# Patient Record
Sex: FEMALE | Race: WHITE | Employment: OTHER
[De-identification: names, ages, dates, MRNs, and addresses within clinical notes are randomized per-mention and may not be internally consistent; named-entity substitution may affect disease eponyms.]

---

## 2017-01-20 ENCOUNTER — OFFICE VISIT (OUTPATIENT)
Dept: FAMILY MEDICINE CLINIC | Facility: CLINIC | Age: 52
End: 2017-01-20

## 2017-01-20 VITALS
BODY MASS INDEX: 34.16 KG/M2 | HEART RATE: 64 BPM | WEIGHT: 199 LBS | DIASTOLIC BLOOD PRESSURE: 84 MMHG | SYSTOLIC BLOOD PRESSURE: 122 MMHG | TEMPERATURE: 97.6 F | RESPIRATION RATE: 18 BRPM | OXYGEN SATURATION: 93 %

## 2017-01-20 DIAGNOSIS — F33.2 MAJOR DEPRESSIVE DISORDER, RECURRENT, SEVERE WITHOUT PSYCHOTIC FEATURES (HCC): ICD-10-CM

## 2017-01-20 DIAGNOSIS — E78.2 MIXED HYPERLIPIDEMIA: ICD-10-CM

## 2017-01-20 DIAGNOSIS — I10 ESSENTIAL HYPERTENSION: Primary | ICD-10-CM

## 2017-01-20 PROCEDURE — 99214 OFFICE O/P EST MOD 30 MIN: CPT | Performed by: NURSE PRACTITIONER

## 2017-01-20 ASSESSMENT — ENCOUNTER SYMPTOMS
SINUS PRESSURE: 0
COLOR CHANGE: 0
BLOOD IN STOOL: 0
BLURRED VISION: 0
SHORTNESS OF BREATH: 0
WHEEZING: 0
CONSTIPATION: 0
SORE THROAT: 0
DIARRHEA: 0
ABDOMINAL PAIN: 0
CHEST TIGHTNESS: 0
EYE PAIN: 0
EYE ITCHING: 0
NAUSEA: 0
COUGH: 0
ABDOMINAL DISTENTION: 0

## 2017-02-01 DIAGNOSIS — I10 ESSENTIAL HYPERTENSION: ICD-10-CM

## 2017-02-01 RX ORDER — FUROSEMIDE 20 MG/1
TABLET ORAL
Qty: 30 TABLET | Refills: 5 | Status: SHIPPED | OUTPATIENT
Start: 2017-02-01 | End: 2017-12-29

## 2017-02-01 RX ORDER — METOCLOPRAMIDE 10 MG/1
TABLET ORAL
Qty: 30 TABLET | Refills: 5 | Status: SHIPPED | OUTPATIENT
Start: 2017-02-01 | End: 2017-12-29 | Stop reason: ALTCHOICE

## 2017-02-01 RX ORDER — LEVETIRACETAM 500 MG/1
TABLET ORAL
Qty: 60 TABLET | Refills: 5 | Status: SHIPPED | OUTPATIENT
Start: 2017-02-01 | End: 2017-12-08 | Stop reason: SDUPTHER

## 2017-02-02 RX ORDER — WARFARIN SODIUM 6 MG/1
6 TABLET ORAL DAILY
Qty: 30 TABLET | Refills: 3 | OUTPATIENT
Start: 2017-02-02

## 2017-02-02 RX ORDER — LEVETIRACETAM 250 MG/1
250 TABLET ORAL EVERY MORNING
Qty: 60 TABLET | Refills: 3 | OUTPATIENT
Start: 2017-02-02

## 2017-02-02 RX ORDER — LOSARTAN POTASSIUM 25 MG/1
25 TABLET ORAL DAILY
Qty: 30 TABLET | Refills: 3 | OUTPATIENT
Start: 2017-02-02

## 2017-02-02 RX ORDER — OMEPRAZOLE 20 MG/1
20 TABLET, DELAYED RELEASE ORAL DAILY
Qty: 30 TABLET | Refills: 3 | OUTPATIENT
Start: 2017-02-02

## 2017-02-07 ENCOUNTER — HOSPITAL ENCOUNTER (EMERGENCY)
Age: 52
Discharge: HOME | End: 2017-02-07
Attending: EMERGENCY MEDICINE
Payer: COMMERCIAL

## 2017-02-07 VITALS
TEMPERATURE: 98.6 F | OXYGEN SATURATION: 97 % | BODY MASS INDEX: 33.29 KG/M2 | DIASTOLIC BLOOD PRESSURE: 84 MMHG | RESPIRATION RATE: 16 BRPM | SYSTOLIC BLOOD PRESSURE: 121 MMHG | WEIGHT: 195 LBS | HEART RATE: 73 BPM | HEIGHT: 64 IN

## 2017-02-07 DIAGNOSIS — R79.1 ELEVATED INR: ICD-10-CM

## 2017-02-07 DIAGNOSIS — K57.32 DIVERTICULITIS OF COLON: Primary | ICD-10-CM

## 2017-02-07 LAB
ABSOLUTE EOS #: 0.1 K/UL (ref 0–0.4)
ABSOLUTE LYMPH #: 2.6 K/UL (ref 1–4.8)
ABSOLUTE MONO #: 0.5 K/UL (ref 0.1–1.3)
ALBUMIN SERPL-MCNC: 3.8 G/DL (ref 3.5–5.2)
ALBUMIN/GLOBULIN RATIO: ABNORMAL (ref 1–2.5)
ALP BLD-CCNC: 154 U/L (ref 35–104)
ALT SERPL-CCNC: 17 U/L (ref 5–33)
ANION GAP SERPL CALCULATED.3IONS-SCNC: 12 MMOL/L (ref 9–17)
AST SERPL-CCNC: 16 U/L
BASOPHILS # BLD: 1 % (ref 0–2)
BASOPHILS ABSOLUTE: 0.1 K/UL (ref 0–0.2)
BILIRUB SERPL-MCNC: 0.21 MG/DL (ref 0.3–1.2)
BILIRUBIN DIRECT: <0.08 MG/DL
BILIRUBIN, INDIRECT: ABNORMAL MG/DL (ref 0–1)
BUN BLDV-MCNC: 15 MG/DL (ref 6–20)
BUN/CREAT BLD: ABNORMAL (ref 9–20)
CALCIUM SERPL-MCNC: 8.8 MG/DL (ref 8.6–10.4)
CHLORIDE BLD-SCNC: 102 MMOL/L (ref 98–107)
CO2: 27 MMOL/L (ref 20–31)
CREAT SERPL-MCNC: 0.82 MG/DL (ref 0.5–0.9)
DIFFERENTIAL TYPE: ABNORMAL
EOSINOPHILS RELATIVE PERCENT: 2 % (ref 0–4)
GFR AFRICAN AMERICAN: >60 ML/MIN
GFR NON-AFRICAN AMERICAN: >60 ML/MIN
GFR SERPL CREATININE-BSD FRML MDRD: ABNORMAL ML/MIN/{1.73_M2}
GFR SERPL CREATININE-BSD FRML MDRD: ABNORMAL ML/MIN/{1.73_M2}
GLOBULIN: ABNORMAL G/DL (ref 1.5–3.8)
GLUCOSE BLD-MCNC: 123 MG/DL (ref 70–99)
HCT VFR BLD CALC: 34.6 % (ref 36–46)
HEMOGLOBIN: 11.6 G/DL (ref 12–16)
INR BLD: 3.7
LIPASE: 15 U/L (ref 13–60)
LYMPHOCYTES # BLD: 36 % (ref 24–44)
MAGNESIUM: 1.9 MG/DL (ref 1.6–2.6)
MCH RBC QN AUTO: 28 PG (ref 26–34)
MCHC RBC AUTO-ENTMCNC: 33.4 G/DL (ref 31–37)
MCV RBC AUTO: 83.6 FL (ref 80–100)
MONOCYTES # BLD: 7 % (ref 1–7)
PDW BLD-RTO: 14.8 % (ref 11.5–14.9)
PLATELET # BLD: 240 K/UL (ref 150–450)
PLATELET ESTIMATE: ABNORMAL
PMV BLD AUTO: 8.4 FL (ref 6–12)
POTASSIUM SERPL-SCNC: 3.7 MMOL/L (ref 3.7–5.3)
PROTHROMBIN TIME: 41.5 SEC (ref 9.7–12)
RBC # BLD: 4.14 M/UL (ref 4–5.2)
RBC # BLD: ABNORMAL 10*6/UL
SEG NEUTROPHILS: 54 % (ref 36–66)
SEGMENTED NEUTROPHILS ABSOLUTE COUNT: 4 K/UL (ref 1.3–9.1)
SODIUM BLD-SCNC: 141 MMOL/L (ref 135–144)
TOTAL PROTEIN: 6.9 G/DL (ref 6.4–8.3)
WBC # BLD: 7.3 K/UL (ref 3.5–11)
WBC # BLD: ABNORMAL 10*3/UL

## 2017-02-07 PROCEDURE — 36415 COLL VENOUS BLD VENIPUNCTURE: CPT

## 2017-02-07 PROCEDURE — 87505 NFCT AGENT DETECTION GI: CPT

## 2017-02-07 PROCEDURE — 99284 EMERGENCY DEPT VISIT MOD MDM: CPT

## 2017-02-07 PROCEDURE — 80076 HEPATIC FUNCTION PANEL: CPT

## 2017-02-07 PROCEDURE — 80048 BASIC METABOLIC PNL TOTAL CA: CPT

## 2017-02-07 PROCEDURE — 6360000002 HC RX W HCPCS: Performed by: EMERGENCY MEDICINE

## 2017-02-07 PROCEDURE — 83735 ASSAY OF MAGNESIUM: CPT

## 2017-02-07 PROCEDURE — 87177 OVA AND PARASITES SMEARS: CPT

## 2017-02-07 PROCEDURE — 6370000000 HC RX 637 (ALT 250 FOR IP): Performed by: EMERGENCY MEDICINE

## 2017-02-07 PROCEDURE — 96375 TX/PRO/DX INJ NEW DRUG ADDON: CPT

## 2017-02-07 PROCEDURE — 83690 ASSAY OF LIPASE: CPT

## 2017-02-07 PROCEDURE — 85025 COMPLETE CBC W/AUTO DIFF WBC: CPT

## 2017-02-07 PROCEDURE — 87209 SMEAR COMPLEX STAIN: CPT

## 2017-02-07 PROCEDURE — 85610 PROTHROMBIN TIME: CPT

## 2017-02-07 PROCEDURE — 87493 C DIFF AMPLIFIED PROBE: CPT

## 2017-02-07 PROCEDURE — 96374 THER/PROPH/DIAG INJ IV PUSH: CPT

## 2017-02-07 RX ORDER — AMOXICILLIN AND CLAVULANATE POTASSIUM 875; 125 MG/1; MG/1
1 TABLET, FILM COATED ORAL ONCE
Status: COMPLETED | OUTPATIENT
Start: 2017-02-07 | End: 2017-02-07

## 2017-02-07 RX ORDER — PANTOPRAZOLE SODIUM 20 MG/1
20 TABLET, DELAYED RELEASE ORAL EVERY MORNING
COMMUNITY
End: 2017-10-17 | Stop reason: ALTCHOICE

## 2017-02-07 RX ORDER — AMOXICILLIN AND CLAVULANATE POTASSIUM 875; 125 MG/1; MG/1
1 TABLET, FILM COATED ORAL 2 TIMES DAILY
Qty: 20 TABLET | Refills: 0 | Status: SHIPPED | OUTPATIENT
Start: 2017-02-07 | End: 2017-02-17

## 2017-02-07 RX ORDER — WARFARIN SODIUM 5 MG/1
15 TABLET ORAL
COMMUNITY
End: 2017-10-17 | Stop reason: DRUGHIGH

## 2017-02-07 RX ORDER — LOSARTAN POTASSIUM 25 MG/1
12.5 TABLET ORAL DAILY
COMMUNITY
End: 2018-01-09

## 2017-02-07 RX ORDER — ONDANSETRON 2 MG/ML
4 INJECTION INTRAMUSCULAR; INTRAVENOUS ONCE
Status: COMPLETED | OUTPATIENT
Start: 2017-02-07 | End: 2017-02-07

## 2017-02-07 RX ORDER — PROMETHAZINE HYDROCHLORIDE 12.5 MG/1
12.5 TABLET ORAL EVERY 6 HOURS PRN
COMMUNITY
End: 2017-12-29 | Stop reason: ALTCHOICE

## 2017-02-07 RX ORDER — DIPHENOXYLATE HYDROCHLORIDE AND ATROPINE SULFATE 2.5; .025 MG/1; MG/1
2 TABLET ORAL EVERY 8 HOURS PRN
COMMUNITY
End: 2017-11-06 | Stop reason: SDUPTHER

## 2017-02-07 RX ORDER — HYDROCODONE BITARTRATE AND ACETAMINOPHEN 5; 325 MG/1; MG/1
1 TABLET ORAL EVERY 6 HOURS PRN
Qty: 10 TABLET | Refills: 0 | Status: SHIPPED | OUTPATIENT
Start: 2017-02-07 | End: 2017-02-14

## 2017-02-07 RX ADMIN — AMOXICILLIN AND CLAVULANATE POTASSIUM 1 TABLET: 875; 125 TABLET, FILM COATED ORAL at 20:54

## 2017-02-07 RX ADMIN — HYDROMORPHONE HYDROCHLORIDE 1 MG: 1 INJECTION, SOLUTION INTRAMUSCULAR; INTRAVENOUS; SUBCUTANEOUS at 18:23

## 2017-02-07 RX ADMIN — ONDANSETRON 4 MG: 2 INJECTION INTRAMUSCULAR; INTRAVENOUS at 18:23

## 2017-02-07 ASSESSMENT — PAIN DESCRIPTION - LOCATION: LOCATION: ABDOMEN

## 2017-02-07 ASSESSMENT — PAIN SCALES - GENERAL
PAINLEVEL_OUTOF10: 10
PAINLEVEL_OUTOF10: 10

## 2017-02-07 ASSESSMENT — PAIN DESCRIPTION - ORIENTATION: ORIENTATION: RIGHT;LOWER

## 2017-02-07 ASSESSMENT — PAIN DESCRIPTION - PAIN TYPE: TYPE: ACUTE PAIN

## 2017-02-08 ENCOUNTER — CARE COORDINATION (OUTPATIENT)
Dept: CARE COORDINATION | Facility: CLINIC | Age: 52
End: 2017-02-08

## 2017-02-08 ASSESSMENT — ENCOUNTER SYMPTOMS
VOMITING: 1
NAUSEA: 1
COUGH: 0
BACK PAIN: 0
SHORTNESS OF BREATH: 0
ABDOMINAL PAIN: 1
DIARRHEA: 1

## 2017-02-20 ENCOUNTER — HOSPITAL ENCOUNTER (OUTPATIENT)
Age: 52
Setting detail: SPECIMEN
Discharge: HOME | End: 2017-02-20
Payer: COMMERCIAL

## 2017-02-20 LAB
INR BLD: 1.8
PROTHROMBIN TIME: 18.8 SEC (ref 9.4–12.6)

## 2017-02-23 ENCOUNTER — HOSPITAL ENCOUNTER (OUTPATIENT)
Age: 52
Setting detail: SPECIMEN
Discharge: HOME | End: 2017-02-23
Payer: COMMERCIAL

## 2017-02-23 LAB
INR BLD: 1.3
PROTHROMBIN TIME: 14.2 SEC (ref 9.4–12.6)

## 2017-03-12 ENCOUNTER — APPOINTMENT (OUTPATIENT)
Dept: GENERAL RADIOLOGY | Age: 52
End: 2017-03-12
Payer: COMMERCIAL

## 2017-03-12 ENCOUNTER — HOSPITAL ENCOUNTER (EMERGENCY)
Age: 52
Discharge: HOME | End: 2017-03-12
Attending: EMERGENCY MEDICINE
Payer: COMMERCIAL

## 2017-03-12 VITALS
SYSTOLIC BLOOD PRESSURE: 108 MMHG | OXYGEN SATURATION: 100 % | HEIGHT: 64 IN | HEART RATE: 63 BPM | BODY MASS INDEX: 34.15 KG/M2 | RESPIRATION RATE: 16 BRPM | WEIGHT: 200 LBS | DIASTOLIC BLOOD PRESSURE: 64 MMHG | TEMPERATURE: 98.2 F

## 2017-03-12 DIAGNOSIS — R10.9 CHRONIC ABDOMINAL PAIN: Primary | ICD-10-CM

## 2017-03-12 DIAGNOSIS — G89.29 CHRONIC ABDOMINAL PAIN: Primary | ICD-10-CM

## 2017-03-12 LAB
-: NORMAL
ABSOLUTE EOS #: 0.1 K/UL (ref 0–0.4)
ABSOLUTE LYMPH #: 2 K/UL (ref 1–4.8)
ABSOLUTE MONO #: 0.3 K/UL (ref 0.1–1.3)
ALBUMIN SERPL-MCNC: 4.2 G/DL (ref 3.5–5.2)
ALBUMIN/GLOBULIN RATIO: ABNORMAL (ref 1–2.5)
ALP BLD-CCNC: 163 U/L (ref 35–104)
ALT SERPL-CCNC: 23 U/L (ref 5–33)
AMORPHOUS: NORMAL
ANION GAP SERPL CALCULATED.3IONS-SCNC: 13 MMOL/L (ref 9–17)
AST SERPL-CCNC: 24 U/L
BACTERIA: NORMAL
BASOPHILS # BLD: 1 % (ref 0–2)
BASOPHILS ABSOLUTE: 0 K/UL (ref 0–0.2)
BILIRUB SERPL-MCNC: 0.35 MG/DL (ref 0.3–1.2)
BILIRUBIN URINE: NEGATIVE
BUN BLDV-MCNC: 13 MG/DL (ref 6–20)
BUN/CREAT BLD: ABNORMAL (ref 9–20)
CALCIUM SERPL-MCNC: 8.9 MG/DL (ref 8.6–10.4)
CASTS UA: NORMAL /LPF
CHLORIDE BLD-SCNC: 99 MMOL/L (ref 98–107)
CO2: 27 MMOL/L (ref 20–31)
COLOR: YELLOW
COMMENT UA: ABNORMAL
CREAT SERPL-MCNC: 0.74 MG/DL (ref 0.5–0.9)
CRYSTALS, UA: NORMAL /HPF
DIFFERENTIAL TYPE: ABNORMAL
EOSINOPHILS RELATIVE PERCENT: 2 % (ref 0–4)
EPITHELIAL CELLS UA: NORMAL /HPF
GFR AFRICAN AMERICAN: >60 ML/MIN
GFR NON-AFRICAN AMERICAN: >60 ML/MIN
GFR SERPL CREATININE-BSD FRML MDRD: ABNORMAL ML/MIN/{1.73_M2}
GFR SERPL CREATININE-BSD FRML MDRD: ABNORMAL ML/MIN/{1.73_M2}
GLUCOSE BLD-MCNC: 102 MG/DL (ref 70–99)
GLUCOSE URINE: NEGATIVE
HCT VFR BLD CALC: 38.6 % (ref 36–46)
HEMOGLOBIN: 12.5 G/DL (ref 12–16)
INR BLD: 2.6
KETONES, URINE: NEGATIVE
LEUKOCYTE ESTERASE, URINE: NEGATIVE
LIPASE: 15 U/L (ref 13–60)
LYMPHOCYTES # BLD: 35 % (ref 24–44)
MCH RBC QN AUTO: 26.9 PG (ref 26–34)
MCHC RBC AUTO-ENTMCNC: 32.4 G/DL (ref 31–37)
MCV RBC AUTO: 83.1 FL (ref 80–100)
MONOCYTES # BLD: 6 % (ref 1–7)
MUCUS: NORMAL
NITRITE, URINE: NEGATIVE
OTHER OBSERVATIONS UA: NORMAL
PARTIAL THROMBOPLASTIN TIME: 43.3 SEC (ref 23–31)
PDW BLD-RTO: 15.3 % (ref 11.5–14.9)
PH UA: 5.5 (ref 5–8)
PLATELET # BLD: 231 K/UL (ref 150–450)
PLATELET ESTIMATE: ABNORMAL
PMV BLD AUTO: 8.4 FL (ref 6–12)
POTASSIUM SERPL-SCNC: 3.7 MMOL/L (ref 3.7–5.3)
PROTEIN UA: NEGATIVE
PROTHROMBIN TIME: 28.8 SEC (ref 9.7–12)
RBC # BLD: 4.65 M/UL (ref 4–5.2)
RBC # BLD: ABNORMAL 10*6/UL
RBC UA: NORMAL /HPF
RENAL EPITHELIAL, UA: NORMAL /HPF
SEG NEUTROPHILS: 56 % (ref 36–66)
SEGMENTED NEUTROPHILS ABSOLUTE COUNT: 3.2 K/UL (ref 1.3–9.1)
SODIUM BLD-SCNC: 139 MMOL/L (ref 135–144)
SPECIFIC GRAVITY UA: 1.01 (ref 1–1.03)
TOTAL PROTEIN: 7.3 G/DL (ref 6.4–8.3)
TRICHOMONAS: NORMAL
TURBIDITY: ABNORMAL
URINE HGB: NEGATIVE
UROBILINOGEN, URINE: NORMAL
WBC # BLD: 5.8 K/UL (ref 3.5–11)
WBC # BLD: ABNORMAL 10*3/UL
WBC UA: NORMAL /HPF
YEAST: NORMAL

## 2017-03-12 PROCEDURE — 2580000003 HC RX 258: Performed by: EMERGENCY MEDICINE

## 2017-03-12 PROCEDURE — 80053 COMPREHEN METABOLIC PANEL: CPT

## 2017-03-12 PROCEDURE — 85025 COMPLETE CBC W/AUTO DIFF WBC: CPT

## 2017-03-12 PROCEDURE — 81001 URINALYSIS AUTO W/SCOPE: CPT

## 2017-03-12 PROCEDURE — 83690 ASSAY OF LIPASE: CPT

## 2017-03-12 PROCEDURE — 96372 THER/PROPH/DIAG INJ SC/IM: CPT

## 2017-03-12 PROCEDURE — 74022 RADEX COMPL AQT ABD SERIES: CPT

## 2017-03-12 PROCEDURE — 6360000002 HC RX W HCPCS: Performed by: EMERGENCY MEDICINE

## 2017-03-12 PROCEDURE — 96374 THER/PROPH/DIAG INJ IV PUSH: CPT

## 2017-03-12 PROCEDURE — 99284 EMERGENCY DEPT VISIT MOD MDM: CPT

## 2017-03-12 PROCEDURE — 85730 THROMBOPLASTIN TIME PARTIAL: CPT

## 2017-03-12 PROCEDURE — 36415 COLL VENOUS BLD VENIPUNCTURE: CPT

## 2017-03-12 PROCEDURE — 85610 PROTHROMBIN TIME: CPT

## 2017-03-12 RX ORDER — DICYCLOMINE HYDROCHLORIDE 10 MG/ML
20 INJECTION INTRAMUSCULAR ONCE
Status: COMPLETED | OUTPATIENT
Start: 2017-03-12 | End: 2017-03-12

## 2017-03-12 RX ORDER — ONDANSETRON 2 MG/ML
4 INJECTION INTRAMUSCULAR; INTRAVENOUS ONCE
Status: COMPLETED | OUTPATIENT
Start: 2017-03-12 | End: 2017-03-12

## 2017-03-12 RX ORDER — 0.9 % SODIUM CHLORIDE 0.9 %
1000 INTRAVENOUS SOLUTION INTRAVENOUS ONCE
Status: COMPLETED | OUTPATIENT
Start: 2017-03-12 | End: 2017-03-12

## 2017-03-12 RX ORDER — ONDANSETRON 4 MG/1
4 TABLET, ORALLY DISINTEGRATING ORAL EVERY 8 HOURS PRN
Qty: 10 TABLET | Refills: 0 | Status: SHIPPED | OUTPATIENT
Start: 2017-03-12 | End: 2017-10-17 | Stop reason: ALTCHOICE

## 2017-03-12 RX ORDER — DICYCLOMINE HYDROCHLORIDE 10 MG/1
10 CAPSULE ORAL EVERY 6 HOURS PRN
Qty: 20 CAPSULE | Refills: 0 | Status: SHIPPED | OUTPATIENT
Start: 2017-03-12 | End: 2017-12-29 | Stop reason: ALTCHOICE

## 2017-03-12 RX ADMIN — ONDANSETRON 4 MG: 2 INJECTION INTRAMUSCULAR; INTRAVENOUS at 12:41

## 2017-03-12 RX ADMIN — DICYCLOMINE HYDROCHLORIDE 20 MG: 20 INJECTION, SOLUTION INTRAMUSCULAR at 12:43

## 2017-03-12 RX ADMIN — SODIUM CHLORIDE 1000 ML: 9 INJECTION, SOLUTION INTRAVENOUS at 12:38

## 2017-03-12 ASSESSMENT — ENCOUNTER SYMPTOMS
RHINORRHEA: 0
FACIAL SWELLING: 0
DIARRHEA: 1
EYE REDNESS: 0
BLOOD IN STOOL: 0
NAUSEA: 1
WHEEZING: 0
VOMITING: 0
ABDOMINAL PAIN: 1
BACK PAIN: 0
EYE PAIN: 0
COLOR CHANGE: 0
SINUS PRESSURE: 0
TROUBLE SWALLOWING: 0
CONSTIPATION: 0
SORE THROAT: 0
SHORTNESS OF BREATH: 0
CHEST TIGHTNESS: 0
EYE DISCHARGE: 0
COUGH: 0

## 2017-03-12 ASSESSMENT — PAIN DESCRIPTION - PAIN TYPE: TYPE: ACUTE PAIN

## 2017-03-12 ASSESSMENT — PAIN DESCRIPTION - ORIENTATION: ORIENTATION: RIGHT;LOWER

## 2017-03-12 ASSESSMENT — PAIN SCALES - GENERAL: PAINLEVEL_OUTOF10: 10

## 2017-03-12 ASSESSMENT — PAIN DESCRIPTION - LOCATION: LOCATION: ABDOMEN

## 2017-03-14 ENCOUNTER — TELEPHONE (OUTPATIENT)
Dept: FAMILY MEDICINE CLINIC | Age: 52
End: 2017-03-14

## 2017-04-11 RX ORDER — GLUCOSAM/CHON-MSM1/C/MANG/BOSW 500-416.6
1 TABLET ORAL DAILY
Qty: 100 EACH | Refills: 3 | Status: SHIPPED | OUTPATIENT
Start: 2017-04-11

## 2017-04-21 DIAGNOSIS — Z12.39 ENCOUNTER FOR SCREENING FOR MALIGNANT NEOPLASM OF BREAST: Primary | ICD-10-CM

## 2017-07-17 ENCOUNTER — OFFICE VISIT (OUTPATIENT)
Dept: FAMILY MEDICINE CLINIC | Age: 52
End: 2017-07-17
Payer: MEDICARE

## 2017-07-17 VITALS
HEART RATE: 76 BPM | DIASTOLIC BLOOD PRESSURE: 78 MMHG | BODY MASS INDEX: 32.82 KG/M2 | SYSTOLIC BLOOD PRESSURE: 122 MMHG | OXYGEN SATURATION: 98 % | WEIGHT: 191.2 LBS | TEMPERATURE: 97.5 F | RESPIRATION RATE: 18 BRPM

## 2017-07-17 DIAGNOSIS — G89.29 CHRONIC RIGHT-SIDED LOW BACK PAIN WITH RIGHT-SIDED SCIATICA: Primary | ICD-10-CM

## 2017-07-17 DIAGNOSIS — M54.41 CHRONIC RIGHT-SIDED LOW BACK PAIN WITH RIGHT-SIDED SCIATICA: Primary | ICD-10-CM

## 2017-07-17 PROCEDURE — 99213 OFFICE O/P EST LOW 20 MIN: CPT | Performed by: NURSE PRACTITIONER

## 2017-07-17 RX ORDER — CYCLOBENZAPRINE HCL 10 MG
10 TABLET ORAL 2 TIMES DAILY PRN
Qty: 20 TABLET | Refills: 0 | Status: SHIPPED | OUTPATIENT
Start: 2017-07-17 | End: 2017-07-27

## 2017-07-20 ASSESSMENT — ENCOUNTER SYMPTOMS
SINUS PRESSURE: 0
COUGH: 0
EYE ITCHING: 0
WHEEZING: 0
SORE THROAT: 0
ABDOMINAL DISTENTION: 0
NAUSEA: 0
ABDOMINAL PAIN: 0
CONSTIPATION: 0
DIARRHEA: 0
BLOOD IN STOOL: 0
CHEST TIGHTNESS: 0
SHORTNESS OF BREATH: 0
EYE PAIN: 0
COLOR CHANGE: 0

## 2017-07-22 ASSESSMENT — ENCOUNTER SYMPTOMS: BACK PAIN: 1

## 2017-07-31 ENCOUNTER — HOSPITAL ENCOUNTER (OUTPATIENT)
Dept: PAIN MANAGEMENT | Age: 52
Discharge: HOME | End: 2017-07-31
Payer: MEDICARE

## 2017-07-31 VITALS
TEMPERATURE: 98.6 F | HEIGHT: 64 IN | RESPIRATION RATE: 18 BRPM | OXYGEN SATURATION: 98 % | DIASTOLIC BLOOD PRESSURE: 77 MMHG | HEART RATE: 76 BPM | SYSTOLIC BLOOD PRESSURE: 137 MMHG | WEIGHT: 196 LBS | BODY MASS INDEX: 33.46 KG/M2

## 2017-07-31 DIAGNOSIS — M47.817 LUMBOSACRAL SPONDYLOSIS WITHOUT MYELOPATHY: Primary | ICD-10-CM

## 2017-07-31 DIAGNOSIS — M51.36 DDD (DEGENERATIVE DISC DISEASE), LUMBAR: ICD-10-CM

## 2017-07-31 DIAGNOSIS — M46.1 SACROILIITIS (HCC): ICD-10-CM

## 2017-07-31 DIAGNOSIS — M47.816 LUMBAR FACET ARTHROPATHY: ICD-10-CM

## 2017-07-31 PROCEDURE — G0463 HOSPITAL OUTPT CLINIC VISIT: HCPCS

## 2017-07-31 PROCEDURE — 99204 OFFICE O/P NEW MOD 45 MIN: CPT | Performed by: PAIN MEDICINE

## 2017-07-31 PROCEDURE — 99204 OFFICE O/P NEW MOD 45 MIN: CPT

## 2017-07-31 PROCEDURE — 80307 DRUG TEST PRSMV CHEM ANLYZR: CPT

## 2017-07-31 ASSESSMENT — PAIN DESCRIPTION - DESCRIPTORS: DESCRIPTORS: ACHING;CONSTANT

## 2017-07-31 ASSESSMENT — PAIN DESCRIPTION - DIRECTION: RADIATING_TOWARDS: DOWN RIGHT LEG

## 2017-07-31 ASSESSMENT — ENCOUNTER SYMPTOMS
VOMITING: 0
CONSTIPATION: 1
COUGH: 0
PHOTOPHOBIA: 0
NAUSEA: 0
BACK PAIN: 1
SHORTNESS OF BREATH: 0
HEARTBURN: 0
BLURRED VISION: 0
DIARRHEA: 0

## 2017-07-31 ASSESSMENT — PAIN DESCRIPTION - PAIN TYPE: TYPE: CHRONIC PAIN

## 2017-07-31 ASSESSMENT — PAIN DESCRIPTION - LOCATION: LOCATION: BACK;LEG

## 2017-07-31 ASSESSMENT — PAIN DESCRIPTION - ONSET: ONSET: SUDDEN

## 2017-07-31 ASSESSMENT — PAIN DESCRIPTION - FREQUENCY: FREQUENCY: CONTINUOUS

## 2017-07-31 ASSESSMENT — PAIN DESCRIPTION - ORIENTATION: ORIENTATION: RIGHT

## 2017-07-31 ASSESSMENT — PAIN SCALES - GENERAL: PAINLEVEL_OUTOF10: 8

## 2017-08-04 LAB
6-ACETYLMORPHINE, UR: NOT DETECTED
7-AMINOCLONAZEPAM, URINE: NOT DETECTED
ALPHA-OH-ALPRAZ, URINE: NOT DETECTED
ALPRAZOLAM, URINE: NOT DETECTED
AMPHETAMINES, URINE: NOT DETECTED
BARBITURATES, URINE: NOT DETECTED
BENZOYLECGONINE, UR: NOT DETECTED
BUPRENORPHINE URINE: NOT DETECTED
CARISOPRODOL, UR: NOT DETECTED
CLONAZEPAM, URINE: NOT DETECTED
CODEINE, URINE: NOT DETECTED
CREATININE URINE: 59.3 MG/DL (ref 20–400)
DIAZEPAM, URINE: NOT DETECTED
DRUGS EXPECTED, UR: NORMAL
EER HI RES INTERP UR: NORMAL
ETHYL GLUCURONIDE UR: NOT DETECTED
FENTANYL URINE: NOT DETECTED
HYDROCODONE, URINE: NOT DETECTED
HYDROMORPHONE, URINE: NOT DETECTED
LORAZEPAM, URINE: NOT DETECTED
MARIJUANA METAB, UR: NOT DETECTED
MDA, UR: NOT DETECTED
MDEA, EVE, UR: NOT DETECTED
MDMA URINE: NOT DETECTED
MEPERIDINE METAB, UR: NOT DETECTED
METHADONE, URINE: NOT DETECTED
METHAMPHETAMINE, URINE: NOT DETECTED
METHYLPHENIDATE: NOT DETECTED
MIDAZOLAM, URINE: NOT DETECTED
MORPHINE URINE: NOT DETECTED
NORBUPRENORPHINE, URINE: NOT DETECTED
NORDIAZEPAM, URINE: NOT DETECTED
NORFENTANYL, URINE: NOT DETECTED
NORHYDROCODONE, URINE: NOT DETECTED
NOROXYCODONE, URINE: NOT DETECTED
NOROXYMORPHONE, URINE: NOT DETECTED
OXAZEPAM, URINE: NOT DETECTED
OXYCODONE URINE: NOT DETECTED
OXYMORPHONE, URINE: NOT DETECTED
PAIN MANAGEMENT DRUG PANEL INTERP, URINE: NORMAL
PAIN MGT DRUG PANEL, HI RES, UR: NORMAL
PCP,URINE: NOT DETECTED
PHENTERMINE, UR: NOT DETECTED
PROPOXYPHENE, URINE: NOT DETECTED
TAPENTADOL, URINE: NOT DETECTED
TAPENTADOL-O-SULFATE, URINE: NOT DETECTED
TEMAZEPAM, URINE: NOT DETECTED
TRAMADOL, URINE: NOT DETECTED
ZOLPIDEM, URINE: NOT DETECTED

## 2017-08-14 ENCOUNTER — OFFICE VISIT (OUTPATIENT)
Dept: FAMILY MEDICINE CLINIC | Age: 52
End: 2017-08-14
Payer: MEDICARE

## 2017-08-14 VITALS
SYSTOLIC BLOOD PRESSURE: 132 MMHG | HEIGHT: 64 IN | TEMPERATURE: 97.7 F | WEIGHT: 193 LBS | OXYGEN SATURATION: 98 % | RESPIRATION RATE: 16 BRPM | DIASTOLIC BLOOD PRESSURE: 82 MMHG | BODY MASS INDEX: 32.95 KG/M2 | HEART RATE: 72 BPM

## 2017-08-14 DIAGNOSIS — M47.817 LUMBOSACRAL SPONDYLOSIS WITHOUT MYELOPATHY: Primary | ICD-10-CM

## 2017-08-14 DIAGNOSIS — J01.10 ACUTE NON-RECURRENT FRONTAL SINUSITIS: ICD-10-CM

## 2017-08-14 PROCEDURE — 99213 OFFICE O/P EST LOW 20 MIN: CPT | Performed by: NURSE PRACTITIONER

## 2017-08-14 RX ORDER — BUSPIRONE HYDROCHLORIDE 10 MG/1
10 TABLET ORAL DAILY
COMMUNITY

## 2017-08-14 RX ORDER — ZOLPIDEM TARTRATE 5 MG/1
5 TABLET ORAL
COMMUNITY
Start: 2017-08-04 | End: 2017-10-17 | Stop reason: ALTCHOICE

## 2017-08-14 RX ORDER — AMOXICILLIN 500 MG/1
500 CAPSULE ORAL 2 TIMES DAILY
Qty: 20 CAPSULE | Refills: 0 | Status: SHIPPED | OUTPATIENT
Start: 2017-08-14 | End: 2017-08-24

## 2017-08-14 RX ORDER — OMEPRAZOLE 20 MG/1
20 TABLET, DELAYED RELEASE ORAL DAILY
Qty: 90 TABLET | Refills: 1 | Status: SHIPPED | OUTPATIENT
Start: 2017-08-14 | End: 2018-01-09

## 2017-08-15 ENCOUNTER — HOSPITAL ENCOUNTER (OUTPATIENT)
Dept: PAIN MANAGEMENT | Age: 52
Discharge: HOME | End: 2017-08-15
Payer: MEDICARE

## 2017-08-15 ENCOUNTER — HOSPITAL ENCOUNTER (OUTPATIENT)
Dept: GENERAL RADIOLOGY | Age: 52
Discharge: HOME | End: 2017-08-15
Payer: MEDICARE

## 2017-08-15 VITALS
OXYGEN SATURATION: 100 % | RESPIRATION RATE: 16 BRPM | SYSTOLIC BLOOD PRESSURE: 145 MMHG | TEMPERATURE: 98.8 F | HEIGHT: 64 IN | HEART RATE: 72 BPM | BODY MASS INDEX: 32.95 KG/M2 | WEIGHT: 193 LBS | DIASTOLIC BLOOD PRESSURE: 96 MMHG

## 2017-08-15 DIAGNOSIS — R52 PAIN: ICD-10-CM

## 2017-08-15 DIAGNOSIS — M51.36 DDD (DEGENERATIVE DISC DISEASE), LUMBAR: ICD-10-CM

## 2017-08-15 DIAGNOSIS — M47.816 LUMBAR FACET ARTHROPATHY: ICD-10-CM

## 2017-08-15 DIAGNOSIS — M47.817 LUMBOSACRAL SPONDYLOSIS WITHOUT MYELOPATHY: Primary | ICD-10-CM

## 2017-08-15 PROCEDURE — 64495 INJ PARAVERT F JNT L/S 3 LEV: CPT | Performed by: PAIN MEDICINE

## 2017-08-15 PROCEDURE — 6360000002 HC RX W HCPCS

## 2017-08-15 PROCEDURE — 64493 INJ PARAVERT F JNT L/S 1 LEV: CPT

## 2017-08-15 PROCEDURE — 64495 INJ PARAVERT F JNT L/S 3 LEV: CPT

## 2017-08-15 PROCEDURE — 6360000002 HC RX W HCPCS: Performed by: PAIN MEDICINE

## 2017-08-15 PROCEDURE — 3209999900 FLUORO FOR SURGICAL PROCEDURES

## 2017-08-15 PROCEDURE — 64493 INJ PARAVERT F JNT L/S 1 LEV: CPT | Performed by: PAIN MEDICINE

## 2017-08-15 PROCEDURE — 6360000004 HC RX CONTRAST MEDICATION

## 2017-08-15 PROCEDURE — 64494 INJ PARAVERT F JNT L/S 2 LEV: CPT | Performed by: PAIN MEDICINE

## 2017-08-15 PROCEDURE — 64494 INJ PARAVERT F JNT L/S 2 LEV: CPT

## 2017-08-15 PROCEDURE — 2500000003 HC RX 250 WO HCPCS

## 2017-08-15 RX ORDER — MIDAZOLAM HYDROCHLORIDE 1 MG/ML
INJECTION INTRAMUSCULAR; INTRAVENOUS
Status: COMPLETED | OUTPATIENT
Start: 2017-08-15 | End: 2017-08-15

## 2017-08-15 RX ORDER — FENTANYL CITRATE 50 UG/ML
INJECTION, SOLUTION INTRAMUSCULAR; INTRAVENOUS
Status: COMPLETED | OUTPATIENT
Start: 2017-08-15 | End: 2017-08-15

## 2017-08-15 RX ADMIN — MIDAZOLAM 2 MG: 1 INJECTION INTRAMUSCULAR; INTRAVENOUS at 09:22

## 2017-08-15 RX ADMIN — FENTANYL CITRATE 50 MCG: 50 INJECTION INTRAMUSCULAR; INTRAVENOUS at 09:31

## 2017-08-15 ASSESSMENT — PAIN DESCRIPTION - DESCRIPTORS: DESCRIPTORS: ACHING;CONSTANT

## 2017-08-15 ASSESSMENT — PAIN - FUNCTIONAL ASSESSMENT
PAIN_FUNCTIONAL_ASSESSMENT: 0-10

## 2017-08-16 ENCOUNTER — TELEPHONE (OUTPATIENT)
Dept: PAIN MANAGEMENT | Age: 52
End: 2017-08-16

## 2017-08-18 PROBLEM — E11.9 TYPE 2 DIABETES MELLITUS WITHOUT COMPLICATION, WITHOUT LONG-TERM CURRENT USE OF INSULIN (HCC): Chronic | Status: ACTIVE | Noted: 2017-08-18

## 2017-08-19 ASSESSMENT — ENCOUNTER SYMPTOMS
WHEEZING: 0
ABDOMINAL PAIN: 0
CONSTIPATION: 0
EYE ITCHING: 0
SINUS PRESSURE: 0
COLOR CHANGE: 0
EYE PAIN: 0
SORE THROAT: 0
DIARRHEA: 0
SHORTNESS OF BREATH: 0
BLOOD IN STOOL: 0
NAUSEA: 0
COUGH: 0
ABDOMINAL DISTENTION: 0
CHEST TIGHTNESS: 0
BACK PAIN: 1

## 2017-08-21 ENCOUNTER — HOSPITAL ENCOUNTER (EMERGENCY)
Age: 52
Discharge: HOME | End: 2017-08-21
Attending: EMERGENCY MEDICINE
Payer: MEDICARE

## 2017-08-21 ENCOUNTER — TELEPHONE (OUTPATIENT)
Dept: PAIN MANAGEMENT | Age: 52
End: 2017-08-21

## 2017-08-21 VITALS
DIASTOLIC BLOOD PRESSURE: 94 MMHG | SYSTOLIC BLOOD PRESSURE: 147 MMHG | RESPIRATION RATE: 16 BRPM | OXYGEN SATURATION: 100 % | TEMPERATURE: 98.3 F | WEIGHT: 196 LBS | HEART RATE: 87 BPM | BODY MASS INDEX: 33.46 KG/M2 | HEIGHT: 64 IN

## 2017-08-21 DIAGNOSIS — S39.012A LOW BACK STRAIN, INITIAL ENCOUNTER: Primary | ICD-10-CM

## 2017-08-21 PROCEDURE — 99283 EMERGENCY DEPT VISIT LOW MDM: CPT

## 2017-08-21 PROCEDURE — 6360000002 HC RX W HCPCS: Performed by: PHYSICIAN ASSISTANT

## 2017-08-21 PROCEDURE — 96372 THER/PROPH/DIAG INJ SC/IM: CPT

## 2017-08-21 RX ORDER — KETOROLAC TROMETHAMINE 30 MG/ML
60 INJECTION, SOLUTION INTRAMUSCULAR; INTRAVENOUS ONCE
Status: COMPLETED | OUTPATIENT
Start: 2017-08-21 | End: 2017-08-21

## 2017-08-21 RX ADMIN — KETOROLAC TROMETHAMINE 60 MG: 30 INJECTION, SOLUTION INTRAMUSCULAR at 11:59

## 2017-08-21 ASSESSMENT — PAIN SCALES - GENERAL
PAINLEVEL_OUTOF10: 10
PAINLEVEL_OUTOF10: 10

## 2017-08-21 ASSESSMENT — ENCOUNTER SYMPTOMS
ABDOMINAL PAIN: 0
WHEEZING: 0
COUGH: 0
SHORTNESS OF BREATH: 0
ABDOMINAL SWELLING: 0
BACK PAIN: 1

## 2017-08-21 ASSESSMENT — PAIN DESCRIPTION - ORIENTATION: ORIENTATION: RIGHT;LEFT

## 2017-08-21 ASSESSMENT — PAIN DESCRIPTION - LOCATION: LOCATION: LEG;ARM

## 2017-08-21 ASSESSMENT — PAIN DESCRIPTION - PAIN TYPE: TYPE: ACUTE PAIN

## 2017-08-23 ENCOUNTER — TELEPHONE (OUTPATIENT)
Dept: PAIN MANAGEMENT | Age: 52
End: 2017-08-23

## 2017-08-30 ENCOUNTER — HOSPITAL ENCOUNTER (OUTPATIENT)
Dept: PAIN MANAGEMENT | Age: 52
Discharge: HOME | End: 2017-08-30
Payer: MEDICARE

## 2017-08-30 VITALS
HEART RATE: 83 BPM | SYSTOLIC BLOOD PRESSURE: 130 MMHG | OXYGEN SATURATION: 99 % | RESPIRATION RATE: 16 BRPM | WEIGHT: 196 LBS | TEMPERATURE: 98.1 F | BODY MASS INDEX: 33.46 KG/M2 | HEIGHT: 64 IN | DIASTOLIC BLOOD PRESSURE: 86 MMHG

## 2017-08-30 DIAGNOSIS — M54.16 LUMBAR RADICULOPATHY: Primary | ICD-10-CM

## 2017-08-30 DIAGNOSIS — D68.51 FACTOR V LEIDEN (HCC): ICD-10-CM

## 2017-08-30 DIAGNOSIS — M47.817 LUMBOSACRAL SPONDYLOSIS WITHOUT MYELOPATHY: ICD-10-CM

## 2017-08-30 DIAGNOSIS — Z79.01 WARFARIN ANTICOAGULATION: ICD-10-CM

## 2017-08-30 DIAGNOSIS — M51.36 DDD (DEGENERATIVE DISC DISEASE), LUMBAR: ICD-10-CM

## 2017-08-30 DIAGNOSIS — M47.816 LUMBAR FACET ARTHROPATHY: ICD-10-CM

## 2017-08-30 DIAGNOSIS — M46.1 SACROILIITIS (HCC): ICD-10-CM

## 2017-08-30 PROCEDURE — 99213 OFFICE O/P EST LOW 20 MIN: CPT

## 2017-08-30 PROCEDURE — 99214 OFFICE O/P EST MOD 30 MIN: CPT | Performed by: PAIN MEDICINE

## 2017-08-30 RX ORDER — MELOXICAM 15 MG/1
15 TABLET ORAL DAILY
Qty: 30 TABLET | Refills: 3 | Status: SHIPPED | OUTPATIENT
Start: 2017-08-30 | End: 2017-09-01

## 2017-08-30 ASSESSMENT — ENCOUNTER SYMPTOMS
PHOTOPHOBIA: 0
HEARTBURN: 0
SHORTNESS OF BREATH: 0
BLURRED VISION: 0
BACK PAIN: 1
DIARRHEA: 0
VOMITING: 0
EYES NEGATIVE: 1
GASTROINTESTINAL NEGATIVE: 1
NAUSEA: 0
RESPIRATORY NEGATIVE: 1
COUGH: 0

## 2017-08-30 ASSESSMENT — PAIN DESCRIPTION - FREQUENCY: FREQUENCY: CONTINUOUS

## 2017-08-30 ASSESSMENT — PAIN DESCRIPTION - PROGRESSION: CLINICAL_PROGRESSION: NOT CHANGED

## 2017-08-30 ASSESSMENT — PAIN DESCRIPTION - ORIENTATION: ORIENTATION: RIGHT;LEFT;LOWER

## 2017-08-30 ASSESSMENT — PAIN DESCRIPTION - PAIN TYPE: TYPE: CHRONIC PAIN

## 2017-08-30 ASSESSMENT — PAIN DESCRIPTION - DESCRIPTORS: DESCRIPTORS: ACHING;CONSTANT

## 2017-08-30 ASSESSMENT — PAIN DESCRIPTION - ONSET: ONSET: ON-GOING

## 2017-08-30 ASSESSMENT — PAIN SCALES - GENERAL: PAINLEVEL_OUTOF10: 8

## 2017-08-30 ASSESSMENT — PAIN DESCRIPTION - DIRECTION: RADIATING_TOWARDS: LEFT LEG

## 2017-08-30 ASSESSMENT — PAIN DESCRIPTION - LOCATION: LOCATION: BACK

## 2017-08-31 ENCOUNTER — TELEPHONE (OUTPATIENT)
Dept: PAIN MANAGEMENT | Age: 52
End: 2017-08-31

## 2017-09-01 RX ORDER — TRAMADOL HYDROCHLORIDE 50 MG/1
50 TABLET ORAL EVERY 6 HOURS PRN
Qty: 40 TABLET | Refills: 0 | Status: SHIPPED | OUTPATIENT
Start: 2017-09-01 | End: 2017-09-13

## 2017-09-08 ENCOUNTER — NURSE ONLY (OUTPATIENT)
Dept: FAMILY MEDICINE CLINIC | Age: 52
End: 2017-09-08
Payer: MEDICARE

## 2017-09-08 ENCOUNTER — HOSPITAL ENCOUNTER (OUTPATIENT)
Age: 52
Setting detail: SPECIMEN
Discharge: HOME | End: 2017-09-08
Payer: MEDICARE

## 2017-09-08 DIAGNOSIS — Z23 FLU VACCINE NEED: ICD-10-CM

## 2017-09-08 DIAGNOSIS — E11.9 TYPE 2 DIABETES MELLITUS WITHOUT COMPLICATION, WITHOUT LONG-TERM CURRENT USE OF INSULIN (HCC): Primary | Chronic | ICD-10-CM

## 2017-09-08 DIAGNOSIS — Z23 NEED FOR PNEUMOCOCCAL VACCINATION: ICD-10-CM

## 2017-09-08 LAB
CREATININE URINE: 27.3 MG/DL (ref 28–217)
HBA1C MFR BLD: 6.2 %
MICROALBUMIN/CREAT 24H UR: <12 MG/L
MICROALBUMIN/CREAT UR-RTO: 44 MCG/MG CREAT

## 2017-09-08 PROCEDURE — G0009 ADMIN PNEUMOCOCCAL VACCINE: HCPCS | Performed by: NURSE PRACTITIONER

## 2017-09-08 PROCEDURE — 90732 PPSV23 VACC 2 YRS+ SUBQ/IM: CPT | Performed by: NURSE PRACTITIONER

## 2017-09-08 PROCEDURE — 83036 HEMOGLOBIN GLYCOSYLATED A1C: CPT | Performed by: NURSE PRACTITIONER

## 2017-09-08 PROCEDURE — G0008 ADMIN INFLUENZA VIRUS VAC: HCPCS | Performed by: NURSE PRACTITIONER

## 2017-09-08 PROCEDURE — 90686 IIV4 VACC NO PRSV 0.5 ML IM: CPT | Performed by: NURSE PRACTITIONER

## 2017-09-13 ENCOUNTER — APPOINTMENT (OUTPATIENT)
Dept: GENERAL RADIOLOGY | Age: 52
End: 2017-09-13
Payer: MEDICARE

## 2017-09-13 ENCOUNTER — HOSPITAL ENCOUNTER (EMERGENCY)
Age: 52
Discharge: HOME | End: 2017-09-13
Attending: EMERGENCY MEDICINE
Payer: MEDICARE

## 2017-09-13 VITALS
HEIGHT: 64 IN | HEART RATE: 88 BPM | OXYGEN SATURATION: 99 % | DIASTOLIC BLOOD PRESSURE: 79 MMHG | WEIGHT: 196 LBS | TEMPERATURE: 98 F | RESPIRATION RATE: 16 BRPM | BODY MASS INDEX: 33.46 KG/M2 | SYSTOLIC BLOOD PRESSURE: 116 MMHG

## 2017-09-13 DIAGNOSIS — M54.6 ACUTE LEFT-SIDED THORACIC BACK PAIN: Primary | ICD-10-CM

## 2017-09-13 PROCEDURE — 6360000002 HC RX W HCPCS: Performed by: PHYSICIAN ASSISTANT

## 2017-09-13 PROCEDURE — 96372 THER/PROPH/DIAG INJ SC/IM: CPT

## 2017-09-13 PROCEDURE — 99283 EMERGENCY DEPT VISIT LOW MDM: CPT

## 2017-09-13 PROCEDURE — 72072 X-RAY EXAM THORAC SPINE 3VWS: CPT

## 2017-09-13 PROCEDURE — 72040 X-RAY EXAM NECK SPINE 2-3 VW: CPT

## 2017-09-13 RX ORDER — KETOROLAC TROMETHAMINE 30 MG/ML
60 INJECTION, SOLUTION INTRAMUSCULAR; INTRAVENOUS ONCE
Status: COMPLETED | OUTPATIENT
Start: 2017-09-13 | End: 2017-09-13

## 2017-09-13 RX ORDER — TRAMADOL HYDROCHLORIDE 50 MG/1
50 TABLET ORAL EVERY 6 HOURS PRN
Qty: 10 TABLET | Refills: 0 | Status: SHIPPED | OUTPATIENT
Start: 2017-09-13 | End: 2017-10-17 | Stop reason: ALTCHOICE

## 2017-09-13 RX ADMIN — KETOROLAC TROMETHAMINE 60 MG: 30 INJECTION, SOLUTION INTRAMUSCULAR at 18:34

## 2017-09-13 ASSESSMENT — PAIN DESCRIPTION - DESCRIPTORS
DESCRIPTORS: ACHING
DESCRIPTORS: ACHING

## 2017-09-13 ASSESSMENT — PAIN DESCRIPTION - PAIN TYPE: TYPE: CHRONIC PAIN

## 2017-09-13 ASSESSMENT — PAIN DESCRIPTION - LOCATION
LOCATION: BACK
LOCATION: BACK

## 2017-09-13 ASSESSMENT — PAIN DESCRIPTION - ORIENTATION: ORIENTATION: LEFT;LOWER

## 2017-09-13 ASSESSMENT — PAIN SCALES - GENERAL
PAINLEVEL_OUTOF10: 10
PAINLEVEL_OUTOF10: 8
PAINLEVEL_OUTOF10: 10

## 2017-09-28 ENCOUNTER — TELEPHONE (OUTPATIENT)
Dept: FAMILY MEDICINE CLINIC | Age: 52
End: 2017-09-28

## 2017-09-28 NOTE — TELEPHONE ENCOUNTER
Pt is calling because she is having very low blood sugars and is wondering what she can do she just had a sugar of 52.  Made her an appointment

## 2017-09-29 ENCOUNTER — OFFICE VISIT (OUTPATIENT)
Dept: FAMILY MEDICINE CLINIC | Age: 52
End: 2017-09-29
Payer: MEDICARE

## 2017-09-29 VITALS
OXYGEN SATURATION: 95 % | DIASTOLIC BLOOD PRESSURE: 78 MMHG | WEIGHT: 193 LBS | BODY MASS INDEX: 33.13 KG/M2 | SYSTOLIC BLOOD PRESSURE: 124 MMHG | HEART RATE: 76 BPM

## 2017-09-29 DIAGNOSIS — E11.649 LOW BLOOD SUGAR IN DIABETES (HCC): Primary | ICD-10-CM

## 2017-09-29 DIAGNOSIS — Z12.31 ENCOUNTER FOR SCREENING MAMMOGRAM FOR MALIGNANT NEOPLASM OF BREAST: ICD-10-CM

## 2017-09-29 LAB — HBA1C MFR BLD: 5.7 %

## 2017-09-29 PROCEDURE — 83036 HEMOGLOBIN GLYCOSYLATED A1C: CPT | Performed by: NURSE PRACTITIONER

## 2017-09-29 PROCEDURE — 99213 OFFICE O/P EST LOW 20 MIN: CPT | Performed by: NURSE PRACTITIONER

## 2017-09-29 ASSESSMENT — ENCOUNTER SYMPTOMS
COUGH: 0
CONSTIPATION: 0
ABDOMINAL DISTENTION: 0
CHEST TIGHTNESS: 0
BLOOD IN STOOL: 0
SORE THROAT: 0
EYE ITCHING: 0
SINUS PRESSURE: 0
WHEEZING: 0
COLOR CHANGE: 0
DIARRHEA: 0
ABDOMINAL PAIN: 0
EYE PAIN: 0
NAUSEA: 0
SHORTNESS OF BREATH: 0

## 2017-10-03 ENCOUNTER — HOSPITAL ENCOUNTER (OUTPATIENT)
Dept: GENERAL RADIOLOGY | Age: 52
Discharge: HOME | End: 2017-10-03
Payer: MEDICARE

## 2017-10-03 ENCOUNTER — HOSPITAL ENCOUNTER (OUTPATIENT)
Dept: PAIN MANAGEMENT | Age: 52
Discharge: HOME | End: 2017-09-12
Payer: MEDICARE

## 2017-10-03 ENCOUNTER — HOSPITAL ENCOUNTER (OUTPATIENT)
Dept: PAIN MANAGEMENT | Age: 52
Discharge: HOME | End: 2017-10-03
Payer: MEDICARE

## 2017-10-03 ENCOUNTER — HOSPITAL ENCOUNTER (OUTPATIENT)
Age: 52
Discharge: HOME | End: 2017-10-03
Payer: MEDICARE

## 2017-10-03 VITALS
TEMPERATURE: 98.6 F | HEIGHT: 64 IN | SYSTOLIC BLOOD PRESSURE: 152 MMHG | BODY MASS INDEX: 32.95 KG/M2 | HEART RATE: 73 BPM | WEIGHT: 193 LBS | OXYGEN SATURATION: 98 % | RESPIRATION RATE: 16 BRPM | DIASTOLIC BLOOD PRESSURE: 104 MMHG

## 2017-10-03 DIAGNOSIS — M51.36 DDD (DEGENERATIVE DISC DISEASE), LUMBAR: ICD-10-CM

## 2017-10-03 DIAGNOSIS — M54.16 LUMBAR RADICULOPATHY: Primary | ICD-10-CM

## 2017-10-03 DIAGNOSIS — M47.817 LUMBOSACRAL SPONDYLOSIS WITHOUT MYELOPATHY: ICD-10-CM

## 2017-10-03 DIAGNOSIS — M47.816 LUMBAR FACET ARTHROPATHY: ICD-10-CM

## 2017-10-03 DIAGNOSIS — R52 PAIN: ICD-10-CM

## 2017-10-03 LAB
INR BLD: 0.9
PROTHROMBIN TIME: 9.9 SEC (ref 9.7–12)

## 2017-10-03 PROCEDURE — 85610 PROTHROMBIN TIME: CPT

## 2017-10-03 PROCEDURE — 62323 NJX INTERLAMINAR LMBR/SAC: CPT | Performed by: PAIN MEDICINE

## 2017-10-03 PROCEDURE — 36415 COLL VENOUS BLD VENIPUNCTURE: CPT

## 2017-10-03 PROCEDURE — 6360000002 HC RX W HCPCS

## 2017-10-03 PROCEDURE — 3209999900 FLUORO FOR SURGICAL PROCEDURES

## 2017-10-03 PROCEDURE — 62327 NJX INTERLAMINAR LMBR/SAC: CPT

## 2017-10-03 PROCEDURE — 99213 OFFICE O/P EST LOW 20 MIN: CPT

## 2017-10-03 PROCEDURE — 6360000004 HC RX CONTRAST MEDICATION

## 2017-10-03 ASSESSMENT — PAIN - FUNCTIONAL ASSESSMENT
PAIN_FUNCTIONAL_ASSESSMENT: 0-10
PAIN_FUNCTIONAL_ASSESSMENT: 0-10

## 2017-10-03 NOTE — IP AVS SNAPSHOT
Patient Information     Patient Name LUCAS Bertrand 1965         This is your updated medication list to keep with you all times      ASK your doctor about these medications     ARIPiprazole 10 MG tablet   Commonly known as:  ABILIFY   TAKE 1 TABLET BY MOUTH NIGHTLY       atorvastatin 40 MG tablet   Commonly known as:  LIPITOR   TAKE 1 TABLET BY MOUTH NIGHTLY       busPIRone 10 MG tablet   Commonly known as:  BUSPAR       dicyclomine 10 MG capsule   Commonly known as:  BENTYL   Take 1 capsule by mouth every 6 hours as needed (cramps)       diphenoxylate-atropine 2.5-0.025 MG per tablet   Commonly known as:  LOMOTIL       docusate sodium 100 MG capsule   Commonly known as:  DOCQLACE   TAKE ONE (1) CAPSULE BY MOUTH DAILY AS NEEDED FORCONSTIPATION       furosemide 20 MG tablet   Commonly known as:  LASIX   TAKE 1 TABLET BY MOUTH DAILY       JANUVIA 50 MG tablet   Generic drug:  SITagliptin       * levETIRAcetam 250 MG tablet   Commonly known as:  KEPPRA       * levETIRAcetam 500 MG tablet   Commonly known as:  KEPPRA   TAKE 1 TABLET BY MOUTH 2 TIMES DAILY       losartan 25 MG tablet   Commonly known as:  COZAAR       metoclopramide 10 MG tablet   Commonly known as:  REGLAN   TAKE 1 TABLET BY MOUTH DAILY       omeprazole 20 MG tablet   Commonly known as:  PRILOSEC OTC   Take 1 tablet by mouth daily       ondansetron 4 MG disintegrating tablet   Commonly known as:  ZOFRAN ODT   Take 1 tablet by mouth every 8 hours as needed for Nausea or Vomiting       pantoprazole 20 MG tablet   Commonly known as:  PROTONIX       promethazine 12.5 MG tablet   Commonly known as:  PHENERGAN       traMADol 50 MG tablet   Commonly known as:  ULTRAM   Take 1 tablet by mouth every 6 hours as needed for Pain       traZODone 100 MG tablet   Commonly known as:  DESYREL   Take 2 tablets by mouth nightly ---Has supply       TRUEPLUS LANCETS 30G Misc   1 each by Does not apply route daily       warfarin 5 MG tablet Commonly known as:  COUMADIN       zolpidem 5 MG tablet   Commonly known as:  AMBIEN       * Notice: This list has 2 medication(s) that are the same as other medications prescribed for you. Read the directions carefully, and ask your doctor or other care provider to review them with you.

## 2017-10-03 NOTE — PROCEDURES
Pre-Procedure Note    Patient Name: Hayder Perry   YOB: 1965  Room/Bed: Room/bed info not found  Medical Record Number: [id number]  Date: 10/3/2017       Indication:    1. Lumbar radiculopathy    2. Lumbosacral spondylosis without myelopathy    3. Lumbar facet arthropathy ([address]    4. DDD (degenerative disc disease), lumbar        Consent: On file. Vital Signs:   Vitals:    10/03/17 0832   BP: (!) 143/88   Pulse: 72   Resp: 12   Temp:    SpO2: 98%       Past Medical History:   has a past medical history of Acid reflux; Arthritis; Coughing; Depression; Diabetes mellitus ([address] Diverticulitis; Epilepsy ([address] Factor V Leiden ([facility] Hiatal hernia; History of stroke associated with blood clotting tendency; Hx of blood clots; Hyperlipidemia; Hypertension; Insomnia; Sleep apnea; Unspecified cerebral artery occlusion with cerebral infarction; and Wears glasses. Past Surgical History:   has a past surgical history that includes Neck surgery (July 2012); Endometrial ablation; laparoscopy; Hysterectomy; Tonsillectomy; Cholecystectomy; Appendectomy; Upper gastrointestinal endoscopy; Knee arthroscopy (Left, 4/7/15, 2015); hiatal hernia repair (2/4/2016); Colonoscopy; Colonoscopy (1/22/16); Colonoscopy (01/05/2017); and hernia repair. Pre-Sedation Documentation and Exam:   Vital signs have been reviewed (see flow sheet for vitals). Mallampati Airway Assessment:  normal    ASA Classification:  Class 3 - A patient with severe systemic disease that limits activity but is not incapacitating    Sedation/ Anesthesia Plan:   intravenous sedation   as needed. Medications Planned:   midazolam (Versed) / Fentanyl  Intravenously  as needed. Patient is an appropriate candidate for plan of sedation: yes  Patient's History and Physical examination was reviewed and there is no change. Electronically signed by Arnulfo Villalobos MD on 10/3/2017 at 8:35 AM        Preoperative Diagnosis:    1.  Lumbar radiculopathy    2. Lumbosacral spondylosis without myelopathy    3. Lumbar facet arthropathy ([address]    4. DDD (degenerative disc disease), lumbar        Postoperative Diagnosis:    1. Lumbar radiculopathy    2. Lumbosacral spondylosis without myelopathy    3. Lumbar facet arthropathy ([address]    4. DDD (degenerative disc disease), lumbar        Procedure Performed:  Lumbar epidural steroid injection under fluoroscopy guidance without IV sedation. Indication for the Procedure: The patient failed conservative management  for pain in the low back radiating to lower extremities. As the patient is not responding to conservative management and it is interfering with activities of daily living we decided to proceed with lumbar epidural steroid injection. The procedure and risks were discussed with the patient and an informed consent was obtained  Current Pain Assessment  Pain Assessment  Pain Assessment: 0-10  Pain Level: 10  Pain Type: Chronic pain  Pain Location: Back, Leg  Pain Orientation: Lower, Left  Pain Radiating Towards: left leg  Pain Descriptors: Constant, Aching, Radiating, Numbness, Tingling  Pain Frequency: Continuous  Pain Onset: On-going  Clinical Progression: Gradually worsening  Effect of Pain on Daily Activities: I can't walk more than even a block w/o pain or stopping  Patient's Stated Pain Goal:  (To be able to have decrease pain w/increase acrtiviies)  Pain Intervention(s): Medication (see eMar), Rest, Repositioned, Heat applied, Cold applied, Elevation  Response to Pain Intervention:  (Lumbar Facet Right helped 50% 1 week)     Procedure:     Patient's vital signs including BP, EKG and SaO2 were monitored by the RN and they remained stable during the procedure. A meaningful communication was kept up with the patient throughout  the procedure. The patient is placed in prone position. Skin over the back was prepped and draped in sterile manner.  Then using fluoroscopy the L4, L5 interspace was observed and the skin and deep tissues in the left paramedian area were infiltrated with 3 ml of 1% lidocaine. The #20-gauge, 3-1/2 inch Tuohy needle was inserted through the skin wheal and the epidural space was identified using loss of resistance technique with normal saline. This was confirmed with AP and lateral views using fluoroscopy after injecting about 3 ml of Omnipaque-180 and observing the spread of the contrast in the epidural space. Then after negative aspiration a total of 80 mg of triamcinolone with 8 ml of normal saline was injected into the epidural space. The needle is removed and a Band-Aid was placed over the needle insertion site. Patient's vital signs remained stable and the patient tolerated the procedure well. The patient was discharged home in stable condition and will be followed in the pain clinic in the next few weeks for further planning.     Electronically signed by Radha Veliz MD on 10/3/2017 at 8:35 AM

## 2017-10-03 NOTE — IP AVS SNAPSHOT
10/3/2017 [facility] [phone]       Follow-up Appointments    Below is a list of your follow-up and future appointments. This may not be a complete list as you may have made appointments directly with providers that we are not aware of or your providers may have made some for you. Please call your providers to confirm appointments. It is important to keep your appointments. Please bring your current insurance card, photo ID, co-pay, and all medication bottles to your appointment. If self-pay, payment is expected at the time of service. Future Appointments     10/18/2017 11:20 AM     Appointment with Mary Low MD at [facility] AND [facility] ([phone])   [phone]       10/27/2017 9:15 AM     Appointment with Kishor Mauricio CNP at [facility] ([phone])   [address] [place] [phone]       12/18/2017 1:00 PM     Appointment with Kishor Mauricio CNP at [facility] ([phone])   Please arrive 15 minutes prior to appointment, bring photo ID and insurance card. [address] [facility] [phone]         Preventive Care        Date Due    Diabetic Foot Exam 6/26/1976    Eye Exam By An Eye Doctor 6/26/1976    HIV screening is recommended for all people regardless of risk factors  aged 15-65 years at least once (lifetime) who have never been HIV tested. 6/26/1981    Tetanus Combination Vaccine (1 - Tdap) 6/26/1985    Pap Smear 6/26/1987    Cholesterol Screening 2/11/2016    Mammograms are recommended every 2 years for low/average risk patients aged 48 - 69, and every year for high risk patients per updated national guidelines. However these guidelines can be individualized by your provider.  6/26/2016    Urine Check For Kidney Problems 9/8/2018    Hemoglobin A1C (Test For Long-Term Glucose Control) 9/29/2018    Colonoscopy 1/24/2021                 Care Plan Once You Return Home This section includes instructions you will need to follow once you leave the hospital.  Your care team will discuss these with you, so you and those caring for you know how to best care for your health needs at home. This section may also include educational information about certain health topics that may be of help to you. Discharge Instructions         POST-PROCEDURE INSTRUCTIONS  Today's Procedure  Lumbar epidural steroid injection    If you are a diabetic, monitor your blood sugar closely for several days and contact your primary physician or the doctor that manages your diabetes if results are outside your normal range. Restart blood thinner at next scheduled dose. Call your ordering physician for when to have INR drawn. Remember to follow pre -procedure instructions previously given to you for any subsequent injections. After your procedure, you can expect some soreness or aching from the needle placement. You may also experience numbness at the injection site, or numbness radiating into your legs or arms (depending on procedure location). This may last from 4 to 8 hours, however, you should have full movement and strength. Until you have normal sensation, please use caution in climbing stairs, stepping up curbs, etc.    You initially may feel better since we injected a numbing medication along with the steroid. It may, however, take 3 to 7 days to notice the effects of the steroid medication. Medications Administered today :  Kenalog, Lidocaine, Omnipaque      The following information was given to and explained to the patient and/or family:   today's procedure, local anesthetic, IV sedation (patient instructed not to drive motor vehicles for 24 hours), and blood thinner risks/ please do not consume any alcoholic beverages today. Please do not make any important decisions today if you had sedation.     PAIN MEDICATION:  For minor discomfort, over the counter pain relievers like Advil (ibuprofen) or Tylenol (acetaminophen) may be used. Do not to exceed label instructions. If you are pregnant, lactating, have kidney or liver disorders, please check with your doctor before using over the counter medications. Medication prescribed by your physician may be taken  as directed for discomfort not relieved by non-prescription medication. ACTIVITY:  You may be up and around as desired and as tolerated by your level of comfort. Feel free to use an ice pack, or heating pad intermittently as necessary. DIET:  You may resume regular eating habits. OBSERVE FOR:  Although extremely unusual, you should be alert to and report any signs of infection. Symptoms to be aware of include: redness and/or warmth around the needle puncture site, increased pain other than expected from the procedure, swelling, drainage, chills, night sweats, and fever above 100 degrees F. (38 degrees C)    If you have any subsequent procedures scheduled, follow the pre procedure instructions given to you at your previous visit. If you have any questions or concerns, please call our office at [phone]. You may be asked to leave a message; this will allow time for the nurses to gather information from your chart and ask the doctor any questions regarding your care. Please contact our office with any questions or to report any problems you may be experiencing. If we are not available when you call, your call will be returned at our first opportunity. Chronic pain is not an emergency. If you feel you need to schedule an emergency evaluation regarding care you received at the pain clinic, please contact our clinic at [phone]. If you have an emergency, chest pain, shortness of breath or anything you feel is an emergent situation,  go the nearest Emergency Room. Please call [facility] at [phone]  for after hour assistance. Monday - Friday after 5 pm. Saturday and Sunday all day. SIDE EFFECTS FROM STEROID USE      After having an injection using steroids, you may experience some side effects. These side effects are temporary and are self-resolving after the body absorbs the steroid in approximately 3-7 days. Some side effects include:        - Insomnia    - Acid stomach    - Increased Blood Sugars (Monitor closely if you are Diabetic)    - Flushed face    - Fatigue, muscle/joint aches    - Headaches    - Water retention    - Increased appetite      It may take up to 1 week for the full benefit of the steroid to be realized      As established several months ago, it is now a necessary requirement to bring ALL MEDICATIONS prescribed by this office to each visit, in the original bottles ~ this includes Non-Narcotic medications. Failure to do so will prevent current prescriptions from being released. In case of emergency, you may call the refill line @ [phone] to request refill of NON-NARCOTIC mediations only as Medication changes and / or NARCOTIC MEDICATIONS WILL NOT BE DISPENSED WITHOUT AN APPOINTMENT. The refill line is a voicemail system, we ask that you speak slowly and clearly when leaving the following information: Your name, date of birth and the name / location of the pharmacy in your medication agreement. **There is a 48 hour / 2 business day notice required for refills, after which you may call your pharmacy to check the status. The nurse will contact you ONLY if there are questions or problems with the refill, our goal is for all patients to bring medication bottles to each visit and discuss ALL medication refill requirements at said visit. **     As stated in the medication agreement, a monthly appointment is required for any patient receiving medications, if you cancel an appointment your medications will not be dispensed until you are able to be seen. This may result in you being without medication for a period of time.

## 2017-10-09 ENCOUNTER — HOSPITAL ENCOUNTER (OUTPATIENT)
Age: 52
Discharge: HOME | End: 2017-10-09
Payer: MEDICARE

## 2017-10-09 ENCOUNTER — APPOINTMENT (OUTPATIENT)
Dept: CT IMAGING | Age: 52
End: 2017-10-09
Payer: MEDICARE

## 2017-10-09 ENCOUNTER — HOSPITAL ENCOUNTER (OUTPATIENT)
Age: 52
Setting detail: OBSERVATION
Discharge: HOME | End: 2017-10-11
Attending: EMERGENCY MEDICINE | Admitting: INTERNAL MEDICINE
Payer: MEDICARE

## 2017-10-09 DIAGNOSIS — R20.0 FACIAL NUMBNESS: Primary | ICD-10-CM

## 2017-10-09 LAB
ABSOLUTE EOS #: 0.1 K/UL (ref 0–0.4)
ABSOLUTE LYMPH #: 2.7 K/UL (ref 1–4.8)
ABSOLUTE MONO #: 0.7 K/UL (ref 0.1–1.3)
ALBUMIN SERPL-MCNC: 4.1 G/DL (ref 3.5–5.2)
ALBUMIN SERPL-MCNC: 4.2 G/DL (ref 3.5–5.2)
ALBUMIN/GLOBULIN RATIO: ABNORMAL (ref 1–2.5)
ALBUMIN/GLOBULIN RATIO: ABNORMAL (ref 1–2.5)
ALP BLD-CCNC: 116 U/L (ref 35–104)
ALP BLD-CCNC: 118 U/L (ref 35–104)
ALT SERPL-CCNC: 25 U/L (ref 5–33)
ALT SERPL-CCNC: 26 U/L (ref 5–33)
ANION GAP SERPL CALCULATED.3IONS-SCNC: 10 MMOL/L (ref 9–17)
ANION GAP SERPL CALCULATED.3IONS-SCNC: 14 MMOL/L (ref 9–17)
AST SERPL-CCNC: 16 U/L
AST SERPL-CCNC: 18 U/L
BASOPHILS # BLD: 1 %
BASOPHILS ABSOLUTE: 0.1 K/UL (ref 0–0.2)
BILIRUB SERPL-MCNC: 0.22 MG/DL (ref 0.3–1.2)
BILIRUB SERPL-MCNC: 0.36 MG/DL (ref 0.3–1.2)
BUN BLDV-MCNC: 20 MG/DL (ref 6–20)
BUN BLDV-MCNC: 25 MG/DL (ref 6–20)
BUN/CREAT BLD: ABNORMAL (ref 9–20)
BUN/CREAT BLD: ABNORMAL (ref 9–20)
CALCIUM IONIZED: 1.19 MMOL/L (ref 1.13–1.33)
CALCIUM SERPL-MCNC: 9.2 MG/DL (ref 8.6–10.4)
CALCIUM SERPL-MCNC: 9.4 MG/DL (ref 8.6–10.4)
CHLORIDE BLD-SCNC: 100 MMOL/L (ref 98–107)
CHLORIDE BLD-SCNC: 99 MMOL/L (ref 98–107)
CHOLESTEROL/HDL RATIO: 3.1
CHOLESTEROL: 215 MG/DL
CO2: 28 MMOL/L (ref 20–31)
CO2: 31 MMOL/L (ref 20–31)
CREAT SERPL-MCNC: 0.71 MG/DL (ref 0.5–0.9)
CREAT SERPL-MCNC: 0.78 MG/DL (ref 0.5–0.9)
CREATININE URINE: 49.9 MG/DL (ref 28–217)
DIFFERENTIAL TYPE: ABNORMAL
EOSINOPHILS RELATIVE PERCENT: 1 %
GFR AFRICAN AMERICAN: >60 ML/MIN
GFR AFRICAN AMERICAN: >60 ML/MIN
GFR NON-AFRICAN AMERICAN: >60 ML/MIN
GFR NON-AFRICAN AMERICAN: >60 ML/MIN
GFR SERPL CREATININE-BSD FRML MDRD: ABNORMAL ML/MIN/{1.73_M2}
GLUCOSE BLD-MCNC: 102 MG/DL (ref 70–99)
GLUCOSE BLD-MCNC: 124 MG/DL (ref 70–99)
HCT VFR BLD CALC: 37.8 % (ref 36–46)
HDLC SERPL-MCNC: 69 MG/DL
HEMOGLOBIN: 12 G/DL (ref 12–16)
INR BLD: 1.4
LDL CHOLESTEROL: 120 MG/DL (ref 0–130)
LYMPHOCYTES # BLD: 23 %
MCH RBC QN AUTO: 25.9 PG (ref 26–34)
MCHC RBC AUTO-ENTMCNC: 31.8 G/DL (ref 31–37)
MCV RBC AUTO: 81.4 FL (ref 80–100)
MICROALBUMIN/CREAT 24H UR: <12 MG/L
MICROALBUMIN/CREAT UR-RTO: 24 MCG/MG CREAT
MONOCYTES # BLD: 6 %
PDW BLD-RTO: 19.5 % (ref 11.5–14.9)
PLATELET # BLD: 309 K/UL (ref 150–450)
PLATELET ESTIMATE: ABNORMAL
PMV BLD AUTO: 8 FL (ref 6–12)
POTASSIUM SERPL-SCNC: 3.8 MMOL/L (ref 3.7–5.3)
POTASSIUM SERPL-SCNC: 4.2 MMOL/L (ref 3.7–5.3)
PROTHROMBIN TIME: 15.4 SEC (ref 9.7–12)
RBC # BLD: 4.64 M/UL (ref 4–5.2)
RBC # BLD: ABNORMAL 10*6/UL
SEG NEUTROPHILS: 69 %
SEGMENTED NEUTROPHILS ABSOLUTE COUNT: 7.9 K/UL (ref 1.3–9.1)
SODIUM BLD-SCNC: 140 MMOL/L (ref 135–144)
SODIUM BLD-SCNC: 142 MMOL/L (ref 135–144)
TOTAL PROTEIN: 7.3 G/DL (ref 6.4–8.3)
TOTAL PROTEIN: 7.4 G/DL (ref 6.4–8.3)
TRIGL SERPL-MCNC: 132 MG/DL
TROPONIN INTERP: NORMAL
TROPONIN T: <0.03 NG/ML
VLDLC SERPL CALC-MCNC: ABNORMAL MG/DL (ref 1–30)
WBC # BLD: 11.4 K/UL (ref 3.5–11)
WBC # BLD: ABNORMAL 10*3/UL

## 2017-10-09 PROCEDURE — 80053 COMPREHEN METABOLIC PANEL: CPT

## 2017-10-09 PROCEDURE — 82570 ASSAY OF URINE CREATININE: CPT

## 2017-10-09 PROCEDURE — 93005 ELECTROCARDIOGRAM TRACING: CPT

## 2017-10-09 PROCEDURE — 96372 THER/PROPH/DIAG INJ SC/IM: CPT

## 2017-10-09 PROCEDURE — 70450 CT HEAD/BRAIN W/O DYE: CPT

## 2017-10-09 PROCEDURE — 99285 EMERGENCY DEPT VISIT HI MDM: CPT

## 2017-10-09 PROCEDURE — 80061 LIPID PANEL: CPT

## 2017-10-09 PROCEDURE — 85610 PROTHROMBIN TIME: CPT

## 2017-10-09 PROCEDURE — 82043 UR ALBUMIN QUANTITATIVE: CPT

## 2017-10-09 PROCEDURE — G0378 HOSPITAL OBSERVATION PER HR: HCPCS

## 2017-10-09 PROCEDURE — 82330 ASSAY OF CALCIUM: CPT

## 2017-10-09 PROCEDURE — 83036 HEMOGLOBIN GLYCOSYLATED A1C: CPT

## 2017-10-09 PROCEDURE — 94762 N-INVAS EAR/PLS OXIMTRY CONT: CPT

## 2017-10-09 PROCEDURE — 6360000002 HC RX W HCPCS: Performed by: EMERGENCY MEDICINE

## 2017-10-09 PROCEDURE — 85025 COMPLETE CBC W/AUTO DIFF WBC: CPT

## 2017-10-09 PROCEDURE — 84484 ASSAY OF TROPONIN QUANT: CPT

## 2017-10-09 PROCEDURE — 36415 COLL VENOUS BLD VENIPUNCTURE: CPT

## 2017-10-09 RX ORDER — DICYCLOMINE HYDROCHLORIDE 10 MG/1
10 CAPSULE ORAL EVERY 6 HOURS PRN
Status: DISCONTINUED | OUTPATIENT
Start: 2017-10-09 | End: 2017-10-11 | Stop reason: HOSPADM

## 2017-10-09 RX ORDER — PANTOPRAZOLE SODIUM 40 MG/1
40 TABLET, DELAYED RELEASE ORAL
Status: DISCONTINUED | OUTPATIENT
Start: 2017-10-10 | End: 2017-10-11 | Stop reason: HOSPADM

## 2017-10-09 RX ORDER — LORAZEPAM 2 MG/ML
1 INJECTION INTRAMUSCULAR
Status: ACTIVE | OUTPATIENT
Start: 2017-10-09 | End: 2017-10-09

## 2017-10-09 RX ORDER — LEVETIRACETAM 500 MG/1
500 TABLET ORAL 2 TIMES DAILY
Status: DISCONTINUED | OUTPATIENT
Start: 2017-10-10 | End: 2017-10-11 | Stop reason: HOSPADM

## 2017-10-09 RX ORDER — TRAMADOL HYDROCHLORIDE 50 MG/1
50 TABLET ORAL EVERY 6 HOURS PRN
Status: DISCONTINUED | OUTPATIENT
Start: 2017-10-09 | End: 2017-10-11 | Stop reason: HOSPADM

## 2017-10-09 RX ORDER — DIPHENOXYLATE HYDROCHLORIDE AND ATROPINE SULFATE 2.5; .025 MG/1; MG/1
2 TABLET ORAL EVERY 8 HOURS PRN
Status: DISCONTINUED | OUTPATIENT
Start: 2017-10-09 | End: 2017-10-11 | Stop reason: HOSPADM

## 2017-10-09 RX ORDER — METOCLOPRAMIDE 10 MG/1
10 TABLET ORAL DAILY
Status: DISCONTINUED | OUTPATIENT
Start: 2017-10-10 | End: 2017-10-11 | Stop reason: HOSPADM

## 2017-10-09 RX ORDER — BUSPIRONE HYDROCHLORIDE 10 MG/1
10 TABLET ORAL 2 TIMES DAILY
Status: DISCONTINUED | OUTPATIENT
Start: 2017-10-10 | End: 2017-10-11 | Stop reason: HOSPADM

## 2017-10-09 RX ORDER — TRAZODONE HYDROCHLORIDE 50 MG/1
200 TABLET ORAL NIGHTLY
Status: DISCONTINUED | OUTPATIENT
Start: 2017-10-10 | End: 2017-10-11 | Stop reason: HOSPADM

## 2017-10-09 RX ORDER — LOSARTAN POTASSIUM 25 MG/1
12.5 TABLET ORAL DAILY
Status: DISCONTINUED | OUTPATIENT
Start: 2017-10-10 | End: 2017-10-11 | Stop reason: HOSPADM

## 2017-10-09 RX ORDER — DOCUSATE SODIUM 100 MG/1
100 CAPSULE, LIQUID FILLED ORAL 2 TIMES DAILY PRN
Status: DISCONTINUED | OUTPATIENT
Start: 2017-10-09 | End: 2017-10-11 | Stop reason: HOSPADM

## 2017-10-09 RX ORDER — ONDANSETRON 2 MG/ML
4 INJECTION INTRAMUSCULAR; INTRAVENOUS EVERY 6 HOURS PRN
Status: DISCONTINUED | OUTPATIENT
Start: 2017-10-09 | End: 2017-10-11 | Stop reason: HOSPADM

## 2017-10-09 RX ORDER — LEVETIRACETAM 250 MG/1
250 TABLET ORAL EVERY MORNING
Status: DISCONTINUED | OUTPATIENT
Start: 2017-10-10 | End: 2017-10-11 | Stop reason: HOSPADM

## 2017-10-09 RX ORDER — DEXTROSE MONOHYDRATE 25 G/50ML
12.5 INJECTION, SOLUTION INTRAVENOUS PRN
Status: DISCONTINUED | OUTPATIENT
Start: 2017-10-09 | End: 2017-10-11 | Stop reason: HOSPADM

## 2017-10-09 RX ORDER — SODIUM CHLORIDE 0.9 % (FLUSH) 0.9 %
10 SYRINGE (ML) INJECTION PRN
Status: DISCONTINUED | OUTPATIENT
Start: 2017-10-09 | End: 2017-10-11 | Stop reason: HOSPADM

## 2017-10-09 RX ORDER — BISACODYL 10 MG
10 SUPPOSITORY, RECTAL RECTAL DAILY PRN
Status: DISCONTINUED | OUTPATIENT
Start: 2017-10-09 | End: 2017-10-11 | Stop reason: HOSPADM

## 2017-10-09 RX ORDER — DEXTROSE MONOHYDRATE 50 MG/ML
100 INJECTION, SOLUTION INTRAVENOUS PRN
Status: DISCONTINUED | OUTPATIENT
Start: 2017-10-09 | End: 2017-10-11 | Stop reason: HOSPADM

## 2017-10-09 RX ORDER — PROMETHAZINE HYDROCHLORIDE 12.5 MG/1
12.5 TABLET ORAL EVERY 6 HOURS PRN
Status: DISCONTINUED | OUTPATIENT
Start: 2017-10-09 | End: 2017-10-11 | Stop reason: HOSPADM

## 2017-10-09 RX ORDER — ATORVASTATIN CALCIUM 40 MG/1
40 TABLET, FILM COATED ORAL NIGHTLY
Status: DISCONTINUED | OUTPATIENT
Start: 2017-10-10 | End: 2017-10-11 | Stop reason: HOSPADM

## 2017-10-09 RX ORDER — FUROSEMIDE 20 MG/1
20 TABLET ORAL DAILY
Status: DISCONTINUED | OUTPATIENT
Start: 2017-10-10 | End: 2017-10-11 | Stop reason: HOSPADM

## 2017-10-09 RX ORDER — NICOTINE POLACRILEX 4 MG
15 LOZENGE BUCCAL PRN
Status: DISCONTINUED | OUTPATIENT
Start: 2017-10-09 | End: 2017-10-11 | Stop reason: HOSPADM

## 2017-10-09 RX ORDER — ACETAMINOPHEN 325 MG/1
650 TABLET ORAL EVERY 4 HOURS PRN
Status: DISCONTINUED | OUTPATIENT
Start: 2017-10-09 | End: 2017-10-11 | Stop reason: HOSPADM

## 2017-10-09 RX ORDER — ARIPIPRAZOLE 10 MG/1
10 TABLET ORAL NIGHTLY
Status: DISCONTINUED | OUTPATIENT
Start: 2017-10-10 | End: 2017-10-11 | Stop reason: HOSPADM

## 2017-10-09 RX ORDER — SODIUM CHLORIDE 0.9 % (FLUSH) 0.9 %
10 SYRINGE (ML) INJECTION EVERY 12 HOURS SCHEDULED
Status: DISCONTINUED | OUTPATIENT
Start: 2017-10-10 | End: 2017-10-11 | Stop reason: HOSPADM

## 2017-10-09 RX ADMIN — ENOXAPARIN SODIUM 90 MG: 100 INJECTION SUBCUTANEOUS at 22:17

## 2017-10-09 ASSESSMENT — ENCOUNTER SYMPTOMS
NAUSEA: 0
DIARRHEA: 0
ORTHOPNEA: 0
BACK PAIN: 0
COUGH: 0
WHEEZING: 0
CONSTIPATION: 0
SORE THROAT: 0
SPUTUM PRODUCTION: 0
ABDOMINAL PAIN: 0
VOMITING: 0
BLURRED VISION: 0
SHORTNESS OF BREATH: 0
DOUBLE VISION: 0

## 2017-10-09 ASSESSMENT — PAIN SCALES - GENERAL
PAINLEVEL_OUTOF10: 0
PAINLEVEL_OUTOF10: 0

## 2017-10-09 NOTE — ED PROVIDER NOTES
[address] [facility]  eMERGENCY dEPARTMENT eNCOUnter   Attending Attestation     Pt Name: Jeffrey Ralph  MRN: [id number]  [place] 1965  Date of evaluation: 10/9/17       Jeffrey Ralph is a 47 y.o. female who presents with Numbness and Oral Swelling      MDM:   Patient with a history of CVA presented to the ER with facial numbness and tongue swelling. Patient without any new deficits at this time. We'll gather lab work obtained imaging and consider disposition afterwards. Patient with NIH stroke scale of 0. Patient is not TPA candidate because her last known well was 10 PM on October 8    Vitals:   Vitals:    10/09/17 2130 10/09/17 2306 10/09/17 2325 10/10/17 0653   BP: 126/75 124/77 (!) 151/90 135/82   Pulse: 72 76 72 68   Resp: 19 18 18 16   Temp:  98.9 °F (37.2 °C) 98.4 °F (36.9 °C) 98.8 °F (37.1 °C)   TempSrc:  Oral Oral Oral   SpO2: 97% 97% 100% 100%   Weight:       Height:             I personally evaluated and examined the patient in conjunction with the resident and agree with the assessment, treatment plan, and disposition of the patient as recorded by the resident. I performed a history and physical examination of the patient and discussed management with the resident. I reviewed the residents note and agree with the documented findings and plan of care. Any areas of disagreement are noted on the chart. I was personally present for the key portions of any procedures. I have documented in the chart those procedures where I was not present during the key portions. I have personally reviewed all images and agree with the resident's interpretation. I have reviewed the emergency nurses triage note. I agree with the chief complaint, past medical history, past surgical history, allergies, medications, social and family history as documented unless otherwise noted.     Princess Shreya MD  Attending Emergency Physician            Princess Shreya MD  10/10/17 8473

## 2017-10-09 NOTE — ED PROVIDER NOTES
[address] [facility]  Emergency Department Encounter  Emergency Medicine Resident     Pt Name: Irene Roberts  MRN: [id number]  [place] 1965  Date of evaluation: 10/9/17  PCP:  Annette Caal 9583       Chief Complaint   Patient presents with    Numbness    Oral Swelling       HISTORY OF PRESENT ILLNESS  (Location/Symptom, Timing/Onset, Context/Setting, Quality, Duration, Modifying Factors, Severity.)      Irene Roberts is a 47 y.o. female who presents with Bilateral facial numbness and tongue numbness which has been ongoing all day today. Patient says she woke up this morning with the symptoms and they have progressively been getting worse all day. She says that her last known well was last night before bed at around 10:00. She says she has a history of a previous CVA with residual left-sided deficits. He says that her previous CVA started out with unilateral facial numbness and then had progressed to unilateral weakness. She denies any difficulty breathing, difficulty swallowing, cough, shortness of breath, chest pain, abdominal pain, nausea, vomiting, or vision changes. Patient says that her previous stroke was due to having factor V Leiden. She says she's been on Coumadin for anticoagulation and says that her INR has been therapeutic. PAST MEDICAL / SURGICAL / SOCIAL / FAMILY HISTORY      has a past medical history of Acid reflux; Arthritis; Coughing; Depression; Diabetes mellitus ([address] Diverticulitis; Epilepsy ([address] Factor V Leiden ([address] Hiatal hernia; History of stroke associated with blood clotting tendency; Hx of blood clots; Hyperlipidemia; Hypertension; Insomnia; Sleep apnea; Unspecified cerebral artery occlusion with cerebral infarction; and Wears glasses. has a past surgical history that includes Neck surgery (July 2012); Endometrial ablation; laparoscopy; Hysterectomy; Tonsillectomy; Cholecystectomy; Appendectomy;  Upper gastrointestinal endoscopy; Knee arthroscopy (Left, 4/7/15, 2015); hiatal hernia repair (2/4/2016); Colonoscopy; Colonoscopy (1/22/16); Colonoscopy (01/05/2017); and hernia repair. Social History     Social History    Marital status:      Spouse name: N/A    Number of children: N/A    Years of education: N/A     Occupational History    disability      Social History Main Topics    Smoking status: Never Smoker    Smokeless tobacco: Never Used    Alcohol use 0.0 oz/week      Comment: 1-2 drinks per month    Drug use: No    Sexual activity: Yes     Partners: Male     Other Topics Concern    Not on file     Social History Narrative    No narrative on file       Family History   Problem Relation Age of Onset    Heart Disease Mother      dies age 32    Cancer Father      lymph nodes    Pacemaker Sister        Allergies:  Latex; Adhesive tape; Morphine and related; Other; and Aspirin    Home Medications:  Prior to Admission medications    Medication Sig Start Date End Date Taking?  Authorizing Provider   zolpidem (AMBIEN) 5 MG tablet Take 5 mg by mouth 8/4/17  Yes Historical Provider, MD   busPIRone (BUSPAR) 10 MG tablet Take 10 mg by mouth   Yes Historical Provider, MD   omeprazole (PRILOSEC OTC) 20 MG tablet Take 1 tablet by mouth daily 8/14/17  Yes Letitia Kang CNP   ARIPiprazole (ABILIFY) 10 MG tablet TAKE 1 TABLET BY MOUTH NIGHTLY 4/10/17  Yes Letitia Kang CNP   dicyclomine (BENTYL) 10 MG capsule Take 1 capsule by mouth every 6 hours as needed (cramps) 3/12/17  Yes Cirilo Hill MD   ondansetron (ZOFRAN ODT) 4 MG disintegrating tablet Take 1 tablet by mouth every 8 hours as needed for Nausea or Vomiting 3/12/17  Yes Cirilo Hill MD   diphenoxylate-atropine (LOMOTIL) 2.5-0.025 MG per tablet Take 2 tablets by mouth every 8 hours as needed for Diarrhea   Yes Historical Provider, MD   promethazine (PHENERGAN) 12.5 MG tablet Take 12.5 mg by mouth every 6 hours as needed for Nausea   Yes Historical Provider, MD   warfarin injection 10 mL    acetaminophen (TYLENOL) tablet 650 mg    magnesium hydroxide (MILK OF MAGNESIA) 400 MG/5ML suspension 30 mL    bisacodyl (DULCOLAX) suppository 10 mg    ondansetron (ZOFRAN) injection 4 mg    enoxaparin (LOVENOX) injection 90 mg    insulin lispro (HUMALOG) injection vial 0-12 Units    insulin lispro (HUMALOG) injection vial 0-6 Units    glucose (GLUTOSE) 40 % oral gel 15 g    dextrose 50 % solution 12.5 g    glucagon (rDNA) injection 1 mg    dextrose 5 % solution    LORazepam (ATIVAN) injection 1 mg       DDX: CVA, anxiety, allergic reaction, Bell's palsy    DIAGNOSTIC RESULTS / EMERGENCY DEPARTMENT COURSE / MDM     LABS:  Results for orders placed or performed during the hospital encounter of 10/09/17   CBC Auto Differential   Result Value Ref Range    WBC 11.4 (H) 3.5 - 11.0 k/uL    RBC 4.64 4.0 - 5.2 m/uL    Hemoglobin 12.0 12.0 - 16.0 g/dL    Hematocrit 37.8 36 - 46 %    MCV 81.4 80 - 100 fL    MCH 25.9 (L) 26 - 34 pg    MCHC 31.8 31 - 37 g/dL    RDW 19.5 (H) 11.5 - 14.9 %    Platelets 363 582 - 841 k/uL    MPV 8.0 6.0 - 12.0 fL    Differential Type NOT REPORTED     WBC Morphology NOT REPORTED     RBC Morphology NOT REPORTED     Platelet Estimate NOT REPORTED     Seg Neutrophils 69 %    Lymphocytes 23 %    Monocytes 6 %    Eosinophils % 1 %    Basophils 1 %    Segs Absolute 7.90 1.3 - 9.1 k/uL    Absolute Lymph # 2.70 1.0 - 4.8 k/uL    Absolute Mono # 0.70 0.1 - 1.3 k/uL    Absolute Eos # 0.10 0.0 - 0.4 k/uL    Basophils # 0.10 0.0 - 0.2 k/uL   Comprehensive Metabolic Panel   Result Value Ref Range    Glucose 124 (H) 70 - 99 mg/dL    BUN 25 (H) 6 - 20 mg/dL    CREATININE 0.78 0.50 - 0.90 mg/dL    Bun/Cre Ratio NOT REPORTED 9 - 20    Calcium 9.4 8.6 - 10.4 mg/dL    Sodium 142 135 - 144 mmol/L    Potassium 3.8 3.7 - 5.3 mmol/L    Chloride 100 98 - 107 mmol/L    CO2 28 20 - 31 mmol/L    Anion Gap 14 9 - 17 mmol/L    Alkaline Phosphatase 116 (H) 35 - 104 U/L    ALT 25 5 - 33 U/L    AST WORK    CRITICAL CARE:  None     FINAL IMPRESSION      1.  Facial numbness          DISPOSITION / PLAN     DISPOSITION Admitted    PATIENT REFERRED TO:  Brett Olivier, CNP  [address]  [phone]            DISCHARGE MEDICATIONS:  Current Discharge Medication List          Ryan Hirsch MD  Emergency Medicine Resident    (Please note that portions of this note were completed with a voice recognition program.  Efforts were made to edit the dictations but occasionally words are mis-transcribed.)       Ryan Hirsch MD  Resident  10/10/17 6522

## 2017-10-10 ENCOUNTER — APPOINTMENT (OUTPATIENT)
Dept: MRI IMAGING | Age: 52
End: 2017-10-10
Payer: MEDICARE

## 2017-10-10 PROBLEM — R20.0 FACIAL NUMBNESS: Status: ACTIVE | Noted: 2017-10-10

## 2017-10-10 PROBLEM — G45.9 TIA (TRANSIENT ISCHEMIC ATTACK): Status: ACTIVE | Noted: 2017-10-10

## 2017-10-10 LAB
CHOLESTEROL/HDL RATIO: 3.3
CHOLESTEROL: 194 MG/DL
ESTIMATED AVERAGE GLUCOSE: 137 MG/DL
ESTIMATED AVERAGE GLUCOSE: 137 MG/DL
GLUCOSE BLD-MCNC: 104 MG/DL (ref 65–105)
GLUCOSE BLD-MCNC: 114 MG/DL (ref 65–105)
GLUCOSE BLD-MCNC: 125 MG/DL (ref 65–105)
GLUCOSE BLD-MCNC: 97 MG/DL (ref 65–105)
HBA1C MFR BLD: 6.4 % (ref 4–6)
HBA1C MFR BLD: 6.4 % (ref 4–6)
HCT VFR BLD CALC: 35.7 % (ref 36–46)
HDLC SERPL-MCNC: 59 MG/DL
HEMOGLOBIN: 11.3 G/DL (ref 12–16)
INR BLD: 1.6
LDL CHOLESTEROL: 101 MG/DL (ref 0–130)
MCH RBC QN AUTO: 25.8 PG (ref 26–34)
MCHC RBC AUTO-ENTMCNC: 31.6 G/DL (ref 31–37)
MCV RBC AUTO: 81.6 FL (ref 80–100)
PDW BLD-RTO: 19.4 % (ref 11.5–14.9)
PLATELET # BLD: 297 K/UL (ref 150–450)
PMV BLD AUTO: 8.2 FL (ref 6–12)
PROTHROMBIN TIME: 18.1 SEC (ref 9.7–12)
RBC # BLD: 4.37 M/UL (ref 4–5.2)
TRIGL SERPL-MCNC: 171 MG/DL
VLDLC SERPL CALC-MCNC: ABNORMAL MG/DL (ref 1–30)
WBC # BLD: 11.6 K/UL (ref 3.5–11)

## 2017-10-10 PROCEDURE — G8979 MOBILITY GOAL STATUS: HCPCS

## 2017-10-10 PROCEDURE — G8978 MOBILITY CURRENT STATUS: HCPCS

## 2017-10-10 PROCEDURE — 6370000000 HC RX 637 (ALT 250 FOR IP): Performed by: INTERNAL MEDICINE

## 2017-10-10 PROCEDURE — G0378 HOSPITAL OBSERVATION PER HR: HCPCS

## 2017-10-10 PROCEDURE — G8980 MOBILITY D/C STATUS: HCPCS

## 2017-10-10 PROCEDURE — 85610 PROTHROMBIN TIME: CPT

## 2017-10-10 PROCEDURE — 70551 MRI BRAIN STEM W/O DYE: CPT

## 2017-10-10 PROCEDURE — 99223 1ST HOSP IP/OBS HIGH 75: CPT | Performed by: INTERNAL MEDICINE

## 2017-10-10 PROCEDURE — 85027 COMPLETE CBC AUTOMATED: CPT

## 2017-10-10 PROCEDURE — 6360000002 HC RX W HCPCS: Performed by: NURSE PRACTITIONER

## 2017-10-10 PROCEDURE — 93880 EXTRACRANIAL BILAT STUDY: CPT

## 2017-10-10 PROCEDURE — 80061 LIPID PANEL: CPT

## 2017-10-10 PROCEDURE — 97161 PT EVAL LOW COMPLEX 20 MIN: CPT

## 2017-10-10 PROCEDURE — 36415 COLL VENOUS BLD VENIPUNCTURE: CPT

## 2017-10-10 PROCEDURE — 6370000000 HC RX 637 (ALT 250 FOR IP): Performed by: NURSE PRACTITIONER

## 2017-10-10 PROCEDURE — 96374 THER/PROPH/DIAG INJ IV PUSH: CPT

## 2017-10-10 PROCEDURE — 96372 THER/PROPH/DIAG INJ SC/IM: CPT

## 2017-10-10 PROCEDURE — 82947 ASSAY GLUCOSE BLOOD QUANT: CPT

## 2017-10-10 PROCEDURE — 83036 HEMOGLOBIN GLYCOSYLATED A1C: CPT

## 2017-10-10 PROCEDURE — 2580000003 HC RX 258: Performed by: NURSE PRACTITIONER

## 2017-10-10 RX ORDER — LORAZEPAM 2 MG/ML
1 INJECTION INTRAMUSCULAR ONCE
Status: COMPLETED | OUTPATIENT
Start: 2017-10-10 | End: 2017-10-10

## 2017-10-10 RX ORDER — PANTOPRAZOLE SODIUM 20 MG/1
20 TABLET, DELAYED RELEASE ORAL EVERY MORNING
Status: DISCONTINUED | OUTPATIENT
Start: 2017-10-10 | End: 2017-10-10

## 2017-10-10 RX ORDER — WARFARIN SODIUM 7.5 MG/1
15 TABLET ORAL
Status: COMPLETED | OUTPATIENT
Start: 2017-10-10 | End: 2017-10-10

## 2017-10-10 RX ORDER — WARFARIN SODIUM 7.5 MG/1
15 TABLET ORAL DAILY
Status: DISCONTINUED | OUTPATIENT
Start: 2017-10-10 | End: 2017-10-10

## 2017-10-10 RX ADMIN — BUSPIRONE HYDROCHLORIDE 10 MG: 10 TABLET ORAL at 20:28

## 2017-10-10 RX ADMIN — ARIPIPRAZOLE 10 MG: 10 TABLET ORAL at 20:28

## 2017-10-10 RX ADMIN — ENOXAPARIN SODIUM 90 MG: 100 INJECTION SUBCUTANEOUS at 10:59

## 2017-10-10 RX ADMIN — Medication 10 ML: at 11:03

## 2017-10-10 RX ADMIN — LORAZEPAM 1 MG: 2 INJECTION INTRAMUSCULAR; INTRAVENOUS at 09:26

## 2017-10-10 RX ADMIN — LEVETIRACETAM 500 MG: 500 TABLET ORAL at 09:35

## 2017-10-10 RX ADMIN — LOSARTAN POTASSIUM 12.5 MG: 25 TABLET ORAL at 09:30

## 2017-10-10 RX ADMIN — Medication 10 ML: at 20:28

## 2017-10-10 RX ADMIN — LEVETIRACETAM 250 MG: 250 TABLET, FILM COATED ORAL at 09:38

## 2017-10-10 RX ADMIN — LINAGLIPTIN 5 MG: 5 TABLET, FILM COATED ORAL at 14:00

## 2017-10-10 RX ADMIN — FUROSEMIDE 20 MG: 20 TABLET ORAL at 09:30

## 2017-10-10 RX ADMIN — PANTOPRAZOLE SODIUM 40 MG: 40 TABLET, DELAYED RELEASE ORAL at 11:00

## 2017-10-10 RX ADMIN — ENOXAPARIN SODIUM 90 MG: 100 INJECTION SUBCUTANEOUS at 20:28

## 2017-10-10 RX ADMIN — WARFARIN SODIUM 15 MG: 7.5 TABLET ORAL at 17:17

## 2017-10-10 RX ADMIN — ATORVASTATIN CALCIUM 40 MG: 40 TABLET, FILM COATED ORAL at 20:27

## 2017-10-10 RX ADMIN — BUSPIRONE HYDROCHLORIDE 10 MG: 10 TABLET ORAL at 09:32

## 2017-10-10 RX ADMIN — DOCUSATE SODIUM 100 MG: 100 CAPSULE, LIQUID FILLED ORAL at 09:32

## 2017-10-10 RX ADMIN — LEVETIRACETAM 500 MG: 500 TABLET ORAL at 20:27

## 2017-10-10 RX ADMIN — METOCLOPRAMIDE 10 MG: 10 TABLET ORAL at 09:30

## 2017-10-10 ASSESSMENT — PAIN SCALES - GENERAL
PAINLEVEL_OUTOF10: 0

## 2017-10-10 NOTE — H&P
[address] [facility]     HISTORY AND PHYSICAL EXAMINATION            Date:   10/10/2017  Patient name:  Lili Arguello  Date of admission:  10/9/2017  5:53 PM  MRN:   [id number]  Account:  [de-identified]  YOB: 1965  PCP:    Janis Hay CNP  Room:   2103/2103-01  Code Status:    Full Code    CHIEF COMPLAINT     Chief Complaint   Patient presents with    Numbness    Oral Swelling       HISTORY OF PRESENT ILLNESS  (Character, Onset, Location, Duration,  Exacerbating/Relieving Factors, Radiation,   Associated Symptoms, Severity )      The patient is a 47 y.o.  female who presents with a one day history of facial numbness and feeling as if her tongue and throat were swelling. According to patient, she noted circumoral numbness upon waking this am, which has progressively worsened throughout the day. Currently she reports that her entire face and head feel numb, which is reminiscent of the symptoms she experienced with her CVA in 2003. There are no other associated symptoms. Denies fever, chills, chest pain, cough, abdominal pain, nausea, vomiting, diarrhea, and urinary symptoms; denies dysphagia, dysphasia, and visual distrbances. Denies worsening of left-sided weakness (residula from old CVA). There are no aggravating or alleviating factors. Symptoms are reported as constant and moderate. Patient has a history of factor V Leiden, for which she takes warfarin daily. Patient reports that she had recently been off of her warfarin prior to receiving a nerve block last week for hip pain. Warfarin was resumed on 10/3/17. PAST MEDICAL HISTORY   Patient  has a past medical history of Acid reflux; Arthritis; Coughing; Depression; Diabetes mellitus ([address] Diverticulitis; Epilepsy ([facility] [address] Factor V Leiden ([facility] [address] Hiatal hernia; History of stroke associated with blood clotting tendency; Hx of blood clots; Hyperlipidemia; Hypertension;  Insomnia; Sleep apnea; Unspecified cerebral artery occlusion with cerebral infarction; and Wears glasses. PAST SURGICAL HISTORY    Patient  has a past surgical history that includes Neck surgery (July 2012); Endometrial ablation; laparoscopy; Hysterectomy; Tonsillectomy; Cholecystectomy; Appendectomy; Upper gastrointestinal endoscopy; Knee arthroscopy (Left, 4/7/15, 2015); hiatal hernia repair (2/4/2016); Colonoscopy; Colonoscopy (1/22/16); Colonoscopy (01/05/2017); and hernia repair. FAMILY HISTORY    Patient family history includes Cancer in her father; Heart Disease in her mother; Pacemaker in her sister. SOCIAL HISTORY    Patient  reports that she has never smoked. She has never used smokeless tobacco. She reports that she drinks alcohol. She reports that she does not use drugs. HOME MEDICATIONS        Prior to Admission medications    Medication Sig Start Date End Date Taking?  Authorizing Provider   zolpidem (AMBIEN) 5 MG tablet Take 5 mg by mouth 8/4/17  Yes Historical Provider, MD   busPIRone (BUSPAR) 10 MG tablet Take 10 mg by mouth   Yes Historical Provider, MD   omeprazole (PRILOSEC OTC) 20 MG tablet Take 1 tablet by mouth daily 8/14/17  Yes Ara Yee CNP   ARIPiprazole (ABILIFY) 10 MG tablet TAKE 1 TABLET BY MOUTH NIGHTLY 4/10/17  Yes Ara Yee CNP   dicyclomine (BENTYL) 10 MG capsule Take 1 capsule by mouth every 6 hours as needed (cramps) 3/12/17  Yes Brennon Robison MD   ondansetron (ZOFRAN ODT) 4 MG disintegrating tablet Take 1 tablet by mouth every 8 hours as needed for Nausea or Vomiting 3/12/17  Yes Brennon Robison MD   diphenoxylate-atropine (LOMOTIL) 2.5-0.025 MG per tablet Take 2 tablets by mouth every 8 hours as needed for Diarrhea   Yes Historical Provider, MD   promethazine (PHENERGAN) 12.5 MG tablet Take 12.5 mg by mouth every 6 hours as needed for Nausea   Yes Historical Provider, MD   warfarin (COUMADIN) 5 MG tablet Take 15 mg by mouth    Yes Historical Provider, MD   losartan (COZAAR) 25 MG tablet Take 12.5 mg by mouth daily   Yes Historical Provider, MD   pantoprazole (PROTONIX) 20 MG tablet Take 20 mg by mouth every morning   Yes Historical Provider, MD   metoclopramide (REGLAN) 10 MG tablet TAKE 1 TABLET BY MOUTH DAILY 2/1/17  Yes Autumn Kohler CNP   levETIRAcetam (KEPPRA) 500 MG tablet TAKE 1 TABLET BY MOUTH 2 TIMES DAILY 2/1/17  Yes Autumn Kohler CNP   furosemide (LASIX) 20 MG tablet TAKE 1 TABLET BY MOUTH DAILY 2/1/17  Yes Autumn Kohler CNP   sitaGLIPtin (JANUVIA) 50 MG tablet Take 50 mg by mouth daily   Yes Historical Provider, MD   levETIRAcetam (KEPPRA) 250 MG tablet Take 250 mg by mouth every morning Takes with the 500mg to equal 750mg in am dose only   Yes Historical Provider, MD   traZODone (DESYREL) 100 MG tablet Take 2 tablets by mouth nightly ---Has supply 10/5/16  Yes Jarad Ramos MD   atorvastatin (LIPITOR) 40 MG tablet TAKE 1 TABLET BY MOUTH NIGHTLY 8/9/16  Yes Autumn Kohler CNP   docusate sodium (DOCQLACE) 100 MG capsule TAKE ONE (1) CAPSULE BY MOUTH DAILY AS NEEDED FORCONSTIPATION  Patient taking differently: TAKE ONE (1) CAPSULE BY MOUTH DAILY AS NEEDED FOR CONSTIPATION 7/15/16  Yes Autumn Kohler CNP   traMADol (ULTRAM) 50 MG tablet Take 1 tablet by mouth every 6 hours as needed for Pain 9/13/17   Jose Juan Hancock PA-C   TRUEPLUS LANCETS 30G MISC 1 each by Does not apply route daily 4/11/17   Autumn Kohler CNP       ALLERGIES      Latex; Adhesive tape; Morphine and related; Other; and Aspirin    REVIEW OF SYSTEMS     Review of Systems   Constitutional: Negative for chills, diaphoresis, fever and malaise/fatigue. HENT: Negative for congestion and sore throat. Eyes: Negative for blurred vision and double vision. Respiratory: Negative for cough, sputum production, shortness of breath and wheezing. Cardiovascular: Negative for chest pain, palpitations, orthopnea and leg swelling.    Gastrointestinal: Negative for abdominal pain, constipation, diarrhea, nausea and vomiting. Genitourinary: Negative for dysuria, frequency and urgency. Musculoskeletal: Negative for back pain, falls and myalgias. Skin: Negative for itching and rash. Neurological: Positive for sensory change (numbness and tingling of face and head). Negative for dizziness, focal weakness, weakness and headaches. Residual left sided weakness from old CVA unchanged from baseline   Psychiatric/Behavioral: Negative for depression and substance abuse. The patient is not nervous/anxious. PHYSICAL EXAM      BP (!) 151/90   Pulse 72   Temp 98.4 °F (36.9 °C) (Oral)   Resp 18   Ht 5' 4\" (1.626 m)   Wt 196 lb (88.9 kg)   SpO2 100%   BMI 33.64 kg/m²  Body mass index is 33.64 kg/m². Physical Exam   Constitutional: She is oriented to person, place, and time. She appears well-developed and well-nourished. No distress. HENT:   Head: Normocephalic and atraumatic. Mouth/Throat: Oropharynx is clear and moist.   No tongue swelling visible. Eyes: Conjunctivae and EOM are normal. Pupils are equal, round, and reactive to light. Neck: Normal range of motion. Neck supple. No tracheal deviation present. Cardiovascular: Normal rate, regular rhythm, normal heart sounds and intact distal pulses. Exam reveals no gallop and no friction rub. No murmur heard. Pulmonary/Chest: Effort normal and breath sounds normal. No respiratory distress. She has no wheezes. She has no rales. She exhibits no tenderness. Abdominal: Soft. Bowel sounds are normal. She exhibits no distension. There is no tenderness. There is no guarding. Musculoskeletal: Normal range of motion. She exhibits edema (trace bilateral lower extremity edema). She exhibits no tenderness. Lymphadenopathy:     She has no cervical adenopathy. Neurological: She is alert and oriented to person, place, and time. Residual weakness from old CVA in left upper and lower extremity.   Right upper and lower extremity strength 5/5 against Component Value Date    TSH 2.11 11/17/2015      U/A:No results for input(s): NITRITE, COLORU, WBCUA, RBCUA, MUCUS, BACTERIA, CLARITYU, SPECGRAV, LEUKOCYTESUR, BLOODU, GLUCOSEU, AMORPHOUS in the last 72 hours. Invalid input(s): Augusta Baldwin    Imaging/Diagonstics:     Xr Cervical Spine Limited    Result Date: 9/13/2017  EXAMINATION: 3 VIEWS OF THE THORACIC SPINE; 3 VIEWS OF THE CERVICAL SPINE 9/13/2017 6:20 pm COMPARISON: None. HISTORY: ORDERING SYSTEM PROVIDED HISTORY: left sided pain TECHNOLOGIST PROVIDED HISTORY: Reason for exam:->left sided pain Ordering Physician Provided Reason for Exam: pain Acuity: Acute Type of Exam: Initial FINDINGS: Cervical spine, three views: Cervical vertebral alignment is normal.  No fractures seen. Previous anterior cervical fusion at C5-6 and C6-7 with anterior surgical plate and screws. No hardware complication. The prevertebral soft tissues are unremarkable. The open-mouth odontoid view is. Thoracic spine, three views: Thoracic vertebral alignment is normal.  No fracture or significant arthritic changes. Paravertebral soft tissues are unremarkable. No acute osseous abnormality of the cervical spine. Anterior cervical fusion at C5-6 and C6-7 with no hardware complication. No acute osseous abnormality of the thoracic spine. Xr Thoracic Spine Standard    Result Date: 9/13/2017  EXAMINATION: 3 VIEWS OF THE THORACIC SPINE; 3 VIEWS OF THE CERVICAL SPINE 9/13/2017 6:20 pm COMPARISON: None. HISTORY: ORDERING SYSTEM PROVIDED HISTORY: left sided pain TECHNOLOGIST PROVIDED HISTORY: Reason for exam:->left sided pain Ordering Physician Provided Reason for Exam: pain Acuity: Acute Type of Exam: Initial FINDINGS: Cervical spine, three views: Cervical vertebral alignment is normal.  No fractures seen. Previous anterior cervical fusion at C5-6 and C6-7 with anterior surgical plate and screws. No hardware complication. The prevertebral soft tissues are unremarkable.   The open-mouth odontoid view is. Thoracic spine, three views: Thoracic vertebral alignment is normal.  No fracture or significant arthritic changes. Paravertebral soft tissues are unremarkable. No acute osseous abnormality of the cervical spine. Anterior cervical fusion at C5-6 and C6-7 with no hardware complication. No acute osseous abnormality of the thoracic spine. Ct Head Wo Contrast    Result Date: 10/9/2017  EXAMINATION: CT OF THE HEAD WITHOUT CONTRAST  10/9/2017 6:38 pm TECHNIQUE: CT of the head was performed without the administration of intravenous contrast. Dose modulation, iterative reconstruction, and/or weight based adjustment of the mA/kV was utilized to reduce the radiation dose to as low as reasonably achievable. COMPARISON: June 7, 2013 HISTORY: ORDERING SYSTEM PROVIDED HISTORY: bilateral facial numbness, previous CVA TECHNOLOGIST PROVIDED HISTORY: Ordering Physician Provided Reason for Exam: BILATERAL FACIAL NUMBNESS AND TINGLING X 1 DAY. PT ALSO COMPLAINS OF THROAT SWELLING Acuity: Unknown Type of Exam: Initial FINDINGS: BRAIN/VENTRICLES: There is no acute intracranial hemorrhage, mass effect or midline shift. No abnormal extra-axial fluid collection. The gray-white differentiation is maintained without evidence of an acute infarct. There is no evidence of hydrocephalus. There is focal linear encephalomalacia in the subinsular region on the right. This appears unchanged. ORBITS: The visualized portion of the orbits demonstrate no acute abnormality. SINUSES: The visualized paranasal sinuses and mastoid air cells demonstrate no acute abnormality. SOFT TISSUES/SKULL:  No acute abnormality of the visualized skull or soft tissues. No acute intracranial abnormality. Remote infarct right corona radiata. Fluoro For Surgical Procedures    Result Date: 10/3/2017  Radiology exam is complete. No Radiologist dictation. Please follow up with ordering provider.          ASSESSMENT  and  PLAN

## 2017-10-10 NOTE — CARE COORDINATION
CASE MANAGEMENT NOTE:    Admission Date:  10/9/2017 Jennifer Wilkes is a 47 y.o.  female    Admitted for : TIA (transient ischemic attack) [G45.9]    Met with:  Patient    PCP:  Kira Omalley CNP                                Insurance:  [facility] Medicare      Current Residence/ Living Arrangements:  independently at home- with daughter in 1 level home, no steps             Current Services PTA:  No    Is patient agreeable to VNS: No    Freedom of choice provided: Yes         VNS chosen:  No    DME:  Cpap    Home Oxygen: No    Nebulizer: No    Supplier: N/A    Potential Assistance Needed: may need to d/c home on Lovenox to bridge coumadin    SNF needed: No    [facility] [address] [facility]       Does Patient want to use MEDS to BEDS? No    Family Members/Caregivers that pt would like involved in their care:    No    If yes, list name here:      Transportation Provider:  Patient and Family                      Discharge Plan:  Return home, here with poss TIA, facial numbness and tongue swelling               Readmission Risk              Readmission Risk:        11.5       Age 72 or Greater:  0    Admitted from SNF or Requires Paid or Family Care:  0    Currently has CHF,COPD,ARF,CRI,or is on dialysis:  0    Takes more than 5 Prescription Medications:  4    Takes Digoxin,Insulin,Anticoagulants,Narcotics or ASA/Plavix:  [facility] in Past 12 Months:  0    On Disability:  3    Patient Considers own Health:  2.5          Electronically signed by:  Alexandra Garces RN on 10/10/2017 at 3:53 PM

## 2017-10-10 NOTE — PLAN OF CARE
Problem: Musculor/Skeletal Functional Status  Goal: Highest potential functional level  Outcome: Met This Shift  Up freely to BR;chose to stay in bed vs ambulating in hallway  Goal: Absence of falls  Outcome: Met This Shift

## 2017-10-10 NOTE — CONSULTS
Consult to Neurology  Consult performed by: Geraldo Ruvalcaba ordered by: Trixie Mello      Non-lateralized facial tingling. CT brain w/ old stroke. Nothing acute. She has residual mild left paresis from old stroke. Known coagulopathy. Needs restart of Coumadin. If nothing acute on MRI and if no significant stenosis per dopplers, then discharge at your discretion. All dictated.

## 2017-10-10 NOTE — PROGRESS NOTES
Pharmacy Note  Warfarin Consult follow-up      Recent Labs      10/09/17   1825  10/10/17   0412   INR  1.4  1.6     Recent Labs      10/09/17   1825  10/10/17   0412   HGB  12.0  11.3*   HCT  37.8  35.7*   PLT  309  297       Significant Drug-Drug Interactions:  New warfarin drug-drug interactions: None  Discontinued drug-drug interactions: None  Current warfarin drug-drug interactions: Lipitor/Lovenox      Date             INR        Dose given previous day  Dose scheduled for today  10/10/2017            1.6       None           15mg        Notes:                     Daily PT/INR while inpatient.    Lebron Torres MS   10/10/2017  10:46 AM

## 2017-10-10 NOTE — CONSULTS
[address] [facility]                [address] [facility] [address]                                 CONSULTATION    PATIENT NAME: Ry Duarte                  :             1965  MED REC NO:   [id number]                            ROOM:           2103  ACCOUNT NO:   [de-identified]                         ADMISSION DATE:  10/09/2017  PROVIDER:     Lani Avery    CONSULT DATE:  10/10/2017    Thank you for asking us to see this lady for neurologic evaluation. She is 47years old and we are asked to see her regarding possible  stroke. The patient came to the hospital because she had a sense of tingling  numbness in her lower face. This was not lateralized. She felt as if  her tongue was swollen and her throat was swollen, but upon  questioning she denies any trouble swallowing. There is been no  change in vision or speech. She denies any new lateralized numbness  or weakness of her face, arms, or legs. She has residual left-sided  paresis from a stroke in , but this has not changed recently. A  CT of the brain shows old small right cerebral infarction but nothing  acute. An MRI of the brain and carotid Dopplers have been ordered. The patient was off her Coumadin for about five days because of a  planned injection. PAST MEDICAL HISTORY:  From a neurologic standpoint, is as mentioned  above. She additionally has hypertension, elevated serum lipids,  sleep apnea, factor V Leiden deficiency, and history of blood clots. She has seizures and takes Keppra. She has not had a recent seizure. She denies having a neurologist.  She has diabetes and depression. PAST SURGICAL HISTORY:  She has had tonsillectomy, knee surgery,  hysterectomy, hernia repair, cholecystectomy, and appendectomy. FAMILY HISTORY:  There is no family history of stroke or epilepsy. Her mother  young from heart disease. SOCIAL HISTORY:  She drinks alcohol.   She does not smoke. CURRENT MEDICATIONS:  Noted in the computer record and anticoagulation  history as described above. REVIEW OF SYSTEMS:  The computer record of patient other than what is  mentioned above, a review of systems is negative for HEENT,  cardiovascular, pulmonary, gastrointestinal, urinary, musculoskeletal,  skin, endocrine, immunologic, and psychiatric. PHYSICAL EXAMINATION:  On examination, she has very mild left facial  weakness in an upper motor neuron distribution. Otherwise cranial  nerves II-XII are intact. Speech is intact for comprehension and  expression. There is no dysarthria or aphasia. She has a flat affect  and seems apathetic. She is oriented. 5/5 motor function on the  right and 4+/5 on the left (she states this is baseline for her since  her stroke). Trace deep tendon reflexes. Coordination is intact and  there is no tremor, ataxia, or nystagmus. IMPRESSION:  Her presenting symptoms as described above are not  particularly lateralized nor localizing. She does have some residual  mild left-sided paresis from her previous stroke (which is evident on  the CT of the brain but the CT shows nothing acute). She needs to get  back on her Coumadin as planned. An MRI of the brain and carotid  Dopplers are pending. If there is no acute finding nor significant  stenosis respectively then discharge at your discretion.         Homa Barragan    D: 10/10/2017 8:37:04       T: 10/10/2017 8:41:08     [email or web address]  Job#: [id number]     Doc#: [id number]

## 2017-10-10 NOTE — ED NOTES
Report called to Krystal Casillas on [facility], RN  10/09/17 4798

## 2017-10-10 NOTE — ED NOTES
Provided pt with nasir crackers and water per Dr. Marc Gutierrez A&Ox4, PMS intact, PWD.       [facility], [address] [place] [address]  10/09/17 4209

## 2017-10-10 NOTE — PROGRESS NOTES
Physical Therapy    Facility/Department: [address] [place] [address] CARE  Initial Assessment    NAME: Arvind Retana  : 1965  MRN: [id number]    Date of Service: 10/10/2017    Patient Diagnosis(es): The encounter diagnosis was Facial numbness. has a past medical history of Acid reflux; Arthritis; Coughing; Depression; Diabetes mellitus ([facility] [address] Diverticulitis; Epilepsy ([facility] [address] Factor V Leiden ([facility] [address] Hiatal hernia; History of stroke associated with blood clotting tendency; Hx of blood clots; Hyperlipidemia; Hypertension; Insomnia; Sleep apnea; Unspecified cerebral artery occlusion with cerebral infarction; and Wears glasses. has a past surgical history that includes Neck surgery (2012); Endometrial ablation; laparoscopy; Hysterectomy; Tonsillectomy; Cholecystectomy; Appendectomy; Upper gastrointestinal endoscopy; Knee arthroscopy (Left, 4/7/15, ); hiatal hernia repair (2016); Colonoscopy; Colonoscopy (16); Colonoscopy (2017); and hernia repair. Restrictions  Position Activity Restriction  Other position/activity restrictions: She has residual left-sided  Vision/Hearing  Vision: Within Functional Limits (Glasses)  Hearing: Within functional limits     Subjective  General  Patient assessed for rehabilitation services?: Yes  Additional Pertinent Hx: The patient is a 47 y.o.  female who presents with a one day history of facial numbness and feeling as if her tongue and throat were swelling. , Pt does have reisdual let sided weakness form previous CVA in . MRI negative for any acute process. Referral Date : 10/09/17  Diagnosis: TIA  Follows Commands: Within Functional Limits  Subjective  Subjective: Reports her symtoms of numbness are resolving , feels like her throat is swollen.   Pain Screening  Patient Currently in Pain: Denies  Vital Signs  Patient Currently in Pain: Denies       Orientation       Social/Functional History  Social/Functional History  Lives With:  (Daughter)  Type Status (): 0 percent impaired, limited or restricted  Mobility: Walking and Moving Around Discharge Status (): 0 percent impaired, limited or restricted  OutComes Score                                           Goals  Short term goals  Time Frame for Short term goals: NA  Patient Goals   Patient goals : Go home       Therapy Time   Individual Concurrent Group Co-treatment   Time In 1512         Time Out 1521         Minutes 9                 Melani Frye, PT

## 2017-10-11 VITALS
HEART RATE: 75 BPM | HEIGHT: 64 IN | WEIGHT: 196 LBS | BODY MASS INDEX: 33.46 KG/M2 | SYSTOLIC BLOOD PRESSURE: 120 MMHG | DIASTOLIC BLOOD PRESSURE: 67 MMHG | RESPIRATION RATE: 18 BRPM | TEMPERATURE: 98.2 F | OXYGEN SATURATION: 100 %

## 2017-10-11 LAB
ESTIMATED AVERAGE GLUCOSE: 137 MG/DL
GLUCOSE BLD-MCNC: 119 MG/DL (ref 65–105)
GLUCOSE BLD-MCNC: 158 MG/DL (ref 65–105)
HBA1C MFR BLD: 6.4 % (ref 4–6)
HCT VFR BLD CALC: 33.5 % (ref 36–46)
HEMOGLOBIN: 11 G/DL (ref 12–16)
INR BLD: 1.7
LV EF: 55 %
LVEF MODALITY: NORMAL
MCH RBC QN AUTO: 26.5 PG (ref 26–34)
MCHC RBC AUTO-ENTMCNC: 32.8 G/DL (ref 31–37)
MCV RBC AUTO: 80.6 FL (ref 80–100)
PDW BLD-RTO: 19.8 % (ref 11.5–14.9)
PLATELET # BLD: 289 K/UL (ref 150–450)
PMV BLD AUTO: 7.8 FL (ref 6–12)
PROTHROMBIN TIME: 19.1 SEC (ref 9.7–12)
RBC # BLD: 4.16 M/UL (ref 4–5.2)
WBC # BLD: 10.8 K/UL (ref 3.5–11)

## 2017-10-11 PROCEDURE — 97110 THERAPEUTIC EXERCISES: CPT

## 2017-10-11 PROCEDURE — 82947 ASSAY GLUCOSE BLOOD QUANT: CPT

## 2017-10-11 PROCEDURE — 93306 TTE W/DOPPLER COMPLETE: CPT

## 2017-10-11 PROCEDURE — G8989 SELF CARE D/C STATUS: HCPCS

## 2017-10-11 PROCEDURE — 97165 OT EVAL LOW COMPLEX 30 MIN: CPT

## 2017-10-11 PROCEDURE — 36415 COLL VENOUS BLD VENIPUNCTURE: CPT

## 2017-10-11 PROCEDURE — 85610 PROTHROMBIN TIME: CPT

## 2017-10-11 PROCEDURE — G0378 HOSPITAL OBSERVATION PER HR: HCPCS

## 2017-10-11 PROCEDURE — 6370000000 HC RX 637 (ALT 250 FOR IP): Performed by: NURSE PRACTITIONER

## 2017-10-11 PROCEDURE — 6370000000 HC RX 637 (ALT 250 FOR IP): Performed by: INTERNAL MEDICINE

## 2017-10-11 PROCEDURE — 2580000003 HC RX 258: Performed by: NURSE PRACTITIONER

## 2017-10-11 PROCEDURE — 85027 COMPLETE CBC AUTOMATED: CPT

## 2017-10-11 PROCEDURE — G8988 SELF CARE GOAL STATUS: HCPCS

## 2017-10-11 PROCEDURE — G8987 SELF CARE CURRENT STATUS: HCPCS

## 2017-10-11 PROCEDURE — 96372 THER/PROPH/DIAG INJ SC/IM: CPT

## 2017-10-11 PROCEDURE — 6360000002 HC RX W HCPCS: Performed by: NURSE PRACTITIONER

## 2017-10-11 PROCEDURE — 99239 HOSP IP/OBS DSCHRG MGMT >30: CPT | Performed by: INTERNAL MEDICINE

## 2017-10-11 PROCEDURE — 83036 HEMOGLOBIN GLYCOSYLATED A1C: CPT

## 2017-10-11 RX ORDER — WARFARIN SODIUM 7.5 MG/1
15 TABLET ORAL
Status: DISCONTINUED | OUTPATIENT
Start: 2017-10-11 | End: 2017-10-11 | Stop reason: HOSPADM

## 2017-10-11 RX ADMIN — BUSPIRONE HYDROCHLORIDE 10 MG: 10 TABLET ORAL at 07:59

## 2017-10-11 RX ADMIN — ACETAMINOPHEN 650 MG: 325 TABLET ORAL at 06:25

## 2017-10-11 RX ADMIN — LEVETIRACETAM 250 MG: 250 TABLET, FILM COATED ORAL at 07:57

## 2017-10-11 RX ADMIN — LEVETIRACETAM 500 MG: 500 TABLET ORAL at 08:00

## 2017-10-11 RX ADMIN — ENOXAPARIN SODIUM 90 MG: 100 INJECTION SUBCUTANEOUS at 07:59

## 2017-10-11 RX ADMIN — METOCLOPRAMIDE 10 MG: 10 TABLET ORAL at 07:57

## 2017-10-11 RX ADMIN — FUROSEMIDE 20 MG: 20 TABLET ORAL at 07:57

## 2017-10-11 RX ADMIN — TRAZODONE HYDROCHLORIDE 200 MG: 50 TABLET ORAL at 01:14

## 2017-10-11 RX ADMIN — Medication 10 ML: at 08:00

## 2017-10-11 RX ADMIN — PANTOPRAZOLE SODIUM 40 MG: 40 TABLET, DELAYED RELEASE ORAL at 06:25

## 2017-10-11 RX ADMIN — LOSARTAN POTASSIUM 12.5 MG: 25 TABLET ORAL at 07:58

## 2017-10-11 RX ADMIN — LINAGLIPTIN 5 MG: 5 TABLET, FILM COATED ORAL at 07:58

## 2017-10-11 RX ADMIN — TRAMADOL HYDROCHLORIDE 50 MG: 50 TABLET, FILM COATED ORAL at 08:08

## 2017-10-11 RX ADMIN — TRAMADOL HYDROCHLORIDE 50 MG: 50 TABLET, FILM COATED ORAL at 01:06

## 2017-10-11 RX ADMIN — INSULIN LISPRO 2 UNITS: 100 INJECTION, SOLUTION INTRAVENOUS; SUBCUTANEOUS at 12:03

## 2017-10-11 ASSESSMENT — PAIN DESCRIPTION - LOCATION
LOCATION: HEAD
LOCATION: HEAD

## 2017-10-11 ASSESSMENT — PAIN SCALES - GENERAL
PAINLEVEL_OUTOF10: 10
PAINLEVEL_OUTOF10: 4
PAINLEVEL_OUTOF10: 0
PAINLEVEL_OUTOF10: 8
PAINLEVEL_OUTOF10: 8
PAINLEVEL_OUTOF10: 10

## 2017-10-11 ASSESSMENT — PAIN DESCRIPTION - PAIN TYPE
TYPE: ACUTE PAIN
TYPE: ACUTE PAIN

## 2017-10-11 ASSESSMENT — PAIN DESCRIPTION - DESCRIPTORS
DESCRIPTORS: HEADACHE
DESCRIPTORS: HEADACHE

## 2017-10-11 NOTE — DISCHARGE INSTR - DIET

## 2017-10-11 NOTE — PROGRESS NOTES
Pharmacy Note  Warfarin Consult follow-up      Recent Labs      10/09/17   1825  10/10/17   0412  10/11/17   0644   INR  1.4  1.6  1.7     Recent Labs      10/09/17   1825  10/10/17   0412  10/11/17   0457   HGB  12.0  11.3*  11.0*   HCT  37.8  35.7*  33.5*   PLT  309  297  289       Significant Drug-Drug Interactions:  New warfarin drug-drug interactions: none  Discontinued drug-drug interactions: none  Current warfarin drug-drug interactions: Lipitor/Lovenox,tramadol, trazodone, acetaminophen      Date             INR        Dose given previous day  Dose scheduled for today  10/11/2017      1.7       15 mg           15 mg        Notes:     Continue home dose of 15 mg. Daily PT/INR while inpatient. Mindy Cheng. Ph.  10/11/2017  10:53 AM

## 2017-10-11 NOTE — PROGRESS NOTES
Discharge instructions given , pt verbalized understanding, home meds returned to pt, pt ambulaetd to care with belongings and family at side, pt stable at time of discharge

## 2017-10-11 NOTE — PROGRESS NOTES
[address]   Occupational Therapy Evaluation  Date: 10/11/17  Patient Name: Cheryl Enciso       Room: 9768/9974-16  MRN: [id number]  Account: [de-identified]   : 1965  (47 y.o.) Gender: female        Diagnosis: TIA          Past Medical History:  has a past medical history of Acid reflux; Arthritis; Coughing; Depression; Diabetes mellitus ([facility] Diverticulitis; Epilepsy ([facility] Factor V Leiden ([facility] Hiatal hernia; History of stroke associated with blood clotting tendency; Hx of blood clots; Hyperlipidemia; Hypertension; Insomnia; Sleep apnea; Unspecified cerebral artery occlusion with cerebral infarction; and Wears glasses. Past Surgical History:   has a past surgical history that includes Neck surgery (2012); Endometrial ablation; laparoscopy; Hysterectomy; Tonsillectomy; Cholecystectomy; Appendectomy; Upper gastrointestinal endoscopy; Knee arthroscopy (Left, 4/7/15, ); hiatal hernia repair (2016); Colonoscopy; Colonoscopy (16); Colonoscopy (2017); and hernia repair.     Restrictions  Other position/activity restrictions: She has residual left-sided  Position Activity Restriction  Other position/activity restrictions: She has residual left-sided  Required Braces or Orthoses?: No     Vitals  Temp: 98.2 °F (36.8 °C)  Pulse: 75  Resp: 18  BP: 120/67  Height: 5' 4\" (162.6 cm)  Weight: 196 lb (88.9 kg)  BMI (Calculated): 33.7  Oxygen Therapy  SpO2: 100 %  Pulse Oximeter Device Mode: Continuous  O2 Device: None (Room air)  Level of Consciousness: Alert    Subjective     Overall Orientation Status: Within Functional Limits  Vision  Vision: Impaired  Vision Exceptions: Wears glasses at all times  Hearing  Hearing: Within functional limits  Social/Functional History  Lives With: Daughter  Type of Home: Apartment  Home Layout: One level  Home Access: Level entry  Bathroom Shower/Tub: Tub/Shower unit  Bathroom Toilet: Standard  ADL Assistance: Independent  Homemaking Assistance: Independent  Homemaking Responsibilities: Yes (share homemgmt tasks)  Ambulation Assistance: Independent  Active : Yes  Additional Comments: Pt does all home making chores on her own. Objective      Cognition  Overall Cognitive Status: WFL   Sensation  Overall Sensation Status: WFL   ADL  Feeding: Independent  Grooming: Independent  UE Bathing: Independent  LE Bathing: Independent  UE Dressing: Independent  LE Dressing: Independent  Toileting: Independent    UE Function           LUE Strength  Gross LUE Strength: Exceptions to [facility]  L Shoulder Flex: 3/5  L Elbow Flex: 3+/5  L Elbow Ext: 3/5  L Wrist Flex: 3/5  L Wrist Ext: 3/5  L Hand Grasp: 3+/5     LUE Tone: Normotonic     LUE AROM (degrees)  LUE AROM : WFL     Left Hand AROM (degrees)  Left Hand AROM: WFL  RUE Strength  Gross RUE Strength: WFL  R Hand Grasp: 4+/5      RUE Tone: Normotonic     RUE AROM (degrees)  RUE AROM : WFL     Right Hand AROM (degrees)  Right Hand AROM: WFL     Exercises:  Pt. insturcted in left UE stgth ex. with yellow and red thera-band ex. in all planes. Pt. completed 10 reps. with red/yellow thera-band in all planes. Pt. also completed left  stgth ex. with moderate resistance foam block  ( gree ) 10 reps. Pt. issued ex. hand out for left UE and  stgth ex. And thera-bands  for HEP. Pt. demo. good understanding . Fine Motor Skills  Coordination  Movements Are Fluid And Coordinated: No  Coordination and Movement description: Left UE, Decreased speed       Mobility  Supine to Sit: Independent  Sit to Supine: Independent       Balance  Sitting Balance: Independent  Standing Balance: Independent  Standing Balance  Sit to stand: Independent  Stand to sit: Independent  Functional Mobility  Functional - Mobility Device: No device  Activity: To/from bathroom  Assist Level:  Independent  Bed mobility  Rolling to Left: Independent  Rolling to Right: Independent  Supine to Sit: Independent  Sit to Supine: Independent  Scooting: Independent     Transfers  Stand Step Transfers: Independent  Sit to stand: Independent  Stand to sit: Independent  Toilet Transfers  Toilet - Technique: Ambulating  Equipment Used: Grab bars      Assessment  Prognosis: Good  Decision Making: Low Complexity  Patient Education: OT POC, Left UE and  stgth ex. for HEP, issued yellow and red thera-band and green foam block and hand out  REQUIRES OT FOLLOW UP: No  No Skilled OT: At baseline function  Activity Tolerance: Patient Tolerated treatment well         G CODE  OT G-codes  Functional Assessment Tool Used: [employer] ( 6 clicks)  Functional Limitation: Self care  Self Care Current Status (): 0 percent impaired, limited or restricted  Self Care Goal Status (): 0 percent impaired, limited or restricted  Self Care Discharge Status (): 0 percent impaired, limited or restricted    Goals       Plan  Safety Devices  Safety Devices in place: Yes  Type of devices: Nurse notified, Left in bed, Call light within reach  Restraints  Initially in place: No     Plan  Plan Comment: No further OT needed at this time. Pt.is indep. with ADL's. Left UE weakness is at baseline.     OT Equipment Recommendations  Equipment Needed: No  OT Individual Minutes  Time In: 0930  Time Out: 5393  Minutes: 23    Electronically signed by TRUNG Segundo on 10/11/17 at 3:01 PM

## 2017-10-12 LAB
EKG ATRIAL RATE: 69 BPM
EKG P AXIS: 24 DEGREES
EKG P-R INTERVAL: 174 MS
EKG Q-T INTERVAL: 374 MS
EKG QRS DURATION: 86 MS
EKG QTC CALCULATION (BAZETT): 400 MS
EKG R AXIS: 15 DEGREES
EKG T AXIS: 22 DEGREES
EKG VENTRICULAR RATE: 69 BPM

## 2017-10-14 ENCOUNTER — HOSPITAL ENCOUNTER (OUTPATIENT)
Age: 52
Discharge: HOME | End: 2017-10-14
Payer: MEDICARE

## 2017-10-14 LAB
AMOUNT GLUCOSE GIVEN: 100 G
GLUCOSE FASTING: 117 MG/DL (ref 65–99)
GLUCOSE TOLERANCE TEST 1 HOUR: 229 MG/DL (ref 65–184)
GLUCOSE TOLERANCE TEST 2 HOUR: 104 MG/DL (ref 65–139)
GLUCOSE TOLERANCE TEST 3 HOUR: 67 MG/DL (ref 65–130)
GLUCOSE, 4 HOUR: 83 MG/DL (ref 65–125)
GLUCOSE, 5 HR: 100 MG/DL (ref 65–109)

## 2017-10-14 PROCEDURE — 82952 GTT-ADDED SAMPLES: CPT

## 2017-10-14 PROCEDURE — 82951 GLUCOSE TOLERANCE TEST (GTT): CPT

## 2017-10-14 PROCEDURE — 36415 COLL VENOUS BLD VENIPUNCTURE: CPT

## 2017-10-17 ENCOUNTER — OFFICE VISIT (OUTPATIENT)
Dept: FAMILY MEDICINE CLINIC | Age: 52
End: 2017-10-17
Payer: MEDICARE

## 2017-10-17 VITALS
RESPIRATION RATE: 18 BRPM | HEART RATE: 62 BPM | TEMPERATURE: 97.5 F | BODY MASS INDEX: 33.95 KG/M2 | OXYGEN SATURATION: 98 % | SYSTOLIC BLOOD PRESSURE: 120 MMHG | DIASTOLIC BLOOD PRESSURE: 74 MMHG | WEIGHT: 197.8 LBS

## 2017-10-17 DIAGNOSIS — E11.9 TYPE 2 DIABETES MELLITUS WITHOUT COMPLICATION, WITHOUT LONG-TERM CURRENT USE OF INSULIN (HCC): Chronic | ICD-10-CM

## 2017-10-17 DIAGNOSIS — G45.9 TRANSIENT CEREBRAL ISCHEMIA, UNSPECIFIED TYPE: Primary | ICD-10-CM

## 2017-10-17 DIAGNOSIS — R53.1 LEFT-SIDED WEAKNESS: ICD-10-CM

## 2017-10-17 PROCEDURE — 1036F TOBACCO NON-USER: CPT | Performed by: NURSE PRACTITIONER

## 2017-10-17 PROCEDURE — 3044F HG A1C LEVEL LT 7.0%: CPT | Performed by: NURSE PRACTITIONER

## 2017-10-17 PROCEDURE — 99214 OFFICE O/P EST MOD 30 MIN: CPT | Performed by: NURSE PRACTITIONER

## 2017-10-17 PROCEDURE — 3017F COLORECTAL CA SCREEN DOC REV: CPT | Performed by: NURSE PRACTITIONER

## 2017-10-17 PROCEDURE — G8427 DOCREV CUR MEDS BY ELIG CLIN: HCPCS | Performed by: NURSE PRACTITIONER

## 2017-10-17 PROCEDURE — G8484 FLU IMMUNIZE NO ADMIN: HCPCS | Performed by: NURSE PRACTITIONER

## 2017-10-17 PROCEDURE — G8417 CALC BMI ABV UP PARAM F/U: HCPCS | Performed by: NURSE PRACTITIONER

## 2017-10-17 PROCEDURE — 3014F SCREEN MAMMO DOC REV: CPT | Performed by: NURSE PRACTITIONER

## 2017-10-17 RX ORDER — MELOXICAM 15 MG/1
TABLET ORAL
Refills: 3 | COMMUNITY
Start: 2017-09-22 | End: 2017-10-18 | Stop reason: ALTCHOICE

## 2017-10-17 RX ORDER — ZOLPIDEM TARTRATE 10 MG/1
10 TABLET ORAL
COMMUNITY
Start: 2017-10-13 | End: 2018-01-09

## 2017-10-17 RX ORDER — WARFARIN SODIUM 5 MG/1
15 TABLET ORAL DAILY
Qty: 90 TABLET | Refills: 0 | Status: SHIPPED
Start: 2017-10-17 | End: 2018-01-09

## 2017-10-18 ENCOUNTER — TELEPHONE (OUTPATIENT)
Dept: FAMILY MEDICINE CLINIC | Age: 52
End: 2017-10-18

## 2017-10-18 ENCOUNTER — HOSPITAL ENCOUNTER (OUTPATIENT)
Dept: PAIN MANAGEMENT | Age: 52
Discharge: HOME | End: 2017-10-18
Payer: MEDICARE

## 2017-10-18 VITALS
WEIGHT: 196 LBS | DIASTOLIC BLOOD PRESSURE: 77 MMHG | HEIGHT: 64 IN | BODY MASS INDEX: 33.46 KG/M2 | HEART RATE: 73 BPM | OXYGEN SATURATION: 100 % | RESPIRATION RATE: 17 BRPM | TEMPERATURE: 98.6 F | SYSTOLIC BLOOD PRESSURE: 101 MMHG

## 2017-10-18 DIAGNOSIS — M46.1 SACROILIITIS (HCC): ICD-10-CM

## 2017-10-18 DIAGNOSIS — M54.16 LUMBAR RADICULOPATHY: Primary | ICD-10-CM

## 2017-10-18 DIAGNOSIS — D68.51 FACTOR V LEIDEN (HCC): ICD-10-CM

## 2017-10-18 DIAGNOSIS — M47.816 LUMBAR FACET ARTHROPATHY: ICD-10-CM

## 2017-10-18 DIAGNOSIS — M47.817 LUMBOSACRAL SPONDYLOSIS WITHOUT MYELOPATHY: ICD-10-CM

## 2017-10-18 DIAGNOSIS — Z79.01 WARFARIN ANTICOAGULATION: ICD-10-CM

## 2017-10-18 DIAGNOSIS — M51.36 DDD (DEGENERATIVE DISC DISEASE), LUMBAR: ICD-10-CM

## 2017-10-18 PROCEDURE — 99213 OFFICE O/P EST LOW 20 MIN: CPT

## 2017-10-18 PROCEDURE — 99214 OFFICE O/P EST MOD 30 MIN: CPT | Performed by: PAIN MEDICINE

## 2017-10-18 RX ORDER — TRAMADOL HYDROCHLORIDE 50 MG/1
50 TABLET ORAL 2 TIMES DAILY PRN
Qty: 60 TABLET | Refills: 0 | Status: SHIPPED | OUTPATIENT
Start: 2017-10-18 | End: 2017-12-08 | Stop reason: SDUPTHER

## 2017-10-18 RX ORDER — CYCLOBENZAPRINE HCL 10 MG
10 TABLET ORAL 2 TIMES DAILY PRN
Qty: 60 TABLET | Refills: 2 | Status: SHIPPED | OUTPATIENT
Start: 2017-10-18 | End: 2017-10-28

## 2017-10-18 ASSESSMENT — PAIN DESCRIPTION - PAIN TYPE: TYPE: CHRONIC PAIN

## 2017-10-18 ASSESSMENT — PAIN SCALES - GENERAL: PAINLEVEL_OUTOF10: 7

## 2017-10-18 ASSESSMENT — PAIN DESCRIPTION - ONSET: ONSET: ON-GOING

## 2017-10-18 ASSESSMENT — ENCOUNTER SYMPTOMS
BACK PAIN: 1
VOMITING: 0
EYES NEGATIVE: 1
SHORTNESS OF BREATH: 0
SINUS PAIN: 1
COUGH: 0
GASTROINTESTINAL NEGATIVE: 1
DOUBLE VISION: 0
BLURRED VISION: 0
NAUSEA: 0
RESPIRATORY NEGATIVE: 1
HEARTBURN: 0

## 2017-10-18 ASSESSMENT — PAIN DESCRIPTION - PROGRESSION: CLINICAL_PROGRESSION: GRADUALLY IMPROVING

## 2017-10-18 ASSESSMENT — PAIN DESCRIPTION - FREQUENCY: FREQUENCY: CONTINUOUS

## 2017-10-18 ASSESSMENT — PAIN DESCRIPTION - LOCATION: LOCATION: BACK;BUTTOCKS

## 2017-10-18 ASSESSMENT — PAIN DESCRIPTION - ORIENTATION: ORIENTATION: LEFT

## 2017-10-18 ASSESSMENT — PAIN DESCRIPTION - DESCRIPTORS: DESCRIPTORS: CONSTANT;SPASM

## 2017-10-18 NOTE — PROGRESS NOTES
[facility]  Patient Pain Assessment  Consultation - No att. providers found    Primary Care Physician: Pilar Washington CNP    Chief complaint:   Chief Complaint   Patient presents with    Back Pain     left buttock   . HISTORY OF PRESENT ILLNESS:  Hayder Perry is 47 y.o. female with    Patient return to the pain clinic with a chief complaint of pain involving the low back. She had undergone lumbar epidural steroid injection on 10/3/2017 with about 80% improvement in the pain especially in the radiating pain to the left lower extremity.  Reports that after few days after the injection she had a mini stroke and that is placed on Coumadin. She reports at that time she developed numbness of the face. She also reports she is going to start physical therapy in the near future. Back Pain   This is a chronic problem. The current episode started more than 1 month ago. The problem occurs constantly. The problem has been gradually improving since onset. The pain is present in the lumbar spine and gluteal. The quality of the pain is described as stabbing and shooting. The pain is at a severity of 7/10. The pain is severe. The pain is worse during the day. The symptoms are aggravated by bending, lying down, sitting, standing and coughing (walking, ADLs). Stiffness is present: sometimes. Associated symptoms include headaches. Pertinent negatives include no chest pain, dysuria, fever, numbness, tingling or weight loss. Risk factors include sedentary lifestyle, lack of exercise and obesity. OARRS compliant?  yes  Concern for prescription abuse?no    Current Pain Assessment  Pain Assessment  Pain Assessment: 0-10  Pain Level: 7  Pain Type: Chronic pain  Pain Location: Back, Buttocks  Pain Orientation: Left  Pain Radiating Towards: none  Pain Descriptors: Constant, Spasm  Pain Frequency: Continuous  Pain Onset: On-going  Clinical Progression: Gradually improving  Effect of Pain on Daily Activities: Can't walk more than even a block w/o pain or stopping  Patient's Stated Pain Goal: 2 (To decrease pain increase activiity)  Pain Intervention(s): Medication (see eMar), Rest, Repositioned, Heat applied, Cold applied                    ADVERSE MEDICATION EFFECTS:   Constipation: no  Bowel Regimen: Yes  Diet: no carbs  Appetite:  ok  Sedation:  not applicable  Urinary Retention: no    FOCUSED PAIN SCALE:  Highest : 8  Lowest :7  Average: Range-7  When and What  was your last procedure:    10/3/17 LESI L4-5  Was your procedure effective:  Yes, 80%    ACTIVITY/SOCIAL/EMOTIONAL:  Sleep Pattern: 13 hours, takes ambien hours per night. generally restful sleep  Energy Level:  Tired/Fatigued  Currently attending Physical Therapy:  Yes, will start Friday  Home Exercises: daily stretching, stroke extercises, arms  Mobility: walking increases the pain  Currently seeing a Psychiatrist or Psychologist:  Yes  Emotional Issues: anxiety/ nervousness   Mood: depressed     ABERRANT BEHAVIORS SINCE LAST VISIT:  Have you ever been treated in another Pain Clinic no  Refills for prescriptions appropriate: not done  Lost rx/pills: lost tramadol this summer  Taking more medication than prescribed:  no  Are you receiving PAIN medications from  other doctors: no  Last Urine/Serum Drug Screen :7/31/17  Was Serum/UDS as anticipated?   yes  Brought pill bottles in :not applicable   Was Pill count appropriate? :not applicable   Are currently pregnant? not applicable  Recent ER visits: Yes10/3/17 Mini stroke, admitted 3 days, [facility]             Past Medical History      Diagnosis Date    Acid reflux     Arthritis     Coughing     dry due to ace inhibitor    Depression     uses Abilify, Buspar, Trazodone for this    Diabetes mellitus ([facility]     Diverticulitis     Epilepsy ([facility]     last seizure 6 mos ago     Factor V Leiden ([facility]     Hiatal hernia     History of stroke associated with blood clotting tendency 2003 had clot in brain and left neck, left side weakness residual    Hx of blood clots     right breast, brain, neck    Hyperlipidemia     Hypertension     Insomnia     uses Trazodone for this    Sleep apnea     C PAP    Unspecified cerebral artery occlusion with cerebral infarction 6/2003    SEE BELOW, 20017 Mini stroke    Wears glasses        Surgical History  Past Surgical History:   Procedure Laterality Date    APPENDECTOMY      CHOLECYSTECTOMY      COLONOSCOPY      with polypectomy    COLONOSCOPY  1/22/16    AND EGD    COLONOSCOPY  01/05/2017    random biopsies.  ENDOMETRIAL ABLATION      2 times    HERNIA REPAIR      HIATAL HERNIA REPAIR  2/4/2016    laparoscopic robotic    HYSTERECTOMY      with BSO    KNEE ARTHROSCOPY Left 4/7/15, 2015    x 2    LAPAROSCOPY      \"springs inserted in to fallopian tubes\" to close tubes   [facility] NECK SURGERY  July 2012    Anterior, C5,6,7 bulging disk repair    TONSILLECTOMY      UPPER GASTROINTESTINAL ENDOSCOPY         Medications  Current Outpatient Prescriptions   Medication Sig Dispense Refill    cyclobenzaprine (FLEXERIL) 10 MG tablet Take 1 tablet by mouth 2 times daily as needed for Muscle spasms 60 tablet 2    traMADol (ULTRAM) 50 MG tablet Take 1 tablet by mouth 2 times daily as needed for Pain 60 tablet 0    zolpidem (AMBIEN) 10 MG tablet Take 10 mg by mouth      warfarin (COUMADIN) 5 MG tablet Take 3 tablets by mouth daily 90 tablet 0    Misc.  Devices MISC True Plus     Testing 3 times a day 100 each 5    busPIRone (BUSPAR) 10 MG tablet Take 10 mg by mouth      omeprazole (PRILOSEC OTC) 20 MG tablet Take 1 tablet by mouth daily 90 tablet 1    TRUEPLUS LANCETS 30G MISC 1 each by Does not apply route daily 100 each 3    ARIPiprazole (ABILIFY) 10 MG tablet TAKE 1 TABLET BY MOUTH NIGHTLY 30 tablet 2    dicyclomine (BENTYL) 10 MG capsule Take 1 capsule by mouth every 6 hours as needed (cramps) 20 capsule 0    diphenoxylate-atropine (LOMOTIL) 2.5-0.025 MG per tablet Take 2 tablets by mouth every 8 hours as needed for Diarrhea      promethazine (PHENERGAN) 12.5 MG tablet Take 12.5 mg by mouth every 6 hours as needed for Nausea      losartan (COZAAR) 25 MG tablet Take 12.5 mg by mouth daily      metoclopramide (REGLAN) 10 MG tablet TAKE 1 TABLET BY MOUTH DAILY 30 tablet 5    levETIRAcetam (KEPPRA) 500 MG tablet TAKE 1 TABLET BY MOUTH 2 TIMES DAILY 60 tablet 5    furosemide (LASIX) 20 MG tablet TAKE 1 TABLET BY MOUTH DAILY 30 tablet 5    levETIRAcetam (KEPPRA) 250 MG tablet Take 250 mg by mouth every morning Takes with the 500mg to equal 750mg in am dose only      atorvastatin (LIPITOR) 40 MG tablet TAKE 1 TABLET BY MOUTH NIGHTLY 30 tablet 5     No current facility-administered medications for this encounter. Allergies  Latex; Adhesive tape; Morphine and related; Other; and Aspirin    Family History  family history includes Cancer in her father; Heart Disease in her mother; Pacemaker in her sister. Social History  Social History     Social History    Marital status:      Spouse name: N/A    Number of children: N/A    Years of education: N/A     Occupational History    disability      Social History Main Topics    Smoking status: Never Smoker    Smokeless tobacco: Never Used    Alcohol use 0.0 oz/week      Comment: 1-2 drinks per month    Drug use: No    Sexual activity: Yes     Partners: Male     Other Topics Concern    None     Social History Narrative    None      reports that she does not use drugs. REVIEW OF SYSTEMS:  Review of Systems   Constitutional: Negative. Negative for fever and weight loss. Left side of body   HENT: Positive for sinus pain. Negative for hearing loss and tinnitus. Eyes: Negative. Negative for blurred vision and double vision. Respiratory: Negative. Negative for cough and shortness of breath. Obstructive sleep apnea   Cardiovascular: Positive for leg swelling. Negative for chest pain and palpitations. Gastrointestinal: Negative. Negative for heartburn, nausea and vomiting. Genitourinary: Negative. Negative for dysuria. Musculoskeletal: Positive for back pain, joint pain (left hip) and myalgias. Negative for falls and neck pain. Skin: Negative. Negative for rash. Neurological: Positive for tremors (right hand/leg), focal weakness, seizures (6 months ago last seizure) and headaches. Negative for dizziness, tingling and numbness. History of CVA-lt. Side weakness   Endo/Heme/Allergies: Bruises/bleeds easily (on coumadin). Coumadin therapy   Psychiatric/Behavioral: Positive for depression. Negative for substance abuse and suicidal ideas. The patient is nervous/anxious. The patient does not have insomnia (on [place]). GENERAL PHYSICAL EXAM:  Vitals: /77   Pulse 73   Temp 98.6 °F (37 °C) (Oral)   Resp 17   Ht 5' 4\" (1.626 m)   Wt 196 lb (88.9 kg)   SpO2 100%   BMI 33.64 kg/m² , Body mass index is 33.64 kg/m². Physical Exam   Constitutional: She is oriented to person, place, and time. She appears well-developed and well-nourished. Obese   HENT:   Head: Normocephalic and atraumatic. Eyes: Conjunctivae and EOM are normal. Pupils are equal, round, and reactive to light. Neck: Normal range of motion. Neck supple. No tracheal deviation present. No thyromegaly present. Cardiovascular: Normal rate and regular rhythm. Pulmonary/Chest: Effort normal. No respiratory distress. Abdominal: Soft. She exhibits no distension. Neurological: She is alert and oriented to person, place, and time. She displays tremor. She displays no atrophy and normal reflexes. No cranial nerve deficit or sensory deficit. She displays a negative Romberg sign. Gait abnormal. Coordination normal.   Reflex Scores:       Patellar reflexes are 2+ on the right side and 2+ on the left side.        Achilles reflexes are 2+ on the right side and 1+ on the left side.  Overall strength in the left side appears to be diminished both in upper and lower extremity-4 out of 5   Skin: Skin is warm and dry. Psychiatric: She has a normal mood and affect. Her speech is normal and behavior is normal. Judgment and thought content normal. Cognition and memory are normal.    Right Ankle Exam     Muscle Strength   The patient has normal right ankle strength. Left Ankle Exam     Muscle Strength   The patient has normal left ankle strength. Right Knee Exam     Muscle Strength     The patient has normal right knee strength. Right Hip Exam     Muscle Strength   The patient has normal right hip strength. Back Exam     Tenderness   The patient is experiencing tenderness in the lumbar and sacroiliac. Range of Motion   Back extension: Limited and painful. Back flexion: Limited and painful. Back lateral bend right: Limited and painful. Back lateral bend left: Limited and painful. Back rotation right: Limited and painful. Back rotation left: Limited and painful.      Muscle Strength   Right Quadriceps:  5/5   Left Quadriceps:  4/5   Right Hamstrings:  5/5   Left Hamstrings:  4/5     Tests   Straight leg raise right: positive  Straight leg raise left: positive    Other   Sensation: normal  Gait: antalgic   Erythema: no back redness  Scars: absent    Comments:  Skin --normal appearance  Surgical Scar --Absent   kyphosis present and scoliosis absent  Alignment of her shoulders, scapulae and iliac crests--symmetric  Paraspinal tenderness:Present  Lumbar lordosis-----------Normal  Movements of the lumbar spine indicated above are diminished range with pain  Facet loading is positive with pain especially on the right side   Facet loading---  : Right side--    Pain-Moderate                                   Left side----   Pain  Mild  Yariel's test -------------- Right side---positive                                           Left inaccuracies of transcription  that are inadvertently overlooked prior to the signature. There is any questions about the transcription please contact me. Return in  4 weeks  with  yUen Mathis CNP  for further plan of treatment.        Electronically signed by Ezekiel Covington MD on 10/18/2017 at 9:44 PM

## 2017-10-18 NOTE — TELEPHONE ENCOUNTER
[facility] called stating that Vesna's test strips are no longer covered by her insurance so the pharmacy can switch her test strips and lancets, but will need a new prescription for a new machine that will take the new test strips.

## 2017-10-19 ENCOUNTER — TELEPHONE (OUTPATIENT)
Dept: PAIN MANAGEMENT | Age: 52
End: 2017-10-19

## 2017-10-21 ASSESSMENT — ENCOUNTER SYMPTOMS
COLOR CHANGE: 0
WHEEZING: 0
EYE PAIN: 0
SORE THROAT: 0
NAUSEA: 0
EYE ITCHING: 0
BLURRED VISION: 0
ABDOMINAL DISTENTION: 0
ABDOMINAL PAIN: 0
SHORTNESS OF BREATH: 0
CHEST TIGHTNESS: 0
COUGH: 0
VISUAL CHANGE: 0
BLOOD IN STOOL: 0
SINUS PRESSURE: 0
DIARRHEA: 0
CONSTIPATION: 0

## 2017-10-22 NOTE — PROGRESS NOTES
Date    Acid reflux     Arthritis     Coughing     dry due to ace inhibitor    Depression     uses Abilify, Buspar, Trazodone for this    Diabetes mellitus ([facility]     Diverticulitis     Epilepsy ([facility]     last seizure 6 mos ago     Factor V Leiden ([facility]     Hiatal hernia     History of stroke associated with blood clotting tendency 2003    had clot in brain and left neck, left side weakness residual    Hx of blood clots     right breast, brain, neck    Hyperlipidemia     Hypertension     Insomnia     uses Trazodone for this    Sleep apnea     C PAP    Unspecified cerebral artery occlusion with cerebral infarction 6/2003    SEE BELOW, 20017 Mini stroke    Wears glasses       Past Surgical History:   Procedure Laterality Date    APPENDECTOMY      CHOLECYSTECTOMY      COLONOSCOPY      with polypectomy    COLONOSCOPY  1/22/16    AND EGD    COLONOSCOPY  01/05/2017    random biopsies.  ENDOMETRIAL ABLATION      2 times    HERNIA REPAIR      HIATAL HERNIA REPAIR  2/4/2016    laparoscopic robotic    HYSTERECTOMY      with BSO    KNEE ARTHROSCOPY Left 4/7/15, 2015    x 2    LAPAROSCOPY      \"springs inserted in to fallopian tubes\" to close tubes   [facility] NECK SURGERY  July 2012    Anterior, C5,6,7 bulging disk repair    TONSILLECTOMY      UPPER GASTROINTESTINAL ENDOSCOPY         Family History   Problem Relation Age of Onset    Heart Disease Mother      dies age 32   [facility] Cancer Father      lymph nodes    Pacemaker Sister        Social History   Substance Use Topics    Smoking status: Never Smoker    Smokeless tobacco: Never Used    Alcohol use 0.0 oz/week      Comment: 1-2 drinks per month      Current Outpatient Prescriptions   Medication Sig Dispense Refill    zolpidem (AMBIEN) 10 MG tablet Take 10 mg by mouth      warfarin (COUMADIN) 5 MG tablet Take 3 tablets by mouth daily 90 tablet 0    Misc.  Devices MISC True Plus     Testing 3 times a day 100 each 5    busPIRone (BUSPAR) 10 MG tablet 10/09/2018    Lipid screen  10/10/2018    Diabetic hemoglobin A1C test  10/11/2018    Colon cancer screen colonoscopy  01/24/2021    Flu vaccine  Completed    Pneumococcal med risk  Completed       Subjective:      Review of Systems   Constitutional: Positive for fatigue. Negative for activity change and appetite change. HENT: Negative for congestion, ear pain, hearing loss, sinus pressure, sore throat and tinnitus. Eyes: Negative for blurred vision, pain, itching and visual disturbance. Respiratory: Negative for cough, chest tightness, shortness of breath and wheezing. Cardiovascular: Negative for chest pain, palpitations and leg swelling. Gastrointestinal: Negative for abdominal distention, abdominal pain, blood in stool, constipation, diarrhea and nausea. Endocrine: Negative for cold intolerance, heat intolerance, polydipsia, polyphagia and polyuria. Genitourinary: Negative for dysuria, flank pain, frequency and urgency. Musculoskeletal: Negative for arthralgias, gait problem and myalgias. Skin: Negative for color change, rash and wound. Allergic/Immunologic: Negative for environmental allergies and food allergies. Neurological: Positive for weakness (left sided). Negative for speech difficulty, light-headedness, numbness and headaches. Hematological: Does not bruise/bleed easily. Psychiatric/Behavioral: Negative for decreased concentration and sleep disturbance. The patient is not nervous/anxious. Objective:     /74 (Site: Left Arm, Position: Sitting, Cuff Size: Medium Adult)   Pulse 62   Temp 97.5 °F (36.4 °C) (Temporal)   Resp 18   Wt 197 lb 12.8 oz (89.7 kg)   SpO2 98%   BMI 33.95 kg/m²   Physical Exam      Assessment:      1. Transient cerebral ischemia, unspecified type    2. Left-sided weakness    3.  Type 2 diabetes mellitus without complication, without long-term current use of insulin (HCC)                     Plan:   Transient cerebral ischemia:  Take warfarin as prescribed  Follow up with specialist as directed  Any episodes of increased weakness, slurred speech, confusion, difficulty walking, go to ED  Left sided weakness: Referred to PT for strengthening of left side. Diabetes:  Monitor BS daily or more often if BS's are out of range  Keep log of BS results and bring with you at next appointment  Discussed signs and symptoms of hypo/hyperglycemia  Take medications as directed  Discussed diet, eat balanced meals with a variety of food, limit intake of carbohydrates, low fat, high fiber  Read nutrition labels so appropriate carbohydrates can be counted  Lose weight slowly if needed  Monitor BP every couple of days  Exercise for at least 30 minute a day 3-5 times a week  Drink plenty of water (8-8oz glasses daily)  RTO as directed    Orders Placed This Encounter   Procedures    Amb External Referral To Physical Therapy     Referral Priority:   Routine     Referral Type:   Consult for Advice and Opinion     Referral Reason:   Specialty Services Required     Referred to Provider:   Eezkiel Poe     Requested Specialty:   Physical Therapy     Number of Visits Requested:   1    DME Order for Glucometer as OP     Orders Placed This Encounter   Medications    warfarin (COUMADIN) 5 MG tablet     Sig: Take 3 tablets by mouth daily     Dispense:  90 tablet     Refill:  0    Misc. Devices MISC     Sig: True Plus     Testing 3 times a day     Dispense:  100 each     Refill:  5        Return in about 2 months (around 12/17/2017) for left sided weakness. Patient given educational materials - see patient instructions. Discussed use, benefit, and side effects of prescribed medications. All patient questions answered. Pt voiced understanding. Reviewed health maintenance. Instructed to continue current medications, diet and exercise. Patient agreed with treatment plan. Follow up as directed.      Electronically signed by Vero Anne CNP on 10/21/2017

## 2017-10-23 ENCOUNTER — OFFICE VISIT (OUTPATIENT)
Dept: FAMILY MEDICINE CLINIC | Age: 52
End: 2017-10-23
Payer: MEDICARE

## 2017-10-23 ENCOUNTER — TELEPHONE (OUTPATIENT)
Dept: FAMILY MEDICINE CLINIC | Age: 52
End: 2017-10-23

## 2017-10-23 VITALS
RESPIRATION RATE: 18 BRPM | HEIGHT: 64 IN | SYSTOLIC BLOOD PRESSURE: 102 MMHG | BODY MASS INDEX: 33.84 KG/M2 | DIASTOLIC BLOOD PRESSURE: 78 MMHG | OXYGEN SATURATION: 98 % | HEART RATE: 73 BPM | WEIGHT: 198.2 LBS | TEMPERATURE: 97.7 F

## 2017-10-23 DIAGNOSIS — Z86.73 HISTORY OF RECURRENT TIAS: Primary | ICD-10-CM

## 2017-10-23 DIAGNOSIS — G25.2 COARSE TREMORS: ICD-10-CM

## 2017-10-23 DIAGNOSIS — G43.719 INTRACTABLE CHRONIC MIGRAINE WITHOUT AURA AND WITHOUT STATUS MIGRAINOSUS: ICD-10-CM

## 2017-10-23 DIAGNOSIS — R53.1 ACUTE LEFT-SIDED WEAKNESS: ICD-10-CM

## 2017-10-23 PROCEDURE — 3017F COLORECTAL CA SCREEN DOC REV: CPT | Performed by: NURSE PRACTITIONER

## 2017-10-23 PROCEDURE — 99214 OFFICE O/P EST MOD 30 MIN: CPT | Performed by: NURSE PRACTITIONER

## 2017-10-23 PROCEDURE — 1036F TOBACCO NON-USER: CPT | Performed by: NURSE PRACTITIONER

## 2017-10-23 PROCEDURE — G8417 CALC BMI ABV UP PARAM F/U: HCPCS | Performed by: NURSE PRACTITIONER

## 2017-10-23 PROCEDURE — 3014F SCREEN MAMMO DOC REV: CPT | Performed by: NURSE PRACTITIONER

## 2017-10-23 PROCEDURE — G8484 FLU IMMUNIZE NO ADMIN: HCPCS | Performed by: NURSE PRACTITIONER

## 2017-10-23 PROCEDURE — G8427 DOCREV CUR MEDS BY ELIG CLIN: HCPCS | Performed by: NURSE PRACTITIONER

## 2017-10-23 ASSESSMENT — ENCOUNTER SYMPTOMS
CONSTIPATION: 0
SHORTNESS OF BREATH: 0
EYE ITCHING: 0
ABDOMINAL PAIN: 0
DIARRHEA: 0
SINUS PRESSURE: 0
BLOOD IN STOOL: 0
VOMITING: 0
ABDOMINAL DISTENTION: 0
COUGH: 0
COLOR CHANGE: 0
BLURRED VISION: 0
WHEEZING: 0
NAUSEA: 0
CHEST TIGHTNESS: 0
SORE THROAT: 0
EYE PAIN: 0

## 2017-10-23 NOTE — PROGRESS NOTES
Visit Information    Have you changed or started any medications since your last visit including any over-the-counter medicines, vitamins, or herbal medicines? no   Are you having any side effects from any of your medications? -  no  Have you stopped taking any of your medications? Is so, why? -  no    Have you seen any other physician or provider since your last visit? No  Have you had any other diagnostic tests since your last visit? No  Have you been seen in the emergency room and/or had an admission to a hospital since we last saw you? Yes - Records Obtained  Have you had your routine dental cleaning in the past 6 months? no    Have you activated your [employer] account? If not, what are your barriers?  No: declined      Patient Care Team:  Christina Acuna CNP as PCP - General (Nurse Practitioner)    Medical History Review      Health Maintenance   Topic Date Due    Diabetic foot exam  06/26/1976    Diabetic retinal exam  06/26/1976    HIV screen  06/26/1981    DTaP/Tdap/Td vaccine (1 - Tdap) 06/26/1985    Cervical cancer screen  06/26/1987    Breast cancer screen  06/26/2016    Diabetic microalbuminuria test  10/09/2018    Lipid screen  10/10/2018    Diabetic hemoglobin A1C test  10/11/2018    Colon cancer screen colonoscopy  01/24/2021    Flu vaccine  Completed    Pneumococcal med risk  Completed

## 2017-10-23 NOTE — PROGRESS NOTES
and obesity. Tremors  This is a new problem that started with first \"mini stroke\" in which she was hospitalized. States had some trembling in right hand but it was not all the time until this last episode of TIA when she went to [facility] ED. States her right hand is trembling badly and it never stops now. Has to sit on her hand to get it to be still. Is having difficulty with eating. Hemoglobin A1C (%)   Date Value   10/11/2017 6.4 (H)   10/10/2017 6.4 (H)   10/09/2017 6.4 (H)             ( goal A1C is < 7)   Microalb/Crt. Ratio (mcg/mg creat)   Date Value   10/09/2017 24     LDL Cholesterol (mg/dL)   Date Value   10/10/2017 101   10/09/2017 120   02/11/2015 74       (goal LDL is <100)   AST (U/L)   Date Value   10/09/2017 18     ALT (U/L)   Date Value   10/09/2017 25     BUN (mg/dL)   Date Value   10/09/2017 25 (H)     BP Readings from Last 3 Encounters:   10/23/17 102/78   10/18/17 101/77   10/17/17 120/74          (goal 120/80)    Past Medical History:   Diagnosis Date    Acid reflux     Arthritis     Coughing     dry due to ace inhibitor    Depression     uses Abilify, Buspar, Trazodone for this    [facility]     Diverticulitis     Epilepsy ([facility] [facility]     last seizure 6 mos ago     Factor V Leiden ([facility]     Hiatal hernia     History of stroke associated with blood clotting tendency 2003    had clot in brain and left neck, left side weakness residual    Hx of blood clots     right breast, brain, neck    Hyperlipidemia     Hypertension     Insomnia     uses Trazodone for this    Sleep apnea     C PAP    Unspecified cerebral artery occlusion with cerebral infarction 6/2003    SEE BELOW, [address]17 Mini stroke    Wears glasses       Past Surgical History:   Procedure Laterality Date    APPENDECTOMY      CHOLECYSTECTOMY      COLONOSCOPY      with polypectomy    COLONOSCOPY  1/22/16    AND EGD    COLONOSCOPY  01/05/2017    random biopsies.     ENDOMETRIAL ABLATION      2 times  HERNIA REPAIR      HIATAL HERNIA REPAIR  2/4/2016    laparoscopic robotic    HYSTERECTOMY      with BSO    KNEE ARTHROSCOPY Left 4/7/15, 2015    x 2    LAPAROSCOPY      \"springs inserted in to fallopian tubes\" to close tubes   Namrata Duckworth NECK SURGERY  July 2012    Anterior, C5,6,7 bulging disk repair    TONSILLECTOMY      UPPER GASTROINTESTINAL ENDOSCOPY         Family History   Problem Relation Age of Onset    Heart Disease Mother      dies age 29    Cancer Father      lymph nodes    Pacemaker Sister        Social History   Substance Use Topics    Smoking status: Never Smoker    Smokeless tobacco: Never Used    Alcohol use 0.0 oz/week      Comment: 1-2 drinks per month      Current Outpatient Prescriptions   Medication Sig Dispense Refill    cyclobenzaprine (FLEXERIL) 10 MG tablet Take 1 tablet by mouth 2 times daily as needed for Muscle spasms 60 tablet 2    traMADol (ULTRAM) 50 MG tablet Take 1 tablet by mouth 2 times daily as needed for Pain 60 tablet 0    zolpidem (AMBIEN) 10 MG tablet Take 10 mg by mouth      warfarin (COUMADIN) 5 MG tablet Take 3 tablets by mouth daily 90 tablet 0    Misc.  Devices MISC True Plus     Testing 3 times a day 100 each 5    busPIRone (BUSPAR) 10 MG tablet Take 10 mg by mouth      omeprazole (PRILOSEC OTC) 20 MG tablet Take 1 tablet by mouth daily 90 tablet 1    TRUEPLUS LANCETS 30G MISC 1 each by Does not apply route daily 100 each 3    ARIPiprazole (ABILIFY) 10 MG tablet TAKE 1 TABLET BY MOUTH NIGHTLY 30 tablet 2    dicyclomine (BENTYL) 10 MG capsule Take 1 capsule by mouth every 6 hours as needed (cramps) 20 capsule 0    diphenoxylate-atropine (LOMOTIL) 2.5-0.025 MG per tablet Take 2 tablets by mouth every 8 hours as needed for Diarrhea      promethazine (PHENERGAN) 12.5 MG tablet Take 12.5 mg by mouth every 6 hours as needed for Nausea      losartan (COZAAR) 25 MG tablet Take 12.5 mg by mouth daily      metoclopramide (REGLAN) 10 MG tablet TAKE 1 TABLET BY MOUTH DAILY 30 tablet 5    levETIRAcetam (KEPPRA) 500 MG tablet TAKE 1 TABLET BY MOUTH 2 TIMES DAILY 60 tablet 5    furosemide (LASIX) 20 MG tablet TAKE 1 TABLET BY MOUTH DAILY 30 tablet 5    levETIRAcetam (KEPPRA) 250 MG tablet Take 250 mg by mouth every morning Takes with the 500mg to equal 750mg in am dose only      atorvastatin (LIPITOR) 40 MG tablet TAKE 1 TABLET BY MOUTH NIGHTLY 30 tablet 5     No current facility-administered medications for this visit. Allergies   Allergen Reactions    Latex     Adhesive Tape      Also plastic tape    Morphine And Related Nausea Only    Other Other (See Comments)     Surgical staples cause infection    Aspirin Nausea And Vomiting       Health Maintenance   Topic Date Due    Diabetic foot exam  06/26/1976    Diabetic retinal exam  06/26/1976    HIV screen  06/26/1981    DTaP/Tdap/Td vaccine (1 - Tdap) 06/26/1985    Cervical cancer screen  06/26/1987    Breast cancer screen  06/26/2016    Diabetic microalbuminuria test  10/09/2018    Lipid screen  10/10/2018    Diabetic hemoglobin A1C test  10/11/2018    Colon cancer screen colonoscopy  01/24/2021    Flu vaccine  Completed    Pneumococcal med risk  Completed       Subjective:      Review of Systems   Constitutional: Positive for fatigue. Negative for activity change and appetite change. HENT: Negative for congestion, ear pain, hearing loss, sinus pressure, sore throat and tinnitus. Eyes: Negative for blurred vision, pain, itching and visual disturbance. Respiratory: Negative for cough, chest tightness, shortness of breath and wheezing. Cardiovascular: Negative for chest pain, palpitations and leg swelling. Gastrointestinal: Negative for abdominal distention, abdominal pain, blood in stool, constipation, diarrhea, nausea and vomiting. Endocrine: Negative for cold intolerance, heat intolerance, polydipsia, polyphagia and polyuria.    Genitourinary: Negative for dysuria, flank pain, frequency and urgency. Musculoskeletal: Negative for arthralgias, gait problem and myalgias. Skin: Negative for color change, rash and wound. Allergic/Immunologic: Negative for environmental allergies and food allergies. Neurological: Positive for tremors, weakness (left side) and headaches (constant, never lets up). Negative for dizziness, speech difficulty and light-headedness. Hematological: Does not bruise/bleed easily. Psychiatric/Behavioral: Positive for memory loss. Negative for confusion, decreased concentration and sleep disturbance. The patient is not nervous/anxious and does not have insomnia. Objective:     /78 (Site: Left Arm, Position: Sitting, Cuff Size: Medium Adult)   Pulse 73   Temp 97.7 °F (36.5 °C) (Temporal)   Resp 18   Ht 5' 4\" (1.626 m)   Wt 198 lb 3.2 oz (89.9 kg)   SpO2 98%   BMI 34.02 kg/m²   Physical Exam   Constitutional: She is oriented to person, place, and time. She appears well-developed and well-nourished. HENT:   Head: Normocephalic. Right Ear: External ear normal.   Left Ear: External ear normal.   Nose: Nose normal.   Mouth/Throat: Oropharynx is clear and moist.   Eyes: Conjunctivae and EOM are normal.   Neck: Normal range of motion. Neck supple. No thyromegaly present. Cardiovascular: Normal rate, regular rhythm and normal heart sounds. Exam reveals no gallop and no friction rub. No murmur heard. Pulmonary/Chest: Effort normal and breath sounds normal. No respiratory distress. She has no wheezes. She has no rales. She exhibits no tenderness. Abdominal: Soft. Bowel sounds are normal. She exhibits no distension. There is no splenomegaly or hepatomegaly. There is no tenderness. No hernia. Musculoskeletal: Normal range of motion. She exhibits no edema or tenderness. Lymphadenopathy:     She has no cervical adenopathy. Neurological: She is alert and oriented to person, place, and time. She displays tremor (right hand).  She exhibits Visits Requested:   1     No orders of the defined types were placed in this encounter. Return in about 2 months (around 12/23/2017) for migraine headaches left sided weakness, TIA. Patient given educational materials - see patient instructions. Discussed use, benefit, and side effects of prescribed medications. All patient questions answered. Pt voiced understanding. Reviewed health maintenance. Instructed to continue current medications, diet and exercise. Patient agreed with treatment plan. Follow up as directed.      Electronically signed by Ca Sow CNP on 10/23/2017

## 2017-10-27 ENCOUNTER — OFFICE VISIT (OUTPATIENT)
Dept: FAMILY MEDICINE CLINIC | Age: 52
End: 2017-10-27
Payer: MEDICARE

## 2017-10-27 ENCOUNTER — HOSPITAL ENCOUNTER (OUTPATIENT)
Age: 52
Setting detail: SPECIMEN
Discharge: HOME | End: 2017-10-27
Payer: MEDICARE

## 2017-10-27 VITALS
DIASTOLIC BLOOD PRESSURE: 82 MMHG | SYSTOLIC BLOOD PRESSURE: 132 MMHG | HEART RATE: 89 BPM | BODY MASS INDEX: 33.84 KG/M2 | OXYGEN SATURATION: 97 % | HEIGHT: 64 IN | WEIGHT: 198.2 LBS | RESPIRATION RATE: 16 BRPM | TEMPERATURE: 98.5 F

## 2017-10-27 DIAGNOSIS — R51.9 WORSENING HEADACHES: ICD-10-CM

## 2017-10-27 DIAGNOSIS — Z12.4 ENCOUNTER FOR PAPANICOLAOU SMEAR OF CERVIX: Primary | ICD-10-CM

## 2017-10-27 DIAGNOSIS — G45.9 TRANSIENT CEREBRAL ISCHEMIA, UNSPECIFIED TYPE: ICD-10-CM

## 2017-10-27 DIAGNOSIS — R53.1 LEFT-SIDED WEAKNESS: ICD-10-CM

## 2017-10-27 PROCEDURE — G8484 FLU IMMUNIZE NO ADMIN: HCPCS | Performed by: NURSE PRACTITIONER

## 2017-10-27 PROCEDURE — 1036F TOBACCO NON-USER: CPT | Performed by: NURSE PRACTITIONER

## 2017-10-27 PROCEDURE — 3014F SCREEN MAMMO DOC REV: CPT | Performed by: NURSE PRACTITIONER

## 2017-10-27 PROCEDURE — G8417 CALC BMI ABV UP PARAM F/U: HCPCS | Performed by: NURSE PRACTITIONER

## 2017-10-27 PROCEDURE — 99214 OFFICE O/P EST MOD 30 MIN: CPT | Performed by: NURSE PRACTITIONER

## 2017-10-27 PROCEDURE — G8427 DOCREV CUR MEDS BY ELIG CLIN: HCPCS | Performed by: NURSE PRACTITIONER

## 2017-10-27 PROCEDURE — 3017F COLORECTAL CA SCREEN DOC REV: CPT | Performed by: NURSE PRACTITIONER

## 2017-10-27 RX ORDER — KETOROLAC TROMETHAMINE 30 MG/ML
30 INJECTION, SOLUTION INTRAMUSCULAR; INTRAVENOUS ONCE
Qty: 1 ML | Refills: 0
Start: 2017-10-27 | End: 2017-12-29 | Stop reason: ALTCHOICE

## 2017-10-27 NOTE — PROGRESS NOTES
Visit Information    Have you changed or started any medications since your last visit including any over-the-counter medicines, vitamins, or herbal medicines? no   Are you having any side effects from any of your medications? -  no  Have you stopped taking any of your medications? Is so, why? -  no    Have you seen any other physician or provider since your last visit? No  Have you had any other diagnostic tests since your last visit? No  Have you been seen in the emergency room and/or had an admission to a hospital since we last saw you? No  Have you had your routine dental cleaning in the past 6 months? no    Have you activated your [employer] account? If not, what are your barriers?  No: declined      Patient Care Team:  Lisbeth Hopkins CNP as PCP - General (Nurse Practitioner)    Medical History Review      Health Maintenance   Topic Date Due    Diabetic foot exam  06/26/1976    Diabetic retinal exam  06/26/1976    HIV screen  06/26/1981    DTaP/Tdap/Td vaccine (1 - Tdap) 06/26/1985    Cervical cancer screen  06/26/1987    Breast cancer screen  06/26/2016    Diabetic microalbuminuria test  10/09/2018    Lipid screen  10/10/2018    Diabetic hemoglobin A1C test  10/11/2018    Colon cancer screen colonoscopy  01/24/2021    Flu vaccine  Completed    Pneumococcal med risk  Completed

## 2017-10-29 ASSESSMENT — ENCOUNTER SYMPTOMS
SHORTNESS OF BREATH: 0
DIARRHEA: 0
ABDOMINAL DISTENTION: 0
COLOR CHANGE: 0
CHEST TIGHTNESS: 0
CONSTIPATION: 0
BLOOD IN STOOL: 0
EYE ITCHING: 0
WHEEZING: 0

## 2017-10-30 NOTE — PROGRESS NOTES
[facility], [address] [address]  Dept: [phone]  Dept Fax: [phone]    Mala Lanier is a 47 y.o. female who presents today for her medical conditions/complaints as noted below. Mala Lanier is c/o of Gynecologic Exam; Diabetes; and Headache            HPI:     Gynecologic Exam   The patient's pertinent negatives include no genital itching, genital odor, pelvic pain, vaginal bleeding or vaginal discharge. The patient is experiencing no pain. She is not pregnant. Associated symptoms include headaches. Pertinent negatives include no constipation, diarrhea, dysuria, flank pain, frequency, hematuria, painful intercourse, rash or urgency. She is sexually active. No, her partner does not have an STD. She uses hysterectomy for contraception. Her past medical history is significant for an abdominal surgery. Neurologic Problem   The patient's primary symptoms include memory loss, slurred speech and weakness (left side). The patient's pertinent negatives include no altered mental status or focal sensory loss. This is a recurrent problem. The current episode started 1 to 4 weeks ago. The neurological problem developed suddenly. The problem has been gradually worsening since onset. There was left-sided, lower extremity and upper extremity focality noted. Associated symptoms include fatigue and headaches (constant, never lets up). Pertinent negatives include no abdominal pain, chest pain, confusion, dizziness, light-headedness, nausea, palpitations, shortness of breath or vomiting. Past treatments include sleep (Went to ED. ). The treatment provided no relief. Her past medical history is significant for a clotting disorder and a CVA. Extremity Weakness   This is a new problem. The current episode started 1 to 4 weeks ago. The problem occurs constantly. The problem has been gradually worsening.  Associated symptoms include fatigue, headaches (constant, never lets Value   10/09/2017 25     BUN (mg/dL)   Date Value   10/09/2017 25 (H)     BP Readings from Last 3 Encounters:   10/27/17 132/82   10/23/17 102/78   10/18/17 101/77          (goal 120/80)    Past Medical History:   Diagnosis Date    Acid reflux     Arthritis     Coughing     dry due to ace inhibitor    Depression     uses Abilify, Buspar, Trazodone for this    Diabetes mellitus ([facility] [facility]     Diverticulitis     Epilepsy ([facility] [address] [facility]     last seizure 6 mos ago     Factor V Leiden ([facility] [facility]     Hiatal hernia     History of stroke associated with blood clotting tendency 2003    had clot in brain and left neck, left side weakness residual    Hx of blood clots     right breast, brain, neck    Hyperlipidemia     Hypertension     Insomnia     uses Trazodone for this    Sleep apnea     C PAP    Unspecified cerebral artery occlusion with cerebral infarction 6/2003    SEE BELOW, 20017 Mini stroke    Wears glasses       Past Surgical History:   Procedure Laterality Date    APPENDECTOMY      CHOLECYSTECTOMY      COLONOSCOPY      with polypectomy    COLONOSCOPY  1/22/16    AND EGD    COLONOSCOPY  01/05/2017    random biopsies.     ENDOMETRIAL ABLATION      2 times    HERNIA REPAIR      HIATAL HERNIA REPAIR  2/4/2016    laparoscopic robotic    HYSTERECTOMY      with BSO    KNEE ARTHROSCOPY Left 4/7/15, 2015    x 2    LAPAROSCOPY      \"springs inserted in to fallopian tubes\" to close tubes   Kirsty Sly NECK SURGERY  July 2012    Anterior, C5,6,7 bulging disk repair    TONSILLECTOMY      UPPER GASTROINTESTINAL ENDOSCOPY         Family History   Problem Relation Age of Onset    Heart Disease Mother      dies age 32   Kirsty Sly Cancer Father      lymph nodes    Pacemaker Sister        Social History   Substance Use Topics    Smoking status: Never Smoker    Smokeless tobacco: Never Used    Alcohol use 0.0 oz/week      Comment: 1-2 drinks per month      Current Outpatient Prescriptions   Medication Sig Dispense Refill    ketorolac (TORADOL) 30 MG/ML injection Inject 1 mL into the muscle once for 1 dose 1 mL 0    zolpidem (AMBIEN) 10 MG tablet Take 10 mg by mouth      warfarin (COUMADIN) 5 MG tablet Take 3 tablets by mouth daily 90 tablet 0    Misc. Devices MISC True Plus     Testing 3 times a day 100 each 5    busPIRone (BUSPAR) 10 MG tablet Take 10 mg by mouth      omeprazole (PRILOSEC OTC) 20 MG tablet Take 1 tablet by mouth daily 90 tablet 1    TRUEPLUS LANCETS 30G MISC 1 each by Does not apply route daily 100 each 3    ARIPiprazole (ABILIFY) 10 MG tablet TAKE 1 TABLET BY MOUTH NIGHTLY 30 tablet 2    dicyclomine (BENTYL) 10 MG capsule Take 1 capsule by mouth every 6 hours as needed (cramps) 20 capsule 0    diphenoxylate-atropine (LOMOTIL) 2.5-0.025 MG per tablet Take 2 tablets by mouth every 8 hours as needed for Diarrhea      promethazine (PHENERGAN) 12.5 MG tablet Take 12.5 mg by mouth every 6 hours as needed for Nausea      losartan (COZAAR) 25 MG tablet Take 12.5 mg by mouth daily      metoclopramide (REGLAN) 10 MG tablet TAKE 1 TABLET BY MOUTH DAILY 30 tablet 5    levETIRAcetam (KEPPRA) 500 MG tablet TAKE 1 TABLET BY MOUTH 2 TIMES DAILY 60 tablet 5    furosemide (LASIX) 20 MG tablet TAKE 1 TABLET BY MOUTH DAILY 30 tablet 5    levETIRAcetam (KEPPRA) 250 MG tablet Take 250 mg by mouth every morning Takes with the 500mg to equal 750mg in am dose only      atorvastatin (LIPITOR) 40 MG tablet TAKE 1 TABLET BY MOUTH NIGHTLY 30 tablet 5     No current facility-administered medications for this visit.       Allergies   Allergen Reactions    Latex     Adhesive Tape      Also plastic tape    Morphine And Related Nausea Only    Other Other (See Comments)     Surgical staples cause infection    Aspirin Nausea And Vomiting       Health Maintenance   Topic Date Due    Diabetic foot exam  06/26/1976    Diabetic retinal exam  06/26/1976    HIV screen  06/26/1981    DTaP/Tdap/Td vaccine (1 - Tdap) 06/26/1985    Cervical cancer screen  06/26/1987    Breast cancer screen  06/26/2016    Diabetic microalbuminuria test  10/09/2018    Lipid screen  10/10/2018    Diabetic hemoglobin A1C test  10/11/2018    Colon cancer screen colonoscopy  01/24/2021    Flu vaccine  Completed    Pneumococcal med risk  Completed       Subjective:      Review of Systems   Constitutional: Negative for activity change and appetite change. HENT: Negative for congestion. Eyes: Negative for itching and visual disturbance. Respiratory: Negative for chest tightness, shortness of breath and wheezing. Cardiovascular: Negative for palpitations and leg swelling. Gastrointestinal: Negative for abdominal distention, blood in stool, constipation and diarrhea. Endocrine: Negative for cold intolerance and heat intolerance. Genitourinary: Negative for dysuria, flank pain, frequency, hematuria, pelvic pain, urgency, vaginal bleeding, vaginal discharge and vaginal pain. Musculoskeletal: Negative for arthralgias, gait problem and myalgias. Skin: Negative for color change, rash and wound. Allergic/Immunologic: Negative for environmental allergies and food allergies. Neurological: Positive for headaches. Negative for light-headedness. Hematological: Does not bruise/bleed easily. Psychiatric/Behavioral: Negative for decreased concentration and sleep disturbance. Objective:     /82 (Site: Right Arm, Position: Sitting, Cuff Size: Medium Adult)   Pulse 89   Temp 98.5 °F (36.9 °C) (Temporal)   Resp 16   Ht 5' 4\" (1.626 m)   Wt 198 lb 3.2 oz (89.9 kg)   SpO2 97%   BMI 34.02 kg/m²   Physical Exam   Constitutional: She is oriented to person, place, and time. She appears well-developed and well-nourished. HENT:   Head: Normocephalic. Right Ear: External ear normal.   Left Ear: External ear normal.   Nose: Nose normal.   Mouth/Throat: Oropharynx is clear and moist.   Eyes: Conjunctivae and EOM are normal.   Neck: Normal range of motion. Neck supple. No thyromegaly present. Cardiovascular: Normal rate, regular rhythm and normal heart sounds. Exam reveals no gallop and no friction rub. No murmur heard. Pulmonary/Chest: Effort normal and breath sounds normal. No respiratory distress. She has no wheezes. She has no rales. She exhibits no tenderness. Abdominal: Soft. Bowel sounds are normal. She exhibits no distension. There is no splenomegaly or hepatomegaly. There is no tenderness. No hernia. Hernia confirmed negative in the right inguinal area and confirmed negative in the left inguinal area. Genitourinary: Rectum normal. No breast swelling, tenderness, discharge or bleeding. There is no rash, tenderness, lesion or injury on the right labia. There is no rash, tenderness, lesion or injury on the left labia. Cervix exhibits discharge. Cervix exhibits no motion tenderness and no friability. No erythema, tenderness or bleeding in the vagina. No foreign body in the vagina. No signs of injury around the vagina. No vaginal discharge found. Musculoskeletal: Normal range of motion. She exhibits no edema or tenderness. Lymphadenopathy:     She has no cervical adenopathy. Right: No inguinal adenopathy present. Left: No inguinal adenopathy present. Neurological: She is alert and oriented to person, place, and time. She displays tremor (right hand). Coordination and gait normal.   Skin: Skin is warm and dry. No rash noted. No erythema. Psychiatric: She has a normal mood and affect. Her speech is normal and behavior is normal. Judgment and thought content normal. Cognition and memory are normal.   Nursing note and vitals reviewed. Assessment:      1. Encounter for Papanicolaou smear of cervix    2. Transient cerebral ischemia, unspecified type    3. Facial numbness    4. Worsening headaches    5.  Left-sided weakness                     Plan:   Pap Smear:  Will call with results once received  May have a little spotting Smokeless tobacco: Never Used                           Order Specific Question:   Collection Type     Answer: Thin Prep     Order Specific Question:   Prior Abnormal Pap Test     Answer:   No     Order Specific Question:   Screening or Diagnostic     Answer:   Screening     Order Specific Question:   HPV Requested? Answer:   Yes -  If ASCUS Reflex HPV     Order Specific Question:   High Risk Patient     Answer:   Donald.- Mackenzie Padilla MD     Referral Priority:   Routine     Referral Type:   Consult for Advice and Opinion     Referral Reason:   Specialty Services Required     Referred to Provider:   Nata Leo     Requested Specialty:   Neurology     Number of Visits Requested:   1     Orders Placed This Encounter   Medications    ketorolac (TORADOL) 30 MG/ML injection     Sig: Inject 1 mL into the muscle once for 1 dose     Dispense:  1 mL     Refill:  0        Return if symptoms worsen or fail to improve. Patient given educational materials - see patient instructions. Discussed use, benefit, and side effects of prescribed medications. All patient questions answered. Pt voiced understanding. Reviewed health maintenance. Instructed to continue current medications, diet and exercise. Patient agreed with treatment plan. Follow up as directed.      Electronically signed by Sterling Oliveira CNP on 10/29/2017

## 2017-11-06 ENCOUNTER — OFFICE VISIT (OUTPATIENT)
Dept: FAMILY MEDICINE CLINIC | Age: 52
End: 2017-11-06
Payer: MEDICARE

## 2017-11-06 VITALS
OXYGEN SATURATION: 98 % | WEIGHT: 198 LBS | TEMPERATURE: 97.5 F | RESPIRATION RATE: 18 BRPM | SYSTOLIC BLOOD PRESSURE: 108 MMHG | HEART RATE: 87 BPM | HEIGHT: 64 IN | DIASTOLIC BLOOD PRESSURE: 72 MMHG | BODY MASS INDEX: 33.8 KG/M2

## 2017-11-06 DIAGNOSIS — R11.2 NON-INTRACTABLE VOMITING WITH NAUSEA, UNSPECIFIED VOMITING TYPE: Primary | ICD-10-CM

## 2017-11-06 DIAGNOSIS — R19.7 DIARRHEA, UNSPECIFIED TYPE: ICD-10-CM

## 2017-11-06 DIAGNOSIS — B96.89 BACTERIAL VAGINOSIS: ICD-10-CM

## 2017-11-06 DIAGNOSIS — R52 BODY ACHES: ICD-10-CM

## 2017-11-06 DIAGNOSIS — N76.0 BACTERIAL VAGINOSIS: ICD-10-CM

## 2017-11-06 LAB — CYTOLOGY REPORT: NORMAL

## 2017-11-06 PROCEDURE — G8427 DOCREV CUR MEDS BY ELIG CLIN: HCPCS | Performed by: NURSE PRACTITIONER

## 2017-11-06 PROCEDURE — G8484 FLU IMMUNIZE NO ADMIN: HCPCS | Performed by: NURSE PRACTITIONER

## 2017-11-06 PROCEDURE — 3014F SCREEN MAMMO DOC REV: CPT | Performed by: NURSE PRACTITIONER

## 2017-11-06 PROCEDURE — 99214 OFFICE O/P EST MOD 30 MIN: CPT | Performed by: NURSE PRACTITIONER

## 2017-11-06 PROCEDURE — 3017F COLORECTAL CA SCREEN DOC REV: CPT | Performed by: NURSE PRACTITIONER

## 2017-11-06 PROCEDURE — 1036F TOBACCO NON-USER: CPT | Performed by: NURSE PRACTITIONER

## 2017-11-06 PROCEDURE — G8417 CALC BMI ABV UP PARAM F/U: HCPCS | Performed by: NURSE PRACTITIONER

## 2017-11-06 RX ORDER — METRONIDAZOLE 500 MG/1
500 TABLET ORAL 2 TIMES DAILY
Qty: 14 TABLET | Refills: 0 | Status: SHIPPED | OUTPATIENT
Start: 2017-11-06 | End: 2017-11-13

## 2017-11-06 RX ORDER — DIPHENOXYLATE HYDROCHLORIDE AND ATROPINE SULFATE 2.5; .025 MG/1; MG/1
1 TABLET ORAL 4 TIMES DAILY PRN
Qty: 12 TABLET | Refills: 0 | Status: SHIPPED | OUTPATIENT
Start: 2017-11-06 | End: 2017-11-16

## 2017-11-06 RX ORDER — ONDANSETRON 2 MG/ML
4 INJECTION INTRAMUSCULAR; INTRAVENOUS EVERY 6 HOURS PRN
Status: DISCONTINUED | OUTPATIENT
Start: 2017-11-06 | End: 2018-01-09 | Stop reason: ALTCHOICE

## 2017-11-06 ASSESSMENT — PATIENT HEALTH QUESTIONNAIRE - PHQ9
SUM OF ALL RESPONSES TO PHQ9 QUESTIONS 1 & 2: 0
2. FEELING DOWN, DEPRESSED OR HOPELESS: 0
1. LITTLE INTEREST OR PLEASURE IN DOING THINGS: 0
SUM OF ALL RESPONSES TO PHQ QUESTIONS 1-9: 0

## 2017-11-08 ENCOUNTER — OFFICE VISIT (OUTPATIENT)
Dept: FAMILY MEDICINE CLINIC | Age: 52
End: 2017-11-08
Payer: MEDICARE

## 2017-11-08 VITALS
OXYGEN SATURATION: 97 % | WEIGHT: 201 LBS | SYSTOLIC BLOOD PRESSURE: 108 MMHG | BODY MASS INDEX: 34.31 KG/M2 | HEIGHT: 64 IN | DIASTOLIC BLOOD PRESSURE: 74 MMHG | RESPIRATION RATE: 20 BRPM | HEART RATE: 68 BPM | TEMPERATURE: 97.9 F

## 2017-11-08 DIAGNOSIS — G44.209 ACUTE NON INTRACTABLE TENSION-TYPE HEADACHE: ICD-10-CM

## 2017-11-08 DIAGNOSIS — H66.001 ACUTE SUPPURATIVE OTITIS MEDIA OF RIGHT EAR WITHOUT SPONTANEOUS RUPTURE OF TYMPANIC MEMBRANE, RECURRENCE NOT SPECIFIED: Primary | ICD-10-CM

## 2017-11-08 DIAGNOSIS — H61.22 IMPACTED CERUMEN, LEFT EAR: ICD-10-CM

## 2017-11-08 PROCEDURE — 69209 REMOVE IMPACTED EAR WAX UNI: CPT | Performed by: NURSE PRACTITIONER

## 2017-11-08 PROCEDURE — G8417 CALC BMI ABV UP PARAM F/U: HCPCS | Performed by: NURSE PRACTITIONER

## 2017-11-08 PROCEDURE — 96372 THER/PROPH/DIAG INJ SC/IM: CPT | Performed by: NURSE PRACTITIONER

## 2017-11-08 PROCEDURE — 3014F SCREEN MAMMO DOC REV: CPT | Performed by: NURSE PRACTITIONER

## 2017-11-08 PROCEDURE — 3017F COLORECTAL CA SCREEN DOC REV: CPT | Performed by: NURSE PRACTITIONER

## 2017-11-08 PROCEDURE — G8427 DOCREV CUR MEDS BY ELIG CLIN: HCPCS | Performed by: NURSE PRACTITIONER

## 2017-11-08 PROCEDURE — 1036F TOBACCO NON-USER: CPT | Performed by: NURSE PRACTITIONER

## 2017-11-08 PROCEDURE — 99214 OFFICE O/P EST MOD 30 MIN: CPT | Performed by: NURSE PRACTITIONER

## 2017-11-08 PROCEDURE — G8484 FLU IMMUNIZE NO ADMIN: HCPCS | Performed by: NURSE PRACTITIONER

## 2017-11-08 RX ORDER — LORATADINE AND PSEUDOEPHEDRINE 10; 240 MG/1; MG/1
1 TABLET, EXTENDED RELEASE ORAL DAILY
Qty: 30 TABLET | Refills: 2 | Status: SHIPPED | OUTPATIENT
Start: 2017-11-08 | End: 2018-07-06

## 2017-11-08 RX ORDER — OFLOXACIN 3 MG/ML
5 SOLUTION AURICULAR (OTIC) 2 TIMES DAILY
Qty: 10 ML | Refills: 0 | Status: SHIPPED | OUTPATIENT
Start: 2017-11-08 | End: 2017-11-18

## 2017-11-08 RX ORDER — KETOROLAC TROMETHAMINE 30 MG/ML
60 INJECTION, SOLUTION INTRAMUSCULAR; INTRAVENOUS ONCE
Status: COMPLETED | OUTPATIENT
Start: 2017-11-08 | End: 2017-11-08

## 2017-11-08 RX ADMIN — ONDANSETRON 4 MG: 2 INJECTION INTRAMUSCULAR; INTRAVENOUS at 12:21

## 2017-11-08 RX ADMIN — KETOROLAC TROMETHAMINE 60 MG: 30 INJECTION, SOLUTION INTRAMUSCULAR; INTRAVENOUS at 12:22

## 2017-11-08 ASSESSMENT — PATIENT HEALTH QUESTIONNAIRE - PHQ9
SUM OF ALL RESPONSES TO PHQ QUESTIONS 1-9: 0
SUM OF ALL RESPONSES TO PHQ9 QUESTIONS 1 & 2: 0
2. FEELING DOWN, DEPRESSED OR HOPELESS: 0
1. LITTLE INTEREST OR PLEASURE IN DOING THINGS: 0

## 2017-11-08 NOTE — PROGRESS NOTES
Visit Information    Have you changed or started any medications since your last visit including any over-the-counter medicines, vitamins, or herbal medicines? no   Are you having any side effects from any of your medications? -  no  Have you stopped taking any of your medications? Is so, why? -  no    Have you seen any other physician or provider since your last visit? No  Have you had any other diagnostic tests since your last visit? No  Have you been seen in the emergency room and/or had an admission to a hospital since we last saw you? No  Have you had your routine dental cleaning in the past 6 months? no    Have you activated your [employer] account? If not, what are your barriers?  No: declined      Patient Care Team:  Julio César Finch CNP as PCP - General (Nurse Practitioner)        Health Maintenance   Topic Date Due    Diabetic foot exam  06/26/1976    Diabetic retinal exam  06/26/1976    HIV screen  06/26/1981    DTaP/Tdap/Td vaccine (1 - Tdap) 06/26/1985    Breast cancer screen  06/26/2016    Diabetic microalbuminuria test  10/09/2018    Lipid screen  10/10/2018    Diabetic hemoglobin A1C test  10/11/2018    Cervical cancer screen  10/27/2020    Colon cancer screen colonoscopy  01/24/2021    Flu vaccine  Completed    Pneumococcal med risk  Completed

## 2017-11-12 ASSESSMENT — ENCOUNTER SYMPTOMS
COLOR CHANGE: 0
CHEST TIGHTNESS: 0
NAUSEA: 1
DIARRHEA: 1
VOMITING: 1
EYE PAIN: 0
SHORTNESS OF BREATH: 0
SINUS PRESSURE: 0
CONSTIPATION: 0
EYE ITCHING: 0
BLOOD IN STOOL: 0
ABDOMINAL DISTENTION: 0

## 2017-11-13 NOTE — PROGRESS NOTES
[facility], [address] [address]  Dept: [phone]  Dept Fax: [phone]    Alisha Rodriguez is a 47 y.o. female who presents today for her medical conditions/complaints as noted below. Alisha Rodriguez is c/o of URI (eye pain, body aches, chills, nausea, vommitting, head ache )      Susanne Padilla received counseling on the following healthy behaviors: nutrition and medication adherence  Reviewed prior labs and health maintenance. Continue current medications, diet and exercise. Discussed use, benefit, and side effects of prescribed medications. Barriers to medication compliance addressed. Patient given educational materials - see patient instructions. All patient questions answered. Patient voiced understanding. HPI:   Nausea & Vomiting   This is a new problem. Episode onset: states started 4 days ago. The problem occurs constantly. The problem has been unchanged. Associated symptoms include chills, fatigue, headaches, myalgias, nausea and vomiting. Pertinent negatives include no arthralgias or weakness. Change in bowel habit: has diarrhea. The symptoms are aggravated by eating, drinking and exertion. She has tried rest for the symptoms. The treatment provided no relief. Diarrhea    This is a new problem. Episode onset: started 4 days ago. The problem occurs 5 to 10 times per day. The problem has been unchanged. The stool consistency is described as watery. The patient states that diarrhea does not awaken her from sleep. Associated symptoms include chills, headaches, myalgias and vomiting. Pertinent negatives include no arthralgias or sweats. Nothing aggravates the symptoms. There are no known risk factors. She has tried nothing for the symptoms. The treatment provided no relief. Generalized Body Aches   This is a new problem. The current episode started in the past 7 days. The problem occurs constantly. The problem has been gradually worsening. 1 each by Does not apply route daily 100 each 3    ARIPiprazole (ABILIFY) 10 MG tablet TAKE 1 TABLET BY MOUTH NIGHTLY 30 tablet 2    dicyclomine (BENTYL) 10 MG capsule Take 1 capsule by mouth every 6 hours as needed (cramps) 20 capsule 0    promethazine (PHENERGAN) 12.5 MG tablet Take 12.5 mg by mouth every 6 hours as needed for Nausea      losartan (COZAAR) 25 MG tablet Take 12.5 mg by mouth daily      metoclopramide (REGLAN) 10 MG tablet TAKE 1 TABLET BY MOUTH DAILY 30 tablet 5    levETIRAcetam (KEPPRA) 500 MG tablet TAKE 1 TABLET BY MOUTH 2 TIMES DAILY 60 tablet 5    furosemide (LASIX) 20 MG tablet TAKE 1 TABLET BY MOUTH DAILY 30 tablet 5    levETIRAcetam (KEPPRA) 250 MG tablet Take 250 mg by mouth every morning Takes with the 500mg to equal 750mg in am dose only      atorvastatin (LIPITOR) 40 MG tablet TAKE 1 TABLET BY MOUTH NIGHTLY 30 tablet 5     Current Facility-Administered Medications   Medication Dose Route Frequency Provider Last Rate Last Dose    ondansetron (ZOFRAN) injection 4 mg  4 mg Intravenous Q6H PRN Crisn Sanchez, CNP   4 mg at 11/08/17 1221     Allergies   Allergen Reactions    Latex     Adhesive Tape      Also plastic tape    Morphine And Related Nausea Only    Other Other (See Comments)     Surgical staples cause infection    Aspirin Nausea And Vomiting       Health Maintenance   Topic Date Due    Diabetic foot exam  06/26/1976    Diabetic retinal exam  06/26/1976    HIV screen  06/26/1981    DTaP/Tdap/Td vaccine (1 - Tdap) 06/26/1985    Breast cancer screen  06/26/2016    Diabetic microalbuminuria test  10/09/2018    Lipid screen  10/10/2018    Diabetic hemoglobin A1C test  10/11/2018    Cervical cancer screen  10/27/2020    Colon cancer screen colonoscopy  01/24/2021    Flu vaccine  Completed    Pneumococcal med risk  Completed       Subjective:      Review of Systems   Constitutional: Positive for chills and fatigue.  Negative for activity change and appetite change. HENT: Negative for hearing loss, sinus pressure and tinnitus. Eyes: Negative for pain, itching and visual disturbance. Respiratory: Negative for chest tightness and shortness of breath. Cardiovascular: Negative for palpitations and leg swelling. Gastrointestinal: Positive for diarrhea, nausea and vomiting. Negative for abdominal distention, blood in stool and constipation. Change in bowel habit: has diarrhea. Endocrine: Negative for cold intolerance, heat intolerance, polydipsia, polyphagia and polyuria. Genitourinary: Negative for flank pain, frequency and urgency. Musculoskeletal: Positive for myalgias. Negative for arthralgias and gait problem. Skin: Negative for color change and wound. Allergic/Immunologic: Negative for environmental allergies and food allergies. Neurological: Positive for headaches. Negative for speech difficulty, weakness and light-headedness. Hematological: Does not bruise/bleed easily. Psychiatric/Behavioral: Negative for decreased concentration and sleep disturbance. The patient is not nervous/anxious. Objective:     /72 (Site: Left Arm, Position: Sitting, Cuff Size: Medium Adult)   Pulse 87   Temp 97.5 °F (36.4 °C) (Temporal)   Resp 18   Ht 5' 4\" (1.626 m)   Wt 198 lb (89.8 kg)   SpO2 98%   BMI 33.99 kg/m²   Physical Exam   Constitutional: She is oriented to person, place, and time. She appears well-developed and well-nourished. HENT:   Head: Normocephalic. Right Ear: External ear normal.   Left Ear: External ear normal.   Nose: Nose normal.   Mouth/Throat: Oropharynx is clear and moist.   Eyes: Conjunctivae and EOM are normal.   Neck: Normal range of motion. Neck supple. No thyromegaly present. Cardiovascular: Normal rate, regular rhythm and normal heart sounds. Exam reveals no gallop and no friction rub. No murmur heard. Pulmonary/Chest: Effort normal and breath sounds normal. No respiratory distress. She has no wheezes. She has no rales. She exhibits no tenderness. Abdominal: Soft. Bowel sounds are normal. She exhibits no distension. There is no splenomegaly or hepatomegaly. There is tenderness. No hernia. Musculoskeletal: Normal range of motion. She exhibits no edema or tenderness. Aching all over body   Lymphadenopathy:     She has no cervical adenopathy. Neurological: She is alert and oriented to person, place, and time. Coordination and gait normal.   Skin: Skin is warm and dry. No rash noted. No erythema. Psychiatric: She has a normal mood and affect. Her speech is normal and behavior is normal. Judgment and thought content normal. Cognition and memory are normal.   Tearful, stating she is so sick   Nursing note and vitals reviewed. Assessment:      1. Non-intractable vomiting with nausea, unspecified vomiting type    2. Diarrhea, unspecified type    3. Body aches    4. Bacterial vaginosis                     Plan:     Nausea/Vomiting:  Take medication as directed  Avoid an empty stomach. Avoid strong smells and unpleasant odors. Drink enough liquids so that your urine is light colored. Practice good mouth care. Wear loose-fitted clothing. Sit or recline with your head elevated for at least 30-60 minutes after eating  Assurance given that this would stop  Diarrhea:   For cramping and abdominal pain, use heating pad on belly  Eat soft starchy foods, bananas, cooked cereal, plain rice, plain noodles, gelatin, eggs, toast, applesauce  Avoid milk products  Avoid caffeine for a few days  Drink plenty of water, weakened tea, broth, apple or grape juice  Drink Gatorade to replace electrolytes  Return to normal diet after 2-3 days, but avoid alcohol, greasy, fatty foods, spicy or highly seasoned foods, and fresh vegetables  May use Imodium as directed on package but stop use if blood noticed in stool  Call office if diarrhea lasts longer than 5 days, have abdominal cramps or pain for greater than 24 hours or if

## 2017-11-19 ASSESSMENT — ENCOUNTER SYMPTOMS
WHEEZING: 0
SINUS PRESSURE: 0
COUGH: 0
EYE ITCHING: 0
ABDOMINAL PAIN: 0
CONSTIPATION: 0
NAUSEA: 0
COLOR CHANGE: 0
ABDOMINAL DISTENTION: 0
SORE THROAT: 0
CHEST TIGHTNESS: 0
BLOOD IN STOOL: 0
EYE PAIN: 0
SCALP TENDERNESS: 1
SHORTNESS OF BREATH: 0
DIARRHEA: 0

## 2017-11-19 NOTE — PROGRESS NOTES
negatives include no abdominal pain, coughing, eye pain, hearing loss, nausea, sinus pressure, sore throat, tinnitus or weakness. Nothing aggravates the symptoms. She has tried darkened room, cold packs and Excedrin for the symptoms. The treatment provided no relief. Her past medical history is significant for hypertension and obesity. Impacted cerumen in left ear  This is a new problem that was found today upon examination. Has had ear fullness but did not know that ear was full of wax. Has had decreased hearing but has had such a terrible headache for over a week that she thought it might be from that. Has had no drainage from the ear. Has not tried any treatment. Hemoglobin A1C (%)   Date Value   10/11/2017 6.4 (H)   10/10/2017 6.4 (H)   10/09/2017 6.4 (H)             ( goal A1C is < 7)   Microalb/Crt.  Ratio (mcg/mg creat)   Date Value   10/09/2017 24     LDL Cholesterol (mg/dL)   Date Value   10/10/2017 101   10/09/2017 120   02/11/2015 74       (goal LDL is <100)   AST (U/L)   Date Value   10/09/2017 18     ALT (U/L)   Date Value   10/09/2017 25     BUN (mg/dL)   Date Value   10/09/2017 25 (H)     BP Readings from Last 3 Encounters:   11/08/17 108/74   11/06/17 108/72   10/27/17 132/82          (goal 120/80)    Past Medical History:   Diagnosis Date    Acid reflux     Arthritis     Coughing     dry due to ace inhibitor    Depression     uses Abilify, Buspar, Trazodone for this    Diabetes mellitus ([facility] [address]     Diverticulitis     Epilepsy ([facility] [address]     last seizure 6 mos ago     Factor V Leiden ([facility] [address]     Hiatal hernia     History of stroke associated with blood clotting tendency 2003    had clot in brain and left neck, left side weakness residual    Hx of blood clots     right breast, brain, neck    Hyperlipidemia     Hypertension     Insomnia     uses Trazodone for this    Sleep apnea     C PAP    Unspecified cerebral artery occlusion with cerebral infarction 6/2003    SEE BELOW, [address] Mini stroke    Wears glasses       Past Surgical History:   Procedure Laterality Date    APPENDECTOMY      CHOLECYSTECTOMY      COLONOSCOPY      with polypectomy    COLONOSCOPY  1/22/16    AND EGD    COLONOSCOPY  01/05/2017    random biopsies.  ENDOMETRIAL ABLATION      2 times    HERNIA REPAIR      HIATAL HERNIA REPAIR  2/4/2016    laparoscopic robotic    HYSTERECTOMY      with BSO    KNEE ARTHROSCOPY Left 4/7/15, 2015    x 2    LAPAROSCOPY      \"springs inserted in to fallopian tubes\" to close tubes   [facility] NECK SURGERY  July 2012    Anterior, C5,6,7 bulging disk repair    TONSILLECTOMY      UPPER GASTROINTESTINAL ENDOSCOPY         Family History   Problem Relation Age of Onset    Heart Disease Mother      dies age 29    Cancer Father      lymph nodes    Pacemaker Sister        Social History   Substance Use Topics    Smoking status: Never Smoker    Smokeless tobacco: Never Used    Alcohol use 0.0 oz/week      Comment: 1-2 drinks per month      Current Outpatient Prescriptions   Medication Sig Dispense Refill    loratadine-pseudoephedrine (CLARITIN-D 24 HOUR)  MG per extended release tablet Take 1 tablet by mouth daily 30 tablet 2    zolpidem (AMBIEN) 10 MG tablet Take 10 mg by mouth      warfarin (COUMADIN) 5 MG tablet Take 3 tablets by mouth daily 90 tablet 0    Misc.  Devices MISC True Plus     Testing 3 times a day 100 each 5    busPIRone (BUSPAR) 10 MG tablet Take 10 mg by mouth      omeprazole (PRILOSEC OTC) 20 MG tablet Take 1 tablet by mouth daily 90 tablet 1    TRUEPLUS LANCETS 30G MISC 1 each by Does not apply route daily 100 each 3    ARIPiprazole (ABILIFY) 10 MG tablet TAKE 1 TABLET BY MOUTH NIGHTLY 30 tablet 2    dicyclomine (BENTYL) 10 MG capsule Take 1 capsule by mouth every 6 hours as needed (cramps) 20 capsule 0    promethazine (PHENERGAN) 12.5 MG tablet Take 12.5 mg by mouth every 6 hours as needed for Nausea      losartan (COZAAR) 25 MG tablet Take 12.5 mg by mouth daily      metoclopramide (REGLAN) 10 MG tablet TAKE 1 TABLET BY MOUTH DAILY 30 tablet 5    levETIRAcetam (KEPPRA) 500 MG tablet TAKE 1 TABLET BY MOUTH 2 TIMES DAILY 60 tablet 5    furosemide (LASIX) 20 MG tablet TAKE 1 TABLET BY MOUTH DAILY 30 tablet 5    levETIRAcetam (KEPPRA) 250 MG tablet Take 250 mg by mouth every morning Takes with the 500mg to equal 750mg in am dose only      atorvastatin (LIPITOR) 40 MG tablet TAKE 1 TABLET BY MOUTH NIGHTLY 30 tablet 5    ketorolac (TORADOL) 30 MG/ML injection Inject 1 mL into the muscle once for 1 dose 1 mL 0     Current Facility-Administered Medications   Medication Dose Route Frequency Provider Last Rate Last Dose    ondansetron (ZOFRAN) injection 4 mg  4 mg Intravenous Q6H PRN Valri Putt, CNP   4 mg at 11/08/17 1221     Allergies   Allergen Reactions    Latex     Adhesive Tape      Also plastic tape    Morphine And Related Nausea Only    Other Other (See Comments)     Surgical staples cause infection    Aspirin Nausea And Vomiting       Health Maintenance   Topic Date Due    Diabetic foot exam  06/26/1976    Diabetic retinal exam  06/26/1976    HIV screen  06/26/1981    DTaP/Tdap/Td vaccine (1 - Tdap) 06/26/1985    Breast cancer screen  06/26/2016    Diabetic microalbuminuria test  10/09/2018    Lipid screen  10/10/2018    Diabetic hemoglobin A1C test  10/11/2018    Cervical cancer screen  10/27/2020    Colon cancer screen colonoscopy  01/24/2021    Flu vaccine  Completed    Pneumococcal med risk  Completed       Subjective:      Review of Systems   Constitutional: Negative for activity change and appetite change. HENT: Positive for ear discharge and ear pain (bilateral). Negative for congestion, hearing loss, sinus pressure, sore throat and tinnitus. Eyes: Negative for pain, itching and visual disturbance. Respiratory: Negative for cough, chest tightness, shortness of breath and wheezing.     Cardiovascular: Negative for chest pain, use Q-tips, andrew pins, or other objects to clean ear  Do not use ear candles. Left ear was impacted with cerumen which was removed by lavage  Advised to put a few drops of peroxide in ears several times a month. If ears feel plugged, come to office to have wax removed  RTO as directed    Orders Placed This Encounter   Procedures    IL REMOVAL IMPACTED CERUMEN IRRIGATION/LVG UNILAT     Orders Placed This Encounter   Medications    ketorolac (TORADOL) injection 60 mg    ofloxacin (FLOXIN) 0.3 % otic solution     Sig: Place 5 drops into both ears 2 times daily for 10 days     Dispense:  10 mL     Refill:  0    loratadine-pseudoephedrine (CLARITIN-D 24 HOUR)  MG per extended release tablet     Sig: Take 1 tablet by mouth daily     Dispense:  30 tablet     Refill:  2        Return in about 4 weeks (around 12/6/2017) for headache. Patient given educational materials - see patient instructions. Discussed use, benefit, and side effects of prescribed medications. All patient questions answered. Pt voiced understanding. Reviewed health maintenance. Instructed to continue current medications, diet and exercise. Patient agreed with treatment plan. Follow up as directed.      Electronically signed by Janis Hay CNP on 11/19/2017

## 2017-11-22 ENCOUNTER — HOSPITAL ENCOUNTER (OUTPATIENT)
Dept: WOMENS IMAGING | Age: 52
Discharge: HOME | End: 2017-11-22
Payer: MEDICARE

## 2017-11-22 DIAGNOSIS — Z78.0 ASYMPTOMATIC MENOPAUSAL STATE: ICD-10-CM

## 2017-11-22 DIAGNOSIS — Z12.31 ENCOUNTER FOR SCREENING MAMMOGRAM FOR MALIGNANT NEOPLASM OF BREAST: ICD-10-CM

## 2017-11-22 PROCEDURE — 77080 DXA BONE DENSITY AXIAL: CPT

## 2017-11-22 PROCEDURE — 77063 BREAST TOMOSYNTHESIS BI: CPT

## 2017-12-08 ENCOUNTER — HOSPITAL ENCOUNTER (OUTPATIENT)
Dept: PAIN MANAGEMENT | Age: 52
Discharge: HOME | End: 2017-12-08
Payer: MEDICARE

## 2017-12-08 VITALS
SYSTOLIC BLOOD PRESSURE: 138 MMHG | TEMPERATURE: 98.6 F | RESPIRATION RATE: 16 BRPM | DIASTOLIC BLOOD PRESSURE: 80 MMHG | HEIGHT: 64 IN | WEIGHT: 200 LBS | BODY MASS INDEX: 34.15 KG/M2 | OXYGEN SATURATION: 98 % | HEART RATE: 88 BPM

## 2017-12-08 DIAGNOSIS — F43.12 CHRONIC POST-TRAUMATIC STRESS DISORDER (PTSD): ICD-10-CM

## 2017-12-08 DIAGNOSIS — M47.817 LUMBOSACRAL SPONDYLOSIS WITHOUT MYELOPATHY: Primary | ICD-10-CM

## 2017-12-08 DIAGNOSIS — D68.51 FACTOR V LEIDEN (HCC): ICD-10-CM

## 2017-12-08 DIAGNOSIS — M54.16 LUMBAR RADICULOPATHY: ICD-10-CM

## 2017-12-08 DIAGNOSIS — Z79.891 CHRONIC USE OF OPIATE FOR THERAPEUTIC PURPOSE: Chronic | ICD-10-CM

## 2017-12-08 PROCEDURE — 99214 OFFICE O/P EST MOD 30 MIN: CPT | Performed by: ANESTHESIOLOGY

## 2017-12-08 PROCEDURE — 99213 OFFICE O/P EST LOW 20 MIN: CPT

## 2017-12-08 RX ORDER — LEVETIRACETAM 750 MG/1
TABLET ORAL
Refills: 2 | COMMUNITY
Start: 2017-11-16 | End: 2018-01-09

## 2017-12-08 RX ORDER — TRAMADOL HYDROCHLORIDE 50 MG/1
50 TABLET ORAL 2 TIMES DAILY PRN
Qty: 60 TABLET | Refills: 0 | Status: SHIPPED | OUTPATIENT
Start: 2017-12-08 | End: 2018-01-09 | Stop reason: SDUPTHER

## 2017-12-08 ASSESSMENT — PAIN DESCRIPTION - LOCATION: LOCATION: BACK;BUTTOCKS

## 2017-12-08 ASSESSMENT — PAIN DESCRIPTION - ONSET: ONSET: ON-GOING

## 2017-12-08 ASSESSMENT — ENCOUNTER SYMPTOMS
RESPIRATORY NEGATIVE: 1
COUGH: 0
HEARTBURN: 0
BLURRED VISION: 0
SINUS PAIN: 0
SORE THROAT: 0
SHORTNESS OF BREATH: 0
GASTROINTESTINAL NEGATIVE: 1
EYES NEGATIVE: 1
BACK PAIN: 1
NAUSEA: 0
VOMITING: 0

## 2017-12-08 ASSESSMENT — PAIN DESCRIPTION - DESCRIPTORS: DESCRIPTORS: CONSTANT;DULL

## 2017-12-08 ASSESSMENT — PAIN DESCRIPTION - ORIENTATION: ORIENTATION: RIGHT

## 2017-12-08 ASSESSMENT — PAIN SCALES - GENERAL: PAINLEVEL_OUTOF10: 8

## 2017-12-08 ASSESSMENT — PAIN DESCRIPTION - PAIN TYPE: TYPE: CHRONIC PAIN

## 2017-12-08 ASSESSMENT — PAIN DESCRIPTION - PROGRESSION: CLINICAL_PROGRESSION: GRADUALLY IMPROVING

## 2017-12-08 ASSESSMENT — PAIN DESCRIPTION - FREQUENCY: FREQUENCY: CONTINUOUS

## 2017-12-08 NOTE — PROGRESS NOTES
10/3/17  Was your procedure effective:  Yes, 80%    ACTIVITY/SOCIAL/EMOTIONAL:  Sleep Pattern: 10 hours per night. nightime awakenings  Energy Level:  Tired/Fatigued  Currently attending Physical Therapy:  No  Home Exercises: never none  Mobility: no current mobility problem   Currently seeing a Psychiatrist or Psychologist:  Yes  Emotional Issues: anxiety/ nervousness   Mood: depressed     ABERRANT BEHAVIORS SINCE LAST VISIT:  Have you ever been treated in another Pain Clinic no  Refills for prescriptions appropriate: yes  Lost rx/pills: no  Taking more medication than prescribed:  no  Are you receiving PAIN medications from  other doctors: no  Last Urine/Serum Drug Screen :7/31/17  Was Serum/UDS as anticipated? yes  Brought pill bottles in :threw away bottle/emptied   Was Pill count appropriate? :did not bring in   Are currently pregnant? No, LMP 3 ablation/hysterectomy  Recent ER visits: No             Past Medical History      Diagnosis Date    Acid reflux     Arthritis     Coughing     dry due to ace inhibitor    Depression     uses Abilify, Buspar, Trazodone for this    Diabetes mellitus ([facility] [address]     Diverticulitis     Epilepsy ([facility] [address]     last seizure 6 mos ago     Factor V Leiden ([facility] [address]     Hiatal hernia     History of stroke associated with blood clotting tendency 2003    had clot in brain and left neck, left side weakness residual    Hx of blood clots     right breast, brain, neck    Hyperlipidemia     Hypertension     Insomnia     uses Trazodone for this    Sleep apnea     C PAP    Unspecified cerebral artery occlusion with cerebral infarction 6/2003    SEE BELOW, 20017 Mini stroke    Wears glasses        Surgical History  Past Surgical History:   Procedure Laterality Date    APPENDECTOMY      CHOLECYSTECTOMY      COLONOSCOPY      with polypectomy    COLONOSCOPY  1/22/16    AND EGD    COLONOSCOPY  01/05/2017    random biopsies.     ENDOMETRIAL ABLATION      2 times    HERNIA REPAIR  HIATAL HERNIA REPAIR  2/4/2016    laparoscopic robotic    HYSTERECTOMY      with BSO    KNEE ARTHROSCOPY Left 4/7/15, 2015    x 2    LAPAROSCOPY      \"springs inserted in to fallopian tubes\" to close tubes   [facility] NECK SURGERY  July 2012    Anterior, C5,6,7 bulging disk repair    TONSILLECTOMY      UPPER GASTROINTESTINAL ENDOSCOPY         Medications  Current Outpatient Prescriptions   Medication Sig Dispense Refill    levETIRAcetam (KEPPRA) 750 MG tablet TAKE 1 TABLET TWICE DAILY  2    loratadine-pseudoephedrine (CLARITIN-D 24 HOUR)  MG per extended release tablet Take 1 tablet by mouth daily 30 tablet 2    zolpidem (AMBIEN) 10 MG tablet Take 10 mg by mouth      warfarin (COUMADIN) 5 MG tablet Take 3 tablets by mouth daily 90 tablet 0    Misc.  Devices MISC True Plus     Testing 3 times a day 100 each 5    busPIRone (BUSPAR) 10 MG tablet Take 10 mg by mouth      omeprazole (PRILOSEC OTC) 20 MG tablet Take 1 tablet by mouth daily 90 tablet 1    TRUEPLUS LANCETS 30G MISC 1 each by Does not apply route daily 100 each 3    ARIPiprazole (ABILIFY) 10 MG tablet TAKE 1 TABLET BY MOUTH NIGHTLY 30 tablet 2    dicyclomine (BENTYL) 10 MG capsule Take 1 capsule by mouth every 6 hours as needed (cramps) 20 capsule 0    promethazine (PHENERGAN) 12.5 MG tablet Take 12.5 mg by mouth every 6 hours as needed for Nausea      losartan (COZAAR) 25 MG tablet Take 12.5 mg by mouth daily      metoclopramide (REGLAN) 10 MG tablet TAKE 1 TABLET BY MOUTH DAILY 30 tablet 5    furosemide (LASIX) 20 MG tablet TAKE 1 TABLET BY MOUTH DAILY 30 tablet 5    atorvastatin (LIPITOR) 40 MG tablet TAKE 1 TABLET BY MOUTH NIGHTLY 30 tablet 5    ketorolac (TORADOL) 30 MG/ML injection Inject 1 mL into the muscle once for 1 dose 1 mL 0     Current Facility-Administered Medications   Medication Dose Route Frequency Provider Last Rate Last Dose    ondansetron (ZOFRAN) injection 4 mg  4 mg Intravenous Q6H PRN Hilary Teran CNP   4 mg at 11/08/17 1221       Allergies  Latex; Adhesive tape; Morphine and related; Other; and Aspirin    Family History  family history includes Cancer in her father; Heart Disease in her mother; Pacemaker in her sister. Social History  Social History     Social History    Marital status:      Spouse name: N/A    Number of children: N/A    Years of education: N/A     Occupational History    disability      Social History Main Topics    Smoking status: Never Smoker    Smokeless tobacco: Never Used    Alcohol use 0.0 oz/week      Comment: occ    Drug use: No    Sexual activity: Yes     Partners: Male     Other Topics Concern    None     Social History Narrative    None      reports that she does not use drugs. REVIEW OF SYSTEMS:  Review of Systems   Constitutional: Negative. HENT: Negative. Negative for hearing loss, sinus pain and sore throat. Eyes: Negative. Negative for blurred vision. Respiratory: Negative. Negative for cough and shortness of breath. Cardiovascular: Positive for leg swelling. Negative for chest pain and palpitations. Gastrointestinal: Negative. Negative for heartburn, nausea and vomiting. Genitourinary: Negative. Musculoskeletal: Positive for back pain, joint pain and neck pain. Negative for falls. Skin: Negative. Neurological: Positive for tremors (right hand), seizures (7 months ago last seizure) and headaches. Negative for dizziness and tingling. Endo/Heme/Allergies: Bruises/bleeds easily (coumadin). Psychiatric/Behavioral: Positive for depression. Negative for suicidal ideas. The patient is nervous/anxious and has insomnia (on [place]). Objective:  Vital signs: (most recent): Blood pressure 138/80, pulse 88, temperature 98.6 °F (37 °C), temperature source Oral, resp. rate 16, height 5' 4\" (1.626 m), weight 200 lb (90.7 kg), SpO2 98 %, not currently breastfeeding.        Assessment & Plan   Past medical history of factor V Leiden on chronic and evaluation with Coumadin  Psychological history of depression and PTSD taking several antipsychotic medications  History of chronic lower back pain with radiation down the right leg associated with right leg numbness and weakness. In past had lumbar into laminar epidural steroid injection last injection was in October patient reports significant improvement in her pain with this injection. She is currently prescribed tramadol twice a day and is asking for increasing the dose. OA RRS report was reviewed consistent with prescription history. She missed her appointment last month and has been out of the medication for 1 month and report increased pain because of that  1. Lumbosacral spondylosis without myelopathy    2. Lumbar radiculopathy    3. Factor V Leiden ([facility] [address]    4. Chronic post-traumatic stress disorder (PTSD)    5. Chronic use of opiate for therapeutic purpose          The current medical regimen is effective;  continue present plan and medications. Controlled Substances Monitoring:     Attestation: The Prescription Monitoring Report for this patient was reviewed today. Marycarmen Roe MD)  Documentation: No signs of potential drug abuse or diversion identified., Existing medication contract. Marycarmen Roe MD)  Chronic Pain: Functional status reviewed - continues with improved or maintaining ADL's. Marycarmen Roe MD)    No orders of the defined types were placed in this encounter. Orders Placed This Encounter   Medications    traMADol (ULTRAM) 50 MG tablet     Sig: Take 1 tablet by mouth 2 times daily as needed for Pain .      Dispense:  60 tablet     Refill:  0       Electronically signed by Marycarmen Roe MD on 12/8/2017 at 4:57 PM

## 2017-12-18 ENCOUNTER — OFFICE VISIT (OUTPATIENT)
Dept: FAMILY MEDICINE CLINIC | Age: 52
End: 2017-12-18
Payer: MEDICARE

## 2017-12-18 VITALS
OXYGEN SATURATION: 98 % | HEIGHT: 64 IN | DIASTOLIC BLOOD PRESSURE: 78 MMHG | BODY MASS INDEX: 35.92 KG/M2 | SYSTOLIC BLOOD PRESSURE: 126 MMHG | RESPIRATION RATE: 16 BRPM | WEIGHT: 210.4 LBS | TEMPERATURE: 97.5 F | HEART RATE: 81 BPM

## 2017-12-18 DIAGNOSIS — R06.02 SHORTNESS OF BREATH ON EXERTION: Primary | ICD-10-CM

## 2017-12-18 DIAGNOSIS — R07.9 RIGHT-SIDED CHEST PAIN: ICD-10-CM

## 2017-12-18 PROCEDURE — G8484 FLU IMMUNIZE NO ADMIN: HCPCS | Performed by: NURSE PRACTITIONER

## 2017-12-18 PROCEDURE — 3014F SCREEN MAMMO DOC REV: CPT | Performed by: NURSE PRACTITIONER

## 2017-12-18 PROCEDURE — G8417 CALC BMI ABV UP PARAM F/U: HCPCS | Performed by: NURSE PRACTITIONER

## 2017-12-18 PROCEDURE — G8427 DOCREV CUR MEDS BY ELIG CLIN: HCPCS | Performed by: NURSE PRACTITIONER

## 2017-12-18 PROCEDURE — 99213 OFFICE O/P EST LOW 20 MIN: CPT | Performed by: NURSE PRACTITIONER

## 2017-12-18 PROCEDURE — 3017F COLORECTAL CA SCREEN DOC REV: CPT | Performed by: NURSE PRACTITIONER

## 2017-12-18 PROCEDURE — 1036F TOBACCO NON-USER: CPT | Performed by: NURSE PRACTITIONER

## 2017-12-19 ENCOUNTER — HOSPITAL ENCOUNTER (OUTPATIENT)
Age: 52
Discharge: HOME | End: 2017-12-19
Payer: MEDICARE

## 2017-12-19 ENCOUNTER — HOSPITAL ENCOUNTER (OUTPATIENT)
Dept: GENERAL RADIOLOGY | Age: 52
Discharge: HOME | End: 2017-12-19
Payer: MEDICARE

## 2017-12-19 DIAGNOSIS — R07.9 RIGHT-SIDED CHEST PAIN: ICD-10-CM

## 2017-12-19 DIAGNOSIS — R06.02 SHORTNESS OF BREATH ON EXERTION: ICD-10-CM

## 2017-12-19 PROCEDURE — 71020 XR CHEST STANDARD TWO VW: CPT

## 2018-01-01 ASSESSMENT — ENCOUNTER SYMPTOMS
ABDOMINAL DISTENTION: 0
NAUSEA: 0
SHORTNESS OF BREATH: 1
EYE ITCHING: 0
BLOOD IN STOOL: 0
COUGH: 1
SPUTUM PRODUCTION: 0
SORE THROAT: 0
CONSTIPATION: 0
COLOR CHANGE: 0
ORTHOPNEA: 0
SINUS PRESSURE: 0
ABDOMINAL PAIN: 0
DIARRHEA: 0
CHEST TIGHTNESS: 1
WHEEZING: 0
EYE PAIN: 0
BACK PAIN: 1

## 2018-01-02 NOTE — PROGRESS NOTES
 Hypertension     Insomnia     uses Trazodone for this    Sleep apnea     C PAP    Unspecified cerebral artery occlusion with cerebral infarction 6/2003    SEE BELOW, 20017 Mini stroke    Wears glasses       Past Surgical History:   Procedure Laterality Date    APPENDECTOMY      CHOLECYSTECTOMY      COLONOSCOPY      with polypectomy    COLONOSCOPY  1/22/16    AND EGD    COLONOSCOPY  01/05/2017    random biopsies.  ENDOMETRIAL ABLATION      2 times    HERNIA REPAIR      HIATAL HERNIA REPAIR  2/4/2016    laparoscopic robotic    HYSTERECTOMY      with BSO    KNEE ARTHROSCOPY Left 4/7/15, 2015    x 2    LAPAROSCOPY      \"springs inserted in to fallopian tubes\" to close tubes   [facility] NECK SURGERY  July 2012    Anterior, C5,6,7 bulging disk repair    TONSILLECTOMY      UPPER GASTROINTESTINAL ENDOSCOPY         Family History   Problem Relation Age of Onset    Heart Disease Mother      dies age 32   [facility] Cancer Father      lymph nodes    Pacemaker Sister        Social History   Substance Use Topics    Smoking status: Never Smoker    Smokeless tobacco: Never Used    Alcohol use 0.0 oz/week      Comment: occ      Current Outpatient Prescriptions   Medication Sig Dispense Refill    levETIRAcetam (KEPPRA) 750 MG tablet TAKE 1 TABLET TWICE DAILY  2    loratadine-pseudoephedrine (CLARITIN-D 24 HOUR)  MG per extended release tablet Take 1 tablet by mouth daily 30 tablet 2    zolpidem (AMBIEN) 10 MG tablet Take 10 mg by mouth      warfarin (COUMADIN) 5 MG tablet Take 3 tablets by mouth daily 90 tablet 0    Misc.  Devices MISC True Plus     Testing 3 times a day 100 each 5    busPIRone (BUSPAR) 10 MG tablet Take 10 mg by mouth      omeprazole (PRILOSEC OTC) 20 MG tablet Take 1 tablet by mouth daily 90 tablet 1    TRUEPLUS LANCETS 30G MISC 1 each by Does not apply route daily 100 each 3    ARIPiprazole (ABILIFY) 10 MG tablet TAKE 1 TABLET BY MOUTH NIGHTLY 30 tablet 2    losartan (COZAAR) 25 MG tablet Take 12.5 mg by mouth daily       Current Facility-Administered Medications   Medication Dose Route Frequency Provider Last Rate Last Dose    ondansetron (ZOFRAN) injection 4 mg  4 mg Intravenous Q6H PRN Erin ElizabethsEILEEN   4 mg at 11/08/17 1221     Allergies   Allergen Reactions    Latex     Adhesive Tape      Also plastic tape    Morphine And Related Nausea Only    Other Other (See Comments)     Surgical staples cause infection    Aspirin Nausea And Vomiting       Health Maintenance   Topic Date Due    Diabetic foot exam  06/26/1976    Diabetic retinal exam  06/26/1976    HIV screen  06/26/1981    DTaP/Tdap/Td vaccine (1 - Tdap) 06/26/1985    Diabetic microalbuminuria test  10/09/2018    Lipid screen  10/10/2018    Diabetic hemoglobin A1C test  10/11/2018    Breast cancer screen  11/22/2019    Cervical cancer screen  10/27/2020    Colon cancer screen colonoscopy  01/24/2021    Flu vaccine  Completed    Pneumococcal med risk  Completed       Subjective:      Review of Systems   Constitutional: Negative for activity change and appetite change. HENT: Negative for congestion, ear pain, hearing loss, sinus pressure, sore throat and tinnitus. Eyes: Negative for pain, itching and visual disturbance. Respiratory: Positive for cough, chest tightness (right sided) and shortness of breath. Negative for sputum production and wheezing. Cardiovascular: Positive for chest pain (right sided). Negative for palpitations, orthopnea and leg swelling. Gastrointestinal: Negative for abdominal distention, abdominal pain, blood in stool, constipation, diarrhea and nausea. Endocrine: Negative for cold intolerance, heat intolerance, polydipsia, polyphagia and polyuria. Genitourinary: Negative for dysuria, flank pain, frequency and urgency. Musculoskeletal: Positive for back pain. Negative for arthralgias, gait problem and myalgias. Skin: Negative for color change, rash and wound. vitals reviewed. Assessment:      1. Shortness of breath on exertion    2. Right-sided chest pain                     Plan:    . Shortness Of Breath:  Do not smoke or allow others to smoke around you  Get plenty of rest and sleep  Find healthy ways to deal with stress  Exercise daily  Eat regularly and healthy  Call 911 if: shortness of breath becomes severe, chest pain or pressure, sweating, nausea or vomiting, lightheadedness or sudden weakness, fast or irregular heartbeat  Call office if wheezing starts, you wake up at night out of breath, or have to prop head up on several pillows to breathe, become SOB with light activity or while at rest.  RTO as directed  Right sided chest pain: Get chest x-ray  Will call with results once received      Orders Placed This Encounter   Procedures    XR CHEST STANDARD (2 VW)     Standing Status:   Future     Number of Occurrences:   1     Standing Expiration Date:   12/18/2018     Order Specific Question:   Reason for exam:     Answer:   right upper chest pain and shortness of breath     No orders of the defined types were placed in this encounter. No Follow-up on file. Patient given educational materials - see patient instructions. Discussed use, benefit, and side effects of prescribed medications. All patient questions answered. Pt voiced understanding. Reviewed health maintenance. Instructed to continue current medications, diet and exercise. Patient agreed with treatment plan. Follow up as directed.      Electronically signed by Nguyen Reid CNP on 1/1/2018

## 2018-01-09 ENCOUNTER — HOSPITAL ENCOUNTER (OUTPATIENT)
Dept: PAIN MANAGEMENT | Age: 53
Discharge: HOME | End: 2018-01-09
Payer: MEDICARE

## 2018-01-09 VITALS
HEIGHT: 64 IN | RESPIRATION RATE: 16 BRPM | SYSTOLIC BLOOD PRESSURE: 132 MMHG | OXYGEN SATURATION: 96 % | HEART RATE: 83 BPM | WEIGHT: 200 LBS | BODY MASS INDEX: 34.15 KG/M2 | TEMPERATURE: 98 F | DIASTOLIC BLOOD PRESSURE: 84 MMHG

## 2018-01-09 DIAGNOSIS — M51.36 DDD (DEGENERATIVE DISC DISEASE), LUMBAR: ICD-10-CM

## 2018-01-09 DIAGNOSIS — M47.816 LUMBAR FACET ARTHROPATHY: ICD-10-CM

## 2018-01-09 DIAGNOSIS — M54.16 LUMBAR RADICULOPATHY: ICD-10-CM

## 2018-01-09 DIAGNOSIS — Z01.812 PRE-PROCEDURE LAB EXAM: ICD-10-CM

## 2018-01-09 DIAGNOSIS — Z79.891 CHRONIC USE OF OPIATE FOR THERAPEUTIC PURPOSE: ICD-10-CM

## 2018-01-09 DIAGNOSIS — M43.06 LUMBAR SPONDYLOLYSIS: Primary | ICD-10-CM

## 2018-01-09 DIAGNOSIS — M47.817 LUMBOSACRAL SPONDYLOSIS WITHOUT MYELOPATHY: ICD-10-CM

## 2018-01-09 PROCEDURE — 99213 OFFICE O/P EST LOW 20 MIN: CPT

## 2018-01-09 RX ORDER — FUROSEMIDE 20 MG/1
TABLET ORAL
Refills: 3 | COMMUNITY
Start: 2017-12-31 | End: 2018-01-09

## 2018-01-09 RX ORDER — ACARBOSE 25 MG/1
TABLET ORAL
COMMUNITY
Start: 2018-01-08 | End: 2018-01-09

## 2018-01-09 RX ORDER — POTASSIUM CHLORIDE 750 MG/1
CAPSULE, EXTENDED RELEASE ORAL
Refills: 11 | COMMUNITY
Start: 2017-12-31 | End: 2018-01-09

## 2018-01-09 RX ORDER — CYCLOBENZAPRINE HCL 10 MG
TABLET ORAL
Refills: 2 | COMMUNITY
Start: 2017-12-12 | End: 2018-01-09

## 2018-01-09 RX ORDER — TRAMADOL HYDROCHLORIDE 50 MG/1
TABLET ORAL
COMMUNITY
Start: 2017-12-08 | End: 2018-02-13 | Stop reason: CLARIF

## 2018-01-09 RX ORDER — LOSARTAN POTASSIUM 25 MG/1
TABLET ORAL
COMMUNITY
Start: 2017-12-31 | End: 2018-09-08 | Stop reason: SDUPTHER

## 2018-01-09 RX ORDER — LEVETIRACETAM 500 MG/1
TABLET ORAL
Refills: 4 | COMMUNITY
Start: 2017-12-03 | End: 2018-09-01 | Stop reason: DRUGHIGH

## 2018-01-09 RX ORDER — BLOOD SUGAR DIAGNOSTIC
STRIP MISCELLANEOUS
COMMUNITY
Start: 2017-12-13

## 2018-01-09 RX ORDER — ATORVASTATIN CALCIUM 80 MG/1
TABLET, FILM COATED ORAL
Refills: 5 | COMMUNITY
Start: 2017-12-07 | End: 2018-06-04 | Stop reason: SDUPTHER

## 2018-01-09 RX ORDER — FUROSEMIDE 20 MG/1
TABLET ORAL
COMMUNITY
Start: 2017-12-31 | End: 2018-01-29 | Stop reason: SDUPTHER

## 2018-01-09 RX ORDER — ATORVASTATIN CALCIUM 40 MG/1
TABLET, FILM COATED ORAL
Refills: 11 | COMMUNITY
Start: 2017-12-03 | End: 2018-02-13 | Stop reason: CLARIF

## 2018-01-09 RX ORDER — POTASSIUM CHLORIDE 750 MG/1
CAPSULE, EXTENDED RELEASE ORAL
COMMUNITY
Start: 2017-12-31

## 2018-01-09 RX ORDER — CYCLOBENZAPRINE HCL 10 MG
TABLET ORAL
COMMUNITY
Start: 2017-12-12 | End: 2018-07-06 | Stop reason: DRUGHIGH

## 2018-01-09 RX ORDER — PANTOPRAZOLE SODIUM 20 MG/1
TABLET, DELAYED RELEASE ORAL
COMMUNITY
Start: 2018-01-03 | End: 2018-01-09

## 2018-01-09 RX ORDER — TRAMADOL HYDROCHLORIDE 50 MG/1
50 TABLET ORAL 2 TIMES DAILY PRN
Qty: 60 TABLET | Refills: 0 | Status: SHIPPED | OUTPATIENT
Start: 2018-01-09 | End: 2018-01-09

## 2018-01-09 RX ORDER — PANTOPRAZOLE SODIUM 20 MG/1
TABLET, DELAYED RELEASE ORAL
Refills: 11 | COMMUNITY
Start: 2017-12-03 | End: 2018-01-09 | Stop reason: SDUPTHER

## 2018-01-09 RX ORDER — LEVETIRACETAM 250 MG/1
TABLET ORAL
COMMUNITY
Start: 2017-12-31 | End: 2018-08-31 | Stop reason: SDUPTHER

## 2018-01-09 RX ORDER — ACARBOSE 25 MG/1
TABLET ORAL
COMMUNITY
Start: 2017-12-11 | End: 2018-01-19 | Stop reason: ALTCHOICE

## 2018-01-09 RX ORDER — WARFARIN SODIUM 7.5 MG/1
TABLET ORAL
COMMUNITY
Start: 2018-01-05 | End: 2018-09-17 | Stop reason: SDUPTHER

## 2018-01-09 RX ORDER — LEVETIRACETAM 250 MG/1
TABLET ORAL
Refills: 11 | COMMUNITY
Start: 2017-12-03 | End: 2018-01-09

## 2018-01-09 RX ORDER — ZOLPIDEM TARTRATE 10 MG/1
TABLET ORAL
COMMUNITY
Start: 2017-12-15 | End: 2018-04-04 | Stop reason: ALTCHOICE

## 2018-01-09 RX ORDER — PANTOPRAZOLE SODIUM 20 MG/1
20 TABLET, DELAYED RELEASE ORAL DAILY
COMMUNITY
Start: 2017-12-31 | End: 2018-07-06 | Stop reason: ALTCHOICE

## 2018-01-09 ASSESSMENT — ENCOUNTER SYMPTOMS
RESPIRATORY NEGATIVE: 1
GASTROINTESTINAL NEGATIVE: 1
BACK PAIN: 1

## 2018-01-09 NOTE — PROGRESS NOTES
[facility]  Progress  Note    Patient is here today to review medication contract. Chief Complaint: low back        HPI: She c/o low back pain radiating down left leg. No history lumbar surgery. PT did not help, No Ed visits. She does not sleep well due to pain. She had lumbar epidural steroid injectionL4, L5 10-3-2017 with 80% relief. Pain   Returned. She would like to have repeat injection, it was noted she was seen in the Ed in October, 2017 with c/o numbness in her face and was seen by Neurology and had MRI brain  Mary Garland Oct 10, 2017 [phone]    Narrative   EXAMINATION:   MRI OF THE BRAIN WITHOUT CONTRAST  10/10/2017 10:20 am       TECHNIQUE:   Multiplanar multisequence MRI of the brain was performed without the   administration of intravenous contrast.       COMPARISON:   MRI on 01/31/2012, head CT on 06/07/2013       HISTORY:   ORDERING SYSTEM PROVIDED HISTORY: TIA   TECHNOLOGIST PROVIDED HISTORY:   Ordering Physician Provided Reason for Exam: PT STATES HER ENTIRE FACE,   THROAT AND TONGUE ARE NUMB X 1 DAY. PT HAS A HISTORY OF A STROKE   Acuity: Acute       Initial evaluation.       FINDINGS:   INTRACRANIAL STRUCTURES/VENTRICLES: There are no areas of restricted   diffusion to suggest an acute infarct.  The cerebral and cerebellar   parenchyma demonstrate volume loss, commensurate with the patient's age.    There is an area of slit like encephalomalacia noted in the right basal   ganglia, unchanged.  There are several scattered areas of increased T2 signal   noted supratentorially which are compatible with mild chronic microvascular   white matter ischemic disease, not appreciably changed.  There are no   abnormal extra-axial fluid collections.       There is ex vacuo dilatation of the right lateral ventricle.  The ventricles   are otherwise stable in size.       Normal major intracranial flow voids are noted.       There are no areas of blooming artifact noted on the reviewed today. KAMARI Tucker)  Documentation: No signs of potential drug abuse or diversion identified., Obtaining appropriate analgesic effect of treatment. (Oarrs done 1-8-18 reviewed 1-9-18 rajan diuscrepancy E AUDREY CNP) KAMARI Lewis)  Medication Contracts: Existing medication contract. KAMARI Tucker)  Attestation: The Prescription Monitoring Report for this patient was reviewed today. KAMARI Tucker)  Documentation: No signs of potential drug abuse or diversion identified., Obtaining appropriate analgesic effect of treatment. (Oarrs done 1-8-18 reviewed 1-9-18 rajan diuscrepancy E AUDREY CNP) KAMARI Lewis)  Medication Contracts: Existing medication contract. (KAMARI Tucker)  Morphine equivalent dose as reported on OARRS:  Review of OARRS does not show any aberrant prescription behavior. Medication is helping the patient stay active. Patient denies any side effects and reports adequate analgesia. No sign of misuse/abuse.     Past Medical History:   Diagnosis Date    Acid reflux     Arthritis     Coughing     dry due to ace inhibitor    Depression     uses Abilify, Buspar, Trazodone for this    Diabetes mellitus ([facility] [address]     Diverticulitis     Epilepsy ([facility] [address]     last seizure 6 mos ago     Factor V Leiden ([facility] [address]     Hiatal hernia     History of stroke associated with blood clotting tendency 2003    had clot in brain and left neck, left side weakness residual    Hx of blood clots     right breast, brain, neck    Hyperlipidemia     Hypertension     Insomnia     uses Trazodone for this    Pre-procedure lab exam 1/9/2018    Sleep apnea     C PAP    Unspecified cerebral artery occlusion with cerebral infarction 6/2003    SEE BELOW, 20017 Mini stroke    Wears glasses        Past Surgical History:   Procedure Laterality Date    APPENDECTOMY      CHOLECYSTECTOMY      COLONOSCOPY      with polypectomy    COLONOSCOPY  1/22/16    AND EGD    COLONOSCOPY  01/05/2017    random

## 2018-01-16 ENCOUNTER — OFFICE VISIT (OUTPATIENT)
Dept: FAMILY MEDICINE CLINIC | Age: 53
End: 2018-01-16
Payer: MEDICARE

## 2018-01-16 VITALS
SYSTOLIC BLOOD PRESSURE: 118 MMHG | HEIGHT: 64 IN | HEART RATE: 79 BPM | TEMPERATURE: 98.1 F | BODY MASS INDEX: 37.25 KG/M2 | DIASTOLIC BLOOD PRESSURE: 74 MMHG | WEIGHT: 218.2 LBS | OXYGEN SATURATION: 96 %

## 2018-01-16 DIAGNOSIS — E11.42 TYPE 2 DIABETES MELLITUS WITH DIABETIC POLYNEUROPATHY, WITHOUT LONG-TERM CURRENT USE OF INSULIN (HCC): ICD-10-CM

## 2018-01-16 DIAGNOSIS — M79.604 BILATERAL LEG PAIN: Primary | ICD-10-CM

## 2018-01-16 DIAGNOSIS — M79.605 BILATERAL LEG PAIN: Primary | ICD-10-CM

## 2018-01-16 LAB — HBA1C MFR BLD: 6.2 %

## 2018-01-16 PROCEDURE — 3044F HG A1C LEVEL LT 7.0%: CPT | Performed by: NURSE PRACTITIONER

## 2018-01-16 PROCEDURE — 3014F SCREEN MAMMO DOC REV: CPT | Performed by: NURSE PRACTITIONER

## 2018-01-16 PROCEDURE — 99213 OFFICE O/P EST LOW 20 MIN: CPT | Performed by: NURSE PRACTITIONER

## 2018-01-16 PROCEDURE — 1036F TOBACCO NON-USER: CPT | Performed by: NURSE PRACTITIONER

## 2018-01-16 PROCEDURE — G8427 DOCREV CUR MEDS BY ELIG CLIN: HCPCS | Performed by: NURSE PRACTITIONER

## 2018-01-16 PROCEDURE — G8484 FLU IMMUNIZE NO ADMIN: HCPCS | Performed by: NURSE PRACTITIONER

## 2018-01-16 PROCEDURE — 83036 HEMOGLOBIN GLYCOSYLATED A1C: CPT | Performed by: NURSE PRACTITIONER

## 2018-01-16 PROCEDURE — 3017F COLORECTAL CA SCREEN DOC REV: CPT | Performed by: NURSE PRACTITIONER

## 2018-01-16 PROCEDURE — G8417 CALC BMI ABV UP PARAM F/U: HCPCS | Performed by: NURSE PRACTITIONER

## 2018-01-16 RX ORDER — GABAPENTIN 100 MG/1
CAPSULE ORAL
Qty: 60 CAPSULE | Refills: 0 | Status: SHIPPED | OUTPATIENT
Start: 2018-01-16 | End: 2018-02-12 | Stop reason: SDUPTHER

## 2018-01-16 RX ORDER — ZOLPIDEM TARTRATE 10 MG/1
TABLET ORAL
COMMUNITY
Start: 2018-01-11 | End: 2018-01-19 | Stop reason: SDUPTHER

## 2018-01-19 ENCOUNTER — HOSPITAL ENCOUNTER (OUTPATIENT)
Dept: PAIN MANAGEMENT | Age: 53
Discharge: HOME | End: 2018-01-19
Payer: MEDICARE

## 2018-01-19 ENCOUNTER — HOSPITAL ENCOUNTER (OUTPATIENT)
Dept: GENERAL RADIOLOGY | Age: 53
Discharge: HOME | End: 2018-01-19
Payer: MEDICARE

## 2018-01-19 ENCOUNTER — HOSPITAL ENCOUNTER (OUTPATIENT)
Age: 53
Discharge: HOME | End: 2018-01-19
Payer: MEDICARE

## 2018-01-19 VITALS
SYSTOLIC BLOOD PRESSURE: 143 MMHG | BODY MASS INDEX: 37.56 KG/M2 | WEIGHT: 220 LBS | DIASTOLIC BLOOD PRESSURE: 92 MMHG | OXYGEN SATURATION: 100 % | HEART RATE: 79 BPM | TEMPERATURE: 98.3 F | RESPIRATION RATE: 16 BRPM | HEIGHT: 64 IN

## 2018-01-19 DIAGNOSIS — M54.16 LUMBAR RADICULOPATHY: Primary | ICD-10-CM

## 2018-01-19 DIAGNOSIS — M43.06 LUMBAR SPONDYLOLYSIS: ICD-10-CM

## 2018-01-19 DIAGNOSIS — M47.817 LUMBOSACRAL SPONDYLOSIS WITHOUT MYELOPATHY: ICD-10-CM

## 2018-01-19 DIAGNOSIS — Z79.891 CHRONIC USE OF OPIATE FOR THERAPEUTIC PURPOSE: ICD-10-CM

## 2018-01-19 DIAGNOSIS — R52 PAIN: ICD-10-CM

## 2018-01-19 DIAGNOSIS — M47.816 LUMBAR FACET ARTHROPATHY: ICD-10-CM

## 2018-01-19 LAB
INR BLD: 1
PROTHROMBIN TIME: 10.3 SEC (ref 9.7–12)

## 2018-01-19 PROCEDURE — 6360000004 HC RX CONTRAST MEDICATION

## 2018-01-19 PROCEDURE — 6360000002 HC RX W HCPCS

## 2018-01-19 PROCEDURE — 62327 NJX INTERLAMINAR LMBR/SAC: CPT

## 2018-01-19 PROCEDURE — 36415 COLL VENOUS BLD VENIPUNCTURE: CPT

## 2018-01-19 PROCEDURE — 6360000002 HC RX W HCPCS: Performed by: PAIN MEDICINE

## 2018-01-19 PROCEDURE — 85610 PROTHROMBIN TIME: CPT

## 2018-01-19 PROCEDURE — 62323 NJX INTERLAMINAR LMBR/SAC: CPT | Performed by: PAIN MEDICINE

## 2018-01-19 PROCEDURE — 3209999900 FLUORO FOR SURGICAL PROCEDURES

## 2018-01-19 RX ORDER — MIDAZOLAM HYDROCHLORIDE 1 MG/ML
INJECTION INTRAMUSCULAR; INTRAVENOUS
Status: COMPLETED | OUTPATIENT
Start: 2018-01-19 | End: 2018-01-19

## 2018-01-19 RX ADMIN — MIDAZOLAM 2 MG: 1 INJECTION INTRAMUSCULAR; INTRAVENOUS at 12:04

## 2018-01-19 ASSESSMENT — PAIN SCALES - GENERAL: PAINLEVEL_OUTOF10: 9

## 2018-01-19 ASSESSMENT — PAIN DESCRIPTION - ONSET: ONSET: ON-GOING

## 2018-01-19 ASSESSMENT — PAIN - FUNCTIONAL ASSESSMENT
PAIN_FUNCTIONAL_ASSESSMENT: 0-10
PAIN_FUNCTIONAL_ASSESSMENT: 0-10

## 2018-01-19 ASSESSMENT — PAIN DESCRIPTION - LOCATION: LOCATION: BACK

## 2018-01-19 ASSESSMENT — PAIN DESCRIPTION - DIRECTION: RADIATING_TOWARDS: LEFT LEG

## 2018-01-19 ASSESSMENT — PAIN DESCRIPTION - ORIENTATION: ORIENTATION: LOWER;LEFT

## 2018-01-19 ASSESSMENT — PAIN DESCRIPTION - PROGRESSION: CLINICAL_PROGRESSION: NOT CHANGED

## 2018-01-19 ASSESSMENT — PAIN DESCRIPTION - PAIN TYPE: TYPE: CHRONIC PAIN

## 2018-01-19 ASSESSMENT — PAIN DESCRIPTION - FREQUENCY: FREQUENCY: INTERMITTENT

## 2018-01-19 ASSESSMENT — PAIN DESCRIPTION - DESCRIPTORS: DESCRIPTORS: DULL;SHARP

## 2018-01-19 NOTE — PROCEDURES
Pre-Procedure Note    Patient Name: Shabbir Sebastian   YOB: 1965  Room/Bed: Room/bed info not found  Medical Record Number: [id number]  Date: 1/19/2018       Indication:    1. Lumbar radiculopathy    2. Lumbar spondylolysis    3. Lumbosacral spondylosis without myelopathy    4. Lumbar facet arthropathy    5. Chronic use of opiate for therapeutic purpose        Consent: On file. Vital Signs:   Vitals:    01/19/18 1205   BP: (!) 169/104   Pulse: 75   Resp: 18   Temp:    SpO2: 99%       Past Medical History:   has a past medical history of Acid reflux; Arthritis; Coughing; Depression; Diabetes mellitus ([facility] [address] Diverticulitis; Epilepsy ([facility] [address] Factor V Leiden ([facility] [address] Hiatal hernia; History of stroke associated with blood clotting tendency; Hx of blood clots; Hyperlipidemia; Hypertension; Insomnia; Pre-procedure lab exam; Sleep apnea; Unspecified cerebral artery occlusion with cerebral infarction; and Wears glasses. Past Surgical History:   has a past surgical history that includes Neck surgery (July 2012); Endometrial ablation; laparoscopy; Hysterectomy; Tonsillectomy; Cholecystectomy; Appendectomy; Upper gastrointestinal endoscopy; Knee arthroscopy (Left, 4/7/15, 2015); hiatal hernia repair (2/4/2016); Colonoscopy; Colonoscopy (1/22/16); Colonoscopy (01/05/2017); and hernia repair. Pre-Sedation Documentation and Exam:   Vital signs have been reviewed (see flow sheet for vitals). Mallampati Airway Assessment:  normal    ASA Classification:  Class 3 - A patient with severe systemic disease that limits activity but is not incapacitating    Sedation/ Anesthesia Plan:   intravenous sedation   as needed. Medications Planned:   midazolam (Versed) / Fentanyl  Intravenously  as needed. Patient is an appropriate candidate for plan of sedation: yes  Patient's History and Physical examination was reviewed and there is no change.   Electronically signed by Mima Kunz MD on 1/19/2018 at 12:09 procedure. The patient is placed in prone position. Skin over the back was prepped and draped in sterile manner. Then using fluoroscopy the L4, L5 interspace was observed and the skin and deep tissues in the left paramedian area were infiltrated with 4 ml of 1% lidocaine. The #20-gauge, 3-1/2 inch Tuohy needle was inserted through the skin wheal and the epidural space was identified using loss of resistance technique with normal saline. This was confirmed with AP and lateral views using fluoroscopy after injecting about 2 ml of Omnipaque-180 and observing the spread of the contrast in the epidural space. Then after negative aspiration a total of 80 mg of triamcinolone with 8 ml of normal saline was injected into the epidural space. The needle is removed and a Band-Aid was placed over the needle insertion site. Patient's vital signs remained stable and the patient tolerated the procedure well. The patient was discharged home in stable condition and will be followed in the pain clinic in the next few weeks for further planning.     Electronically signed by Mima Kunz MD on 1/19/2018 at 12:09 PM

## 2018-01-22 ENCOUNTER — TELEPHONE (OUTPATIENT)
Dept: PAIN MANAGEMENT | Age: 53
End: 2018-01-22

## 2018-01-22 ENCOUNTER — TELEPHONE (OUTPATIENT)
Dept: FAMILY MEDICINE CLINIC | Age: 53
End: 2018-01-22

## 2018-01-22 ENCOUNTER — OFFICE VISIT (OUTPATIENT)
Dept: FAMILY MEDICINE CLINIC | Age: 53
End: 2018-01-22
Payer: MEDICARE

## 2018-01-22 VITALS
OXYGEN SATURATION: 98 % | HEIGHT: 64 IN | WEIGHT: 217.4 LBS | HEART RATE: 85 BPM | SYSTOLIC BLOOD PRESSURE: 132 MMHG | TEMPERATURE: 98.8 F | BODY MASS INDEX: 37.11 KG/M2 | DIASTOLIC BLOOD PRESSURE: 82 MMHG

## 2018-01-22 DIAGNOSIS — Z79.891 CHRONIC USE OF OPIATE FOR THERAPEUTIC PURPOSE: ICD-10-CM

## 2018-01-22 DIAGNOSIS — R51.9 INTRACTABLE HEADACHE, UNSPECIFIED CHRONICITY PATTERN, UNSPECIFIED HEADACHE TYPE: Primary | ICD-10-CM

## 2018-01-22 PROCEDURE — 99213 OFFICE O/P EST LOW 20 MIN: CPT | Performed by: NURSE PRACTITIONER

## 2018-01-22 PROCEDURE — 3017F COLORECTAL CA SCREEN DOC REV: CPT | Performed by: NURSE PRACTITIONER

## 2018-01-22 PROCEDURE — 3014F SCREEN MAMMO DOC REV: CPT | Performed by: NURSE PRACTITIONER

## 2018-01-22 PROCEDURE — G8417 CALC BMI ABV UP PARAM F/U: HCPCS | Performed by: NURSE PRACTITIONER

## 2018-01-22 PROCEDURE — 96372 THER/PROPH/DIAG INJ SC/IM: CPT | Performed by: NURSE PRACTITIONER

## 2018-01-22 PROCEDURE — G8484 FLU IMMUNIZE NO ADMIN: HCPCS | Performed by: NURSE PRACTITIONER

## 2018-01-22 PROCEDURE — G8427 DOCREV CUR MEDS BY ELIG CLIN: HCPCS | Performed by: NURSE PRACTITIONER

## 2018-01-22 PROCEDURE — 1036F TOBACCO NON-USER: CPT | Performed by: NURSE PRACTITIONER

## 2018-01-22 RX ORDER — KETOROLAC TROMETHAMINE 30 MG/ML
60 INJECTION, SOLUTION INTRAMUSCULAR; INTRAVENOUS ONCE
Status: COMPLETED | OUTPATIENT
Start: 2018-01-22 | End: 2018-01-22

## 2018-01-22 RX ORDER — GABAPENTIN 100 MG/1
CAPSULE ORAL
COMMUNITY
Start: 2018-01-16 | End: 2018-02-12 | Stop reason: SDUPTHER

## 2018-01-22 RX ADMIN — KETOROLAC TROMETHAMINE 60 MG: 30 INJECTION, SOLUTION INTRAMUSCULAR; INTRAVENOUS at 14:39

## 2018-01-25 ENCOUNTER — TELEPHONE (OUTPATIENT)
Dept: FAMILY MEDICINE CLINIC | Age: 53
End: 2018-01-25

## 2018-01-26 PROBLEM — E11.42 TYPE 2 DIABETES MELLITUS WITH DIABETIC POLYNEUROPATHY, WITHOUT LONG-TERM CURRENT USE OF INSULIN (HCC): Chronic | Status: ACTIVE | Noted: 2017-08-18

## 2018-01-26 ASSESSMENT — ENCOUNTER SYMPTOMS
COUGH: 0
NAUSEA: 0
WHEEZING: 0
CHEST TIGHTNESS: 0
ABDOMINAL PAIN: 0
SORE THROAT: 0
SHORTNESS OF BREATH: 0
EYE ITCHING: 0
BLOOD IN STOOL: 0
EYE PAIN: 0
CONSTIPATION: 0
ABDOMINAL DISTENTION: 0
SINUS PRESSURE: 0
DIARRHEA: 0
COLOR CHANGE: 0

## 2018-01-26 NOTE — PROGRESS NOTES
Pt states she started expierence pain in bilateral legs x 4days ago. Painful to the touch front and back all the way down to her toes. Visit Information    Have you changed or started any medications since your last visit including any over-the-counter medicines, vitamins, or herbal medicines? no   Are you having any side effects from any of your medications? -  no  Have you stopped taking any of your medications? Is so, why? -  no    Have you seen any other physician or provider since your last visit? No  Have you had any other diagnostic tests since your last visit? No  Have you been seen in the emergency room and/or had an admission to a hospital since we last saw you? No  Have you had your routine dental cleaning in the past 6 months? no    Have you activated your invi account? If not, what are your barriers?  No: declined     Patient Care Team:  Edison Ga CNP as PCP - General (Nurse Practitioner)    Medical History Review  Past Medical, Family, and Social History reviewed and does not contribute to the patient presenting condition    Health Maintenance   Topic Date Due    Diabetic foot exam  06/26/1976    Diabetic retinal exam  06/26/1976    HIV screen  06/26/1981    DTaP/Tdap/Td vaccine (1 - Tdap) 06/26/1985    Diabetic microalbuminuria test  10/09/2018    Potassium monitoring  10/09/2018    Creatinine monitoring  10/09/2018    Lipid screen  10/10/2018    A1C test (Diabetic or Prediabetic)  10/11/2018    Breast cancer screen  11/22/2019    Cervical cancer screen  10/27/2020    Colon cancer screen colonoscopy  01/24/2021    Flu vaccine  Completed    Pneumococcal med risk  Completed
No thyromegaly present. Cardiovascular: Normal rate, regular rhythm and normal heart sounds. Exam reveals no gallop and no friction rub. No murmur heard. Pulmonary/Chest: Effort normal and breath sounds normal. No respiratory distress. She has no wheezes. She has no rales. She exhibits no tenderness. Abdominal: Soft. Bowel sounds are normal. She exhibits no distension. There is no splenomegaly or hepatomegaly. There is no tenderness. No hernia. Musculoskeletal: Normal range of motion. She exhibits no edema or tenderness. Legs:  Lymphadenopathy:     She has no cervical adenopathy. Neurological: She is alert and oriented to person, place, and time. Coordination and gait normal.   Skin: Skin is warm and dry. No rash noted. No erythema. Psychiatric: She has a normal mood and affect. Her speech is normal and behavior is normal. Judgment and thought content normal. Cognition and memory are normal.   Nursing note and vitals reviewed. Assessment:      1. Bilateral leg pain    2. Type 2 diabetes mellitus with diabetic polyneuropathy, without long-term current use of insulin ([facility])                     Plan:   Neuropathy/Leg pain:  Take medication if prescribed as directed  Take OTC pain medication as directed  May try biofeedback, relaxation methods.   Eat a healthy diet that includes fruits, vegetables, whole grains and lean meat  RTO as directed  Diabetes:  Monitor BS daily or more often if BS's are out of range  Keep log of BS results and bring with you at next appointment  Discussed signs and symptoms of hypo/hyperglycemia  Take medications as directed  Discussed diet, eat balanced meals with a variety of food, limit intake of carbohydrates, low fat, high fiber  Read nutrition labels so appropriate carbohydrates can be counted  Lose weight slowly if needed  Monitor BP every couple of days  Discussed A1c reading today was 6.2  Exercise for at least 30 minute a day 3-5 times a week  Drink plenty

## 2018-01-28 ASSESSMENT — ENCOUNTER SYMPTOMS
NAUSEA: 0
EYE PAIN: 0
SINUS PRESSURE: 0
DIARRHEA: 0
COLOR CHANGE: 0
BLOOD IN STOOL: 0
SORE THROAT: 0
ABDOMINAL DISTENTION: 0
EYE ITCHING: 0
WHEEZING: 0
CONSTIPATION: 0
ABDOMINAL PAIN: 0
SHORTNESS OF BREATH: 0
COUGH: 0
CHEST TIGHTNESS: 0

## 2018-01-29 NOTE — PROGRESS NOTES
LDL is <100)   AST (U/L)   Date Value   10/09/2017 18     ALT (U/L)   Date Value   10/09/2017 25     BUN (mg/dL)   Date Value   10/09/2017 25 (H)     BP Readings from Last 3 Encounters:   01/22/18 132/82   01/19/18 (!) 143/92   01/16/18 118/74          (goal 120/80)    Past Medical History:   Diagnosis Date    Acid reflux     Arthritis     Coughing     dry due to ace inhibitor    Depression     uses Abilify, Buspar, Trazodone for this    Diabetes mellitus ([facility] [address]     Diverticulitis     Epilepsy ([facility] [address]     last seizure 6 mos ago     Factor V Leiden ([facility] [address]     Hiatal hernia     History of stroke associated with blood clotting tendency 2003    had clot in brain and left neck, left side weakness residual    Hx of blood clots     right breast, brain, neck    Hyperlipidemia     Hypertension     Insomnia     uses Trazodone for this    Pre-procedure lab exam 1/9/2018    Sleep apnea     C PAP    Unspecified cerebral artery occlusion with cerebral infarction 6/2003    SEE BELOW, 20017 Mini stroke    Wears glasses       Past Surgical History:   Procedure Laterality Date    APPENDECTOMY      CHOLECYSTECTOMY      COLONOSCOPY      with polypectomy    COLONOSCOPY  1/22/16    AND EGD    COLONOSCOPY  01/05/2017    random biopsies.     ENDOMETRIAL ABLATION      2 times    HERNIA REPAIR      HIATAL HERNIA REPAIR  2/4/2016    laparoscopic robotic    HYSTERECTOMY      with BSO    KNEE ARTHROSCOPY Left 4/7/15, 2015    x 2    LAPAROSCOPY      \"springs inserted in to fallopian tubes\" to close tubes   [facility] NECK SURGERY  July 2012    Anterior, C5,6,7 bulging disk repair    TONSILLECTOMY      UPPER GASTROINTESTINAL ENDOSCOPY         Family History   Problem Relation Age of Onset    Heart Disease Mother      dies age 32    Cancer Father      lymph nodes    Pacemaker Sister        Social History   Substance Use Topics    Smoking status: Never Smoker    Smokeless tobacco: Never Used    Alcohol use 0.0 oz/week the defined types were placed in this encounter. Orders Placed This Encounter   Medications    ketorolac (TORADOL) injection 60 mg        No Follow-up on file. Patient given educational materials - see patient instructions. Discussed use, benefit, and side effects of prescribed medications. All patient questions answered. Pt voiced understanding. Reviewed health maintenance. Instructed to continue current medications, diet and exercise. Patient agreed with treatment plan. Follow up as directed.      Electronically signed by Vanesa Monteiro CNP on 1/28/2018

## 2018-01-31 NOTE — TELEPHONE ENCOUNTER
Patient states that yes endo knows she is on it and she is not sure how long she is going to be on the prednisone.

## 2018-02-07 ENCOUNTER — TELEPHONE (OUTPATIENT)
Dept: FAMILY MEDICINE CLINIC | Age: 53
End: 2018-02-07

## 2018-02-08 ENCOUNTER — HOSPITAL ENCOUNTER (OUTPATIENT)
Dept: PAIN MANAGEMENT | Age: 53
Discharge: HOME | End: 2018-02-08
Payer: MEDICARE

## 2018-02-08 VITALS
BODY MASS INDEX: 37.05 KG/M2 | TEMPERATURE: 98.4 F | DIASTOLIC BLOOD PRESSURE: 84 MMHG | WEIGHT: 217 LBS | RESPIRATION RATE: 17 BRPM | SYSTOLIC BLOOD PRESSURE: 112 MMHG | HEIGHT: 64 IN | HEART RATE: 82 BPM | OXYGEN SATURATION: 95 %

## 2018-02-08 DIAGNOSIS — M43.06 LUMBAR SPONDYLOLYSIS: ICD-10-CM

## 2018-02-08 DIAGNOSIS — Z51.81 ENCOUNTER FOR MEDICATION MONITORING: ICD-10-CM

## 2018-02-08 DIAGNOSIS — M47.817 LUMBOSACRAL SPONDYLOSIS WITHOUT MYELOPATHY: Primary | ICD-10-CM

## 2018-02-08 DIAGNOSIS — M51.36 DDD (DEGENERATIVE DISC DISEASE), LUMBAR: ICD-10-CM

## 2018-02-08 DIAGNOSIS — D68.51 FACTOR V LEIDEN (HCC): ICD-10-CM

## 2018-02-08 DIAGNOSIS — M54.16 LUMBAR RADICULOPATHY: ICD-10-CM

## 2018-02-08 DIAGNOSIS — Z79.891 CHRONIC USE OF OPIATE FOR THERAPEUTIC PURPOSE: ICD-10-CM

## 2018-02-08 DIAGNOSIS — M47.816 LUMBAR FACET ARTHROPATHY: ICD-10-CM

## 2018-02-08 PROCEDURE — 99214 OFFICE O/P EST MOD 30 MIN: CPT | Performed by: PAIN MEDICINE

## 2018-02-08 PROCEDURE — 99213 OFFICE O/P EST LOW 20 MIN: CPT

## 2018-02-08 PROCEDURE — 80307 DRUG TEST PRSMV CHEM ANLYZR: CPT

## 2018-02-08 RX ORDER — TRAMADOL HYDROCHLORIDE 50 MG/1
50 TABLET ORAL 2 TIMES DAILY PRN
Qty: 60 TABLET | Refills: 0 | Status: SHIPPED | OUTPATIENT
Start: 2018-02-08 | End: 2018-02-08 | Stop reason: SDUPTHER

## 2018-02-08 RX ORDER — LIRAGLUTIDE 6 MG/ML
INJECTION SUBCUTANEOUS
Refills: 0 | COMMUNITY
Start: 2018-01-25 | End: 2018-02-13 | Stop reason: CLARIF

## 2018-02-08 RX ORDER — LIRAGLUTIDE 6 MG/ML
INJECTION SUBCUTANEOUS
COMMUNITY
Start: 2018-01-25 | End: 2018-07-06 | Stop reason: SINTOL

## 2018-02-08 RX ORDER — TRAMADOL HYDROCHLORIDE 50 MG/1
50 TABLET ORAL EVERY 8 HOURS PRN
Qty: 90 TABLET | Refills: 0 | Status: SHIPPED | OUTPATIENT
Start: 2018-02-08 | End: 2018-02-13 | Stop reason: CLARIF

## 2018-02-08 RX ORDER — TEMAZEPAM 15 MG/1
15 CAPSULE ORAL
COMMUNITY
Start: 2018-01-26 | End: 2018-05-04

## 2018-02-08 ASSESSMENT — PAIN DESCRIPTION - DIRECTION: RADIATING_TOWARDS: LEFT HIP/LEG

## 2018-02-08 ASSESSMENT — ENCOUNTER SYMPTOMS
SHORTNESS OF BREATH: 0
NAUSEA: 0
ABDOMINAL PAIN: 0
BACK PAIN: 1
BLURRED VISION: 0
SORE THROAT: 0
BOWEL INCONTINENCE: 0
GASTROINTESTINAL NEGATIVE: 1
HEARTBURN: 0
VOMITING: 0
EYES NEGATIVE: 1
COUGH: 0
RESPIRATORY NEGATIVE: 1
SINUS PAIN: 0

## 2018-02-08 ASSESSMENT — PAIN SCALES - GENERAL: PAINLEVEL_OUTOF10: 10

## 2018-02-08 ASSESSMENT — PAIN DESCRIPTION - PROGRESSION: CLINICAL_PROGRESSION: GRADUALLY WORSENING

## 2018-02-08 ASSESSMENT — PAIN DESCRIPTION - LOCATION: LOCATION: BACK;LEG;HIP

## 2018-02-08 ASSESSMENT — PAIN DESCRIPTION - ORIENTATION: ORIENTATION: LEFT

## 2018-02-08 ASSESSMENT — PAIN DESCRIPTION - ONSET: ONSET: ON-GOING

## 2018-02-08 ASSESSMENT — PAIN DESCRIPTION - PAIN TYPE: TYPE: CHRONIC PAIN

## 2018-02-08 ASSESSMENT — PAIN DESCRIPTION - DESCRIPTORS: DESCRIPTORS: CONSTANT

## 2018-02-08 ASSESSMENT — PAIN DESCRIPTION - FREQUENCY: FREQUENCY: CONTINUOUS

## 2018-02-08 NOTE — PROGRESS NOTES
[facility]  Patient Pain Assessment  Follow up - No att. providers found    Primary Care Physician: Jie Ayala CNP    Chief complaint:   Chief Complaint   Patient presents with    Back Pain     left hip/leg   . HISTORY OF PRESENT ILLNESS:  Chery Schafer is 47 y.o. female with    -Had LESI on 1/19  -No improvement in the pain; went on 1/20 for increased BP (182/125); Blood Sugar was 307. Thinks that the visist to the ED was related to the LESI  -Denies SOB, skin rash (but had facial redness); got treated with steroids which improved her symptoms  -Typical day: Gets up at 10 am; eats breakfast, then go to boyfriends house to watch tv; do dishes  -Still able to bathe, dress, toilet and groom herself; still able to walk without a walker and cane  Attending note: patient return to the pain clinic with a chief complaint of pain involving the low back area with pain radiating to mostly to the left lower extremity. She had undergone lumbar epidural steroid injection on 1/19/2018 with no pain relief. At present she is taking tramadol twice a day for pain control. She rates her current pain at a 10 on a scale of 0-10. She is not exercising on a regular basis. Patient's pain is mostly start in nature. Patient also has history of seizures and is on Keppra. Her last seizure was about 8 months ago. Patient is also has history of diabetes mellitus and is on insulin. Back Pain   This is a chronic problem. The current episode started more than 1 year ago. The problem occurs constantly. The problem has been gradually worsening since onset. The pain is present in the lumbar spine. The quality of the pain is described as aching. The pain radiates to the left knee. The pain is at a severity of 10/10. The pain is worse during the day. The symptoms are aggravated by standing.  Pertinent negatives include no abdominal pain, bladder incontinence, bowel incontinence, chest pain, dysuria, fever, headaches, numbness, tingling or weight loss. She has tried heat, walking and analgesics for the symptoms. The treatment provided mild relief. OARRS compliant? yes  Concern for prescription abuse?no    Current Pain Assessment  Pain Assessment  Pain Assessment: 0-10  Pain Level: 10  Pain Type: Chronic pain  Pain Location: Back, Leg, Hip  Pain Orientation: Left  Pain Radiating Towards: left hip/leg  Pain Descriptors: Constant  Pain Frequency: Continuous  Pain Onset: On-going  Clinical Progression: Gradually worsening  Effect of Pain on Daily Activities: pain increases w/walking & when standing to do dishes  Patient's Stated Pain Goal: 2 (To have pain @ 2 w/physical activity)  Pain Intervention(s): Medication (see eMar), Rest, Repositioned, Heat applied, Elevation                    ADVERSE MEDICATION EFFECTS:   Constipation: no  Bowel Regimen: Yes  Diet: low carbs  Appetite:  ok  Sedation:  no  Urinary Retention: no    FOCUSED PAIN SCALE:  Highest : 10  Lowest :8  Average: Range-9  When and What  was your last procedure:    1/19/18 LESI L4-5  Was your procedure effective:  No, worse ended in ER x2 lots of side effects    ACTIVITY/SOCIAL/EMOTIONAL:  Sleep Pattern: 10 hours per night. difficulty falling asleep, nightime awakenings and difficulty falling back asleep if awakened  Energy Level:  Tired/Fatigued  Currently attending Physical Therapy:  No  Home Exercises: never none  Mobility: walking increases the pain  Currently seeing a Psychiatrist or Psychologist:  Yes  Emotional Issues: anxiety/ nervousness   Mood: depressed     ABERRANT BEHAVIORS SINCE LAST VISIT:  Have you ever been treated in another Pain Clinic no  Refills for prescriptions appropriate: yes  Lost rx/pills: no  Taking more medication than prescribed:  no  Are you receiving PAIN medications from  other doctors: no  Last Urine/Serum Drug Screen :3/12/17  Was Serum/UDS as anticipated? yes  Brought pill bottles in :no , forgot bottle, pt.  States, I have 2 left  Was Pill count appropriate? :did not bring bottle in, due today according to [facility]    Are currently pregnant? not applicable  Recent ER visits: Yes, x2, [facility];s for side effects of steroid shots of LESI              Past Medical History      Diagnosis Date    Acid reflux     Arthritis     Coughing     dry due to ace inhibitor    Depression     uses Abilify, Buspar, Trazodone for this    [facility] [address]     Diverticulitis     Epilepsy ([facility] [address]     last seizure 6 mos ago     Factor V Leiden ([facility] [address]     Hiatal hernia     History of stroke associated with blood clotting tendency 2003    had clot in brain and left neck, left side weakness residual    Hx of blood clots     right breast, brain, neck    Hyperlipidemia     Hypertension     Insomnia     uses Trazodone for this    Pre-procedure lab exam 1/9/2018    Sleep apnea     C PAP    Unspecified cerebral artery occlusion with cerebral infarction 6/2003    SEE BELOW, [address] Mini stroke    Wears glasses        Surgical History  Past Surgical History:   Procedure Laterality Date    APPENDECTOMY      CHOLECYSTECTOMY      COLONOSCOPY      with polypectomy    COLONOSCOPY  1/22/16    AND EGD    COLONOSCOPY  01/05/2017    random biopsies.  ENDOMETRIAL ABLATION      2 times    HERNIA REPAIR      HIATAL HERNIA REPAIR  2/4/2016    laparoscopic robotic    HYSTERECTOMY      with BSO    KNEE ARTHROSCOPY Left 4/7/15, 2015    x 2    LAPAROSCOPY      \"springs inserted in to fallopian tubes\" to close tubes   Carissa Soto NECK SURGERY  July 2012    Anterior, C5,6,7 bulging disk repair    TONSILLECTOMY      UPPER GASTROINTESTINAL ENDOSCOPY         Medications  Current Outpatient Prescriptions   Medication Sig Dispense Refill    VICTOZA 18 MG/3ML SOPN SC injection INJECT 0.6MG ONCE DAILY  0    VICTOZA 18 MG/3ML SOPN SC injection       temazepam (RESTORIL) 15 MG capsule Take 15 mg by mouth.       traMADol (ULTRAM) 50 MG tablet Take 1 tablet by mouth every 8 hours as needed for Pain for up to 30 days. 90 tablet 0    furosemide (LASIX) 20 MG tablet TAKE ONE TABLET BY MOUTH DAILY 30 tablet 5    gabapentin (NEURONTIN) 100 MG capsule       PROAIR  (90 Base) MCG/ACT inhaler       gabapentin (NEURONTIN) 100 MG capsule Take 1 tablet daily twice a day. 60 capsule 0    atorvastatin (LIPITOR) 80 MG tablet TAKE 1 TABLET BY MOUTH NIGHTLY AT BEDTIME  5    atorvastatin (LIPITOR) 40 MG tablet TAKE ONE TABLET BY MOUTH NIGHTLY AT BEDTIME  11    levETIRAcetam (KEPPRA) 500 MG tablet TAKE ONE TABLET BY MOUTH TWO TIMES DAILY  4    ONE TOUCH ULTRA TEST strip TEST BLOOD SUGAR 3 TIMES A DAY  5    warfarin (COUMADIN) 7.5 MG tablet       levETIRAcetam (KEPPRA) 250 MG tablet       pantoprazole (PROTONIX) 20 MG tablet       losartan (COZAAR) 25 MG tablet       potassium chloride (MICRO-K) 10 MEQ extended release capsule       zolpidem (AMBIEN) 10 MG tablet       EXACTECH TEST strip       cyclobenzaprine (FLEXERIL) 10 MG tablet       traMADol (ULTRAM) 50 MG tablet       loratadine-pseudoephedrine (CLARITIN-D 24 HOUR)  MG per extended release tablet Take 1 tablet by mouth daily 30 tablet 2    Misc. Devices MISC True Plus     Testing 3 times a day 100 each 5    busPIRone (BUSPAR) 10 MG tablet Take 10 mg by mouth      TRUEPLUS LANCETS 30G MISC 1 each by Does not apply route daily 100 each 3    ARIPiprazole (ABILIFY) 10 MG tablet TAKE 1 TABLET BY MOUTH NIGHTLY 30 tablet 2     No current facility-administered medications for this encounter. Allergies  Latex; Adhesive tape; Morphine and related; Other; and Aspirin    Family History  family history includes Cancer in her father; Heart Disease in her mother; Pacemaker in her sister.     Social History  Social History     Social History    Marital status:      Spouse name: N/A    Number of children: N/A    Years of education: N/A     Occupational History    disability      Social tracheal deviation present. No thyromegaly present. Cardiovascular: Normal rate and regular rhythm. Pulmonary/Chest: Effort normal. No respiratory distress. Abdominal: Soft. She exhibits no distension. Musculoskeletal: She exhibits no edema. Neurological: She is alert and oriented to person, place, and time. She displays tremor. She displays no atrophy. No cranial nerve deficit or sensory deficit. She exhibits normal muscle tone. She displays a negative Romberg sign. Coordination normal.   Reflex Scores:       Patellar reflexes are 2+ on the right side and 1+ on the left side. Achilles reflexes are 2+ on the right side and 1+ on the left side. Skin: Skin is warm and dry. Psychiatric: She has a normal mood and affect. Her speech is normal and behavior is normal. Judgment and thought content normal. Cognition and memory are normal.    Right Ankle Exam     Muscle Strength   The patient has normal right ankle strength. Left Ankle Exam     Muscle Strength   The patient has normal left ankle strength. Right Knee Exam     Muscle Strength     The patient has normal right knee strength. Left Knee Exam     Comments:  Slightly decreased strength      Right Hip Exam     Muscle Strength   The patient has normal right hip strength. Left Hip Exam     Muscle Strength   Left hip normal muscle strength: Slightly decreased strength. Back Exam     Tenderness   The patient is experiencing tenderness in the lumbar and sacroiliac. Range of Motion   Back extension: Limited and painful. Back flexion: Limited and painful. Back lateral bend right: Limited and painful. Back lateral bend left: Limited and painful. Back rotation right: Limited and painful. Back rotation left: Limited and painful.      Muscle Strength   Right Quadriceps:  5/5   Left Quadriceps:  4/5   Right Hamstrings:  5/5   Left Hamstrings:  4/5     Tests   Straight leg raise right: positive  Straight leg raise

## 2018-02-09 ASSESSMENT — ENCOUNTER SYMPTOMS: PHOTOPHOBIA: 0

## 2018-02-13 ENCOUNTER — OFFICE VISIT (OUTPATIENT)
Dept: FAMILY MEDICINE CLINIC | Age: 53
End: 2018-02-13
Payer: MEDICARE

## 2018-02-13 VITALS
SYSTOLIC BLOOD PRESSURE: 124 MMHG | WEIGHT: 214.8 LBS | RESPIRATION RATE: 16 BRPM | TEMPERATURE: 97.8 F | HEIGHT: 64 IN | DIASTOLIC BLOOD PRESSURE: 86 MMHG | HEART RATE: 111 BPM | BODY MASS INDEX: 36.67 KG/M2 | OXYGEN SATURATION: 98 %

## 2018-02-13 DIAGNOSIS — G89.29 CHRONIC MIDLINE LOW BACK PAIN WITH BILATERAL SCIATICA: Primary | ICD-10-CM

## 2018-02-13 DIAGNOSIS — M54.41 CHRONIC MIDLINE LOW BACK PAIN WITH BILATERAL SCIATICA: Primary | ICD-10-CM

## 2018-02-13 DIAGNOSIS — M54.42 CHRONIC MIDLINE LOW BACK PAIN WITH BILATERAL SCIATICA: Primary | ICD-10-CM

## 2018-02-13 LAB
6-ACETYLMORPHINE, UR: NOT DETECTED
7-AMINOCLONAZEPAM, URINE: NOT DETECTED
ALPHA-OH-ALPRAZ, URINE: NOT DETECTED
ALPRAZOLAM, URINE: NOT DETECTED
AMPHETAMINES, URINE: NOT DETECTED
BARBITURATES, URINE: NOT DETECTED
BENZOYLECGONINE, UR: NOT DETECTED
BUPRENORPHINE URINE: NOT DETECTED
CARISOPRODOL, UR: NOT DETECTED
CLONAZEPAM, URINE: NOT DETECTED
CODEINE, URINE: NOT DETECTED
CREATININE URINE: 44.1 MG/DL (ref 20–400)
DIAZEPAM, URINE: NOT DETECTED
DRUGS EXPECTED, UR: NORMAL
EER HI RES INTERP UR: NORMAL
ETHYL GLUCURONIDE UR: NOT DETECTED
FENTANYL URINE: NOT DETECTED
HYDROCODONE, URINE: NOT DETECTED
HYDROMORPHONE, URINE: NOT DETECTED
LORAZEPAM, URINE: NOT DETECTED
MARIJUANA METAB, UR: NOT DETECTED
MDA, UR: NOT DETECTED
MDEA, EVE, UR: NOT DETECTED
MDMA URINE: NOT DETECTED
MEPERIDINE METAB, UR: NOT DETECTED
METHADONE, URINE: NOT DETECTED
METHAMPHETAMINE, URINE: NOT DETECTED
METHYLPHENIDATE: NOT DETECTED
MIDAZOLAM, URINE: NOT DETECTED
MORPHINE URINE: NOT DETECTED
NORBUPRENORPHINE, URINE: NOT DETECTED
NORDIAZEPAM, URINE: NOT DETECTED
NORFENTANYL, URINE: NOT DETECTED
NORHYDROCODONE, URINE: NOT DETECTED
NOROXYCODONE, URINE: NOT DETECTED
NOROXYMORPHONE, URINE: NOT DETECTED
OXAZEPAM, URINE: PRESENT
OXYCODONE URINE: NOT DETECTED
OXYMORPHONE, URINE: NOT DETECTED
PAIN MANAGEMENT DRUG PANEL INTERP, URINE: NORMAL
PAIN MGT DRUG PANEL, HI RES, UR: NORMAL
PCP,URINE: NOT DETECTED
PHENTERMINE, UR: NOT DETECTED
PROPOXYPHENE, URINE: NOT DETECTED
TAPENTADOL, URINE: NOT DETECTED
TAPENTADOL-O-SULFATE, URINE: NOT DETECTED
TEMAZEPAM, URINE: PRESENT
TRAMADOL, URINE: NOT DETECTED
ZOLPIDEM, URINE: NOT DETECTED

## 2018-02-13 PROCEDURE — 3014F SCREEN MAMMO DOC REV: CPT | Performed by: NURSE PRACTITIONER

## 2018-02-13 PROCEDURE — G8417 CALC BMI ABV UP PARAM F/U: HCPCS | Performed by: NURSE PRACTITIONER

## 2018-02-13 PROCEDURE — 3017F COLORECTAL CA SCREEN DOC REV: CPT | Performed by: NURSE PRACTITIONER

## 2018-02-13 PROCEDURE — G8484 FLU IMMUNIZE NO ADMIN: HCPCS | Performed by: NURSE PRACTITIONER

## 2018-02-13 PROCEDURE — G8427 DOCREV CUR MEDS BY ELIG CLIN: HCPCS | Performed by: NURSE PRACTITIONER

## 2018-02-13 PROCEDURE — 99213 OFFICE O/P EST LOW 20 MIN: CPT | Performed by: NURSE PRACTITIONER

## 2018-02-13 PROCEDURE — 1036F TOBACCO NON-USER: CPT | Performed by: NURSE PRACTITIONER

## 2018-02-13 RX ORDER — GABAPENTIN 100 MG/1
CAPSULE ORAL
Qty: 60 CAPSULE | Refills: 2 | Status: SHIPPED | OUTPATIENT
Start: 2018-02-13 | End: 2018-03-14 | Stop reason: DRUGHIGH

## 2018-02-13 RX ORDER — WARFARIN SODIUM 5 MG/1
5 TABLET ORAL DAILY
Qty: 30 TABLET | Refills: 3 | Status: SHIPPED | OUTPATIENT
Start: 2018-02-13 | End: 2018-03-14 | Stop reason: DRUGHIGH

## 2018-02-13 RX ORDER — ALPRAZOLAM 0.5 MG/1
TABLET ORAL
Qty: 2 TABLET | Refills: 0 | Status: SHIPPED | OUTPATIENT
Start: 2018-02-13 | End: 2018-02-13

## 2018-02-13 RX ORDER — CHOLECALCIFEROL (VITAMIN D3) 50 MCG
2000 TABLET ORAL DAILY
Qty: 30 TABLET | Refills: 5 | Status: SHIPPED | OUTPATIENT
Start: 2018-02-13 | End: 2018-11-29 | Stop reason: ALTCHOICE

## 2018-02-13 RX ORDER — TRAMADOL HYDROCHLORIDE 50 MG/1
TABLET ORAL
COMMUNITY
Start: 2018-02-08 | End: 2018-03-07 | Stop reason: SDUPTHER

## 2018-02-13 RX ORDER — TOPIRAMATE 25 MG/1
25 TABLET ORAL
COMMUNITY
Start: 2018-02-10

## 2018-02-13 ASSESSMENT — ENCOUNTER SYMPTOMS
ABDOMINAL PAIN: 0
SHORTNESS OF BREATH: 0
EYE ITCHING: 0
CHEST TIGHTNESS: 0
COUGH: 0
NAUSEA: 0
BLOOD IN STOOL: 0
CONSTIPATION: 0
EYE PAIN: 0
WHEEZING: 0
ABDOMINAL DISTENTION: 0
COLOR CHANGE: 0
BACK PAIN: 1
SORE THROAT: 0
DIARRHEA: 0
SINUS PRESSURE: 0

## 2018-02-13 NOTE — PROGRESS NOTES
oz/week      Comment: occ      Current Outpatient Prescriptions   Medication Sig Dispense Refill    Liraglutide (VICTOZA) 18 MG/3ML SOPN SC injection Inject 1.2 mg into the skin      topiramate (TOPAMAX) 25 MG tablet       traMADol (ULTRAM) 50 MG tablet       gabapentin (NEURONTIN) 100 MG capsule TAKE 1 TABLET DAILY TWICE A DAY. . 60 capsule 2    ALPRAZolam (XANAX) 0.5 MG tablet Take by mouth before testing. . 2 tablet 0    Cholecalciferol (VITAMIN D) 2000 units TABS tablet Take 1 tablet by mouth daily 30 tablet 5    warfarin (COUMADIN) 5 MG tablet Take 1 tablet by mouth daily 30 tablet 3    VICTOZA 18 MG/3ML SOPN SC injection       temazepam (RESTORIL) 15 MG capsule Take 15 mg by mouth.  furosemide (LASIX) 20 MG tablet TAKE ONE TABLET BY MOUTH DAILY 30 tablet 5    PROAIR  (90 Base) MCG/ACT inhaler       atorvastatin (LIPITOR) 80 MG tablet TAKE 1 TABLET BY MOUTH NIGHTLY AT BEDTIME  5    levETIRAcetam (KEPPRA) 500 MG tablet TAKE ONE TABLET BY MOUTH TWO TIMES DAILY  4    ONE TOUCH ULTRA TEST strip TEST BLOOD SUGAR 3 TIMES A DAY  5    warfarin (COUMADIN) 7.5 MG tablet       levETIRAcetam (KEPPRA) 250 MG tablet       pantoprazole (PROTONIX) 20 MG tablet       losartan (COZAAR) 25 MG tablet       potassium chloride (MICRO-K) 10 MEQ extended release capsule       zolpidem (AMBIEN) 10 MG tablet       EXACTECH TEST strip       cyclobenzaprine (FLEXERIL) 10 MG tablet       loratadine-pseudoephedrine (CLARITIN-D 24 HOUR)  MG per extended release tablet Take 1 tablet by mouth daily 30 tablet 2    Misc. Devices MISC True Plus     Testing 3 times a day 100 each 5    busPIRone (BUSPAR) 10 MG tablet Take 10 mg by mouth      TRUEPLUS LANCETS 30G MISC 1 each by Does not apply route daily 100 each 3    ARIPiprazole (ABILIFY) 10 MG tablet TAKE 1 TABLET BY MOUTH NIGHTLY 30 tablet 2     No current facility-administered medications for this visit.       Allergies   Allergen Reactions    Latex not nervous/anxious. Objective:     /86 (Site: Right Arm, Position: Sitting, Cuff Size: Medium Adult)   Pulse 111   Temp 97.8 °F (36.6 °C) (Temporal)   Resp 16   Ht 5' 4\" (1.626 m)   Wt 214 lb 12.8 oz (97.4 kg)   SpO2 98%   BMI 36.87 kg/m²   Physical Exam   Constitutional: She is oriented to person, place, and time. She appears well-developed and well-nourished. HENT:   Head: Normocephalic. Right Ear: External ear normal.   Left Ear: External ear normal.   Nose: Nose normal.   Mouth/Throat: Oropharynx is clear and moist.   Eyes: Conjunctivae and EOM are normal.   Neck: Normal range of motion. Neck supple. No thyromegaly present. Cardiovascular: Normal rate, regular rhythm and normal heart sounds. Exam reveals no gallop and no friction rub. No murmur heard. Pulmonary/Chest: Effort normal and breath sounds normal. No respiratory distress. She has no wheezes. She has no rales. She exhibits no tenderness. Abdominal: Soft. Bowel sounds are normal. She exhibits no distension. There is no splenomegaly or hepatomegaly. There is no tenderness. No hernia. Musculoskeletal: Normal range of motion. She exhibits no edema or tenderness. Lymphadenopathy:     She has no cervical adenopathy. Neurological: She is alert and oriented to person, place, and time. Coordination and gait normal.   Skin: Skin is warm and dry. No rash noted. No erythema. Psychiatric: She has a normal mood and affect. Her speech is normal and behavior is normal. Judgment and thought content normal. Cognition and memory are normal.   Nursing note and vitals reviewed. Assessment:      1. Chronic midline low back pain with bilateral sciatica                     Plan:   Back Pain: Do exercises daily to strengthen core muscles  May apply heat or ice, whichever feels best, to back 3-4 times a day. Elevate legs when sitting.   Use proper lifting mechanics  Good posture at all times  Do not sit for long periods without getting up and walking. May use Icy Hot to back . Get MRI as ordered. No orders of the defined types were placed in this encounter. Orders Placed This Encounter   Medications    gabapentin (NEURONTIN) 100 MG capsule     Sig: TAKE 1 TABLET DAILY TWICE A DAY. Martine Nick Dispense:  60 capsule     Refill:  2    ALPRAZolam (XANAX) 0.5 MG tablet     Sig: Take by mouth before testing. Martine Nick Dispense:  2 tablet     Refill:  0    Cholecalciferol (VITAMIN D) 2000 units TABS tablet     Sig: Take 1 tablet by mouth daily     Dispense:  30 tablet     Refill:  5    warfarin (COUMADIN) 5 MG tablet     Sig: Take 1 tablet by mouth daily     Dispense:  30 tablet     Refill:  3        Return in about 2 weeks (around 2/27/2018) for PT/INR. Patient given educational materials - see patient instructions. Discussed use, benefit, and side effects of prescribed medications. All patient questions answered. Pt voiced understanding. Reviewed health maintenance. Instructed to continue current medications, diet and exercise. Patient agreed with treatment plan. Follow up as directed.      Electronically signed by Fariba Strickland CNP on 2/13/2018

## 2018-02-22 ENCOUNTER — HOSPITAL ENCOUNTER (OUTPATIENT)
Dept: MRI IMAGING | Age: 53
Discharge: HOME | End: 2018-02-24
Payer: MEDICARE

## 2018-02-22 DIAGNOSIS — M47.816 LUMBAR FACET ARTHROPATHY: ICD-10-CM

## 2018-02-22 DIAGNOSIS — M43.06 LUMBAR SPONDYLOLYSIS: ICD-10-CM

## 2018-02-22 DIAGNOSIS — M51.36 DDD (DEGENERATIVE DISC DISEASE), LUMBAR: ICD-10-CM

## 2018-02-22 DIAGNOSIS — M47.817 LUMBOSACRAL SPONDYLOSIS WITHOUT MYELOPATHY: ICD-10-CM

## 2018-02-22 PROCEDURE — 72148 MRI LUMBAR SPINE W/O DYE: CPT

## 2018-02-27 ENCOUNTER — NURSE ONLY (OUTPATIENT)
Dept: FAMILY MEDICINE CLINIC | Age: 53
End: 2018-02-27
Payer: MEDICARE

## 2018-02-27 DIAGNOSIS — Z86.73 HISTORY OF STROKE ASSOCIATED WITH BLOOD CLOTTING TENDENCY: Primary | ICD-10-CM

## 2018-02-27 LAB
INTERNATIONAL NORMALIZATION RATIO, POC: 1.1
PROTHROMBIN TIME, POC: 13.7

## 2018-02-27 PROCEDURE — 85610 PROTHROMBIN TIME: CPT | Performed by: NURSE PRACTITIONER

## 2018-03-07 ENCOUNTER — HOSPITAL ENCOUNTER (OUTPATIENT)
Dept: PAIN MANAGEMENT | Age: 53
Discharge: HOME | End: 2018-03-07
Payer: MEDICARE

## 2018-03-07 VITALS
BODY MASS INDEX: 36.54 KG/M2 | HEIGHT: 64 IN | TEMPERATURE: 98.2 F | OXYGEN SATURATION: 96 % | WEIGHT: 214 LBS | DIASTOLIC BLOOD PRESSURE: 64 MMHG | HEART RATE: 92 BPM | RESPIRATION RATE: 16 BRPM | SYSTOLIC BLOOD PRESSURE: 117 MMHG

## 2018-03-07 DIAGNOSIS — M54.16 LUMBAR RADICULOPATHY: ICD-10-CM

## 2018-03-07 DIAGNOSIS — M51.36 DDD (DEGENERATIVE DISC DISEASE), LUMBAR: ICD-10-CM

## 2018-03-07 DIAGNOSIS — M47.817 LUMBOSACRAL SPONDYLOSIS WITHOUT MYELOPATHY: Primary | ICD-10-CM

## 2018-03-07 DIAGNOSIS — M43.06 LUMBAR SPONDYLOLYSIS: ICD-10-CM

## 2018-03-07 DIAGNOSIS — Z51.81 ENCOUNTER FOR MEDICATION MONITORING: ICD-10-CM

## 2018-03-07 DIAGNOSIS — M47.816 LUMBAR FACET ARTHROPATHY: ICD-10-CM

## 2018-03-07 PROCEDURE — 99213 OFFICE O/P EST LOW 20 MIN: CPT

## 2018-03-07 PROCEDURE — 99214 OFFICE O/P EST MOD 30 MIN: CPT | Performed by: PAIN MEDICINE

## 2018-03-07 RX ORDER — TRAMADOL HYDROCHLORIDE 50 MG/1
50 TABLET ORAL EVERY 12 HOURS PRN
Qty: 60 TABLET | Refills: 0 | Status: SHIPPED | OUTPATIENT
Start: 2018-03-07 | End: 2018-04-04 | Stop reason: SDUPTHER

## 2018-03-07 ASSESSMENT — ENCOUNTER SYMPTOMS
RESPIRATORY NEGATIVE: 1
BACK PAIN: 1
EYES NEGATIVE: 1
HEARTBURN: 1

## 2018-03-07 ASSESSMENT — PAIN DESCRIPTION - PAIN TYPE: TYPE: CHRONIC PAIN

## 2018-03-07 ASSESSMENT — PAIN SCALES - GENERAL: PAINLEVEL_OUTOF10: 10

## 2018-03-07 ASSESSMENT — PAIN DESCRIPTION - LOCATION: LOCATION: BACK;HIP;LEG

## 2018-03-07 ASSESSMENT — PAIN DESCRIPTION - ONSET: ONSET: ON-GOING

## 2018-03-07 ASSESSMENT — PAIN DESCRIPTION - ORIENTATION: ORIENTATION: LEFT;LOWER

## 2018-03-07 ASSESSMENT — PAIN DESCRIPTION - FREQUENCY: FREQUENCY: CONTINUOUS

## 2018-03-07 ASSESSMENT — PAIN DESCRIPTION - PROGRESSION: CLINICAL_PROGRESSION: GRADUALLY WORSENING

## 2018-03-07 NOTE — PROGRESS NOTES
[facility]  Patient Pain Assessment  RECHECK - No att. providers found    Primary Care Physician: Temo Thomason CNP    Chief complaint:   Chief Complaint   Patient presents with    Lower Back Pain   . HISTORY OF PRESENT ILLNESS:  Chelsea Taylor is 47 y.o. female with    Patient return to the pain clinic with a chief complaint of pain involving the low back area with pain radiating left hip to the left leg. She denies any tingling numbness or weakness in her lower extremity. Patient reports her sleep patterns are poor because of the pain. She rates her pain at about 10 on a scale of 0-10. Patient typically also gives history of anxiety. Patient had an MRI done and would like to review the MRI. Back Pain   This is a chronic problem. The problem occurs constantly. The problem has been gradually worsening since onset. The pain is present in the lumbar spine and gluteal. The quality of the pain is described as stabbing, shooting and aching Christrobel Handy). The pain is at a severity of 10/10. The pain is severe. The pain is worse during the day. The symptoms are aggravated by bending, lying down, sitting, standing and coughing (walking, ADLs). Stiffness is present: sometimes. Associated symptoms include headaches. Pertinent negatives include no chest pain, dysuria, fever, numbness, tingling, weakness or weight loss. Risk factors include sedentary lifestyle, lack of exercise and obesity. Patient relates current medications are helping the pain. Patient reports taking pain medications as prescribed, denies obtaining medications from different sources and denies use of illegal drugs. Patient denies side effects from medications like nausea, vomiting, constipation or drowsiness. Patient reports current activities of daily living ar possible due to medications and would like to continue them. OARRS compliant?  yes  Concern for prescription abuse?no    Current Pain Assessment  Pain PROAIR  (90 Base) MCG/ACT inhaler       atorvastatin (LIPITOR) 80 MG tablet TAKE 1 TABLET BY MOUTH NIGHTLY AT BEDTIME  5    levETIRAcetam (KEPPRA) 500 MG tablet TAKE ONE TABLET BY MOUTH TWO TIMES DAILY  4    ONE TOUCH ULTRA TEST strip TEST BLOOD SUGAR 3 TIMES A DAY  5    warfarin (COUMADIN) 7.5 MG tablet       levETIRAcetam (KEPPRA) 250 MG tablet       pantoprazole (PROTONIX) 20 MG tablet       losartan (COZAAR) 25 MG tablet       potassium chloride (MICRO-K) 10 MEQ extended release capsule       zolpidem (AMBIEN) 10 MG tablet       EXACTECH TEST strip       cyclobenzaprine (FLEXERIL) 10 MG tablet       loratadine-pseudoephedrine (CLARITIN-D 24 HOUR)  MG per extended release tablet Take 1 tablet by mouth daily 30 tablet 2    Misc. Devices MISC True Plus     Testing 3 times a day 100 each 5    busPIRone (BUSPAR) 10 MG tablet Take 10 mg by mouth      TRUEPLUS LANCETS 30G MISC 1 each by Does not apply route daily 100 each 3    ARIPiprazole (ABILIFY) 10 MG tablet TAKE 1 TABLET BY MOUTH NIGHTLY 30 tablet 2     No current facility-administered medications for this encounter. Allergies  Latex; Adhesive tape; Morphine and related; Other; and Aspirin    Family History  family history includes Cancer in her father; Heart Disease in her mother; Pacemaker in her sister. Social History  Social History     Social History    Marital status:      Spouse name: N/A    Number of children: N/A    Years of education: N/A     Occupational History    disability      Social History Main Topics    Smoking status: Never Smoker    Smokeless tobacco: Never Used    Alcohol use 0.0 oz/week      Comment: occ    Drug use: No    Sexual activity: Yes     Partners: Male     Other Topics Concern    None     Social History Narrative    None      reports that she does not use drugs. REVIEW OF SYSTEMS:  Review of Systems   Constitutional: Negative for fever and weight loss.         Left leg HENT: Negative. Negative for congestion and hearing loss. Eyes: Negative. Negative for blurred vision and photophobia. Respiratory: Negative. Negative for cough and hemoptysis. Cardiovascular: Negative for chest pain, palpitations and leg swelling. Gastrointestinal: Positive for heartburn. Negative for constipation, nausea and vomiting. Genitourinary: Negative. Negative for dysuria and frequency. Musculoskeletal: Positive for back pain. Skin: Negative. Negative for rash. Neurological: Positive for headaches. Negative for tingling, sensory change, focal weakness, weakness and numbness. Endo/Heme/Allergies: Bruises/bleeds easily. Coumadin therapy   Psychiatric/Behavioral: Positive for depression. Negative for substance abuse and suicidal ideas. The patient is nervous/anxious and has insomnia. GENERAL PHYSICAL EXAM:  Vitals: /64   Pulse 92   Temp 98.2 °F (36.8 °C) (Oral)   Resp 16   Ht 5' 4\" (1.626 m)   Wt 214 lb (97.1 kg)   SpO2 96%   BMI 36.73 kg/m² , Body mass index is 36.73 kg/m². Physical Exam   Constitutional: She is oriented to person, place, and time. She appears well-developed and well-nourished. HENT:   Head: Normocephalic and atraumatic. Eyes: Conjunctivae are normal. Pupils are equal, round, and reactive to light. Neck: Neck supple. No tracheal deviation present. No thyromegaly present. Cardiovascular: Normal rate and regular rhythm. Pulmonary/Chest: Effort normal.   Abdominal: She exhibits no distension. Musculoskeletal: Normal range of motion. Neurological: She is alert and oriented to person, place, and time. She has normal strength. She displays no atrophy, no tremor and normal reflexes. No cranial nerve deficit or sensory deficit. She exhibits normal muscle tone. She displays a negative Romberg sign. Coordination normal.   Reflex Scores:       Patellar reflexes are 2+ on the right side and 2+ on the left side.        [place]

## 2018-03-08 ASSESSMENT — ENCOUNTER SYMPTOMS
PHOTOPHOBIA: 0
HEMOPTYSIS: 0
COUGH: 0
CONSTIPATION: 0
VOMITING: 0
BLURRED VISION: 0
NAUSEA: 0

## 2018-03-12 ENCOUNTER — TELEPHONE (OUTPATIENT)
Dept: FAMILY MEDICINE CLINIC | Age: 53
End: 2018-03-12

## 2018-03-13 ENCOUNTER — HOSPITAL ENCOUNTER (OUTPATIENT)
Dept: GENERAL RADIOLOGY | Age: 53
Discharge: HOME | End: 2018-03-15
Payer: MEDICARE

## 2018-03-13 ENCOUNTER — HOSPITAL ENCOUNTER (OUTPATIENT)
Dept: PAIN MANAGEMENT | Age: 53
Discharge: HOME | End: 2018-03-13
Payer: MEDICARE

## 2018-03-13 VITALS
RESPIRATION RATE: 18 BRPM | BODY MASS INDEX: 36.54 KG/M2 | OXYGEN SATURATION: 98 % | WEIGHT: 214 LBS | SYSTOLIC BLOOD PRESSURE: 136 MMHG | HEIGHT: 64 IN | DIASTOLIC BLOOD PRESSURE: 77 MMHG | TEMPERATURE: 98.3 F | HEART RATE: 86 BPM

## 2018-03-13 DIAGNOSIS — M51.36 DDD (DEGENERATIVE DISC DISEASE), LUMBAR: ICD-10-CM

## 2018-03-13 DIAGNOSIS — M47.816 LUMBAR FACET ARTHROPATHY: ICD-10-CM

## 2018-03-13 DIAGNOSIS — M47.817 LUMBOSACRAL SPONDYLOSIS WITHOUT MYELOPATHY: ICD-10-CM

## 2018-03-13 DIAGNOSIS — M54.16 LUMBAR RADICULOPATHY: ICD-10-CM

## 2018-03-13 DIAGNOSIS — M43.06 LUMBAR SPONDYLOLYSIS: Primary | ICD-10-CM

## 2018-03-13 PROCEDURE — 64495 INJ PARAVERT F JNT L/S 3 LEV: CPT

## 2018-03-13 PROCEDURE — 64493 INJ PARAVERT F JNT L/S 1 LEV: CPT

## 2018-03-13 PROCEDURE — 64493 INJ PARAVERT F JNT L/S 1 LEV: CPT | Performed by: PAIN MEDICINE

## 2018-03-13 PROCEDURE — 2500000003 HC RX 250 WO HCPCS

## 2018-03-13 PROCEDURE — 3209999900 FLUORO FOR SURGICAL PROCEDURES

## 2018-03-13 PROCEDURE — 6360000004 HC RX CONTRAST MEDICATION

## 2018-03-13 PROCEDURE — 64494 INJ PARAVERT F JNT L/S 2 LEV: CPT

## 2018-03-13 PROCEDURE — 6360000002 HC RX W HCPCS: Performed by: PAIN MEDICINE

## 2018-03-13 PROCEDURE — 64495 INJ PARAVERT F JNT L/S 3 LEV: CPT | Performed by: PAIN MEDICINE

## 2018-03-13 PROCEDURE — 64494 INJ PARAVERT F JNT L/S 2 LEV: CPT | Performed by: PAIN MEDICINE

## 2018-03-13 PROCEDURE — 6360000002 HC RX W HCPCS

## 2018-03-13 RX ORDER — MIDAZOLAM HYDROCHLORIDE 1 MG/ML
INJECTION INTRAMUSCULAR; INTRAVENOUS
Status: COMPLETED | OUTPATIENT
Start: 2018-03-13 | End: 2018-03-13

## 2018-03-13 RX ADMIN — MIDAZOLAM 2 MG: 1 INJECTION INTRAMUSCULAR; INTRAVENOUS at 09:20

## 2018-03-13 ASSESSMENT — PAIN - FUNCTIONAL ASSESSMENT
PAIN_FUNCTIONAL_ASSESSMENT: 0-10

## 2018-03-13 NOTE — PROGRESS NOTES
Discharge criteria met. Post procedure dressing dry and intact. Sensory and motor function intact as per pre-procedure. Patient alert and oriented x3  Instructions and follow up reviewed with pt at patient at discharge. Discharged home transported by wheelchair, accompanied by family .   Discharged @1315

## 2018-03-14 ENCOUNTER — OFFICE VISIT (OUTPATIENT)
Dept: FAMILY MEDICINE CLINIC | Age: 53
End: 2018-03-14
Payer: MEDICARE

## 2018-03-14 ENCOUNTER — TELEPHONE (OUTPATIENT)
Dept: PAIN MANAGEMENT | Age: 53
End: 2018-03-14

## 2018-03-14 VITALS
OXYGEN SATURATION: 98 % | SYSTOLIC BLOOD PRESSURE: 136 MMHG | BODY MASS INDEX: 34.97 KG/M2 | HEIGHT: 66 IN | RESPIRATION RATE: 22 BRPM | TEMPERATURE: 97.6 F | HEART RATE: 84 BPM | DIASTOLIC BLOOD PRESSURE: 82 MMHG | WEIGHT: 217.6 LBS

## 2018-03-14 DIAGNOSIS — B37.9 YEAST INFECTION: ICD-10-CM

## 2018-03-14 DIAGNOSIS — R10.84 GENERALIZED ABDOMINAL PAIN: Primary | ICD-10-CM

## 2018-03-14 DIAGNOSIS — D68.51 FACTOR V LEIDEN (HCC): ICD-10-CM

## 2018-03-14 DIAGNOSIS — Z79.891 CHRONIC USE OF OPIATE FOR THERAPEUTIC PURPOSE: ICD-10-CM

## 2018-03-14 LAB
INTERNATIONAL NORMALIZATION RATIO, POC: 1.3
PROTHROMBIN TIME, POC: 15.1

## 2018-03-14 PROCEDURE — 1036F TOBACCO NON-USER: CPT | Performed by: NURSE PRACTITIONER

## 2018-03-14 PROCEDURE — G8482 FLU IMMUNIZE ORDER/ADMIN: HCPCS | Performed by: NURSE PRACTITIONER

## 2018-03-14 PROCEDURE — G8417 CALC BMI ABV UP PARAM F/U: HCPCS | Performed by: NURSE PRACTITIONER

## 2018-03-14 PROCEDURE — 3017F COLORECTAL CA SCREEN DOC REV: CPT | Performed by: NURSE PRACTITIONER

## 2018-03-14 PROCEDURE — 85610 PROTHROMBIN TIME: CPT | Performed by: NURSE PRACTITIONER

## 2018-03-14 PROCEDURE — 99214 OFFICE O/P EST MOD 30 MIN: CPT | Performed by: NURSE PRACTITIONER

## 2018-03-14 PROCEDURE — G8427 DOCREV CUR MEDS BY ELIG CLIN: HCPCS | Performed by: NURSE PRACTITIONER

## 2018-03-14 PROCEDURE — 3014F SCREEN MAMMO DOC REV: CPT | Performed by: NURSE PRACTITIONER

## 2018-03-14 RX ORDER — CLOTRIMAZOLE 1 %
CREAM (GRAM) TOPICAL
Qty: 15 G | Refills: 0 | Status: SHIPPED | OUTPATIENT
Start: 2018-03-14

## 2018-03-14 RX ORDER — WARFARIN SODIUM 5 MG/1
10 TABLET ORAL DAILY
Qty: 30 TABLET | Refills: 3 | Status: SHIPPED | OUTPATIENT
Start: 2018-03-14 | End: 2018-05-04 | Stop reason: ALTCHOICE

## 2018-03-14 RX ORDER — GABAPENTIN 300 MG/1
300 CAPSULE ORAL 3 TIMES DAILY
Qty: 90 CAPSULE | Refills: 3 | Status: SHIPPED | OUTPATIENT
Start: 2018-03-14 | End: 2018-06-04

## 2018-03-14 ASSESSMENT — PATIENT HEALTH QUESTIONNAIRE - PHQ9
SUM OF ALL RESPONSES TO PHQ9 QUESTIONS 1 & 2: 0
1. LITTLE INTEREST OR PLEASURE IN DOING THINGS: 0
2. FEELING DOWN, DEPRESSED OR HOPELESS: 0
SUM OF ALL RESPONSES TO PHQ QUESTIONS 1-9: 0

## 2018-03-25 ASSESSMENT — ENCOUNTER SYMPTOMS
VOMITING: 0
DIARRHEA: 0
NAUSEA: 0
COUGH: 0
SORE THROAT: 0
SINUS PRESSURE: 0
EYE PAIN: 0
EYE ITCHING: 0
BELCHING: 0
CHEST TIGHTNESS: 0
SHORTNESS OF BREATH: 0
CONSTIPATION: 0
COLOR CHANGE: 0
ABDOMINAL PAIN: 1
ABDOMINAL DISTENTION: 0
WHEEZING: 0
BLOOD IN STOOL: 0
RHINORRHEA: 0

## 2018-03-25 ASSESSMENT — CROHNS DISEASE ACTIVITY INDEX (CDAI): CDAI SCORE: 0

## 2018-03-25 NOTE — PROGRESS NOTES
[facility], [address] [address]  Dept: [phone]  Dept Fax: [phone]    Blaze Alejandre is a 47 y.o. female who presents today for her medical conditions/complaints as noted below. Blaze Alejandre is c/o of Follow-Up from [facility] 03/09/2018 for abdominal pain )    Jesus Alberto Lynn received counseling on the following healthy behaviors: nutrition, exercise and medication adherence  Reviewed prior labs and health maintenance. Continue current medications, diet and exercise. Discussed use, benefit, and side effects of prescribed medications. Barriers to medication compliance addressed. Patient given educational materials - see patient instructions. All patient questions answered. Patient voiced understanding. HPI:     Abdominal Pain   This is a recurrent problem. The current episode started in the past 7 days. The onset quality is sudden. The problem occurs intermittently. The problem has been waxing and waning. The pain is located in the generalized abdominal region. The pain is at a severity of 6/10. The pain is severe. The quality of the pain is aching and sharp. The abdominal pain does not radiate. Pertinent negatives include no arthralgias, belching, constipation, diarrhea, dysuria, fever, frequency, headaches, myalgias, nausea or vomiting. Nothing aggravates the pain. The pain is relieved by nothing. She has tried oral narcotic analgesics and proton pump inhibitors for the symptoms. The treatment provided no relief. Prior diagnostic workup includes GI consult. Her past medical history is significant for GERD. There is no history of colon cancer, Crohn's disease or pancreatitis. Rash   This is a new problem. The current episode started 1 to 4 weeks ago. The problem has been gradually worsening since onset. The affected locations include the abdomen. The rash is characterized by dryness, itchiness, burning and redness. with polypectomy    COLONOSCOPY  1/22/16    AND EGD    COLONOSCOPY  01/05/2017    random biopsies.  ENDOMETRIAL ABLATION      2 times    HERNIA REPAIR      HIATAL HERNIA REPAIR  2/4/2016    laparoscopic robotic    HYSTERECTOMY      with BSO    KNEE ARTHROSCOPY Left 4/7/15, 2015    x 2    LAPAROSCOPY      \"springs inserted in to fallopian tubes\" to close tubes   [employer] NECK SURGERY  July 2012    Anterior, C5,6,7 bulging disk repair    TONSILLECTOMY      UPPER GASTROINTESTINAL ENDOSCOPY         Family History   Problem Relation Age of Onset    Heart Disease Mother      dies age 32   [employer] Cancer Father      lymph nodes    Pacemaker Sister        Social History   Substance Use Topics    Smoking status: Never Smoker    Smokeless tobacco: Never Used    Alcohol use 0.0 oz/week      Comment: occ      Current Outpatient Prescriptions   Medication Sig Dispense Refill    warfarin (COUMADIN) 5 MG tablet Take 2 tablets by mouth daily Take 2 tablets by mouth daily x 4 days 30 tablet 3    gabapentin (NEURONTIN) 300 MG capsule Take 1 capsule by mouth 3 times daily for 30 days. 90 capsule 3    clotrimazole (LOTRIMIN) 1 % cream Apply topically 2 times daily. 15 g 0    traMADol (ULTRAM) 50 MG tablet Take 1 tablet by mouth every 12 hours as needed for Pain for up to 30 days. 60 tablet 0    Liraglutide (VICTOZA) 18 MG/3ML SOPN SC injection Inject 1.2 mg into the skin      topiramate (TOPAMAX) 25 MG tablet       Cholecalciferol (VITAMIN D) 2000 units TABS tablet Take 1 tablet by mouth daily 30 tablet 5    VICTOZA 18 MG/3ML SOPN SC injection       temazepam (RESTORIL) 15 MG capsule Take 15 mg by mouth.       furosemide (LASIX) 20 MG tablet TAKE ONE TABLET BY MOUTH DAILY 30 tablet 5    PROAIR  (90 Base) MCG/ACT inhaler       atorvastatin (LIPITOR) 80 MG tablet TAKE 1 TABLET BY MOUTH NIGHTLY AT BEDTIME  5    levETIRAcetam (KEPPRA) 500 MG tablet TAKE ONE TABLET BY MOUTH TWO TIMES DAILY  4    ONE TOUCH ULTRA

## 2018-03-29 RX ORDER — OMEPRAZOLE AND SODIUM BICARBONATE 40; 1100 MG/1; MG/1
1 CAPSULE ORAL
Qty: 30 CAPSULE | Refills: 5 | Status: SHIPPED | OUTPATIENT
Start: 2018-03-29 | End: 2018-11-29 | Stop reason: ALTCHOICE

## 2018-04-04 ENCOUNTER — HOSPITAL ENCOUNTER (OUTPATIENT)
Dept: PAIN MANAGEMENT | Age: 53
Discharge: HOME | End: 2018-04-04
Payer: MEDICARE

## 2018-04-04 VITALS
BODY MASS INDEX: 37.05 KG/M2 | HEIGHT: 64 IN | HEART RATE: 97 BPM | TEMPERATURE: 98.4 F | SYSTOLIC BLOOD PRESSURE: 114 MMHG | DIASTOLIC BLOOD PRESSURE: 83 MMHG | RESPIRATION RATE: 20 BRPM | WEIGHT: 217 LBS | OXYGEN SATURATION: 99 %

## 2018-04-04 DIAGNOSIS — M51.36 DDD (DEGENERATIVE DISC DISEASE), LUMBAR: ICD-10-CM

## 2018-04-04 DIAGNOSIS — M47.816 LUMBAR FACET ARTHROPATHY: ICD-10-CM

## 2018-04-04 DIAGNOSIS — Z79.891 CHRONIC USE OF OPIATE FOR THERAPEUTIC PURPOSE: ICD-10-CM

## 2018-04-04 DIAGNOSIS — M47.817 LUMBOSACRAL SPONDYLOSIS WITHOUT MYELOPATHY: Primary | ICD-10-CM

## 2018-04-04 DIAGNOSIS — M54.16 LUMBAR RADICULOPATHY: ICD-10-CM

## 2018-04-04 PROCEDURE — 99214 OFFICE O/P EST MOD 30 MIN: CPT | Performed by: PAIN MEDICINE

## 2018-04-04 PROCEDURE — 99213 OFFICE O/P EST LOW 20 MIN: CPT

## 2018-04-04 RX ORDER — TEMAZEPAM 15 MG/1
15 CAPSULE ORAL
COMMUNITY
Start: 2018-04-02 | End: 2018-04-04 | Stop reason: SDUPTHER

## 2018-04-04 RX ORDER — TRAMADOL HYDROCHLORIDE 50 MG/1
50 TABLET ORAL EVERY 12 HOURS PRN
Qty: 60 TABLET | Refills: 0 | Status: SHIPPED | OUTPATIENT
Start: 2018-04-06 | End: 2018-05-04 | Stop reason: SDUPTHER

## 2018-04-04 RX ORDER — ONDANSETRON 4 MG/1
4 TABLET, ORALLY DISINTEGRATING ORAL
COMMUNITY
Start: 2018-04-01 | End: 2018-07-06 | Stop reason: ALTCHOICE

## 2018-04-04 RX ORDER — MONTELUKAST SODIUM 4 MG/1
1 TABLET, CHEWABLE ORAL DAILY
COMMUNITY
End: 2022-08-10

## 2018-04-04 ASSESSMENT — PAIN SCALES - GENERAL: PAINLEVEL_OUTOF10: 0

## 2018-04-04 ASSESSMENT — ENCOUNTER SYMPTOMS
SORE THROAT: 1
NAUSEA: 0
HEARTBURN: 0
VOMITING: 0
PHOTOPHOBIA: 0
COUGH: 1
EYES NEGATIVE: 1
SINUS PAIN: 0
BLURRED VISION: 0
SHORTNESS OF BREATH: 0
GASTROINTESTINAL NEGATIVE: 1
BACK PAIN: 1

## 2018-04-04 ASSESSMENT — PAIN DESCRIPTION - ORIENTATION: ORIENTATION: LEFT;LOWER

## 2018-04-04 ASSESSMENT — PAIN DESCRIPTION - PAIN TYPE: TYPE: CHRONIC PAIN

## 2018-04-04 ASSESSMENT — PAIN DESCRIPTION - PROGRESSION: CLINICAL_PROGRESSION: GRADUALLY IMPROVING

## 2018-04-04 ASSESSMENT — PAIN DESCRIPTION - FREQUENCY: FREQUENCY: INTERMITTENT

## 2018-04-04 ASSESSMENT — PAIN DESCRIPTION - LOCATION: LOCATION: BACK;HIP;LEG

## 2018-04-09 ENCOUNTER — OFFICE VISIT (OUTPATIENT)
Dept: GASTROENTEROLOGY | Age: 53
End: 2018-04-09
Payer: MEDICARE

## 2018-04-09 VITALS
WEIGHT: 223.3 LBS | BODY MASS INDEX: 38.33 KG/M2 | HEART RATE: 108 BPM | DIASTOLIC BLOOD PRESSURE: 86 MMHG | SYSTOLIC BLOOD PRESSURE: 131 MMHG

## 2018-04-09 DIAGNOSIS — K21.9 GASTROESOPHAGEAL REFLUX DISEASE, ESOPHAGITIS PRESENCE NOT SPECIFIED: ICD-10-CM

## 2018-04-09 DIAGNOSIS — R10.84 GENERALIZED ABDOMINAL PAIN: Primary | ICD-10-CM

## 2018-04-09 DIAGNOSIS — R14.0 BLOATING: ICD-10-CM

## 2018-04-09 DIAGNOSIS — R19.7 DIARRHEA, UNSPECIFIED TYPE: ICD-10-CM

## 2018-04-09 PROCEDURE — G8427 DOCREV CUR MEDS BY ELIG CLIN: HCPCS | Performed by: INTERNAL MEDICINE

## 2018-04-09 PROCEDURE — 99215 OFFICE O/P EST HI 40 MIN: CPT | Performed by: INTERNAL MEDICINE

## 2018-04-09 PROCEDURE — 3017F COLORECTAL CA SCREEN DOC REV: CPT | Performed by: INTERNAL MEDICINE

## 2018-04-09 PROCEDURE — 3014F SCREEN MAMMO DOC REV: CPT | Performed by: INTERNAL MEDICINE

## 2018-04-09 PROCEDURE — G8417 CALC BMI ABV UP PARAM F/U: HCPCS | Performed by: INTERNAL MEDICINE

## 2018-04-09 ASSESSMENT — ENCOUNTER SYMPTOMS
RECTAL PAIN: 0
BLOOD IN STOOL: 0
ALLERGIC/IMMUNOLOGIC NEGATIVE: 1
ANAL BLEEDING: 0
CONSTIPATION: 0
TROUBLE SWALLOWING: 0
RESPIRATORY NEGATIVE: 1
ABDOMINAL PAIN: 1
NAUSEA: 1
DIARRHEA: 1
ABDOMINAL DISTENTION: 0
VOMITING: 0

## 2018-04-11 ENCOUNTER — TELEPHONE (OUTPATIENT)
Dept: GASTROENTEROLOGY | Age: 53
End: 2018-04-11

## 2018-04-11 DIAGNOSIS — R19.7 DIARRHEA, UNSPECIFIED TYPE: ICD-10-CM

## 2018-04-11 DIAGNOSIS — K63.5 POLYP OF COLON, UNSPECIFIED PART OF COLON, UNSPECIFIED TYPE: Primary | ICD-10-CM

## 2018-04-11 DIAGNOSIS — K62.5 RECTAL BLEEDING: ICD-10-CM

## 2018-04-11 PROBLEM — Z01.812 PRE-PROCEDURE LAB EXAM: Status: RESOLVED | Noted: 2018-01-09 | Resolved: 2018-04-11

## 2018-04-25 ENCOUNTER — TELEPHONE (OUTPATIENT)
Dept: FAMILY MEDICINE CLINIC | Age: 53
End: 2018-04-25

## 2018-05-04 ENCOUNTER — HOSPITAL ENCOUNTER (OUTPATIENT)
Dept: PAIN MANAGEMENT | Age: 53
Discharge: HOME | End: 2018-05-04
Payer: MEDICARE

## 2018-05-04 VITALS
OXYGEN SATURATION: 97 % | BODY MASS INDEX: 38.07 KG/M2 | RESPIRATION RATE: 16 BRPM | HEIGHT: 64 IN | WEIGHT: 223 LBS | TEMPERATURE: 98.2 F | DIASTOLIC BLOOD PRESSURE: 91 MMHG | HEART RATE: 78 BPM | SYSTOLIC BLOOD PRESSURE: 126 MMHG

## 2018-05-04 DIAGNOSIS — Z79.891 CHRONIC USE OF OPIATE FOR THERAPEUTIC PURPOSE: ICD-10-CM

## 2018-05-04 DIAGNOSIS — M51.36 DDD (DEGENERATIVE DISC DISEASE), LUMBAR: ICD-10-CM

## 2018-05-04 DIAGNOSIS — M43.06 LUMBAR SPONDYLOLYSIS: ICD-10-CM

## 2018-05-04 DIAGNOSIS — M54.16 LUMBAR RADICULOPATHY: Primary | ICD-10-CM

## 2018-05-04 DIAGNOSIS — M47.817 LUMBOSACRAL SPONDYLOSIS WITHOUT MYELOPATHY: ICD-10-CM

## 2018-05-04 DIAGNOSIS — Z51.81 ENCOUNTER FOR MEDICATION MONITORING: ICD-10-CM

## 2018-05-04 DIAGNOSIS — M47.816 LUMBAR FACET ARTHROPATHY: ICD-10-CM

## 2018-05-04 PROCEDURE — 99213 OFFICE O/P EST LOW 20 MIN: CPT | Performed by: NURSE PRACTITIONER

## 2018-05-04 PROCEDURE — 99213 OFFICE O/P EST LOW 20 MIN: CPT

## 2018-05-04 RX ORDER — TRAMADOL HYDROCHLORIDE 50 MG/1
50 TABLET ORAL EVERY 12 HOURS PRN
Qty: 60 TABLET | Refills: 0 | Status: SHIPPED | OUTPATIENT
Start: 2018-05-06 | End: 2018-06-04 | Stop reason: SDUPTHER

## 2018-05-04 RX ORDER — VENLAFAXINE HYDROCHLORIDE 75 MG/1
75 CAPSULE, EXTENDED RELEASE ORAL
COMMUNITY
Start: 2018-04-30 | End: 2018-06-04

## 2018-05-04 RX ORDER — TEMAZEPAM 15 MG/1
15 CAPSULE ORAL NIGHTLY
COMMUNITY
Start: 2018-04-30

## 2018-05-04 ASSESSMENT — ENCOUNTER SYMPTOMS
RESPIRATORY NEGATIVE: 1
NAUSEA: 1
BACK PAIN: 1

## 2018-05-08 RX ORDER — WARFARIN SODIUM 5 MG/1
5 TABLET ORAL DAILY
Qty: 90 TABLET | Refills: 1 | Status: SHIPPED | OUTPATIENT
Start: 2018-05-08 | End: 2018-06-04 | Stop reason: ALTCHOICE

## 2018-05-08 RX ORDER — FUROSEMIDE 20 MG/1
TABLET ORAL
Qty: 90 TABLET | Refills: 1 | Status: SHIPPED | OUTPATIENT
Start: 2018-05-08 | End: 2018-11-14 | Stop reason: SDUPTHER

## 2018-05-08 RX ORDER — WARFARIN SODIUM 5 MG/1
5 TABLET ORAL DAILY
Qty: 30 TABLET | Refills: 3 | Status: SHIPPED | OUTPATIENT
Start: 2018-05-08 | End: 2018-06-04 | Stop reason: ALTCHOICE

## 2018-05-18 ENCOUNTER — CARE COORDINATION (OUTPATIENT)
Dept: CARE COORDINATION | Age: 53
End: 2018-05-18

## 2018-06-04 ENCOUNTER — HOSPITAL ENCOUNTER (OUTPATIENT)
Dept: PAIN MANAGEMENT | Age: 53
Discharge: HOME | End: 2018-06-04
Payer: MEDICARE

## 2018-06-04 VITALS
HEART RATE: 68 BPM | SYSTOLIC BLOOD PRESSURE: 133 MMHG | BODY MASS INDEX: 38.07 KG/M2 | OXYGEN SATURATION: 98 % | TEMPERATURE: 98.4 F | HEIGHT: 64 IN | WEIGHT: 223 LBS | RESPIRATION RATE: 16 BRPM | DIASTOLIC BLOOD PRESSURE: 84 MMHG

## 2018-06-04 DIAGNOSIS — M51.36 DDD (DEGENERATIVE DISC DISEASE), LUMBAR: ICD-10-CM

## 2018-06-04 DIAGNOSIS — Z01.812 PRE-PROCEDURE LAB EXAM: ICD-10-CM

## 2018-06-04 DIAGNOSIS — M47.816 LUMBAR FACET ARTHROPATHY: ICD-10-CM

## 2018-06-04 DIAGNOSIS — Z79.891 CHRONIC USE OF OPIATE FOR THERAPEUTIC PURPOSE: ICD-10-CM

## 2018-06-04 DIAGNOSIS — M46.1 SACROILIITIS (HCC): ICD-10-CM

## 2018-06-04 DIAGNOSIS — Z51.81 ENCOUNTER FOR MEDICATION MONITORING: Primary | ICD-10-CM

## 2018-06-04 DIAGNOSIS — M54.16 LUMBAR RADICULOPATHY: ICD-10-CM

## 2018-06-04 DIAGNOSIS — M47.817 LUMBOSACRAL SPONDYLOSIS WITHOUT MYELOPATHY: ICD-10-CM

## 2018-06-04 PROCEDURE — 99213 OFFICE O/P EST LOW 20 MIN: CPT

## 2018-06-04 PROCEDURE — 99213 OFFICE O/P EST LOW 20 MIN: CPT | Performed by: NURSE PRACTITIONER

## 2018-06-04 RX ORDER — GABAPENTIN 100 MG/1
CAPSULE ORAL
Refills: 2 | COMMUNITY
Start: 2018-05-16 | End: 2018-07-30 | Stop reason: SDUPTHER

## 2018-06-04 RX ORDER — ATORVASTATIN CALCIUM 80 MG/1
TABLET, FILM COATED ORAL
Qty: 30 TABLET | Refills: 5 | Status: SHIPPED | OUTPATIENT
Start: 2018-06-04

## 2018-06-04 RX ORDER — VENLAFAXINE HYDROCHLORIDE 150 MG/1
CAPSULE, EXTENDED RELEASE ORAL
Refills: 1 | COMMUNITY
Start: 2018-04-28

## 2018-06-04 RX ORDER — DICYCLOMINE HCL 20 MG
TABLET ORAL
Refills: 0 | COMMUNITY
Start: 2018-05-28 | End: 2020-10-23

## 2018-06-04 RX ORDER — TRAMADOL HYDROCHLORIDE 50 MG/1
50 TABLET ORAL EVERY 12 HOURS PRN
Qty: 60 TABLET | Refills: 0 | Status: SHIPPED | OUTPATIENT
Start: 2018-06-06 | End: 2018-07-06 | Stop reason: ALTCHOICE

## 2018-06-04 ASSESSMENT — ENCOUNTER SYMPTOMS
BACK PAIN: 1
DIARRHEA: 1

## 2018-06-06 ENCOUNTER — TELEPHONE (OUTPATIENT)
Dept: FAMILY MEDICINE CLINIC | Age: 53
End: 2018-06-06

## 2018-06-06 RX ORDER — DICYCLOMINE HCL 20 MG
TABLET ORAL
Qty: 120 TABLET | Refills: 0 | Status: CANCELLED | OUTPATIENT
Start: 2018-06-06

## 2018-06-18 ENCOUNTER — HOSPITAL ENCOUNTER (OUTPATIENT)
Age: 53
Discharge: HOME | End: 2018-06-18
Payer: MEDICARE

## 2018-06-18 ENCOUNTER — HOSPITAL ENCOUNTER (OUTPATIENT)
Dept: PAIN MANAGEMENT | Age: 53
Discharge: HOME | End: 2018-06-18
Payer: MEDICARE

## 2018-06-18 ENCOUNTER — HOSPITAL ENCOUNTER (OUTPATIENT)
Dept: GENERAL RADIOLOGY | Age: 53
Discharge: HOME | End: 2018-06-20
Payer: MEDICARE

## 2018-06-18 VITALS
SYSTOLIC BLOOD PRESSURE: 128 MMHG | WEIGHT: 223 LBS | OXYGEN SATURATION: 100 % | RESPIRATION RATE: 16 BRPM | DIASTOLIC BLOOD PRESSURE: 78 MMHG | BODY MASS INDEX: 38.07 KG/M2 | HEART RATE: 70 BPM | HEIGHT: 64 IN | TEMPERATURE: 98.5 F

## 2018-06-18 DIAGNOSIS — M43.06 LUMBAR SPONDYLOLYSIS: ICD-10-CM

## 2018-06-18 DIAGNOSIS — M47.816 LUMBAR FACET ARTHROPATHY: ICD-10-CM

## 2018-06-18 DIAGNOSIS — M54.16 LUMBAR RADICULOPATHY: ICD-10-CM

## 2018-06-18 DIAGNOSIS — Z01.812 PRE-PROCEDURE LAB EXAM: ICD-10-CM

## 2018-06-18 DIAGNOSIS — M51.36 DDD (DEGENERATIVE DISC DISEASE), LUMBAR: ICD-10-CM

## 2018-06-18 DIAGNOSIS — M47.817 LUMBOSACRAL SPONDYLOSIS WITHOUT MYELOPATHY: Primary | ICD-10-CM

## 2018-06-18 LAB
INR BLD: 1
PROTHROMBIN TIME: 10.6 SEC (ref 9.7–12)

## 2018-06-18 PROCEDURE — 64493 INJ PARAVERT F JNT L/S 1 LEV: CPT

## 2018-06-18 PROCEDURE — 6360000004 HC RX CONTRAST MEDICATION

## 2018-06-18 PROCEDURE — 64493 INJ PARAVERT F JNT L/S 1 LEV: CPT | Performed by: PAIN MEDICINE

## 2018-06-18 PROCEDURE — 64494 INJ PARAVERT F JNT L/S 2 LEV: CPT | Performed by: PAIN MEDICINE

## 2018-06-18 PROCEDURE — 64494 INJ PARAVERT F JNT L/S 2 LEV: CPT

## 2018-06-18 PROCEDURE — 2500000003 HC RX 250 WO HCPCS

## 2018-06-18 PROCEDURE — 6360000002 HC RX W HCPCS

## 2018-06-18 PROCEDURE — 36415 COLL VENOUS BLD VENIPUNCTURE: CPT

## 2018-06-18 PROCEDURE — 64495 INJ PARAVERT F JNT L/S 3 LEV: CPT

## 2018-06-18 PROCEDURE — 64495 INJ PARAVERT F JNT L/S 3 LEV: CPT | Performed by: PAIN MEDICINE

## 2018-06-18 PROCEDURE — 85610 PROTHROMBIN TIME: CPT

## 2018-06-18 PROCEDURE — 6360000002 HC RX W HCPCS: Performed by: PAIN MEDICINE

## 2018-06-18 PROCEDURE — 3209999900 FLUORO FOR SURGICAL PROCEDURES

## 2018-06-18 RX ORDER — AMOXICILLIN 500 MG/1
CAPSULE ORAL
Refills: 0 | Status: ON HOLD | COMMUNITY
Start: 2018-06-04 | End: 2018-07-17 | Stop reason: ALTCHOICE

## 2018-06-18 RX ORDER — MIDAZOLAM HYDROCHLORIDE 1 MG/ML
INJECTION INTRAMUSCULAR; INTRAVENOUS
Status: COMPLETED | OUTPATIENT
Start: 2018-06-18 | End: 2018-06-18

## 2018-06-18 RX ADMIN — MIDAZOLAM 2 MG: 1 INJECTION INTRAMUSCULAR; INTRAVENOUS at 08:41

## 2018-06-18 ASSESSMENT — PAIN - FUNCTIONAL ASSESSMENT
PAIN_FUNCTIONAL_ASSESSMENT: 0-10
PAIN_FUNCTIONAL_ASSESSMENT: 0-10

## 2018-06-18 ASSESSMENT — PAIN DESCRIPTION - LOCATION: LOCATION: BACK

## 2018-06-18 ASSESSMENT — PAIN DESCRIPTION - PAIN TYPE: TYPE: CHRONIC PAIN

## 2018-06-18 ASSESSMENT — PAIN DESCRIPTION - FREQUENCY: FREQUENCY: INTERMITTENT

## 2018-06-18 ASSESSMENT — PAIN DESCRIPTION - ORIENTATION: ORIENTATION: LEFT;LOWER

## 2018-06-18 ASSESSMENT — PAIN DESCRIPTION - ONSET: ONSET: ON-GOING

## 2018-06-18 ASSESSMENT — PAIN DESCRIPTION - DIRECTION: RADIATING_TOWARDS: LEFT LEG TO KNEE

## 2018-06-18 ASSESSMENT — PAIN SCALES - GENERAL: PAINLEVEL_OUTOF10: 9

## 2018-06-18 ASSESSMENT — PAIN DESCRIPTION - PROGRESSION: CLINICAL_PROGRESSION: NOT CHANGED

## 2018-06-18 ASSESSMENT — PAIN DESCRIPTION - DESCRIPTORS: DESCRIPTORS: SHARP;STABBING

## 2018-06-19 ENCOUNTER — TELEPHONE (OUTPATIENT)
Dept: PAIN MANAGEMENT | Age: 53
End: 2018-06-19

## 2018-06-29 ENCOUNTER — HOSPITAL ENCOUNTER (OUTPATIENT)
Age: 53
Discharge: HOME | End: 2018-06-29
Payer: MEDICARE

## 2018-06-29 LAB
ALBUMIN SERPL-MCNC: 3.8 G/DL (ref 3.5–5.2)
ALBUMIN/GLOBULIN RATIO: ABNORMAL (ref 1–2.5)
ALP BLD-CCNC: 155 U/L (ref 35–104)
ALT SERPL-CCNC: 20 U/L (ref 5–33)
ANION GAP SERPL CALCULATED.3IONS-SCNC: 12 MMOL/L (ref 9–17)
AST SERPL-CCNC: 17 U/L
BILIRUB SERPL-MCNC: 0.22 MG/DL (ref 0.3–1.2)
BUN BLDV-MCNC: 14 MG/DL (ref 6–20)
BUN/CREAT BLD: ABNORMAL (ref 9–20)
CALCIUM SERPL-MCNC: 8.8 MG/DL (ref 8.6–10.4)
CHLORIDE BLD-SCNC: 105 MMOL/L (ref 98–107)
CO2: 25 MMOL/L (ref 20–31)
CREAT SERPL-MCNC: 0.72 MG/DL (ref 0.5–0.9)
GFR AFRICAN AMERICAN: >60 ML/MIN
GFR NON-AFRICAN AMERICAN: >60 ML/MIN
GFR SERPL CREATININE-BSD FRML MDRD: ABNORMAL ML/MIN/{1.73_M2}
GFR SERPL CREATININE-BSD FRML MDRD: ABNORMAL ML/MIN/{1.73_M2}
GLUCOSE BLD-MCNC: 110 MG/DL (ref 70–99)
IRON SATURATION: 12 % (ref 20–55)
IRON: 41 UG/DL (ref 37–145)
POTASSIUM SERPL-SCNC: 3.6 MMOL/L (ref 3.7–5.3)
SODIUM BLD-SCNC: 142 MMOL/L (ref 135–144)
T3 FREE: 2.67 PG/ML (ref 2.02–4.43)
THYROXINE, FREE: 1.18 NG/DL (ref 0.93–1.7)
TOTAL IRON BINDING CAPACITY: 345 UG/DL (ref 250–450)
TOTAL PROTEIN: 7.6 G/DL (ref 6.4–8.3)
TSH SERPL DL<=0.05 MIU/L-ACNC: 3.66 MIU/L (ref 0.3–5)
UNSATURATED IRON BINDING CAPACITY: 304 UG/DL (ref 112–347)
VITAMIN D 25-HYDROXY: 21.1 NG/ML (ref 30–100)

## 2018-06-29 PROCEDURE — 84443 ASSAY THYROID STIM HORMONE: CPT

## 2018-06-29 PROCEDURE — 84439 ASSAY OF FREE THYROXINE: CPT

## 2018-06-29 PROCEDURE — 82306 VITAMIN D 25 HYDROXY: CPT

## 2018-06-29 PROCEDURE — 83550 IRON BINDING TEST: CPT

## 2018-06-29 PROCEDURE — 83540 ASSAY OF IRON: CPT

## 2018-06-29 PROCEDURE — 82627 DEHYDROEPIANDROSTERONE: CPT

## 2018-06-29 PROCEDURE — 80053 COMPREHEN METABOLIC PANEL: CPT

## 2018-06-29 PROCEDURE — 86376 MICROSOMAL ANTIBODY EACH: CPT

## 2018-06-29 PROCEDURE — 84481 FREE ASSAY (FT-3): CPT

## 2018-07-02 ENCOUNTER — HOSPITAL ENCOUNTER (OUTPATIENT)
Dept: PAIN MANAGEMENT | Age: 53
Discharge: HOME | End: 2018-07-02
Payer: MEDICARE

## 2018-07-02 VITALS
DIASTOLIC BLOOD PRESSURE: 79 MMHG | WEIGHT: 223 LBS | BODY MASS INDEX: 38.07 KG/M2 | TEMPERATURE: 98 F | RESPIRATION RATE: 16 BRPM | HEIGHT: 64 IN | SYSTOLIC BLOOD PRESSURE: 131 MMHG | HEART RATE: 70 BPM | OXYGEN SATURATION: 99 %

## 2018-07-02 DIAGNOSIS — Z51.81 ENCOUNTER FOR MEDICATION MONITORING: ICD-10-CM

## 2018-07-02 DIAGNOSIS — M54.16 LUMBAR RADICULOPATHY: ICD-10-CM

## 2018-07-02 DIAGNOSIS — M43.06 LUMBAR SPONDYLOLYSIS: Primary | ICD-10-CM

## 2018-07-02 DIAGNOSIS — M47.816 LUMBAR FACET ARTHROPATHY: ICD-10-CM

## 2018-07-02 DIAGNOSIS — M47.817 LUMBOSACRAL SPONDYLOSIS WITHOUT MYELOPATHY: ICD-10-CM

## 2018-07-02 DIAGNOSIS — M51.36 DDD (DEGENERATIVE DISC DISEASE), LUMBAR: ICD-10-CM

## 2018-07-02 LAB
DHEAS (DHEA SULFATE): <15 UG/DL (ref 26–200)
THYROID PEROXIDASE (TPO) AB: 15.1 IU/ML (ref 0–35)

## 2018-07-02 PROCEDURE — 99213 OFFICE O/P EST LOW 20 MIN: CPT

## 2018-07-02 PROCEDURE — 99214 OFFICE O/P EST MOD 30 MIN: CPT | Performed by: PAIN MEDICINE

## 2018-07-02 RX ORDER — HYDROCODONE BITARTRATE AND ACETAMINOPHEN 5; 325 MG/1; MG/1
1 TABLET ORAL EVERY 24 HOURS
Qty: 30 TABLET | Refills: 0 | Status: SHIPPED | OUTPATIENT
Start: 2018-07-02 | End: 2018-07-30 | Stop reason: SDUPTHER

## 2018-07-02 RX ORDER — HYDROCODONE BITARTRATE AND ACETAMINOPHEN 5; 325 MG/1; MG/1
1 TABLET ORAL EVERY 6 HOURS PRN
Qty: 30 TABLET | Refills: 0 | Status: SHIPPED | OUTPATIENT
Start: 2018-07-02 | End: 2018-07-02 | Stop reason: SDUPTHER

## 2018-07-02 RX ORDER — TRAMADOL HYDROCHLORIDE 50 MG/1
50 TABLET ORAL EVERY 12 HOURS PRN
Qty: 60 TABLET | Refills: 0 | Status: CANCELLED | OUTPATIENT
Start: 2018-07-06 | End: 2018-08-05

## 2018-07-02 ASSESSMENT — PAIN DESCRIPTION - PAIN TYPE: TYPE: CHRONIC PAIN

## 2018-07-02 ASSESSMENT — ENCOUNTER SYMPTOMS
BLURRED VISION: 0
RESPIRATORY NEGATIVE: 1
SHORTNESS OF BREATH: 0
VOMITING: 0
BACK PAIN: 1
COUGH: 0
CONSTIPATION: 0
HEARTBURN: 0
GASTROINTESTINAL NEGATIVE: 1
EYES NEGATIVE: 1
NAUSEA: 0
PHOTOPHOBIA: 0

## 2018-07-02 ASSESSMENT — PAIN DESCRIPTION - ONSET: ONSET: ON-GOING

## 2018-07-02 ASSESSMENT — PAIN DESCRIPTION - DESCRIPTORS: DESCRIPTORS: SHARP;STABBING;CONSTANT

## 2018-07-02 ASSESSMENT — PAIN DESCRIPTION - FREQUENCY: FREQUENCY: CONTINUOUS

## 2018-07-02 ASSESSMENT — PAIN SCALES - GENERAL: PAINLEVEL_OUTOF10: 9

## 2018-07-02 ASSESSMENT — PAIN DESCRIPTION - LOCATION: LOCATION: BACK;LEG

## 2018-07-02 ASSESSMENT — PAIN DESCRIPTION - DIRECTION: RADIATING_TOWARDS: LEFT LEG TO KNEE

## 2018-07-02 ASSESSMENT — PAIN DESCRIPTION - PROGRESSION: CLINICAL_PROGRESSION: GRADUALLY WORSENING

## 2018-07-02 ASSESSMENT — PAIN DESCRIPTION - ORIENTATION: ORIENTATION: LEFT;LOWER

## 2018-07-02 NOTE — PROGRESS NOTES
[address] Pain Management  Patient Pain Assessment  RECHECK - Dr. Kalyan Wilson    Primary Care Physician: KAMARI Augustine - CNP    Chief complaint:   Chief Complaint   Patient presents with    Lower Back Pain   . HISTORY OF PRESENT ILLNESS:  Jaron Glover is 47 y.o. female with    Patient return to [facility] with a chief complaint of pain involving the low back area. This patient had undergone lumbar facet joint injections on the left side at the levels of L2 3 through L5-S1 on 6/18/2018 without any relief of pain. She is complaining of pain in the low back area with pain radiating to the left thigh to the knee along with this weakness in her left lower extremity. She relates that her pain is getting worse. Her sleep patterns are poor sleeping about 6-7 hours a day. She reports her Effexor dose was increased by 75 mg. She reports she is trying to exercise she did physical therapy in the past.    Back Pain   This is a chronic problem. The current episode started more than 1 year ago. The problem occurs constantly. The problem has been gradually worsening since onset. The pain is present in the sacro-iliac, lumbar spine and gluteal. Quality: Sharp stabbing in nature. The pain radiates to the left foot. The pain is at a severity of 9/10 (0 to 3). The pain is severe. The pain is the same all the time. The symptoms are aggravated by bending, standing, position and twisting (ADLs, lifting and walking). Stiffness is present in the morning. Associated symptoms include headaches, tingling and weakness. Pertinent negatives include no chest pain, dysuria, fever, numbness or weight loss. Risk factors include lack of exercise, sedentary lifestyle and obesity. Treatments tried: Lumbar facet joint injections. The treatment provided mild relief. Patient reports tramadol is not helping her pain she is unable to sleep because of the pain. She is requesting a change in her medications. OARRS compliant? yes  Concern for prescription abuse?no    Current Pain Assessment  Pain Assessment  Pain Assessment: 0-10  Pain Level: 9  Pain Type: Chronic pain  Pain Location: Back, Leg  Pain Orientation: Left, Lower  Pain Radiating Towards: Left leg to knee  Pain Descriptors: Sharp, Stabbing, Constant  Pain Frequency: Continuous  Pain Onset: On-going  Clinical Progression: Gradually worsening  Effect of Pain on Daily Activities: Pain increases with standing, walking, and doing dishesw  Patient's Stated Pain Goal: 2 (Pain at 2 with physical activity)  Pain Intervention(s): Medication (see eMar), Rest                    ADVERSE MEDICATION EFFECTS:   Constipation: no  Bowel Regimen: No:   Diet: common adult  Appetite:  ok  Sedation:  no  Urinary Retention: no    FOCUSED PAIN SCALE:  Highest : 10  Lowest :6  Average: LVGLN-4-66  When and What  was your last procedure:  6/18/18 Left Lumbar facet    Was your procedure effective:  no    ACTIVITY/SOCIAL/EMOTIONAL:  Sleep Pattern: 6 hours per night. nightime awakenings  Energy Level:  Tired/Fatigued  Currently attending Physical Therapy:  No  Home Exercises: daily stretches  Mobility: Can walk 500 feet before having increased pain  Currently seeing a Psychiatrist or Psychologist:  Yes for depression  Emotional Issues: normal   Mood: appropriate     ABERRANT BEHAVIORS SINCE LAST VISIT:  Have you ever been treated in another Pain Clinic not applicable  Refills for prescriptions appropriate: yes  Lost rx/pills: no  Taking more medication than prescribed:  no  Are you receiving PAIN medications from  other doctors: no  Last Urine/Serum Drug Screen :2/8/18  Was Serum/UDS as anticipated?   Negative for Tramadol  Brought pill bottles in :yes   Was Pill count appropriate? :yes   Are currently pregnant? not applicable  Recent ER visits: No             Past Medical History      Diagnosis Date    Acid reflux     Arthritis     Coughing     dry due to ace inhibitor    Depression     uses Abilify, her mother; Pacemaker in her sister. Social History  Social History     Social History    Marital status:      Spouse name: N/A    Number of children: N/A    Years of education: N/A     Occupational History    disability      Social History Main Topics    Smoking status: Never Smoker    Smokeless tobacco: Never Used    Alcohol use 0.0 oz/week      Comment: occ    Drug use: No    Sexual activity: Yes     Partners: Male     Other Topics Concern    None     Social History Narrative    None      reports that she does not use drugs. REVIEW OF SYSTEMS:  Review of Systems   Constitutional: Negative for fever, malaise/fatigue and weight loss. Left side of body   HENT: Negative. Negative for congestion and tinnitus. Eyes: Negative. Negative for blurred vision and photophobia. Respiratory: Negative. Negative for cough and shortness of breath. Cardiovascular: Negative for chest pain, palpitations and leg swelling. Gastrointestinal: Negative. Negative for constipation, heartburn, nausea and vomiting. Genitourinary: Negative. Negative for dysuria and hematuria. Musculoskeletal: Positive for back pain. Skin: Negative. Neurological: Positive for tingling, weakness and headaches. Negative for focal weakness and numbness. Endo/Heme/Allergies: Bruises/bleeds easily. Coumadin therapy for factor 5   Psychiatric/Behavioral: Positive for depression. Negative for substance abuse and suicidal ideas. The patient has insomnia. GENERAL PHYSICAL EXAM:  Vitals: /79   Pulse 70   Temp 98 °F (36.7 °C) (Oral)   Resp 16   Ht 5' 4\" (1.626 m)   Wt 223 lb (101.2 kg)   SpO2 99%   BMI 38.28 kg/m² , Body mass index is 38.28 kg/m². Physical Exam   Constitutional: She is oriented to person, place, and time. She appears well-developed and well-nourished. Morbidly obese   HENT:   Head: Normocephalic and atraumatic.    Eyes: Conjunctivae and EOM are normal. Pupils are equal, round, and reactive to light. Neck: Neck supple. No tracheal deviation present. No thyromegaly present. Cardiovascular: Normal rate and regular rhythm. Pulmonary/Chest: Effort normal.   Abdominal: She exhibits no distension. Musculoskeletal: Normal range of motion. She exhibits no edema. Neurological: She is alert and oriented to person, place, and time. She has normal strength. She displays no atrophy, no tremor and normal reflexes. No cranial nerve deficit or sensory deficit. She exhibits normal muscle tone. She displays a negative Romberg sign. Gait abnormal. Coordination normal.   Reflex Scores:       Patellar reflexes are 2+ on the right side and 2+ on the left side. Achilles reflexes are 1+ on the right side and 1+ on the left side. Skin: Skin is warm and dry. Psychiatric: She has a normal mood and affect. Her speech is normal and behavior is normal. Judgment and thought content normal. Cognition and memory are normal.    Right Ankle Exam     Muscle Strength   The patient has normal right ankle strength. Left Ankle Exam     Muscle Strength   The patient has normal left ankle strength. Right Knee Exam     Muscle Strength     The patient has normal right knee strength. Left Knee Exam     Muscle Strength     The patient has normal left knee strength. Right Hip Exam     Muscle Strength   The patient has normal right hip strength. Left Hip Exam     Muscle Strength   The patient has normal left hip strength. Back Exam     Tenderness   The patient is experiencing tenderness in the lumbar and sacroiliac. Range of Motion   Back extension: Limited and painful. Back flexion: Limited and painful. Back lateral bend right: Limited and painful. Back lateral bend left: Limited and painful. Back rotation right: Limited and painful. Back rotation left: Limited and painful.      Tests   Straight leg raise right: negative  Straight leg raise left: negative    Other   Sensation: normal  Gait: antalgic   Erythema: no back redness  Scars: absent    Comments:     Facet loading---  : Right side--    Pain-Mild                                   Left side----   Pain  Moderate  Yariel's test -------------- Right side---negative                                           Left side-----positive            Nurses Notes and Vital Signs reviewed. DATA  Labs:  Benzodiazepine Screen, Urine   Date Value Ref Range Status   03/20/2014 NEGATIVE NEG Final     Comment:           (Positive cutoff 200 ng/mL)                6/29/2018  8:42 PM - Shemar, Kathypn Incoming Lab Results From [facility]      Component Results      Component Value Ref Range & Units Status Collected Lab   Vit D, 25-Hydroxy 21.1   30.0 - 100.0 ng/mL Final 06/29/2018  3:14  [address]              Imaging:  Radiology Images and Reports reviewed where indicated and necessary      SPINAL CORD: The conus terminates normally.       SOFT TISSUES: No paraspinal mass identified.       L1-L2: There is no significant disc herniation, spinal canal stenosis or   neural foraminal narrowing.       L2-L3: There is no significant disc herniation, spinal canal stenosis or   neural foraminal narrowing.       L3-L4: There is no significant disc herniation, spinal canal stenosis or   neural foraminal narrowing.       L4-L5: Disc bulge and facet hypertrophy resulting in mild narrowing of the   thecal sac and both neural foramen.       L5-S1: Disc bulge and facet hypertrophy resulting in mild-to-moderate left   and right-sided neural foraminal stenosis.         Impression   Degenerative canal and neural foraminal stenosis of the lower lumbar spine.        Patient Active Problem List   Diagnosis    Complex tear of medial meniscus of left knee as current injury    Depression    Acid reflux    Hyperlipidemia    Epilepsy ([facility] [address]    Pelvic pain in female    Colon polyp    History of stroke associated with blood clotting tendency    Factor V Leiden ([facility] [address]    Cholelithiasis    Acute medial meniscus tear of left knee    Primary insomnia    Hiatal hernia    Major depressive disorder, recurrent, severe without psychotic features ([facility] [address]    Chronic post-traumatic stress disorder (PTSD)    ERICK (generalized anxiety disorder)    Abdominal pain    Diarrhea    Essential hypertension    Lumbosacral spondylosis without myelopathy    Type 2 diabetes mellitus with diabetic polyneuropathy, without long-term current use of insulin (HCC)    Lumbar radiculopathy    TIA (transient ischemic attack)    Facial numbness    Left-sided weakness    Worsening headaches    Chronic use of opiate for therapeutic purpose    Pre-procedure lab exam    Lumbar spondylolysis    Generalized abdominal pain    Bloating    DDD (degenerative disc disease), lumbar    Encounter for medication monitoring    Lumbar facet arthropathy    Sacroiliitis ([facility] [address]        ASSESSMENT    Staci Bryan is a 47 y.o. female with     1. Lumbar radiculopathy    2. Lumbosacral spondylosis without myelopathy    3. DDD (degenerative disc disease), lumbar    4. Lumbar facet arthropathy           PLAN  Patient is requesting that she is unable to sleep because of severe pain at night and she is requesting a change in the medication and would like to take something \"stronger\" at bedtime to help her pain. Hence we will start her on Norco at bedtime once a day. She is encouraged to exercise and work on diet and weight loss. We'll also refer her to nutritional counseling to help with her diet planning. We will continue current pain medications. Patient was told that we will continue her on Norco for next 2-3 months to help with her diet exercise and weight loss and then we will try to hanna the narcotic medication. She agrees with the treatment plan  Current medications are being tolerated without any Adverse side effects.   Orders Placed This Encounter   Medications  DISCONTD: HYDROcodone-acetaminophen (NORCO) 5-325 MG per tablet     Sig: Take 1 tablet by mouth every 6 hours as needed for Pain for up to 30 days. .     Dispense:  30 tablet     Refill:  0    HYDROcodone-acetaminophen (NORCO) 5-325 MG per tablet     Sig: Take 1 tablet by mouth every 24 hours for 30 days. .     Dispense:  30 tablet     Refill:  0     Urine drug screens have been appropriate. No aberrant activity noted. Analgesia is achieved. Activities of daily living are possible because of medications. Safe use of medications explained to patient. Counselling/Preventive measures for pain  Control:    [x]  Spine strengthening exercises are discussed with patient in detail. [x] Ill effects of being on chronic pain medications such as sleep disturbances, hormonal changes, withdrawal symptoms,  chronic opioid dependence and tolerance were discussed with patient. I had asked the patient to minimize medication use and utilize pain medications only for uncontrolled rest pain or pain with exertional activities. I advised patient not to self escalate pain medications without consulting with us. At each of patient's future visits we will try to taper pain medications, while adjusting the adjunct medications, and re-evaluating for Physical Therapy to improve spinal and joint strength. We will continue to have discussions to decrease pain medications as tolerated. We discussed the same at today's visit and have not been to implement it, as the patient's pain is not under control with current medications. Orders Placed This Encounter   Procedures    Amb Referral to Nutrition Services     Referral Priority:   Routine     Referral Type:   Consult for Advice and Opinion     Requested Specialty:   Nutrition     Number of Visits Requested:   1       Decision Making Process : Patient's health history and referral records thoroughly reviewed before focused physical examination and discussion with patient. Over 50% of today's visit is spent on examining the patient and counseling. Level of complexity of date to be reviewed is Moderate. The chart date reviewed include the following: Imaging Reports. Summary of Care. Time spent reviewing with patient the below reports:   Medication safety, Treatment options. Level of diagnosis and management options of this case is multiple: involving the following management options: Interventions as needed, medication management as appropriate, future visits, activity modification, heat/ice as needed, Urine drug screen as required. [x]The patient's questions were answered to the best of my abilities. This note was created using voice recognition software. There may be inaccuracies of transcription  that are inadvertently overlooked prior to the signature. There is any questions about the transcription please contact me. Return in  4 weeks  with  Melania Chua CNP  for further plan of treatment.          Electronically signed by Madeleine Villegas MD on 7/2/2018 at 4:01 PM

## 2018-07-04 PROBLEM — Z01.812 PRE-PROCEDURE LAB EXAM: Status: RESOLVED | Noted: 2018-01-09 | Resolved: 2018-07-04

## 2018-07-06 ENCOUNTER — OFFICE VISIT (OUTPATIENT)
Dept: FAMILY MEDICINE CLINIC | Age: 53
End: 2018-07-06
Payer: MEDICARE

## 2018-07-06 VITALS
SYSTOLIC BLOOD PRESSURE: 128 MMHG | BODY MASS INDEX: 37.25 KG/M2 | RESPIRATION RATE: 18 BRPM | WEIGHT: 218.2 LBS | TEMPERATURE: 97.8 F | DIASTOLIC BLOOD PRESSURE: 84 MMHG | OXYGEN SATURATION: 98 % | HEIGHT: 64 IN | HEART RATE: 102 BPM

## 2018-07-06 DIAGNOSIS — F33.2 MAJOR DEPRESSIVE DISORDER, RECURRENT, SEVERE WITHOUT PSYCHOTIC FEATURES (HCC): ICD-10-CM

## 2018-07-06 DIAGNOSIS — D68.51 FACTOR V LEIDEN (HCC): Primary | ICD-10-CM

## 2018-07-06 DIAGNOSIS — E11.42 TYPE 2 DIABETES MELLITUS WITH DIABETIC POLYNEUROPATHY, WITHOUT LONG-TERM CURRENT USE OF INSULIN (HCC): Chronic | ICD-10-CM

## 2018-07-06 DIAGNOSIS — G40.919 INTRACTABLE EPILEPSY WITHOUT STATUS EPILEPTICUS, UNSPECIFIED EPILEPSY TYPE (HCC): ICD-10-CM

## 2018-07-06 DIAGNOSIS — M46.1 SACROILIITIS (HCC): ICD-10-CM

## 2018-07-06 LAB
INTERNATIONAL NORMALIZATION RATIO, POC: 1.4
PROTHROMBIN TIME, POC: 16.5

## 2018-07-06 PROCEDURE — 2022F DILAT RTA XM EVC RTNOPTHY: CPT | Performed by: NURSE PRACTITIONER

## 2018-07-06 PROCEDURE — G8427 DOCREV CUR MEDS BY ELIG CLIN: HCPCS | Performed by: NURSE PRACTITIONER

## 2018-07-06 PROCEDURE — 3044F HG A1C LEVEL LT 7.0%: CPT | Performed by: NURSE PRACTITIONER

## 2018-07-06 PROCEDURE — G8417 CALC BMI ABV UP PARAM F/U: HCPCS | Performed by: NURSE PRACTITIONER

## 2018-07-06 PROCEDURE — 3017F COLORECTAL CA SCREEN DOC REV: CPT | Performed by: NURSE PRACTITIONER

## 2018-07-06 PROCEDURE — 99214 OFFICE O/P EST MOD 30 MIN: CPT | Performed by: NURSE PRACTITIONER

## 2018-07-06 PROCEDURE — 85610 PROTHROMBIN TIME: CPT | Performed by: NURSE PRACTITIONER

## 2018-07-06 PROCEDURE — 1036F TOBACCO NON-USER: CPT | Performed by: NURSE PRACTITIONER

## 2018-07-06 RX ORDER — VENLAFAXINE 75 MG/1
75 TABLET ORAL DAILY
Qty: 90 TABLET | Status: SHIPPED | COMMUNITY
Start: 2018-07-06 | End: 2018-09-27

## 2018-07-06 RX ORDER — CYCLOBENZAPRINE HCL 10 MG
10 TABLET ORAL 3 TIMES DAILY PRN
Qty: 60 TABLET | Refills: 0 | Status: SHIPPED
Start: 2018-07-06 | End: 2018-11-01 | Stop reason: SDUPTHER

## 2018-07-06 RX ORDER — VENLAFAXINE 75 MG/1
75 TABLET ORAL 3 TIMES DAILY
COMMUNITY
End: 2018-07-06 | Stop reason: DRUGHIGH

## 2018-07-06 ASSESSMENT — PATIENT HEALTH QUESTIONNAIRE - PHQ9
SUM OF ALL RESPONSES TO PHQ QUESTIONS 1-9: 0
1. LITTLE INTEREST OR PLEASURE IN DOING THINGS: 0
2. FEELING DOWN, DEPRESSED OR HOPELESS: 0
SUM OF ALL RESPONSES TO PHQ9 QUESTIONS 1 & 2: 0

## 2018-07-06 NOTE — LETTER
July 6, 2018        To Whom it May Concern:      Kimberli Garrison is my patient, and has been under my care since August 2015. I am familiar with her history and with the functional limitations imposed by her conditions. She meets the definition of disability under the Americans with Disabilities Act, the [employer], and the Rehabilitation Act of 2017. Due to her seizures and hypoglycemia as well as her depression and anxiety, Viola Vázquez has certain limitations related to coping and stress. In order to help alleviate these difficulties and to enhance her ability to live independently and to fully use and enjoy the dwelling unit you own or administer, I have prescribed Viola Vázquez to obtain a service dog to help determine when her blood sugar is low and impending seizures as well as comfort and emotional support. The presence of this animal is necessary for the health of Viola Vázquez, because its presence will mitigate the symptoms she is currently experiencing. Sincerely,        Krupa Choudhary CNP

## 2018-07-15 ASSESSMENT — ENCOUNTER SYMPTOMS
CHEST TIGHTNESS: 0
BACK PAIN: 1
COUGH: 0
BLOOD IN STOOL: 0
ABDOMINAL PAIN: 0
EYE ITCHING: 0
WHEEZING: 0
SINUS PRESSURE: 0
ABDOMINAL DISTENTION: 0
CONSTIPATION: 0
SHORTNESS OF BREATH: 0
NAUSEA: 0
COLOR CHANGE: 0
SORE THROAT: 0
EYE PAIN: 0
DIARRHEA: 0
SWOLLEN GLANDS: 0

## 2018-07-15 NOTE — PROGRESS NOTES
blocker is being taken. She does not see a podiatrist.Eye exam is not current. Back Pain   This is a chronic problem. The current episode started more than 1 year ago. The problem occurs constantly. The problem has been waxing and waning since onset. The pain is present in the lumbar spine and sacro-iliac. The quality of the pain is described as aching and shooting. The pain radiates to the left thigh. The pain is at a severity of 5/10. The pain is moderate. The pain is the same all the time. The symptoms are aggravated by standing. Stiffness is present all day. Associated symptoms include headaches and leg pain. Pertinent negatives include no abdominal pain, chest pain, dysuria or weakness. Risk factors include obesity. She has tried analgesics, muscle relaxant, ice and heat for the symptoms. The treatment provided no relief. Mental Health Problem   Primary symptoms comment: depression. The current episode started more than 1 month ago. This is a chronic problem. The onset of the illness is precipitated by emotional stress. Additional symptoms of the illness include insomnia, fatigue, feelings of worthlessness, distractible, poor judgment, headaches and seizures. Additional symptoms of the illness do not include appetite change or abdominal pain. She does not admit to suicidal ideas. She does not have a plan to commit suicide. She does not contemplate harming herself. She has not already injured self. She does not contemplate injuring another person. She has not already  injured another person. Risk factors that are present for mental illness include a history of mental illness and epilepsy. Seizures   This is a chronic (Epilepsy) problem. The current episode started more than 1 year ago. The problem occurs every several days. The problem has been gradually worsening. Associated symptoms include fatigue and headaches.  Pertinent negatives include no abdominal pain, arthralgias, chest pain, congestion, coughing, myalgias, nausea, rash, sore throat, swollen glands or weakness. Nothing aggravates the symptoms. She has tried rest for the symptoms. The treatment provided no relief. States cannot afford her medication that controls her seizures and that is why they are worsening. Says insurance will not pay for it. Hemoglobin A1C (%)   Date Value   01/16/2018 6.2   10/11/2017 6.4 (H)   10/10/2017 6.4 (H)             ( goal A1C is < 7)   Microalb/Crt. Ratio (mcg/mg creat)   Date Value   10/09/2017 24     LDL Cholesterol (mg/dL)   Date Value   10/10/2017 101   10/09/2017 120   02/11/2015 74       (goal LDL is <100)   AST (U/L)   Date Value   06/29/2018 17     ALT (U/L)   Date Value   06/29/2018 20     BUN (mg/dL)   Date Value   06/29/2018 14     BP Readings from Last 3 Encounters:   07/06/18 128/84   07/02/18 131/79   06/18/18 128/78          (goal 120/80)    Past Medical History:   Diagnosis Date    Acid reflux     Arthritis     Coughing     dry due to ace inhibitor    Depression     uses Abilify, Buspar, Trazodone for this    Diabetes mellitus ([facility] [address]     Diverticulitis     Epilepsy ([facility] [address]     last seizure 6 mos ago     Factor V Leiden ([facility] [address]     Generalized abdominal pain     Hiatal hernia     History of stroke associated with blood clotting tendency 2003    had clot in brain and left neck, left side weakness residual    Hx of blood clots     right breast, brain, neck    Hyperlipidemia     Hypertension     Insomnia     uses Trazodone for this    Pre-procedure lab exam 1/9/2018    Sleep apnea     C PAP    Unspecified cerebral artery occlusion with cerebral infarction 6/2003    SEE BELOW, 20017 Mini stroke    Wears glasses       Past Surgical History:   Procedure Laterality Date    APPENDECTOMY      CHOLECYSTECTOMY      COLONOSCOPY      with polypectomy    COLONOSCOPY  1/22/16    AND EGD    COLONOSCOPY  01/05/2017    DR MACKAY-random biopsies.     ENDOMETRIAL ABLATION      2 times    HERNIA mouth every morning (before breakfast) 30 capsule 5    clotrimazole (LOTRIMIN) 1 % cream Apply topically 2 times daily. 15 g 0    topiramate (TOPAMAX) 25 MG tablet       Cholecalciferol (VITAMIN D) 2000 units TABS tablet Take 1 tablet by mouth daily 30 tablet 5    PROAIR  (90 Base) MCG/ACT inhaler       levETIRAcetam (KEPPRA) 500 MG tablet TAKE ONE TABLET BY MOUTH TWO TIMES DAILY  4    warfarin (COUMADIN) 7.5 MG tablet       levETIRAcetam (KEPPRA) 250 MG tablet       losartan (COZAAR) 25 MG tablet       potassium chloride (MICRO-K) 10 MEQ extended release capsule       EXACTECH TEST strip       Misc. Devices MISC True Plus     Testing 3 times a day 100 each 5    busPIRone (BUSPAR) 10 MG tablet Take 10 mg by mouth      TRUEPLUS LANCETS 30G MISC 1 each by Does not apply route daily 100 each 3    ARIPiprazole (ABILIFY) 10 MG tablet TAKE 1 TABLET BY MOUTH NIGHTLY 30 tablet 2     No current facility-administered medications for this visit.       Allergies   Allergen Reactions    Latex Other (See Comments)     Red marks on body    Adhesive Tape      Also plastic tape    Morphine And Related Nausea Only    Other Other (See Comments)     Surgical staples cause infection    Aspirin Nausea And Vomiting       Health Maintenance   Topic Date Due    Diabetic foot exam  06/26/1976    Diabetic retinal exam  06/26/1976    HIV screen  06/26/1981    DTaP/Tdap/Td vaccine (1 - Tdap) 06/26/1985    Shingles Vaccine (1 of 2 - 2 Dose Series) 06/26/2016    Flu vaccine (1) 09/01/2018    Diabetic microalbuminuria test  10/09/2018    Lipid screen  10/10/2018    A1C test (Diabetic or Prediabetic)  01/16/2019    Potassium monitoring  06/29/2019    Creatinine monitoring  06/29/2019    Breast cancer screen  11/22/2019    Cervical cancer screen  10/27/2020    Colon cancer screen colonoscopy  01/24/2021    Pneumococcal med risk  Completed       Subjective:      Review of Systems   Constitutional: Positive for Exam reveals no gallop and no friction rub. No murmur heard. Pulmonary/Chest: Effort normal and breath sounds normal. No respiratory distress. She has no wheezes. She has no rales. She exhibits no tenderness. Abdominal: Soft. Bowel sounds are normal. She exhibits no distension. There is no splenomegaly or hepatomegaly. There is no tenderness. No hernia. Musculoskeletal: Normal range of motion. She exhibits no edema or tenderness. Lumbar back: She exhibits pain and spasm. Lymphadenopathy:     She has no cervical adenopathy. Neurological: She is alert and oriented to person, place, and time. She has normal strength. No sensory deficit. Coordination and gait normal.   States is having more seizures because she cannot afford the medication, Keppra, that she takes to control them. Skin: Skin is warm and dry. No rash noted. No erythema. Psychiatric: Her speech is normal and behavior is normal. Judgment and thought content normal. Cognition and memory are normal. She exhibits a depressed mood. Nursing note and vitals reviewed. Assessment:      1. Factor V Leiden ([facility] [address]    2. Type 2 diabetes mellitus with diabetic polyneuropathy, without long-term current use of insulin ([facility] [address]    3. Sacroiliitis ([facility] [address]    4. Major depressive disorder, recurrent, severe without psychotic features ([address]    5. Intractable epilepsy without status epilepticus, unspecified epilepsy type ([facility] [address]               Quality & Risk Score Accuracy    Visit Dx:  M46.1 - Sacroiliitis ([facility] [address]  Assessment and plan:  Stable based upon symptoms and exam. Continue current treatment plan and follow up at least yearly. Visit Dx:  F33.2 - Major depressive disorder, recurrent, severe without psychotic features ([address]  Assessment and plan:  Stable based upon symptoms and exam. Continue current treatment plan and follow up at least yearly.   Visit Dx:  Q60.978 - Intractable epilepsy without status epilepticus, unspecified epilepsy type

## 2018-07-17 ENCOUNTER — ANESTHESIA (OUTPATIENT)
Dept: OPERATING ROOM | Age: 53
End: 2018-07-17
Payer: MEDICARE

## 2018-07-17 ENCOUNTER — HOSPITAL ENCOUNTER (OUTPATIENT)
Age: 53
Setting detail: OUTPATIENT SURGERY
Discharge: HOME | End: 2018-07-17
Attending: INTERNAL MEDICINE | Admitting: INTERNAL MEDICINE
Payer: MEDICARE

## 2018-07-17 ENCOUNTER — ANESTHESIA EVENT (OUTPATIENT)
Dept: OPERATING ROOM | Age: 53
End: 2018-07-17
Payer: MEDICARE

## 2018-07-17 VITALS
HEART RATE: 68 BPM | DIASTOLIC BLOOD PRESSURE: 88 MMHG | RESPIRATION RATE: 16 BRPM | WEIGHT: 216 LBS | HEIGHT: 64 IN | SYSTOLIC BLOOD PRESSURE: 131 MMHG | TEMPERATURE: 97.5 F | OXYGEN SATURATION: 94 % | BODY MASS INDEX: 36.88 KG/M2

## 2018-07-17 VITALS — OXYGEN SATURATION: 100 % | TEMPERATURE: 98 F | SYSTOLIC BLOOD PRESSURE: 128 MMHG | DIASTOLIC BLOOD PRESSURE: 87 MMHG

## 2018-07-17 DIAGNOSIS — D68.51 FACTOR V LEIDEN (HCC): ICD-10-CM

## 2018-07-17 DIAGNOSIS — R10.2 PELVIC PAIN IN FEMALE: Primary | ICD-10-CM

## 2018-07-17 DIAGNOSIS — M47.817 LUMBOSACRAL SPONDYLOSIS WITHOUT MYELOPATHY: ICD-10-CM

## 2018-07-17 DIAGNOSIS — R14.0 BLOATING: ICD-10-CM

## 2018-07-17 DIAGNOSIS — E11.42 TYPE 2 DIABETES MELLITUS WITH DIABETIC POLYNEUROPATHY, WITHOUT LONG-TERM CURRENT USE OF INSULIN (HCC): Chronic | ICD-10-CM

## 2018-07-17 DIAGNOSIS — F51.01 PRIMARY INSOMNIA: ICD-10-CM

## 2018-07-17 DIAGNOSIS — F41.1 GAD (GENERALIZED ANXIETY DISORDER): ICD-10-CM

## 2018-07-17 DIAGNOSIS — F43.12 CHRONIC POST-TRAUMATIC STRESS DISORDER (PTSD): ICD-10-CM

## 2018-07-17 DIAGNOSIS — K44.9 HIATAL HERNIA: ICD-10-CM

## 2018-07-17 DIAGNOSIS — R51.9 WORSENING HEADACHES: ICD-10-CM

## 2018-07-17 DIAGNOSIS — M54.16 LUMBAR RADICULOPATHY: ICD-10-CM

## 2018-07-17 DIAGNOSIS — R10.84 GENERALIZED ABDOMINAL PAIN: ICD-10-CM

## 2018-07-17 DIAGNOSIS — R20.0 FACIAL NUMBNESS: ICD-10-CM

## 2018-07-17 DIAGNOSIS — R53.1 LEFT-SIDED WEAKNESS: ICD-10-CM

## 2018-07-17 DIAGNOSIS — Z51.81 ENCOUNTER FOR MEDICATION MONITORING: ICD-10-CM

## 2018-07-17 DIAGNOSIS — F33.2 MAJOR DEPRESSIVE DISORDER, RECURRENT, SEVERE WITHOUT PSYCHOTIC FEATURES (HCC): ICD-10-CM

## 2018-07-17 DIAGNOSIS — I10 ESSENTIAL HYPERTENSION: ICD-10-CM

## 2018-07-17 DIAGNOSIS — Z79.891 CHRONIC USE OF OPIATE FOR THERAPEUTIC PURPOSE: Chronic | ICD-10-CM

## 2018-07-17 DIAGNOSIS — M51.36 DDD (DEGENERATIVE DISC DISEASE), LUMBAR: ICD-10-CM

## 2018-07-17 DIAGNOSIS — Z86.73 HISTORY OF STROKE ASSOCIATED WITH BLOOD CLOTTING TENDENCY: ICD-10-CM

## 2018-07-17 DIAGNOSIS — M43.06 LUMBAR SPONDYLOLYSIS: ICD-10-CM

## 2018-07-17 DIAGNOSIS — K59.1 FUNCTIONAL DIARRHEA: ICD-10-CM

## 2018-07-17 DIAGNOSIS — M46.1 SACROILIITIS (HCC): ICD-10-CM

## 2018-07-17 DIAGNOSIS — M47.816 LUMBAR FACET ARTHROPATHY: ICD-10-CM

## 2018-07-17 LAB
GLUCOSE BLD-MCNC: 73 MG/DL (ref 65–105)
GLUCOSE BLD-MCNC: 80 MG/DL (ref 65–105)
INR BLD: 1.9
PROTHROMBIN TIME: 19.3 SEC (ref 9.7–12)

## 2018-07-17 PROCEDURE — 7100000030 HC ASPR PHASE II RECOVERY - FIRST 15 MIN: Performed by: INTERNAL MEDICINE

## 2018-07-17 PROCEDURE — 85610 PROTHROMBIN TIME: CPT

## 2018-07-17 PROCEDURE — 3609012400 HC EGD TRANSORAL BIOPSY SINGLE/MULTIPLE: Performed by: INTERNAL MEDICINE

## 2018-07-17 PROCEDURE — 7100000000 HC PACU RECOVERY - FIRST 15 MIN: Performed by: INTERNAL MEDICINE

## 2018-07-17 PROCEDURE — 3700000000 HC ANESTHESIA ATTENDED CARE: Performed by: INTERNAL MEDICINE

## 2018-07-17 PROCEDURE — 2580000003 HC RX 258: Performed by: INTERNAL MEDICINE

## 2018-07-17 PROCEDURE — 7100000001 HC PACU RECOVERY - ADDTL 15 MIN: Performed by: INTERNAL MEDICINE

## 2018-07-17 PROCEDURE — 88305 TISSUE EXAM BY PATHOLOGIST: CPT

## 2018-07-17 PROCEDURE — 2709999900 HC NON-CHARGEABLE SUPPLY: Performed by: INTERNAL MEDICINE

## 2018-07-17 PROCEDURE — 36415 COLL VENOUS BLD VENIPUNCTURE: CPT

## 2018-07-17 PROCEDURE — 7100000031 HC ASPR PHASE II RECOVERY - ADDTL 15 MIN: Performed by: INTERNAL MEDICINE

## 2018-07-17 PROCEDURE — 82947 ASSAY GLUCOSE BLOOD QUANT: CPT

## 2018-07-17 PROCEDURE — 6360000002 HC RX W HCPCS: Performed by: ANESTHESIOLOGY

## 2018-07-17 PROCEDURE — 3700000001 HC ADD 15 MINUTES (ANESTHESIA): Performed by: INTERNAL MEDICINE

## 2018-07-17 PROCEDURE — 3609027000 HC COLONOSCOPY: Performed by: INTERNAL MEDICINE

## 2018-07-17 RX ORDER — MEPERIDINE HYDROCHLORIDE 50 MG/ML
12.5 INJECTION INTRAMUSCULAR; INTRAVENOUS; SUBCUTANEOUS EVERY 5 MIN PRN
Status: DISCONTINUED | OUTPATIENT
Start: 2018-07-17 | End: 2018-07-17

## 2018-07-17 RX ORDER — FENTANYL CITRATE 50 UG/ML
25 INJECTION, SOLUTION INTRAMUSCULAR; INTRAVENOUS EVERY 5 MIN PRN
Status: DISCONTINUED | OUTPATIENT
Start: 2018-07-17 | End: 2018-07-17

## 2018-07-17 RX ORDER — SODIUM CHLORIDE, SODIUM LACTATE, POTASSIUM CHLORIDE, CALCIUM CHLORIDE 600; 310; 30; 20 MG/100ML; MG/100ML; MG/100ML; MG/100ML
INJECTION, SOLUTION INTRAVENOUS CONTINUOUS
Status: DISCONTINUED | OUTPATIENT
Start: 2018-07-17 | End: 2018-07-17 | Stop reason: HOSPADM

## 2018-07-17 RX ORDER — PROMETHAZINE HYDROCHLORIDE 25 MG/ML
6.25 INJECTION, SOLUTION INTRAMUSCULAR; INTRAVENOUS
Status: DISCONTINUED | OUTPATIENT
Start: 2018-07-17 | End: 2018-07-17 | Stop reason: HOSPADM

## 2018-07-17 RX ORDER — OXYCODONE HYDROCHLORIDE AND ACETAMINOPHEN 5; 325 MG/1; MG/1
2 TABLET ORAL PRN
Status: DISCONTINUED | OUTPATIENT
Start: 2018-07-17 | End: 2018-07-17

## 2018-07-17 RX ORDER — DIPHENHYDRAMINE HYDROCHLORIDE 50 MG/ML
12.5 INJECTION INTRAMUSCULAR; INTRAVENOUS
Status: DISCONTINUED | OUTPATIENT
Start: 2018-07-17 | End: 2018-07-17 | Stop reason: HOSPADM

## 2018-07-17 RX ORDER — OXYCODONE HYDROCHLORIDE AND ACETAMINOPHEN 5; 325 MG/1; MG/1
1 TABLET ORAL PRN
Status: DISCONTINUED | OUTPATIENT
Start: 2018-07-17 | End: 2018-07-17

## 2018-07-17 RX ORDER — MIDAZOLAM HYDROCHLORIDE 1 MG/ML
INJECTION INTRAMUSCULAR; INTRAVENOUS PRN
Status: DISCONTINUED | OUTPATIENT
Start: 2018-07-17 | End: 2018-07-17 | Stop reason: SDUPTHER

## 2018-07-17 RX ORDER — PROPOFOL 10 MG/ML
INJECTION, EMULSION INTRAVENOUS PRN
Status: DISCONTINUED | OUTPATIENT
Start: 2018-07-17 | End: 2018-07-17 | Stop reason: SDUPTHER

## 2018-07-17 RX ORDER — PROPOFOL 10 MG/ML
INJECTION, EMULSION INTRAVENOUS CONTINUOUS PRN
Status: DISCONTINUED | OUTPATIENT
Start: 2018-07-17 | End: 2018-07-17 | Stop reason: SDUPTHER

## 2018-07-17 RX ORDER — MORPHINE SULFATE 2 MG/ML
1 INJECTION, SOLUTION INTRAMUSCULAR; INTRAVENOUS EVERY 5 MIN PRN
Status: DISCONTINUED | OUTPATIENT
Start: 2018-07-17 | End: 2018-07-17

## 2018-07-17 RX ADMIN — SODIUM CHLORIDE, POTASSIUM CHLORIDE, SODIUM LACTATE AND CALCIUM CHLORIDE: 600; 310; 30; 20 INJECTION, SOLUTION INTRAVENOUS at 09:33

## 2018-07-17 RX ADMIN — PROPOFOL 50 MG: 10 INJECTION, EMULSION INTRAVENOUS at 09:44

## 2018-07-17 RX ADMIN — PROPOFOL 100 MCG/KG/MIN: 10 INJECTION, EMULSION INTRAVENOUS at 09:44

## 2018-07-17 RX ADMIN — PROPOFOL 20 MG: 10 INJECTION, EMULSION INTRAVENOUS at 09:45

## 2018-07-17 RX ADMIN — MIDAZOLAM 2 MG: 1 INJECTION INTRAMUSCULAR; INTRAVENOUS at 09:44

## 2018-07-17 ASSESSMENT — PULMONARY FUNCTION TESTS
PIF_VALUE: 0
PIF_VALUE: 1
PIF_VALUE: 2
PIF_VALUE: 0
PIF_VALUE: 2
PIF_VALUE: 0

## 2018-07-17 ASSESSMENT — PAIN SCALES - GENERAL
PAINLEVEL_OUTOF10: 0

## 2018-07-17 ASSESSMENT — PAIN - FUNCTIONAL ASSESSMENT: PAIN_FUNCTIONAL_ASSESSMENT: 0-10

## 2018-07-17 NOTE — ANESTHESIA PRE PROCEDURE
Department of Anesthesiology  Preprocedure Note       Name:  Jaron Glover   Age:  47 y.o.  :  1965                                          MRN:  [id number]         Date:  2018      Surgeon: Oumou Agarwal):  Juno Walter MD    Procedure: Procedure(s):  EGD ESOPHAGOGASTRODUODENOSCOPY  COLONOSCOPY    Medications prior to admission:   Prior to Admission medications    Medication Sig Start Date End Date Taking? Authorizing Provider   venlafaxine (EFFEXOR) 75 MG tablet Take 1 tablet by mouth daily With 150 mg tablet daily 18  Yes Brady Martin, APRN - CNP   cyclobenzaprine (FLEXERIL) 10 MG tablet 1 tablet 3 times daily as needed 18  Yes Brady Lex, APRN - CNP   HYDROcodone-acetaminophen (NORCO) 5-325 MG per tablet Take 1 tablet by mouth every 24 hours for 30 days. . 18 Yes Marissa Baxter MD   gabapentin (NEURONTIN) 100 MG capsule TAKE 1 TABLET DAILY TWICE A DAY. . 18  Yes Historical Provider, MD   venlafaxine (EFFEXOR XR) 150 MG extended release capsule TAKE 1 CAPSULE (150 MG TOTAL) BY MOUTH DAILY. 18  Yes Historical Provider, MD   atorvastatin (LIPITOR) 80 MG tablet TAKE 1 TABLET BY MOUTH NIGHTLY AT BEDTIME 18  Yes Brady Martin APRN - CNP   furosemide (LASIX) 20 MG tablet TAKE ONE TABLET BY MOUTH DAILY 18  Yes Brady Martin APRN - CNP   temazepam (RESTORIL) 15 MG capsule Take 15 mg by mouth. . 18  Yes Historical Provider, MD   colestipol (COLESTID) 1 g tablet Take 1 g by mouth   Yes Historical Provider, MD   clotrimazole (LOTRIMIN) 1 % cream Apply topically 2 times daily.  3/14/18  Yes Brady Martin APRN - CNP   topiramate (TOPAMAX) 25 MG tablet  2/10/18  Yes Historical Provider, MD   Cholecalciferol (VITAMIN D) 2000 units TABS tablet Take 1 tablet by mouth daily 18  Yes Brady Martin APRN - CNP   PROAIR  (68 Base) MCG/ACT inhaler  1/10/18  Yes Historical Provider, MD   levETIRAcetam (KEPPRA) 500 MG tablet TAKE ONE TABLET BY MOUTH TWO TIMES DAILY  Wears glasses        Past Surgical History:        Procedure Laterality Date    APPENDECTOMY      CHOLECYSTECTOMY      COLONOSCOPY      with polypectomy    COLONOSCOPY  1/22/16    AND EGD    COLONOSCOPY  01/05/2017    DR MACKAY-random biopsies.  ENDOMETRIAL ABLATION      2 times    HERNIA REPAIR      HIATAL HERNIA REPAIR  2/4/2016    laparoscopic robotic    HYSTERECTOMY      with BSO    KNEE ARTHROSCOPY Left 4/7/15, 2015    x 2    LAPAROSCOPY      \"springs inserted in to fallopian tubes\" to close tubes   [facility] NECK SURGERY  July 2012    Anterior, C5,6,7 bulging disk repair    TONSILLECTOMY      UPPER GASTROINTESTINAL ENDOSCOPY      Jan 2016 DR MACKAY       Social History:    Social History   Substance Use Topics    Smoking status: Never Smoker    Smokeless tobacco: Never Used    Alcohol use 0.0 oz/week      Comment: occ                                Counseling given: Not Answered      Vital Signs (Current):   Vitals:    07/17/18 0906   BP: (!) 145/93   Pulse: 71   Resp: 18   Temp: 98.1 °F (36.7 °C)   TempSrc: Oral   SpO2: 99%   Weight: 216 lb (98 kg)   Height: 5' 4\" (1.626 m)                                              BP Readings from Last 3 Encounters:   07/17/18 (!) 145/93   07/06/18 128/84   07/02/18 131/79       NPO Status:                                                                                 BMI:   Wt Readings from Last 3 Encounters:   07/17/18 216 lb (98 kg)   07/06/18 218 lb 3.2 oz (99 kg)   07/02/18 223 lb (101.2 kg)     Body mass index is 37.08 kg/m².     CBC:   Lab Results   Component Value Date    WBC 10.8 10/11/2017    RBC 4.16 10/11/2017    RBC 4.55 02/01/2012    HGB 11.0 10/11/2017    HCT 33.5 10/11/2017    MCV 80.6 10/11/2017    RDW 19.8 10/11/2017     10/11/2017     02/01/2012       CMP:   Lab Results   Component Value Date     06/29/2018    K 3.6 06/29/2018     06/29/2018    CO2 25 06/29/2018    BUN 14 06/29/2018    CREATININE 0.72 06/29/2018 GFRAA >60 06/29/2018    LABGLOM >60 06/29/2018    GLUCOSE 110 06/29/2018    GLUCOSE 126 01/30/2012    PROT 7.6 06/29/2018    CALCIUM 8.8 06/29/2018    BILITOT 0.22 06/29/2018    ALKPHOS 155 06/29/2018    AST 17 06/29/2018    ALT 20 06/29/2018       POC Tests: No results for input(s): POCGLU, POCNA, POCK, POCCL, POCBUN, POCHEMO, POCHCT in the last 72 hours. Coags:   Lab Results   Component Value Date    PROTIME 16.5 07/06/2018    INR 1.4 07/06/2018    INR 1.0 06/18/2018    APTT 43.3 03/12/2017       HCG (If Applicable): No results found for: PREGTESTUR, PREGSERUM, HCG, HCGQUANT     ABGs: No results found for: PHART, PO2ART, ZNV1RAU, RWN0IZF, BEART, U9IJRECU     Type & Screen (If Applicable):  No results found for: LABABO, [address]    Anesthesia Evaluation  Patient summary reviewed and Nursing notes reviewed no history of anesthetic complications:   Airway: Mallampati: III  TM distance: >3 FB   Neck ROM: full  Mouth opening: > = 3 FB Dental:          Pulmonary:normal exam  breath sounds clear to auscultation  (+) sleep apnea: on CPAP,                             Cardiovascular:    (+) hypertension:, hyperlipidemia      ECG reviewed  Rhythm: regular  Rate: normal  Echocardiogram reviewed                  Neuro/Psych:   (+) seizures:, CVA: residual symptoms, neuromuscular disease:, TIA, headaches: migraine headaches, psychiatric history:depression/anxiety              ROS comment: last seizure 6 mos ago  GI/Hepatic/Renal:   (+) hiatal hernia, GERD:, bowel prep,           Endo/Other:    (+) DiabetesType II DM, no interval change, , : arthritis: OA and no interval change. , . Abdominal:   (+) obese,         Vascular:                                        Anesthesia Plan      MAC and general     ASA 3             Anesthetic plan and risks discussed with patient.                       Priscilla Alexis MD   7/17/2018

## 2018-07-17 NOTE — H&P
HISTORY and [address] [id number]       NAME:  Micah Alicia  MRN: [id number]   YOB: 1965   Date: 7/17/2018   Age: 47 y.o. Gender: female     COMPLAINT AND PRESENT HISTORY:     Micah Alicia is a 47 y.o.  female, here today for EGD and Colonoscopy. Patient reports recent abdominal pain, cramping, bloating for the past few months. Pain located in right lower quadrant, described as an ache (8/10), no radiation. Patient admits to associated diarrhea, having approximately 1-2 loose bowel movements daily. Patient has past medical history of GERD, symptoms fairly well controlled with omeprazole and antiacids. She denies any other GI symptoms. No nausea, vomiting or constipation. Patient reports small amount of blood in stool recently, no known hemorrhoids, no rectal pain. Patient feeling well otherwise, no recent fever/chills, chest pain, cough or shortness of breath.     PAST MEDICAL HISTORY     Past Medical History:   Diagnosis Date    Acid reflux     Arthritis     Coughing     dry due to ace inhibitor    Depression     uses Abilify, Buspar, Trazodone for this    Diabetes mellitus ([facility] [address]     Diverticulitis     Epilepsy ([facility] [address]     last seizure 6 mos ago     Factor V Leiden ([facility] [address]     Generalized abdominal pain     Hiatal hernia     History of stroke associated with blood clotting tendency 2003    had clot in brain and left neck, left side weakness residual    Hx of blood clots     right breast, brain, neck    Hyperlipidemia     Hypertension     Insomnia     uses Trazodone for this    Pre-procedure lab exam 1/9/2018    Sleep apnea     C PAP    Unspecified cerebral artery occlusion with cerebral infarction 6/2003    SEE BELOW, 20017 Mini stroke    Wears glasses      SURGICAL HISTORY       Past Surgical History:   Procedure Laterality Date    APPENDECTOMY      CHOLECYSTECTOMY      COLONOSCOPY      with polypectomy    COLONOSCOPY  1/22/16    AND EGD    COLONOSCOPY  01/05/2017    DR MACKAY-random biopsies.  ENDOMETRIAL ABLATION      2 times    HERNIA REPAIR      HIATAL HERNIA REPAIR  2/4/2016    laparoscopic robotic    HYSTERECTOMY      with BSO    KNEE ARTHROSCOPY Left 4/7/15, 2015    x 2    LAPAROSCOPY      \"springs inserted in to fallopian tubes\" to close tubes   [facility] NECK SURGERY  July 2012    Anterior, C5,6,7 bulging disk repair    TONSILLECTOMY      UPPER GASTROINTESTINAL ENDOSCOPY      Jan 2016 DR MACKAY     FAMILY HISTORY       Family History   Problem Relation Age of Onset    Heart Disease Mother         dies age 32   [facility] Cancer Father         lymph nodes    Pacemaker Sister      SOCIAL HISTORY       Social History     Social History    Marital status:      Spouse name: N/A    Number of children: N/A    Years of education: N/A     Occupational History    disability      Social History Main Topics    Smoking status: Never Smoker    Smokeless tobacco: Never Used    Alcohol use 0.0 oz/week      Comment: occ    Drug use: No    Sexual activity: Yes     Partners: Male     Other Topics Concern    None     Social History Narrative    None     REVIEW OF SYSTEMS      Allergies   Allergen Reactions    Latex Other (See Comments)     Red marks on body    Adhesive Tape      Also plastic tape    Morphine And Related Nausea Only    Other Other (See Comments)     Surgical staples cause infection    Aspirin Nausea And Vomiting     No current facility-administered medications on file prior to encounter. Current Outpatient Prescriptions on File Prior to Encounter   Medication Sig Dispense Refill    colestipol (COLESTID) 1 g tablet Take 1 g by mouth      clotrimazole (LOTRIMIN) 1 % cream Apply topically 2 times daily.  15 g 0    topiramate (TOPAMAX) 25 MG tablet       Cholecalciferol (VITAMIN D) 2000 units TABS tablet Take 1 tablet by mouth daily 30 tablet 5    PROAIR  (90 Base) MCG/ACT inhaler       levETIRAcetam (KEPPRA) 500 MG tablet TAKE ONE TABLET BY MOUTH TWO TIMES DAILY  4    levETIRAcetam (KEPPRA) 250 MG tablet       losartan (COZAAR) 25 MG tablet       potassium chloride (MICRO-K) 10 MEQ extended release capsule       busPIRone (BUSPAR) 10 MG tablet Take 10 mg by mouth      ARIPiprazole (ABILIFY) 10 MG tablet TAKE 1 TABLET BY MOUTH NIGHTLY 30 tablet 2    omeprazole-sodium bicarbonate (ZEGERID)  MG per capsule Take 1 capsule by mouth every morning (before breakfast) 30 capsule 5    warfarin (COUMADIN) 7.5 MG tablet       EXACTECH TEST strip       Misc. Devices MISC True Plus     Testing 3 times a day 100 each 5    TRUEPLUS LANCETS 30G MISC 1 each by Does not apply route daily 100 each 3     Negative except for what is mentioned in the HPI. GENERAL PHYSICAL EXAM     Vitals: BP (!) 145/93   Pulse 71   Temp 98.1 °F (36.7 °C) (Oral)   Resp 18   Ht 5' 4\" (1.626 m)   Wt 216 lb (98 kg)   SpO2 99%   BMI 37.08 kg/m²  Body mass index is 37.08 kg/m². GENERAL APPEARANCE:   Izabel Aguila is a 47 y.o.,  female, moderately obese, nourished, conscious, alert. Does not appear to be distress or pain at this time. SKIN:  Normal temperature, turgor and texture. No cyanosis or jaundice. HEAD:  Normocephalic, atraumatic. EYES:  Pupils equal, reactive to light and accomodation. Conjunctiva clear, no pallor. Glasses. THROAT:  Not congested. No tonsillar erythema or exudates. NECK:  No stiffness, trachea central.  No palpable masses. CHEST:  Symmetrical and equal on expansion. HEART:  Slightly hypertensive. Regular rate, rhythm. No murmur. LUNGS:  Equal on expansion. Clear to auscultation with no adventitious sounds. ABDOMEN:  Obese. Soft on palpation. No localized tenderness, guarding or rigidity. No palpable organomegaly.               LYMPHATICS:  No palpable

## 2018-07-18 LAB — SURGICAL PATHOLOGY REPORT: NORMAL

## 2018-07-20 ENCOUNTER — HOSPITAL ENCOUNTER (OUTPATIENT)
Age: 53
Discharge: HOME | End: 2018-07-20
Payer: MEDICARE

## 2018-07-20 ENCOUNTER — NURSE ONLY (OUTPATIENT)
Dept: FAMILY MEDICINE CLINIC | Age: 53
End: 2018-07-20
Payer: MEDICARE

## 2018-07-20 VITALS — HEIGHT: 64 IN | BODY MASS INDEX: 37.25 KG/M2 | OXYGEN SATURATION: 96 % | WEIGHT: 218.2 LBS | RESPIRATION RATE: 16 BRPM

## 2018-07-20 DIAGNOSIS — D68.51 FACTOR V LEIDEN (HCC): ICD-10-CM

## 2018-07-20 DIAGNOSIS — G45.9 TRANSIENT CEREBRAL ISCHEMIA, UNSPECIFIED TYPE: Primary | ICD-10-CM

## 2018-07-20 LAB
INTERNATIONAL NORMALIZATION RATIO, POC: 1.3
IRON SATURATION: 23 % (ref 20–55)
IRON: 78 UG/DL (ref 37–145)
POTASSIUM SERPL-SCNC: 3.8 MMOL/L (ref 3.7–5.3)
PROTHROMBIN TIME, POC: 16.2
TOTAL IRON BINDING CAPACITY: 333 UG/DL (ref 250–450)
UNSATURATED IRON BINDING CAPACITY: 255 UG/DL (ref 112–347)
VITAMIN D 25-HYDROXY: 24 NG/ML (ref 30–100)

## 2018-07-20 PROCEDURE — 84132 ASSAY OF SERUM POTASSIUM: CPT

## 2018-07-20 PROCEDURE — 82306 VITAMIN D 25 HYDROXY: CPT

## 2018-07-20 PROCEDURE — 85610 PROTHROMBIN TIME: CPT | Performed by: NURSE PRACTITIONER

## 2018-07-20 PROCEDURE — 83540 ASSAY OF IRON: CPT

## 2018-07-20 PROCEDURE — 36415 COLL VENOUS BLD VENIPUNCTURE: CPT

## 2018-07-20 PROCEDURE — 83550 IRON BINDING TEST: CPT

## 2018-07-20 RX ORDER — AZITHROMYCIN 250 MG/1
TABLET, FILM COATED ORAL
Qty: 1 PACKET | Refills: 0 | Status: SHIPPED | OUTPATIENT
Start: 2018-07-20 | End: 2018-07-30 | Stop reason: ALTCHOICE

## 2018-07-20 ASSESSMENT — PATIENT HEALTH QUESTIONNAIRE - PHQ9
SUM OF ALL RESPONSES TO PHQ9 QUESTIONS 1 & 2: 0
SUM OF ALL RESPONSES TO PHQ QUESTIONS 1-9: 0
2. FEELING DOWN, DEPRESSED OR HOPELESS: 0
1. LITTLE INTEREST OR PLEASURE IN DOING THINGS: 0

## 2018-07-23 ENCOUNTER — HOSPITAL ENCOUNTER (OUTPATIENT)
Dept: NUCLEAR MEDICINE | Age: 53
Discharge: HOME | End: 2018-07-25
Payer: MEDICARE

## 2018-07-23 DIAGNOSIS — Z51.81 ENCOUNTER FOR MEDICATION MONITORING: ICD-10-CM

## 2018-07-23 DIAGNOSIS — Z79.891 CHRONIC USE OF OPIATE FOR THERAPEUTIC PURPOSE: Chronic | ICD-10-CM

## 2018-07-23 DIAGNOSIS — R10.84 GENERALIZED ABDOMINAL PAIN: ICD-10-CM

## 2018-07-23 DIAGNOSIS — M47.816 LUMBAR FACET ARTHROPATHY: ICD-10-CM

## 2018-07-23 DIAGNOSIS — M47.817 LUMBOSACRAL SPONDYLOSIS WITHOUT MYELOPATHY: ICD-10-CM

## 2018-07-23 DIAGNOSIS — M43.06 LUMBAR SPONDYLOLYSIS: ICD-10-CM

## 2018-07-23 DIAGNOSIS — M51.36 DDD (DEGENERATIVE DISC DISEASE), LUMBAR: ICD-10-CM

## 2018-07-23 DIAGNOSIS — I10 ESSENTIAL HYPERTENSION: ICD-10-CM

## 2018-07-23 DIAGNOSIS — R10.2 PELVIC PAIN IN FEMALE: ICD-10-CM

## 2018-07-23 DIAGNOSIS — F51.01 PRIMARY INSOMNIA: ICD-10-CM

## 2018-07-23 DIAGNOSIS — F33.2 MAJOR DEPRESSIVE DISORDER, RECURRENT, SEVERE WITHOUT PSYCHOTIC FEATURES (HCC): ICD-10-CM

## 2018-07-23 DIAGNOSIS — F43.12 CHRONIC POST-TRAUMATIC STRESS DISORDER (PTSD): ICD-10-CM

## 2018-07-23 DIAGNOSIS — R14.0 BLOATING: ICD-10-CM

## 2018-07-23 DIAGNOSIS — F41.1 GAD (GENERALIZED ANXIETY DISORDER): ICD-10-CM

## 2018-07-23 DIAGNOSIS — Z86.73 HISTORY OF STROKE ASSOCIATED WITH BLOOD CLOTTING TENDENCY: ICD-10-CM

## 2018-07-23 DIAGNOSIS — D68.51 FACTOR V LEIDEN (HCC): ICD-10-CM

## 2018-07-23 DIAGNOSIS — R53.1 LEFT-SIDED WEAKNESS: ICD-10-CM

## 2018-07-23 DIAGNOSIS — R20.0 FACIAL NUMBNESS: ICD-10-CM

## 2018-07-23 DIAGNOSIS — M54.16 LUMBAR RADICULOPATHY: ICD-10-CM

## 2018-07-23 DIAGNOSIS — R51.9 WORSENING HEADACHES: ICD-10-CM

## 2018-07-23 DIAGNOSIS — E11.42 TYPE 2 DIABETES MELLITUS WITH DIABETIC POLYNEUROPATHY, WITHOUT LONG-TERM CURRENT USE OF INSULIN (HCC): Chronic | ICD-10-CM

## 2018-07-23 DIAGNOSIS — K59.1 FUNCTIONAL DIARRHEA: ICD-10-CM

## 2018-07-23 DIAGNOSIS — M46.1 SACROILIITIS (HCC): ICD-10-CM

## 2018-07-23 DIAGNOSIS — K44.9 HIATAL HERNIA: ICD-10-CM

## 2018-07-23 PROCEDURE — 3430000000 HC RX DIAGNOSTIC RADIOPHARMACEUTICAL: Performed by: INTERNAL MEDICINE

## 2018-07-23 PROCEDURE — 78264 GASTRIC EMPTYING IMG STUDY: CPT

## 2018-07-23 PROCEDURE — A9541 TC99M SULFUR COLLOID: HCPCS | Performed by: INTERNAL MEDICINE

## 2018-07-23 RX ADMIN — Medication 1.1 MILLICURIE: at 08:07

## 2018-07-26 ENCOUNTER — HOSPITAL ENCOUNTER (OUTPATIENT)
Dept: NUCLEAR MEDICINE | Age: 53
Discharge: HOME | End: 2018-07-28
Payer: MEDICARE

## 2018-07-26 DIAGNOSIS — Z86.73 HISTORY OF STROKE ASSOCIATED WITH BLOOD CLOTTING TENDENCY: ICD-10-CM

## 2018-07-26 DIAGNOSIS — Z51.81 ENCOUNTER FOR MEDICATION MONITORING: ICD-10-CM

## 2018-07-26 DIAGNOSIS — Z79.891 CHRONIC USE OF OPIATE FOR THERAPEUTIC PURPOSE: ICD-10-CM

## 2018-07-26 DIAGNOSIS — R14.0 BLOATING: ICD-10-CM

## 2018-07-26 DIAGNOSIS — F51.01 PRIMARY INSOMNIA: ICD-10-CM

## 2018-07-26 DIAGNOSIS — M47.816 LUMBAR FACET ARTHROPATHY: ICD-10-CM

## 2018-07-26 DIAGNOSIS — F43.12 CHRONIC POST-TRAUMATIC STRESS DISORDER: ICD-10-CM

## 2018-07-26 DIAGNOSIS — R53.1 LEFT-SIDED WEAKNESS: ICD-10-CM

## 2018-07-26 DIAGNOSIS — E11.42 TYPE 2 DIABETES MELLITUS WITH DIABETIC POLYNEUROPATHY, WITHOUT LONG-TERM CURRENT USE OF INSULIN (HCC): ICD-10-CM

## 2018-07-26 DIAGNOSIS — K44.9 HIATAL HERNIA: ICD-10-CM

## 2018-07-26 DIAGNOSIS — M54.16 LUMBAR RADICULOPATHY: ICD-10-CM

## 2018-07-26 DIAGNOSIS — M43.06 LUMBAR SPONDYLOLYSIS: ICD-10-CM

## 2018-07-26 DIAGNOSIS — R20.0 FACIAL NUMBNESS: ICD-10-CM

## 2018-07-26 DIAGNOSIS — I10 ESSENTIAL HYPERTENSION: ICD-10-CM

## 2018-07-26 DIAGNOSIS — M46.1 SACROILIITIS (HCC): ICD-10-CM

## 2018-07-26 DIAGNOSIS — R10.2 PELVIC PAIN IN FEMALE: ICD-10-CM

## 2018-07-26 DIAGNOSIS — D68.51 FACTOR V LEIDEN (HCC): ICD-10-CM

## 2018-07-26 DIAGNOSIS — R10.84 GENERALIZED ABDOMINAL PAIN: ICD-10-CM

## 2018-07-26 DIAGNOSIS — R51.9 WORSENING HEADACHES: ICD-10-CM

## 2018-07-26 DIAGNOSIS — M47.817 LUMBOSACRAL SPONDYLOSIS WITHOUT MYELOPATHY: ICD-10-CM

## 2018-07-26 DIAGNOSIS — M51.36 DDD (DEGENERATIVE DISC DISEASE), LUMBAR: ICD-10-CM

## 2018-07-26 DIAGNOSIS — F33.2 MAJOR DEPRESSIVE DISORDER, RECURRENT SEVERE WITHOUT PSYCHOTIC FEATURES (HCC): ICD-10-CM

## 2018-07-26 DIAGNOSIS — F41.1 GAD (GENERALIZED ANXIETY DISORDER): ICD-10-CM

## 2018-07-26 DIAGNOSIS — K59.1 FUNCTIONAL DIARRHEA: ICD-10-CM

## 2018-07-26 PROCEDURE — 78264 GASTRIC EMPTYING IMG STUDY: CPT

## 2018-07-26 PROCEDURE — 3430000000 HC RX DIAGNOSTIC RADIOPHARMACEUTICAL: Performed by: INTERNAL MEDICINE

## 2018-07-26 PROCEDURE — A9541 TC99M SULFUR COLLOID: HCPCS | Performed by: INTERNAL MEDICINE

## 2018-07-26 RX ADMIN — Medication 1.1 MILLICURIE: at 08:37

## 2018-07-30 ENCOUNTER — HOSPITAL ENCOUNTER (OUTPATIENT)
Dept: PAIN MANAGEMENT | Age: 53
Discharge: HOME | End: 2018-07-30
Payer: MEDICARE

## 2018-07-30 ENCOUNTER — OFFICE VISIT (OUTPATIENT)
Dept: FAMILY MEDICINE CLINIC | Age: 53
End: 2018-07-30
Payer: MEDICARE

## 2018-07-30 VITALS
BODY MASS INDEX: 36.7 KG/M2 | SYSTOLIC BLOOD PRESSURE: 124 MMHG | HEIGHT: 64 IN | HEART RATE: 93 BPM | WEIGHT: 215 LBS | RESPIRATION RATE: 18 BRPM | DIASTOLIC BLOOD PRESSURE: 74 MMHG | OXYGEN SATURATION: 97 % | TEMPERATURE: 97.8 F

## 2018-07-30 DIAGNOSIS — M54.16 LUMBAR RADICULOPATHY: ICD-10-CM

## 2018-07-30 DIAGNOSIS — Z51.81 ENCOUNTER FOR MEDICATION MONITORING: ICD-10-CM

## 2018-07-30 DIAGNOSIS — J30.2 CHRONIC SEASONAL ALLERGIC RHINITIS, UNSPECIFIED TRIGGER: Primary | ICD-10-CM

## 2018-07-30 DIAGNOSIS — M47.816 LUMBAR FACET ARTHROPATHY: ICD-10-CM

## 2018-07-30 DIAGNOSIS — M43.06 LUMBAR SPONDYLOLYSIS: ICD-10-CM

## 2018-07-30 DIAGNOSIS — Z79.891 CHRONIC USE OF OPIATE FOR THERAPEUTIC PURPOSE: ICD-10-CM

## 2018-07-30 DIAGNOSIS — M46.1 SACROILIITIS (HCC): ICD-10-CM

## 2018-07-30 DIAGNOSIS — M51.36 DDD (DEGENERATIVE DISC DISEASE), LUMBAR: ICD-10-CM

## 2018-07-30 DIAGNOSIS — M47.817 LUMBOSACRAL SPONDYLOSIS WITHOUT MYELOPATHY: Primary | ICD-10-CM

## 2018-07-30 PROCEDURE — G8417 CALC BMI ABV UP PARAM F/U: HCPCS | Performed by: NURSE PRACTITIONER

## 2018-07-30 PROCEDURE — 1036F TOBACCO NON-USER: CPT | Performed by: NURSE PRACTITIONER

## 2018-07-30 PROCEDURE — 99213 OFFICE O/P EST LOW 20 MIN: CPT

## 2018-07-30 PROCEDURE — G8427 DOCREV CUR MEDS BY ELIG CLIN: HCPCS | Performed by: NURSE PRACTITIONER

## 2018-07-30 PROCEDURE — 3017F COLORECTAL CA SCREEN DOC REV: CPT | Performed by: NURSE PRACTITIONER

## 2018-07-30 PROCEDURE — 99213 OFFICE O/P EST LOW 20 MIN: CPT | Performed by: NURSE PRACTITIONER

## 2018-07-30 RX ORDER — HYDROCODONE BITARTRATE AND ACETAMINOPHEN 5; 325 MG/1; MG/1
1 TABLET ORAL EVERY 24 HOURS
Qty: 30 TABLET | Refills: 0 | Status: SHIPPED | OUTPATIENT
Start: 2018-08-01 | End: 2018-08-30 | Stop reason: SDUPTHER

## 2018-07-30 RX ORDER — VENLAFAXINE HYDROCHLORIDE 75 MG/1
CAPSULE, EXTENDED RELEASE ORAL
COMMUNITY
Start: 2018-07-20 | End: 2018-11-01 | Stop reason: SDUPTHER

## 2018-07-30 RX ORDER — MONTELUKAST SODIUM 10 MG/1
10 TABLET ORAL NIGHTLY
Qty: 30 TABLET | Refills: 3 | Status: SHIPPED | OUTPATIENT
Start: 2018-07-30 | End: 2018-11-14 | Stop reason: SDUPTHER

## 2018-07-30 RX ORDER — FLUTICASONE PROPIONATE 50 MCG
1 SPRAY, SUSPENSION (ML) NASAL DAILY
Qty: 1 BOTTLE | Refills: 3 | Status: SHIPPED | OUTPATIENT
Start: 2018-07-30 | End: 2018-09-27 | Stop reason: SDUPTHER

## 2018-07-30 ASSESSMENT — ENCOUNTER SYMPTOMS
BACK PAIN: 1
WHEEZING: 0
DIARRHEA: 0
BACK PAIN: 1
COUGH: 0
ABDOMINAL DISTENTION: 0
SORE THROAT: 0
CHEST TIGHTNESS: 1
SINUS PRESSURE: 0
NAUSEA: 0
EYE PAIN: 0
ABDOMINAL PAIN: 0
EYE DISCHARGE: 1
COUGH: 1
CONSTIPATION: 0
COLOR CHANGE: 0
SHORTNESS OF BREATH: 0
EYE ITCHING: 0
SHORTNESS OF BREATH: 0
BLOOD IN STOOL: 0
RHINORRHEA: 1
CONSTIPATION: 0

## 2018-07-30 NOTE — PROGRESS NOTES
Patient is here today to review medication contract. Chief Complaint: back pain    PMH: Patient complains of back pain. She has undergone facet joint injections on the left side at the levels of L2 3 through L5-S1 on 6/18/2018 without any relief of pain. She reports she is trying to exercise she did physical therapy in the past. She was taking tramadol 50 mg BID with mild relief but was switched to Norco 5 mg HS at last visit and states she is doing well with this medication - sleeping better and pain is well managed. Per Dr. Gemini Monreal POC: \"Patient was told that we will continue her on Norco for next 2-3 months to help with her diet exercise and weight loss and then we will try to taper the narcotic medication. She agrees with the treatment plan\". Back Pain   This is a chronic problem. The current episode started more than 1 year ago. The problem occurs constantly. The problem is unchanged. The pain is present in the lumbar spine. The quality of the pain is described as stabbing. The pain does not radiate. The pain is at a severity of 8/10. The pain is moderate. Exacerbated by: walking. Pertinent negatives include no chest pain or fever. She has tried analgesics, bed rest, heat and ice for the symptoms. The treatment provided mild relief. Patient denies any new neurological symptoms. No bowel or bladder incontinence, no weakness, and no falling. Pill count: appropriate    Morphine equivalent: 5    Attestation: The Prescription Monitoring Report for this patient was reviewed today. (Paola Cohen, APRN - CNP)  Documentation: Possible medication side effects, risk of tolerance/dependence & alternative treatments discussed., No signs of potential drug abuse or diversion identified.  Paola Cohen, APRN - CNP)  Medication Contracts: Existing medication contract. Paola Cohen, APRN - CNP)      Past Medical History:   Diagnosis Date    Acid reflux     Arthritis     Coughing

## 2018-07-30 NOTE — PROGRESS NOTES
z-pack she was on did not help. Still feeling a little tight in chest and back. States she thinks she is having seasonal allergies. Hemoglobin A1C (%)   Date Value   01/16/2018 6.2   10/11/2017 6.4 (H)   10/10/2017 6.4 (H)             ( goal A1C is < 7)   Microalb/Crt. Ratio (mcg/mg creat)   Date Value   10/09/2017 24     LDL Cholesterol (mg/dL)   Date Value   10/10/2017 101   10/09/2017 120   02/11/2015 74       (goal LDL is <100)   AST (U/L)   Date Value   06/29/2018 17     ALT (U/L)   Date Value   06/29/2018 20     BUN (mg/dL)   Date Value   06/29/2018 14     BP Readings from Last 3 Encounters:   07/30/18 124/74   07/17/18 131/88   07/17/18 128/87          (goal 120/80)    Past Medical History:   Diagnosis Date    Acid reflux     Arthritis     Coughing     dry due to ace inhibitor    Depression     uses Abilify, Buspar, Trazodone for this    Diabetes mellitus ([facility] [address]     Diverticulitis     Epilepsy ([facility] [address]     last seizure 6 mos ago     Factor V Leiden ([facility] [address]     Generalized abdominal pain     Hiatal hernia     History of stroke associated with blood clotting tendency 2003    had clot in brain and left neck, left side weakness residual    Hx of blood clots     right breast, brain, neck    Hyperlipidemia     Hypertension     Insomnia     uses Trazodone for this    Pre-procedure lab exam 1/9/2018    Sleep apnea     C PAP    Unspecified cerebral artery occlusion with cerebral infarction 6/2003    SEE BELOW, 20017 Mini stroke    Wears glasses       Past Surgical History:   Procedure Laterality Date    APPENDECTOMY      CHOLECYSTECTOMY      COLONOSCOPY      with polypectomy    COLONOSCOPY  1/22/16    AND EGD    COLONOSCOPY  01/05/2017    DR MACKAY-random biopsies.     ENDOMETRIAL ABLATION      2 times    HERNIA REPAIR      HIATAL HERNIA REPAIR  2/4/2016    laparoscopic robotic    HYSTERECTOMY      with BSO    KNEE ARTHROSCOPY Left 4/7/15, 2015    x 2    LAPAROSCOPY      \"[place] inserted in to fallopian tubes\" to close tubes   [facility] NECK SURGERY  July 2012    Anterior, C5,6,7 bulging disk repair    CA COLONOSCOPY FLX DX W/COLLJ SPEC WHEN PFRMD N/A 7/17/2018    COLONOSCOPY performed by Etelvina Thomas MD at [facility] ENDOSCOPY      Jan 2016 DR Gracia Done    UPPER GASTROINTESTINAL ENDOSCOPY N/A 7/17/2018    EGD BIOPSY performed by Etelvina Thomas MD at [address] History   Problem Relation Age of Onset    Heart Disease Mother         dies age 32   [facility] Cancer Father         lymph nodes    Pacemaker Sister        Social History   Substance Use Topics    Smoking status: Never Smoker    Smokeless tobacco: Never Used    Alcohol use 0.0 oz/week      Comment: occ      Current Outpatient Prescriptions   Medication Sig Dispense Refill    venlafaxine (EFFEXOR XR) 75 MG extended release capsule TAKE 1 CAPSULE BY MOUTH DAILY ALONG WITH 150 MG CAPSULE. TOTAL = 225 MG      montelukast (SINGULAIR) 10 MG tablet Take 1 tablet by mouth nightly 30 tablet 3    fluticasone (FLONASE) 50 MCG/ACT nasal spray 1 spray by Nasal route daily One spray each nostril at bed-time 1 Bottle 3    venlafaxine (EFFEXOR) 75 MG tablet Take 1 tablet by mouth daily With 150 mg tablet daily 90 tablet     cyclobenzaprine (FLEXERIL) 10 MG tablet 1 tablet 3 times daily as needed 60 tablet 0    HYDROcodone-acetaminophen (NORCO) 5-325 MG per tablet Take 1 tablet by mouth every 24 hours for 30 days. . 30 tablet 0    dicyclomine (BENTYL) 20 MG tablet TAKE 1 TABLET (20 MG TOTAL) BY MOUTH 2 (TWO) TIMES A DAY FOR 30 DOSES.  0    gabapentin (NEURONTIN) 100 MG capsule TAKE 1 TABLET DAILY TWICE A DAY. .  2    venlafaxine (EFFEXOR XR) 150 MG extended release capsule TAKE 1 CAPSULE (150 MG TOTAL) BY MOUTH DAILY.   1    atorvastatin (LIPITOR) 80 MG tablet TAKE 1 TABLET BY MOUTH NIGHTLY AT BEDTIME 30 tablet 5    furosemide (LASIX) 20 MG tablet TAKE ONE TABLET BY MOUTH DAILY 90 tablet 1    temazepam (RESTORIL) 15 MG capsule Take 15 mg by mouth. [place] ONE TOUCH ULTRA TEST strip TEST BLOOD SUGAR 3 TIMES A  strip 5    colestipol (COLESTID) 1 g tablet Take 1 g by mouth      omeprazole-sodium bicarbonate (ZEGERID)  MG per capsule Take 1 capsule by mouth every morning (before breakfast) 30 capsule 5    clotrimazole (LOTRIMIN) 1 % cream Apply topically 2 times daily. 15 g 0    topiramate (TOPAMAX) 25 MG tablet       Cholecalciferol (VITAMIN D) 2000 units TABS tablet Take 1 tablet by mouth daily 30 tablet 5    PROAIR  (90 Base) MCG/ACT inhaler       levETIRAcetam (KEPPRA) 500 MG tablet TAKE ONE TABLET BY MOUTH TWO TIMES DAILY  4    warfarin (COUMADIN) 7.5 MG tablet       levETIRAcetam (KEPPRA) 250 MG tablet       losartan (COZAAR) 25 MG tablet       potassium chloride (MICRO-K) 10 MEQ extended release capsule       EXACTECH TEST strip       Misc. Devices MISC True Plus     Testing 3 times a day 100 each 5    busPIRone (BUSPAR) 10 MG tablet Take 10 mg by mouth      TRUEPLUS LANCETS 30G MISC 1 each by Does not apply route daily 100 each 3    ARIPiprazole (ABILIFY) 10 MG tablet TAKE 1 TABLET BY MOUTH NIGHTLY 30 tablet 2     No current facility-administered medications for this visit.       Allergies   Allergen Reactions    Latex Other (See Comments)     Red marks on body    Adhesive Tape      Also plastic tape    Morphine And Related Nausea Only    Other Other (See Comments)     Surgical staples cause infection    Aspirin Nausea And Vomiting       Health Maintenance   Topic Date Due    Diabetic foot exam  06/26/1976    Diabetic retinal exam  06/26/1976    HIV screen  06/26/1981    DTaP/Tdap/Td vaccine (1 - Tdap) 06/26/1985    Shingles Vaccine (1 of 2 - 2 Dose Series) 06/26/2016    Flu vaccine (1) 09/01/2018    Diabetic microalbuminuria test  10/09/2018    Lipid screen  10/10/2018    A1C test (Diabetic or Prediabetic)  01/16/2019    Creatinine monitoring normal.   Left Ear: External ear normal.   Nose: Nose normal.   Mouth/Throat: Oropharynx is clear and moist.   Mild rhinorrhea    Eyes: Conjunctivae and EOM are normal. Right eye exhibits discharge. Left eye exhibits discharge. Eyes sl watery, slight periorbital edema with right side more than left   Neck: Normal range of motion. Neck supple. No thyromegaly present. Cardiovascular: Normal rate, regular rhythm and normal heart sounds. Exam reveals no gallop and no friction rub. No murmur heard. Pulmonary/Chest: Effort normal. No respiratory distress. She has wheezes. She has no rales. She exhibits no tenderness. Abdominal: Soft. Bowel sounds are normal. She exhibits no distension. There is no splenomegaly or hepatomegaly. There is no tenderness. No hernia. Musculoskeletal: Normal range of motion. She exhibits no edema or tenderness. Lymphadenopathy:     She has no cervical adenopathy. Neurological: She is alert and oriented to person, place, and time. Coordination and gait normal.   Skin: Skin is warm and dry. No rash noted. No erythema. Psychiatric: She has a normal mood and affect. Her speech is normal and behavior is normal. Judgment and thought content normal. Cognition and memory are normal.   Nursing note and vitals reviewed. Assessment:      1. Chronic seasonal allergic rhinitis, unspecified trigger                     Plan:      No orders of the defined types were placed in this encounter.     Orders Placed This Encounter   Medications    montelukast (SINGULAIR) 10 MG tablet     Sig: Take 1 tablet by mouth nightly     Dispense:  30 tablet     Refill:  3    fluticasone (FLONASE) 50 MCG/ACT nasal spray     Si spray by Nasal route daily One spray each nostril at bed-time     Dispense:  1 Bottle     Refill:  3   Rhinitis:  Reduce exposure to triggers  May irrigate nose with Neti-pot   Use fluticasone as directed  Take an antihistamine to relieve sneezing, and runny nose  Do not smoke  Do not expose self to second hand smoke. Seasonal allergies: Take singulair as prescribed  Keep windows closed during peak pollen times. RTO if breathing worsens or other symptoms appear. Return in about 6 months (around 1/30/2019), or if symptoms worsen or fail to improve. Patient given educational materials - see patient instructions. Discussed use, benefit, and side effects of prescribed medications. All patient questions answered. Pt voiced understanding. Reviewed health maintenance. Instructed to continue current medications, diet and exercise. Patient agreed with treatment plan. Follow up as directed.      Electronically signed by KAMARI Montano CNP on 7/30/2018

## 2018-08-06 ENCOUNTER — TELEPHONE (OUTPATIENT)
Dept: FAMILY MEDICINE CLINIC | Age: 53
End: 2018-08-06

## 2018-08-13 ENCOUNTER — TELEPHONE (OUTPATIENT)
Dept: FAMILY MEDICINE CLINIC | Age: 53
End: 2018-08-13

## 2018-08-13 DIAGNOSIS — S83.92XD SPRAIN OF LEFT KNEE, UNSPECIFIED LIGAMENT, SUBSEQUENT ENCOUNTER: Primary | ICD-10-CM

## 2018-08-14 ENCOUNTER — TELEPHONE (OUTPATIENT)
Dept: ORTHOPEDIC SURGERY | Age: 53
End: 2018-08-14

## 2018-08-14 ENCOUNTER — HOSPITAL ENCOUNTER (EMERGENCY)
Age: 53
Discharge: HOME | End: 2018-08-14
Attending: EMERGENCY MEDICINE
Payer: MEDICARE

## 2018-08-14 VITALS
HEIGHT: 64 IN | RESPIRATION RATE: 16 BRPM | OXYGEN SATURATION: 96 % | BODY MASS INDEX: 36.88 KG/M2 | WEIGHT: 216 LBS | HEART RATE: 84 BPM | SYSTOLIC BLOOD PRESSURE: 117 MMHG | TEMPERATURE: 97.7 F | DIASTOLIC BLOOD PRESSURE: 92 MMHG

## 2018-08-14 DIAGNOSIS — M25.562 ACUTE PAIN OF LEFT KNEE: Primary | ICD-10-CM

## 2018-08-14 LAB
INR BLD: 1.8
PROTHROMBIN TIME: 18.7 SEC (ref 9.7–12)

## 2018-08-14 PROCEDURE — 6370000000 HC RX 637 (ALT 250 FOR IP): Performed by: PHYSICIAN ASSISTANT

## 2018-08-14 PROCEDURE — 85610 PROTHROMBIN TIME: CPT

## 2018-08-14 PROCEDURE — 99284 EMERGENCY DEPT VISIT MOD MDM: CPT

## 2018-08-14 PROCEDURE — 36415 COLL VENOUS BLD VENIPUNCTURE: CPT

## 2018-08-14 RX ORDER — TRAMADOL HYDROCHLORIDE 50 MG/1
50 TABLET ORAL ONCE
Status: COMPLETED | OUTPATIENT
Start: 2018-08-14 | End: 2018-08-14

## 2018-08-14 RX ORDER — NAPROXEN 500 MG/1
500 TABLET ORAL 2 TIMES DAILY
Qty: 20 TABLET | Refills: 0 | Status: SHIPPED | OUTPATIENT
Start: 2018-08-14 | End: 2018-12-04 | Stop reason: ALTCHOICE

## 2018-08-14 RX ADMIN — TRAMADOL HYDROCHLORIDE 50 MG: 50 TABLET, FILM COATED ORAL at 13:22

## 2018-08-14 ASSESSMENT — PAIN DESCRIPTION - PAIN TYPE
TYPE: ACUTE PAIN
TYPE: ACUTE PAIN

## 2018-08-14 ASSESSMENT — PAIN DESCRIPTION - LOCATION
LOCATION: LEG;KNEE;FOOT
LOCATION: KNEE

## 2018-08-14 ASSESSMENT — PAIN SCALES - GENERAL
PAINLEVEL_OUTOF10: 10
PAINLEVEL_OUTOF10: 6
PAINLEVEL_OUTOF10: 10

## 2018-08-14 ASSESSMENT — PAIN DESCRIPTION - ORIENTATION
ORIENTATION: LEFT
ORIENTATION: LEFT

## 2018-08-14 NOTE — ED PROVIDER NOTES
daily, Disp-90 tabletHistorical Med      cyclobenzaprine (FLEXERIL) 10 MG tablet 1 tablet 3 times daily as needed, Disp-60 tablet, R-0NO PRINT      dicyclomine (BENTYL) 20 MG tablet TAKE 1 TABLET (20 MG TOTAL) BY MOUTH 2 (TWO) TIMES A DAY FOR 30 DOSES., R-0Historical Med      !! venlafaxine (EFFEXOR XR) 150 MG extended release capsule TAKE 1 CAPSULE (150 MG TOTAL) BY MOUTH DAILY., R-1Historical Med      atorvastatin (LIPITOR) 80 MG tablet TAKE 1 TABLET BY MOUTH NIGHTLY AT BEDTIME, Disp-30 tablet, R-5Normal      furosemide (LASIX) 20 MG tablet TAKE ONE TABLET BY MOUTH DAILY, Disp-90 tablet, R-1Normal      temazepam (RESTORIL) 15 MG capsule Take 15 mg by mouth. .Historical Med      !! ONE TOUCH ULTRA TEST strip TEST BLOOD SUGAR 3 TIMES A DAY, Disp-100 strip, R-5Normal      colestipol (COLESTID) 1 g tablet Take 1 g by mouthHistorical Med      omeprazole-sodium bicarbonate (ZEGERID)  MG per capsule Take 1 capsule by mouth every morning (before breakfast), Disp-30 capsule, R-5Normal      clotrimazole (LOTRIMIN) 1 % cream Apply topically 2 times daily. , Disp-15 g, R-0, Normal      topiramate (TOPAMAX) 25 MG tablet Historical Med      Cholecalciferol (VITAMIN D) 2000 units TABS tablet Take 1 tablet by mouth daily, Disp-30 tablet, R-5Normal      PROAIR  (90 Base) MCG/ACT inhaler DAWHistorical Med      !! levETIRAcetam (KEPPRA) 500 MG tablet TAKE ONE TABLET BY MOUTH TWO TIMES DAILY, R-4Historical Med      warfarin (COUMADIN) 7.5 MG tablet Historical Med      !! levETIRAcetam (KEPPRA) 250 MG tablet Historical Med      losartan (COZAAR) 25 MG tablet Historical Med      potassium chloride (MICRO-K) 10 MEQ extended release capsule Historical Med      !! EXACTECH TEST strip DAWHistorical Med      Misc.  Devices MISC Disp-100 each, R-5, NormalTrue Plus     Testing 3 times a day      busPIRone (BUSPAR) 10 MG tablet Take 10 mg by mouthHistorical Med      TRUEPLUS LANCETS 30G MISC DAILY Starting 4/11/2017, Until Discontinued, Disp-100 each, R-3, Normal      ARIPiprazole (ABILIFY) 10 MG tablet TAKE 1 TABLET BY MOUTH NIGHTLY, Disp-30 tablet, R-2Normal       !! - Potential duplicate medications found. Please discuss with provider. ALLERGIES     Latex; Adhesive tape; Morphine and related; Other; and Aspirin    FAMILY HISTORY       Family History   Problem Relation Age of Onset    Heart Disease Mother         dies age 32    Cancer Father         lymph nodes    Pacemaker Sister      Family Status   Relation Status    Mother     Father Alive    Sister Alive      None otherwise stated in nurses note    SOCIAL HISTORY      reports that she has never smoked. She has never used smokeless tobacco. She reports that she drinks alcohol. She reports that she does not use drugs. Lives at home with others    PHYSICAL EXAM    (up to 7 for level 4, 8 or more for level 5)     ED Triage Vitals [18 1251]   BP Temp Temp src Pulse Resp SpO2 Height Weight   (!) 172/89 97.7 °F (36.5 °C) -- 105 16 98 % 5' 4\" (1.626 m) 216 lb (98 kg)       Physical Exam   Nursing note and vitals reviewed. Constitutional: Oriented to person, place, and time and well-developed, well-nourished, and in no distress. Head: Normocephalic and atraumatic. Ear: External ears normal.   Nose: Nose normal and midline. Eyes: Conjunctivae and EOM are normal. Pupils are equal, round, and reactive to light. Cardiovascular: Normal rate, regular rhythm, normal heart sounds and intact distal pulses. Pulmonary/Chest: Effort normal and breath sounds normal. No respiratory distress. No wheezes. No rales. No chest tenderness. Musculoskeletal: There is some tenderness palpation to the 1-2+ pitting edema pretibial dorsal left foot. Pulses intact, range of motion is normal.  No swelling, negative Homans sign, no bands or cords palpated calf. Neurological: Alert and oriented to person, place, and time. GCS score is 15. Skin: Skin is warm and dry. No rash noted. No erythema. No pallor. DIAGNOSTIC RESULTS     RADIOLOGY:   All plain film, CT, MRI, and formal ultrasound images (except ED bedside ultrasound) are read by the radiologist   VL DUP LOWER EXTREMITY VENOUS LEFT    (Results Pending)       Negative for DVT    Nm Gastric Emptying    Result Date: 7/26/2018  EXAMINATION: NUCLEAR MEDICINE GASTRIC EMPTYING STUDY 7/26/2018 1:15 pm TECHNIQUE: Following ingestion of a standard solid meal labeled with 1.1 mCi Tc 99m sulfur colloid, planar images of the chest and abdomen were obtained at 0, 1, 2 and 4 hours in both anterior and posterior projections. Regions of interest were drawn around the stomach and geometric means were calculated. All medications capable of altering motility were held. COMPARISON: None. HISTORY: ORDERING SYSTEM PROVIDED HISTORY: Pelvic pain in female TECHNOLOGIST PROVIDED HISTORY: Ordering Physician Provided Reason for Exam: Pelvic pain in female, history of stroke associated with blood clotting tendency, factor V leiden, Primary insomnia, hiatal hernia, depressice disorder, chronic post-traumatic stress disorder, generalized anxiety, functional diarrhea, hypertension, lumbosacral spondylosis, type 2 diabetes, lumbar radiculopathy, facial numbness, leftsided weakness, worsening headaches, chronic use of opiate for therapeutic purpose, lumbar sponylolysis, abd pain, DDD, Encounter for mediciation monitorning, lumbar facet arthropathy, sacroiliitis Acuity: Unknown Type of Exam: Unknown Additional signs and symptoms: Pt states no heartburn, no n/v, no abd pain, diabetic FINDINGS: The gastric emptying were calculated as follows: At 60 min, there is 5.6% emptying of the stomach. At 120 min, there is 14.9% emptying of the stomach. (Normal is >40%) At 240 min, there is 36.2% emptying of the stomach. (Normal is >90%)     Significantly delayed gastric emptying as measured above.      Nm Gastric Emptying    Result Date: (36.5 °C)    SpO2: 98% 96%   Weight: 216 lb (98 kg)    Height: 5' 4\" (1.626 m)        Most likely due to the increased wraps were on the left knee 4 days. Instructed patient to leave to elevate the follow-up with ortho provided. Patient instructed to return to the emergency room if symptoms worsen, return, or any other concern right away which is agreed. Instructed to follow up with pcp/ortho provided as soon as possible    MEDS  Orders Placed This Encounter   Medications    traMADol (ULTRAM) tablet 50 mg    naproxen (NAPROSYN) 500 MG tablet     Sig: Take 1 tablet by mouth 2 times daily     Dispense:  20 tablet     Refill:  0         CONSULTS:  None    PROCEDURES:  None        FINAL IMPRESSION      1.  Acute pain of left knee          DISPOSITION/PLAN   DISPOSITION Decision To Discharge    PATIENT REFERRED TO:  Sonido Mast MD  [address] [place] [address]  [address] [facility] [address]  [phone]    Schedule an appointment as soon as possible for a visit       [address] [facility] [address]  [phone]    For worsening symptoms, or any other concern      DISCHARGE MEDICATIONS:  Discharge Medication List as of 8/14/2018  2:31 PM      START taking these medications    Details   naproxen (NAPROSYN) 500 MG tablet Take 1 tablet by mouth 2 times daily, Disp-20 tablet, R-0Print             (Please note that portions of this note were completed with a voice recognition program.  Efforts were made to edit the dictations but occasionally words are mis-transcribed.)    SANTA Jorge PA  08/14/18 [id number]

## 2018-08-22 ENCOUNTER — TELEPHONE (OUTPATIENT)
Dept: GASTROENTEROLOGY | Age: 53
End: 2018-08-22

## 2018-08-23 ENCOUNTER — TELEPHONE (OUTPATIENT)
Dept: GASTROENTEROLOGY | Age: 53
End: 2018-08-23

## 2018-08-23 NOTE — TELEPHONE ENCOUNTER
Patient returning call to reschedule colonoscopy with 2 day prep, she would just like to discuss this with Dr Eryn Pulido on her appointment scheduled on 8-31-18.

## 2018-08-23 NOTE — TELEPHONE ENCOUNTER
Writer tried calling pt's phone and pt VM box is full so was unable to LM to R/S colonoscopy with 2 day prep. Writer call E.C to have pt call DR Jeronimo's office to get R/S for her colonoscopy.

## 2018-08-30 ENCOUNTER — HOSPITAL ENCOUNTER (OUTPATIENT)
Dept: PAIN MANAGEMENT | Age: 53
Discharge: HOME | End: 2018-08-30
Payer: MEDICARE

## 2018-08-30 VITALS
DIASTOLIC BLOOD PRESSURE: 81 MMHG | HEIGHT: 64 IN | TEMPERATURE: 98 F | RESPIRATION RATE: 16 BRPM | OXYGEN SATURATION: 95 % | SYSTOLIC BLOOD PRESSURE: 128 MMHG | WEIGHT: 216 LBS | HEART RATE: 76 BPM | BODY MASS INDEX: 36.88 KG/M2

## 2018-08-30 DIAGNOSIS — Z51.81 ENCOUNTER FOR MEDICATION MONITORING: ICD-10-CM

## 2018-08-30 DIAGNOSIS — M46.1 SACROILIITIS (HCC): ICD-10-CM

## 2018-08-30 DIAGNOSIS — M54.16 LUMBAR RADICULOPATHY: ICD-10-CM

## 2018-08-30 DIAGNOSIS — Z79.891 CHRONIC USE OF OPIATE FOR THERAPEUTIC PURPOSE: ICD-10-CM

## 2018-08-30 DIAGNOSIS — M47.816 LUMBAR FACET ARTHROPATHY: ICD-10-CM

## 2018-08-30 DIAGNOSIS — M43.06 LUMBAR SPONDYLOLYSIS: ICD-10-CM

## 2018-08-30 DIAGNOSIS — M47.817 LUMBOSACRAL SPONDYLOSIS WITHOUT MYELOPATHY: Primary | ICD-10-CM

## 2018-08-30 DIAGNOSIS — M51.36 DDD (DEGENERATIVE DISC DISEASE), LUMBAR: ICD-10-CM

## 2018-08-30 PROCEDURE — 99213 OFFICE O/P EST LOW 20 MIN: CPT

## 2018-08-30 PROCEDURE — 99213 OFFICE O/P EST LOW 20 MIN: CPT | Performed by: NURSE PRACTITIONER

## 2018-08-30 RX ORDER — PANTOPRAZOLE SODIUM 20 MG/1
TABLET, DELAYED RELEASE ORAL
Refills: 11 | COMMUNITY
Start: 2018-06-01 | End: 2018-08-30

## 2018-08-30 RX ORDER — ONDANSETRON 4 MG/1
4 TABLET, FILM COATED ORAL EVERY 12 HOURS PRN
Qty: 10 TABLET | Refills: 0 | Status: SHIPPED | OUTPATIENT
Start: 2018-08-30 | End: 2018-09-04

## 2018-08-30 RX ORDER — HYDROCODONE BITARTRATE AND ACETAMINOPHEN 5; 325 MG/1; MG/1
1 TABLET ORAL EVERY 24 HOURS
Qty: 30 TABLET | Refills: 0 | Status: SHIPPED | OUTPATIENT
Start: 2018-09-02 | End: 2018-09-28 | Stop reason: SDUPTHER

## 2018-08-30 ASSESSMENT — ENCOUNTER SYMPTOMS
BACK PAIN: 1
RESPIRATORY NEGATIVE: 1

## 2018-08-30 NOTE — PROGRESS NOTES
Sudhir 89 PROGRESS NOTE      Patient here today to review MEDICATION CONTRACT    Chief Complaint:low back pain    HPI: She c/o low back pain is unchanged. PT did not help, Lumbar injection did not help. . She has pain left knee,  She saw an Orthopaedic Dr had an injection which did not help, She has an appt to see another orthopaedic surgeon  In October. She states was referred to pain clinic in [place] for her knee pain by the orthopaedic surgeon. She has had ED visits for knee pain. Her sleep is poor. She c/o nausea due to pain. Back Pain   This is a chronic problem. The current episode started more than 1 year ago. The problem occurs constantly. The problem is unchanged. The pain is present in the lumbar spine. Quality: dull. The pain does not radiate. The pain is at a severity of 8/10. The pain is severe. Exacerbated by: walking. Stiffness is present all day. Associated symptoms include headaches. Risk factors include obesity, sedentary lifestyle and menopause. She has tried analgesics and bed rest for the symptoms. The treatment provided mild relief. Patient denies any new neurological symptoms. No bowel or bladder incontinence, no weakness, and no falling. Treatment goals:  Functional status: reduce pain      Aberrancy  None   Analgesia  Pain 8  Adverse  Effects :none bM daily  ADL;s : activity limited        Pill count: appropriate    Morphine equivalent dose as reported on OARRS:5     Review of OARRS does not show any aberrant prescription behavior. Medication is helping the patient stay active. Patient denies any side effects and reports adequate analgesia. No sign of misuse/abuse.         When was the last UDS:    2-8-18         Was the UDS appropriate:    INCONSISTENT with medications provided:   TRAMADOL : based on negative immunoassay   Oxazepam   Temazepam   Record/Diagnostics Review:      As above, I did review the imaging    2/13/2018  8:03 AM - Shemar, Edelmira Incoming 20.0 - 400.0 mg/dL Final 02/08/2018  3:34 PM [facility]   Pain Mgt Drug Panel, Hi Res, Ur See Below   Final 02/08/2018  3:34 PM [facility]   (NOTE)   Methodology: Qualitative Enzyme Immunoassay and Qualitative Liquid   Chromatography-Time of Flight-Mass Spectrometry or Tandem Mass   Spectrometry, Quantitative Spectrophotometry   The absence of expected drug(s) and/or drug metabolite(s) may   indicate non-compliance, inappropriate timing of specimen   collection relative to drug administration, poor drug absorption,   diluted/adulterated urine, or limitations of testing. The   concentration must be greater than or equal to the cutoff to be   reported as present.  If specific drug concentrations are   required, contact the laboratory within two weeks of specimen   collection to request quantification by a second analytical   technique. Interpretive questions should be directed to the   laboratory. Results based on immunoassay detection that do not match clinical   expectations should be   interpreted with caution. Confirmatory testing by mass   spectrometry for immunoassay-based results is available, if   ordered within two weeks of specimen collection. Additional   charges apply. For medical purposes only; not valid for forensic use. This test was developed and its performance characteristics   determined by [facility]. The U.S. Food and Drug   Administration has not approved or cleared this test; however, FDA   clearance or approval is not currently required for clinical use. The results are not intended to be used as the sole means for   clinical diagnosis or patient management decisions. EER Hi Res Interp Ur See Note   Final 02/08/2018  3:34 PM [facility]   (NOTE)   Access [facility] Enhanced Report using either link below:   -Direct access: [email or web address]   -Enter Username, Password: [email or web address]   Username: [email or web address]   Password: [email or web address]   Performed by [facility],   [facility] every morning (before breakfast), Disp: 30 capsule, Rfl: 5    clotrimazole (LOTRIMIN) 1 % cream, Apply topically 2 times daily. , Disp: 15 g, Rfl: 0    topiramate (TOPAMAX) 25 MG tablet, , Disp: , Rfl:     PROAIR  (90 Base) MCG/ACT inhaler, , Disp: , Rfl:     levETIRAcetam (KEPPRA) 500 MG tablet, TAKE ONE TABLET BY MOUTH TWO TIMES DAILY, Disp: , Rfl: 4    warfarin (COUMADIN) 7.5 MG tablet, , Disp: , Rfl:     levETIRAcetam (KEPPRA) 250 MG tablet, , Disp: , Rfl:     losartan (COZAAR) 25 MG tablet, , Disp: , Rfl:     potassium chloride (MICRO-K) 10 MEQ extended release capsule, , Disp: , Rfl:     busPIRone (BUSPAR) 10 MG tablet, Take 10 mg by mouth, Disp: , Rfl:     ARIPiprazole (ABILIFY) 10 MG tablet, TAKE 1 TABLET BY MOUTH NIGHTLY, Disp: 30 tablet, Rfl: 2    cyclobenzaprine (FLEXERIL) 10 MG tablet, 1 tablet 3 times daily as needed, Disp: 60 tablet, Rfl: 0    venlafaxine (EFFEXOR XR) 150 MG extended release capsule, TAKE 1 CAPSULE (150 MG TOTAL) BY MOUTH DAILY. , Disp: , Rfl: 1    ONE TOUCH ULTRA TEST strip, TEST BLOOD SUGAR 3 TIMES A DAY, Disp: 100 strip, Rfl: 5    Cholecalciferol (VITAMIN D) 2000 units TABS tablet, Take 1 tablet by mouth daily, Disp: 30 tablet, Rfl: 5    EXACTECH TEST strip, , Disp: , Rfl:     Misc.  Devices MISC, True Plus     Testing 3 times a day, Disp: 100 each, Rfl: 5    TRUEPLUS LANCETS 30G MISC, 1 each by Does not apply route daily, Disp: 100 each, Rfl: 3    Family History   Problem Relation Age of Onset    Heart Disease Mother         dies age 32    Cancer Father         lymph nodes    Pacemaker Sister        Social History     Social History    Marital status:      Spouse name: N/A    Number of children: N/A    Years of education: N/A     Occupational History    disability      Social History Main Topics    Smoking status: Never Smoker    Smokeless tobacco: Never Used    Alcohol use 0.0 oz/week      Comment: occ    Drug use: No    Sexual activity: Yes     Partners: Male     Other Topics Concern    Not on file     Social History Narrative    No narrative on file       Review of Systems:  Review of Systems   Constitution: Negative. HENT: Negative. Eyes:        Glasses   Cardiovascular: Negative. Respiratory: Negative. Endocrine:        Blood sugars 150    Hematologic/Lymphatic: Bruises/bleeds easily. Skin: Negative. Musculoskeletal: Positive for back pain and joint pain. Genitourinary: Negative. Neurological: Positive for headaches and light-headedness. Uses walker   Psychiatric/Behavioral: Positive for depression. In therapy, managing         Physical Exam:  /81   Pulse 76   Temp 98 °F (36.7 °C) (Oral)   Resp 16   Ht 5' 4\" (1.626 m)   Wt 216 lb (98 kg)   SpO2 95%   BMI 37.08 kg/m²     Physical Exam   Constitutional: She is well-developed, well-nourished, and in no distress. overweight   HENT:   Head: Normocephalic. Neck: Normal range of motion. Neck supple. Pulmonary/Chest: Effort normal.   Musculoskeletal:        Left knee: She exhibits decreased range of motion and deformity. Tenderness found. Medial joint line and lateral joint line tenderness noted. Lumbar back: She exhibits tenderness.         Back:         Legs:        Assessment:    Problem List Items Addressed This Visit     Chronic use of opiate for therapeutic purpose (Chronic)    Lumbosacral spondylosis without myelopathy - Primary    Relevant Medications    HYDROcodone-acetaminophen (NORCO) 5-325 MG per tablet (Start on 9/2/2018)    Lumbar radiculopathy    Relevant Medications    HYDROcodone-acetaminophen (NORCO) 5-325 MG per tablet (Start on 9/2/2018)    Lumbar spondylolysis    DDD (degenerative disc disease), lumbar    Relevant Medications    HYDROcodone-acetaminophen (NORCO) 5-325 MG per tablet (Start on 9/2/2018)    Encounter for medication monitoring    Lumbar facet arthropathy ([facility] [address]    Relevant Medications

## 2018-09-01 RX ORDER — LEVETIRACETAM 250 MG/1
250 TABLET ORAL 2 TIMES DAILY
Qty: 180 TABLET | Refills: 1 | Status: SHIPPED | OUTPATIENT
Start: 2018-09-01

## 2018-09-10 ENCOUNTER — NURSE ONLY (OUTPATIENT)
Dept: FAMILY MEDICINE CLINIC | Age: 53
End: 2018-09-10
Payer: MEDICARE

## 2018-09-10 VITALS — RESPIRATION RATE: 16 BRPM | BODY MASS INDEX: 36.88 KG/M2 | WEIGHT: 216 LBS | HEIGHT: 64 IN

## 2018-09-10 DIAGNOSIS — D68.51 FACTOR V LEIDEN (HCC): ICD-10-CM

## 2018-09-10 DIAGNOSIS — G45.9 TRANSIENT CEREBRAL ISCHEMIA, UNSPECIFIED TYPE: Primary | ICD-10-CM

## 2018-09-10 LAB
INTERNATIONAL NORMALIZATION RATIO, POC: 1.5
PROTHROMBIN TIME, POC: 12.6

## 2018-09-10 PROCEDURE — 85610 PROTHROMBIN TIME: CPT | Performed by: NURSE PRACTITIONER

## 2018-09-11 RX ORDER — LOSARTAN POTASSIUM 25 MG/1
TABLET ORAL
Qty: 15 TABLET | Refills: 5 | Status: SHIPPED | OUTPATIENT
Start: 2018-09-11

## 2018-09-14 ENCOUNTER — TELEPHONE (OUTPATIENT)
Dept: FAMILY MEDICINE CLINIC | Age: 53
End: 2018-09-14

## 2018-09-17 RX ORDER — WARFARIN SODIUM 7.5 MG/1
TABLET ORAL
Qty: 60 TABLET | Refills: 1 | Status: SHIPPED | OUTPATIENT
Start: 2018-09-17 | End: 2018-10-16 | Stop reason: SDUPTHER

## 2018-09-17 RX ORDER — PROMETHAZINE HYDROCHLORIDE 25 MG/1
25 TABLET ORAL EVERY 6 HOURS PRN
Qty: 45 TABLET | Refills: 0 | Status: SHIPPED | OUTPATIENT
Start: 2018-09-17 | End: 2018-09-24

## 2018-09-18 ENCOUNTER — TELEPHONE (OUTPATIENT)
Dept: ONCOLOGY | Age: 53
End: 2018-09-18

## 2018-09-18 NOTE — TELEPHONE ENCOUNTER
HAO 7/9/2018 @ 9:30a    No call back    Called Dr. Alok Grant office on 9/18/208 and spoke with Radhames Booth and she said to disregard the order, because the patient is going to a different

## 2018-09-20 ENCOUNTER — HOSPITAL ENCOUNTER (OUTPATIENT)
Age: 53
Discharge: HOME | End: 2018-09-20
Payer: MEDICARE

## 2018-09-20 LAB
IRON SATURATION: 33 % (ref 20–55)
IRON: 97 UG/DL (ref 37–145)
POTASSIUM SERPL-SCNC: 3.5 MMOL/L (ref 3.7–5.3)
TOTAL IRON BINDING CAPACITY: 292 UG/DL (ref 250–450)
UNSATURATED IRON BINDING CAPACITY: 195 UG/DL (ref 112–347)
VITAMIN D 25-HYDROXY: 41 NG/ML (ref 30–100)

## 2018-09-20 PROCEDURE — 83540 ASSAY OF IRON: CPT

## 2018-09-20 PROCEDURE — 83550 IRON BINDING TEST: CPT

## 2018-09-20 PROCEDURE — 84132 ASSAY OF SERUM POTASSIUM: CPT

## 2018-09-20 PROCEDURE — 82306 VITAMIN D 25 HYDROXY: CPT

## 2018-09-20 PROCEDURE — 36415 COLL VENOUS BLD VENIPUNCTURE: CPT

## 2018-09-27 ENCOUNTER — OFFICE VISIT (OUTPATIENT)
Dept: GASTROENTEROLOGY | Age: 53
End: 2018-09-27
Payer: MEDICARE

## 2018-09-27 VITALS
WEIGHT: 206.4 LBS | BODY MASS INDEX: 35.43 KG/M2 | DIASTOLIC BLOOD PRESSURE: 89 MMHG | HEART RATE: 89 BPM | SYSTOLIC BLOOD PRESSURE: 133 MMHG

## 2018-09-27 DIAGNOSIS — K31.84 GASTROPARESIS: ICD-10-CM

## 2018-09-27 DIAGNOSIS — J30.2 CHRONIC SEASONAL ALLERGIC RHINITIS: ICD-10-CM

## 2018-09-27 DIAGNOSIS — R11.0 NAUSEA: ICD-10-CM

## 2018-09-27 DIAGNOSIS — K30 DELAYED GASTRIC EMPTYING: Primary | ICD-10-CM

## 2018-09-27 PROCEDURE — 99214 OFFICE O/P EST MOD 30 MIN: CPT | Performed by: INTERNAL MEDICINE

## 2018-09-27 PROCEDURE — G8417 CALC BMI ABV UP PARAM F/U: HCPCS | Performed by: INTERNAL MEDICINE

## 2018-09-27 PROCEDURE — 3017F COLORECTAL CA SCREEN DOC REV: CPT | Performed by: INTERNAL MEDICINE

## 2018-09-27 PROCEDURE — G8427 DOCREV CUR MEDS BY ELIG CLIN: HCPCS | Performed by: INTERNAL MEDICINE

## 2018-09-27 PROCEDURE — 1036F TOBACCO NON-USER: CPT | Performed by: INTERNAL MEDICINE

## 2018-09-27 RX ORDER — METOCLOPRAMIDE 10 MG/1
10 TABLET ORAL
Qty: 120 TABLET | Refills: 3 | Status: SHIPPED | OUTPATIENT
Start: 2018-09-27 | End: 2018-11-02 | Stop reason: SDUPTHER

## 2018-09-27 RX ORDER — FLUTICASONE PROPIONATE 50 MCG
SPRAY, SUSPENSION (ML) NASAL
Qty: 1 BOTTLE | Refills: 3 | Status: SHIPPED | OUTPATIENT
Start: 2018-09-27

## 2018-09-27 ASSESSMENT — ENCOUNTER SYMPTOMS
ABDOMINAL DISTENTION: 1
ANAL BLEEDING: 1
DIARRHEA: 1
RECTAL PAIN: 1
ABDOMINAL PAIN: 1
VOMITING: 0
BLOOD IN STOOL: 0
CONSTIPATION: 0
TROUBLE SWALLOWING: 0
ALLERGIC/IMMUNOLOGIC NEGATIVE: 1
RESPIRATORY NEGATIVE: 1
NAUSEA: 1

## 2018-09-27 NOTE — PROGRESS NOTES
Subjective:      Patient ID: Live Malin is a 47 y.o. female. Dr. Tyrone Kussmaul, APRN - CNP our mutual patient Live Malin was seen      Dr. Tyrone Kussmaul, APRN - CNP requested that you be seen for the evaluation of   1. Delayed gastric emptying    2. Gastroparesis    3. Nausea    . The patient is here as a follow up of her recent GI procedure. The results have been sent to you separately   The findings were explained to the patient in detail and biopsies were also discussed   with her  Retained food  Gastric emptying scan shows delayed emptying  Mild nausea  No bleeding  No melena    Past Medical, Family, and Social History reviewed and does contribute to the patient presenting condition. patient\"s PMH/PSH,SH,PSYCH hx, MEDs, ALLERGIES, and ROS was all reviewed and updated ion the appropriate sections    Abdominal Pain   Associated symptoms include arthralgias, diarrhea and nausea. Pertinent negatives include no constipation or vomiting. Diarrhea    Associated symptoms include abdominal pain and arthralgias. Pertinent negatives include no vomiting. Review of Systems   Constitutional: Positive for fatigue. HENT: Negative. Negative for trouble swallowing. Eyes: Positive for visual disturbance. Respiratory: Negative. Cardiovascular: Negative. Gastrointestinal: Positive for abdominal distention, abdominal pain, anal bleeding, diarrhea, nausea and rectal pain. Negative for blood in stool, constipation and vomiting. Endocrine: Negative. Genitourinary: Negative. Musculoskeletal: Positive for arthralgias. Skin: Negative. Allergic/Immunologic: Negative. Neurological: Positive for dizziness, weakness and light-headedness. Hematological: Bruises/bleeds easily. Psychiatric/Behavioral: Positive for sleep disturbance. The patient is nervous/anxious. Reviewed and agree  Objective:   Physical Exam   Constitutional: She is oriented to person, place, and time.  She

## 2018-09-28 ENCOUNTER — HOSPITAL ENCOUNTER (OUTPATIENT)
Dept: PAIN MANAGEMENT | Age: 53
Discharge: HOME | End: 2018-09-28
Payer: MEDICARE

## 2018-09-28 VITALS
SYSTOLIC BLOOD PRESSURE: 126 MMHG | WEIGHT: 206 LBS | HEIGHT: 64 IN | HEART RATE: 85 BPM | RESPIRATION RATE: 16 BRPM | DIASTOLIC BLOOD PRESSURE: 77 MMHG | TEMPERATURE: 98.2 F | BODY MASS INDEX: 35.17 KG/M2 | OXYGEN SATURATION: 97 %

## 2018-09-28 DIAGNOSIS — Z79.891 CHRONIC USE OF OPIATE FOR THERAPEUTIC PURPOSE: ICD-10-CM

## 2018-09-28 DIAGNOSIS — M47.817 LUMBOSACRAL SPONDYLOSIS WITHOUT MYELOPATHY: ICD-10-CM

## 2018-09-28 DIAGNOSIS — M51.36 DDD (DEGENERATIVE DISC DISEASE), LUMBAR: ICD-10-CM

## 2018-09-28 DIAGNOSIS — M47.816 LUMBAR FACET ARTHROPATHY: ICD-10-CM

## 2018-09-28 DIAGNOSIS — Z51.81 ENCOUNTER FOR MEDICATION MONITORING: Primary | ICD-10-CM

## 2018-09-28 DIAGNOSIS — M46.1 SACROILIITIS (HCC): ICD-10-CM

## 2018-09-28 DIAGNOSIS — M54.16 LUMBAR RADICULOPATHY: ICD-10-CM

## 2018-09-28 PROCEDURE — 99213 OFFICE O/P EST LOW 20 MIN: CPT

## 2018-09-28 PROCEDURE — 80307 DRUG TEST PRSMV CHEM ANLYZR: CPT

## 2018-09-28 PROCEDURE — 99214 OFFICE O/P EST MOD 30 MIN: CPT | Performed by: PAIN MEDICINE

## 2018-09-28 RX ORDER — HYDROCODONE BITARTRATE AND ACETAMINOPHEN 5; 325 MG/1; MG/1
1 TABLET ORAL EVERY 24 HOURS
Qty: 30 TABLET | Refills: 0 | Status: SHIPPED | OUTPATIENT
Start: 2018-10-04 | End: 2018-11-01 | Stop reason: SDUPTHER

## 2018-09-28 ASSESSMENT — PAIN DESCRIPTION - LOCATION: LOCATION: BACK

## 2018-09-28 ASSESSMENT — PAIN SCALES - GENERAL: PAINLEVEL_OUTOF10: 9

## 2018-09-28 ASSESSMENT — PAIN DESCRIPTION - ONSET: ONSET: ON-GOING

## 2018-09-28 ASSESSMENT — ENCOUNTER SYMPTOMS
BACK PAIN: 1
DIARRHEA: 1
EYES NEGATIVE: 1
SINUS PAIN: 1
RESPIRATORY NEGATIVE: 1

## 2018-09-28 ASSESSMENT — PAIN DESCRIPTION - ORIENTATION: ORIENTATION: LEFT;LOWER

## 2018-09-28 ASSESSMENT — PAIN DESCRIPTION - PAIN TYPE: TYPE: CHRONIC PAIN

## 2018-09-28 ASSESSMENT — PAIN DESCRIPTION - FREQUENCY: FREQUENCY: CONTINUOUS

## 2018-09-28 ASSESSMENT — PAIN DESCRIPTION - PROGRESSION: CLINICAL_PROGRESSION: GRADUALLY WORSENING

## 2018-09-28 ASSESSMENT — PAIN DESCRIPTION - DESCRIPTORS: DESCRIPTORS: CONSTANT;SHARP;STABBING

## 2018-09-28 ASSESSMENT — PAIN DESCRIPTION - DIRECTION: RADIATING_TOWARDS: LEFT LEG AND KNEE

## 2018-09-28 NOTE — PROGRESS NOTES
[facility] Pain Management  Patient Pain Assessment  RECHECK - Dr. Karri Miller    Primary Care Physician: Milton Boggs, KAMARI - CNP    Chief complaint:   Chief Complaint   Patient presents with    Lower Back Pain   . HISTORY OF PRESENT ILLNESS:  Carlos Manuel Macias is 47 y.o. female with    Patient returned to the pain clinic with a chief complaint of pain involving the low back. Pain is worse on the left side with pain radiating to the right thigh to the knee mostly. Patient had undergone lumbar facet joint injections on the left side with minimal improvement in the pain. She reports not much change in her back pain since her last visit. Her sleep patterns are somewhat poor. Back Pain   This is a chronic problem. The current episode started more than 1 year ago. The problem occurs constantly. The problem has been gradually worsening since onset. The pain is present in the sacro-iliac, lumbar spine and gluteal. Quality: Sharp stabbing in nature. The pain radiates to the left foot. The pain is at a severity of 9/10 (8-10). The pain is severe. The pain is the same all the time. The symptoms are aggravated by bending, standing, position and twisting (ADLs, lifting and walking). Stiffness is present in the morning. Associated symptoms include headaches and tingling. Pertinent negatives include no chest pain, dysuria, fever, numbness or weight loss. Risk factors include lack of exercise, sedentary lifestyle and obesity. Treatments tried: Lumbar facet joint injections. The treatment provided mild relief. Patient relates current medications are helping the pain. Patient reports taking pain medications as prescribed, denies obtaining medications from different sources and denies use of illegal drugs. Patient denies side effects from medications like nausea, vomiting, constipation or drowsiness.  Patient reports current activities of daily living ar possible due to medications and would like to (NORCO) 5-325 MG per tablet Take 1 tablet by mouth every 24 hours for 30 days. Akua Kay Date: 10/4/18 30 tablet 0    fluticasone (FLONASE) 50 MCG/ACT nasal spray INSTILL 1 SPRAY INTO EACH NOSTRIL DAILY AT BED TIME 1 Bottle 3    metoclopramide (REGLAN) 10 MG tablet Take 1 tablet by mouth 3 times daily (with meals) 120 tablet 3    warfarin (COUMADIN) 7.5 MG tablet Take as directed 60 tablet 1    losartan (COZAAR) 25 MG tablet TAKE ONE-HALF TABLET BY MOUTH DAILY 15 tablet 5    levETIRAcetam (KEPPRA) 250 MG tablet Take 1 tablet by mouth 2 times daily 180 tablet 1    Dulaglutide (TRULICITY) 5.69 GJ/8.7EF SOPN Inject 0.75 mg into the skin once a week      naproxen (NAPROSYN) 500 MG tablet Take 1 tablet by mouth 2 times daily 20 tablet 0    venlafaxine (EFFEXOR XR) 75 MG extended release capsule TAKE 1 CAPSULE BY MOUTH DAILY ALONG WITH 150 MG CAPSULE. TOTAL = 225 MG      montelukast (SINGULAIR) 10 MG tablet Take 1 tablet by mouth nightly 30 tablet 3    cyclobenzaprine (FLEXERIL) 10 MG tablet 1 tablet 3 times daily as needed 60 tablet 0    dicyclomine (BENTYL) 20 MG tablet TAKE 1 TABLET (20 MG TOTAL) BY MOUTH 2 (TWO) TIMES A DAY FOR 30 DOSES.  0    venlafaxine (EFFEXOR XR) 150 MG extended release capsule TAKE 1 CAPSULE (150 MG TOTAL) BY MOUTH DAILY. 1    atorvastatin (LIPITOR) 80 MG tablet TAKE 1 TABLET BY MOUTH NIGHTLY AT BEDTIME 30 tablet 5    furosemide (LASIX) 20 MG tablet TAKE ONE TABLET BY MOUTH DAILY 90 tablet 1    temazepam (RESTORIL) 15 MG capsule Take 15 mg by mouth. [employer] [place] ONE TOUCH ULTRA TEST strip TEST BLOOD SUGAR 3 TIMES A  strip 5    colestipol (COLESTID) 1 g tablet Take 1 g by mouth      omeprazole-sodium bicarbonate (ZEGERID)  MG per capsule Take 1 capsule by mouth every morning (before breakfast) 30 capsule 5    clotrimazole (LOTRIMIN) 1 % cream Apply topically 2 times daily.  15 g 0    topiramate (TOPAMAX) 25 MG tablet       Cholecalciferol (VITAMIN D) 2000 units TABS tablet Take 1 tablet by mouth daily 30 tablet 5    PROAIR  (90 Base) MCG/ACT inhaler       potassium chloride (MICRO-K) 10 MEQ extended release capsule       EXACTECH TEST strip       Misc. Devices MISC True Plus     Testing 3 times a day 100 each 5    busPIRone (BUSPAR) 10 MG tablet Take 10 mg by mouth      TRUEPLUS LANCETS 30G MISC 1 each by Does not apply route daily 100 each 3    ARIPiprazole (ABILIFY) 10 MG tablet TAKE 1 TABLET BY MOUTH NIGHTLY 30 tablet 2     No current facility-administered medications for this encounter. Allergies  Latex; Adhesive tape; Morphine and related; Other; and Aspirin    Family History  family history includes Cancer in her father; Heart Disease in her mother; Pacemaker in her sister. Social History  Social History     Social History    Marital status:      Spouse name: N/A    Number of children: N/A    Years of education: N/A     Occupational History    disability      Social History Main Topics    Smoking status: Never Smoker    Smokeless tobacco: Never Used    Alcohol use 0.0 oz/week      Comment: occ    Drug use: No    Sexual activity: Yes     Partners: Male     Other Topics Concern    None     Social History Narrative    None      reports that she does not use drugs. REVIEW OF SYSTEMS:  Review of Systems   Constitutional: Negative. Negative for fever and weight loss. HENT: Positive for sinus pain. Negative for tinnitus. Eyes: Negative. Negative for blurred vision and photophobia. Respiratory: Negative. Negative for cough and shortness of breath. Cardiovascular: Negative for chest pain, palpitations and leg swelling. Gastrointestinal: Positive for diarrhea. Negative for constipation, heartburn, nausea and vomiting. Genitourinary: Negative. Negative for dysuria and hematuria. Musculoskeletal: Positive for back pain. Skin: Positive for rash.         bilat feet, redness    Neurological: Positive for lumbar spine with mild to moderate   loss of disc height at L5-S1.       SPINAL CORD: The conus terminates normally.       SOFT TISSUES: No paraspinal mass identified.       L1-L2: There is no significant disc herniation, spinal canal stenosis or   neural foraminal narrowing.       L2-L3: There is no significant disc herniation, spinal canal stenosis or   neural foraminal narrowing.       L3-L4: There is no significant disc herniation, spinal canal stenosis or   neural foraminal narrowing.       L4-L5: Disc bulge and facet hypertrophy resulting in mild narrowing of the   thecal sac and both neural foramen.       L5-S1: Disc bulge and facet hypertrophy resulting in mild-to-moderate left   and right-sided neural foraminal stenosis.         Impression   Degenerative canal and neural foraminal stenosis of the lower lumbar spine. FINDINGS:   LUMBAR SPINE:       The bone mineral density in the lumbar spine including the L1 through L4   levels is measured at 1.25 to g/cm2, which corresponds to a T-score of 0.5   and a Z-score of 0 point.  This is within the normal range by WHO criteria.       LEFT HIP:       The bone mineral density in the total hip is measured at 0.920 g/cm2   corresponding to a T-score of -0.7 and a Z-score of -0.8.  This is within the   normal range by [facility] criteria.       The bone mineral density of the femoral neck is measured at 0.845 g/cm2   corresponding to a T-score of -1.4 and a Z-score of -1.1.  This is within the   osteopenic range by [facility] criteria.       .           Impression   Bone mineral density is in the osteopenic range.        Patient Active Problem List   Diagnosis    Complex tear of medial meniscus of left knee as current injury    Depression    Acid reflux    Hyperlipidemia    Epilepsy ([facility] [address]    Pelvic pain in female    Colon polyp    History of stroke associated with blood clotting tendency    Factor V Leiden ([facility] [address]    Cholelithiasis    Acute medial meniscus tear of left

## 2018-09-29 ASSESSMENT — ENCOUNTER SYMPTOMS
COUGH: 0
VOMITING: 0
HEARTBURN: 0
PHOTOPHOBIA: 0
CONSTIPATION: 0
BLURRED VISION: 0
SHORTNESS OF BREATH: 0
NAUSEA: 0

## 2018-10-02 LAB
6-ACETYLMORPHINE, UR: NOT DETECTED
7-AMINOCLONAZEPAM, URINE: NOT DETECTED
ALPHA-OH-ALPRAZ, URINE: NOT DETECTED
ALPRAZOLAM, URINE: NOT DETECTED
AMPHETAMINES, URINE: NOT DETECTED
BARBITURATES, URINE: NOT DETECTED
BENZOYLECGONINE, UR: NOT DETECTED
BUPRENORPHINE URINE: NOT DETECTED
CARISOPRODOL, UR: NOT DETECTED
CLONAZEPAM, URINE: NOT DETECTED
CODEINE, URINE: NOT DETECTED
CREATININE URINE: 89.5 MG/DL (ref 20–400)
DIAZEPAM, URINE: NOT DETECTED
DRUGS EXPECTED, UR: NORMAL
EER HI RES INTERP UR: NORMAL
ETHYL GLUCURONIDE UR: NOT DETECTED
FENTANYL URINE: NOT DETECTED
HYDROCODONE, URINE: NOT DETECTED
HYDROMORPHONE, URINE: NOT DETECTED
LORAZEPAM, URINE: NOT DETECTED
MARIJUANA METAB, UR: NOT DETECTED
MDA, UR: NOT DETECTED
MDEA, EVE, UR: NOT DETECTED
MDMA URINE: NOT DETECTED
MEPERIDINE METAB, UR: NOT DETECTED
METHADONE, URINE: NOT DETECTED
METHAMPHETAMINE, URINE: NOT DETECTED
METHYLPHENIDATE: NOT DETECTED
MIDAZOLAM, URINE: NOT DETECTED
MORPHINE URINE: NOT DETECTED
NORBUPRENORPHINE, URINE: NOT DETECTED
NORDIAZEPAM, URINE: NOT DETECTED
NORFENTANYL, URINE: NOT DETECTED
NORHYDROCODONE, URINE: NOT DETECTED
NOROXYCODONE, URINE: NOT DETECTED
NOROXYMORPHONE, URINE: NOT DETECTED
OXAZEPAM, URINE: PRESENT
OXYCODONE URINE: NOT DETECTED
OXYMORPHONE, URINE: NOT DETECTED
PAIN MANAGEMENT DRUG PANEL INTERP, URINE: NORMAL
PAIN MGT DRUG PANEL, HI RES, UR: NORMAL
PCP,URINE: NOT DETECTED
PHENTERMINE, UR: NOT DETECTED
PROPOXYPHENE, URINE: NOT DETECTED
TAPENTADOL, URINE: NOT DETECTED
TAPENTADOL-O-SULFATE, URINE: NOT DETECTED
TEMAZEPAM, URINE: PRESENT
TRAMADOL, URINE: NOT DETECTED
ZOLPIDEM, URINE: NOT DETECTED

## 2018-10-05 ENCOUNTER — OFFICE VISIT (OUTPATIENT)
Dept: FAMILY MEDICINE CLINIC | Age: 53
End: 2018-10-05
Payer: MEDICARE

## 2018-10-05 VITALS
HEART RATE: 84 BPM | HEIGHT: 64 IN | OXYGEN SATURATION: 98 % | RESPIRATION RATE: 18 BRPM | SYSTOLIC BLOOD PRESSURE: 128 MMHG | BODY MASS INDEX: 36.77 KG/M2 | DIASTOLIC BLOOD PRESSURE: 80 MMHG | TEMPERATURE: 97.8 F | WEIGHT: 215.4 LBS

## 2018-10-05 DIAGNOSIS — E11.42 TYPE 2 DIABETES MELLITUS WITH DIABETIC POLYNEUROPATHY, WITHOUT LONG-TERM CURRENT USE OF INSULIN (HCC): Primary | Chronic | ICD-10-CM

## 2018-10-05 DIAGNOSIS — R21 RASH, SKIN: ICD-10-CM

## 2018-10-05 DIAGNOSIS — D68.51 FACTOR V LEIDEN (HCC): ICD-10-CM

## 2018-10-05 DIAGNOSIS — Z23 FLU VACCINE NEED: ICD-10-CM

## 2018-10-05 LAB
HBA1C MFR BLD: 6.3 %
INTERNATIONAL NORMALIZATION RATIO, POC: 2.2
PROTHROMBIN TIME, POC: 25.9

## 2018-10-05 PROCEDURE — 3044F HG A1C LEVEL LT 7.0%: CPT | Performed by: NURSE PRACTITIONER

## 2018-10-05 PROCEDURE — G8427 DOCREV CUR MEDS BY ELIG CLIN: HCPCS | Performed by: NURSE PRACTITIONER

## 2018-10-05 PROCEDURE — 90686 IIV4 VACC NO PRSV 0.5 ML IM: CPT | Performed by: NURSE PRACTITIONER

## 2018-10-05 PROCEDURE — 96372 THER/PROPH/DIAG INJ SC/IM: CPT | Performed by: NURSE PRACTITIONER

## 2018-10-05 PROCEDURE — 1036F TOBACCO NON-USER: CPT | Performed by: NURSE PRACTITIONER

## 2018-10-05 PROCEDURE — G8417 CALC BMI ABV UP PARAM F/U: HCPCS | Performed by: NURSE PRACTITIONER

## 2018-10-05 PROCEDURE — 2022F DILAT RTA XM EVC RTNOPTHY: CPT | Performed by: NURSE PRACTITIONER

## 2018-10-05 PROCEDURE — 99214 OFFICE O/P EST MOD 30 MIN: CPT | Performed by: NURSE PRACTITIONER

## 2018-10-05 PROCEDURE — 83036 HEMOGLOBIN GLYCOSYLATED A1C: CPT | Performed by: NURSE PRACTITIONER

## 2018-10-05 PROCEDURE — G0008 ADMIN INFLUENZA VIRUS VAC: HCPCS | Performed by: NURSE PRACTITIONER

## 2018-10-05 PROCEDURE — 3017F COLORECTAL CA SCREEN DOC REV: CPT | Performed by: NURSE PRACTITIONER

## 2018-10-05 PROCEDURE — 85610 PROTHROMBIN TIME: CPT | Performed by: NURSE PRACTITIONER

## 2018-10-05 PROCEDURE — G8482 FLU IMMUNIZE ORDER/ADMIN: HCPCS | Performed by: NURSE PRACTITIONER

## 2018-10-05 RX ORDER — METHYLPREDNISOLONE 4 MG/1
TABLET ORAL
Qty: 1 KIT | Refills: 0 | Status: SHIPPED | OUTPATIENT
Start: 2018-10-05 | End: 2018-10-11

## 2018-10-05 RX ORDER — TRIAMCINOLONE ACETONIDE 0.25 MG/G
CREAM TOPICAL
Qty: 80 G | Refills: 1 | Status: SHIPPED | OUTPATIENT
Start: 2018-10-05

## 2018-10-05 RX ORDER — METHYLPREDNISOLONE ACETATE 80 MG/ML
80 INJECTION, SUSPENSION INTRA-ARTICULAR; INTRALESIONAL; INTRAMUSCULAR; SOFT TISSUE ONCE
Status: COMPLETED | OUTPATIENT
Start: 2018-10-05 | End: 2018-10-05

## 2018-10-05 RX ADMIN — METHYLPREDNISOLONE ACETATE 80 MG: 80 INJECTION, SUSPENSION INTRA-ARTICULAR; INTRALESIONAL; INTRAMUSCULAR; SOFT TISSUE at 11:10

## 2018-10-05 ASSESSMENT — PATIENT HEALTH QUESTIONNAIRE - PHQ9
2. FEELING DOWN, DEPRESSED OR HOPELESS: 0
SUM OF ALL RESPONSES TO PHQ QUESTIONS 1-9: 0
SUM OF ALL RESPONSES TO PHQ QUESTIONS 1-9: 0
SUM OF ALL RESPONSES TO PHQ9 QUESTIONS 1 & 2: 0
1. LITTLE INTEREST OR PLEASURE IN DOING THINGS: 0

## 2018-10-05 NOTE — PROGRESS NOTES
identification noted. Her disease course has been stable. There are no hypoglycemic associated symptoms. Pertinent negatives for hypoglycemia include no dizziness, headaches, nervousness/anxiousness or speech difficulty. Associated symptoms include fatigue and foot paresthesias. Pertinent negatives for diabetes include no chest pain, no polydipsia, no polyphagia, no polyuria and no weakness. There are no hypoglycemic complications. Symptoms are stable. Diabetic complications include peripheral neuropathy. Risk factors for coronary artery disease include diabetes mellitus, hypertension, obesity and dyslipidemia. Current diabetic treatment includes diet (Trulicity). She is compliant with treatment most of the time. Her weight is stable. She is following a generally healthy diet. Meal planning includes avoidance of concentrated sweets. She has not had a previous visit with a dietitian. She never participates in exercise. There is no change in her home blood glucose trend. An ACE inhibitor/angiotensin II receptor blocker is being taken. She does not see a podiatrist.Eye exam is not current. Rash   This is a new problem. The current episode started 1 to 4 weeks ago. The problem has been gradually worsening since onset. The affected locations include the left foot, left toes, right foot and right toes. The rash is characterized by dryness, redness, itchiness and peeling. Associated symptoms include fatigue. Pertinent negatives include no congestion, cough, diarrhea, eye pain, shortness of breath or sore throat. Past treatments include anti-itch cream. The treatment provided no relief. There is no history of allergies, asthma or eczema. Factor V Leiden  This is a chronic problem that has been stable. Currently taking warfarin for this. PT/INR done in this office. This is stable and she is having no problems.     Hemoglobin A1C (%)   Date Value   10/05/2018 6.3   01/16/2018 6.2   10/11/2017 6.4 (H)             ( goal A1C 09/20/2019    A1C test (Diabetic or Prediabetic)  10/05/2019    Breast cancer screen  11/22/2019    Cervical cancer screen  10/27/2020    Colon cancer screen colonoscopy  07/17/2023    Flu vaccine  Completed    Pneumococcal med risk  Completed       Subjective:      Review of Systems   Constitutional: Positive for fatigue. Negative for activity change and appetite change. HENT: Negative for congestion, ear pain, hearing loss, nosebleeds, sinus pressure, sore throat and tinnitus. Eyes: Negative for pain, itching and visual disturbance. Respiratory: Negative for cough, chest tightness, shortness of breath and wheezing. Cardiovascular: Negative for chest pain, palpitations and leg swelling. Gastrointestinal: Negative for abdominal distention, abdominal pain, blood in stool, constipation, diarrhea and nausea. Endocrine: Negative for cold intolerance, heat intolerance, polydipsia, polyphagia and polyuria. Genitourinary: Negative for dysuria, flank pain, frequency and urgency. Musculoskeletal: Positive for back pain and gait problem. Negative for arthralgias and myalgias. Skin: Positive for rash (bilateral feet). Negative for color change and wound. Allergic/Immunologic: Negative for environmental allergies and food allergies. Neurological: Positive for numbness (burning in feet). Negative for dizziness, speech difficulty, weakness, light-headedness and headaches. Hematological: Does not bruise/bleed easily. Psychiatric/Behavioral: Negative for decreased concentration and sleep disturbance. The patient is not nervous/anxious. Objective:     /80 (Site: Left Upper Arm, Position: Sitting, Cuff Size: Large Adult)   Pulse 84   Temp 97.8 °F (36.6 °C) (Temporal)   Resp 18   Ht 5' 4\" (1.626 m)   Wt 215 lb 6.4 oz (97.7 kg)   SpO2 98%   BMI 36.97 kg/m²   Physical Exam   Constitutional: She is oriented to person, place, and time. She appears well-developed and well-nourished.

## 2018-10-10 ASSESSMENT — ENCOUNTER SYMPTOMS
EYE PAIN: 0
ABDOMINAL DISTENTION: 0
SINUS PRESSURE: 0
WHEEZING: 0
ABDOMINAL PAIN: 0
SORE THROAT: 0
CONSTIPATION: 0
EYE ITCHING: 0
CHEST TIGHTNESS: 0
COUGH: 0
DIARRHEA: 0
COLOR CHANGE: 0
BLOOD IN STOOL: 0
SHORTNESS OF BREATH: 0
BACK PAIN: 1
NAUSEA: 0

## 2018-10-16 RX ORDER — WARFARIN SODIUM 7.5 MG/1
TABLET ORAL
Qty: 90 TABLET | Refills: 1 | Status: SHIPPED | OUTPATIENT
Start: 2018-10-16 | End: 2018-11-01 | Stop reason: ALTCHOICE

## 2018-10-26 ENCOUNTER — TELEPHONE (OUTPATIENT)
Dept: FAMILY MEDICINE CLINIC | Age: 53
End: 2018-10-26

## 2018-10-29 RX ORDER — WARFARIN SODIUM 5 MG/1
TABLET ORAL
Qty: 90 TABLET | Refills: 1 | Status: SHIPPED | OUTPATIENT
Start: 2018-10-29 | End: 2018-12-04 | Stop reason: DRUGHIGH

## 2018-10-30 ENCOUNTER — HOSPITAL ENCOUNTER (OUTPATIENT)
Age: 53
Discharge: HOME | End: 2018-10-30
Payer: MEDICARE

## 2018-10-30 LAB
POTASSIUM SERPL-SCNC: 4.1 MMOL/L (ref 3.7–5.3)
VITAMIN D 25-HYDROXY: 52.8 NG/ML (ref 30–100)

## 2018-10-30 PROCEDURE — 82306 VITAMIN D 25 HYDROXY: CPT

## 2018-10-30 PROCEDURE — 36415 COLL VENOUS BLD VENIPUNCTURE: CPT

## 2018-10-30 PROCEDURE — 84132 ASSAY OF SERUM POTASSIUM: CPT

## 2018-11-01 ENCOUNTER — HOSPITAL ENCOUNTER (OUTPATIENT)
Dept: PAIN MANAGEMENT | Age: 53
Discharge: HOME | End: 2018-11-01
Payer: MEDICARE

## 2018-11-01 VITALS
WEIGHT: 212 LBS | HEIGHT: 64 IN | BODY MASS INDEX: 36.19 KG/M2 | DIASTOLIC BLOOD PRESSURE: 76 MMHG | OXYGEN SATURATION: 99 % | SYSTOLIC BLOOD PRESSURE: 122 MMHG | RESPIRATION RATE: 16 BRPM | TEMPERATURE: 98 F | HEART RATE: 85 BPM

## 2018-11-01 DIAGNOSIS — Z01.812 PRE-PROCEDURE LAB EXAM: ICD-10-CM

## 2018-11-01 DIAGNOSIS — M54.16 LUMBAR RADICULOPATHY: ICD-10-CM

## 2018-11-01 DIAGNOSIS — M47.816 LUMBAR FACET ARTHROPATHY: Primary | ICD-10-CM

## 2018-11-01 DIAGNOSIS — Z79.891 CHRONIC USE OF OPIATE FOR THERAPEUTIC PURPOSE: ICD-10-CM

## 2018-11-01 DIAGNOSIS — M46.1 SACROILIITIS (HCC): ICD-10-CM

## 2018-11-01 DIAGNOSIS — M47.817 LUMBOSACRAL SPONDYLOSIS WITHOUT MYELOPATHY: ICD-10-CM

## 2018-11-01 DIAGNOSIS — F43.12 CHRONIC POST-TRAUMATIC STRESS DISORDER (PTSD): ICD-10-CM

## 2018-11-01 DIAGNOSIS — M51.36 DDD (DEGENERATIVE DISC DISEASE), LUMBAR: ICD-10-CM

## 2018-11-01 DIAGNOSIS — M43.06 LUMBAR SPONDYLOLYSIS: ICD-10-CM

## 2018-11-01 PROCEDURE — 99213 OFFICE O/P EST LOW 20 MIN: CPT

## 2018-11-01 RX ORDER — HYDROCODONE BITARTRATE AND ACETAMINOPHEN 5; 325 MG/1; MG/1
1 TABLET ORAL EVERY 24 HOURS
Qty: 30 TABLET | Refills: 0 | Status: SHIPPED | OUTPATIENT
Start: 2018-11-03 | End: 2018-11-29 | Stop reason: SDUPTHER

## 2018-11-01 RX ORDER — CYCLOBENZAPRINE HCL 10 MG
10 TABLET ORAL 2 TIMES DAILY PRN
Qty: 60 TABLET | Refills: 1 | Status: SHIPPED | OUTPATIENT
Start: 2018-11-01 | End: 2022-08-10

## 2018-11-01 ASSESSMENT — ENCOUNTER SYMPTOMS
NAUSEA: 1
BACK PAIN: 1

## 2018-11-01 NOTE — PROGRESS NOTES
Sudhir Lilly PROGRESS NOTE      Patient here today to review MEDICATIONCONTRACT    Chief Complaint:  Low back pain    HPI: She c/o low back pain. She states has muscle spasms and ran out of flexeril. Patient had undergone lumbar facet joint injections on the left side with minimal improvement in the pain. She is seeing an Orthopaedic Dr for her left knee, she had MRI done left knee. She has follow up appt Nov, 19. She has not started aquatics yet, she is waiting until she sees her Orthopaedic Dr. She is not sleeping well. She was seen last week in the ed for her knee pain/    Back Pain   This is a chronic problem. The current episode started more than 1 year ago. The problem occurs constantly. The problem has been gradually worsening since onset. The pain is present in the lumbar spine. The quality of the pain is described as aching. Radiates to: posterior left leg to knee. The pain is at a severity of 9/10. The pain is severe. The symptoms are aggravated by standing and twisting (walking). Associated symptoms include numbness and weakness. Risk factors include sedentary lifestyle and obesity. She has tried analgesics and ice for the symptoms. The treatment provided mild relief. Patient denies any new neurological symptoms. No bowel or bladder incontinence, no weakness, and no falling. Treatment goals:  Functional status: get rid of pain      Aberrancy: none     Analgesia:pain 9    Adverse  Effects :BM daily    ADL;s :activity limited due to left knee        Pill count: appropriate    Morphine equivalent dose as reported on OARRS:5  Attestation: The Prescription Monitoring Report for this patient was reviewed today. KAMARI Brink CNP)  Documentation: Obtaining appropriate analgesic effect of treatment., No signs of potential drug abuse or diversion identified. KAMARI Brink - CNP)  Medication Contracts: Existing medication contract.  KAMARI Brink - EILEEN)  Review this test; however, FDA   clearance or approval is not currently required for clinical use. The results are not intended to be used as the sole means for   clinical diagnosis or patient management decisions. EER Hi Res Interp Ur See Note   Final 09/28/2018  5:03 PM [facility]   (NOTE)   Access [facility] Enhanced Report using either link below:   -Direct access: [email or web address]   -Enter Username, Password: [email or web address]   Username: [email or web address]   Password: [email or web address]   Performed by [place] [id number], [id number] [facility] [phone]   www. Guzman Ding MD, Lab. Director              Past Medical History:   Diagnosis Date    Acid reflux     Arthritis     Coughing     dry due to ace inhibitor    Depression     uses Abilify, Buspar, Trazodone for this    Diabetes mellitus [address]     Diverticulitis     Epilepsy ([facility] [facility]     last seizure 6 mos ago     Factor V Leiden ([facility] [facility]     Generalized abdominal pain     Hiatal hernia     History of stroke associated with blood clotting tendency 2003    had clot in brain and left neck, left side weakness residual    Hx of blood clots     right breast, brain, neck    Hyperlipidemia     Hypertension     Insomnia     uses Trazodone for this    Pre-procedure lab exam 1/9/2018    Sleep apnea     C PAP    Unspecified cerebral artery occlusion with cerebral infarction 6/2003    SEE BELOW, 20017 Mini stroke    Wears glasses        Past Surgical History:   Procedure Laterality Date    APPENDECTOMY      CHOLECYSTECTOMY      COLONOSCOPY      with polypectomy    COLONOSCOPY  1/22/16    AND EGD    COLONOSCOPY  01/05/2017    DR MACKAY-random biopsies.     ENDOMETRIAL ABLATION      2 times    HERNIA REPAIR      HIATAL HERNIA REPAIR  2/4/2016    laparoscopic robotic    HYSTERECTOMY      with BSO    KNEE ARTHROSCOPY Left 4/7/15, 2015    x 2    LAPAROSCOPY      \"springs inserted in to fallopian tubes\" to close tubes    NECK SURGERY  July 2012    Anterior, C5,6,7 bulging disk repair    [place] COLONOSCOPY FLX DX W/COLLJ SPEC WHEN PFRMD N/A 7/17/2018    COLONOSCOPY performed by Ale Membreno MD at [address] ENDOSCOPY      Jan 2016 DR Rachael Caballero    UPPER GASTROINTESTINAL ENDOSCOPY N/A 7/17/2018    EGD BIOPSY performed by Ale Membreno MD at [address] [facility]   Allergen Reactions    Latex Other (See Comments)     Red marks on body    Adhesive Tape      Also plastic tape    Morphine And Related Nausea Only    Other Other (See Comments)     Surgical staples cause infection    Aspirin Nausea And Vomiting         Current Outpatient Prescriptions:     ferrous sulfate 27 MG TABS, Take by mouth, Disp: , Rfl:     [START ON 11/3/2018] HYDROcodone-acetaminophen (NORCO) 5-325 MG per tablet, Take 1 tablet by mouth every 24 hours for 30 days. ., Disp: 30 tablet, Rfl: 0    cyclobenzaprine (FLEXERIL) 10 MG tablet, Take 1 tablet by mouth 2 times daily as needed for Muscle spasms, Disp: 60 tablet, Rfl: 1    warfarin (COUMADIN) 5 MG tablet, TAKE 1 TABLET EVERY DAY, Disp: 90 tablet, Rfl: 1    pantoprazole (PROTONIX) 20 MG tablet, 1 TABLET(S) BY MOUTH AT LEAST 1/2 HR BEFORE BREAKFAST, CAFFINATED OR ACIDIC BEVERAGES IN THE MORNING, Disp: 30 tablet, Rfl: 5    triamcinolone (KENALOG) 0.025 % cream, Apply topically 2 times daily. , Disp: 80 g, Rfl: 1    fluticasone (FLONASE) 50 MCG/ACT nasal spray, INSTILL 1 SPRAY INTO EACH NOSTRIL DAILY AT BED TIME, Disp: 1 Bottle, Rfl: 3    metoclopramide (REGLAN) 10 MG tablet, Take 1 tablet by mouth 3 times daily (with meals), Disp: 120 tablet, Rfl: 3    losartan (COZAAR) 25 MG tablet, TAKE ONE-HALF TABLET BY MOUTH DAILY, Disp: 15 tablet, Rfl: 5    levETIRAcetam (KEPPRA) 250 MG tablet, Take 1 tablet by mouth 2 times daily, Disp: 180 tablet, Rfl: 1    naproxen (NAPROSYN) 500 MG tablet, Take 1 tablet by mouth 2 times daily, Disp: 20 tablet, Rfl: 0    montelukast Pacemaker Sister        Social History     Social History    Marital status:      Spouse name: N/A    Number of children: N/A    Years of education: N/A     Occupational History    disability      Social History Main Topics    Smoking status: Never Smoker    Smokeless tobacco: Never Used    Alcohol use 0.0 oz/week      Comment: occ    Drug use: No    Sexual activity: Yes     Partners: Male     Other Topics Concern    Not on file     Social History Narrative    No narrative on file       Review of Systems:  Review of Systems   Constitution: Positive for weakness. HENT: Negative. Eyes:        Glasses   Cardiovascular: Negative. Skin: Positive for rash. Musculoskeletal: Positive for back pain and joint pain. Gastrointestinal: Positive for nausea. Genitourinary: Negative. Neurological: Positive for light-headedness and numbness. Psychiatric/Behavioral: Positive for depression. Managing         Physical Exam:  /76   Pulse 85   Temp 98 °F (36.7 °C)   Resp 16   Ht 5' 4\" (1.626 m)   Wt 212 lb (96.2 kg)   SpO2 99%   BMI 36.39 kg/m²     Physical Exam   Constitutional: She appears well-developed. overweight   HENT:   Head: Normocephalic. Neck: Normal range of motion. Pulmonary/Chest: Effort normal.   Musculoskeletal:        Left knee: She exhibits decreased range of motion. Tenderness found. Medial joint line and lateral joint line tenderness noted. Lumbar back: She exhibits tenderness. Neurological: She is alert. Reflex Scores:       Patellar reflexes are 2+ on the right side and 2+ on the left side. Achilles reflexes are 2+ on the right side and 2+ on the left side. Skin: Skin is warm, dry and intact. Psychiatric: Her speech is normal and behavior is normal. Judgment and thought content normal. Cognition and memory are normal. She exhibits a depressed mood.          Assessment:    Problem List Items Addressed This Visit     Chronic use of consulting with us. At each of patient's future visits we will try to taper pain medications, while adjusting the adjunct medications, and re-evaluating for Physical Therapy to improve spinal andjoint strength. We will continue to have discussions to decrease pain medications as tolerated. Counseled patient on effects their pain medication and /or their medical condition mayhave on their  ability to drive or operate machinery. Instructed not to drive or operate machinery if drowsy     I also discussed with the patient regarding the dangers of combining narcotic pain medication with tranquilizers, alcohol or illegal drugs or taking the medication any way other thanprescribed. The dangers were discussed  including respiratory depression and death. Patient was told to tell  all  physicians regarding the medications he is getting from pain clinic. Patient is warned not to take any unprescribed medications over-the-countermedications that can depress breathing . Patient is advised to talk to the pharmacist or physicians if planning to take any over-the-counter medications before  takeing them. Patient is strongly advised to avoidtranquilizers or  relaxants, illegal drugs  or any medications that can depress breathing  Patient is also advised to tell us if there is any changes in their medications from other physicians.         TREATMENT OPTIONS:     Return in 4 weeks  Medication Agreement Requirements Met  Continue Opioid therapy  Script written for hydrocodone, flexeril  Follow up appointment made

## 2018-11-02 RX ORDER — METOCLOPRAMIDE 10 MG/1
10 TABLET ORAL
Qty: 270 TABLET | Refills: 0 | Status: SHIPPED | OUTPATIENT
Start: 2018-11-02 | End: 2020-10-27

## 2018-11-14 ENCOUNTER — TELEPHONE (OUTPATIENT)
Dept: FAMILY MEDICINE CLINIC | Age: 53
End: 2018-11-14

## 2018-11-15 ENCOUNTER — HOSPITAL ENCOUNTER (EMERGENCY)
Age: 53
Discharge: HOME | End: 2018-11-16
Attending: EMERGENCY MEDICINE
Payer: MEDICARE

## 2018-11-15 ENCOUNTER — APPOINTMENT (OUTPATIENT)
Dept: GENERAL RADIOLOGY | Age: 53
End: 2018-11-15
Payer: MEDICARE

## 2018-11-15 ENCOUNTER — APPOINTMENT (OUTPATIENT)
Dept: CT IMAGING | Age: 53
End: 2018-11-15
Payer: MEDICARE

## 2018-11-15 DIAGNOSIS — R10.9 ABDOMINAL PAIN, UNSPECIFIED ABDOMINAL LOCATION: Primary | ICD-10-CM

## 2018-11-15 LAB
ABSOLUTE EOS #: 0.1 K/UL (ref 0–0.4)
ABSOLUTE IMMATURE GRANULOCYTE: ABNORMAL K/UL (ref 0–0.3)
ABSOLUTE LYMPH #: 3 K/UL (ref 1–4.8)
ABSOLUTE MONO #: 0.5 K/UL (ref 0.1–1.3)
ALBUMIN SERPL-MCNC: 3.7 G/DL (ref 3.5–5.2)
ALBUMIN/GLOBULIN RATIO: ABNORMAL (ref 1–2.5)
ALP BLD-CCNC: 184 U/L (ref 35–104)
ALT SERPL-CCNC: 13 U/L (ref 5–33)
ANION GAP SERPL CALCULATED.3IONS-SCNC: 11 MMOL/L (ref 9–17)
AST SERPL-CCNC: 16 U/L
BASOPHILS # BLD: 1 % (ref 0–2)
BASOPHILS ABSOLUTE: 0.1 K/UL (ref 0–0.2)
BILIRUB SERPL-MCNC: 0.18 MG/DL (ref 0.3–1.2)
BUN BLDV-MCNC: 15 MG/DL (ref 6–20)
BUN/CREAT BLD: ABNORMAL (ref 9–20)
CALCIUM SERPL-MCNC: 9.3 MG/DL (ref 8.6–10.4)
CHLORIDE BLD-SCNC: 104 MMOL/L (ref 98–107)
CO2: 27 MMOL/L (ref 20–31)
CREAT SERPL-MCNC: 0.68 MG/DL (ref 0.5–0.9)
DIFFERENTIAL TYPE: ABNORMAL
EOSINOPHILS RELATIVE PERCENT: 1 % (ref 0–4)
GFR AFRICAN AMERICAN: >60 ML/MIN
GFR NON-AFRICAN AMERICAN: >60 ML/MIN
GFR SERPL CREATININE-BSD FRML MDRD: ABNORMAL ML/MIN/{1.73_M2}
GFR SERPL CREATININE-BSD FRML MDRD: ABNORMAL ML/MIN/{1.73_M2}
GLUCOSE BLD-MCNC: 104 MG/DL (ref 70–99)
HCT VFR BLD CALC: 36.1 % (ref 36–46)
HEMOGLOBIN: 12.1 G/DL (ref 12–16)
IMMATURE GRANULOCYTES: ABNORMAL %
INR BLD: 5.6
LYMPHOCYTES # BLD: 37 % (ref 24–44)
MCH RBC QN AUTO: 28.4 PG (ref 26–34)
MCHC RBC AUTO-ENTMCNC: 33.4 G/DL (ref 31–37)
MCV RBC AUTO: 85.1 FL (ref 80–100)
MONOCYTES # BLD: 7 % (ref 1–7)
NRBC AUTOMATED: ABNORMAL PER 100 WBC
PARTIAL THROMBOPLASTIN TIME: 59.9 SEC (ref 23–31)
PDW BLD-RTO: 15.8 % (ref 11.5–14.9)
PLATELET # BLD: 312 K/UL (ref 150–450)
PLATELET ESTIMATE: ABNORMAL
PMV BLD AUTO: 8 FL (ref 6–12)
POTASSIUM SERPL-SCNC: 3.4 MMOL/L (ref 3.7–5.3)
PROTHROMBIN TIME: 53.2 SEC (ref 9.7–12)
RBC # BLD: 4.24 M/UL (ref 4–5.2)
RBC # BLD: ABNORMAL 10*6/UL
SEG NEUTROPHILS: 54 % (ref 36–66)
SEGMENTED NEUTROPHILS ABSOLUTE COUNT: 4.5 K/UL (ref 1.3–9.1)
SODIUM BLD-SCNC: 142 MMOL/L (ref 135–144)
TOTAL PROTEIN: 7 G/DL (ref 6.4–8.3)
WBC # BLD: 8.1 K/UL (ref 3.5–11)
WBC # BLD: ABNORMAL 10*3/UL

## 2018-11-15 PROCEDURE — 99284 EMERGENCY DEPT VISIT MOD MDM: CPT

## 2018-11-15 PROCEDURE — 74177 CT ABD & PELVIS W/CONTRAST: CPT

## 2018-11-15 PROCEDURE — 85730 THROMBOPLASTIN TIME PARTIAL: CPT

## 2018-11-15 PROCEDURE — 71046 X-RAY EXAM CHEST 2 VIEWS: CPT

## 2018-11-15 PROCEDURE — 6360000002 HC RX W HCPCS: Performed by: STUDENT IN AN ORGANIZED HEALTH CARE EDUCATION/TRAINING PROGRAM

## 2018-11-15 PROCEDURE — 2580000003 HC RX 258: Performed by: STUDENT IN AN ORGANIZED HEALTH CARE EDUCATION/TRAINING PROGRAM

## 2018-11-15 PROCEDURE — 96374 THER/PROPH/DIAG INJ IV PUSH: CPT

## 2018-11-15 PROCEDURE — 87086 URINE CULTURE/COLONY COUNT: CPT

## 2018-11-15 PROCEDURE — 85025 COMPLETE CBC W/AUTO DIFF WBC: CPT

## 2018-11-15 PROCEDURE — 80053 COMPREHEN METABOLIC PANEL: CPT

## 2018-11-15 PROCEDURE — 85610 PROTHROMBIN TIME: CPT

## 2018-11-15 PROCEDURE — 36415 COLL VENOUS BLD VENIPUNCTURE: CPT

## 2018-11-15 PROCEDURE — 6360000004 HC RX CONTRAST MEDICATION: Performed by: STUDENT IN AN ORGANIZED HEALTH CARE EDUCATION/TRAINING PROGRAM

## 2018-11-15 PROCEDURE — 81001 URINALYSIS AUTO W/SCOPE: CPT

## 2018-11-15 RX ORDER — 0.9 % SODIUM CHLORIDE 0.9 %
1000 INTRAVENOUS SOLUTION INTRAVENOUS ONCE
Status: COMPLETED | OUTPATIENT
Start: 2018-11-15 | End: 2018-11-15

## 2018-11-15 RX ORDER — FENTANYL CITRATE 50 UG/ML
50 INJECTION, SOLUTION INTRAMUSCULAR; INTRAVENOUS ONCE
Status: COMPLETED | OUTPATIENT
Start: 2018-11-15 | End: 2018-11-15

## 2018-11-15 RX ORDER — SODIUM CHLORIDE 0.9 % (FLUSH) 0.9 %
10 SYRINGE (ML) INJECTION PRN
Status: DISCONTINUED | OUTPATIENT
Start: 2018-11-15 | End: 2018-11-16 | Stop reason: HOSPADM

## 2018-11-15 RX ORDER — 0.9 % SODIUM CHLORIDE 0.9 %
80 INTRAVENOUS SOLUTION INTRAVENOUS ONCE
Status: COMPLETED | OUTPATIENT
Start: 2018-11-15 | End: 2018-11-15

## 2018-11-15 RX ADMIN — FENTANYL CITRATE 50 MCG: 50 INJECTION, SOLUTION INTRAMUSCULAR; INTRAVENOUS at 22:32

## 2018-11-15 RX ADMIN — IOPAMIDOL 75 ML: 755 INJECTION, SOLUTION INTRAVENOUS at 23:26

## 2018-11-15 RX ADMIN — SODIUM CHLORIDE 80 ML: 9 INJECTION, SOLUTION INTRAVENOUS at 23:26

## 2018-11-15 RX ADMIN — Medication 10 ML: at 23:26

## 2018-11-15 RX ADMIN — SODIUM CHLORIDE 1000 ML: 9 INJECTION, SOLUTION INTRAVENOUS at 22:28

## 2018-11-15 ASSESSMENT — PAIN SCALES - GENERAL
PAINLEVEL_OUTOF10: 9
PAINLEVEL_OUTOF10: 9

## 2018-11-16 ENCOUNTER — APPOINTMENT (OUTPATIENT)
Dept: CT IMAGING | Age: 53
End: 2018-11-16
Payer: MEDICARE

## 2018-11-16 VITALS
TEMPERATURE: 98.2 F | BODY MASS INDEX: 36.7 KG/M2 | OXYGEN SATURATION: 99 % | HEIGHT: 64 IN | DIASTOLIC BLOOD PRESSURE: 79 MMHG | HEART RATE: 80 BPM | SYSTOLIC BLOOD PRESSURE: 130 MMHG | WEIGHT: 215 LBS | RESPIRATION RATE: 16 BRPM

## 2018-11-16 LAB
-: ABNORMAL
AMORPHOUS: ABNORMAL
BACTERIA: ABNORMAL
BILIRUBIN URINE: NEGATIVE
CASTS UA: ABNORMAL /LPF
COLOR: YELLOW
COMMENT UA: ABNORMAL
CRYSTALS, UA: ABNORMAL /HPF
EPITHELIAL CELLS UA: ABNORMAL /HPF
GLUCOSE URINE: NEGATIVE
KETONES, URINE: NEGATIVE
LEUKOCYTE ESTERASE, URINE: NEGATIVE
MUCUS: ABNORMAL
NITRITE, URINE: NEGATIVE
OTHER OBSERVATIONS UA: ABNORMAL
PH UA: 6 (ref 5–8)
PROTEIN UA: NEGATIVE
RBC UA: ABNORMAL /HPF
RENAL EPITHELIAL, UA: ABNORMAL /HPF
SPECIFIC GRAVITY UA: 1.07 (ref 1–1.03)
TRICHOMONAS: ABNORMAL
TURBIDITY: CLEAR
URINE HGB: NEGATIVE
UROBILINOGEN, URINE: NORMAL
WBC UA: ABNORMAL /HPF
YEAST: ABNORMAL

## 2018-11-16 PROCEDURE — 70450 CT HEAD/BRAIN W/O DYE: CPT

## 2018-11-16 RX ORDER — FAMOTIDINE 20 MG/1
20 TABLET, FILM COATED ORAL 2 TIMES DAILY
Qty: 60 TABLET | Refills: 0 | Status: SHIPPED | OUTPATIENT
Start: 2018-11-16 | End: 2020-10-23

## 2018-11-16 ASSESSMENT — ENCOUNTER SYMPTOMS
CHEST TIGHTNESS: 0
SORE THROAT: 0
TROUBLE SWALLOWING: 0
VOMITING: 0
NAUSEA: 0
PHOTOPHOBIA: 0
BACK PAIN: 0
COUGH: 0
DIARRHEA: 1
WHEEZING: 0
ABDOMINAL PAIN: 1
SHORTNESS OF BREATH: 0

## 2018-11-16 NOTE — ED PROVIDER NOTES
of education: N/A     Occupational History    disability      Social History Main Topics    Smoking status: Never Smoker    Smokeless tobacco: Never Used    Alcohol use 0.0 oz/week      Comment: occ    Drug use: No    Sexual activity: Yes     Partners: Male     Other Topics Concern    Not on file     Social History Narrative    No narrative on file       Family History   Problem Relation Age of Onset    Heart Disease Mother         dies age 32    Cancer Father         lymph nodes    Pacemaker Sister        Allergies:  Latex; Adhesive tape; Morphine and related; Other; and Aspirin    Home Medications:  Prior to Admission medications    Medication Sig Start Date End Date Taking? Authorizing Provider   famotidine (PEPCID) 20 MG tablet Take 1 tablet by mouth 2 times daily 11/16/18  Yes Richard Padilla MD   montelukast (SINGULAIR) 10 MG tablet TAKE 1 TABLET BY MOUTH EVERY DAY AT NIGHT 11/15/18   KAMARI Cristobal CNP   furosemide (LASIX) 20 MG tablet TAKE 1 TABLET EVERY DAY 11/15/18   KAMARI Cristobal CNP   metoclopramide (REGLAN) 10 MG tablet Take 1 tablet by mouth 3 times daily (with meals) 11/2/18   Milo Gonzalez MD   ferrous sulfate 27 MG TABS Take by mouth    Historical Provider, MD   HYDROcodone-acetaminophen (NORCO) 5-325 MG per tablet Take 1 tablet by mouth every 24 hours for 30 days. . 11/3/18 12/3/18  KAMARI Coleman CNP   cyclobenzaprine (FLEXERIL) 10 MG tablet Take 1 tablet by mouth 2 times daily as needed for Muscle spasms 11/1/18   KAMARI Coleman CNP   warfarin (COUMADIN) 5 MG tablet TAKE 1 TABLET EVERY DAY 10/29/18   KAMARI Cristobal CNP   pantoprazole (PROTONIX) 20 MG tablet 1 TABLET(S) BY MOUTH AT LEAST 1/2 HR BEFORE BREAKFAST, CAFFINATED OR ACIDIC BEVERAGES IN THE MORNING 10/29/18   KAMARI Cristobal CNP   triamcinolone (KENALOG) 0.025 % cream Apply topically 2 times daily.  10/5/18   KAMARI Cristobal CNP   fluticasone (FLONASE) 50 MCG/ACT nasal spray

## 2018-11-17 LAB
CULTURE: NO GROWTH
Lab: NORMAL
SPECIMEN DESCRIPTION: NORMAL
STATUS: NORMAL

## 2018-11-19 ENCOUNTER — ANTI-COAG VISIT (OUTPATIENT)
Dept: FAMILY MEDICINE CLINIC | Age: 53
End: 2018-11-19

## 2018-11-29 ENCOUNTER — HOSPITAL ENCOUNTER (OUTPATIENT)
Dept: PAIN MANAGEMENT | Age: 53
Discharge: HOME | End: 2018-11-29
Payer: MEDICARE

## 2018-11-29 VITALS
SYSTOLIC BLOOD PRESSURE: 133 MMHG | RESPIRATION RATE: 16 BRPM | TEMPERATURE: 97.9 F | HEART RATE: 82 BPM | BODY MASS INDEX: 35.51 KG/M2 | WEIGHT: 208 LBS | HEIGHT: 64 IN | DIASTOLIC BLOOD PRESSURE: 77 MMHG | OXYGEN SATURATION: 97 %

## 2018-11-29 DIAGNOSIS — M47.816 LUMBAR FACET ARTHROPATHY: ICD-10-CM

## 2018-11-29 DIAGNOSIS — M46.1 SACROILIITIS (HCC): ICD-10-CM

## 2018-11-29 DIAGNOSIS — M54.16 LUMBAR RADICULOPATHY: ICD-10-CM

## 2018-11-29 DIAGNOSIS — M47.817 LUMBOSACRAL SPONDYLOSIS WITHOUT MYELOPATHY: Primary | ICD-10-CM

## 2018-11-29 DIAGNOSIS — M51.36 DDD (DEGENERATIVE DISC DISEASE), LUMBAR: ICD-10-CM

## 2018-11-29 DIAGNOSIS — M43.06 LUMBAR SPONDYLOLYSIS: ICD-10-CM

## 2018-11-29 DIAGNOSIS — Z79.891 CHRONIC USE OF OPIATE FOR THERAPEUTIC PURPOSE: ICD-10-CM

## 2018-11-29 PROCEDURE — 99213 OFFICE O/P EST LOW 20 MIN: CPT

## 2018-11-29 PROCEDURE — 99213 OFFICE O/P EST LOW 20 MIN: CPT | Performed by: NURSE PRACTITIONER

## 2018-11-29 RX ORDER — CHOLECALCIFEROL (VITAMIN D3) 1250 MCG
CAPSULE ORAL WEEKLY
COMMUNITY

## 2018-11-29 RX ORDER — HYDROCODONE BITARTRATE AND ACETAMINOPHEN 5; 325 MG/1; MG/1
1 TABLET ORAL EVERY 24 HOURS
Qty: 30 TABLET | Refills: 0 | Status: SHIPPED | OUTPATIENT
Start: 2018-12-05 | End: 2019-01-04 | Stop reason: SDUPTHER

## 2018-11-29 ASSESSMENT — ENCOUNTER SYMPTOMS
BACK PAIN: 1
DIARRHEA: 1

## 2018-11-29 NOTE — PROGRESS NOTES
immunoassay has cross-reactivity to carisoprodol   and meprobamate.     Clonazepam, Urine Not Detected    Final 09/28/2018  5:03 PM [facility]   Codeine, Urine Not Detected    Final 09/28/2018  5:03 PM [facility]   MDA, Ur Not Detected    Final 09/28/2018  5:03 PM [facility]   Diazepam, Urine Not Detected    Final 09/28/2018  5:03 PM [facility]   Ethyl Glucuronide Ur Not Detected    Final 09/28/2018  5:03 PM [facility]   Fentanyl, Ur Not Detected    Final 09/28/2018  5:03 PM [facility]   Hydrocodone, Urine Not Detected    Final 09/28/2018  5:03 PM [facility]   Hydromorphone, Urine Not Detected    Final 09/28/2018  5:03 PM [facility]   Lorazepam, Urine Not Detected    Final 09/28/2018  5:03 PM [facility]   Marijuana Metab, Ur Not Detected    Final 09/28/2018  5:03 PM [facility]   MDEA, KAIA, Ur Not Detected    Final 09/28/2018  5:03 PM [facility]   MDMA, Urine Not Detected    Final 09/28/2018  5:03 PM [facility]   Meperidine Metab, Ur Not Detected    Final 09/28/2018  5:03 PM [facility]   Methadone, Urine Not Detected    Final 09/28/2018  5:03 PM [facility]   Methamphetamine, Urine Not Detected    Final 09/28/2018  5:03 PM [facility]   Methylphenidate Not Detected    Final 09/28/2018  5:03 PM [facility]   Midazolam, Urine Not Detected    Final 09/28/2018  5:03 PM [facility]   Morphine Urine Not Detected    Final 09/28/2018  5:03 PM [facility]   Norbuprenorphine, Urine Not Detected    Final 09/28/2018  5:03 PM [facility]   Nordiazepam, Urine Not Detected    Final 09/28/2018  5:03 PM [facility]   Norfentanyl, Urine Not Detected    Final 09/28/2018  5:03 PM [facility]   NORHYDROCODONE, URINE Not Detected    Final 09/28/2018  5:03 PM [facility]   Noroxycodone, Urine Not Detected    Final 09/28/2018  5:03 PM [facility]   NOROXYMORPHONE, URINE Not Detected    Final 09/28/2018  5:03 PM [facility]   Oxazepam, Urine Present    Final 09/28/2018  5:03 PM [facility]   Oxycodone Urine Not Detected    Final 09/28/2018  5:03 PM [facility]   Oxymorphone, Urine Not Detected    Final 09/28/2018  5:03 PM [facility]   PCP, Urine Not Detected    Final 09/28/2018  5:03 PM [facility]   Phentermine, Ur Not

## 2018-12-04 ENCOUNTER — HOSPITAL ENCOUNTER (OUTPATIENT)
Dept: PREADMISSION TESTING | Age: 53
Discharge: HOME | End: 2018-12-08
Payer: MEDICARE

## 2018-12-04 VITALS
HEIGHT: 64 IN | DIASTOLIC BLOOD PRESSURE: 88 MMHG | WEIGHT: 205 LBS | BODY MASS INDEX: 35 KG/M2 | TEMPERATURE: 98.6 F | RESPIRATION RATE: 16 BRPM | OXYGEN SATURATION: 97 % | HEART RATE: 88 BPM | SYSTOLIC BLOOD PRESSURE: 142 MMHG

## 2018-12-04 LAB
ABSOLUTE EOS #: 0 K/UL (ref 0–0.4)
ABSOLUTE IMMATURE GRANULOCYTE: ABNORMAL K/UL (ref 0–0.3)
ABSOLUTE LYMPH #: 2.2 K/UL (ref 1–4.8)
ABSOLUTE MONO #: 0.4 K/UL (ref 0.1–1.3)
ANION GAP SERPL CALCULATED.3IONS-SCNC: 11 MMOL/L (ref 9–17)
BASOPHILS # BLD: 1 % (ref 0–2)
BASOPHILS ABSOLUTE: 0.1 K/UL (ref 0–0.2)
BUN BLDV-MCNC: 12 MG/DL (ref 6–20)
BUN/CREAT BLD: NORMAL (ref 9–20)
CALCIUM SERPL-MCNC: 9 MG/DL (ref 8.6–10.4)
CHLORIDE BLD-SCNC: 104 MMOL/L (ref 98–107)
CO2: 26 MMOL/L (ref 20–31)
CREAT SERPL-MCNC: 0.72 MG/DL (ref 0.5–0.9)
DIFFERENTIAL TYPE: ABNORMAL
EKG ATRIAL RATE: 65 BPM
EKG P AXIS: 3 DEGREES
EKG P-R INTERVAL: 170 MS
EKG Q-T INTERVAL: 420 MS
EKG QRS DURATION: 98 MS
EKG QTC CALCULATION (BAZETT): 436 MS
EKG R AXIS: 22 DEGREES
EKG T AXIS: 32 DEGREES
EKG VENTRICULAR RATE: 65 BPM
EOSINOPHILS RELATIVE PERCENT: 1 % (ref 0–4)
GFR AFRICAN AMERICAN: >60 ML/MIN
GFR NON-AFRICAN AMERICAN: >60 ML/MIN
GFR SERPL CREATININE-BSD FRML MDRD: NORMAL ML/MIN/{1.73_M2}
GFR SERPL CREATININE-BSD FRML MDRD: NORMAL ML/MIN/{1.73_M2}
GLUCOSE BLD-MCNC: 99 MG/DL (ref 70–99)
HCT VFR BLD CALC: 36.3 % (ref 36–46)
HEMOGLOBIN: 11.7 G/DL (ref 12–16)
IMMATURE GRANULOCYTES: ABNORMAL %
LYMPHOCYTES # BLD: 37 % (ref 24–44)
MCH RBC QN AUTO: 27.7 PG (ref 26–34)
MCHC RBC AUTO-ENTMCNC: 32.2 G/DL (ref 31–37)
MCV RBC AUTO: 86 FL (ref 80–100)
MONOCYTES # BLD: 7 % (ref 1–7)
NRBC AUTOMATED: ABNORMAL PER 100 WBC
PDW BLD-RTO: 15.3 % (ref 11.5–14.9)
PLATELET # BLD: 315 K/UL (ref 150–450)
PLATELET ESTIMATE: ABNORMAL
PMV BLD AUTO: 8.5 FL (ref 6–12)
POTASSIUM SERPL-SCNC: 4.3 MMOL/L (ref 3.7–5.3)
RBC # BLD: 4.22 M/UL (ref 4–5.2)
RBC # BLD: ABNORMAL 10*6/UL
SEG NEUTROPHILS: 54 % (ref 36–66)
SEGMENTED NEUTROPHILS ABSOLUTE COUNT: 3.2 K/UL (ref 1.3–9.1)
SODIUM BLD-SCNC: 141 MMOL/L (ref 135–144)
WBC # BLD: 6 K/UL (ref 3.5–11)
WBC # BLD: ABNORMAL 10*3/UL

## 2018-12-04 PROCEDURE — 93005 ELECTROCARDIOGRAM TRACING: CPT

## 2018-12-04 PROCEDURE — 36415 COLL VENOUS BLD VENIPUNCTURE: CPT

## 2018-12-04 PROCEDURE — 80048 BASIC METABOLIC PNL TOTAL CA: CPT

## 2018-12-04 PROCEDURE — 85025 COMPLETE CBC W/AUTO DIFF WBC: CPT

## 2018-12-04 NOTE — H&P
HISTORY and [id number]       NAME:  Meek Rivas  MRN: [id number]   YOB: 1965   Date: 12/4/2018   Age: 47 y.o. Gender: female       COMPLAINT AND PRESENT HISTORY:   Meek Rivas is 47 y.o.,   female, having a Colonoscopy. Prior Colonoscopy was done 2 yrs ago. Patient has a history of colon polyps removed   Patient has a history of GI bleeding, experiencing BRBPR. for the last  started 2 months ago. Patient reports that her bleeding is similar to menstrual bleeding. Patient states that she notes bleeding with her stools every other day. Patient admits to weakness and fatigue lately. Patient has a hx of Delayed gastric emptying and Gastroparesis . Patient does complain of heartburn and uses an antacid. Patient denies any other GI symptoms. No nausea / vomiting, No diarrhea or constipation. No abdominal pains or cramping. No heartburn, no changes in the caliber or consistency of the stools. No fever or chills.       PAST MEDICAL HISTORY     Past Medical History:   Diagnosis Date    Acid reflux     Arthritis     Coughing     dry due to ace inhibitor    CPAP (continuous positive airway pressure) dependence     Depression     uses Abilify, Buspar, Trazodone for this    Diabetes mellitus ([facility] [address]     Diverticulitis     Environmental allergies     Epilepsy ([facility] [address]     MAY 2018    Factor V Leiden ([facility] [address]     Generalized abdominal pain     Hiatal hernia     History of stroke associated with blood clotting tendency 2003    had clot in brain and left neck, left side weakness residual    Hx of blood clots     right breast, brain, neck    Hyperlipidemia     Hypertension     Insomnia     uses Trazodone for this    Pre-procedure lab exam 1/9/2018    Sleep apnea     C PAP    Unspecified cerebral artery occlusion with cerebral infarction 6/2003    SEE BELOW, 200[address] Mini stroke    Wears glasses        Pt denies any history of  heart disease, COPD, Asthma,

## 2018-12-05 ENCOUNTER — ANESTHESIA EVENT (OUTPATIENT)
Dept: OPERATING ROOM | Age: 53
End: 2018-12-05
Payer: MEDICARE

## 2018-12-05 RX ORDER — SODIUM CHLORIDE 9 MG/ML
INJECTION, SOLUTION INTRAVENOUS CONTINUOUS
Status: CANCELLED | OUTPATIENT
Start: 2018-12-05

## 2018-12-05 RX ORDER — LIDOCAINE HYDROCHLORIDE 10 MG/ML
1 INJECTION, SOLUTION EPIDURAL; INFILTRATION; INTRACAUDAL; PERINEURAL
Status: CANCELLED | OUTPATIENT
Start: 2018-12-05 | End: 2018-12-05

## 2018-12-11 ENCOUNTER — TELEPHONE (OUTPATIENT)
Dept: GASTROENTEROLOGY | Age: 53
End: 2018-12-11

## 2018-12-12 ENCOUNTER — TELEPHONE (OUTPATIENT)
Dept: GASTROENTEROLOGY | Age: 53
End: 2018-12-12

## 2018-12-17 ENCOUNTER — TELEPHONE (OUTPATIENT)
Dept: GASTROENTEROLOGY | Age: 53
End: 2018-12-17

## 2018-12-17 NOTE — TELEPHONE ENCOUNTER
Writer called pt again and she answered stating yes she has stopped coumadin on her own simce 12/11/18. Writer stated th jenn has not received clearance form Count includes the [facility] [employer] but will update that you had stopped coumadin yourself on 12/11/18. Writer also infirmed pt of new arrival time of 745am and she agreed.

## 2018-12-17 NOTE — TELEPHONE ENCOUNTER
Per patient calls, patient has stopped coumadin and confirmed new arrival time of 745am for procedure tomorrow.

## 2018-12-18 ENCOUNTER — HOSPITAL ENCOUNTER (OUTPATIENT)
Age: 53
Setting detail: OUTPATIENT SURGERY
Discharge: HOME | End: 2018-12-18
Attending: INTERNAL MEDICINE | Admitting: INTERNAL MEDICINE
Payer: MEDICARE

## 2018-12-18 ENCOUNTER — ANESTHESIA (OUTPATIENT)
Dept: OPERATING ROOM | Age: 53
End: 2018-12-18
Payer: MEDICARE

## 2018-12-18 VITALS
SYSTOLIC BLOOD PRESSURE: 135 MMHG | BODY MASS INDEX: 35 KG/M2 | RESPIRATION RATE: 16 BRPM | DIASTOLIC BLOOD PRESSURE: 87 MMHG | OXYGEN SATURATION: 98 % | WEIGHT: 205 LBS | HEART RATE: 78 BPM | HEIGHT: 64 IN | TEMPERATURE: 97 F

## 2018-12-18 VITALS — TEMPERATURE: 96.8 F | OXYGEN SATURATION: 100 % | DIASTOLIC BLOOD PRESSURE: 70 MMHG | SYSTOLIC BLOOD PRESSURE: 118 MMHG

## 2018-12-18 LAB
GLUCOSE BLD-MCNC: 88 MG/DL (ref 65–105)
INR BLD: 1
PROTHROMBIN TIME: 10.6 SEC (ref 9.7–12)

## 2018-12-18 PROCEDURE — 7100000031 HC ASPR PHASE II RECOVERY - ADDTL 15 MIN: Performed by: INTERNAL MEDICINE

## 2018-12-18 PROCEDURE — 82947 ASSAY GLUCOSE BLOOD QUANT: CPT

## 2018-12-18 PROCEDURE — 2500000003 HC RX 250 WO HCPCS: Performed by: NURSE ANESTHETIST, CERTIFIED REGISTERED

## 2018-12-18 PROCEDURE — 7100000030 HC ASPR PHASE II RECOVERY - FIRST 15 MIN: Performed by: INTERNAL MEDICINE

## 2018-12-18 PROCEDURE — 2709999900 HC NON-CHARGEABLE SUPPLY: Performed by: INTERNAL MEDICINE

## 2018-12-18 PROCEDURE — 2580000003 HC RX 258: Performed by: ANESTHESIOLOGY

## 2018-12-18 PROCEDURE — 3609008400 HC SIGMOIDOSCOPY DIAGNOSTIC: Performed by: INTERNAL MEDICINE

## 2018-12-18 PROCEDURE — 3700000000 HC ANESTHESIA ATTENDED CARE: Performed by: INTERNAL MEDICINE

## 2018-12-18 PROCEDURE — 6360000002 HC RX W HCPCS: Performed by: NURSE ANESTHETIST, CERTIFIED REGISTERED

## 2018-12-18 PROCEDURE — 7100000000 HC PACU RECOVERY - FIRST 15 MIN: Performed by: INTERNAL MEDICINE

## 2018-12-18 PROCEDURE — 7100000001 HC PACU RECOVERY - ADDTL 15 MIN: Performed by: INTERNAL MEDICINE

## 2018-12-18 PROCEDURE — 36415 COLL VENOUS BLD VENIPUNCTURE: CPT

## 2018-12-18 PROCEDURE — 85610 PROTHROMBIN TIME: CPT

## 2018-12-18 PROCEDURE — 45330 DIAGNOSTIC SIGMOIDOSCOPY: CPT | Performed by: INTERNAL MEDICINE

## 2018-12-18 RX ORDER — LIDOCAINE HYDROCHLORIDE 10 MG/ML
1 INJECTION, SOLUTION EPIDURAL; INFILTRATION; INTRACAUDAL; PERINEURAL
Status: DISCONTINUED | OUTPATIENT
Start: 2018-12-18 | End: 2018-12-18 | Stop reason: HOSPADM

## 2018-12-18 RX ORDER — PROPOFOL 10 MG/ML
INJECTION, EMULSION INTRAVENOUS PRN
Status: DISCONTINUED | OUTPATIENT
Start: 2018-12-18 | End: 2018-12-18 | Stop reason: SDUPTHER

## 2018-12-18 RX ORDER — LIDOCAINE HYDROCHLORIDE 20 MG/ML
INJECTION, SOLUTION INFILTRATION; PERINEURAL PRN
Status: DISCONTINUED | OUTPATIENT
Start: 2018-12-18 | End: 2018-12-18 | Stop reason: SDUPTHER

## 2018-12-18 RX ORDER — MIDAZOLAM HYDROCHLORIDE 1 MG/ML
INJECTION INTRAMUSCULAR; INTRAVENOUS PRN
Status: DISCONTINUED | OUTPATIENT
Start: 2018-12-18 | End: 2018-12-18 | Stop reason: SDUPTHER

## 2018-12-18 RX ORDER — SODIUM CHLORIDE 9 MG/ML
INJECTION, SOLUTION INTRAVENOUS CONTINUOUS
Status: DISCONTINUED | OUTPATIENT
Start: 2018-12-18 | End: 2018-12-18 | Stop reason: HOSPADM

## 2018-12-18 RX ADMIN — SODIUM CHLORIDE: 9 INJECTION, SOLUTION INTRAVENOUS at 08:12

## 2018-12-18 RX ADMIN — MIDAZOLAM 2 MG: 1 INJECTION INTRAMUSCULAR; INTRAVENOUS at 09:20

## 2018-12-18 RX ADMIN — LIDOCAINE HYDROCHLORIDE 60 MG: 20 INJECTION, SOLUTION INFILTRATION; PERINEURAL at 09:21

## 2018-12-18 RX ADMIN — PROPOFOL 20 MG: 10 INJECTION, EMULSION INTRAVENOUS at 09:22

## 2018-12-18 RX ADMIN — PROPOFOL 20 MG: 10 INJECTION, EMULSION INTRAVENOUS at 09:24

## 2018-12-18 ASSESSMENT — PULMONARY FUNCTION TESTS
PIF_VALUE: 0

## 2018-12-18 ASSESSMENT — PAIN SCALES - GENERAL
PAINLEVEL_OUTOF10: 0
PAINLEVEL_OUTOF10: 0

## 2018-12-18 ASSESSMENT — PAIN - FUNCTIONAL ASSESSMENT: PAIN_FUNCTIONAL_ASSESSMENT: 0-10

## 2018-12-18 NOTE — ANESTHESIA PRE PROCEDURE
(TRULICITY) 1.45 DQ/7.1VN SOPN Inject 0.75 mg into the skin once a week Patient takes on Fridays   Yes Historical Provider, MD   dicyclomine (BENTYL) 20 MG tablet TAKE 1 TABLET (20 MG TOTAL) BY MOUTH 2 (TWO) TIMES A DAY FOR 30 DOSES. 5/28/18  Yes Historical Provider, MD   venlafaxine (EFFEXOR XR) 150 MG extended release capsule TAKE 1 CAPSULE (150 MG TOTAL) BY MOUTH DAILY. 4/28/18  Yes Historical Provider, MD   atorvastatin (LIPITOR) 80 MG tablet TAKE 1 TABLET BY MOUTH NIGHTLY AT BEDTIME  Patient taking differently: Take 80 mg by mouth daily  6/4/18  Yes [facility] [place] [place]   temazepam (RESTORIL) 15 MG capsule Take 15 mg by mouth nightly. . 4/30/18  Yes Historical Provider, MD   colestipol (COLESTID) 1 g tablet Take 1 g by mouth daily    Yes Historical Provider, MD   clotrimazole (LOTRIMIN) 1 % cream Apply topically 2 times daily. 3/14/18  Yes Zoila Canales   topiramate (TOPAMAX) 25 MG tablet Take 25 mg by mouth  2/10/18  Yes Historical Provider, MD   PROAIR  (90 Base) MCG/ACT inhaler Inhale 2 puffs into the lungs every 6 hours as needed  1/10/18  Yes Historical Provider, MD   potassium chloride (MICRO-K) 10 MEQ extended release capsule  12/31/17  Yes Historical Provider, MD   busPIRone (BUSPAR) 10 MG tablet Take 10 mg by mouth daily    Yes Historical Provider, MD   ARIPiprazole (ABILIFY) 10 MG tablet TAKE 1 TABLET BY MOUTH NIGHTLY 4/10/17  Yes [facility]   ONE TOUCH ULTRA TEST strip TEST BLOOD SUGAR 3 TIMES A DAY 4/23/18   Skpi Mendoza MD   EXACTECH TEST strip  12/13/17   Historical Provider, MD   Misc.  Devices MISC True Plus     Testing 3 times a day 10/17/17   [facility] [place]   TRUEPLUS LANCETS 30G MISC 1 each by Does not apply route daily 4/11/17   Jaretster Az       Current medications:    Current Facility-Administered Medications   Medication Dose Route Frequency Provider Last Rate Last Dose    0.9 % sodium chloride infusion   Intravenous Continuous Audrey Grady MD        lidocaine PF 1 % injection 1 mL 1 mL Intradermal Once PRN Rowena Henley MD           Allergies:     Allergies   Allergen Reactions    Latex Other (See Comments)     Red marks on body    Adhesive Tape      Also plastic tape    Morphine And Related Nausea Only    Other Other (See Comments)     Surgical staples cause infection    Aspirin Nausea And Vomiting       Problem List:    Patient Active Problem List   Diagnosis Code    Complex tear of medial meniscus of left knee as current injury S80.12A    Depression F32.9    Acid reflux K21.9    Hyperlipidemia E78.5    Epilepsy ([address] G40.909    Pelvic pain in female R10.2    Colon polyp K63.5    History of stroke associated with blood clotting tendency Z86.73    Cholelithiasis K80.20    Acute medial meniscus tear of left knee S83.242A    Primary insomnia F51.01    Hiatal hernia K44.9    Major depressive disorder, recurrent, severe without psychotic features (HCC) F33.2    Chronic post-traumatic stress disorder (PTSD) F43.12    ERICK (generalized anxiety disorder) F41.1    Abdominal pain R10.9    Diarrhea R19.7    Essential hypertension I10    Lumbosacral spondylosis without myelopathy M47.817    Type 2 diabetes mellitus with diabetic polyneuropathy, without long-term current use of insulin ([facility]) E11.42    Lumbar radiculopathy M54.16    TIA (transient ischemic attack) G45.9    Facial numbness R20.0    Left-sided weakness R53.1    Worsening headaches R51    Chronic use of opiate for therapeutic purpose Z79.891    Lumbar spondylolysis M43.06    Generalized abdominal pain R10.84    Bloating R14.0    DDD (degenerative disc disease), lumbar M51.36    Encounter for medication monitoring Z51.81    Lumbar facet arthropathy M47.816    Sacroiliitis (HCC) M46.1    Delayed gastric emptying K30       Past Medical History:        Diagnosis Date    Acid reflux     Arthritis     Coughing     dry due to ace inhibitor    CPAP (continuous positive airway pressure) dependence    

## 2018-12-18 NOTE — H&P
sounds. ABDOMEN:  Obese. Soft on palpation. No localized tenderness. No guarding or rigidity. No palpable hepatosplenomegaly. LYMPHATICS:  No palpable cervical lymphadenopathy.      LOCOMOTOR, BACK AND SPINE:  No tenderness or deformities. EXTREMITIES:  Symmetrical, no pretibial edema. Rosalias sign negative. No discoloration or ulcerations.     NEUROLOGIC:  The patient is conscious, alert, oriented, No apparent focal sensory or motor deficits.                                                                     PROVISIONAL DIAGNOSES / SURGERY:           Delayed gastric emptying    Gastroparesis          COLONOSCOPY                              Patient Active Problem List     Diagnosis Date Noted    Delayed gastric emptying 09/27/2018    Sacroiliitis ([address]      DDD (degenerative disc disease), lumbar      Encounter for medication monitoring      Lumbar facet arthropathy      Bloating 04/09/2018    Generalized abdominal pain      Lumbar spondylolysis      Chronic use of opiate for therapeutic purpose 12/08/2017    Left-sided weakness 10/27/2017    Worsening headaches 10/27/2017    TIA (transient ischemic attack) 10/10/2017    Facial numbness 10/10/2017    Lumbar radiculopathy      Type 2 diabetes mellitus with diabetic polyneuropathy, without long-term current use of insulin ([address] 08/18/2017    Lumbosacral spondylosis without myelopathy      Essential hypertension 01/20/2017    Abdominal pain 11/17/2016    Diarrhea 11/17/2016    Major depressive disorder, recurrent, severe without psychotic features ([address]      Chronic post-traumatic stress disorder (PTSD)      ERICK (generalized anxiety disorder)      Hiatal hernia 02/06/2016    Primary insomnia 02/03/2016    Acute medial meniscus tear of left knee 04/06/2015    Depression      Acid reflux      Hyperlipidemia      Epilepsy (HCC)      Pelvic pain in female      Colon polyp      History of stroke

## 2019-01-02 ENCOUNTER — TELEPHONE (OUTPATIENT)
Dept: FAMILY MEDICINE CLINIC | Age: 54
End: 2019-01-02

## 2019-01-04 ENCOUNTER — HOSPITAL ENCOUNTER (OUTPATIENT)
Dept: PAIN MANAGEMENT | Age: 54
Discharge: HOME | End: 2019-01-04
Payer: COMMERCIAL

## 2019-01-04 VITALS
DIASTOLIC BLOOD PRESSURE: 83 MMHG | SYSTOLIC BLOOD PRESSURE: 127 MMHG | BODY MASS INDEX: 36.5 KG/M2 | HEART RATE: 76 BPM | TEMPERATURE: 98 F | RESPIRATION RATE: 16 BRPM | HEIGHT: 63 IN | WEIGHT: 206 LBS | OXYGEN SATURATION: 97 %

## 2019-01-04 DIAGNOSIS — Z79.891 CHRONIC USE OF OPIATE FOR THERAPEUTIC PURPOSE: ICD-10-CM

## 2019-01-04 DIAGNOSIS — M47.816 LUMBAR FACET ARTHROPATHY: Primary | ICD-10-CM

## 2019-01-04 DIAGNOSIS — M47.817 LUMBOSACRAL SPONDYLOSIS WITHOUT MYELOPATHY: ICD-10-CM

## 2019-01-04 DIAGNOSIS — M46.1 SACROILIITIS (HCC): ICD-10-CM

## 2019-01-04 DIAGNOSIS — Z51.81 ENCOUNTER FOR MEDICATION MONITORING: ICD-10-CM

## 2019-01-04 DIAGNOSIS — F43.12 CHRONIC POST-TRAUMATIC STRESS DISORDER (PTSD): ICD-10-CM

## 2019-01-04 DIAGNOSIS — M51.36 DDD (DEGENERATIVE DISC DISEASE), LUMBAR: ICD-10-CM

## 2019-01-04 DIAGNOSIS — M43.06 LUMBAR SPONDYLOLYSIS: ICD-10-CM

## 2019-01-04 DIAGNOSIS — M54.16 LUMBAR RADICULOPATHY: ICD-10-CM

## 2019-01-04 PROCEDURE — 99213 OFFICE O/P EST LOW 20 MIN: CPT | Performed by: NURSE PRACTITIONER

## 2019-01-04 PROCEDURE — 99213 OFFICE O/P EST LOW 20 MIN: CPT

## 2019-01-04 RX ORDER — HYDROCODONE BITARTRATE AND ACETAMINOPHEN 5; 325 MG/1; MG/1
1 TABLET ORAL 2 TIMES DAILY PRN
Qty: 14 TABLET | Refills: 0 | Status: SHIPPED | OUTPATIENT
Start: 2019-01-04 | End: 2019-01-11

## 2019-01-04 ASSESSMENT — ENCOUNTER SYMPTOMS
HEARTBURN: 1
BACK PAIN: 1
RESPIRATORY NEGATIVE: 1

## 2019-01-14 ENCOUNTER — HOSPITAL ENCOUNTER (OUTPATIENT)
Age: 54
Setting detail: SPECIMEN
Discharge: HOME | End: 2019-01-14
Payer: COMMERCIAL

## 2019-01-14 LAB
INR BLD: 1.1
PROTHROMBIN TIME: 10.8 SEC (ref 9.4–12.6)

## 2019-01-14 PROCEDURE — 85610 PROTHROMBIN TIME: CPT

## 2019-01-16 ENCOUNTER — HOSPITAL ENCOUNTER (OUTPATIENT)
Age: 54
Setting detail: SPECIMEN
Discharge: HOME | End: 2019-01-16
Payer: COMMERCIAL

## 2019-01-16 LAB
INR BLD: 1.8
PROTHROMBIN TIME: 18.1 SEC (ref 9.4–12.6)

## 2019-01-16 PROCEDURE — 85610 PROTHROMBIN TIME: CPT

## 2019-01-16 PROCEDURE — 36415 COLL VENOUS BLD VENIPUNCTURE: CPT

## 2019-02-26 RX ORDER — FUROSEMIDE 20 MG/1
TABLET ORAL
Qty: 90 TABLET | Refills: 1 | Status: SHIPPED | OUTPATIENT
Start: 2019-02-26 | End: 2022-08-26

## 2019-06-05 ENCOUNTER — TELEPHONE (OUTPATIENT)
Dept: GASTROENTEROLOGY | Age: 54
End: 2019-06-05

## 2020-09-04 ENCOUNTER — TELEPHONE (OUTPATIENT)
Dept: GASTROENTEROLOGY | Age: 55
End: 2020-09-04

## 2020-09-04 NOTE — TELEPHONE ENCOUNTER
Ravindra Reno called, LVM asking to schedule an appt. Call was returned but we cannot leave a returned massage due to the mailbox being full.

## 2020-09-08 NOTE — TELEPHONE ENCOUNTER
Stefani from [facility] left message to verify we received referral for patient. Tried calling her back but there was no answer; unable to leave message because she does not have a voicemail set up.

## 2020-09-09 NOTE — TELEPHONE ENCOUNTER
If you can get him in sooner that will not overbook Dr Man Chahal than move him otherwise that date is OK. Preferably sooner.

## 2020-09-09 NOTE — TELEPHONE ENCOUNTER
Received a call from Awilda at [facility] to see if we had the patient's referral.  Advised yes. We scheduled for 9/28/20 at [facility]. She will call the patient and inform and if this does not work will have the patient call the office. Please advise due to the diagnoses if we can move up sooner.           Dx:  Hemorrhage of anus and rectum

## 2020-09-10 NOTE — TELEPHONE ENCOUNTER
Called patient and cancelled her [place] appointment and offered 9/11/20 11 am at the [place] office. Patient agreeable. Writer thanked and call ended.

## 2020-09-11 ENCOUNTER — OFFICE VISIT (OUTPATIENT)
Dept: GASTROENTEROLOGY | Age: 55
End: 2020-09-11
Payer: COMMERCIAL

## 2020-09-11 VITALS — BODY MASS INDEX: 39.04 KG/M2 | WEIGHT: 220.4 LBS

## 2020-09-11 PROBLEM — K62.5 RECTAL BLEEDING: Status: ACTIVE | Noted: 2020-09-11

## 2020-09-11 PROBLEM — R19.5 OCCULT BLOOD IN STOOLS: Status: ACTIVE | Noted: 2020-09-11

## 2020-09-11 PROBLEM — K31.84 GASTROPARESIS: Status: ACTIVE | Noted: 2020-09-11

## 2020-09-11 PROBLEM — K58.8 OTHER IRRITABLE BOWEL SYNDROME: Status: ACTIVE | Noted: 2020-09-11

## 2020-09-11 PROBLEM — E66.01 MORBID (SEVERE) OBESITY DUE TO EXCESS CALORIES (HCC): Status: ACTIVE | Noted: 2020-09-11

## 2020-09-11 PROCEDURE — 99214 OFFICE O/P EST MOD 30 MIN: CPT | Performed by: INTERNAL MEDICINE

## 2020-09-11 PROCEDURE — G8417 CALC BMI ABV UP PARAM F/U: HCPCS | Performed by: INTERNAL MEDICINE

## 2020-09-11 PROCEDURE — 3017F COLORECTAL CA SCREEN DOC REV: CPT | Performed by: INTERNAL MEDICINE

## 2020-09-11 PROCEDURE — G8427 DOCREV CUR MEDS BY ELIG CLIN: HCPCS | Performed by: INTERNAL MEDICINE

## 2020-09-11 PROCEDURE — 1036F TOBACCO NON-USER: CPT | Performed by: INTERNAL MEDICINE

## 2020-09-11 RX ORDER — POLYETHYLENE GLYCOL 3350 17 G/17G
POWDER, FOR SOLUTION ORAL
Qty: 238 G | Refills: 0 | Status: SHIPPED | OUTPATIENT
Start: 2020-09-11 | End: 2020-10-27

## 2020-09-11 ASSESSMENT — ENCOUNTER SYMPTOMS
BLOOD IN STOOL: 0
ABDOMINAL PAIN: 1
CONSTIPATION: 0
ANAL BLEEDING: 1
RECTAL PAIN: 1
ABDOMINAL DISTENTION: 1
RESPIRATORY NEGATIVE: 1
TROUBLE SWALLOWING: 0
VOMITING: 0
ALLERGIC/IMMUNOLOGIC NEGATIVE: 1
DIARRHEA: 0
NAUSEA: 1

## 2020-09-11 NOTE — PROGRESS NOTES
GI OFFICE FOLLOW UP    Lavell Brown is a 48 y.o. female evaluated via on 9/11/2020. Consent:  She and/or health care decision maker is aware that that she may receive a bill for this telephone service, depending on her insurance coverage, and has provided verbal consent to proceed: YES      INTERVAL HISTORY:   Atif Cuevas 1949  [place], [address]    Chief Complaint   Patient presents with    Rectal Bleeding     2018 pt with also rectal pain, last 2 colonoscopies unsuccessful    Gastroesophageal Reflux     is complaining of symptoms with this       1. Rectal bleeding    2. Gastroparesis    3. Occult blood in stools    4.  Morbid (severe) obesity due to excess calories (HCC)    5. Other irritable bowel syndrome        This patient evaluated in my office after 2018    She had 2 attempted colonoscopy done at that time was not performed secondary to poor prep both times    She has history significant for significant gastroparesis was found to have large amount of food in the stomach    She was given Reglan therapy unfortunately she is taking only 1 pill a day although he was prescribed 3 times a day    She has not been seen in the past 2 years has not followed up  Patient has some rectal bleeding she says every time she goes to the bathroom she is seeing fresh blood and she is scared about it  Patient has been complaining of some abdominal pains, off and on cramping  Also complains of abdominal bloating and gas  Has off and on nausea without any sig vomiting  Has some alternating constipation and diarrhea  Has no weight loss  Has some anxiety issues    Denies any trouble swallowing food has mild nausea but no vomiting    Has morbid obesity    She was also found to be positive for occult blood in the stools she is taken blood thinners warfarin sodium      HISTORY OF PRESENT ILLNESS: Hiral Olivier is a 48 y.o. female with a past history remarkable for , referred for evaluation of   Chief Complaint   Patient presents with    Rectal Bleeding     2018 pt with also rectal pain, last 2 colonoscopies unsuccessful    Gastroesophageal Reflux     is complaining of symptoms with this   . Past Medical,Family, and Social History reviewed and does contribute to the patient presenting condition. Patient's PMH/PSH,SH,PSYCH Hx, MEDs, ALLERGIES, and ROS were all reviewed and updated in the appropriate sections. PAST MEDICAL HISTORY:  Past Medical History:   Diagnosis Date    Acid reflux     Arthritis     Coughing     dry due to ace inhibitor    CPAP (continuous positive airway pressure) dependence     Depression     uses Abilify, Buspar, Trazodone for this    Diabetes mellitus [facility]     Diverticulitis     Environmental allergies     Epilepsy ([facility]     MAY 2018    Factor V Leiden ([facility]     Gastroparesis     Generalized abdominal pain     Hemorrhage of anus and rectum     Hiatal hernia     History of stroke associated with blood clotting tendency 2003    had clot in brain and left neck, left side weakness residual    Hx of blood clots     right breast, brain, neck    Hyperlipidemia     Hypertension     Insomnia     uses Trazodone for this    Pre-procedure lab exam 1/9/2018    Sleep apnea     C PAP    Unspecified cerebral artery occlusion with cerebral infarction 6/2003    SEE BELOW, [address] Mini stroke    Wears glasses        Past Surgical History:   Procedure Laterality Date    APPENDECTOMY      CHOLECYSTECTOMY      COLONOSCOPY      with polypectomy    COLONOSCOPY  1/22/16    AND EGD    COLONOSCOPY  01/05/2017    DR MACKAY-random biopsies.     ENDOMETRIAL ABLATION      2 times    HERNIA REPAIR      HIATAL HERNIA REPAIR  2/4/2016    laparoscopic robotic    HYSTERECTOMY      with BSO    KNEE ARTHROSCOPY Left 4/7/15, 2015    x 2    LAPAROSCOPY      \"springs inserted in to fallopian tubes\" to close tubes   [facility]  July 2012    Anterior, C5,6,7 bulging disk repair    SC COLONOSCOPY FLX DX W/COLLJ SPEC WHEN PFRMD N/A 7/17/2018    COLONOSCOPY performed by Cesia Saldivar MD at [address] [place] [address] N/A 12/18/2018    [address] performed by Cesia Saldivar MD at [address] ENDOSCOPY      Jan 2016 DR Mikey Valentin    UPPER GASTROINTESTINAL ENDOSCOPY N/A 7/17/2018    EGD BIOPSY performed by [facility]ar [facility] at [facility]:    Current Outpatient Medications:     furosemide (LASIX) 20 MG tablet, TAKE 1 TABLET EVERY DAY, Disp: 90 tablet, Rfl: 1    Warfarin Sodium (COUMADIN PO), Take 6 mg by mouth daily, Disp: , Rfl:     Cholecalciferol (VITAMIN D PO), Take by mouth once a week Pt. Takes on Sundays, Disp: , Rfl:     Cholecalciferol (VITAMIN D3) 65158 units CAPS, Take by mouth once a week, Disp: , Rfl:     famotidine (PEPCID) 20 MG tablet, Take 1 tablet by mouth 2 times daily, Disp: 60 tablet, Rfl: 0    montelukast (SINGULAIR) 10 MG tablet, TAKE 1 TABLET BY MOUTH EVERY DAY AT NIGHT, Disp: 30 tablet, Rfl: 5    metoclopramide (REGLAN) 10 MG tablet, Take 1 tablet by mouth 3 times daily (with meals), Disp: 270 tablet, Rfl: 0    ferrous sulfate 27 MG TABS, Take by mouth daily , Disp: , Rfl:     cyclobenzaprine (FLEXERIL) 10 MG tablet, Take 1 tablet by mouth 2 times daily as needed for Muscle spasms, Disp: 60 tablet, Rfl: 1    triamcinolone (KENALOG) 0.025 % cream, Apply topically 2 times daily. , Disp: 80 g, Rfl: 1    fluticasone (FLONASE) 50 MCG/ACT nasal spray, INSTILL 1 SPRAY INTO EACH NOSTRIL DAILY AT BED TIME, Disp: 1 Bottle, Rfl: 3    losartan (COZAAR) 25 MG tablet, TAKE ONE-HALF TABLET BY MOUTH DAILY, Disp: 15 tablet, Rfl: 5    levETIRAcetam (KEPPRA) 250 MG tablet, Take 1 tablet by mouth 2 times daily, Disp: 180 tablet, Rfl: 1    Dulaglutide (TRULICITY) 1.34 PL/2.3PS SOPN, Inject 0.75 mg into the skin once a week Patient takes on Fridays, Disp: , Rfl:     dicyclomine (BENTYL) 20 MG tablet, TAKE 1 TABLET (20 MG TOTAL) BY MOUTH 2 (TWO) TIMES A DAY FOR 30 DOSES., Disp: , Rfl: 0    venlafaxine (EFFEXOR XR) 150 MG extended release capsule, TAKE 1 CAPSULE (150 MG TOTAL) BY MOUTH DAILY. , Disp: , Rfl: 1    atorvastatin (LIPITOR) 80 MG tablet, TAKE 1 TABLET BY MOUTH NIGHTLY AT BEDTIME (Patient taking differently: Take 80 mg by mouth daily ), Disp: 30 tablet, Rfl: 5    temazepam (RESTORIL) 15 MG capsule, Take 15 mg by mouth nightly. ., Disp: , Rfl:     ONE TOUCH ULTRA TEST strip, TEST BLOOD SUGAR 3 TIMES A DAY, Disp: 100 strip, Rfl: 5    colestipol (COLESTID) 1 g tablet, Take 1 g by mouth daily , Disp: , Rfl:     clotrimazole (LOTRIMIN) 1 % cream, Apply topically 2 times daily. , Disp: 15 g, Rfl: 0    topiramate (TOPAMAX) 25 MG tablet, Take 25 mg by mouth , Disp: , Rfl:     PROAIR  (90 Base) MCG/ACT inhaler, Inhale 2 puffs into the lungs every 6 hours as needed , Disp: , Rfl:     potassium chloride (MICRO-K) 10 MEQ extended release capsule, , Disp: , Rfl:     EXACTECH TEST strip, , Disp: , Rfl:     Misc.  Devices MISC, True Plus     Testing 3 times a day, Disp: 100 each, Rfl: 5    busPIRone (BUSPAR) 10 MG tablet, Take 10 mg by mouth daily , Disp: , Rfl:     TRUEPLUS LANCETS 30G MISC, 1 each by Does not apply route daily, Disp: 100 each, Rfl: 3    ARIPiprazole (ABILIFY) 10 MG tablet, TAKE 1 TABLET BY MOUTH NIGHTLY, Disp: 30 tablet, Rfl: 2    ALLERGIES:   Allergies   Allergen Reactions    Latex Other (See Comments)     Red marks on body    Adhesive Tape      Also plastic tape    Morphine And Related Nausea Only    Other Other (See Comments)     Surgical staples cause infection    Aspirin Nausea And Vomiting       FAMILY HISTORY:       Problem Relation Age of Onset    Heart Disease Mother         dies age 32    Cancer Father         lymph nodes    Pacemaker Sister          SOCIAL HISTORY:   Social History     Socioeconomic History    Marital status:      Spouse name: Not on file    Number of children: Not on file    Years of education: Not on file    Highest education level: Not on file   Occupational History    Occupation: disability   Social Needs    Financial resource strain: Not on file    Food insecurity     Worry: Not on file     Inability: Not on file   [employer] needs     Medical: Not on file     Non-medical: Not on file   Tobacco Use    Smoking status: Never Smoker    Smokeless tobacco: Never Used   Substance and Sexual Activity    Alcohol use: Yes     Alcohol/week: 0.0 standard drinks     Comment: OCCASSIONAL    Drug use: No    Sexual activity: Yes     Partners: Male   Lifestyle    Physical activity     Days per week: Not on file     Minutes per session: Not on file    Stress: Not on file   Relationships    Social connections     Talks on phone: Not on file     Gets together: Not on file     Attends Anabaptist service: Not on file     Active member of club or organization: Not on file     Attends meetings of clubs or organizations: Not on file     Relationship status: Not on file    Intimate partner violence     Fear of current or ex partner: Not on file     Emotionally abused: Not on file     Physically abused: Not on file     Forced sexual activity: Not on file   Other Topics Concern    Not on file   Social History Narrative    Not on file         REVIEW OF SYSTEMS:         Review of Systems   Constitutional: Positive for fatigue. HENT: Negative. Negative for trouble swallowing. Eyes: Positive for visual disturbance. Respiratory: Negative. Cardiovascular: Negative. Gastrointestinal: Positive for abdominal distention, abdominal pain (lower abd), anal bleeding, nausea and rectal pain. Negative for blood in stool, constipation, diarrhea and vomiting. GERD   Endocrine: Negative. Genitourinary: Negative.     Musculoskeletal: Positive for arthralgias. Skin: Negative. Allergic/Immunologic: Negative. Neurological: Positive for dizziness, weakness and light-headedness. Hematological: Bruises/bleeds easily. Psychiatric/Behavioral: Positive for sleep disturbance. The patient is nervous/anxious. Reviewed and agree    PHYSICAL EXAMINATION: Vital signs reviewed per the nursing documentation. Wt 220 lb 6.4 oz (100 kg)   BMI 39.04 kg/m²   Body mass index is 39.04 kg/m². Physical Exam  Nursing note reviewed. Constitutional:       Appearance: She is well-developed. Comments: Anxious    HENT:      Head: Normocephalic and atraumatic. Eyes:      Conjunctiva/sclera: Conjunctivae normal.      Pupils: Pupils are equal, round, and reactive to light. Neck:      Musculoskeletal: Normal range of motion and neck supple. Cardiovascular:      Heart sounds: Normal heart sounds. Pulmonary:      Effort: Pulmonary effort is normal.      Breath sounds: Normal breath sounds. Abdominal:      General: Bowel sounds are normal.      Palpations: Abdomen is soft. Comments: NON TENDER, NON DISTENTED  LIVER SPLEEN AND HERNIAS ARE NOT  PALPABLE  BOWEL SOUNDS ARE POSITIVE      Musculoskeletal: Normal range of motion. Skin:     General: Skin is warm. Neurological:      Mental Status: She is alert and oriented to person, place, and time.    Psychiatric:         Behavior: Behavior normal.           LABORATORY DATA: Reviewed  Lab Results   Component Value Date    WBC 6.0 12/04/2018    HGB 11.7 (L) 12/04/2018    HCT 36.3 12/04/2018    MCV 86.0 12/04/2018     12/04/2018     12/04/2018    K 4.3 12/04/2018     12/04/2018    CO2 26 12/04/2018    BUN 12 12/04/2018    CREATININE 0.72 12/04/2018    LABALBU 3.7 11/15/2018    BILITOT 0.18 (L) 11/15/2018    ALKPHOS 184 (H) 11/15/2018    AST 16 11/15/2018    ALT 13 11/15/2018    INR 1.8 01/16/2019         Lab Results   Component Value Date    RBC 4.22 12/04/2018    HGB 11.7 (L) 12/04/2018 MCV 86.0 12/04/2018    MCH 27.7 12/04/2018    MCHC 32.2 12/04/2018    RDW 15.3 (H) 12/04/2018    MPV 8.5 12/04/2018    BASOPCT 1 12/04/2018    LYMPHSABS 2.20 12/04/2018    MONOSABS 0.40 12/04/2018    NEUTROABS 3.20 12/04/2018    EOSABS 0.00 12/04/2018    BASOSABS 0.10 12/04/2018         DIAGNOSTIC TESTING:     No results found. Assessment  1. Rectal bleeding    2. Gastroparesis    3. Occult blood in stools    4. Morbid (severe) obesity due to excess calories (HCC)    5. Other irritable bowel syndrome        Plan    Plan EGD and Colonoscopy     The Endoscopic procedure was explained to the patient in detail  The prep and NPO were explained  All the Risks, Benefits, and Alternatives were explained  Risk of Bleeding, Perforation and Cardio Respiratory risks were explained  her questions were answered  The procedure has been scheduled with the  in the office  Patient was asked to give us a call for any questions  The patient has verbalized understanding and agreement to this plan. Pt was advised in detail about some life style and dietary modifications. She was advised about avoidance of caffeine, nicotine and chocolate. Pt was also told to stay away from any kind of fast foods, soda pops. She was also advised to avoid lots of spices, grease and fried food etc.     Instructions were also given about trying to arrange the timing, quality and quantity of food. Instructions were given about using ample amount of fiber including dietary and supplemental fiber either metamucil, bennafiber or citrucell etc.  Pt was advised about drinking ample amount of water without any colors or chemicals. Stress was given about regular exercise. Pt has verbalized understanding and agreement to these modifications.       The patient was instructed to start taking some OTC Probiotics products   These are available over the counter at the Pharmacy stores and Grocery stores  He was explained about the beneficial effects they have in the GI track  They will help to establish the good bacterial azar and will help with the digestion and bowel movements  The patient has verbalized understanding and agreement to this plan      Pt seems to have signs and symptoms consistent with GERD, acid indigestion and heartburns. She was discussed  in detail about some possible life style and dietary modifications. She was stressed about the maintenance  of appropriate weight and effect of obesity contributing to reflux symptoms. Routine exercise was streesed. Avoidance of Caffeine, nicotine and chocolate were explained. Pt was asked to avoid spices grease and fried food. Advices were also given about avoidance of any kind of fast foods, soda pops and high energy drinks. Pt was advised to place two small block under the head end of the bed which may help with night time reflux. Was advised not to eat any thin at least 2-3 hrs before going to bed and walk especially after dinner    Pt has verbalized understanding and agreement to this plan. Pt was given advices and instructions about weight loss. She was advised about the significance of exercise at least three times a week . Dietary advices were also given about the avoidance of fast food, fried food and lots of spices and grease. nstructions were given about using ample amount of fiber including dietary and supplemental fiber either metamucil, bennafiber or citrucell etc.  Pt was advised about drinking ample amount of water without any colors or chemicals. Stress was given about regular exercise. Pt was told to stay way from soda pops.     Pt has verbalized understanding and agrrement    More than half of patient's clinic visit time was spent in counseling about lifestyle and dietary modifications  Patient's  questions were answered in this regard as well  The patient has verbalized understanding and agreement       I communicated with the patient and/or health care

## 2020-09-14 NOTE — TELEPHONE ENCOUNTER
Writer attempted to contact pt to inform pt of covid testing date sched for her, but there was no answer & we are unable to leave a msg d/t vm is full. We will send letter informing of covid date and time to pt's home address.   Pt is sched at [address] 10/16/20 @ 10:50am.

## 2020-09-25 ENCOUNTER — TELEPHONE (OUTPATIENT)
Dept: GASTROENTEROLOGY | Age: 55
End: 2020-09-25

## 2020-09-25 NOTE — TELEPHONE ENCOUNTER
Kyra Fajardo at [employer] left vm asking how long to hold coumadin for pt's procedure. Did return call to [phone] and spoke to answering service who took message for him. Did explain that request was sent to [facility] on 9/11/20 and there was not response scanned into the patients chart yet.

## 2020-10-13 NOTE — TELEPHONE ENCOUNTER
Notifed [facility], Kai Joe, of need for Belia Ma to hold Coumadin 5 days piror to procedure on 10/20/20. Notified Belia Ma of Coumadin hold beginning on 10/15/20. She voiced understanding.

## 2020-10-16 ENCOUNTER — HOSPITAL ENCOUNTER (OUTPATIENT)
Dept: PREADMISSION TESTING | Age: 55
Setting detail: SPECIMEN
Discharge: HOME | End: 2020-10-20
Payer: COMMERCIAL

## 2020-10-16 PROCEDURE — U0003 INFECTIOUS AGENT DETECTION BY NUCLEIC ACID (DNA OR RNA); SEVERE ACUTE RESPIRATORY SYNDROME CORONAVIRUS 2 (SARS-COV-2) (CORONAVIRUS DISEASE [COVID-19]), AMPLIFIED PROBE TECHNIQUE, MAKING USE OF HIGH THROUGHPUT TECHNOLOGIES AS DESCRIBED BY CMS-2020-01-R: HCPCS

## 2020-10-18 LAB — SARS-COV-2, NAA: NOT DETECTED

## 2020-10-19 ENCOUNTER — ANESTHESIA EVENT (OUTPATIENT)
Dept: ENDOSCOPY | Age: 55
End: 2020-10-19
Payer: COMMERCIAL

## 2020-10-19 ENCOUNTER — TELEPHONE (OUTPATIENT)
Dept: PRIMARY CARE CLINIC | Age: 55
End: 2020-10-19

## 2020-10-20 ENCOUNTER — HOSPITAL ENCOUNTER (OUTPATIENT)
Age: 55
Setting detail: OUTPATIENT SURGERY
Discharge: HOME | End: 2020-10-20
Attending: INTERNAL MEDICINE | Admitting: INTERNAL MEDICINE
Payer: COMMERCIAL

## 2020-10-20 ENCOUNTER — ANESTHESIA (OUTPATIENT)
Dept: ENDOSCOPY | Age: 55
End: 2020-10-20
Payer: COMMERCIAL

## 2020-10-20 VITALS
DIASTOLIC BLOOD PRESSURE: 75 MMHG | TEMPERATURE: 97.7 F | SYSTOLIC BLOOD PRESSURE: 135 MMHG | OXYGEN SATURATION: 98 % | BODY MASS INDEX: 39.05 KG/M2 | HEIGHT: 63 IN | RESPIRATION RATE: 14 BRPM | HEART RATE: 56 BPM | WEIGHT: 220.38 LBS

## 2020-10-20 VITALS — SYSTOLIC BLOOD PRESSURE: 132 MMHG | DIASTOLIC BLOOD PRESSURE: 62 MMHG | OXYGEN SATURATION: 100 %

## 2020-10-20 LAB — GLUCOSE BLD-MCNC: 75 MG/DL (ref 65–105)

## 2020-10-20 PROCEDURE — 7100000031 HC ASPR PHASE II RECOVERY - ADDTL 15 MIN: Performed by: INTERNAL MEDICINE

## 2020-10-20 PROCEDURE — 45385 COLONOSCOPY W/LESION REMOVAL: CPT | Performed by: INTERNAL MEDICINE

## 2020-10-20 PROCEDURE — 45380 COLONOSCOPY AND BIOPSY: CPT | Performed by: INTERNAL MEDICINE

## 2020-10-20 PROCEDURE — 6360000002 HC RX W HCPCS

## 2020-10-20 PROCEDURE — 3700000000 HC ANESTHESIA ATTENDED CARE: Performed by: INTERNAL MEDICINE

## 2020-10-20 PROCEDURE — 7100000030 HC ASPR PHASE II RECOVERY - FIRST 15 MIN: Performed by: INTERNAL MEDICINE

## 2020-10-20 PROCEDURE — 7100000000 HC PACU RECOVERY - FIRST 15 MIN: Performed by: INTERNAL MEDICINE

## 2020-10-20 PROCEDURE — 2580000003 HC RX 258: Performed by: ANESTHESIOLOGY

## 2020-10-20 PROCEDURE — 43239 EGD BIOPSY SINGLE/MULTIPLE: CPT | Performed by: INTERNAL MEDICINE

## 2020-10-20 PROCEDURE — 6360000002 HC RX W HCPCS: Performed by: ANESTHESIOLOGY

## 2020-10-20 PROCEDURE — 3609012400 HC EGD TRANSORAL BIOPSY SINGLE/MULTIPLE: Performed by: INTERNAL MEDICINE

## 2020-10-20 PROCEDURE — 2709999900 HC NON-CHARGEABLE SUPPLY: Performed by: INTERNAL MEDICINE

## 2020-10-20 PROCEDURE — 3700000001 HC ADD 15 MINUTES (ANESTHESIA): Performed by: INTERNAL MEDICINE

## 2020-10-20 PROCEDURE — 2500000003 HC RX 250 WO HCPCS

## 2020-10-20 PROCEDURE — 88305 TISSUE EXAM BY PATHOLOGIST: CPT

## 2020-10-20 PROCEDURE — 7100000001 HC PACU RECOVERY - ADDTL 15 MIN: Performed by: INTERNAL MEDICINE

## 2020-10-20 PROCEDURE — 3609010400 HC COLONOSCOPY POLYPECTOMY HOT BIOPSY: Performed by: INTERNAL MEDICINE

## 2020-10-20 PROCEDURE — 82947 ASSAY GLUCOSE BLOOD QUANT: CPT

## 2020-10-20 RX ORDER — DIPHENHYDRAMINE HYDROCHLORIDE 50 MG/ML
12.5 INJECTION INTRAMUSCULAR; INTRAVENOUS
Status: DISCONTINUED | OUTPATIENT
Start: 2020-10-20 | End: 2020-10-20 | Stop reason: HOSPADM

## 2020-10-20 RX ORDER — SODIUM CHLORIDE 0.9 % (FLUSH) 0.9 %
10 SYRINGE (ML) INJECTION EVERY 12 HOURS SCHEDULED
Status: DISCONTINUED | OUTPATIENT
Start: 2020-10-20 | End: 2020-10-20 | Stop reason: HOSPADM

## 2020-10-20 RX ORDER — GLIPIZIDE 5 MG/1
5 TABLET ORAL
COMMUNITY

## 2020-10-20 RX ORDER — FLUTICASONE PROPIONATE 50 MCG
BLISTER, WITH INHALATION DEVICE INHALATION
COMMUNITY
Start: 2020-09-04

## 2020-10-20 RX ORDER — PROPOFOL 10 MG/ML
INJECTION, EMULSION INTRAVENOUS PRN
Status: DISCONTINUED | OUTPATIENT
Start: 2020-10-20 | End: 2020-10-20 | Stop reason: SDUPTHER

## 2020-10-20 RX ORDER — LIDOCAINE HYDROCHLORIDE 10 MG/ML
INJECTION, SOLUTION EPIDURAL; INFILTRATION; INTRACAUDAL; PERINEURAL PRN
Status: DISCONTINUED | OUTPATIENT
Start: 2020-10-20 | End: 2020-10-20 | Stop reason: SDUPTHER

## 2020-10-20 RX ORDER — SODIUM CHLORIDE 9 MG/ML
INJECTION, SOLUTION INTRAVENOUS CONTINUOUS
Status: DISCONTINUED | OUTPATIENT
Start: 2020-10-20 | End: 2020-10-20 | Stop reason: HOSPADM

## 2020-10-20 RX ORDER — MEPERIDINE HYDROCHLORIDE 25 MG/ML
12.5 INJECTION INTRAMUSCULAR; INTRAVENOUS; SUBCUTANEOUS EVERY 5 MIN PRN
Status: DISCONTINUED | OUTPATIENT
Start: 2020-10-20 | End: 2020-10-20 | Stop reason: HOSPADM

## 2020-10-20 RX ORDER — ONDANSETRON 2 MG/ML
4 INJECTION INTRAMUSCULAR; INTRAVENOUS
Status: DISCONTINUED | OUTPATIENT
Start: 2020-10-20 | End: 2020-10-20 | Stop reason: HOSPADM

## 2020-10-20 RX ORDER — MORPHINE SULFATE 2 MG/ML
2 INJECTION, SOLUTION INTRAMUSCULAR; INTRAVENOUS EVERY 5 MIN PRN
Status: DISCONTINUED | OUTPATIENT
Start: 2020-10-20 | End: 2020-10-20 | Stop reason: HOSPADM

## 2020-10-20 RX ORDER — LABETALOL HYDROCHLORIDE 5 MG/ML
5 INJECTION, SOLUTION INTRAVENOUS EVERY 10 MIN PRN
Status: DISCONTINUED | OUTPATIENT
Start: 2020-10-20 | End: 2020-10-20 | Stop reason: HOSPADM

## 2020-10-20 RX ORDER — MIDAZOLAM HYDROCHLORIDE 1 MG/ML
2 INJECTION INTRAMUSCULAR; INTRAVENOUS ONCE
Status: COMPLETED | OUTPATIENT
Start: 2020-10-20 | End: 2020-10-20

## 2020-10-20 RX ORDER — LIDOCAINE HYDROCHLORIDE 10 MG/ML
1 INJECTION, SOLUTION EPIDURAL; INFILTRATION; INTRACAUDAL; PERINEURAL
Status: DISCONTINUED | OUTPATIENT
Start: 2020-10-20 | End: 2020-10-20 | Stop reason: HOSPADM

## 2020-10-20 RX ORDER — SODIUM CHLORIDE 0.9 % (FLUSH) 0.9 %
10 SYRINGE (ML) INJECTION PRN
Status: DISCONTINUED | OUTPATIENT
Start: 2020-10-20 | End: 2020-10-20 | Stop reason: HOSPADM

## 2020-10-20 RX ADMIN — PROPOFOL 330 MG: 10 INJECTION, EMULSION INTRAVENOUS at 10:29

## 2020-10-20 RX ADMIN — SODIUM CHLORIDE: 9 INJECTION, SOLUTION INTRAVENOUS at 09:39

## 2020-10-20 RX ADMIN — LIDOCAINE HYDROCHLORIDE 50 MG: 10 INJECTION, SOLUTION EPIDURAL; INFILTRATION; INTRACAUDAL; PERINEURAL at 10:29

## 2020-10-20 RX ADMIN — MIDAZOLAM 2 MG: 1 INJECTION INTRAMUSCULAR; INTRAVENOUS at 09:40

## 2020-10-20 ASSESSMENT — ENCOUNTER SYMPTOMS
SHORTNESS OF BREATH: 0
STRIDOR: 0

## 2020-10-20 ASSESSMENT — PULMONARY FUNCTION TESTS
PIF_VALUE: 1
PIF_VALUE: 2
PIF_VALUE: 1

## 2020-10-20 ASSESSMENT — PAIN SCALES - GENERAL: PAINLEVEL_OUTOF10: 0

## 2020-10-20 ASSESSMENT — PAIN - FUNCTIONAL ASSESSMENT: PAIN_FUNCTIONAL_ASSESSMENT: 0-10

## 2020-10-20 ASSESSMENT — LIFESTYLE VARIABLES: SMOKING_STATUS: 0

## 2020-10-20 NOTE — OP NOTE
PROCEDURE NOTE    DATE OF PROCEDURE: 10/20/2020     SURGEON: Clotilde Tijerina MD    ASSISTANT: None    PREOPERATIVE DIAGNOSIS: GERD  ABDOMINAL PAINS  GASTROPARESIS  IBS    POSTOPERATIVE DIAGNOSIS: As described below    OPERATION: Upper GI endoscopy with Biopsy    ANESTHESIA: MAC PER ANESTHESIA     ESTIMATED BLOOD LOSS: Less than 50 ml    COMPLICATIONS: None. SPECIMENS:  Was Obtained:     HISTORY: The patient is a 48y.o. year old female with history of above preop diagnosis. I recommended esophagogastroduodenoscopy with possible biopsy and I explained the risk, benefits, expected outcome, and alternatives to the procedure. Risks included but are not limited to bleeding, infection, respiratory distress, hypotension, and perforation of the esophagus, stomach, or duodenum. Patient understands and is in agreement. PROCEDURE: The patient was given IV conscious sedation. The patient's SPO2 remained above 90% throughout the procedure. The gastroscope was inserted orally and advanced under direct vision through the esophagus, through the stomach, through the pylorus, and into the descending duodenum. Findings:    Retropharyngeal area was grossly normal appearing    Esophagus: normal    Stomach:    Fundus: normal    Body: abnormal: SMALL TO MOD AMOUNT OF RETAINED FOOD    Antrum: abnormal: MILD EROSIVE GASTRITIS BIOPSIES WERE TAKEN    Duodenum:     Descending: normal. RANDOM BIOPSIES WERE TAKEN    Bulb: normal    The scope was removed and the patient tolerated the procedure well. Recommendations/Plan:   1. F/U Biopsies  2. F/U In Office in 3-4 weeks  3. Discussed with the family  4.  Post sedation patient was stable with stable vital signs and stable O2 saturations    Electronically signed by Clotilde Tijerina MD  on 10/20/2020 at 10:33 AM

## 2020-10-20 NOTE — ANESTHESIA PRE PROCEDURE
Department of Anesthesiology  Preprocedure Note       Name:  Parth Turcios   Age:  48 y.o.  :  1965                                          MRN:  [id number]         Date:  10/20/2020      Surgeon: Lopez Henley):  Kalpana Hernandez MD    Procedure: Procedure(s):  EGD ESOPHAGOGASTRODUODENOSCOPY  COLONOSCOPY DIAGNOSTIC    Medications prior to admission:   Prior to Admission medications    Medication Sig Start Date End Date Taking? Authorizing Provider   magnesium citrate solution Take 296 mLs by mouth once for 1 dose 20  Kalpana Hernandez MD   bisacodyl (DULCOLAX) 5 MG EC tablet TAKE 4 TABS AT 10 AM THE DAY PRIOR TO COLONOSCOPY 20   Kalpana Hernandez MD   polyethylene glycol [facility]) 17 GM/SCOOP powder Use as directed by following your patient instructions given by office. 20   Kalpana Hernandez MD   furosemide (LASIX) 20 MG tablet TAKE 1 TABLET EVERY DAY 19   KAMARI Murry CNP   Warfarin Sodium (COUMADIN PO) Take 6 mg by mouth daily    Historical Provider, MD   Cholecalciferol (VITAMIN D PO) Take by mouth once a week Pt. Takes on Sundays    Historical Provider, MD   Cholecalciferol (VITAMIN D3) 26487 units CAPS Take by mouth once a week    Historical Provider, MD   famotidine (PEPCID) 20 MG tablet Take 1 tablet by mouth 2 times daily 18   Nikolai Torres MD   montelukast (SINGULAIR) 10 MG tablet TAKE 1 TABLET BY MOUTH EVERY DAY AT NIGHT 11/15/18   [facility]   metoclopramide (REGLAN) 10 MG tablet Take 1 tablet by mouth 3 times daily (with meals) 18   Kalpana Hernandez MD   ferrous sulfate 27 MG TABS Take by mouth daily     Historical Provider, MD   cyclobenzaprine (FLEXERIL) 10 MG tablet Take 1 tablet by mouth 2 times daily as needed for Muscle spasms 18   KAMARI Walls CNP   triamcinolone (KENALOG) 0.025 % cream Apply topically 2 times daily.  10/5/18   Siena Canales   fluticasone (FLONASE) 50 MCG/ACT nasal spray INSTILL 1 SPRAY INTO EACH NOSTRIL DAILY AT BED TIME 9/27/18   Maral Canales   losartan (COZAAR) 25 MG tablet TAKE ONE-HALF TABLET BY MOUTH DAILY 9/11/18   Maral Bañuelos Ally   levETIRAcetam (KEPPRA) 250 MG tablet Take 1 tablet by mouth 2 times daily 9/1/18   Stacie Hatch   Dulaglutide (TRULICITY) 2.47 DQ/1.8WK SOPN Inject 0.75 mg into the skin once a week Patient takes on Fridays    Historical Provider, MD   dicyclomine (BENTYL) 20 MG tablet TAKE 1 TABLET (20 MG TOTAL) BY MOUTH 2 (TWO) TIMES A DAY FOR 30 DOSES. 5/28/18   Historical Provider, MD   venlafaxine (EFFEXOR XR) 150 MG extended release capsule TAKE 1 CAPSULE (150 MG TOTAL) BY MOUTH DAILY. 4/28/18   Historical Provider, MD   atorvastatin (LIPITOR) 80 MG tablet TAKE 1 TABLET BY MOUTH NIGHTLY AT BEDTIME  Patient taking differently: Take 80 mg by mouth daily  6/4/18   Maral Canales   temazepam (RESTORIL) 15 MG capsule Take 15 mg by mouth nightly. . 4/30/18   Historical Provider, MD   ONE TOUCH ULTRA TEST strip TEST BLOOD SUGAR 3 TIMES A DAY 4/23/18   Krysta Frias MD   colestipol (COLESTID) 1 g tablet Take 1 g by mouth daily     Historical Provider, MD   clotrimazole (LOTRIMIN) 1 % cream Apply topically 2 times daily. 3/14/18   Stacie Hatch   topiramate (TOPAMAX) 25 MG tablet Take 25 mg by mouth  2/10/18   Historical Provider, MD   PROAIR  (36 Base) MCG/ACT inhaler Inhale 2 puffs into the lungs every 6 hours as needed  1/10/18   Historical Provider, MD   potassium chloride (MICRO-K) 10 MEQ extended release capsule  12/31/17   Historical Provider, MD   EXACTECH TEST strip  12/13/17   Historical Provider, MD   Misc.  Devices MISC True Plus     Testing 3 times a day 10/17/17   Maral Bañuelos Ally   busPIRone (BUSPAR) 10 MG tablet Take 10 mg by mouth daily     Historical Provider, MD   TRUEPLUS LANCETS 30G MISC 1 each by Does not apply route daily 4/11/17   Maral Bañuelos [place]   ARIPiprazole (ABILIFY) 10 MG tablet TAKE 1 TABLET BY MOUTH NIGHTLY 4/10/17   Stacie Hatch       Current medications:    Current Facility-Administered Medications   Medication Dose Route Frequency Provider Last Rate Last Dose    sodium chloride flush 0.9 % injection 10 mL  10 mL Intravenous 2 times per day Jules Beckford MD        sodium chloride flush 0.9 % injection 10 mL  10 mL Intravenous PRN Jules Beckford MD        lidocaine PF 1 % injection 1 mL  1 mL Intradermal Once PRN Jules Beckford MD        0.9 % sodium chloride infusion   Intravenous Continuous Jules Beckford MD           Allergies:     Allergies   Allergen Reactions    Latex Other (See Comments)     Red marks on body    Adhesive Tape      Also plastic tape    Morphine And Related Nausea Only    Other Other (See Comments)     Surgical staples cause infection    Aspirin Nausea And Vomiting       Problem List:    Patient Active Problem List   Diagnosis Code    Complex tear of medial meniscus of left knee as current injury S80.12A    Depression F32.9    Acid reflux K21.9    Hyperlipidemia E78.5    Epilepsy ([facility] [address] G40.909    Pelvic pain in female R10.2    Colon polyp K63.5    History of stroke associated with blood clotting tendency Z86.73    Cholelithiasis K80.20    Acute medial meniscus tear of left knee S83.242A    Primary insomnia F51.01    Hiatal hernia K44.9    Major depressive disorder, recurrent, severe without psychotic features ([facility] [address] F33.2    Chronic post-traumatic stress disorder (PTSD) F43.12    ERICK (generalized anxiety disorder) F41.1    Abdominal pain R10.9    Diarrhea R19.7    Essential hypertension I10    Lumbosacral spondylosis without myelopathy M47.817    Type 2 diabetes mellitus with diabetic polyneuropathy, without long-term current use of insulin (HCC) E11.42    Lumbar radiculopathy M54.16    TIA (transient ischemic attack) G45.9    Facial numbness R20.0    Left-sided weakness R53.1    Worsening headaches R51.9    Chronic use of opiate for therapeutic purpose Z79.891    Lumbar spondylolysis M43.06    Generalized abdominal pain R10.84  Bloating R14.0    DDD (degenerative disc disease), lumbar M51.36    Encounter for medication monitoring Z51.81    Lumbar facet arthropathy M47.816    Sacroiliitis (HCC) M46.1    Delayed gastric emptying K30    Hemorrhage of anus and rectum K62.5    Other irritable bowel syndrome K58.8    Morbid (severe) obesity due to excess calories (HCC) E66.01    Occult blood in stools R19.5    Gastroparesis K31.84    Rectal bleeding K62.5       Past Medical History:        Diagnosis Date    Acid reflux     Arthritis     Coughing     dry due to ace inhibitor    CPAP (continuous positive airway pressure) dependence     Depression     uses Abilify, Buspar, Trazodone for this    Diabetes mellitus ([facility] [address]     Diverticulitis     Environmental allergies     Epilepsy ([facility] [address]     MAY 2018    Factor V Leiden ([facility] [address]     Gastroparesis     Generalized abdominal pain     Hemorrhage of anus and rectum     Hiatal hernia     History of stroke associated with blood clotting tendency 2003    had clot in brain and left neck, left side weakness residual    Hx of blood clots     right breast, brain, neck    Hyperlipidemia     Hypertension     Insomnia     uses Trazodone for this    Pre-procedure lab exam 1/9/2018    Sleep apnea     C PAP    Unspecified cerebral artery occlusion with cerebral infarction 6/2003    SEE BELOW, 20017 Mini stroke    Wears glasses        Past Surgical History:        Procedure Laterality Date    APPENDECTOMY      CHOLECYSTECTOMY      COLONOSCOPY      with polypectomy    COLONOSCOPY  1/22/16    AND EGD    COLONOSCOPY  01/05/2017    DR MACKAY-random biopsies.     ENDOMETRIAL ABLATION      2 times    HERNIA REPAIR      HIATAL HERNIA REPAIR  2/4/2016    laparoscopic robotic    HYSTERECTOMY      with BSO    KNEE ARTHROSCOPY Left 4/7/15, 2015    x 2    LAPAROSCOPY      \"springs inserted in to fallopian tubes\" to close tubes   [facility] NECK SURGERY  July 2012    Anterior, C5,6,7 bulging disk repair    [place] COLONOSCOPY FLX DX W/COLLJ SPEC WHEN PFRMD N/A 7/17/2018    COLONOSCOPY performed by Shann Spurling, MD at [address] N/A 12/18/2018    SIGMOIDOSCOPY DIAGNOSTIC FLEXIBLE performed by Shann Spurling, MD at [address] ENDOSCOPY      Jan 2016  [facility] GASTROINTESTINAL ENDOSCOPY N/A 7/17/2018    EGD BIOPSY performed by Shann Spurling, MD at [facility] History:    Social History     Tobacco Use    Smoking status: Never Smoker    Smokeless tobacco: Never Used   Substance Use Topics    Alcohol use: Yes     Alcohol/week: 0.0 standard drinks     Comment: OCCASSIONAL                                Counseling given: Not Answered      Vital Signs (Current): There were no vitals filed for this visit.                                            BP Readings from Last 3 Encounters:   01/04/19 127/83   12/18/18 135/87   12/18/18 118/70       NPO Status:                                                                                 BMI:   Wt Readings from Last 3 Encounters:   09/11/20 220 lb 6.4 oz (100 kg)   01/04/19 206 lb (93.4 kg)   12/18/18 205 lb (93 kg)     There is no height or weight on file to calculate BMI.    CBC:   Lab Results   Component Value Date    WBC 6.0 12/04/2018    RBC 4.22 12/04/2018    RBC 4.55 02/01/2012    HGB 11.7 12/04/2018    HCT 36.3 12/04/2018    MCV 86.0 12/04/2018    RDW 15.3 12/04/2018     12/04/2018     02/01/2012     HB 11.7    CMP:   Lab Results   Component Value Date     12/04/2018    K 4.3 12/04/2018     12/04/2018    CO2 26 12/04/2018    BUN 12 12/04/2018    CREATININE 0.72 12/04/2018    GFRAA >60 12/04/2018    LABGLOM >60 12/04/2018    GLUCOSE 99 12/04/2018    GLUCOSE 126 01/30/2012    PROT 7.0 11/15/2018    CALCIUM 9.0 12/04/2018    BILITOT 0.18 11/15/2018    ALKPHOS 184 11/15/2018    AST 16 11/15/2018    ALT 13 11/15/2018       POC Tests: No results for input(s): POCGLU, Amy Fix, POCCL, POCBUN, POCHEMO, POCHCT in the last 72 hours. Coags:   Lab Results   Component Value Date    PROTIME 18.1 01/16/2019    PROTIME 25.9 10/05/2018    INR 1.8 01/16/2019    APTT 59.9 11/15/2018       HCG (If Applicable): No results found for: PREGTESTUR, PREGSERUM, HCG, HCGQUANT     ABGs: No results found for: PHART, PO2ART, TOW7MYX, HMN2ZGB, BEART, N6XKACWJ     Type & Screen (If Applicable):  No results found for: LABABO, LABRH    Drug/Infectious Status (If Applicable):  No results found for: HIV, HEPCAB    COVID-19 Screening (If Applicable):   Lab Results   Component Value Date    COVID19 Not Detected 10/16/2020         Anesthesia Evaluation  Patient summary reviewed no history of anesthetic complications:   Airway: Mallampati: III  TM distance: >3 FB   Neck ROM: full  Mouth opening: > = 3 FB Dental: normal exam         Pulmonary:normal exam  breath sounds clear to auscultation  (+) sleep apnea: on CPAP,      (-) pneumonia, COPD, asthma, shortness of breath, recent URI, rhonchi, wheezes, rales, stridor and not a current smoker          Patient did not smoke on day of surgery.                  Cardiovascular:  Exercise tolerance: good (>4 METS),   (+) hypertension:,     (-) pacemaker, valvular problems/murmurs, past MI, CAD, CABG/stent, dysrhythmias,  angina,  CHF, orthopnea, PND,  MONTIEL, murmur, weak pulses,  friction rub, systolic click, carotid bruit,  JVD and peripheral edema    ECG reviewed  Rhythm: regular  Rate: normal  Echocardiogram reviewed         Beta Blocker:  Not on Beta Blocker         Neuro/Psych:   (+) CVA: no interval change, neuromuscular disease:, TIA, headaches:, psychiatric history: stable with treatmentdepression/anxiety    (-) seizures           GI/Hepatic/Renal:   (+) hiatal hernia, GERD: no interval change,      (-) PUD, hepatitis, liver disease, no renal disease, bowel prep and no morbid obesity       Endo/Other:    (+) DiabetesType II DM, , : arthritis: OA., no malignancy/cancer. (-) hypothyroidism, hyperthyroidism, blood dyscrasia, no electrolyte abnormalities, no malignancy/cancer               Abdominal:           Vascular: negative vascular ROS. Anesthesia Plan      MAC and general     ASA 3       Induction: intravenous. MIPS: Postoperative opioids intended and Prophylactic antiemetics administered. Anesthetic plan and risks discussed with patient. Plan discussed with CRNA. The patient was counseled at length about the risks of munira Covid-19 during their perioperative period and any recovery window from their procedure. The patient was made aware that munira Covid-19  may worsen their prognosis for recovering from their procedure  and lend to a higher morbidity and/or mortality risk. All material risks, benefits, and reasonable alternatives including postponing the procedure were discussed. The patient DOES wish to proceed with the procedure at this time.           Shu Machuca MD   10/20/2020

## 2020-10-20 NOTE — PROGRESS NOTES
Lab unable to draw blood, unable to draw blood from IV site, Dr. Noelle Rose ok not to get PT/INR since results from 10- was 1.8 and has been off coumadin since 10-

## 2020-10-20 NOTE — H&P
HISTORY and [id number]       NAME:  Amie Mehta  MRN: [id number]   YOB: 1965   Date: 10/20/2020   Age: 48 y.o. Gender: female       COMPLAINT AND PRESENT HISTORY:   Amie Mehta is 48 y.o.,  female, will be having a Colonoscopy and EGD. Prior Colonoscopy  and EGD was done Two years ago. With polyectomy  Pt has Hx of RECTAL BLEEDING, IBS ,OCCULT BLOOD IN STOOLS, and  GASTROPARESIS    Patient denies any  FH of Colon or esophogeal  Cancer. Patient reports no changes in bowel habits as constipation or diarrhea. she has  GI /Rectal bleeding very bright red blood,  it has been for two weeks, with no experiencing red/ black/ BRBPR stools. Patient has a history of abd. pain in the  RUQ area, as a sharp pain , rated  7/10. With no nausea or vomiting. Pt denies weight loss . Pt reports dysphagia and difficulty with food getting stuck. Pt has to resolve to drinking more fluids to assist with swallowing. Pt has hx of heartburn , she did not take any medication for it. Pt denies fever/chills, chest pain or SOB, no problem with anesthesia, No hx of infection MRSA  Pt reports her BM today is clear liquid today after she drink the colon prep yesterday  Pt NPO since the past midnight, no am medication . Pt report the last dose of Coumadin,was  Five days ago.     PAST MEDICAL HISTORY     Past Medical History:   Diagnosis Date    Acid reflux     Arthritis     Coughing     dry due to ace inhibitor    CPAP (continuous positive airway pressure) dependence     Depression     uses Abilify, Buspar, Trazodone for this    Diabetes mellitus ([facility] [address]     Diverticulitis     Environmental allergies     Epilepsy ([facility] [address]     MAY 2018    Factor V Leiden ([facility] [address]     Gastroparesis     Generalized abdominal pain     Hemorrhage of anus and rectum     Hiatal hernia     History of stroke associated with blood clotting tendency 2003    had clot in brain and left neck, left side weakness residual    Hx of blood clots     right breast, brain, neck    Hyperlipidemia     Hypertension     Insomnia     uses Trazodone for this    Pre-procedure lab exam 1/9/2018    Sleep apnea     C PAP    Unspecified cerebral artery occlusion with cerebral infarction 6/2003    SEE BELOW, 20017 Mini stroke    Wears glasses        SURGICAL HISTORY       Past Surgical History:   Procedure Laterality Date    APPENDECTOMY      CHOLECYSTECTOMY      COLONOSCOPY      with polypectomy    COLONOSCOPY  1/22/16    AND EGD    COLONOSCOPY  01/05/2017    DR MACKAY-random biopsies.     ENDOMETRIAL ABLATION      2 times    HERNIA REPAIR      HIATAL HERNIA REPAIR  2/4/2016    laparoscopic robotic    HYSTERECTOMY      with BSO    KNEE ARTHROSCOPY Left 4/7/15, 2015    x 2    LAPAROSCOPY      \"springs inserted in to fallopian tubes\" to close tubes   [facility] NECK SURGERY  July 2012    Anterior, C5,6,7 bulging disk repair    [place] COLONOSCOPY FLX DX W/COLLJ SPEC WHEN PFRMD N/A 7/17/2018    COLONOSCOPY performed by Mara Angeles MD at [address] N/A 12/18/2018    [address] [facility] performed by Mara Angeles MD at [address] ENDOSCOPY      Jan 2016 DR Sharda Posey    UPPER GASTROINTESTINAL ENDOSCOPY N/A 7/17/2018    EGD BIOPSY performed by Mara Angeles MD at [address] [facility]       Family History   Problem Relation Age of Onset    Heart Disease Mother         dies age 32   [facility] Cancer Father         lymph nodes    Pacemaker Sister        SOCIAL HISTORY       Social History     Socioeconomic History    Marital status:      Spouse name: Not on file    Number of children: Not on file    Years of education: Not on file    Highest education level: Not on file   Occupational History    Occupation: disability   Social Needs    Financial resource strain: Not on file    Food insecurity     Worry: Not on file     Inability: Not on file   [facility] Cholecalciferol (VITAMIN D PO) Take by mouth once a week Pt. Takes on Sundays      Cholecalciferol (VITAMIN D3) 28433 units CAPS Take by mouth once a week      famotidine (PEPCID) 20 MG tablet Take 1 tablet by mouth 2 times daily 60 tablet 0    montelukast (SINGULAIR) 10 MG tablet TAKE 1 TABLET BY MOUTH EVERY DAY AT NIGHT 30 tablet 5    metoclopramide (REGLAN) 10 MG tablet Take 1 tablet by mouth 3 times daily (with meals) 270 tablet 0    ferrous sulfate 27 MG TABS Take by mouth daily       cyclobenzaprine (FLEXERIL) 10 MG tablet Take 1 tablet by mouth 2 times daily as needed for Muscle spasms 60 tablet 1    triamcinolone (KENALOG) 0.025 % cream Apply topically 2 times daily. 80 g 1    fluticasone (FLONASE) 50 MCG/ACT nasal spray INSTILL 1 SPRAY INTO EACH NOSTRIL DAILY AT BED TIME 1 Bottle 3    losartan (COZAAR) 25 MG tablet TAKE ONE-HALF TABLET BY MOUTH DAILY 15 tablet 5    levETIRAcetam (KEPPRA) 250 MG tablet Take 1 tablet by mouth 2 times daily 180 tablet 1    Dulaglutide (TRULICITY) 5.72 WZ/3.9IB SOPN Inject 0.75 mg into the skin once a week Patient takes on Fridays      dicyclomine (BENTYL) 20 MG tablet TAKE 1 TABLET (20 MG TOTAL) BY MOUTH 2 (TWO) TIMES A DAY FOR 30 DOSES.  0    venlafaxine (EFFEXOR XR) 150 MG extended release capsule TAKE 1 CAPSULE (150 MG TOTAL) BY MOUTH DAILY. 1    atorvastatin (LIPITOR) 80 MG tablet TAKE 1 TABLET BY MOUTH NIGHTLY AT BEDTIME (Patient taking differently: Take 80 mg by mouth daily ) 30 tablet 5    temazepam (RESTORIL) 15 MG capsule Take 15 mg by mouth nightly. Conner Childs ONE TOUCH ULTRA TEST strip TEST BLOOD SUGAR 3 TIMES A  strip 5    colestipol (COLESTID) 1 g tablet Take 1 g by mouth daily       clotrimazole (LOTRIMIN) 1 % cream Apply topically 2 times daily.  15 g 0    topiramate (TOPAMAX) 25 MG tablet Take 25 mg by mouth       PROAIR  (90 Base) MCG/ACT inhaler Inhale 2 puffs into the lungs every 6 hours as needed       potassium chloride (MICRO-K) 10 MEQ extended release capsule       EXACTECH TEST strip       Misc. Devices MISC True Plus     Testing 3 times a day 100 each 5    busPIRone (BUSPAR) 10 MG tablet Take 10 mg by mouth daily       TRUEPLUS LANCETS 30G MISC 1 each by Does not apply route daily 100 each 3    ARIPiprazole (ABILIFY) 10 MG tablet TAKE 1 TABLET BY MOUTH NIGHTLY 30 tablet 2       Negative except for what is mentioned in the HPI. GENERAL PHYSICAL EXAM     Vitals: see nursing flow sheet for vital signs. GENERAL APPEARANCE:   Marizol Chaidez is 48 y.o.,  female,  nourished, conscious, alert. Does not appear to be distress or pain at this time. SKIN:  Warm, dry, no cyanosis or jaundice. HEAD:  Normocephalic, atraumatic, no swelling or tenderness. EYES:  Pupils equal, reactive to light. EARS:  No discharge, no marked hearing loss. NOSE:  No rhinorrhea, epistaxis or septal deformity. THROAT:  Not congested. No ulceration bleeding or discharge. NECK:  No stiffness, trachea central.  No palpable masses or L.N.                 CHEST:  Symmetrical and equal on expansion. HEART:  RRR S1 > S2. No audible murmurs or gallops. LUNGS:  Equal on expansion, normal breath sounds. No adventitious sounds. ABDOMEN: Soft on palpation. No dysphagia, No localized tenderness. No guarding or rigidity. No palpable hepatosplenomegaly. LYMPHATICS:  No palpable cervical lymphadenopathy. LOCOMOTOR, BACK AND SPINE:  No tenderness or deformities. EXTREMITIES:  Symmetrical, no pretibial edema. No discoloration or ulcerations. NEUROLOGIC:  The patient is conscious, alert, oriented,Cranial nerve II-XII intact, taste and smell were not examined. No apparent focal sensory or motor deficits.              PROVISIONAL DIAGNOSES / SURGERY:      RECTAL BLEEDING IBS   OCCULT BLOOD IN STOOLS   GASTROPARESIS    EGD ESOPHAGOGASTRODUODENOSCOPY  COLONOSCOPY DIAGNOSTIC  Patient Active Problem List    Diagnosis Date Noted    Other irritable bowel syndrome 09/11/2020    Morbid (severe) obesity due to excess calories ([address] 09/11/2020    Occult blood in stools 09/11/2020    Gastroparesis 09/11/2020    Rectal bleeding 09/11/2020    Hemorrhage of anus and rectum     Delayed gastric emptying 09/27/2018    Sacroiliitis ([address]     DDD (degenerative disc disease), lumbar     Encounter for medication monitoring     Lumbar facet arthropathy     Bloating 04/09/2018    Generalized abdominal pain     Lumbar spondylolysis     Chronic use of opiate for therapeutic purpose 12/08/2017    Left-sided weakness 10/27/2017    Worsening headaches 10/27/2017    TIA (transient ischemic attack) 10/10/2017    Facial numbness 10/10/2017    Lumbar radiculopathy     Type 2 diabetes mellitus with diabetic polyneuropathy, without long-term current use of insulin ([address] 08/18/2017    Lumbosacral spondylosis without myelopathy     Essential hypertension 01/20/2017    Abdominal pain 11/17/2016    Diarrhea 11/17/2016    Major depressive disorder, recurrent, severe without psychotic features ([address]     Chronic post-traumatic stress disorder (PTSD)     ERICK (generalized anxiety disorder)     Hiatal hernia 02/06/2016    Primary insomnia 02/03/2016    Acute medial meniscus tear of left knee 04/06/2015    Depression     Acid reflux     Hyperlipidemia     Epilepsy (HCC)     Pelvic pain in female     Colon polyp     History of stroke associated with blood clotting tendency     Cholelithiasis     Complex tear of medial meniscus of left knee as current injury 11/07/2014           KAMARI Lewis CNP on 10/20/2020 at 8:18 AM

## 2020-10-20 NOTE — OP NOTE
PROCEDURE NOTE    DATE OF PROCEDURE: 10/20/2020    SURGEON: Jody Nascimento MD    ASSISTANT: None    PREOPERATIVE DIAGNOSIS: SCREENING  IBS    POSTOPERATIVE DIAGNOSIS: as described below    OPERATION: Total colonoscopy     ANESTHESIA: MAC PER ANESTHESIA     ESTIMATED BLOOD LOSS: less than 50     COMPLICATIONS: None. SPECIMENS:  Was Obtained:     HISTORY: The patient is a 48y.o. year old female with history of above preop diagnosis. I recommended colonoscopy with possible biopsy or polypectomy and I explained the risk, benefits, expected outcome, and alternatives to the procedure. Risks included but are not limited to bleeding, infection, respiratory distress, hypotension, and perforation of the colon and possibility of missing a lesion. The patient understands and is in agreement. PROCEDURE: The patient was given IV conscious sedation. The patient's SPO2 remained above 90% throughout the procedure. The colonoscope was inserted per rectum and advanced under direct vision to the cecum without difficulty. The prep was fair. LARGE AMOUNT OF RETAINED STOOLS DENISE LEFT COLON   NO GROSS PATHOLOGY    Findings:  Terminal ileum: normal    Cecum/Ascending colon: normal. RANDOM BIOPSIES    Transverse colon: normal    Descending/Sigmoid colon: abnormal: RETAINED FORMED STOOLS  MILD DIVERTICULOSIS    Rectum/Anus: examined in normal and retroflexed positions and was abnormal: A ONE CM POLYP WAS REMOVED WITH SNARE CAUTERY FROM THE RECTO SIGMOID JUNCTION  VERY LOOSE TONE  INT HEMORRHOIDS    Withdrawal Time was (minutes): 16    The colon was decompressed and the scope was removed. The patient tolerated the procedure well. Recommendations/Plan:   1. Lifestyle and dietary modifications as discussed  2. F/U Biopsies  3. F/U In Office in 3-4 weeks  4. Discussed with the family  5. Colonoscopy Recall :1.5-2 year  6.  POST SEDATION PATIENT WAS STABLE WITH STABLE VITAL SIGNS AND OXYGEN SATURATIONS AND WAS DISCHARGED HOME WITH RIDE IN A STABLE CONDITION.     Electronically signed by Gray Christian MD  on 10/20/2020 at 10:44 AM

## 2020-10-21 LAB — SURGICAL PATHOLOGY REPORT: NORMAL

## 2020-10-22 ENCOUNTER — TELEPHONE (OUTPATIENT)
Dept: GASTROENTEROLOGY | Age: 55
End: 2020-10-22

## 2020-10-23 NOTE — TELEPHONE ENCOUNTER
Spoke to pt. Attempted to schedule her for office apt on Monday but she had another appointment she said. Pt is not using any PPI for acid reflux. Pt would like to be on cancellation list to see if she can get an open appointment with Dr Aleta Santoyo next week. Thanks. DISCHARGE

## 2020-10-26 ENCOUNTER — TELEPHONE (OUTPATIENT)
Dept: GASTROENTEROLOGY | Age: 55
End: 2020-10-26

## 2020-10-27 ENCOUNTER — OFFICE VISIT (OUTPATIENT)
Dept: GASTROENTEROLOGY | Age: 55
End: 2020-10-27
Payer: COMMERCIAL

## 2020-10-27 VITALS — TEMPERATURE: 96.6 F | BODY MASS INDEX: 39.27 KG/M2 | WEIGHT: 221.7 LBS

## 2020-10-27 PROCEDURE — 1036F TOBACCO NON-USER: CPT | Performed by: INTERNAL MEDICINE

## 2020-10-27 PROCEDURE — G8428 CUR MEDS NOT DOCUMENT: HCPCS | Performed by: INTERNAL MEDICINE

## 2020-10-27 PROCEDURE — G8484 FLU IMMUNIZE NO ADMIN: HCPCS | Performed by: INTERNAL MEDICINE

## 2020-10-27 PROCEDURE — 3017F COLORECTAL CA SCREEN DOC REV: CPT | Performed by: INTERNAL MEDICINE

## 2020-10-27 PROCEDURE — G8417 CALC BMI ABV UP PARAM F/U: HCPCS | Performed by: INTERNAL MEDICINE

## 2020-10-27 PROCEDURE — 99214 OFFICE O/P EST MOD 30 MIN: CPT | Performed by: INTERNAL MEDICINE

## 2020-10-27 RX ORDER — OMEPRAZOLE 20 MG/1
20 CAPSULE, DELAYED RELEASE ORAL
Qty: 180 CAPSULE | Refills: 1 | Status: SHIPPED | OUTPATIENT
Start: 2020-10-27 | End: 2021-04-19

## 2020-10-27 RX ORDER — METOCLOPRAMIDE 10 MG/1
10 TABLET ORAL
Qty: 120 TABLET | Refills: 3 | Status: SHIPPED | OUTPATIENT
Start: 2020-10-27 | End: 2021-04-01

## 2020-10-27 ASSESSMENT — ENCOUNTER SYMPTOMS
ALLERGIC/IMMUNOLOGIC NEGATIVE: 1
NAUSEA: 0
ANAL BLEEDING: 0
BLOOD IN STOOL: 0
SORE THROAT: 1
RESPIRATORY NEGATIVE: 1
TROUBLE SWALLOWING: 1
ABDOMINAL PAIN: 1
VOMITING: 0
ABDOMINAL DISTENTION: 1
RECTAL PAIN: 0
CONSTIPATION: 0
DIARRHEA: 0

## 2020-10-27 NOTE — PROGRESS NOTES
GI NEW PATIENT OFFICE VISIT    INTERVAL HISTORY:   No referring provider defined for this encounter. Chief Complaint   Patient presents with    Abdominal Pain     had colon/egd, c/o burning in stomach       1. Gastroparesis    2. Screening for colorectal cancer    3. Adenomatous polyp of colon, unspecified part of colon    4. Gastroesophageal reflux disease without esophagitis    5. Nausea    6. Morbid (severe) obesity due to excess calories [facility])       The patient is here as a follow up of her recent GI procedure. The results have been sent to you separately   The findings were explained to the patient in detail and biopsies were also discussed   with her    She had a recent upper endoscopy and colonoscopy by myself  Colonoscopy was not 10% cleaned up she had large amount of retained stools especially in the left side of the colon    She had a 1 cm polyp removed with snare cautery from the rectum biopsy of revealed it to be inflammatory polyp? She had internal hemorrhoids very loose anal sphincter tone    She has issues with gastroparesis diabetes mellitus had some retained food during the upper endoscopy with some gastritis    Patient has been complaining of some abdominal pains, off and on cramping  Also complains of abdominal bloating and gas  Has off and on nausea without any sig vomiting  Has some alternating constipation and diarrhea  Has no weight loss  Has some anxiety issues    No current smoking alcohol abuse illicit drug usage  Has morbid obesity  No known family history for colon cancer  Repeat colonoscopy suggested in 1.5 to 2 years    HISTORY OF PRESENT ILLNESS: Rica Mancini is a 48 y.o. female with a past history remarkable for , referred for evaluation of   Chief Complaint   Patient presents with    Abdominal Pain     had colon/egd, c/o burning in stomach   .     Past Medical,Family, and Social History reviewed and does contribute to the patient presenting condition. Patient's PMH/PSH,SH,PSYCH Hx, MEDs, ALLERGIES, and ROS were all reviewed and updated in the appropriate sections. PAST MEDICAL HISTORY:  Past Medical History:   Diagnosis Date    Acid reflux     Arthritis     Coughing     dry due to ace inhibitor    CPAP (continuous positive airway pressure) dependence     Depression     uses Abilify, Buspar, Trazodone for this    Diabetes mellitus [facility]     Diverticulitis     Environmental allergies     Epilepsy ([facility]     MAY 2018    Factor V Leiden ([facility]     Gastroparesis     Generalized abdominal pain     Hemorrhage of anus and rectum     Hiatal hernia     History of stroke associated with blood clotting tendency 2003    had clot in brain and left neck, left side weakness residual    Hx of blood clots     right breast, brain, neck    Hyperlipidemia     Hypertension     Insomnia     uses Trazodone for this    Pre-procedure lab exam 1/9/2018    Sleep apnea     C PAP    Unspecified cerebral artery occlusion with cerebral infarction 6/2003    SEE BELOW, [address] Mini stroke    Wears glasses        Past Surgical History:   Procedure Laterality Date    APPENDECTOMY      CHOLECYSTECTOMY      COLONOSCOPY      with polypectomy    COLONOSCOPY  1/22/16    AND EGD    COLONOSCOPY  01/05/2017    DR MACKAY-random biopsies.     COLONOSCOPY N/A 10/20/2020    COLONOSCOPY POLYPECTOMY HOT BIOPSY performed by Kimberly May MD at [place] [address]      2 times   [facility]  2/4/2016    laparoscopic robotic    HYSTERECTOMY      with BSO    KNEE ARTHROSCOPY Left 4/7/15, 2015    x 2    LAPAROSCOPY      \"springs inserted in to fallopian tubes\" to close tubes   [facility] NECK SURGERY  July 2012    Anterior, C5,6,7 bulging disk repair    MO COLONOSCOPY FLX DX W/COLLJ SPEC WHEN PFRMD N/A 7/17/2018    COLONOSCOPY performed by Kimberly [facility] MD at [facility] OR    SIGMOIDOSCOPY N/A 12/18/2018    SIGMOIDOSCOPY DIAGNOSTIC FLEXIBLE performed by Beka Manuel MD at [address] ENDOSCOPY      Jan 2016 DR Alexandra Alvarenga    UPPER GASTROINTESTINAL ENDOSCOPY N/A 7/17/2018    EGD BIOPSY performed by Beka Manuel MD at [facility] N/A 10/20/2020    EGD BIOPSY performed by Beka Manuel MD at [address]:    Current Outpatient Medications:     omeprazole (PRILOSEC) 20 MG delayed release capsule, Take 1 capsule by mouth 2 times daily (before meals), Disp: 180 capsule, Rfl: 1    metoclopramide (REGLAN) 10 MG tablet, Take 1 tablet by mouth 3 times daily (with meals), Disp: 120 tablet, Rfl: 3    enoxaparin (LOVENOX) 100 MG/ML injection, Inject 100 mg into the skin every 12 hours, Disp: , Rfl:     FLOVENT DISKUS 50 MCG/BLIST AEPB inhaler, TAKE 1 PUFF BY MOUTH TWICE A DAY, Disp: , Rfl:     glipiZIDE (GLUCOTROL) 5 MG tablet, Take 5 mg by mouth 2 times daily (before meals), Disp: , Rfl:     furosemide (LASIX) 20 MG tablet, TAKE 1 TABLET EVERY DAY, Disp: 90 tablet, Rfl: 1    Warfarin Sodium (COUMADIN PO), Take 6 mg by mouth daily, Disp: , Rfl:     Cholecalciferol (VITAMIN D PO), Take by mouth once a week Pt. Takes on Sundays, Disp: , Rfl:     Cholecalciferol (VITAMIN D3) 12816 units CAPS, Take by mouth once a week, Disp: , Rfl:     montelukast (SINGULAIR) 10 MG tablet, TAKE 1 TABLET BY MOUTH EVERY DAY AT NIGHT, Disp: 30 tablet, Rfl: 5    ferrous sulfate 27 MG TABS, Take by mouth daily , Disp: , Rfl:     cyclobenzaprine (FLEXERIL) 10 MG tablet, Take 1 tablet by mouth 2 times daily as needed for Muscle spasms, Disp: 60 tablet, Rfl: 1    triamcinolone (KENALOG) 0.025 % cream, Apply topically 2 times daily. , Disp: 80 g, Rfl: 1    fluticasone (FLONASE) 50 MCG/ACT nasal spray, INSTILL 1 SPRAY INTO EACH NOSTRIL DAILY AT BED TIME, Disp: 1 Bottle, Rfl: 3    losartan (COZAAR) 25 MG tablet, TAKE ONE-HALF TABLET BY MOUTH DAILY, Disp: 15 tablet, Rfl: 5    levETIRAcetam (KEPPRA) 250 MG tablet, Take 1 tablet by mouth 2 times daily, Disp: 180 tablet, Rfl: 1    venlafaxine (EFFEXOR XR) 150 MG extended release capsule, TAKE 1 CAPSULE (150 MG TOTAL) BY MOUTH DAILY. , Disp: , Rfl: 1    atorvastatin (LIPITOR) 80 MG tablet, TAKE 1 TABLET BY MOUTH NIGHTLY AT BEDTIME (Patient taking differently: Take 80 mg by mouth daily ), Disp: 30 tablet, Rfl: 5    temazepam (RESTORIL) 15 MG capsule, Take 15 mg by mouth nightly. ., Disp: , Rfl:     ONE TOUCH ULTRA TEST strip, TEST BLOOD SUGAR 3 TIMES A DAY, Disp: 100 strip, Rfl: 5    colestipol (COLESTID) 1 g tablet, Take 1 g by mouth daily , Disp: , Rfl:     clotrimazole (LOTRIMIN) 1 % cream, Apply topically 2 times daily. , Disp: 15 g, Rfl: 0    topiramate (TOPAMAX) 25 MG tablet, Take 25 mg by mouth , Disp: , Rfl:     PROAIR  (90 Base) MCG/ACT inhaler, Inhale 2 puffs into the lungs every 6 hours as needed , Disp: , Rfl:     potassium chloride (MICRO-K) 10 MEQ extended release capsule, , Disp: , Rfl:     EXACTECH TEST strip, , Disp: , Rfl:     Misc.  Devices MISC, True Plus     Testing 3 times a day, Disp: 100 each, Rfl: 5    busPIRone (BUSPAR) 10 MG tablet, Take 10 mg by mouth daily , Disp: , Rfl:     TRUEPLUS LANCETS 30G MISC, 1 each by Does not apply route daily, Disp: 100 each, Rfl: 3    ARIPiprazole (ABILIFY) 10 MG tablet, TAKE 1 TABLET BY MOUTH NIGHTLY, Disp: 30 tablet, Rfl: 2    magnesium citrate solution, Take 296 mLs by mouth once for 1 dose, Disp: 296 mL, Rfl: 0    ALLERGIES:   Allergies   Allergen Reactions    Latex Other (See Comments)     Red marks on body    Adhesive Tape      Also plastic tape    Morphine And Related Nausea Only    Other Other (See Comments)     Surgical staples cause infection    Aspirin Nausea And Vomiting       FAMILY HISTORY:       Problem Relation Age of Onset    Heart Disease Mother         dies age 32    Cancer Father         lymph nodes    Pacemaker Sister          SOCIAL HISTORY:   Social History     Socioeconomic History    Marital status:      Spouse name: Not on file    Number of children: Not on file    Years of education: Not on file    Highest education level: Not on file   Occupational History    Occupation: disability   Social Needs    Financial resource strain: Not on file    Food insecurity     Worry: Not on file     Inability: Not on file   [employer] needs     Medical: Not on file     Non-medical: Not on file   Tobacco Use    Smoking status: Never Smoker    Smokeless tobacco: Never Used   Substance and Sexual Activity    Alcohol use: Yes     Alcohol/week: 0.0 standard drinks     Comment: OCCASSIONAL    Drug use: No    Sexual activity: Yes     Partners: Male   Lifestyle    Physical activity     Days per week: Not on file     Minutes per session: Not on file    Stress: Not on file   Relationships    Social connections     Talks on phone: Not on file     Gets together: Not on file     Attends Rest[employer] service: Not on file     Active member of club or organization: Not on file     Attends meetings of clubs or organizations: Not on file     Relationship status: Not on file    Intimate partner violence     Fear of current or ex partner: Not on file     Emotionally abused: Not on file     Physically abused: Not on file     Forced sexual activity: Not on file   Other Topics Concern    Not on file   Social History Narrative    Not on file         REVIEW OF SYSTEMS:         Review of Systems   Constitutional: Positive for fatigue. HENT: Positive for sore throat (burning) and trouble swallowing (trouble getting food down). Eyes: Positive for visual disturbance. Respiratory: Negative. Cardiovascular: Negative. Gastrointestinal: Positive for abdominal distention and abdominal pain (burning).  Negative for anal bleeding, blood in stool, constipation, diarrhea, nausea, rectal pain and vomiting. GERD burning   Endocrine: Negative. Genitourinary: Negative. Musculoskeletal: Positive for arthralgias. Skin: Negative. Allergic/Immunologic: Negative. Neurological: Positive for dizziness, weakness and light-headedness. Hematological: Bruises/bleeds easily. Psychiatric/Behavioral: Positive for sleep disturbance. The patient is nervous/anxious. PHYSICAL EXAMINATION: Vital signs reviewed per the nursing documentation. Temp 96.6 °F (35.9 °C)   Wt 221 lb 11.2 oz (100.6 kg)   BMI 39.27 kg/m²   Body mass index is 39.27 kg/m². Physical Exam  Nursing note reviewed. Constitutional:       Appearance: She is well-developed. Comments: Anxious   Obesity    HENT:      Head: Normocephalic and atraumatic. Eyes:      Conjunctiva/sclera: Conjunctivae normal.      Pupils: Pupils are equal, round, and reactive to light. Neck:      Musculoskeletal: Normal range of motion and neck supple. Cardiovascular:      Heart sounds: Normal heart sounds. Pulmonary:      Effort: Pulmonary effort is normal.      Breath sounds: Normal breath sounds. Abdominal:      General: Bowel sounds are normal.      Palpations: Abdomen is soft. Comments: Obese non tender belly    Musculoskeletal: Normal range of motion. Skin:     General: Skin is warm. Neurological:      Mental Status: She is alert and oriented to person, place, and time.    Psychiatric:         Behavior: Behavior normal.           LABORATORY DATA: Reviewed  Lab Results   Component Value Date    WBC 6.0 12/04/2018    HGB 11.7 (L) 12/04/2018    HCT 36.3 12/04/2018    MCV 86.0 12/04/2018     12/04/2018     12/04/2018    K 4.3 12/04/2018     12/04/2018    CO2 26 12/04/2018    BUN 12 12/04/2018    CREATININE 0.72 12/04/2018    LABALBU 3.7 11/15/2018    BILITOT 0.18 (L) 11/15/2018    ALKPHOS 184 (H) 11/15/2018    AST 16 11/15/2018    ALT 13 11/15/2018    INR 1.8 01/16/2019         Lab Results Component Value Date    RBC 4.22 12/04/2018    HGB 11.7 (L) 12/04/2018    MCV 86.0 12/04/2018    MCH 27.7 12/04/2018    MCHC 32.2 12/04/2018    RDW 15.3 (H) 12/04/2018    MPV 8.5 12/04/2018    BASOPCT 1 12/04/2018    LYMPHSABS 2.20 12/04/2018    MONOSABS 0.40 12/04/2018    NEUTROABS 3.20 12/04/2018    EOSABS 0.00 12/04/2018    BASOSABS 0.10 12/04/2018         DIAGNOSTIC TESTING:     No results found. Assessment  1. Gastroparesis    2. Screening for colorectal cancer    3. Adenomatous polyp of colon, unspecified part of colon    4. Gastroesophageal reflux disease without esophagitis    5. Nausea    6. Morbid (severe) obesity due to excess calories (HCC)        Plan    PPI    Reglan    Significant side effects of both of these therapies will explain including tardive dyskinesia from Reglan and she was asked to stop taking medication and call me for any issues or problems      Pt seems to have signs and symptoms consistent with GERD, acid indigestion and heartburns. She was discussed  in detail about some possible life style and dietary modifications. She was stressed about the maintenance  of appropriate weight and effect of obesity contributing to reflux symptoms. Routine exercise was streesed. Avoidance of Caffeine, nicotine and chocolate were explained. Pt was asked to avoid spices grease and fried food. Advices were also given about avoidance of any kind of fast foods, soda pops and high energy drinks. Pt was advised to place two small block under the head end of the bed which may help with night time reflux. Was advised not to eat any thin at least 2-3 hrs before going to bed and walk especially after dinner    Pt has verbalized understanding and agreement to this plan. Pt was given advices and instructions about weight loss. She was advised about the significance of exercise at least three times a week .    Dietary advices were also given about the avoidance of fast food, fried food and lots of

## 2020-12-08 ENCOUNTER — TELEPHONE (OUTPATIENT)
Dept: GASTROENTEROLOGY | Age: 55
End: 2020-12-08

## 2020-12-08 NOTE — TELEPHONE ENCOUNTER
SCOTTM/Alber message that appt on 12/17/20 has been changed to a Virtual Appt on mobil# and please callback to confirm appt/mobil# or to reschedule

## 2021-04-19 RX ORDER — OMEPRAZOLE 20 MG/1
20 CAPSULE, DELAYED RELEASE ORAL
Qty: 180 CAPSULE | Refills: 1 | Status: SHIPPED | OUTPATIENT
Start: 2021-04-19 | End: 2022-02-28

## 2021-06-22 ENCOUNTER — TELEPHONE (OUTPATIENT)
Dept: GASTROENTEROLOGY | Age: 56
End: 2021-06-22

## 2021-06-22 NOTE — TELEPHONE ENCOUNTER
Received new referral from [facility], [facility] . Returned referral to [facility].

## 2022-02-28 RX ORDER — OMEPRAZOLE 20 MG/1
20 CAPSULE, DELAYED RELEASE ORAL
Qty: 180 CAPSULE | Refills: 1 | Status: SHIPPED | OUTPATIENT
Start: 2022-02-28

## 2022-03-28 ENCOUNTER — TELEPHONE (OUTPATIENT)
Dept: GASTROENTEROLOGY | Age: 57
End: 2022-03-28

## 2022-04-06 ENCOUNTER — OFFICE VISIT (OUTPATIENT)
Dept: ORTHOPEDIC SURGERY | Age: 57
End: 2022-04-06
Payer: MEDICARE

## 2022-04-06 DIAGNOSIS — Z41.9 SURGERY, ELECTIVE: Primary | ICD-10-CM

## 2022-04-06 DIAGNOSIS — M25.561 RIGHT KNEE PAIN, UNSPECIFIED CHRONICITY: ICD-10-CM

## 2022-04-06 PROCEDURE — 99203 OFFICE O/P NEW LOW 30 MIN: CPT | Performed by: ORTHOPAEDIC SURGERY

## 2022-04-06 NOTE — PROGRESS NOTES
[facility]            [address] [place] [address], [address] [facility],[address], [address] [facility]           Dept Phone: [phone]           Dept Fax:  [phone]           [place]          Dept Phone: [phone]           Dept Fax:  [phone]      Chief Compliant:  Chief Complaint   Patient presents with    Pain     Rt knee        History of Present Illness: This is a 55 y.o. female who presents to the clinic today for evaluation / follow up of severe right knee pain. Patient is a 78-year-old female who is here for evaluation for her right knee. Patient notes a history of having a previous left total knee arthroplasty performed per Dr. Margret Goldsmith in [place]. She has no problems with this. Patient was apparently was going to have her right knee replaced after failure of conservative treatment but this occurred at the time of the pandemic. Patient states that she is had a arthroscopy of his right knee she has had multiple steroid as well as viscosupplementation to this right knee. She states her activities become severely restricted to the point where she has difficulty ambulating. She would like to pursue total knee arthroplasty having failed conservative treatment on the right side. Review of Systems   Constitutional: Negative for fever, chills, sweats. Eyes: Negative for changes in vision, or pain. HENT: Negative for ear ache, epistaxis, or sore throat. Respiratory/Cardio: Negative for Chest pain, palpitations, SOB, or cough. Gastrointestinal: Negative for abdominal pain, N/V/D. Genitourinary: Negative for dysuria, frequency, urgency, or hematuria. Neurological: Negative for headache, numbness, or weakness. Integumentary: Negative for rash, itching, laceration, or abrasion.    Musculoskeletal: Positive for Pain (Rt knee) Physical Exam:  Constitutional: Patient is oriented to person, place, and time. Patient appears well-developed and well nourished. HENT: Negative otherwise noted  Head: Normocephalic and Atraumatic  Nose: Normal  Eyes: Conjunctivae and EOM are normal  Neck: Normal range of motion Neck supple. Respiratory/Cardio: Effort normal. No respiratory distress. Musculoskeletal: Examination of the patient's right knee notes slight effusion but nothing too severe. She has motion of 5 to about 125 degrees. She has some mild medial joint line tenderness but not severe she has ligamentously is grossly intact regards to cruciates and collaterals she has severe patellofemoral pain with any kind of compression and quad activation. She has no hip rotational pain and no trochanteric findings. She has no calf tenderness negative Homans    Neurological: Patient is alert and oriented to person, place, and time. Normal strenght. No sensory deficit. Skin: Skin is warm and dry  Psychiatric: Behavior is normal. Thought content normal.  Nursing note and vitals reviewed. Labs and Imaging:     XR taken today:  XR KNEE RIGHT (1-2 VIEWS)    Result Date: 4/6/2022  Rays taken today reviewed by me show standing AP both knees and a lateral of the right knee. Patient had a previous left total knee arthroplasty performed and appears to be in adequate position patient has fairly significant medial joint space narrowing approaching bone-on-bone. Patient is noted on the lateral view to have severe patellofemoral arthrosis with total joint space loss and significant osteophyte formation.           Orders Placed This Encounter   Procedures    XR KNEE RIGHT (1-2 VIEWS)     Standing Status:   Future     Number of Occurrences:   1     Standing Expiration Date:   4/5/2023       Assessment and Plan:  Significant degenerative joint disease right knee primarily patellofemoral nature but also some moderate medial joint space narrowing  History of left total knee arthroplasty performed elsewhere        This is a 55 y.o. female who presents to the clinic today for evaluation / follow up of significant osteoarthritis right knee. Past History:    Current Outpatient Medications:     omeprazole (PRILOSEC) 20 MG delayed release capsule, TAKE 1 CAPSULE BY MOUTH 2 TIMES DAILY (BEFORE MEALS), Disp: 180 capsule, Rfl: 1    metoclopramide (REGLAN) 10 MG tablet, TAKE 1 TABLET BY MOUTH 3 TIMES DAILY WITH MEALS, Disp: 120 tablet, Rfl: 2    enoxaparin (LOVENOX) 100 MG/ML injection, Inject 100 mg into the skin every 12 hours, Disp: , Rfl:     FLOVENT DISKUS 50 MCG/BLIST AEPB inhaler, TAKE 1 PUFF BY MOUTH TWICE A DAY, Disp: , Rfl:     glipiZIDE (GLUCOTROL) 5 MG tablet, Take 5 mg by mouth 2 times daily (before meals), Disp: , Rfl:     magnesium citrate solution, Take 296 mLs by mouth once for 1 dose, Disp: 296 mL, Rfl: 0    furosemide (LASIX) 20 MG tablet, TAKE 1 TABLET EVERY DAY, Disp: 90 tablet, Rfl: 1    Warfarin Sodium (COUMADIN PO), Take 6 mg by mouth daily, Disp: , Rfl:     Cholecalciferol (VITAMIN D PO), Take by mouth once a week Pt. Takes on Sundays, Disp: , Rfl:     Cholecalciferol (VITAMIN D3) 62486 units CAPS, Take by mouth once a week, Disp: , Rfl:     montelukast (SINGULAIR) 10 MG tablet, TAKE 1 TABLET BY MOUTH EVERY DAY AT NIGHT, Disp: 30 tablet, Rfl: 5    ferrous sulfate 27 MG TABS, Take by mouth daily , Disp: , Rfl:     cyclobenzaprine (FLEXERIL) 10 MG tablet, Take 1 tablet by mouth 2 times daily as needed for Muscle spasms, Disp: 60 tablet, Rfl: 1    triamcinolone (KENALOG) 0.025 % cream, Apply topically 2 times daily. , Disp: 80 g, Rfl: 1    fluticasone (FLONASE) 50 MCG/ACT nasal spray, INSTILL 1 SPRAY INTO EACH NOSTRIL DAILY AT BED TIME, Disp: 1 Bottle, Rfl: 3    losartan (COZAAR) 25 MG tablet, TAKE ONE-HALF TABLET BY MOUTH DAILY, Disp: 15 tablet, Rfl: 5    levETIRAcetam (KEPPRA) 250 MG tablet, Take 1 tablet by mouth 2 times daily, Disp: 180 tablet, Rfl: 1    venlafaxine (EFFEXOR XR) 150 MG extended release capsule, TAKE 1 CAPSULE (150 MG TOTAL) BY MOUTH DAILY. , Disp: , Rfl: 1    atorvastatin (LIPITOR) 80 MG tablet, TAKE 1 TABLET BY MOUTH NIGHTLY AT BEDTIME (Patient taking differently: Take 80 mg by mouth daily ), Disp: 30 tablet, Rfl: 5    temazepam (RESTORIL) 15 MG capsule, Take 15 mg by mouth nightly. ., Disp: , Rfl:     ONE TOUCH ULTRA TEST strip, TEST BLOOD SUGAR 3 TIMES A DAY, Disp: 100 strip, Rfl: 5    colestipol (COLESTID) 1 g tablet, Take 1 g by mouth daily , Disp: , Rfl:     clotrimazole (LOTRIMIN) 1 % cream, Apply topically 2 times daily. , Disp: 15 g, Rfl: 0    topiramate (TOPAMAX) 25 MG tablet, Take 25 mg by mouth , Disp: , Rfl:     PROAIR  (90 Base) MCG/ACT inhaler, Inhale 2 puffs into the lungs every 6 hours as needed , Disp: , Rfl:     potassium chloride (MICRO-K) 10 MEQ extended release capsule, , Disp: , Rfl:     EXACTECH TEST strip, , Disp: , Rfl:     Misc.  Devices MISC, True Plus     Testing 3 times a day, Disp: 100 each, Rfl: 5    busPIRone (BUSPAR) 10 MG tablet, Take 10 mg by mouth daily , Disp: , Rfl:     TRUEPLUS LANCETS 30G MISC, 1 each by Does not apply route daily, Disp: 100 each, Rfl: 3    ARIPiprazole (ABILIFY) 10 MG tablet, TAKE 1 TABLET BY MOUTH NIGHTLY, Disp: 30 tablet, Rfl: 2  Allergies   Allergen Reactions    Latex Other (See Comments)     Red marks on body    Adhesive Tape      Also plastic tape    Morphine And Related Nausea Only    Other Other (See Comments)     Surgical staples cause infection    Aspirin Nausea And Vomiting     Social History     Socioeconomic History    Marital status:      Spouse name: Not on file    Number of children: Not on file    Years of education: Not on file    Highest education level: Not on file   Occupational History    Occupation: disability   Tobacco Use    Smoking status: Never Smoker    Smokeless tobacco: Never Used   Vaping Use    Vaping Use: Never used   Substance and Sexual Activity    Alcohol use: Yes     Alcohol/week: 0.0 standard drinks     Comment: OCCASSIONAL    Drug use: No    Sexual activity: Yes     Partners: Male   Other Topics Concern    Not on file   Social History Narrative    Not on file     Social Determinants of Health     Financial Resource Strain:     Difficulty of Paying Living Expenses: Not on file   Food Insecurity:     Worried About Running Out of Food in the Last Year: Not on file    Zaki of Food in the Last Year: Not on file   Transportation Needs:     Lack of Transportation (Medical): Not on file    Lack of Transportation (Non-Medical):  Not on file   Physical Activity:     Days of Exercise per Week: Not on file    Minutes of Exercise per Session: Not on file   Stress:     Feeling of Stress : Not on file   Social Connections:     Frequency of Communication with Friends and Family: Not on file    Frequency of Social Gatherings with Friends and Family: Not on file    Attends [employer] Services: Not on file    Active Member of [address] [place] [address] or Organizations: Not on file    Attends Club or Organization Meetings: Not on file    Marital Status: Not on file   Intimate Partner Violence:     Fear of Current or Ex-Partner: Not on file    Emotionally Abused: Not on file    Physically Abused: Not on file    Sexually Abused: Not on file   Housing Stability:     Unable to Pay for Housing in the Last Year: Not on file    Number of [place] in the Last Year: Not on file    Unstable Housing in the Last Year: Not on file     Past Medical History:   Diagnosis Date    Acid reflux     Arthritis     Coughing     dry due to ace inhibitor    CPAP (continuous positive airway pressure) dependence     Depression     uses Abilify, Buspar, Trazodone for this    Diabetes mellitus ([facility]     Diverticulitis     Environmental allergies     [facility]ilepsy [facility]     MAY 2018    Factor V Leiden ([facility]     Gastroparesis     Generalized abdominal pain     Hemorrhage of anus and rectum     Hiatal hernia     History of stroke associated with blood clotting tendency 2003    had clot in brain and left neck, left side weakness residual    Hx of blood clots     right breast, brain, neck    Hyperlipidemia     Hypertension     Insomnia     uses Trazodone for this    Pre-procedure lab exam 1/9/2018    Sleep apnea     C PAP    Unspecified cerebral artery occlusion with cerebral infarction 6/2003    SEE BELOW, [address] Mini stroke    Wears glasses      Past Surgical History:   Procedure Laterality Date    APPENDECTOMY      CHOLECYSTECTOMY      COLONOSCOPY      with polypectomy    COLONOSCOPY  1/22/16    AND EGD    COLONOSCOPY  01/05/2017    DR MACKAY-random biopsies.     COLONOSCOPY N/A 10/20/2020    COLONOSCOPY POLYPECTOMY HOT BIOPSY performed by Kalpana Hernandez MD at [place] [address]      2 times   [facility]  2/4/2016    laparoscopic robotic    HYSTERECTOMY      with BSO    KNEE ARTHROSCOPY Left 4/7/15, 2015    x 2    LAPAROSCOPY      \"springs inserted in to fallopian tubes\" to close tubes   [facility] NECK SURGERY  July 2012    Anterior, C5,6,7 bulging disk repair    OR COLONOSCOPY FLX DX W/COLLJ SPEC WHEN PFRMD N/A 7/17/2018    COLONOSCOPY performed by Kalpana Hernandez MD at [address] [place] [address] N/A 12/18/2018    SIGMOIDOSCOPY DIAGNOSTIC FLEXIBLE performed by Kalpana Hernandez MD at [address] [facility],[address] ENDOSCOPY      Jan 2016 DR Henna Benitez    UPPER GASTROINTESTINAL ENDOSCOPY N/A 7/17/2018    EGD BIOPSY performed by Kalpana Hernandez MD at [facility] [address] N/A 10/20/2020    EGD BIOPSY performed by Kalpana Hernandez MD at [facility] ENDO     Family History   Problem Relation Age of Onset    Heart Disease Mother         dies age 32    Cancer Father         lymph nodes    Pacemaker Sister    Plan  Patient wished to pursue right total knee arthroplasty. She is aware of the risk and benefits as well as the typical details procedure having been through this before on her left knee. Patient reports a history of having a what sounds like a lung perforation in the past.  She does not give a history of COPD however but she does have a pulmonologist.  She does state that she is a type II diabetic but her last hemoglobin A1c was 6.4. Patient sees Esther Barrera for medical clearance      Provider Attestation:  Pierce Novak, personally performed the services described in this documentation. All medical record entries made by the scribe were at my direction and in my presence. I have reviewed the chart and discharge instructions and agree that the records reflect my personal performance and is accurate and complete. Mehnaz Osman MD. 04/06/22      Please note that this chart was generated using voice recognition Dragon dictation software. Although every effort was made to ensure the accuracy of this automated transcription, some errors in transcription may have occurred.

## 2022-04-07 ENCOUNTER — TELEPHONE (OUTPATIENT)
Dept: ORTHOPEDIC SURGERY | Age: 57
End: 2022-04-07

## 2022-04-07 NOTE — TELEPHONE ENCOUNTER
Mark from [address] called in stating they have received order for pt and needs offices notes faxed to [phone].  For any further questions Mark can be reached at [phone]

## 2022-04-11 PROBLEM — Z86.718 HX OF BLOOD CLOTS: Status: ACTIVE | Noted: 2022-04-11

## 2022-04-12 ENCOUNTER — HOSPITAL ENCOUNTER (OUTPATIENT)
Dept: PREADMISSION TESTING | Age: 57
Discharge: HOME | End: 2022-04-16
Payer: MEDICARE

## 2022-04-12 VITALS
WEIGHT: 217 LBS | BODY MASS INDEX: 38.45 KG/M2 | HEART RATE: 101 BPM | HEIGHT: 63 IN | OXYGEN SATURATION: 100 % | TEMPERATURE: 98 F | SYSTOLIC BLOOD PRESSURE: 115 MMHG | DIASTOLIC BLOOD PRESSURE: 81 MMHG | RESPIRATION RATE: 20 BRPM

## 2022-04-12 DIAGNOSIS — Z01.818 PREOP EXAMINATION: ICD-10-CM

## 2022-04-12 LAB
ANION GAP SERPL CALCULATED.3IONS-SCNC: 12 MMOL/L (ref 9–17)
BILIRUBIN URINE: NEGATIVE
BUN BLDV-MCNC: 15 MG/DL (ref 6–20)
CALCIUM SERPL-MCNC: 9.1 MG/DL (ref 8.6–10.4)
CHLORIDE BLD-SCNC: 108 MMOL/L (ref 98–107)
CO2: 22 MMOL/L (ref 20–31)
COLOR: YELLOW
COMMENT UA: NORMAL
CREAT SERPL-MCNC: 0.8 MG/DL (ref 0.5–0.9)
GFR AFRICAN AMERICAN: >60 ML/MIN
GFR NON-AFRICAN AMERICAN: >60 ML/MIN
GFR SERPL CREATININE-BSD FRML MDRD: ABNORMAL ML/MIN/{1.73_M2}
GLUCOSE BLD-MCNC: 87 MG/DL (ref 70–99)
GLUCOSE URINE: NEGATIVE
HCT VFR BLD CALC: 38 % (ref 36–46)
HEMOGLOBIN: 12.1 G/DL (ref 12–16)
KETONES, URINE: NEGATIVE
LEUKOCYTE ESTERASE, URINE: NEGATIVE
MCH RBC QN AUTO: 26.2 PG (ref 26–34)
MCHC RBC AUTO-ENTMCNC: 31.8 G/DL (ref 31–37)
MCV RBC AUTO: 82.2 FL (ref 80–100)
NITRITE, URINE: NEGATIVE
PDW BLD-RTO: 17.2 % (ref 11.5–14.9)
PH UA: 5.5 (ref 5–8)
PLATELET # BLD: 336 K/UL (ref 150–450)
PMV BLD AUTO: 7.8 FL (ref 6–12)
POTASSIUM SERPL-SCNC: 3.8 MMOL/L (ref 3.7–5.3)
PROTEIN UA: NEGATIVE
RBC # BLD: 4.62 M/UL (ref 4–5.2)
SODIUM BLD-SCNC: 142 MMOL/L (ref 135–144)
SPECIFIC GRAVITY UA: 1.01 (ref 1–1.03)
TURBIDITY: CLEAR
URINE HGB: NEGATIVE
UROBILINOGEN, URINE: NORMAL
WBC # BLD: 7.7 K/UL (ref 3.5–11)

## 2022-04-12 PROCEDURE — 81003 URINALYSIS AUTO W/O SCOPE: CPT

## 2022-04-12 PROCEDURE — 85027 COMPLETE CBC AUTOMATED: CPT

## 2022-04-12 PROCEDURE — 87641 MR-STAPH DNA AMP PROBE: CPT

## 2022-04-12 PROCEDURE — 80048 BASIC METABOLIC PNL TOTAL CA: CPT

## 2022-04-12 PROCEDURE — 36415 COLL VENOUS BLD VENIPUNCTURE: CPT

## 2022-04-12 PROCEDURE — 93005 ELECTROCARDIOGRAM TRACING: CPT | Performed by: NURSE PRACTITIONER

## 2022-04-12 ASSESSMENT — ENCOUNTER SYMPTOMS
SHORTNESS OF BREATH: 1
GASTROINTESTINAL NEGATIVE: 1
APNEA: 1
EYES NEGATIVE: 1

## 2022-04-12 NOTE — H&P
HISTORY and [id number]       NAME:  Carlos Morton  MRN: [id number]   YOB: 1965   Date: 4/12/2022   Age: 55 y.o. Gender: female     COMPLAINT AND PRESENT HISTORY:   Carlos Morton is 55 y.o.,  female, presents for KNEE TOTAL ARTHROPLASTY RIGHT Right per Dr Yang Torres. Primary dx:   HPI:  Pt has hx of arthritis and she had lift knee replacement 2-3 years ago. curretnly she  C/O of Constant aching/sharp pain swelling, stiffness, popping and cracking in the right Knee. Pain started more than 4 months and getting worse. Pt describes the pain as 10/10 in intensity at times. The knee does buckle, give away under her. Also she states her right knee locked up and she cannot move it and she use walker for ambulation all the time . No recent falls or trauma. No redness,or rashes. Pt states she has lymphedema  bilateral legs and she is using compression pump for one hours twice per day. Pain aggrivated by walking or standing jersey long time, pain decreased . Only by sitting, Pt denies taking any oral medication for pain. Pt denies physical therapy or steroid injection done before.  Pt denies fever/chills, chest pain or SOB.      Testing completed r/t condition:    XR KNEE RIGHT (1-2 VIEWS)  Narrative   Rays taken today reviewed by me show standing AP both knees and a lateral    of the right knee.  Patient had a previous left total knee arthroplasty    performed and appears to be in adequate position patient has fairly    significant medial joint space narrowing approaching bone-on-bone.     Patient is noted on the lateral view to have severe patellofemoral    arthrosis with total joint space loss and significant osteophyte    formation.      Review of additional significant medical hx:  Pt was not clear with reviewing her past medication history and the medication list , she kept change her medication list and she was unsure with her currently medication list. JAVAN  CURRENTLY MEDICATION R/T CONDITION : glipizide            Lab Results   Component Value Date     LABA1C 6.3 10/05/2018            Lab Results   Component Value Date      10/11/2017      DEPRESSION :   CURRENTLY MEDICATION R/T CONDITION: Abilify, Buspar, Trazodone     EPILEPSY MAY 2018, SEIZURES pt states the last seizure was 6 months ago. CURRENTLY MEDICATION R/T CONDITION : Keppra       HISTORY OF STROKE ASSOCIATED WITH BLOOD 2003 ,TIA 10/8/2017,Factor 5 Leiden mutation, heterozygous   Pt has mild weakness in the left Side and difficulty speech   CURRENTLY MEDICATION R/T CONDITION : WARFARIN  and she has the bridge with Lovenox. POCT Protime / INR     Ref Range & Units 4 wk ago   INR 0.8 - 1.2 2.4 Abnormal     Resulting Agency  MANUALLY TRANSCRIBED RESULTS   Specimen Collected: 03/14/22  Last Resulted: 03/14/22    Received From: [facility]  Result Received: 03/24/22  4:29 PM    View Encounter      Recent Data from [facility]  Related to POCT Protime / INR  Component 04/06/22 03/14/22 02/28/22 02/14/22 02/02/22 11/18/21    INR 2.1 Abnormal  2.4 3.8 Abnormal  2.2 Abnormal  3.5 Abnormal  2.8 Abnormal         HTN, HLD  PT DENEIS PALPITATION , CHESTPAIN , DIZZINESS, SYNCOPE  CURRENTLY MEDICATION R/T CONDITION : LIPITOR, LASIX ,COZAAR, COLESTIPOL      BP Readings from Last 3 Encounters:   04/12/22 115/81   10/20/20 132/62   10/20/20 135/75       EKG 12 Lead 2018  Narrative & Impression  Normal sinus rhythm  Incomplete right bundle branch block  Borderline ECG  When compared with ECG of 09-OCT-2017 18:18,  Nonspecific T wave abnormality, worse in Inferior leads       Echo complete W/ contrast 11/29/21  Narrative    Left Ventricle: Systolic function is normal with an ejection fraction   of 60-65%. No obvious regional wall motion abnormalities. Left Ventricle   Left ventricle appears normal in size. There is mild concentric increased wall thickness/hypertrophy. Systolic function is normal with an ejection fraction of 60-65%. No obvious regional wall motion abnormalities. Grade I diastolic dysfunction (impaired relaxation) is present. Right Ventricle   Right ventricular size appears normal. Systolic function is normal.     Left Atrium   Left atrium is normal in size. The left atrial volume index is 26.3 mL/m2. Right Atrium   Right atrium is normal in size. The right atrial area is 12.0 cm2. IVC/SVC   IVC is not well visualized. Mitral Valve   Mitral valve structure is normal. There is trace regurgitation. There is no evidence of mitral valve stenosis. Tricuspid Valve   The tricuspid valve was not well visualized. There is trace regurgitation. There is no evidence of tricuspid valve stenosis. The right ventricular systolic pressure could not be estimated. Aortic Valve   The aortic valve was not well visualized. Probable trileaflet aortic valve. There is no regurgitation or stenosis. Pulmonic Valve   The pulmonic valve was not well visualized. There is no evidence of pulmonic valve stenosis. Ascending Aorta   The aortic root is normal in size. Pericardium   Pericardium was not well visualized. Study Details   A complete echo was performed using complete 2D, color flow Doppler and spectral Doppler. Definity study was performed. Overall the study quality was adequate. The study had technical difficulties. The study was difficult due to patient's body habitus. Wall Scoring Baseline   Score Index: 1.00   The following segments are hyperkinetic: apical anterior, apical septal, apical inferior, apical lateral and apex. All other segments are normal.    History of pneumonia three years ago   Pt states she still on this medication   currently medication r/t condition : [facility] . X-ray chest 2 views 3/30/2022  Narrative  Indication: Persistent cough.    TECHNIQUE: Frontal and lateral views of the chest are obtained compared to prior exam dated on 12/16/2021. FINDINGS: Heart and mediastinum are within normal limits.  Focal consolidation, pleural effusion, or pneumothorax is not seen. IMPRESSION:   1.  No acute pulmonary process seen.  Unchanged appearance of the chest.   Workstation:[id number]   Finalized by Ethel Bose MD on 3/30/2022 4:23 PM    Pulmonary function test Complete PFT w/ BD (Spirometry (Flow Volume Loop) pre/post short acting bronchodilator w/ DLCO (diffusion study) and Lung Volume): 1/13/22     Narrative  Performed by [facility] RESULTS  Normal pre bronchodilator FVC, 105% predicted.  Normal pre bronchodilator   FEV1, 84% predicted.  Decreased pre bronchodilator ratio. Decreased total lung capacity. Decreased DLCO.    Impression:   Possible mixed obstructive restrictive lung disease. Decreased DLCO   pattern can be seen in emphysema, pulmonary vascular disease, interstitial   lung disease, and bronchiectasis. Clinical correlation is recommended. .      She does not give a history of COPD.       INSOMNIA   CURRENTLY MEDICATION R/T CONDITION : TRAZODONE    SLEEP APNEA  CURRENTLY MEDICATION R/T CONDITION : pt used CPAP at night     Denies hx of MRSA infection. Denies hx of blood clots. Denies hx of any personal or family hx of complications w/anesthesia. PAST MEDICAL HISTORY     Past Medical History:   Diagnosis Date    Acid reflux     Arthritis     Coughing     dry due to ace inhibitor    CPAP (continuous positive airway pressure) dependence     Depression     uses Abilify, Buspar, Trazodone for this    Diabetes mellitus ([facility]     Difficult intubation     Diverticulitis     Environmental allergies     Epilepsy ([facility]     MAY 2018,. ... 4/12/22: States last seizure was 6 months ago.      Factor V Leiden ([facility]     Gastroparesis     Generalized abdominal pain     Hemorrhage of anus and rectum     Hiatal hernia     History of stroke associated with blood clotting tendency 2003    had clot in brain and left neck, left side weakness residual    Hx of blood clots     right breast, brain, neck    Hyperlipidemia     Hypertension     Insomnia     uses Trazodone for this    Pre-procedure lab exam 1/9/2018    Sleep apnea     C PAP    Unspecified cerebral artery occlusion with cerebral infarction 6/2003    SEE BELOW, [address] Mini stroke    Wears glasses        SURGICAL HISTORY       Past Surgical History:   Procedure Laterality Date    APPENDECTOMY      CHOLECYSTECTOMY      COLONOSCOPY      with polypectomy    COLONOSCOPY  1/22/16    AND EGD    COLONOSCOPY  01/05/2017    DR MACKAY-random biopsies.     COLONOSCOPY N/A 10/20/2020    COLONOSCOPY POLYPECTOMY HOT BIOPSY performed by Hal Romberg, MD at [facility] 22      2 times   [facility]  2/4/2016    laparoscopic robotic    HYSTERECTOMY      with BSO    KNEE ARTHROSCOPY Left 4/7/15, 2015    x 2    LAPAROSCOPY      \"springs inserted in to fallopian tubes\" to close tubes   [place] NECK SURGERY  July 2012    Anterior, C5,6,7 bulging disk repair    HI COLONOSCOPY FLX DX W/COLLJ SPEC WHEN PFRMD N/A 7/17/2018    COLONOSCOPY performed by Hal Romberg, MD at [address] [place] [address] N/A 12/18/2018    SIGMOIDOSCOPY DIAGNOSTIC FLEXIBLE performed by Hal Romberg, MD at [address] [facility]  ENDOSCOPY      Jan 2016 DR Asha Zheng    UPPER GASTROINTESTINAL ENDOSCOPY N/A 7/17/2018    EGD BIOPSY performed by Hal [facility]berg, MD at [facility] N/A 10/20/2020    EGD BIOPSY performed by Hal [facility]berg, MD at [facility] History     Socioeconomic History    Marital status:      Spouse name: None    Number of children: None    Years of education: None    Highest education level: None   Occupational History    Occupation: disability   Tobacco Use    Smoking status: Never Smoker    Smokeless tobacco: Never Used   Vaping Use    Vaping Use: Never used   Substance and Sexual Activity    Alcohol use: Yes     Alcohol/week: 0.0 standard drinks     Comment: OCCASSIONAL    Drug use: No    Sexual activity: Yes     Partners: Male   Other Topics Concern    None   Social History Narrative    None     Social Determinants of Health     Financial Resource Strain:     Difficulty of Paying Living Expenses: Not on file   Food Insecurity:     Worried About Running Out of Food in the Last Year: Not on file    Zaki of Food in the Last Year: Not on file   Transportation Needs:     Lack of Transportation (Medical): Not on file    Lack of Transportation (Non-Medical):  Not on file   Physical Activity:     Days of Exercise per Week: Not on file    Minutes of Exercise per Session: Not on file   Stress:     Feeling of Stress : Not on file   Social Connections:     Frequency of Communication with Friends and Family: Not on file    Frequency of Social Gatherings with Friends and Family: Not on file    Attends Congregation Services: Not on file    Active Member of [address] [place] [address] or Organizations: Not on file    Attends Club or Organization Meetings: Not on file    Marital Status: Not on file   Intimate Partner Violence:     Fear of Current or Ex-Partner: Not on file    Emotionally Abused: Not on file    Physically Abused: Not on file    Sexually Abused: Not on file   Housing Stability:     Unable to Pay for Housing in the Last Year: Not on file    Number of [place] in the Last Year: Not on file    Unstable Housing in the Last Year: Not on file       REVIEW OF SYSTEMS      Allergies   Allergen Reactions    Latex Other (See Comments)     Red marks on body    Adhesive Tape      Also plastic tape    Morphine And Related Nausea Only    Other Other (See Comments)     Surgical staples cause infection    Aspirin Nausea And Vomiting       Current Outpatient Medications on File Prior to Encounter   Medication Sig Dispense Refill    omeprazole (PRILOSEC) 20 MG delayed release capsule TAKE 1 CAPSULE BY MOUTH 2 TIMES DAILY (BEFORE MEALS) 180 capsule 1    metoclopramide (REGLAN) 10 MG tablet TAKE 1 TABLET BY MOUTH 3 TIMES DAILY WITH MEALS 120 tablet 2    enoxaparin (LOVENOX) 100 MG/ML injection Inject 100 mg into the skin every 12 hours (Patient not taking: Reported on 4/12/2022)      FLOVENT DISKUS 50 MCG/BLIST AEPB inhaler TAKE 1 PUFF BY MOUTH TWICE A DAY      glipiZIDE (GLUCOTROL) 5 MG tablet Take 5 mg by mouth 2 times daily (before meals)      magnesium citrate solution Take 296 mLs by mouth once for 1 dose 296 mL 0    furosemide (LASIX) 20 MG tablet TAKE 1 TABLET EVERY DAY 90 tablet 1    Warfarin Sodium (COUMADIN PO) Take 6 mg by mouth daily      Cholecalciferol (VITAMIN D PO) Take by mouth once a week Pt. Takes on Sundays      Cholecalciferol (VITAMIN D3) 08333 units CAPS Take by mouth once a week      montelukast (SINGULAIR) 10 MG tablet TAKE 1 TABLET BY MOUTH EVERY DAY AT NIGHT 30 tablet 5    ferrous sulfate 27 MG TABS Take by mouth daily       cyclobenzaprine (FLEXERIL) 10 MG tablet Take 1 tablet by mouth 2 times daily as needed for Muscle spasms 60 tablet 1    triamcinolone (KENALOG) 0.025 % cream Apply topically 2 times daily. 80 g 1    fluticasone (FLONASE) 50 MCG/ACT nasal spray INSTILL 1 SPRAY INTO EACH NOSTRIL DAILY AT BED TIME 1 Bottle 3    losartan (COZAAR) 25 MG tablet TAKE ONE-HALF TABLET BY MOUTH DAILY 15 tablet 5    levETIRAcetam (KEPPRA) 250 MG tablet Take 1 tablet by mouth 2 times daily 180 tablet 1    venlafaxine (EFFEXOR XR) 150 MG extended release capsule TAKE 1 CAPSULE (150 MG TOTAL) BY MOUTH DAILY. 1    atorvastatin (LIPITOR) 80 MG tablet TAKE 1 TABLET BY MOUTH NIGHTLY AT BEDTIME (Patient taking differently: Take 80 mg by mouth daily ) 30 tablet 5    temazepam (RESTORIL) 15 MG capsule Take 15 mg by mouth nightly.  Kris Saldaña ONE TOUCH ULTRA TEST strip TEST BLOOD SUGAR 3 TIMES A DAY 100 strip 5    colestipol (COLESTID) 1 g tablet Take 1 g by mouth daily       clotrimazole (LOTRIMIN) 1 % cream Apply topically 2 times daily. 15 g 0    topiramate (TOPAMAX) 25 MG tablet Take 25 mg by mouth       PROAIR  (90 Base) MCG/ACT inhaler Inhale 2 puffs into the lungs every 6 hours as needed       potassium chloride (MICRO-K) 10 MEQ extended release capsule       EXACTECH TEST strip       Misc. Devices MISC True Plus     Testing 3 times a day 100 each 5    busPIRone (BUSPAR) 10 MG tablet Take 10 mg by mouth daily       TRUEPLUS LANCETS 30G MISC 1 each by Does not apply route daily 100 each 3    ARIPiprazole (ABILIFY) 10 MG tablet TAKE 1 TABLET BY MOUTH NIGHTLY 30 tablet 2     No current facility-administered medications on file prior to encounter. Review of Systems   Constitutional: Positive for activity change. Due to the right knee pain    HENT: Negative. Eyes: Negative. Respiratory: Positive for apnea and shortness of breath. With activity    Cardiovascular: Negative. Negative for chest pain, palpitations and leg swelling. Gastrointestinal: Negative. Genitourinary: Negative. Musculoskeletal:        Right knee pain, swelling bilateral legs due to lymphedema she is using compression pump for one hours twice per day. Skin: Negative. Neurological: Negative. Hematological: Bruises/bleeds easily. Psychiatric/Behavioral: Negative. GENERAL PHYSICAL EXAM     Vitals: /81   Pulse 101   Temp 98 °F (36.7 °C) (Temporal)   Resp 20   Ht 5' 3\" (1.6 m)   Wt 217 lb (98.4 kg)   SpO2 100%   BMI 38.44 kg/m²               Physical Exam  Constitutional:       Appearance: Normal appearance. HENT:      Head: Normocephalic. Right Ear: External ear normal.      Left Ear: External ear normal.      Nose: Nose normal.      Mouth/Throat:      Mouth: Mucous membranes are dry. Eyes:      General:         Right eye: No discharge.          Left eye: No discharge. Cardiovascular:      Rate and Rhythm: Regular rhythm. Tachycardia present. Pulses: Normal pulses. Heart sounds: Normal heart sounds. No murmur heard. No friction rub. No gallop. Pulmonary:      Effort: Pulmonary effort is normal.      Breath sounds: Normal breath sounds. No wheezing, rhonchi or rales. Abdominal:      General: There is no distension. Palpations: Abdomen is soft. Tenderness: There is no abdominal tenderness. Musculoskeletal:         General: Swelling and tenderness present. Cervical back: Normal range of motion and neck supple. Right lower le+ Edema present. Left lower le+ Edema present. Comments: -Pt has lymphedema ( swelling leg) bilateral.  -There is tenderness with palpation over the mild medial joint with slightly effusion of the right knee, with limited ROM,  no erythema, skin intact, no calf tenderness. Skin:     General: Skin is warm and dry. Coloration: Skin is not jaundiced. Findings: No bruising or erythema. Neurological:      General: No focal deficit present. Mental Status: She is alert and oriented to person, place, and time. Motor: No weakness.       Gait: Gait abnormal.      Comments: antalgic gait   Psychiatric:         Mood and Affect: Mood normal.         Behavior: Behavior normal.         LAB REVIEW     Lab Results   Component Value Date    WBC 7.7 2022    HGB 12.1 2022    HCT 38.0 2022    MCV 82.2 2022     2022     Lab Results   Component Value Date     2022    K 3.8 2022     2022    CO2 22 2022    BUN 15 2022    CREATININE 0.80 2022    GLUCOSE 87 2022    GLUCOSE 126 2012    CALCIUM 9.1 2022      Lab Results   Component Value Date    COLORU Yellow 2022    NITRU NEGATIVE 2022    GLUCOSEU NEGATIVE 2022    KETUA NEGATIVE 2022    UROBILINOGEN Normal 2022 [address] NEGATIVE 04/12/2022       PRELIMINARY EKG REVIEW, DATE:    EKG 4/12/2022  Normal sinus rhythm  Normal ECG    SURGERY / PROVISIONAL DIAGNOSES:      KNEE TOTAL ARTHROPLASTY RIGHT  DJD RIGHT KNEE (PT HAS HAD COVID VACCINE)    Patient Active Problem List    Diagnosis Date Noted    Hx of blood clots 04/11/2022    Other irritable bowel syndrome 09/11/2020    Morbid (severe) obesity due to excess calories ([address] 09/11/2020    Occult blood in stools 09/11/2020    Gastroparesis 09/11/2020    Rectal bleeding 09/11/2020    Hemorrhage of anus and rectum     Delayed gastric emptying 09/27/2018    Sacroiliitis ([address]     DDD (degenerative disc disease), lumbar     Encounter for medication monitoring     Lumbar facet arthropathy     Bloating 04/09/2018    Generalized abdominal pain     Lumbar spondylolysis     Chronic use of opiate for therapeutic purpose 12/08/2017    Left-sided weakness 10/27/2017    Worsening headaches 10/27/2017    TIA (transient ischemic attack) 10/10/2017    Facial numbness 10/10/2017    Lumbar radiculopathy     Type 2 diabetes mellitus with diabetic polyneuropathy, without long-term current use of insulin ([address] 08/18/2017    Lumbosacral spondylosis without myelopathy     Essential hypertension 01/20/2017    Abdominal pain 11/17/2016    Diarrhea 11/17/2016    Major depressive disorder, recurrent, severe without psychotic features ([address]     Chronic post-traumatic stress disorder (PTSD)     ERICK (generalized anxiety disorder)     Hiatal hernia 02/06/2016    Primary insomnia 02/03/2016    Acute medial meniscus tear of left knee 04/06/2015    Depression     Acid reflux     Hyperlipidemia     Epilepsy (HCC)     Pelvic pain in female     Colon polyp     History of stroke associated with blood clotting tendency     Cholelithiasis     Complex tear of medial meniscus of left knee as current injury 11/07/2014           Medical clearance order by Dr Zaynab Elise .     Dr. Gamboa Sis , anesthesia, was contacted and informed of patient's history and planned surgery. He review the pt's last EKG, ECHO, chest X-ray and the Pulmonary function test. Orders received and No Other clearance required beside medical clearance.      Total time spent on encounter- PAT provider minutes: 31-40 minutes      KAMARI Lewis CNP on 4/12/2022 at 10:35 AM

## 2022-04-13 LAB
EKG ATRIAL RATE: 91 BPM
EKG P AXIS: 57 DEGREES
EKG P-R INTERVAL: 204 MS
EKG Q-T INTERVAL: 374 MS
EKG QRS DURATION: 108 MS
EKG QTC CALCULATION (BAZETT): 460 MS
EKG R AXIS: 35 DEGREES
EKG T AXIS: 24 DEGREES
EKG VENTRICULAR RATE: 91 BPM
MRSA, DNA, NASAL: NEGATIVE
SPECIMEN DESCRIPTION: NORMAL

## 2022-04-13 PROCEDURE — 93010 ELECTROCARDIOGRAM REPORT: CPT | Performed by: INTERNAL MEDICINE

## 2022-04-15 ENCOUNTER — TELEPHONE (OUTPATIENT)
Dept: ORTHOPEDIC SURGERY | Age: 57
End: 2022-04-15

## 2022-04-15 DIAGNOSIS — M25.561 RIGHT KNEE PAIN, UNSPECIFIED CHRONICITY: Primary | ICD-10-CM

## 2022-04-15 RX ORDER — TRAMADOL HYDROCHLORIDE 50 MG/1
50 TABLET ORAL EVERY 4 HOURS PRN
Qty: 42 TABLET | Refills: 0 | Status: CANCELLED | OUTPATIENT
Start: 2022-04-15 | End: 2022-04-22

## 2022-04-15 NOTE — TELEPHONE ENCOUNTER
Patient called in requesting some advisement from the clinical as she is having a lot of pain in her knee. Patient stated it feels like rocks grinding on her knee and would like a call back from the clinical to advise her and what she can take. Please advise thank you!

## 2022-04-15 NOTE — TELEPHONE ENCOUNTER
Patient advised that this is a normal feeling being that she has some close to bone on bone narrowing. She would like something for pain due to not being able to walk. Willing to prescribe patient something for pain?   DOS 4/26/22 Rt TKA

## 2022-04-18 RX ORDER — TRAMADOL HYDROCHLORIDE 50 MG/1
50 TABLET ORAL EVERY 4 HOURS PRN
Qty: 42 TABLET | Refills: 0 | Status: SHIPPED | OUTPATIENT
Start: 2022-04-18 | End: 2022-04-26 | Stop reason: SDUPTHER

## 2022-04-18 NOTE — TELEPHONE ENCOUNTER
Patient called to follow up on previous pain med request from 4/15 and is asking for a return call to advise if something could be called into [facility] for her. Thank you.

## 2022-04-21 ENCOUNTER — TELEPHONE (OUTPATIENT)
Dept: ORTHOPEDIC SURGERY | Age: 57
End: 2022-04-21

## 2022-04-21 NOTE — TELEPHONE ENCOUNTER
Attempted to call patient, no answer and VM is full. She is needing medical clearance for her surgery next week. According to [facility] [place] office the patient was told she needed to see both pulmonology and cardiology as well. I do not see that either of those appointments have happened. I will try to call her again later or else her surgery will most likely get cancelled.

## 2022-04-22 DIAGNOSIS — M25.561 RIGHT KNEE PAIN, UNSPECIFIED CHRONICITY: ICD-10-CM

## 2022-04-22 NOTE — TELEPHONE ENCOUNTER
She was scheduled for surgery on 4/26 but she has to get pulmonary clearance and cardiac before her PCP will clear. Due to this the surgery has been cancelled. She says that her right knee is locking and she is having trouble getting out of bed. Do you have anything to offer at this time since we can't proceed with the total knee?

## 2022-04-26 RX ORDER — TRAMADOL HYDROCHLORIDE 50 MG/1
50 TABLET ORAL EVERY 4 HOURS PRN
Qty: 42 TABLET | Refills: 0 | Status: SHIPPED | OUTPATIENT
Start: 2022-04-26 | End: 2022-05-03

## 2022-05-06 DIAGNOSIS — M25.561 RIGHT KNEE PAIN, UNSPECIFIED CHRONICITY: Primary | ICD-10-CM

## 2022-05-06 NOTE — TELEPHONE ENCOUNTER
Patient called stating she is in a lot of pain in her right knee, it has given out on her 2 times and has had to have someone help her up. She took tylenol and tylenol pm and nothing is helping. She has used  iced/heat elevation. Patient has had 2 surgeries cancelled do to medical clearance issues. Please advise on what patient can do or if you can call her in medication. [facility].

## 2022-05-09 ENCOUNTER — TELEPHONE (OUTPATIENT)
Dept: ORTHOPEDIC SURGERY | Age: 57
End: 2022-05-09

## 2022-05-09 RX ORDER — TRAMADOL HYDROCHLORIDE 50 MG/1
50 TABLET ORAL EVERY 4 HOURS PRN
Qty: 42 TABLET | Refills: 0 | Status: SHIPPED | OUTPATIENT
Start: 2022-05-09 | End: 2022-05-16

## 2022-05-09 NOTE — TELEPHONE ENCOUNTER
Patient stated she had tramadol from a previous prescription and this is not helping. Patient has not had her surgery yet and has now been cleared by pulmonary and has a cardiology appointment on Thursday with hopes to be cleared. Is there anything you want to prescribe for her or just wait until her appointment on Wednesday 5/11/22.

## 2022-05-09 NOTE — TELEPHONE ENCOUNTER
Patient is scheduled for a PO on 5-11-22. Patient states her knee keeps giving out on her, and she is having quite a bit of pain. Ice/heat, Tramadol, nothing working. Ciro Calderón is not helping her either. Patient would like if she can have an RX prior to Wednesday's appt.     Pharmacy is [facility] in [facility]    Patient can be reached at [phone]

## 2022-05-11 ENCOUNTER — OFFICE VISIT (OUTPATIENT)
Dept: ORTHOPEDIC SURGERY | Age: 57
End: 2022-05-11
Payer: MEDICARE

## 2022-05-11 DIAGNOSIS — M25.561 RIGHT KNEE PAIN, UNSPECIFIED CHRONICITY: Primary | ICD-10-CM

## 2022-05-11 PROCEDURE — 99212 OFFICE O/P EST SF 10 MIN: CPT | Performed by: ORTHOPAEDIC SURGERY

## 2022-05-11 RX ORDER — HYDROCODONE BITARTRATE AND ACETAMINOPHEN 5; 325 MG/1; MG/1
1 TABLET ORAL EVERY 4 HOURS PRN
Qty: 42 TABLET | Refills: 0 | Status: SHIPPED | OUTPATIENT
Start: 2022-05-11 | End: 2022-05-18

## 2022-05-12 PROBLEM — Z01.818 PREOP EXAMINATION: Status: RESOLVED | Noted: 2022-04-12 | Resolved: 2022-05-12

## 2022-05-13 ENCOUNTER — TELEPHONE (OUTPATIENT)
Dept: ORTHOPEDIC SURGERY | Age: 57
End: 2022-05-13

## 2022-05-13 NOTE — TELEPHONE ENCOUNTER
Patient says her cardiologist cleared her for surgery and would like a return call to schedule surgery

## 2022-05-18 ENCOUNTER — TELEPHONE (OUTPATIENT)
Dept: ORTHOPEDIC SURGERY | Age: 57
End: 2022-05-18

## 2022-05-18 NOTE — TELEPHONE ENCOUNTER
AKIRA from [facility].  Called in stating insurance is requesting ov notes for pts knee brace please fax ov notes to [phone] can be reached at [phone]

## 2022-05-19 ENCOUNTER — TELEPHONE (OUTPATIENT)
Dept: ORTHOPEDIC SURGERY | Age: 57
End: 2022-05-19

## 2022-06-02 ENCOUNTER — HOSPITAL ENCOUNTER (OUTPATIENT)
Dept: PREADMISSION TESTING | Age: 57
Discharge: HOME | End: 2022-06-02

## 2022-06-03 PROBLEM — Z01.818 PREOP EXAMINATION: Status: ACTIVE | Noted: 2022-06-03

## 2022-06-06 ENCOUNTER — HOSPITAL ENCOUNTER (OUTPATIENT)
Dept: PREADMISSION TESTING | Age: 57
Discharge: HOME | End: 2022-06-10
Payer: MEDICARE

## 2022-06-06 VITALS
HEART RATE: 89 BPM | TEMPERATURE: 97.8 F | RESPIRATION RATE: 20 BRPM | SYSTOLIC BLOOD PRESSURE: 114 MMHG | DIASTOLIC BLOOD PRESSURE: 72 MMHG | WEIGHT: 211 LBS | HEIGHT: 63 IN | BODY MASS INDEX: 37.39 KG/M2 | OXYGEN SATURATION: 98 %

## 2022-06-06 DIAGNOSIS — Z01.818 PREOP EXAMINATION: ICD-10-CM

## 2022-06-06 LAB
ABSOLUTE EOS #: 0 K/UL (ref 0–0.4)
ABSOLUTE LYMPH #: 2.3 K/UL (ref 1–4.8)
ABSOLUTE MONO #: 0.7 K/UL (ref 0.1–1.3)
ANION GAP SERPL CALCULATED.3IONS-SCNC: 13 MMOL/L (ref 9–17)
BASOPHILS # BLD: 1 % (ref 0–2)
BASOPHILS ABSOLUTE: 0.1 K/UL (ref 0–0.2)
BILIRUBIN URINE: NEGATIVE
BUN BLDV-MCNC: 8 MG/DL (ref 6–20)
CALCIUM SERPL-MCNC: 9 MG/DL (ref 8.6–10.4)
CHLORIDE BLD-SCNC: 103 MMOL/L (ref 98–107)
CO2: 24 MMOL/L (ref 20–31)
COLOR: YELLOW
COMMENT UA: NORMAL
CREAT SERPL-MCNC: 0.94 MG/DL (ref 0.5–0.9)
EOSINOPHILS RELATIVE PERCENT: 1 % (ref 0–4)
GFR AFRICAN AMERICAN: >60 ML/MIN
GFR NON-AFRICAN AMERICAN: >60 ML/MIN
GFR SERPL CREATININE-BSD FRML MDRD: ABNORMAL ML/MIN/{1.73_M2}
GLUCOSE BLD-MCNC: 83 MG/DL (ref 70–99)
GLUCOSE URINE: NEGATIVE
HCT VFR BLD CALC: 37.5 % (ref 36–46)
HEMOGLOBIN: 12.2 G/DL (ref 12–16)
KETONES, URINE: NEGATIVE
LEUKOCYTE ESTERASE, URINE: NEGATIVE
LYMPHOCYTES # BLD: 36 % (ref 24–44)
MCH RBC QN AUTO: 27.1 PG (ref 26–34)
MCHC RBC AUTO-ENTMCNC: 32.4 G/DL (ref 31–37)
MCV RBC AUTO: 83.7 FL (ref 80–100)
MONOCYTES # BLD: 11 % (ref 1–7)
NITRITE, URINE: NEGATIVE
PDW BLD-RTO: 16.4 % (ref 11.5–14.9)
PH UA: 6 (ref 5–8)
PLATELET # BLD: 281 K/UL (ref 150–450)
PMV BLD AUTO: 8.4 FL (ref 6–12)
POTASSIUM SERPL-SCNC: 3.3 MMOL/L (ref 3.7–5.3)
PROTEIN UA: NEGATIVE
RBC # BLD: 4.49 M/UL (ref 4–5.2)
SEG NEUTROPHILS: 51 % (ref 36–66)
SEGMENTED NEUTROPHILS ABSOLUTE COUNT: 3.2 K/UL (ref 1.3–9.1)
SODIUM BLD-SCNC: 140 MMOL/L (ref 135–144)
SPECIFIC GRAVITY UA: 1.01 (ref 1–1.03)
TURBIDITY: CLEAR
URINE HGB: NEGATIVE
UROBILINOGEN, URINE: NORMAL
WBC # BLD: 6.3 K/UL (ref 3.5–11)

## 2022-06-06 PROCEDURE — 36415 COLL VENOUS BLD VENIPUNCTURE: CPT

## 2022-06-06 PROCEDURE — APPSS45 APP SPLIT SHARED TIME 31-45 MINUTES: Performed by: NURSE PRACTITIONER

## 2022-06-06 PROCEDURE — 81003 URINALYSIS AUTO W/O SCOPE: CPT

## 2022-06-06 PROCEDURE — 85025 COMPLETE CBC W/AUTO DIFF WBC: CPT

## 2022-06-06 PROCEDURE — 87641 MR-STAPH DNA AMP PROBE: CPT

## 2022-06-06 PROCEDURE — 80048 BASIC METABOLIC PNL TOTAL CA: CPT

## 2022-06-06 ASSESSMENT — ENCOUNTER SYMPTOMS
VOMITING: 0
WHEEZING: 0
SHORTNESS OF BREATH: 0
ABDOMINAL PAIN: 0
COUGH: 0
SORE THROAT: 0
CONSTIPATION: 0
NAUSEA: 0
BACK PAIN: 0
DIARRHEA: 0

## 2022-06-06 NOTE — H&P
HISTORY and [id number]       NAME:  Viraj Mclean  MRN: [id number]   YOB: 1965   Date: 6/6/2022   Age: 55 y.o. Gender: female       COMPLAINT AND PRESENT HISTORY:     Viraj Mclean is 55 y.o. female, undergoing preadmission testing for right knee DJD with scheduled right total knee arthroplasty. Symptoms started approximately two years ago and has been progressively worsening. Pt c/o pain, swelling, stiffness, popping and cracking in the right knee. Describes pain as constant aching. Rating pain 10/10. Takes hydrocodone, flexeril with moderate relief. Walking aggravates pain. Nothing in particular helps alleviate symptoms. Pain does radiate up to right anterior hip and down to right ankle. She does have tingling to anterior right lower leg into her right foot. She does have numbness to right knee at times. The right knee does buckle, give way under the patient. Pt reports frequent falls related to her right knee locking up. Last fall was two months ago. Denies injury, hitting her head or LOC. She does use a walker for ambulation assistance but is not using here today. Denies redness or rashes. Denies Hx of MRSA infections in the past.    Testing completed r/t condition:  X-RAY RIGHT KNEE, 4-6-22:  Narrative   Rays taken today reviewed by me show standing AP both knees and a lateral    of the right knee.  Patient had a previous left total knee arthroplasty    performed and appears to be in adequate position patient has fairly    significant medial joint space narrowing approaching bone-on-bone.     Patient is noted on the lateral view to have severe patellofemoral    arthrosis with total joint space loss and significant osteophyte    formation.      MRI RIGHT KNEE, 2-11-22: IMPRESSION:     1. Persistent median meniscal tear, no parameniscal cyst.     2. Arthritis and chondral wear versus involving the patellofemoral and medial compartments as described. Joint effusion. Workstation:[id number]     Finalized by Blaise Wall MD on 2/11/2022 10:32 AM        PMHx includes:    DM: Sees Dr. Abrahan Fried for endocrinology. Takes trulicity. She reports most recent A1c was 6.7 in May, 2022. Epilepsy: Takes keppra and topamax. Last seizure was in spring 2022. Sees Dr. Codey Siu for neurology. Factor V Leiden / multiple blood clots: Takes warfarin for anticoagulation. Stroke: 2003. Takes warfarin. She reports left sided residual weakness. HTN / HLD: Takes losartan, lipitor. Sees cardiologist at [facility]. Denies recent or current chest pain/pressure, palpitations, SOB, headaches, dizziness, lightheadedness, lower extremity swelling, and/or blurred vision. ECHO, 11-29-21:  Narrative  Performed by Christos Gonzalez  Left Ventricle: Systolic function is normal with an ejection fraction   of 60-65%. No obvious regional wall motion abnormalities.      STRESS TEST, 8-10-20:  Narrative  Performed by Rajendra Velasco: A pharmacological stress test was performed using regadenoson.   Negative Lexiscan stress EKG     Good quality myocardial perfusion images     No evidence of ischemia or infarction on perfusion images     Normal left ventricular ejection fraction EF 65% on gated images     Low risk study for cardiovascular events     Sleep apnea: Wears a CPAP at night. INHALER USE, HX OF PNA: Sees Dr. Reginaldo Germain for pulmonology. Current medications r/t condition: Yadira Goodwin     PFT, 1-13-22:  Impression:     Possible mixed obstructive restrictive lung disease. Decreased DLCO   pattern can be seen in emphysema, pulmonary vascular disease, interstitial   lung disease, and bronchiectasis. Clinical correlation is recommended. Difficult intubation. Otherwise, denies personal and family history of complications with anesthesia. Pt has obtained medical, cardiac and pulmonary clearance.        PAST MEDICAL HISTORY     Past Medical History:   Diagnosis Date    Acid reflux  Arthritis     Coughing     dry due to ace inhibitor    CPAP (continuous positive airway pressure) dependence     Depression     uses Abilify, Buspar, Trazodone for this    Diabetes mellitus ([facility]     Difficult intubation     Diverticulitis     Environmental allergies     Epilepsy ([facility]     MAY 2018,. ... 4/12/22: States last seizure was 6 months ago.  Factor V Leiden ([facility]     Gastroparesis     Generalized abdominal pain     Hemorrhage of anus and rectum     Hiatal hernia     History of stroke associated with blood clotting tendency 2003    had clot in brain and left neck, left side weakness residual    Hx of blood clots     right breast, brain, neck    Hyperlipidemia     Hypertension     Insomnia     uses Trazodone for this    Pre-procedure lab exam 1/9/2018    Sleep apnea     C PAP    Unspecified cerebral artery occlusion with cerebral infarction 6/2003    SEE BELOW, [address] Mini stroke    Wears glasses        SURGICAL HISTORY       Past Surgical History:   Procedure Laterality Date    APPENDECTOMY      CHOLECYSTECTOMY      COLONOSCOPY      with polypectomy    COLONOSCOPY  1/22/16    AND EGD    COLONOSCOPY  01/05/2017    DR MACKAY-random biopsies.     COLONOSCOPY N/A 10/20/2020    COLONOSCOPY POLYPECTOMY HOT BIOPSY performed by Smith Thomason MD at [place] [address]      2 times   [address] [place] [address]  2/4/2016    laparoscopic robotic    HYSTERECTOMY      with BSO    KNEE ARTHROSCOPY Left 4/7/15, 2015    x 2    LAPAROSCOPY      \"springs inserted in to fallopian tubes\" to close tubes   Manuel Spina NECK SURGERY  July 2012    Anterior, C5,6,7 bulging disk repair    ID COLONOSCOPY FLX DX W/COLLJ SPEC WHEN PFRMD N/A 7/17/2018    COLONOSCOPY performed by Smith Thomason MD at [address] [place] [address] N/A 12/18/2018    SIGMOIDOSCOPY DIAGNOSTIC FLEXIBLE performed by Smith Thomason MD at [address] [place] [address] 2016 DR Reji Diallo UPPER GASTROINTESTINAL ENDOSCOPY N/A 7/17/2018    EGD BIOPSY performed by Manohar Berry MD at [address] [facility] N/A 10/20/2020    EGD BIOPSY performed by Manohar Berry MD at [address]       Family History   Problem Relation Age of Onset    Heart Disease Mother         dies age 32    Cancer Father         lymph nodes    Pacemaker Sister        SOCIAL HISTORY       Social History     Socioeconomic History    Marital status:      Spouse name: None    Number of children: None    Years of education: None    Highest education level: None   Occupational History    Occupation: disability   Tobacco Use    Smoking status: Never Smoker    Smokeless tobacco: Never Used   Vaping Use    Vaping Use: Never used   Substance and Sexual Activity    Alcohol use: Yes     Alcohol/week: 0.0 standard drinks     Comment: OCCASSIONAL    Drug use: No    Sexual activity: Yes     Partners: Male   Other Topics Concern    None   Social History Narrative    None     Social Determinants of Health     Financial Resource Strain:     Difficulty of Paying Living Expenses: Not on file   Food Insecurity:     Worried About Running Out of Food in the Last Year: Not on file    Zaki of Food in the Last Year: Not on file   Transportation Needs:     Lack of Transportation (Medical): Not on file    Lack of Transportation (Non-Medical):  Not on file   Physical Activity:     Days of Exercise per Week: Not on file    Minutes of Exercise per Session: Not on file   Stress:     Feeling of Stress : Not on file   Social Connections:     Frequency of Communication with Friends and Family: Not on file    Frequency of Social Gatherings with Friends and Family: Not on file    Attends [employer] Services: Not on file    Active Member of Clubs or Organizations: Not on file    Attends Club or Organization Meetings: Not on file    Marital Status: Not on file   Intimate 0. 025 % cream Apply topically 2 times daily. 80 g 1    fluticasone (FLONASE) 50 MCG/ACT nasal spray INSTILL 1 SPRAY INTO EACH NOSTRIL DAILY AT BED TIME 1 Bottle 3    losartan (COZAAR) 25 MG tablet TAKE ONE-HALF TABLET BY MOUTH DAILY 15 tablet 5    levETIRAcetam (KEPPRA) 250 MG tablet Take 1 tablet by mouth 2 times daily 180 tablet 1    venlafaxine (EFFEXOR XR) 150 MG extended release capsule TAKE 1 CAPSULE (150 MG TOTAL) BY MOUTH DAILY. 1    atorvastatin (LIPITOR) 80 MG tablet TAKE 1 TABLET BY MOUTH NIGHTLY AT BEDTIME (Patient taking differently: Take 80 mg by mouth daily ) 30 tablet 5    temazepam (RESTORIL) 15 MG capsule Take 15 mg by mouth nightly. Harvey  ONE TOUCH ULTRA TEST strip TEST BLOOD SUGAR 3 TIMES A  strip 5    colestipol (COLESTID) 1 g tablet Take 1 g by mouth daily       clotrimazole (LOTRIMIN) 1 % cream Apply topically 2 times daily. 15 g 0    topiramate (TOPAMAX) 25 MG tablet Take 25 mg by mouth       PROAIR  (90 Base) MCG/ACT inhaler Inhale 2 puffs into the lungs every 6 hours as needed       potassium chloride (MICRO-K) 10 MEQ extended release capsule       EXACTECH TEST strip       Misc. Devices MISC True Plus     Testing 3 times a day 100 each 5    busPIRone (BUSPAR) 10 MG tablet Take 10 mg by mouth daily       TRUEPLUS LANCETS 30G MISC 1 each by Does not apply route daily 100 each 3    ARIPiprazole (ABILIFY) 10 MG tablet TAKE 1 TABLET BY MOUTH NIGHTLY 30 tablet 2     No current facility-administered medications on file prior to encounter. Review of Systems   Constitutional: Positive for appetite change and unexpected weight change (Lost 20 lbs over the last three months). HENT: Negative for congestion, dental problem, hearing loss and sore throat. Eyes: Positive for visual disturbance (Glasses). Respiratory: Negative for cough, shortness of breath and wheezing. Cardiovascular: Positive for leg swelling (Right leg).  Negative for chest pain and palpitations. Gastrointestinal: Negative for abdominal pain, constipation, diarrhea, nausea and vomiting. Genitourinary: Negative. Musculoskeletal: Positive for arthralgias and gait problem. Negative for back pain and neck pain. Skin: Negative for rash and wound. Neurological: Positive for seizures (History of Epilepsy) and weakness (left sided weakness). Negative for dizziness, light-headedness and headaches. Hematological: Bruises/bleeds easily. Psychiatric/Behavioral: Negative. GENERAL PHYSICAL EXAM     Vitals: /72   Pulse 89   Temp 97.8 °F (36.6 °C) (Temporal)   Resp 20   Ht 5' 3\" (1.6 m)   Wt 211 lb (95.7 kg)   SpO2 98%   BMI 37.38 kg/m²  Body mass index is 37.38 kg/m². Physical Exam  Constitutional:       General: She is not in acute distress. Appearance: She is well-developed. She is obese. She is not ill-appearing. HENT:      Head: Normocephalic and atraumatic. Nose: Nose normal.      Mouth/Throat:      Mouth: Mucous membranes are moist.      Pharynx: Oropharynx is clear. No oropharyngeal exudate or posterior oropharyngeal erythema. Eyes:      General: No scleral icterus. Right eye: No discharge. Left eye: No discharge. Pupils: Pupils are equal, round, and reactive to light. Neck:      Trachea: No tracheal deviation. Cardiovascular:      Rate and Rhythm: Normal rate and regular rhythm. Heart sounds: Normal heart sounds. No murmur heard. No friction rub. No gallop. Pulmonary:      Effort: Pulmonary effort is normal. No respiratory distress. Breath sounds: Normal breath sounds. No wheezing, rhonchi or rales. Abdominal:      General: Bowel sounds are normal. There is no distension. Palpations: Abdomen is soft. Tenderness: There is no abdominal tenderness. There is no guarding. Musculoskeletal:      Cervical back: Neck supple. Right knee: Bony tenderness present.  No erythema or ecchymosis. Decreased range of motion. Tenderness present over the medial joint line and lateral joint line. Right lower leg: No edema. Left lower leg: No edema. Skin:     General: Skin is warm and dry. Coloration: Skin is not jaundiced. Findings: No bruising or erythema. Neurological:      General: No focal deficit present. Mental Status: She is alert and oriented to person, place, and time. Cranial Nerves: No cranial nerve deficit or facial asymmetry. Motor: Weakness (left  strength 3/5, right 5/5. left lower extermity weakness) present. No tremor or seizure activity.       Gait: Gait abnormal.   Psychiatric:         Mood and Affect: Mood normal.        PROVISIONAL DIAGNOSES / SURGERY:      KNEE TOTAL ARTHROPLASTY    DEGENERATIVE JOINT DISEASE RIGHT KNEE    Patient Active Problem List    Diagnosis Date Noted    Preop examination 06/03/2022    Hx of blood clots 04/11/2022    Other irritable bowel syndrome 09/11/2020    Morbid (severe) obesity due to excess calories ([address] 09/11/2020    Occult blood in stools 09/11/2020    Gastroparesis 09/11/2020    Rectal bleeding 09/11/2020    Hemorrhage of anus and rectum     Delayed gastric emptying 09/27/2018    Sacroiliitis ([address]     DDD (degenerative disc disease), lumbar     Encounter for medication monitoring     Lumbar facet arthropathy     Bloating 04/09/2018    Generalized abdominal pain     Lumbar spondylolysis     Chronic use of opiate for therapeutic purpose 12/08/2017    Left-sided weakness 10/27/2017    Worsening headaches 10/27/2017    TIA (transient ischemic attack) 10/10/2017    Facial numbness 10/10/2017    Lumbar radiculopathy     Type 2 diabetes mellitus with diabetic polyneuropathy, without long-term current use of insulin ([address] 08/18/2017    Lumbosacral spondylosis without myelopathy     Essential hypertension 01/20/2017    Abdominal pain 11/17/2016    Diarrhea 11/17/2016    Major depressive disorder, recurrent, severe without psychotic features ([facility]     Chronic post-traumatic stress disorder (PTSD)     ERICK (generalized anxiety disorder)     Hiatal hernia 02/06/2016    Primary insomnia 02/03/2016    Acute medial meniscus tear of left knee 04/06/2015    Depression     Acid reflux     Hyperlipidemia     Epilepsy (HCC)     Pelvic pain in female     Colon polyp     History of stroke associated with blood clotting tendency     Cholelithiasis     Complex tear of medial meniscus of left knee as current injury 11/07/2014           KAMARI Cast - CNP on 6/6/2022 at 2:03 PM    Total time spent on encounter- PAT provider minutes: 31-40 minutes

## 2022-06-06 NOTE — H&P (VIEW-ONLY)
HISTORY and Treinta MARLYN [address]       NAME:  Mariah Hodge  MRN: [id number]   YOB: 1965   Date: 6/6/2022   Age: 55 y.o. Gender: female       COMPLAINT AND PRESENT HISTORY:     Mariah Hodge is 55 y.o. female, undergoing preadmission testing for right knee DJD with scheduled right total knee arthroplasty. Symptoms started approximately two years ago and has been progressively worsening. Pt c/o pain, swelling, stiffness, popping and cracking in the right knee. Describes pain as constant aching. Rating pain 10/10. Takes hydrocodone, flexeril with moderate relief. Walking aggravates pain. Nothing in particular helps alleviate symptoms. Pain does radiate up to right anterior hip and down to right ankle. She does have tingling to anterior right lower leg into her right foot. She does have numbness to right knee at times. The right knee does buckle, give way under the patient. Pt reports frequent falls related to her right knee locking up. Last fall was two months ago. Denies injury, hitting her head or LOC. She does use a walker for ambulation assistance but is not using here today. Denies redness or rashes. Denies Hx of MRSA infections in the past.    Testing completed r/t condition:  X-RAY RIGHT KNEE, 4-6-22:  Narrative   Rays taken today reviewed by me show standing AP both knees and a lateral    of the right knee.  Patient had a previous left total knee arthroplasty    performed and appears to be in adequate position patient has fairly    significant medial joint space narrowing approaching bone-on-bone.     Patient is noted on the lateral view to have severe patellofemoral    arthrosis with total joint space loss and significant osteophyte    formation.      MRI RIGHT KNEE, 2-11-22: IMPRESSION:     1. Persistent median meniscal tear, no parameniscal cyst.     2. Arthritis and chondral wear versus involving the patellofemoral and medial compartments as described. Joint effusion. Workstation:[id number]     Finalized by Flor Moreno MD on 2/11/2022 10:32 AM        PMHx includes:    DM: Sees Dr. Raffi Gordon for endocrinology. Takes trulicity. She reports most recent A1c was 6.7 in May, 2022. Epilepsy: Takes keppra and topamax. Last seizure was in spring 2022. Sees Dr. Chelsea Adorno for neurology. Factor V Leiden / multiple blood clots: Takes warfarin for anticoagulation. Stroke: 2003. Takes warfarin. She reports left sided residual weakness. HTN / HLD: Takes losartan, lipitor. Sees cardiologist at [facility]. Denies recent or current chest pain/pressure, palpitations, SOB, headaches, dizziness, lightheadedness, lower extremity swelling, and/or blurred vision. ECHO, 11-29-21:  Narrative  Performed by Patricia Tejada  Left Ventricle: Systolic function is normal with an ejection fraction   of 60-65%. No obvious regional wall motion abnormalities.      STRESS TEST, 8-10-20:  Narrative  Performed by Cara Lakhani: A pharmacological stress test was performed using regadenoson.   Negative Lexiscan stress EKG     Good quality myocardial perfusion images     No evidence of ischemia or infarction on perfusion images     Normal left ventricular ejection fraction EF 65% on gated images     Low risk study for cardiovascular events     Sleep apnea: Wears a CPAP at night. INHALER USE, HX OF PNA: Sees Dr. Rock Lund for pulmonology. Current medications r/t condition: Modesta Tash     PFT, 1-13-22:  Impression:     Possible mixed obstructive restrictive lung disease. Decreased DLCO   pattern can be seen in emphysema, pulmonary vascular disease, interstitial   lung disease, and bronchiectasis. Clinical correlation is recommended. Difficult intubation. Otherwise, denies personal and family history of complications with anesthesia. Pt has obtained medical, cardiac and pulmonary clearance.        PAST MEDICAL HISTORY     Past Medical History:   Diagnosis Date    Acid reflux  Arthritis     Coughing     dry due to ace inhibitor    CPAP (continuous positive airway pressure) dependence     Depression     uses Abilify, Buspar, Trazodone for this    Diabetes [facility]     Difficult intubation     Diverticulitis     Environmental allergies     Epilepsy ([facility]     MAY 2018,. ... 4/12/22: States last seizure was 6 months ago.  Factor V Leiden ([facility]     Gastroparesis     Generalized abdominal pain     Hemorrhage of anus and rectum     Hiatal hernia     History of stroke associated with blood clotting tendency 2003    had clot in brain and left neck, left side weakness residual    Hx of blood clots     right breast, brain, neck    Hyperlipidemia     Hypertension     Insomnia     uses Trazodone for this    Pre-procedure lab exam 1/9/2018    Sleep apnea     C PAP    Unspecified cerebral artery occlusion with cerebral infarction 6/2003    SEE BELOW, [address] Mini stroke    Wears glasses        SURGICAL HISTORY       Past Surgical History:   Procedure Laterality Date    APPENDECTOMY      CHOLECYSTECTOMY      COLONOSCOPY      with polypectomy    COLONOSCOPY  1/22/16    AND EGD    COLONOSCOPY  01/05/2017    DR MACKAY-random biopsies.     COLONOSCOPY N/A 10/20/2020    COLONOSCOPY POLYPECTOMY HOT BIOPSY performed by Jody Nascimento MD at [place] [address]      2 times   [facility]  2/4/2016    laparoscopic robotic    HYSTERECTOMY      with BSO    KNEE ARTHROSCOPY Left 4/7/15, 2015    x 2    LAPAROSCOPY      \"springs inserted in to fallopian tubes\" to close tubes   [place] NECK SURGERY  July 2012    Anterior, C5,6,7 bulging disk repair    ID COLONOSCOPY FLX DX W/COLLJ SPEC WHEN PFRMD N/A 7/17/2018    COLONOSCOPY performed by Jody Nascimento MD at [address] [place] [address] N/A 12/18/2018    SIGMOIDOSCOPY DIAGNOSTIC FLEXIBLE performed by Jody Nascimento MD at [address] Liz Gordon UPPER GASTROINTESTINAL ENDOSCOPY N/A 7/17/2018    EGD BIOPSY performed by Bushra Parker MD at [facility] N/A 10/20/2020    EGD BIOPSY performed by Bushra Parker MD at [address]       Family History   Problem Relation Age of Onset    Heart Disease Mother         dies age 32    Cancer Father         lymph nodes    Pacemaker Sister        SOCIAL HISTORY       Social History     Socioeconomic History    Marital status:      Spouse name: None    Number of children: None    Years of education: None    Highest education level: None   Occupational History    Occupation: disability   Tobacco Use    Smoking status: Never Smoker    Smokeless tobacco: Never Used   Vaping Use    Vaping Use: Never used   Substance and Sexual Activity    Alcohol use: Yes     Alcohol/week: 0.0 standard drinks     Comment: OCCASSIONAL    Drug use: No    Sexual activity: Yes     Partners: Male   Other Topics Concern    None   Social History Narrative    None     Social Determinants of Health     Financial Resource Strain:     Difficulty of Paying Living Expenses: Not on file   Food Insecurity:     Worried About Running Out of Food in the Last Year: Not on file    Zaki of Food in the Last Year: Not on file   Transportation Needs:     Lack of Transportation (Medical): Not on file    Lack of Transportation (Non-Medical):  Not on file   Physical Activity:     Days of Exercise per Week: Not on file    Minutes of Exercise per Session: Not on file   Stress:     Feeling of Stress : Not on file   Social Connections:     Frequency of Communication with Friends and Family: Not on file    Frequency of Social Gatherings with Friends and Family: Not on file    Attends Jew Services: Not on file    Active Member of Clubs or Organizations: Not on file    Attends Club or Organization Meetings: Not on file    Marital Status: Not on file   Intimate Partner Violence:     Fear of Current or Ex-Partner: Not on file    Emotionally Abused: Not on file    Physically Abused: Not on file    Sexually Abused: Not on file   Housing Stability:     Unable to Pay for Housing in the Last Year: Not on file    Number of [place] in the Last Year: Not on file    Unstable Housing in the Last Year: Not on file           REVIEW OF SYSTEMS      Allergies   Allergen Reactions    Latex Other (See Comments)     Red marks on body    Adhesive Tape      Also plastic tape    Morphine And Related Nausea Only    Other Other (See Comments)     Surgical staples cause infection    Aspirin Nausea And Vomiting       Current Outpatient Medications on File Prior to Encounter   Medication Sig Dispense Refill    omeprazole (PRILOSEC) 20 MG delayed release capsule TAKE 1 CAPSULE BY MOUTH 2 TIMES DAILY (BEFORE MEALS) 180 capsule 1    metoclopramide (REGLAN) 10 MG tablet TAKE 1 TABLET BY MOUTH 3 TIMES DAILY WITH MEALS 120 tablet 2    enoxaparin (LOVENOX) 100 MG/ML injection Inject 100 mg into the skin every 12 hours (Patient not taking: Reported on 4/12/2022)      FLOVENT DISKUS 50 MCG/BLIST AEPB inhaler TAKE 1 PUFF BY MOUTH TWICE A DAY      glipiZIDE (GLUCOTROL) 5 MG tablet Take 5 mg by mouth 2 times daily (before meals)      magnesium citrate solution Take 296 mLs by mouth once for 1 dose 296 mL 0    furosemide (LASIX) 20 MG tablet TAKE 1 TABLET EVERY DAY 90 tablet 1    Warfarin Sodium (COUMADIN PO) Take 6 mg by mouth daily      Cholecalciferol (VITAMIN D PO) Take by mouth once a week Pt.  Takes on Sundays      Cholecalciferol (VITAMIN D3) 89618 units CAPS Take by mouth once a week      montelukast (SINGULAIR) 10 MG tablet TAKE 1 TABLET BY MOUTH EVERY DAY AT NIGHT 30 tablet 5    ferrous sulfate 27 MG TABS Take by mouth daily       cyclobenzaprine (FLEXERIL) 10 MG tablet Take 1 tablet by mouth 2 times daily as needed for Muscle spasms 60 tablet 1    triamcinolone (KENALOG) 0. 025 % cream Apply topically 2 times daily. 80 g 1    fluticasone (FLONASE) 50 MCG/ACT nasal spray INSTILL 1 SPRAY INTO EACH NOSTRIL DAILY AT BED TIME 1 Bottle 3    losartan (COZAAR) 25 MG tablet TAKE ONE-HALF TABLET BY MOUTH DAILY 15 tablet 5    levETIRAcetam (KEPPRA) 250 MG tablet Take 1 tablet by mouth 2 times daily 180 tablet 1    venlafaxine (EFFEXOR XR) 150 MG extended release capsule TAKE 1 CAPSULE (150 MG TOTAL) BY MOUTH DAILY. 1    atorvastatin (LIPITOR) 80 MG tablet TAKE 1 TABLET BY MOUTH NIGHTLY AT BEDTIME (Patient taking differently: Take 80 mg by mouth daily ) 30 tablet 5    temazepam (RESTORIL) 15 MG capsule Take 15 mg by mouth nightly. [facility] ONE TOUCH ULTRA TEST strip TEST BLOOD SUGAR 3 TIMES A  strip 5    colestipol (COLESTID) 1 g tablet Take 1 g by mouth daily       clotrimazole (LOTRIMIN) 1 % cream Apply topically 2 times daily. 15 g 0    topiramate (TOPAMAX) 25 MG tablet Take 25 mg by mouth       PROAIR  (90 Base) MCG/ACT inhaler Inhale 2 puffs into the lungs every 6 hours as needed       potassium chloride (MICRO-K) 10 MEQ extended release capsule       EXACTECH TEST strip       Misc. Devices MISC True Plus     Testing 3 times a day 100 each 5    busPIRone (BUSPAR) 10 MG tablet Take 10 mg by mouth daily       TRUEPLUS LANCETS 30G MISC 1 each by Does not apply route daily 100 each 3    ARIPiprazole (ABILIFY) 10 MG tablet TAKE 1 TABLET BY MOUTH NIGHTLY 30 tablet 2     No current facility-administered medications on file prior to encounter. Review of Systems   Constitutional: Positive for appetite change and unexpected weight change (Lost 20 lbs over the last three months). HENT: Negative for congestion, dental problem, hearing loss and sore throat. Eyes: Positive for visual disturbance (Glasses). Respiratory: Negative for cough, shortness of breath and wheezing. Cardiovascular: Positive for leg swelling (Right leg).  Negative for chest pain and palpitations. Gastrointestinal: Negative for abdominal pain, constipation, diarrhea, nausea and vomiting. Genitourinary: Negative. Musculoskeletal: Positive for arthralgias and gait problem. Negative for back pain and neck pain. Skin: Negative for rash and wound. Neurological: Positive for seizures (History of Epilepsy) and weakness (left sided weakness). Negative for dizziness, light-headedness and headaches. Hematological: Bruises/bleeds easily. Psychiatric/Behavioral: Negative. GENERAL PHYSICAL EXAM     Vitals: /72   Pulse 89   Temp 97.8 °F (36.6 °C) (Temporal)   Resp 20   Ht 5' 3\" (1.6 m)   Wt 211 lb (95.7 kg)   SpO2 98%   BMI 37.38 kg/m²  Body mass index is 37.38 kg/m². Physical Exam  Constitutional:       General: She is not in acute distress. Appearance: She is well-developed. She is obese. She is not ill-appearing. HENT:      Head: Normocephalic and atraumatic. Nose: Nose normal.      Mouth/Throat:      Mouth: Mucous membranes are moist.      Pharynx: Oropharynx is clear. No oropharyngeal exudate or posterior oropharyngeal erythema. Eyes:      General: No scleral icterus. Right eye: No discharge. Left eye: No discharge. Pupils: Pupils are equal, round, and reactive to light. Neck:      Trachea: No tracheal deviation. Cardiovascular:      Rate and Rhythm: Normal rate and regular rhythm. Heart sounds: Normal heart sounds. No murmur heard. No friction rub. No gallop. Pulmonary:      Effort: Pulmonary effort is normal. No respiratory distress. Breath sounds: Normal breath sounds. No wheezing, rhonchi or rales. Abdominal:      General: Bowel sounds are normal. There is no distension. Palpations: Abdomen is soft. Tenderness: There is no abdominal tenderness. There is no guarding. Musculoskeletal:      Cervical back: Neck supple. Right knee: Bony tenderness present.  No erythema or ecchymosis. Decreased range of motion. Tenderness present over the medial joint line and lateral joint line. Right lower leg: No edema. Left lower leg: No edema. Skin:     General: Skin is warm and dry. Coloration: Skin is not jaundiced. Findings: No bruising or erythema. Neurological:      General: No focal deficit present. Mental Status: She is alert and oriented to person, place, and time. Cranial Nerves: No cranial nerve deficit or facial asymmetry. Motor: Weakness (left  strength 3/5, right 5/5. left lower extermity weakness) present. No tremor or seizure activity.       Gait: Gait abnormal.   Psychiatric:         Mood and Affect: Mood normal.        PROVISIONAL DIAGNOSES / SURGERY:      KNEE TOTAL ARTHROPLASTY    DEGENERATIVE JOINT DISEASE RIGHT KNEE    Patient Active Problem List    Diagnosis Date Noted    Preop examination 06/03/2022    Hx of blood clots 04/11/2022    Other irritable bowel syndrome 09/11/2020    Morbid (severe) obesity due to excess calories ([address] 09/11/2020    Occult blood in stools 09/11/2020    Gastroparesis 09/11/2020    Rectal bleeding 09/11/2020    Hemorrhage of anus and rectum     Delayed gastric emptying 09/27/2018    Sacroiliitis ([address]     DDD (degenerative disc disease), lumbar     Encounter for medication monitoring     Lumbar facet arthropathy     Bloating 04/09/2018    Generalized abdominal pain     Lumbar spondylolysis     Chronic use of opiate for therapeutic purpose 12/08/2017    Left-sided weakness 10/27/2017    Worsening headaches 10/27/2017    TIA (transient ischemic attack) 10/10/2017    Facial numbness 10/10/2017    Lumbar radiculopathy     Type 2 diabetes mellitus with diabetic polyneuropathy, without long-term current use of insulin ([address] 08/18/2017    Lumbosacral spondylosis without myelopathy     Essential hypertension 01/20/2017    Abdominal pain 11/17/2016    Diarrhea 11/17/2016    Major depressive disorder, recurrent, severe without psychotic features ([facility]     Chronic post-traumatic stress disorder (PTSD)     ERICK (generalized anxiety disorder)     Hiatal hernia 02/06/2016    Primary insomnia 02/03/2016    Acute medial meniscus tear of left knee 04/06/2015    Depression     Acid reflux     Hyperlipidemia     Epilepsy (HCC)     Pelvic pain in female     Colon polyp     History of stroke associated with blood clotting tendency     Cholelithiasis     Complex tear of medial meniscus of left knee as current injury 11/07/2014           KAMARI Vivar CNP on 6/6/2022 at 2:03 PM    Total time spent on encounter- PAT provider minutes: 31-40 minutes

## 2022-06-07 LAB
MRSA, DNA, NASAL: NEGATIVE
SPECIMEN DESCRIPTION: NORMAL

## 2022-06-13 ENCOUNTER — TELEPHONE (OUTPATIENT)
Dept: GASTROENTEROLOGY | Age: 57
End: 2022-06-13

## 2022-06-13 ENCOUNTER — ANESTHESIA EVENT (OUTPATIENT)
Dept: OPERATING ROOM | Age: 57
End: 2022-06-13
Payer: MEDICARE

## 2022-06-13 ENCOUNTER — TELEPHONE (OUTPATIENT)
Dept: ORTHOPEDIC SURGERY | Age: 57
End: 2022-06-13

## 2022-06-13 NOTE — TELEPHONE ENCOUNTER
Patient LVM to cancel her 6-14-22 appointment due to having knee surgery. This appointment was already cancelled 5-31-22.

## 2022-06-13 NOTE — TELEPHONE ENCOUNTER
Patient called, she is scheduled for surgery tomorrow, and she is very nervous. Patient would like to know if there is anything that she can be given prior to the surgery to help her with her anxiety level.     Pharmacy is [facility].    Patient can be reached at [phone]

## 2022-06-14 ENCOUNTER — HOSPITAL ENCOUNTER (OUTPATIENT)
Age: 57
Discharge: HOME | End: 2022-06-14
Attending: ORTHOPAEDIC SURGERY | Admitting: ORTHOPAEDIC SURGERY
Payer: MEDICARE

## 2022-06-14 ENCOUNTER — ANESTHESIA (OUTPATIENT)
Dept: OPERATING ROOM | Age: 57
End: 2022-06-14
Payer: MEDICARE

## 2022-06-14 ENCOUNTER — APPOINTMENT (OUTPATIENT)
Dept: GENERAL RADIOLOGY | Age: 57
End: 2022-06-14
Attending: ORTHOPAEDIC SURGERY
Payer: MEDICARE

## 2022-06-14 VITALS
RESPIRATION RATE: 16 BRPM | OXYGEN SATURATION: 94 % | SYSTOLIC BLOOD PRESSURE: 107 MMHG | HEART RATE: 93 BPM | DIASTOLIC BLOOD PRESSURE: 68 MMHG | BODY MASS INDEX: 37.39 KG/M2 | WEIGHT: 211 LBS | TEMPERATURE: 98.1 F | HEIGHT: 63 IN

## 2022-06-14 DIAGNOSIS — M17.11 PRIMARY OSTEOARTHRITIS OF RIGHT KNEE: Primary | ICD-10-CM

## 2022-06-14 LAB
GLUCOSE BLD-MCNC: 222 MG/DL (ref 65–105)
GLUCOSE BLD-MCNC: 88 MG/DL (ref 65–105)
POTASSIUM SERPL-SCNC: 3.5 MMOL/L (ref 3.7–5.3)

## 2022-06-14 PROCEDURE — 6360000002 HC RX W HCPCS: Performed by: ORTHOPAEDIC SURGERY

## 2022-06-14 PROCEDURE — 97535 SELF CARE MNGMENT TRAINING: CPT

## 2022-06-14 PROCEDURE — 3700000001 HC ADD 15 MINUTES (ANESTHESIA): Performed by: ORTHOPAEDIC SURGERY

## 2022-06-14 PROCEDURE — 2580000003 HC RX 258: Performed by: ANESTHESIOLOGY

## 2022-06-14 PROCEDURE — 97166 OT EVAL MOD COMPLEX 45 MIN: CPT

## 2022-06-14 PROCEDURE — 6360000002 HC RX W HCPCS: Performed by: ANESTHESIOLOGY

## 2022-06-14 PROCEDURE — 7100000001 HC PACU RECOVERY - ADDTL 15 MIN: Performed by: ORTHOPAEDIC SURGERY

## 2022-06-14 PROCEDURE — 2720000010 HC SURG SUPPLY STERILE: Performed by: ORTHOPAEDIC SURGERY

## 2022-06-14 PROCEDURE — 84132 ASSAY OF SERUM POTASSIUM: CPT

## 2022-06-14 PROCEDURE — 27447 TOTAL KNEE ARTHROPLASTY: CPT | Performed by: ORTHOPAEDIC SURGERY

## 2022-06-14 PROCEDURE — C1713 ANCHOR/SCREW BN/BN,TIS/BN: HCPCS | Performed by: ORTHOPAEDIC SURGERY

## 2022-06-14 PROCEDURE — 64447 NJX AA&/STRD FEMORAL NRV IMG: CPT | Performed by: ANESTHESIOLOGY

## 2022-06-14 PROCEDURE — 3600000013 HC SURGERY LEVEL 3 ADDTL 15MIN: Performed by: ORTHOPAEDIC SURGERY

## 2022-06-14 PROCEDURE — 97530 THERAPEUTIC ACTIVITIES: CPT

## 2022-06-14 PROCEDURE — 3700000000 HC ANESTHESIA ATTENDED CARE: Performed by: ORTHOPAEDIC SURGERY

## 2022-06-14 PROCEDURE — 2580000003 HC RX 258: Performed by: ORTHOPAEDIC SURGERY

## 2022-06-14 PROCEDURE — 2500000003 HC RX 250 WO HCPCS: Performed by: ANESTHESIOLOGY

## 2022-06-14 PROCEDURE — 2709999900 HC NON-CHARGEABLE SUPPLY: Performed by: ORTHOPAEDIC SURGERY

## 2022-06-14 PROCEDURE — 7100000000 HC PACU RECOVERY - FIRST 15 MIN: Performed by: ORTHOPAEDIC SURGERY

## 2022-06-14 PROCEDURE — A4216 STERILE WATER/SALINE, 10 ML: HCPCS | Performed by: ORTHOPAEDIC SURGERY

## 2022-06-14 PROCEDURE — 3600000003 HC SURGERY LEVEL 3 BASE: Performed by: ORTHOPAEDIC SURGERY

## 2022-06-14 PROCEDURE — 6370000000 HC RX 637 (ALT 250 FOR IP): Performed by: ORTHOPAEDIC SURGERY

## 2022-06-14 PROCEDURE — 97116 GAIT TRAINING THERAPY: CPT

## 2022-06-14 PROCEDURE — C9290 INJ, BUPIVACAINE LIPOSOME: HCPCS | Performed by: ANESTHESIOLOGY

## 2022-06-14 PROCEDURE — 73560 X-RAY EXAM OF KNEE 1 OR 2: CPT

## 2022-06-14 PROCEDURE — C1776 JOINT DEVICE (IMPLANTABLE): HCPCS | Performed by: ORTHOPAEDIC SURGERY

## 2022-06-14 PROCEDURE — 36415 COLL VENOUS BLD VENIPUNCTURE: CPT

## 2022-06-14 PROCEDURE — 97162 PT EVAL MOD COMPLEX 30 MIN: CPT

## 2022-06-14 PROCEDURE — 2500000003 HC RX 250 WO HCPCS: Performed by: NURSE ANESTHETIST, CERTIFIED REGISTERED

## 2022-06-14 PROCEDURE — 97110 THERAPEUTIC EXERCISES: CPT

## 2022-06-14 PROCEDURE — 6360000002 HC RX W HCPCS: Performed by: NURSE ANESTHETIST, CERTIFIED REGISTERED

## 2022-06-14 PROCEDURE — 2500000003 HC RX 250 WO HCPCS: Performed by: ORTHOPAEDIC SURGERY

## 2022-06-14 PROCEDURE — 82947 ASSAY GLUCOSE BLOOD QUANT: CPT

## 2022-06-14 DEVICE — STEM TIB L40MM KNEE PRI FIN VANGUARD COMPLT SYS: Type: IMPLANTABLE DEVICE | Site: KNEE | Status: FUNCTIONAL

## 2022-06-14 DEVICE — IMPLANTABLE DEVICE: Type: IMPLANTABLE DEVICE | Site: KNEE | Status: FUNCTIONAL

## 2022-06-14 DEVICE — COMPONENT PAT DIA34MM THK7.8MM THN KNEE POLY 3 PEG SER A: Type: IMPLANTABLE DEVICE | Site: KNEE | Status: FUNCTIONAL

## 2022-06-14 DEVICE — TRAY TIB L67MM KNEE COPOLYESTER SIL INTLOK PRI CEM VANGUARD: Type: IMPLANTABLE DEVICE | Site: KNEE | Status: FUNCTIONAL

## 2022-06-14 DEVICE — CEMENT BNE 40GM HI VISC RADPQ FOR REV SURG: Type: IMPLANTABLE DEVICE | Site: KNEE | Status: FUNCTIONAL

## 2022-06-14 RX ORDER — DIPHENHYDRAMINE HYDROCHLORIDE 50 MG/ML
12.5 INJECTION INTRAMUSCULAR; INTRAVENOUS
Status: DISCONTINUED | OUTPATIENT
Start: 2022-06-14 | End: 2022-06-14 | Stop reason: HOSPADM

## 2022-06-14 RX ORDER — FENTANYL CITRATE 50 UG/ML
50 INJECTION, SOLUTION INTRAMUSCULAR; INTRAVENOUS EVERY 5 MIN PRN
Status: DISCONTINUED | OUTPATIENT
Start: 2022-06-14 | End: 2022-06-14 | Stop reason: HOSPADM

## 2022-06-14 RX ORDER — OXYCODONE HYDROCHLORIDE 5 MG/1
5 TABLET ORAL EVERY 4 HOURS PRN
Status: DISCONTINUED | OUTPATIENT
Start: 2022-06-14 | End: 2022-06-14 | Stop reason: HOSPADM

## 2022-06-14 RX ORDER — ASPIRIN 81 MG/1
81 TABLET ORAL 2 TIMES DAILY
Status: DISCONTINUED | OUTPATIENT
Start: 2022-06-14 | End: 2022-06-14 | Stop reason: HOSPADM

## 2022-06-14 RX ORDER — FENTANYL CITRATE 50 UG/ML
25 INJECTION, SOLUTION INTRAMUSCULAR; INTRAVENOUS EVERY 5 MIN PRN
Status: DISCONTINUED | OUTPATIENT
Start: 2022-06-14 | End: 2022-06-14 | Stop reason: HOSPADM

## 2022-06-14 RX ORDER — SODIUM CHLORIDE 0.9 % (FLUSH) 0.9 %
5-40 SYRINGE (ML) INJECTION PRN
Status: DISCONTINUED | OUTPATIENT
Start: 2022-06-14 | End: 2022-06-14 | Stop reason: HOSPADM

## 2022-06-14 RX ORDER — PROPOFOL 10 MG/ML
INJECTION, EMULSION INTRAVENOUS PRN
Status: DISCONTINUED | OUTPATIENT
Start: 2022-06-14 | End: 2022-06-14 | Stop reason: SDUPTHER

## 2022-06-14 RX ORDER — NIFEDIPINE 60 MG/1
60 TABLET, EXTENDED RELEASE ORAL DAILY
COMMUNITY
Start: 2022-02-08

## 2022-06-14 RX ORDER — ONDANSETRON 2 MG/ML
4 INJECTION INTRAMUSCULAR; INTRAVENOUS
Status: DISCONTINUED | OUTPATIENT
Start: 2022-06-14 | End: 2022-06-14 | Stop reason: HOSPADM

## 2022-06-14 RX ORDER — SODIUM CHLORIDE 9 MG/ML
INJECTION, SOLUTION INTRAVENOUS PRN
Status: DISCONTINUED | OUTPATIENT
Start: 2022-06-14 | End: 2022-06-14 | Stop reason: HOSPADM

## 2022-06-14 RX ORDER — ONDANSETRON 2 MG/ML
INJECTION INTRAMUSCULAR; INTRAVENOUS PRN
Status: DISCONTINUED | OUTPATIENT
Start: 2022-06-14 | End: 2022-06-14 | Stop reason: SDUPTHER

## 2022-06-14 RX ORDER — NITROGLYCERIN 80 MG/1
PATCH TRANSDERMAL
COMMUNITY

## 2022-06-14 RX ORDER — BUPIVACAINE HYDROCHLORIDE 5 MG/ML
INJECTION, SOLUTION EPIDURAL; INTRACAUDAL
Status: DISCONTINUED | OUTPATIENT
Start: 2022-06-14 | End: 2022-06-14 | Stop reason: SDUPTHER

## 2022-06-14 RX ORDER — ROCURONIUM BROMIDE 10 MG/ML
INJECTION, SOLUTION INTRAVENOUS PRN
Status: DISCONTINUED | OUTPATIENT
Start: 2022-06-14 | End: 2022-06-14 | Stop reason: SDUPTHER

## 2022-06-14 RX ORDER — KETOROLAC TROMETHAMINE 30 MG/ML
30 INJECTION, SOLUTION INTRAMUSCULAR; INTRAVENOUS EVERY 6 HOURS PRN
Status: DISCONTINUED | OUTPATIENT
Start: 2022-06-14 | End: 2022-06-14 | Stop reason: HOSPADM

## 2022-06-14 RX ORDER — OXYCODONE HYDROCHLORIDE AND ACETAMINOPHEN 5; 325 MG/1; MG/1
1-2 TABLET ORAL EVERY 4 HOURS PRN
Qty: 42 TABLET | Refills: 0 | Status: SHIPPED | OUTPATIENT
Start: 2022-06-14 | End: 2022-06-21 | Stop reason: SDUPTHER

## 2022-06-14 RX ORDER — FENTANYL CITRATE 50 UG/ML
INJECTION, SOLUTION INTRAMUSCULAR; INTRAVENOUS PRN
Status: DISCONTINUED | OUTPATIENT
Start: 2022-06-14 | End: 2022-06-14 | Stop reason: SDUPTHER

## 2022-06-14 RX ORDER — MIDAZOLAM HYDROCHLORIDE 1 MG/ML
INJECTION INTRAMUSCULAR; INTRAVENOUS PRN
Status: DISCONTINUED | OUTPATIENT
Start: 2022-06-14 | End: 2022-06-14 | Stop reason: SDUPTHER

## 2022-06-14 RX ORDER — OXYCODONE HYDROCHLORIDE 10 MG/1
10 TABLET ORAL EVERY 4 HOURS PRN
Status: DISCONTINUED | OUTPATIENT
Start: 2022-06-14 | End: 2022-06-14 | Stop reason: HOSPADM

## 2022-06-14 RX ORDER — SODIUM CHLORIDE, SODIUM LACTATE, POTASSIUM CHLORIDE, CALCIUM CHLORIDE 600; 310; 30; 20 MG/100ML; MG/100ML; MG/100ML; MG/100ML
INJECTION, SOLUTION INTRAVENOUS CONTINUOUS
Status: DISCONTINUED | OUTPATIENT
Start: 2022-06-14 | End: 2022-06-14 | Stop reason: HOSPADM

## 2022-06-14 RX ORDER — DEXAMETHASONE SODIUM PHOSPHATE 10 MG/ML
10 INJECTION, SOLUTION INTRAMUSCULAR; INTRAVENOUS ONCE
Status: COMPLETED | OUTPATIENT
Start: 2022-06-14 | End: 2022-06-14

## 2022-06-14 RX ORDER — TRANEXAMIC ACID 100 MG/ML
INJECTION, SOLUTION INTRAVENOUS PRN
Status: DISCONTINUED | OUTPATIENT
Start: 2022-06-14 | End: 2022-06-14 | Stop reason: SDUPTHER

## 2022-06-14 RX ORDER — SODIUM CHLORIDE 9 MG/ML
INJECTION, SOLUTION INTRAVENOUS CONTINUOUS
Status: DISCONTINUED | OUTPATIENT
Start: 2022-06-14 | End: 2022-06-14 | Stop reason: HOSPADM

## 2022-06-14 RX ORDER — SODIUM CHLORIDE 0.9 % (FLUSH) 0.9 %
5-40 SYRINGE (ML) INJECTION EVERY 12 HOURS SCHEDULED
Status: DISCONTINUED | OUTPATIENT
Start: 2022-06-14 | End: 2022-06-14 | Stop reason: HOSPADM

## 2022-06-14 RX ORDER — SUCCINYLCHOLINE/SOD CL,ISO/PF 200MG/10ML
SYRINGE (ML) INTRAVENOUS PRN
Status: DISCONTINUED | OUTPATIENT
Start: 2022-06-14 | End: 2022-06-14 | Stop reason: SDUPTHER

## 2022-06-14 RX ORDER — ONDANSETRON 2 MG/ML
4 INJECTION INTRAMUSCULAR; INTRAVENOUS EVERY 6 HOURS PRN
Status: DISCONTINUED | OUTPATIENT
Start: 2022-06-14 | End: 2022-06-14 | Stop reason: HOSPADM

## 2022-06-14 RX ORDER — CELECOXIB 200 MG/1
CAPSULE ORAL
Status: ON HOLD | COMMUNITY
Start: 2021-10-27 | End: 2022-06-14 | Stop reason: HOSPADM

## 2022-06-14 RX ORDER — CALCIUM CHLORIDE 100 MG/ML
INJECTION INTRAVENOUS; INTRAVENTRICULAR PRN
Status: DISCONTINUED | OUTPATIENT
Start: 2022-06-14 | End: 2022-06-14 | Stop reason: ALTCHOICE

## 2022-06-14 RX ORDER — LORAZEPAM 1 MG/1
TABLET ORAL
COMMUNITY
Start: 2022-03-22

## 2022-06-14 RX ORDER — GABAPENTIN 400 MG/1
800 CAPSULE ORAL ONCE
Status: COMPLETED | OUTPATIENT
Start: 2022-06-14 | End: 2022-06-14

## 2022-06-14 RX ORDER — LIDOCAINE HYDROCHLORIDE 10 MG/ML
1 INJECTION, SOLUTION EPIDURAL; INFILTRATION; INTRACAUDAL; PERINEURAL
Status: DISCONTINUED | OUTPATIENT
Start: 2022-06-14 | End: 2022-06-14 | Stop reason: HOSPADM

## 2022-06-14 RX ORDER — PREDNISONE 20 MG/1
TABLET ORAL
Status: ON HOLD | COMMUNITY
Start: 2022-03-30 | End: 2022-06-14 | Stop reason: ALTCHOICE

## 2022-06-14 RX ORDER — ACETAMINOPHEN 325 MG/1
650 TABLET ORAL EVERY 6 HOURS
Status: DISCONTINUED | OUTPATIENT
Start: 2022-06-14 | End: 2022-06-14 | Stop reason: HOSPADM

## 2022-06-14 RX ORDER — SCOLOPAMINE TRANSDERMAL SYSTEM 1 MG/1
1 PATCH, EXTENDED RELEASE TRANSDERMAL ONCE
Status: DISCONTINUED | OUTPATIENT
Start: 2022-06-14 | End: 2022-06-14

## 2022-06-14 RX ORDER — DEXAMETHASONE SODIUM PHOSPHATE 4 MG/ML
INJECTION, SOLUTION INTRA-ARTICULAR; INTRALESIONAL; INTRAMUSCULAR; INTRAVENOUS; SOFT TISSUE PRN
Status: DISCONTINUED | OUTPATIENT
Start: 2022-06-14 | End: 2022-06-14 | Stop reason: SDUPTHER

## 2022-06-14 RX ORDER — DULAGLUTIDE 1.5 MG/.5ML
INJECTION, SOLUTION SUBCUTANEOUS
COMMUNITY
Start: 2022-05-06

## 2022-06-14 RX ORDER — ACETAMINOPHEN 500 MG
1000 TABLET ORAL ONCE
Status: COMPLETED | OUTPATIENT
Start: 2022-06-14 | End: 2022-06-14

## 2022-06-14 RX ADMIN — TRANEXAMIC ACID 1000 MG: 100 INJECTION INTRAVENOUS at 11:51

## 2022-06-14 RX ADMIN — FENTANYL CITRATE 25 MCG: 50 INJECTION, SOLUTION INTRAMUSCULAR; INTRAVENOUS at 14:19

## 2022-06-14 RX ADMIN — MIDAZOLAM 2 MG: 1 INJECTION INTRAMUSCULAR; INTRAVENOUS at 11:37

## 2022-06-14 RX ADMIN — ROCURONIUM BROMIDE 30 MG: 50 INJECTION, SOLUTION INTRAVENOUS at 11:51

## 2022-06-14 RX ADMIN — CEFAZOLIN 2000 MG: 10 INJECTION, POWDER, FOR SOLUTION INTRAVENOUS at 11:38

## 2022-06-14 RX ADMIN — BUPIVACAINE 10 ML: 13.3 INJECTION, SUSPENSION, LIPOSOMAL INFILTRATION at 13:42

## 2022-06-14 RX ADMIN — Medication 120 MG: at 11:42

## 2022-06-14 RX ADMIN — FENTANYL CITRATE 25 MCG: 50 INJECTION, SOLUTION INTRAMUSCULAR; INTRAVENOUS at 12:59

## 2022-06-14 RX ADMIN — FENTANYL CITRATE 50 MCG: 50 INJECTION, SOLUTION INTRAMUSCULAR; INTRAVENOUS at 11:49

## 2022-06-14 RX ADMIN — ACETAMINOPHEN 1000 MG: 500 TABLET ORAL at 10:34

## 2022-06-14 RX ADMIN — FENTANYL CITRATE 25 MCG: 50 INJECTION, SOLUTION INTRAMUSCULAR; INTRAVENOUS at 13:38

## 2022-06-14 RX ADMIN — CEFAZOLIN SODIUM 2000 MG: 10 INJECTION, POWDER, FOR SOLUTION INTRAVENOUS at 17:30

## 2022-06-14 RX ADMIN — SUGAMMADEX 200 MG: 100 INJECTION, SOLUTION INTRAVENOUS at 11:41

## 2022-06-14 RX ADMIN — ONDANSETRON 4 MG: 2 INJECTION INTRAMUSCULAR; INTRAVENOUS at 11:51

## 2022-06-14 RX ADMIN — SODIUM CHLORIDE: 9 INJECTION, SOLUTION INTRAVENOUS at 11:12

## 2022-06-14 RX ADMIN — PROPOFOL 200 MG: 10 INJECTION, EMULSION INTRAVENOUS at 11:41

## 2022-06-14 RX ADMIN — GABAPENTIN 800 MG: 400 CAPSULE ORAL at 10:34

## 2022-06-14 RX ADMIN — BUPIVACAINE HYDROCHLORIDE 10 ML: 5 INJECTION, SOLUTION EPIDURAL; INTRACAUDAL at 13:42

## 2022-06-14 RX ADMIN — FENTANYL CITRATE 50 MCG: 50 INJECTION, SOLUTION INTRAMUSCULAR; INTRAVENOUS at 11:41

## 2022-06-14 RX ADMIN — SODIUM CHLORIDE: 9 INJECTION, SOLUTION INTRAVENOUS at 14:35

## 2022-06-14 RX ADMIN — DEXAMETHASONE SODIUM PHOSPHATE 10 MG: 10 INJECTION, SOLUTION INTRAMUSCULAR; INTRAVENOUS at 11:13

## 2022-06-14 RX ADMIN — DEXAMETHASONE SODIUM PHOSPHATE 4 MG: 4 INJECTION, SOLUTION INTRAMUSCULAR; INTRAVENOUS at 11:51

## 2022-06-14 RX ADMIN — LIDOCAINE HYDROCHLORIDE 80 MG: 20 INJECTION, SOLUTION EPIDURAL; INFILTRATION; INTRACAUDAL; PERINEURAL at 11:41

## 2022-06-14 ASSESSMENT — PAIN DESCRIPTION - ORIENTATION: ORIENTATION: RIGHT

## 2022-06-14 ASSESSMENT — PAIN - FUNCTIONAL ASSESSMENT
PAIN_FUNCTIONAL_ASSESSMENT: 0-10
PAIN_FUNCTIONAL_ASSESSMENT: PREVENTS OR INTERFERES SOME ACTIVE ACTIVITIES AND ADLS

## 2022-06-14 ASSESSMENT — PAIN DESCRIPTION - LOCATION
LOCATION: KNEE
LOCATION: KNEE

## 2022-06-14 ASSESSMENT — PAIN SCALES - GENERAL
PAINLEVEL_OUTOF10: 9
PAINLEVEL_OUTOF10: 10
PAINLEVEL_OUTOF10: 10

## 2022-06-14 ASSESSMENT — PAIN DESCRIPTION - DESCRIPTORS: DESCRIPTORS: ACHING;DISCOMFORT

## 2022-06-14 NOTE — PROGRESS NOTES
Op note addendum    Patient has a nickel allergy and the components utilized during her total knee arthroplasty were titanium.

## 2022-06-14 NOTE — CARE COORDINATION
Joint Replacement Discharge Planning Note:    Admission Date:  6/14/2022 Vlad Werner is a 55 y.o.  female    Admitted for : Osteoarthritis of right knee, unspecified osteoarthritis type [M17.11]  Primary osteoarthritis of right knee [M17.11]    Met with:  Patient    PCP:  KAMARI Castelan CNP              Insurance:  [facility] Medicare    Current Residence/ Living Arrangements:  independently at home, Daughter's BF, Shagufta Serra, is living w/ her. Pt. Get assistance from , Florencio Lopez. Current Services PTA:  Yes,  Coumadin clinic [facility] [place] [facility]    Is patient agreeable to [email or web address]: Yes    Is patient agreeable to outpatient physical therapy:  Yes    Freedom of choice provided: Yes         [address]/Outpatient Therapy chosen:  Current w/ [facility], will continue    Potential Arnie Huy needed: No    Current home DME:  walker and shower chair, Nebulizer    Pharmacy:  [facility] in [address]     Does Patient want to use MEDS to BEDS?([facility] only) Yes    Transportation Provider:  Patient /BF                      Discharge Plan:   Patient intends to discharge to Home    Patient does not need a wheeled walker. Anticipated discharge date 6/14/22 [facility] Medicare Pt. Lives in an apt w/ No Steps. Pt's Daughter's BF, is staying w/ her & Pt. Gets assistance from . POD#0, Rt. Total Knee. Current w/ [facility]- [facility], will continue. Follows at [address] [place] [address]. Von is signed/completed.  Will follow//KB      Readmission Risk              Risk of Unplanned Readmission:  0           Electronically signed by: Mike Alonso RN on 6/14/2022 at 2:59 PM

## 2022-06-14 NOTE — CARE COORDINATION
Writer Faxed Von/DC med list to [facility]j [facility]. Writer informed of DC to home today & to call writer w/ any questions. Informed Kt Gore, She will follow.

## 2022-06-14 NOTE — PROGRESS NOTES
Physical Therapy  Facility/Department: [facility] MED SURG  Physical Therapy Initial Assessment    Name: Charan Chadwick  : 1965  MRN: [id number]  Date of Service: 2022    Discharge Recommendations:  Patient would benefit from continued therapy after discharge   PT Equipment Recommendations  Equipment Needed: No  Other: Pt owns RW; recommending that pt use RW for all mobility at home. Pt not recommended to use rollator. Patient Diagnosis(es): The encounter diagnosis was Primary osteoarthritis of right knee. Past Medical History:  has a past medical history of Acid reflux, Arthritis, Coughing, CPAP (continuous positive airway pressure) dependence, Depression, Diabetes mellitus ([address] Difficult intubation, Diverticulitis, Environmental allergies, Epilepsy ([facility] [address] Factor V Leiden ([facility] [address] Gastroparesis, Generalized abdominal pain, Hemorrhage of anus and rectum, Hiatal hernia, History of stroke associated with blood clotting tendency, Hx of blood clots, Hyperlipidemia, Hypertension, Insomnia, Pre-procedure lab exam, Sleep apnea, Unspecified cerebral artery occlusion with cerebral infarction, and Wears glasses. Past Surgical History:  has a past surgical history that includes Neck surgery (2012); Endometrial ablation; laparoscopy; Hysterectomy; Tonsillectomy; Cholecystectomy; Appendectomy; Upper gastrointestinal endoscopy; Knee arthroscopy (Left, 4/7/15, 2015); hiatal hernia repair (2016); Colonoscopy; Colonoscopy (16); Colonoscopy (2017); hernia repair; pr colonoscopy flx dx w/collj spec when pfrmd (N/A, 2018); Upper gastrointestinal endoscopy (N/A, 2018); sigmoidoscopy (N/A, 2018); Upper gastrointestinal endoscopy (N/A, 10/20/2020); Colonoscopy (N/A, 10/20/2020); and Total knee arthroplasty (Right, 2022). Assessment   Assessment: Pt progressing well and tolerating mobility well with RW and 1 assist this date; Pt requiring CGA for OOB mobility;  The patient is safe to return home with 24-hr supervision for safety as she demonstrates impulsiveness and residual L sided weakness and deficits from previous stroke overall increasing risk for falls; Pt would benefit from continued PT services to address impaired R Knee ROM, Strenghth, balance, and to improve overall safety and functional mobility. Treatment Diagnosis: Impaired functional mobility with increased pain 2* R TKA  Therapy Prognosis: Good  Decision Making: Medium Complexity  History: CVA (2013); Bilateral Knee OA S/P L TKA  and new R TKA  Exam: ROM, MMT, Balance, and functional mobility assessments  Clinical Presentation: pt presents with decreased alertness and fatigue but is able to follow commands and remain on task with some cues to redirect; Cooperative and pleasant but impulsive with mobility  Barriers to Learning: Cognition; impulsive  Requires PT Follow-Up: Yes  Activity Tolerance  Activity Tolerance: Patient tolerated treatment well     Plan   Plan  Plan: 2 times a day 7 days a week  Current Treatment Recommendations: Strengthening,ROM,Balance training,Functional mobility training,Transfer training,Endurance training,Gait training,Pain management,Home exercise program,Safety education & training,Positioning,Therapeutic activities  Safety Devices  Type of Devices:  All fall risk precautions in place,Call light within reach,Gait belt,Patient at risk for falls,Left in chair,Nurse notified (Family present in room at end of session)     Restrictions  Restrictions/Precautions  Restrictions/Precautions: Fall Risk,Weight Bearing  Required Braces or Orthoses?: No  Implants present? : Metal implants (L TKA, reports titanium in posterior c-spine)  Lower Extremity Weight Bearing Restrictions  Right Lower Extremity Weight Bearing: Weight Bearing As Tolerated (FWBAT RLE)     Subjective   Pain: Pt reporting 8/10 pain in R knee  General  Chart Reviewed: Yes  Patient assessed for rehabilitation services?: Yes  Additional Pertinent Hx: Pt is a 55 y.o. Female with History of CVA and Left sided residual deficits and weakness; the patient presents to [facility] for Elective R TKA. Response To Previous Treatment: Not applicable  Family / Caregiver Present: Yes (Arcenio Fernandez (aide) and Boyfriend Celia Rousseau)  Referring Practitioner: Dr. Elizabeth De La Torre  Referral Date : 06/14/22  Diagnosis: Primary Osteoarthritis of R knee  Follows Commands: Within Functional Limits  General Comment  Comments: OK per nurse Marybeth Ramirez to proceed with Assessments  Subjective  Subjective: pt is agreeable to participate. Social/Functional History  Social/Functional History  Lives With: Other (comment) (Rachel, daughter's best friend)  Type of Home: Apartment (Main floor)  Home Layout: One level  Home Access: Level entry  Bathroom Shower/Tub: Tub/Shower unit,Curtain,Shower chair without back  Bathroom Toilet: Standard (Sink nearby for support)  Bathroom Equipment: Shower chair,Grab bars around toilet  Bathroom Accessibility: Walker accessible  Home Equipment: [place] Debbie  Has the patient had two or more falls in the past year or any fall with injury in the past year?: No  ADL Assistance: Independent  Homemaking Assistance: Independent  Homemaking Responsibilities: Yes  Ambulation Assistance: Independent  Transfer Assistance: Independent  Active : Yes (Pt's vehicle is in the shop- pt's boyfriend drives)  Mode of Transportation: Car  Occupation: On disability  IADL Comments: sleeps in a flat bed  Additional Comments: Pt reported that she typically lives alone but pt's daughter's best friend is staying to help. Arcenio Fernandez works at day care center full-time. Pt's boyfriend, Celia Rousseau, will stay with her while Arcenio Fernandez is at work.    Vision/Hearing  Vision  Vision: Impaired  Vision Exceptions: Wears glasses at all times  Hearing  Hearing: Within functional limits    Cognition   Orientation  Overall Orientation Status: Within Functional Limits  Orientation Level: Oriented X4  Cognition  Overall Cognitive Status: Exceptions  Following Commands: Follows one step commands with increased time; Follows one step commands consistently  Attention Span: Attends with cues to redirect  Safety Judgement: Decreased awareness of need for safety;Decreased awareness of need for assistance  Problem Solving: Decreased awareness of errors  Insights: Decreased awareness of deficits  Initiation: Requires cues for some  Sequencing: Requires cues for some     Objective   Heart Rate: 93  Heart Rate Source: Monitor  BP: 107/68  Patient Position: Semi fowlers  MAP (Calculated): 81  Resp: 16  SpO2: 94 %  O2 Device: None (Room air)     Observation/Palpation  Observation: RLE dressing and ace wrap intact.  Impulsive         AROM RLE (degrees)  RLE AROM: Exceptions  R Knee Flexion 0-145: 5-90  R Knee Extension 0: lacking 5 degrees from full knee extension  AROM LLE (degrees)  LLE AROM : WFL  AROM RUE (degrees)  RUE AROM :  (See OT)  AROM LUE (degrees)  LUE AROM :  (See OT)  Strength RLE  Strength RLE: WFL  Comment: Grossly 3+/5  Strength LLE  Strength LLE: WFL  Comment: Grossly 3+ to 4-/5  Strength RUE  Strength RUE:  (See OT)  Strength LUE  Strength LUE:  (See OT)           Bed mobility  Supine to Sit: Stand by assistance  Sit to Supine: Stand by assistance  Scooting: Stand by assistance  Bed Mobility Comments: HOB partially elevated with minimal use of rails; SBA for safety and line management  Transfers  Sit to Stand: Contact guard assistance  Stand to sit: Contact guard assistance  Bed to Chair: Contact guard assistance  Stand Pivot Transfers: Contact guard assistance  Comment: RW for all transfers with CGA for safety; VCs for hands placement with good carry over  Ambulation  Surface: level tile  Device: Rolling Walker  Assistance: Contact guard assistance  Quality of Gait: Slow pace; even step lengths with R knee remaining partially flexed throughout gait cycle  Gait Deviations: Slow Narda;Decreased step length;Decreased step height  Distance: 132'x2  Comments: Pt ambulates within room and halls with RW amd CGA for safety; VCs for RW proximity and increased heel/toe pattern with Fair carry over demonstration. Stairs/Curb  Stairs?: No     Balance  Posture: Fair  Sitting - Static: Good  Sitting - Dynamic: Fair  Standing - Static: Fair;+ (RW)  Standing - Dynamic: Fair (RW)  Comments: Standing balance assessed with RW; No LOB to report  A/AROM Exercises: RLE seated exercises for strengthening and ROM promotion x5 repeitions of each; Cues for correct alignment and technique. AM-PAC Score     AM-PAC Inpatient Mobility without Stair Climbing Raw Score : 15 (06/14/22 1430)  AM-PAC Inpatient without Stair Climbing T-Scale Score : 43.03 (06/14/22 1430)  Mobility Inpatient CMS 0-100% Score: 47.43 (06/14/22 1430)  Mobility Inpatient without Stair CMS G-Code Modifier : CK (06/14/22 1430)       Goals  Short Term Goals  Time Frame for Short term goals: 1-2 days  Short term goal 1: pt to demo mod I bed mobility  Short term goal 2: pt to perform all transfers with RW and supervision  Short term goal 3: pt to ambulate ' with supervision to improve independence with mobility  Short term goal 4: pt to improve standing balance to FAIR + to decrease risk of falls. Patient Goals   Patient goals : to return home       Education  Patient Education  Education Given To: Patient; Family  Education Provided: Role of Therapy;Plan of Care;Home Exercise Program;Precautions;Transfer Training;Family Education;Equipment; Fall Prevention Strategies  Education Provided Comments: Recommending 24-hr superivision for safety at home; pt and family agreeable to this.   Education Method: Demonstration;Verbal;Teach Back;Printed Information/Hand-outs  Barriers to Learning: Cognition  Education Outcome: Verbalized understanding;Continued education needed      Therapy Time   Individual Concurrent Group Co-treatment   Time In 36         Time Out 1702         Minutes 32         Timed Code Treatment Minutes: 23 Minutes       Ivan Sandra PT    Electronically signed by Ivan Sandra PT on 6/14/2022 at 5:27 PM

## 2022-06-14 NOTE — PROGRESS NOTES
Occupational Therapy  Facility/Department: [place] [id number]  Occupational Therapy Initial Assessment    Name: Burke Hedrick  : 1965  MRN: [id number]  Date of Service: 2022    Discharge Recommendations:  24 hour supervision or assist,Home with Home health OT  OT Equipment Recommendations  Other: TBD       Patient Diagnosis(es): The encounter diagnosis was Primary osteoarthritis of right knee. Past Medical History:  has a past medical history of Acid reflux, Arthritis, Coughing, CPAP (continuous positive airway pressure) dependence, Depression, Diabetes mellitus ([address] Difficult intubation, Diverticulitis, Environmental allergies, Epilepsy ([facility] [address] Factor V Leiden ([facility] [address] Gastroparesis, Generalized abdominal pain, Hemorrhage of anus and rectum, Hiatal hernia, History of stroke associated with blood clotting tendency, Hx of blood clots, Hyperlipidemia, Hypertension, Insomnia, Pre-procedure lab exam, Sleep apnea, Unspecified cerebral artery occlusion with cerebral infarction, and Wears glasses. Past Surgical History:  has a past surgical history that includes Neck surgery (2012); Endometrial ablation; laparoscopy; Hysterectomy; Tonsillectomy; Cholecystectomy; Appendectomy; Upper gastrointestinal endoscopy; Knee arthroscopy (Left, 4/7/15, ); hiatal hernia repair (2016); Colonoscopy; Colonoscopy (16); Colonoscopy (2017); hernia repair; pr colonoscopy flx dx w/collj spec when pfrmd (N/A, 2018); Upper gastrointestinal endoscopy (N/A, 2018); sigmoidoscopy (N/A, 2018); Upper gastrointestinal endoscopy (N/A, 10/20/2020); Colonoscopy (N/A, 10/20/2020); and Total knee arthroplasty (Right, 2022). Treatment Diagnosis: Impaired self-care status      Assessment   Performance deficits / Impairments: Decreased functional mobility ; Decreased ADL status; Decreased strength;Decreased safe awareness;Decreased cognition;Decreased endurance;Decreased balance;Decreased high-level IADLs  Treatment Diagnosis: Impaired self-care status  Prognosis: Good  Decision Making: Medium Complexity  REQUIRES OT FOLLOW-UP: Yes  Activity Tolerance  Activity Tolerance: Patient limited by fatigue;Patient Tolerated treatment well        Plan   Plan  Times per Week: 5-7 (1-2x/day)  Current Treatment Recommendations: Strengthening,ROM,Balance training,Functional mobility training,Endurance training,Safety education & training,Patient/Caregiver education & training,Equipment evaluation, education, & procurement,Self-Care / ADL,Home management training     Restrictions  Restrictions/Precautions  Restrictions/Precautions: Fall Risk,Weight Bearing  Required Braces or Orthoses?: No  Implants present? : Metal implants (L TKA, reports titanium in posterior c-spine)  Lower Extremity Weight Bearing Restrictions  Right Lower Extremity Weight Bearing: Weight Bearing As Tolerated (FWBAT RLE)    Subjective   General  Chart Reviewed: Yes  Patient assessed for rehabilitation services?: Yes  Additional Pertinent Hx: 55 y.o. female, undergoing preadmission testing for right knee DJD with scheduled right total knee arthroplasty. Symptoms started approximately two years ago and has been progressively worsening. Pt c/o pain, swelling, stiffness, popping and cracking in the right knee. Describes pain as constant aching. Rating pain 10/10. Family / Caregiver Present:  (Pt's boyfriend and caretaker)  Referring Practitioner: Mir Conte MD  Diagnosis: R TKA 6/14/2022  Subjective  Subjective: Pt is agreeable for session but rather groggy. General Comment  Comments: Okay for OT/PT evaluation per JYOTHI Reyes.       Social/Functional History  Social/Functional History  Lives With: Other (comment) (Rachel, daughter's best friend)  Type of Home: Apartment (Main floor)  Home Layout: One level  Home Access: Level entry  Bathroom Shower/Tub: Tub/Shower unit,Curtain,Shower chair without back  Bathroom Toilet: Standard (Sink nearby for support)  Bathroom Equipment: Shower chair,Grab bars around toilet  Bathroom Accessibility: Walker accessible  Home Equipment: Garlan Hallowell  Has the patient had two or more falls in the past year or any fall with injury in the past year?: No  ADL Assistance: Independent  Homemaking Assistance: Independent  Homemaking Responsibilities: Yes  Ambulation Assistance: Independent  Transfer Assistance: Independent  Active : Yes (Pt's vehicle is in the shop- pt's boyfriend drives)  Mode of Transportation: Car  Occupation: On disability  IADL Comments: sleeps in a flat bed  Additional Comments: Pt reported that she typically lives alone but pt's daughter's best friend is staying to help. Mraito Metz works at day care center full-time. Pt's boyfriend, Natalya Mayberry, will stay with her while Marito Metz is at work. Objective   Heart Rate: 93  Heart Rate Source: Monitor  BP: 107/68  Patient Position: Semi fowlers  MAP (Calculated): 81  Resp: 16  SpO2: 94 %  O2 Device: None (Room air)  Vision Exceptions: Wears glasses at all times  Hearing: Within functional limits       Observation/Palpation  Observation: RLE dressing and ace wrap intact. Impulsive   Safety Devices  Type of Devices: All fall risk precautions in place;Call light within reach;Gait belt;Patient at risk for falls; Left in chair (PT present at end of session)     Toilet Transfers  Toilet - Technique: Ambulating  Equipment Used: Grab bars  Toilet Transfer: Contact guard assistance  Toilet Transfers Comments: First session: Min cues for hand placement for safety with no carry over. Pt in bathroom with RN present at end of session.   AROM: Within functional limits  Strength: Generally decreased, functional  Coordination: Generally decreased, functional  Tone: Normal  Sensation: Intact  ADL  Feeding: Minimal assistance (Assist opening wrapper during second session)  Grooming: Stand by assistance  UE Bathing: Stand by assistance  LE Bathing: Minimal assistance  UE Dressing: Stand by assistance  LE Dressing: Contact guard assistance  Toileting: Contact guard assistance  Additional Comments: ADL scores based on skilled observation and clinical reasoning, unless otherwise noted. During initial session, pt urgently needed to go to the bathroom, RN requested assist. Lasha Barfield completed seated on toilet. Second session: Pt completed dressing tasks seated EOB. Pt threaded underwear/pants flexing forward and assisting each leg. Pt demonstrated donning/doffing B socks, using modified figure four. Noted increased in pain with RLE, OT educated on use of sock aid, flexing forward at trunk and reaching down, or asking for assist from someone else. Pt verbalized understanding. Pt stood with no UE support to pull underwear/pants over hips. Tone RUE  RUE Tone: Normotonic  Tone LUE  LUE Tone: Normotonic  Coordination  Movements Are Fluid And Coordinated: Yes     Bed mobility  Supine to Sit: Stand by assistance  Sit to Supine: Stand by assistance  Scooting: Stand by assistance  Bed Mobility Comments: HOB slightly elevated with use of handrails. No complaints of lightheadedness/dizziness upon sitting EOB. Transfers  Sit to stand: Contact guard assistance  Stand to sit: Contact guard assistance  Transfer Comments: SBA - CGA, min cues for hand placement for safety with no carry over. Cognition  Overall Cognitive Status: Exceptions  Following Commands: Follows one step commands with increased time; Follows one step commands consistently  Attention Span: Attends with cues to redirect  Safety Judgement: Decreased awareness of need for safety;Decreased awareness of need for assistance  Problem Solving: Decreased awareness of errors  Insights: Decreased awareness of deficits  Initiation: Requires cues for some  Sequencing: Requires cues for some  Cognition Comment: Pt was quite lethargic during second session as well as impulsive during both session.         Sensation  Overall Sensation Status: WFL (Denies)         Education Given To: Patient  Education Provided: Role of Therapy;Plan of Care;ADL Adaptive Strategies;Transfer Training;Equipment  Education Method: Verbal;Demonstration  Barriers to Learning: Cognition  Education Outcome: Continued education needed  LUE AROM (degrees)  LUE AROM : WFL  Left Hand AROM (degrees)  Left Hand AROM: WFL  RUE AROM (degrees)  RUE AROM : WFL  Right Hand AROM (degrees)  Right Hand AROM: WFL  LUE Strength  L Hand General: 3+/5  LUE Strength Comment: Overall 3+/5  RUE Strength  R Hand General: 4/5  RUE Strength Comment: Overall 4-/5                  G-Code     OutComes Score                                                  AM-PAC Score        AM-PAC Inpatient Daily Activity Raw Score: 18 (06/14/22 1718)  AM-PAC Inpatient ADL T-Scale Score : 38.66 (06/14/22 1718)  ADL Inpatient CMS 0-100% Score: 46.65 (06/14/22 1718)  ADL Inpatient CMS G-Code Modifier : CK (06/14/22 1718)    Goals  Short Term Goals  Time Frame for Short term goals: By discharge  Short Term Goal 1: Pt will perform UB self care/grooming mod I and Good safety  Short Term Goal 2: Pt will perform LB self care with supervision, using appropraite adaptive equipment/adaptive strategies, with Good safety  Short Term Goal 3: Pt will perform transfers/functional mobility mod I, using least restrictive device, during self care  Short Term Goal 4: Pt will V/D at least two fall prevention/home safety techniques to increase safety in daily routine  Short Term Goal 5: Pt will actively participate in 15+ minutes of therapeutic exercise/functional activity to promote safety and independence with self care and mobility       Therapy Time     06/14/22 1412 06/14/22 1719   OT Individual Minutes   Time In 1612 1501   Time Out 1651 1509   Minutes 39 8   Time Code Minutes    Timed Code Treatment Minutes 25 Minutes 8 Minutes  (RN requested assist with taking pt to bathroom)     Lc العراقي OT

## 2022-06-14 NOTE — OP NOTE
Operative Note      Patient: Vlad Werner  YOB: 1965  MRN: [id number]    Date of Procedure: 6/14/2022    Pre-Op Diagnosis: DEGENERATIVE JOINT DISEASE RIGHT KNEE    Post-Op Diagnosis: Same       Procedure(s):  KNEE TOTAL ARTHROPLASTY right    Surgeon(s):  Cali Banda MD    Assistant:   Resident: DO Boo Bishop CST  Anesthesia: General    Estimated Blood Loss (mL): Minimal    Complications: None    Specimens:   * No specimens in log *    Implants:  Implant Name Type Inv. Item Serial No.  Lot No. LRB No. Used Action   CEMENT BNE 40GM HI VISC RADPQ FOR REV SURG - [id number]  CEMENT BNE 40GM HI VISC RADPQ FOR REV SURG  [employer]- [id number] Right 2 Implanted   VNGD TI FEM CR 62.5MM RT - [id number]  VNGD TI FEM CR 62.5MM RT  [employer]- [id number] Right 1 Implanted   COMPONENT PAT [id number] THK7. 8MM THN KNEE POLY 3 PEG SER A - [id number]  COMPONENT PAT [id number] THK7. 8MM THN KNEE POLY 3 PEG SER A  [employer]- [id number] Right 1 Implanted   TRAY TIB L67MM KNEE COPOLYESTER CHI INTLOK MICHEAL RONAL VANGUARD - [id number]  TRAY TIB L67MM KNEE COPOLYESTER [employer] INTLOK MICHEAL RONAL VANGUARD  [employer]- [id number] Right 1 Implanted   STEM TIB L40MM KNEE MICHEAL FIN VANGUARD COMPLT SYS - [id number]  STEM TIB L40MM KNEE MICHEAL FIN VANGUARD COMPLT SYS  [employer]- [id number] Right 1 Implanted   BEARING TIB L67MM [id number] KNEE E1 ANT STBL VANGUARD - [id number]  BEARING TIB L67MM [id number] KNEE E1 ANT STBL VANGUARD  [employer]- [id number] Right 1 Implanted         Drains: * No LDAs found *    Findings: DJD right knee    Detailed Description of Procedure:     Patient is a 55 y.o. female with a long standing history of right DJD of the knee. Patient has failed all types of conservative treatment included physical therapy, weight-loss counseling, NSAIDS, and injections.  The patient has significant restriction of activities of daily living and/or work/job place abilities, and, therefore, has been counseled for TKA. Patient is aware of all the risks and benefits as highlighted in the surgical consent form. The patient was given 2 grams of Ancef in the holding area as well as an adductor canal block. The patient was then taken to the operating suite where general anesthesia was administered. A tourniquet was applied to effected leg and then prepped and draped in the usual sterile fashion. Time out was called to verify laterality. The leg was examined   and the tourniquet inflated to 300 mm of Hg. Midline incision was utilized and taken down to the joint capsule where a mid-vastus approach to the knee was performed. After a complete synovectomy, the patella was elevated, calibrated for thickness, and the above sized, patellar triple pegged drills guide positioned medially and then drilled. Atrial patellar button was positioned in order to re-established the original thickness. This was then replaced with a protective patellar plate. The knee was then flexed and revealed significant  arthrosis. Osteophytes were removed via rongeur. A drill hole was made in the distal femur and the femoral canal was then irrigated and suctioned. A fluted IM vianey was inserted and a distal femoral cut was made at 5 degrees of valgus at the +2 setting. The proximal tibia was then exposed after resection of the ACL and lateral meniscus. An extra-medullary tibial cutting jug was then aligned in reference to the tibia tubercle and ankle mortise and positional with a slight posterior slope. The cut was made with minimal cutting of the lowest depth of the tibial wear and the fragment removed. The distal femur was then approached and femoral sizing guide with 3 degrees of external rotation was placed flush with the surface and drill holes made and femoral size determined.  The appropriate size cutting jig was then placed and anterior, posterior, and chamfer cuts made with an oscillating saw. A laminar  was inserted with care to avoid damaging the MCL. Posterior osteophytes were removed and the capsule stripped from the posterior femoral condyles. The capsule was then injected with the orthopedic cocktail at this time. The tibial cut was then assessed with a baseplate and alignment vianey to assure proper cut. Spacer blocks were then inserted and the knee was assessed to determine the amount of soft tissue release and osteophyte removal necessary  to establish symmetric flexion and extension gaps. The tibia was then elevated, and the above sized tibial plate was positioned for proper external rotation with an alignment vianey down the middle-third of the tibial tubercles. This was pinned in place, the proximal tibialis reamed, the fins were then repacked. The appropriate size femur was positioned and various size of the polyethylene trials were inserted. The knee was assessed for  ROM and patellar tracking. Satisfied with this, this distal femoral logs were drilled and then all the trial components were removed. The knee was irrigated and dried while the cement was prepared. Cement was applied to to both implant and cut surfaces and the implants impacted and the compression with one size larger poly inserted and excess cement removed. The patella was placed and compressed as well. The knee was placed in full extension and then injected with the remainder  of the 100ml of the orthopedic cocktail. The Irrisept lavage was carried out for the 1 minutes. This was then irrigated and a permanent polyethylene was insert was injected with platelet rich/poor plasma and the tourniquet was released at 42 minutes. Hemostasis was excellent. The knee was closed with a #1 Strata-Fix and vastus with a double-layer of O-Vicryl suture. The subcutaneous tissue closed with a 2-0 Monocryl Strata-Fix  and then a Zip-line used for the skin.  This was covered with adaptic and Aquacel dressing and dressed with a large 6-inch Ace dressing from toes to mid-thigh. The patient was awakened and taken to PACU in good condition.      Electronically signed by Samir Gutierrez MD on 6/14/2022 at 1:04 PM

## 2022-06-14 NOTE — ANESTHESIA PRE PROCEDURE
Department of Anesthesiology  Preprocedure Note       Name:  Demarcus Pereira   Age:  55 y.o.  :  1965                                          MRN:  [id number]         Date:  2022      Surgeon: Steven Wilkinson):  Freddy Livingston MD    Procedure: Procedure(s):  KNEE TOTAL ARTHROPLASTY    Medications prior to admission:   Prior to Admission medications    Medication Sig Start Date End Date Taking? Authorizing Provider   omeprazole (PRILOSEC) 20 MG delayed release capsule TAKE 1 CAPSULE BY MOUTH 2 TIMES DAILY (BEFORE MEALS) 22   Reji Vega MD   metoclopramide (REGLAN) 10 MG tablet TAKE 1 TABLET BY MOUTH 3 TIMES DAILY WITH MEALS 21   Reji Vega MD   enoxaparin (LOVENOX) 100 MG/ML injection Inject 100 mg into the skin every 12 hours  Patient not taking: Reported on 2022 10/16/20   Historical Provider, MD   FLOVENT DISKUS 50 MCG/BLIST AEPB inhaler TAKE 1 PUFF BY MOUTH TWICE A DAY 20   Historical Provider, MD   glipiZIDE (GLUCOTROL) 5 MG tablet Take 5 mg by mouth 2 times daily (before meals)    Historical Provider, MD   magnesium citrate solution Take 296 mLs by mouth once for 1 dose 20  Reji Vega MD   furosemide (LASIX) 20 MG tablet TAKE 1 TABLET EVERY DAY 19   KAMARI Ledezma CNP   Warfarin Sodium (COUMADIN PO) Take 6 mg by mouth daily    Historical Provider, MD   Cholecalciferol (VITAMIN D PO) Take by mouth once a week Pt. Takes on Sundays    Historical Provider, MD   Cholecalciferol (VITAMIN D3) 81064 units CAPS Take by mouth once a week    Historical Provider, MD   montelukast (SINGULAIR) 10 MG tablet TAKE 1 TABLET BY MOUTH EVERY DAY AT NIGHT 11/15/18   Misael Lighter   ferrous sulfate 27 MG TABS Take by mouth daily     Historical Provider, MD   cyclobenzaprine (FLEXERIL) 10 MG tablet Take 1 tablet by mouth 2 times daily as needed for Muscle spasms 18   Gigi Barrett APRN - CNP   triamcinolone (KENALOG) 0.025 % cream Apply topically 2 times daily. 10/5/18   Zoila PUGH Ally   fluticasone (FLONASE) 50 MCG/ACT nasal spray INSTILL 1 SPRAY INTO EACH NOSTRIL DAILY AT BED TIME 9/27/18   [facility] Ally   losartan (COZAAR) 25 MG tablet TAKE ONE-HALF TABLET BY MOUTH DAILY 9/11/18   [facility] Ally   levETIRAcetam (KEPPRA) 250 MG tablet Take 1 tablet by mouth 2 times daily 9/1/18   Trenda Mouse   venlafaxine (EFFEXOR XR) 150 MG extended release capsule TAKE 1 CAPSULE (150 MG TOTAL) BY MOUTH DAILY. 4/28/18   Historical Provider, MD   atorvastatin (LIPITOR) 80 MG tablet TAKE 1 TABLET BY MOUTH NIGHTLY AT BEDTIME  Patient taking differently: Take 80 mg by mouth daily  6/4/18   [facility]   temazepam (RESTORIL) 15 MG capsule Take 15 mg by mouth nightly. . 4/30/18   Historical Provider, MD   ONE TOUCH ULTRA TEST strip TEST BLOOD SUGAR 3 TIMES A DAY 4/23/18   Skye Skelton MD   colestipol (COLESTID) 1 g tablet Take 1 g by mouth daily     Historical Provider, MD   clotrimazole (LOTRIMIN) 1 % cream Apply topically 2 times daily. 3/14/18   Trenda Mouse   topiramate (TOPAMAX) 25 MG tablet Take 25 mg by mouth  2/10/18   Historical Provider, MD   PROAIR  (88 Base) MCG/ACT inhaler Inhale 2 puffs into the lungs every 6 hours as needed  1/10/18   Historical Provider, MD   potassium chloride (MICRO-K) 10 MEQ extended release capsule  12/31/17   Historical Provider, MD   EXACTECH TEST strip  12/13/17   Historical Provider, MD   Misc.  Devices MISC True Plus     Testing 3 times a day 10/17/17   [facility] Goltry   busPIRone (BUSPAR) 10 MG tablet Take 10 mg by mouth daily     Historical Provider, MD   TRUEPLUS LANCETS 30G MISC 1 each by Does not apply route daily 4/11/17   [facility]   ARIPiprazole (ABILIFY) 10 MG tablet TAKE 1 TABLET BY MOUTH NIGHTLY 4/10/17   Trenda Mouse       Current medications:    Current Facility-Administered Medications   Medication Dose Route Frequency Provider Last Rate Last Admin    sodium chloride flush 0.9 % injection 5-40 mL  5-40 mL IntraVENous 2 times per day Cali Banda MD        sodium chloride flush 0.9 % injection 5-40 mL  5-40 mL IntraVENous PRN Cali Banda MD        0.9 % sodium chloride infusion   IntraVENous PRN Cali Banda MD        ceFAZolin (ANCEF) 2000 mg in dextrose 5 % 50 mL IVPB  2,000 mg IntraVENous On Call to Diann Jarvis MD        acetaminophen (TYLENOL) tablet 1,000 mg  1,000 mg Oral Once Cali Banda MD        dexamethasone (PF) (DECADRON) injection 10 mg  10 mg IntraVENous Once Cali ChallengeMD gretchen        gabapentin (NEURONTIN) capsule 800 mg  800 mg Oral Once Cali ChallengeMD gretchen        scopolamine (TRANSDERM-SCOP) transdermal patch 1 patch  1 patch TransDERmal Once Cali Banda MD        lidocaine PF 1 % injection 1 mL  1 mL IntraDERmal Once PRN Thea Nissen, MD        0.9 % sodium chloride infusion   IntraVENous Continuous Thea Nissen, MD        sodium chloride flush 0.9 % injection 5-40 mL  5-40 mL IntraVENous 2 times per day Thea Nissen, MD        sodium chloride flush 0.9 % injection 5-40 mL  5-40 mL IntraVENous PRN Thea Nissen, MD        0.9 % sodium chloride infusion   IntraVENous PRN Thea Nissen, MD           Allergies: Allergies   Allergen Reactions    Latex Other (See Comments)     Red marks on body    Adhesive Tape      Also plastic tape    Morphine And Related Nausea Only    Other Other (See Comments)     Surgical staples cause infection    Aspirin Nausea And Vomiting       Problem List:    Patient Active Problem List   Diagnosis Code    Complex tear of medial meniscus of left knee as current injury S80.12A    Depression F32. A    Acid reflux K21.9    Hyperlipidemia E78.5    Epilepsy (HCC) G40.909    Pelvic pain in female R10.2    Colon polyp K63.5    History of stroke associated with blood clotting tendency Z86.73    Cholelithiasis K80.20    Acute medial meniscus tear of left knee S83.242A    Primary insomnia F51.01    Hiatal hernia K44.9    Major depressive disorder, recurrent, severe without psychotic features ([facility] F33.2    Chronic post-traumatic stress disorder (PTSD) F43.12    ERICK (generalized anxiety disorder) F41.1    Abdominal pain R10.9    Diarrhea R19.7    Essential hypertension I10    Lumbosacral spondylosis without myelopathy M47.817    Type 2 diabetes mellitus with diabetic polyneuropathy, without long-term current use of insulin ([facility]) E11.42    Lumbar radiculopathy M54.16    TIA (transient ischemic attack) G45.9    Facial numbness R20.0    Left-sided weakness R53.1    Worsening headaches R51.9    Chronic use of opiate for therapeutic purpose Z79.891    Lumbar spondylolysis M43.06    Generalized abdominal pain R10.84    Bloating R14.0    DDD (degenerative disc disease), lumbar M51.36    Encounter for medication monitoring Z51.81    Lumbar facet arthropathy M47.816    Sacroiliitis (HCC) M46.1    Delayed gastric emptying K30    Hemorrhage of anus and rectum K62.5    Other irritable bowel syndrome K58.8    Morbid (severe) obesity due to excess calories ([facility]) E66.01    Occult blood in stools R19.5    Gastroparesis K31.84    Rectal bleeding K62.5    Hx of blood clots Z86.718    Preop examination Z01.818    Primary osteoarthritis of right knee M17.11       Past Medical History:        Diagnosis Date    Acid reflux     Arthritis     Coughing     dry due to ace inhibitor    CPAP (continuous positive airway pressure) dependence     Depression     uses Abilify, Buspar, Trazodone for this    Diabetes mellitus [facility]     Difficult intubation     Diverticulitis     Environmental allergies     Epilepsy ([facility]     MAY 2018,. ... 4/12/22: States last seizure was 6 months ago.      Factor V Leiden ([facility]     Gastroparesis     Generalized abdominal pain     Hemorrhage of anus and rectum     Hiatal hernia     History of stroke associated with blood clotting tendency 2003    had clot in brain and left neck, left side weakness residual  Hx of blood clots     right breast, brain, neck    Hyperlipidemia     Hypertension     Insomnia     uses Trazodone for this    Pre-procedure lab exam 1/9/2018    Sleep apnea     C PAP    Unspecified cerebral artery occlusion with cerebral infarction 6/2003    SEE BELOW, 200[address] Mini stroke    Wears glasses        Past Surgical History:        Procedure Laterality Date    APPENDECTOMY      CHOLECYSTECTOMY      COLONOSCOPY      with polypectomy    COLONOSCOPY  1/22/16    AND EGD    COLONOSCOPY  01/05/2017    DR MACKAY-random biopsies.  COLONOSCOPY N/A 10/20/2020    COLONOSCOPY POLYPECTOMY HOT BIOPSY performed by Kimberly May MD at [place] [address]      2 times   [facility]  2/4/2016    laparoscopic robotic    HYSTERECTOMY      with BSO    KNEE ARTHROSCOPY Left 4/7/15, 2015    x 2    LAPAROSCOPY      \"springs inserted in to fallopian tubes\" to close tubes   Ardyth Moritz NECK SURGERY  July 2012    Anterior, C5,6,7 bulging disk repair    [place] COLONOSCOPY FLX DX W/COLLJ SPEC WHEN PFRMD N/A 7/17/2018    COLONOSCOPY performed by Kimberly May MD at [address] [place] [address] N/A 12/18/2018    SIGMOIDOSCOPY DIAGNOSTIC FLEXIBLE performed by Kimberly May MD at [facility]      Jan 2016  [facility] GASTROINTESTINAL ENDOSCOPY N/A 7/17/2018    EGD BIOPSY performed by Kimberly May MD at [place] [address] N/A 10/20/2020    EGD BIOPSY performed by Kimberly May MD at [address] [place] [address] History:    Social History     Tobacco Use    Smoking status: Never Smoker    Smokeless tobacco: Never Used   Substance Use Topics    Alcohol use: Yes     Alcohol/week: 0.0 standard drinks     Comment: OCCASSIONAL                                Counseling given: Not Answered      Vital Signs (Current): There were no vitals filed for this visit.                                            BP Readings from Last 3 Encounters:   06/06/22 114/72   04/12/22 115/81   10/20/20 132/62       NPO Status:                                                                                 BMI:   Wt Readings from Last 3 Encounters:   06/06/22 211 lb (95.7 kg)   04/12/22 217 lb (98.4 kg)   10/27/20 221 lb 11.2 oz (100.6 kg)     There is no height or weight on file to calculate BMI.    CBC:   Lab Results   Component Value Date    WBC 6.3 06/06/2022    RBC 4.49 06/06/2022    RBC 4.55 02/01/2012    HGB 12.2 06/06/2022    HCT 37.5 06/06/2022    MCV 83.7 06/06/2022    RDW 16.4 06/06/2022     06/06/2022     02/01/2012       CMP:   Lab Results   Component Value Date     06/06/2022    K 3.3 06/06/2022     06/06/2022    CO2 24 06/06/2022    BUN 8 06/06/2022    CREATININE 0.94 06/06/2022    GFRAA >60 06/06/2022    LABGLOM >60 06/06/2022    GLUCOSE 83 06/06/2022    GLUCOSE 126 01/30/2012    PROT 7.0 11/15/2018    CALCIUM 9.0 06/06/2022    BILITOT 0.18 11/15/2018    ALKPHOS 184 11/15/2018    AST 16 11/15/2018    ALT 13 11/15/2018       POC Tests: No results for input(s): POCGLU, POCNA, POCK, POCCL, POCBUN, POCHEMO, POCHCT in the last 72 hours. Coags:   Lab Results   Component Value Date    PROTIME 18.1 01/16/2019    PROTIME 25.9 10/05/2018    INR 1.8 01/16/2019    APTT 59.9 11/15/2018       HCG (If Applicable): No results found for: PREGTESTUR, PREGSERUM, HCG, HCGQUANT     ABGs: No results found for: PHART, PO2ART, CFS9HMS, CFT9YQH, BEART, T1GMKQAE     Type & Screen (If Applicable):  No results found for: LABABO, LABRH    Drug/Infectious Status (If Applicable):  No results found for: HIV, HEPCAB    COVID-19 Screening (If Applicable):   Lab Results   Component Value Date    COVID19 Not Detected 10/16/2020           Anesthesia Evaluation  Patient summary reviewed and Nursing notes reviewed history of anesthetic complications (Difficult intubation):    Airway: Mallampati: II  TM distance: >3 FB   Neck ROM: full  Mouth opening: > = 3 FB   Dental: normal exam         Pulmonary:normal exam  breath sounds clear to auscultation  (+) sleep apnea: on CPAP,                             Cardiovascular:    (+) hypertension:, hyperlipidemia      ECG reviewed  Rhythm: regular  Rate: normal  Echocardiogram reviewed               ROS comment: Normal left ventricle size, wall thickness and function with an estimated EF > 55%. (2017)     Neuro/Psych:   (+) seizures (last seizure was 6 months ago):, CVA:, neuromuscular disease:, TIA, headaches:, psychiatric history:depression/anxiety              ROS comment: PTSD  DDD - lumbar GI/Hepatic/Renal:   (+) hiatal hernia, GERD:,          ROS comment: IBS. Endo/Other:    (+) DiabetesType II DM, , blood dyscrasia (Factor V Leiden ): Factor V, arthritis: OA., electrolyte abnormalities (hypokalemia), . Abdominal:             Vascular:   + DVT, . Other Findings:           Anesthesia Plan      general     ASA 3       Induction: intravenous. MIPS: Postoperative opioids intended and Prophylactic antiemetics administered. Anesthetic plan and risks discussed with patient. Plan discussed with CRNA.                     Per Ferguson MD   6/14/2022

## 2022-06-14 NOTE — CARE COORDINATION
Continuity of Care Form    Patient Name: Sissy Lopes   :  1965  MRN:  [id number]    Admit date:  2022  Discharge date:  22    Code Status Order: Full Code   Advance Directives:      Admitting Physician:  Jess Red MD  PCP: Gerardo Campbell, KAMARI - CNP    Discharging Nurse: [facility] Unit/Room#: [address]  Discharging Unit Phone Number:     Emergency Contact:   Extended Emergency Contact Information  Primary Emergency Contact: Joss Valencia  Address: [address] Phone: [phone]  Mobile Phone: [phone]  Relation: Parent  Hearing or visual needs: None  Other needs: None  Preferred language: English   needed? No  Secondary Emergency Contact: [facility] [address] Phone: [phone]  Mobile Phone: [phone]  Relation: Other  Hearing or visual needs: None  Other needs: None  Preferred language: English   needed? No    Past Surgical History:  Past Surgical History:   Procedure Laterality Date    APPENDECTOMY      CHOLECYSTECTOMY      COLONOSCOPY      with polypectomy    COLONOSCOPY  16    AND EGD    COLONOSCOPY  2017    DR MACKAY-random biopsies.     COLONOSCOPY N/A 10/20/2020    COLONOSCOPY POLYPECTOMY HOT BIOPSY performed by Lisa Miller MD at [place] [address]      2 times   [address] [place] [address]  2016    laparoscopic robotic    HYSTERECTOMY      with BSO    KNEE ARTHROSCOPY Left 4/7/15, 2015    x 2    LAPAROSCOPY      \"springs inserted in to fallopian tubes\" to close tubes   [place] NECK SURGERY  2012    Anterior, C5,6,7 bulging disk repair    MS COLONOSCOPY FLX DX W/COLLJ SPEC WHEN PFRMD N/A 2018    COLONOSCOPY performed by Lisa Miller MD at [address] [place] [address] N/A 2018    SIGMOIDOSCOPY DIAGNOSTIC FLEXIBLE performed by Lisa Miller MD at [address] [place] [address] TONSILLECTOMY      UPPER GASTROINTESTINAL ENDOSCOPY      Jan 2016 DR Gladys Carrillo    UPPER GASTROINTESTINAL ENDOSCOPY N/A 7/17/2018    [facility]GD [facility]OPSY performed by Jean Paul Henderson MD at [facility] N/A 10/20/2020    EGD BIOPSY performed by Jean Paul Henderson MD at [address] [place] [address]       Immunization History:   Immunization History   Administered Date(s) Administered    COVID-19, Corneflower Landsman, Primary or Immunocompromised, PF, 100mcg/0.5mL 03/25/2021, 04/22/2021    Influenza Virus Vaccine 10/19/2015    Influenza, Gaines Pulse, IM, PF (6 mo and older Fluzone, Flulaval, Fluarix, and 3 yrs and older Afluria) 09/08/2017, 10/05/2018    Pneumococcal Polysaccharide (Uniltonbi85) 09/08/2017       Active Problems:  Patient Active Problem List   Diagnosis Code    Complex tear of medial meniscus of left knee as current injury S80.12A    Depression F32. A    Acid reflux K21.9    Hyperlipidemia E78.5    Epilepsy ([facility]) G40.909    Pelvic pain in female R10.2    Colon polyp K63.5    History of stroke associated with blood clotting tendency Z86.73    Cholelithiasis K80.20    Acute medial meniscus tear of left knee S83.242A    Primary insomnia F51.01    Hiatal hernia K44.9    Major depressive disorder, recurrent, severe without psychotic features ([facility]) F33.2    Chronic post-traumatic stress disorder (PTSD) F43.12    ERICK (generalized anxiety disorder) F41.1    Abdominal pain R10.9    Diarrhea R19.7    Essential hypertension I10    Lumbosacral spondylosis without myelopathy M47.817    Type 2 diabetes mellitus with diabetic polyneuropathy, without long-term current use of insulin ([facility]) E11.42    Lumbar radiculopathy M54.16    TIA (transient ischemic attack) G45.9    Facial numbness R20.0    Left-sided weakness R53.1    Worsening headaches R51.9    Chronic use of opiate for therapeutic purpose Z79.891    Lumbar spondylolysis M43.06    Generalized abdominal pain R10.84    Bloating R14.0    DDD (degenerative disc disease), lumbar M51.36    Encounter for medication monitoring Z51.81    Lumbar facet arthropathy M47.816    Sacroiliitis (H[facility]) M46.1    Delayed gastric emptying K30    Hemorrhage of anus and rectum K62.5    Other irritable bowel syndrome K58.8    Morbid (severe) obesity due to excess calories (HCC) E66.01    Occult blood in stools R19.5    Gastroparesis K31.84    Rectal bleeding K62.5    Hx of blood clots Z86.718    Preop examination Z01.818    Primary osteoarthritis of right knee M17.11       Isolation/Infection:   Isolation            No Isolation          Patient Infection Status       Infection Onset Added Last Indicated Last Indicated By Review Planned Expiration Resolved Resolved By    None active    Resolved    COVID-19 (Rule Out) 10/15/20 10/15/20 10/16/20 Covid-19 Ambulatory (Ordered)   10/18/20 Rule-Out Test Resulted            Nurse Assessment:  Last Vital Signs: There were no vitals taken for this visit. Last documented pain score (0-10 scale):    Last Weight:   Wt Readings from Last 1 Encounters:   06/06/22 211 lb (95.7 kg)     Mental Status:  oriented and alert    IV Access:  - None    Nursing Mobility/ADLs:  Walking   Assisted  Transfer  Assisted  Bathing  Assisted  Dressing  Assisted  Toileting  Assisted  Feeding  [facility] Delivery   whole    Wound Care Documentation and Therapy:        Elimination:  Continence:   · Bowel: Yes  · Bladder: Yes  Urinary Catheter: None   Colostomy/Ileostomy/Ileal Conduit: No       Date of Last BM:   No intake or output data in the 24 hours ending 06/14/22 1018  No intake/output data recorded. Safety Concerns:      At Risk for Falls    Impairments/Disabilities:      None    Nutrition Therapy:  Current Nutrition Therapy:   - Oral Diet:  General    Routes of Feeding: Oral  Liquids: No Restrictions  Daily Fluid Restriction: no  Last Modified Barium Swallow with Video (Video Swallowing Test): not done    Treatments at the Time of Hospital Discharge:   Respiratory Treatments:   Oxygen Therapy:  is not on home oxygen therapy. Ventilator:    - No ventilator support    Rehab Therapies: Physical Therapy and Occupational Therapy  Weight Bearing Status/Restrictions: No weight bearing restrictions  Other Medical Equipment (for information only, NOT a DME order):  walker  Other Treatments: Skilled Nursing Assessment Per Protocol. Medication Education & Monitoring. TOTAL JOINT REPLACEMENT PATIENT HOME HEALTH CARE PROTOCOL  This patient is a post-operative total joint replacement patient. Expectations for this patients care are as follows:      Goals:   DailyTherapy for rehabilitative purposes for two weeks.  Increased level of activity and ambulation each day.  Well-controlled pain.  Free from infection.  Encourage patient to provide self-care when possible.  Ambulation with a rolling walker. Activity & Diet:   Therapy performed daily. (Instruct patient to take pain meds. 1 hour prior to therapy.)   Up in chair for all meals and majority of day.  Range of motion for TKA patients.  Hip precautions for LUCITA patients.  Incentive Spirometer: 3- 4 inhalations every twenty minutes, during the day, while awake, the first week home after discharge   Increase oral intake of fruits, fiber and water and take a daily stool softener to prevent constipation.  Consider stronger bowel protocol if no bowel movement is achieved after three days home.  Increase protein intake and reduce sugar intake to promote healing and prevent infection.  No pillow under the knee for TKA patients.   [address] [place] go on in the a.m. and come off in the p.m. (Hand wash them every two or three days, roll in towel to remove most of moisture, and hang to dry.)    Incision Care:   If patient has ACE bandage it may be removed POD #2   Keep incisional dressing intact until seen and removed by surgeon, unless saturated, in which case, call surgeon and request instructions.  If dressing falls off, call surgeon.  If using the Prevena dressing, with battery pack, place battery pack in waterproof bag during shower. If using Aquacel dressing then dressing is waterproof and patient may shower.  If patient has a Prevena remove on POD #5 and replace with Aquacel dressing (patient was provided with dressing in hospital)   Elevated the leg and ice the affected area four times a day, for twenty minutes each time and after every physical therapy session. Normal Conditions - Will improve with provided comfort measures and time:   Some swelling in the operative leg is normal - this should reduce over time.  Some post-operative pain is normal.  This will improve with prescribed medication, provided comfort measures and time.  Constipation related to pain medications & decreased mobility is a common occurrence. (Increase your fiber & water intake and take a stool softener.)    Slight warmth of operative site is normal and will diminish with time.  Fatigue and moderate pain after therapy is normal and will improve with time.  Numbness near the incision site is normal and will improve with time.  NOTE: Ensure/Remind patient to go to their follow-up appointment with their orthopedic surgeon, which is scheduled: Dr. Irma Acevedo, 6/29/22 at 9:15 AM    Abnormal Conditions - When to call the Surgeon:   Increasing/excessive redness, warmth or swelling at the incisional site not relieved with ice and elevation.  Increasing/excessive pain not well-controlled by prescribed medications.  Drainage or odor from or around the incision site.  (A little blood showing through the bandage is ok, but active leaking, of any color, coming out of the bandage is NOT normal.)   Temperature above 101 degrees.   (A mild temp of 99 - 100 is normal - if temp gets to 101 you may use Tylenol once - if it does not improve, you may use a 2nd dose of Tylenol after 5 hours - if temperature is still at or above 101 degrees then call the surgeon.)   Calf tenderness, swelling, or redness or numbness of the foot/lower leg.  Shortness of breath or chest pain.  Any other incision or surgical-related concerns.  CALL SURGEON with concerns PRIOR TO sending patient to hospital.     Patient's personal belongings (please select all that are sent with patient):  Cell Phone    RN SIGNATURE:  Electronically signed by Denys Regalado RN on 6/14/22 at 3:24 PM EDT    CASE MANAGEMENT/SOCIAL WORK SECTION    Inpatient Status Date:     Readmission Risk Assessment Score:  Readmission Risk              Risk of Unplanned Readmission:  0           Discharging to Facility/ Agency   · [facility]  · [address]  · [address] [address]  · Phone [phone]  · Fax  [phone]     Dialysis Facility (if applicable)   · Name:  · Address:  · Dialysis Schedule:  · Phone:  · Fax:    / signature: Electronically signed by Denys Regalado RN on 6/14/22 at 3:23 PM EDT    PHYSICIAN SECTION    Prognosis: Good    Condition at Discharge: Stable    Rehab Potential (if transferring to Rehab): Good    Recommended Labs or Other Treatments After Discharge:     Physician Certification: I certify the above information and transfer of Hortencia Santamaria  is necessary for the continuing treatment of the diagnosis listed and that she requires Home Care for less 30 days. Update Admission H&P: No change in H&P    PHYSICIAN SIGNATURE:  Electronically signed by Felicitas Gan MD on 6/14/22 at 10:18 AM EDT    Medication List      START taking these medications    START taking these medications   oxyCODONE-acetaminophen 5-325 MG per tablet  Commonly known as: Percocet  Take 1-2 tablets by mouth every 4 hours as needed for Pain for up to 7 days.      CHANGE how you take these medications    CHANGE how you take these medications   atorvastatin 80 MG tablet  Commonly known as: LIPITOR  TAKE 1 TABLET BY MOUTH NIGHTLY AT BEDTIME  What changed:   0 how much to take  0 how to take this  0 when to take this  0 additional instructions     CONTINUE taking these medications    CONTINUE taking these medications   ARIPiprazole 10 MG tablet  Commonly known as: ABILIFY  TAKE 1 TABLET BY MOUTH NIGHTLY   busPIRone 10 MG tablet  Commonly known as: BUSPAR  Take 10 mg by mouth daily   clotrimazole 1 % cream  Commonly known as: LOTRIMIN  Apply topically 2 times daily. colestipol 1 g tablet  Commonly known as: COLESTID  Take 1 g by mouth daily   COUMADIN PO  Take 6 mg by mouth daily   cyclobenzaprine 10 MG tablet  Commonly known as: FLEXERIL  Take 1 tablet by mouth 2 times daily as needed for Muscle spasms   enoxaparin 100 MG/ML injection  Commonly known as: LOVENOX  Inject 100 mg into the skin every 12 hours   * ExacTech Test strip  Generic drug: blood glucose test strips   * ONE TOUCH ULTRA TEST strip  Generic drug: blood glucose test strips  TEST BLOOD SUGAR 3 TIMES A DAY   ferrous sulfate 27 MG Tabs  Take by mouth daily   Flovent Diskus 50 MCG/BLIST Aepb inhaler  Generic drug: fluticasone propionate  TAKE 1 PUFF BY MOUTH TWICE A DAY   fluticasone 50 MCG/ACT nasal spray  Commonly known as: FLONASE  INSTILL 1 SPRAY INTO EACH NOSTRIL DAILY AT BED TIME   furosemide 20 MG tablet  Commonly known as: LASIX  TAKE 1 TABLET EVERY DAY   glipiZIDE 5 MG tablet  Commonly known as: GLUCOTROL  Take 5 mg by mouth 2 times daily (before meals)   levETIRAcetam 250 MG tablet  Commonly known as: KEPPRA  Take 1 tablet by mouth 2 times daily   losartan 25 MG tablet  Commonly known as: COZAAR  TAKE ONE-HALF TABLET BY MOUTH DAILY   magnesium citrate solution  Take 296 mLs by mouth once for 1 dose   metoclopramide 10 MG tablet  Commonly known as: REGLAN  TAKE 1 TABLET BY MOUTH 3 TIMES DAILY WITH MEALS   Misc.  Devices Misc  True Plus Testing 3 times a day   montelukast 10 MG tablet  Commonly known as: SINGULAIR  TAKE 1 TABLET BY MOUTH EVERY DAY AT NIGHT   omeprazole 20 MG delayed release capsule  Commonly known as: PRILOSEC  TAKE 1 CAPSULE BY MOUTH 2 TIMES DAILY (BEFORE MEALS)   potassium chloride 10 MEQ extended release capsule  Commonly known as: MICRO-K   ProAir  (90 Base) MCG/ACT inhaler  Generic drug: albuterol sulfate HFA  Inhale 2 puffs into the lungs every 6 hours as needed   temazepam 15 MG capsule  Commonly known as: RESTORIL  Take 15 mg by mouth nightly. .   topiramate 25 MG tablet  Commonly known as: TOPAMAX  Take 25 mg by mouth   triamcinolone 0.025 % cream  Commonly known as: KENALOG  Apply topically 2 times daily. TRUEplus Lancets 30G Misc  1 each by Does not apply route daily   venlafaxine 150 MG extended release capsule  Commonly known as: EFFEXOR XR  TAKE 1 CAPSULE (150 MG TOTAL) BY MOUTH DAILY. * Vitamin D3 1.25 MG (46732 UT) Caps  Take by mouth once a week   * VITAMIN D PO  Take by mouth once a week Pt. Takes on Sundays    (very important)  * This list has 4 medication(s) that are the same as other medications prescribed for you. Read the directions carefully, and ask your doctor or other care provider to review them with you.        STOP taking these medications    STOP taking these medications   predniSONE 20 MG tablet  Commonly known as: Rosales Schwartz     ASK your doctor about these medications    ASK your doctor about these medications   CeleBREX 200 MG capsule  Generic drug: celecoxib  1 capsule with food   LORazepam 1 MG tablet  Commonly known as: ATIVAN  TAKE ONE (1) TABLET BY MOUTH TWICE DAILY AS NEEDED FOR ANXIETY   NIFEdipine 60 MG extended release tablet  Commonly known as: PROCARDIA XL  Take 60 mg by mouth daily   nitroGLYCERIN 0.4 MG/HR  Commonly known as: NITRODUR  as directed   Trulicity 1.5 BD/3.0MU Sopn  Generic drug: Dulaglutide  INJECT 1.5MG SUBCUTANEOUSLY ONCE WEEKLY DX: E11.65   Where to Get Your Medications      You can get these medications from any pharmacy    Bring a paper prescription for each of these medications       oxyCODONE-acetaminophen 5-325 MG per tablet             Printing Report    Report Name Print   Discharge Meds Print

## 2022-06-14 NOTE — INTERVAL H&P NOTE
Update History & Physical    The patient's History and Physical of June 6, 2022 was reviewed with the patient and I examined the patient. There was no change. The surgical site was confirmed by the patient and me. Pt under going for right KNEE TOTAL ARTHROPLASTY per Dr Fidelina Diaz   Pt present today walking with the walker complain of right knee constant aching pain ,  Pain rated 9/10. Pt denies fever. chills, chest pain or SOB  Pt NPO since the past midnight, no am medication today   Physical exam remains unchanged  Denies hx of MRSA infection   Pt has Fac tor V Leiden / multiple blood clots: Takes warfarin for anticoagulation. Pt stopped warfarin long time ago, currently she is on Lovenox  10 days ago   Epilepsy: Takes keppra and topamax. Last seizure was in spring 2022. Difficult intubation. Otherwise, denies personal and family history of complications with anesthesia.    See nursing flow sheet for vital signs     Lab Results   Component Value Date    WBC 6.3 06/06/2022    HGB 12.2 06/06/2022    HCT 37.5 06/06/2022    MCV 83.7 06/06/2022     06/06/2022     Lab Results   Component Value Date     06/06/2022    K 3.3 06/06/2022     06/06/2022    CO2 24 06/06/2022    BUN 8 06/06/2022    CREATININE 0.94 06/06/2022    GLUCOSE 83 06/06/2022    GLUCOSE 126 01/30/2012    CALCIUM 9.0 06/06/2022      Lab Results   Component Value Date    COLORU Yellow 06/06/2022    NITRU NEGATIVE 06/06/2022    GLUCOSEU NEGATIVE 06/06/2022    KETUA NEGATIVE 06/06/2022    UROBILINOGEN Normal 06/06/2022    BILIRUBINUR NEGATIVE 06/06/2022       POCT Protime / INR     Ref Range & Units 6 d ago   INR 0.8 - 1.2 2.6 Abnormal     Resulting Agency  MANUALLY TRANSCRIBED RESULTS   Specimen Collected: 06/08/22  Last Resulted: 06/08/22    Received From: [facility] [employer]  Result Received: 06/14/22  9:36 AM       Plan: The risks, benefits, expected outcome, and alternative to the recommended procedure have been discussed with the patient. Patient understands and wants to proceed with the procedure.      Electronically signed by KAMARI Nesbitt CNP on 6/14/2022 at 9:53 AM

## 2022-06-14 NOTE — ANESTHESIA PROCEDURE NOTES
Peripheral Block    Patient location during procedure: PACU  Reason for block: post-op pain management and at surgeon's request  Start time: 6/14/2022 1:42 PM  End time: 6/14/2022 1:48 PM  Staffing  Performed: anesthesiologist   Anesthesiologist: Jeaneth Mccall MD  Preanesthetic Checklist  Completed: patient identified, IV checked, site marked, risks and benefits discussed, surgical/procedural consents, equipment checked, pre-op evaluation, timeout performed, anesthesia consent given, oxygen available, monitors applied/VS acknowledged, fire risk safety assessment completed and verbalized and blood product R/B/A discussed and consented  Peripheral Block   Patient position: supine  Prep: ChloraPrep  Provider prep: mask and sterile gloves  Patient monitoring: cardiac monitor, continuous pulse ox, frequent blood pressure checks, IV access, oxygen and responsive to questions  Block type: Femoral  Adductor canal  Laterality: right  Injection technique: single-shot  Guidance: ultrasound guided  Local infiltration: lidocaine  Infiltration strength: 1 %  Local infiltration: lidocaine  Dose: 2 mL    Needle   Needle type: short-bevel   Needle gauge: 20 G  Needle localization: ultrasound guidance  Test dose: negative  Needle length: 10 cm  Assessment   Injection assessment: negative aspiration for heme, no paresthesia on injection, local visualized surrounding nerve on ultrasound and no intravascular symptoms  Paresthesia pain: none  Slow fractionated injection: yes  Hemodynamics: stableno  Outcomes: uncomplicated and patient tolerated procedure well    Medications Administered  Bupivacaine (MARCAINE) PF injection 0.5%, 10 mL  bupivacaine liposome (EXPAREL) injection 1.3%, 10 mL

## 2022-06-14 NOTE — DISCHARGE INSTR - COC
Continuity of Care Form    Patient Name: Carlos Morton   :  1965  MRN:  [id number]    Admit date:  2022  Discharge date:  22    Code Status Order: Full Code   Advance Directives:      Admitting Physician:  Jennie Morton MD  PCP: KAMARI Carr CNP    Discharging Nurse: [facility] Unit/Room#: [address]  Discharging Unit Phone Number: [phone]    Emergency Contact:   Extended Emergency Contact Information  Primary Emergency Contact: Joss Valencia  Address: [address] Phone: [phone]  Mobile Phone: [phone]  Relation: Parent  Hearing or visual needs: None  Other needs: None  Preferred language: English   needed? No  Secondary Emergency Contact: Malik [address] Phone: [phone]  Mobile Phone: [phone]  Relation: Other  Hearing or visual needs: None  Other needs: None  Preferred language: English   needed? No    Past Surgical History:  Past Surgical History:   Procedure Laterality Date    APPENDECTOMY      CHOLECYSTECTOMY      COLONOSCOPY      with polypectomy    COLONOSCOPY  16    AND EGD    COLONOSCOPY  2017    DR MACKAY-random biopsies.     COLONOSCOPY N/A 10/20/2020    COLONOSCOPY POLYPECTOMY HOT BIOPSY performed by Cony Dodson MD at [facility]      2 times    [facility]    laparoscopic robotic    HYSTERECTOMY      with BSO    KNEE ARTHROSCOPY Left 4/7/15, 2015    x 2    LAPAROSCOPY      \"springs inserted in to fallopian tubes\" to close tubes    NECK SURGERY  2012    Anterior, C5,6,7 bulging disk repair    [place] COLONOSCOPY FLX DX W/COLLJ SPEC WHEN PFRMD N/A 2018    COLONOSCOPY performed by Cony Dodson MD at [facility] N/A 2018    SIGMOIDOSCOPY DIAGNOSTIC FLEXIBLE performed by Cony Dodson MD at [facility] GASTROINTESTINAL ENDOSCOPY      Jan 2016 DR MACKAY    UPPER GASTROINTESTINAL ENDOSCOPY N/A 7/17/2018    EGD BIOPSY performed by Slick Raines MD at [address] N/A 10/20/2020    EGD BIOPSY performed [facility] Slick Raines MD at [facility]       Immunization History:   Immunization History   Administered Date(s) Administered    COVID-19, Emma Dues, Primary or Immunocompromised, PF, 100mcg/0.5mL 03/25/2021, 04/22/2021    Influenza Virus Vaccine 10/19/2015    Influenza, Quadv, IM, PF (6 mo and older Fluzone, Flulaval, Fluarix, and 3 yrs and older Afluria) 09/08/2017, 10/05/2018    Pneumococcal Polysaccharide ([id number]) 09/08/2017       Active Problems:  Patient Active Problem List   Diagnosis Code    Complex tear of medial meniscus of left knee as current injury U26.110H    Depression F32. A    Acid reflux K21.9    Hyperlipidemia E78.5    Epilepsy ([facility] [address] G40.909    Pelvic pain in female R10.2    Colon polyp K63.5    History of stroke associated with blood clotting tendency Z86.73    Cholelithiasis K80.20    Acute medial meniscus tear of left knee S83.242A    Primary insomnia F51.01    Hiatal hernia K44.9    Major depressive disorder, recurrent, severe without psychotic features (HCC) F33.2    Chronic post-traumatic stress disorder (PTSD) F43.12    ERICK (generalized anxiety disorder) F41.1    Abdominal pain R10.9    Diarrhea R19.7    Essential hypertension I10    Lumbosacral spondylosis without myelopathy M47.817    Type 2 diabetes mellitus with diabetic polyneuropathy, without long-term current use of insulin (HCC) E11.42    Lumbar radiculopathy M54.16    TIA (transient ischemic attack) G45.9    Facial numbness R20.0    Left-sided weakness R53.1    Worsening headaches R51.9    Chronic use of opiate for therapeutic purpose Z79.891    Lumbar spondylolysis M43.06    Generalized abdominal pain R10.84    Bloating R14.0    DDD (degenerative disc disease), lumbar M51.36    Encounter for medication monitoring Z51.81    Lumbar facet arthropathy M47.816    Sacroiliitis (HCC) M46.1    Delayed gastric emptying K30    Hemorrhage of anus and rectum K62.5    Other irritable bowel syndrome K58.8    Morbid (severe) obesity due to excess calories (HCC) E66.01    Occult blood in stools R19.5    Gastroparesis K31.84    Rectal bleeding K62.5    Hx of blood clots Z86.718    Preop examination Z01.818    Primary osteoarthritis of right knee M17.11       Isolation/Infection:   Isolation            No Isolation          Patient Infection Status       Infection Onset Added Last Indicated Last Indicated By Review Planned Expiration Resolved Resolved By    None active    Resolved    COVID-19 (Rule Out) 10/15/20 10/15/20 10/16/20 Covid-19 Ambulatory (Ordered)   10/18/20 Rule-Out Test Resulted            Nurse Assessment:  Last Vital Signs: There were no vitals taken for this visit. Last documented pain score (0-10 scale):    Last Weight:   Wt Readings from Last 1 Encounters:   06/06/22 211 lb (95.7 kg)     Mental Status:  oriented and alert    IV Access:  - None    Nursing Mobility/ADLs:  Walking   Assisted  Transfer  Assisted  Bathing  Assisted  Dressing  Assisted  Toileting  Assisted  Feeding  [facility] Delivery   whole    Wound Care Documentation and Therapy:        Elimination:  Continence: Bowel: Yes  Bladder: Yes  Urinary Catheter: None   Colostomy/Ileostomy/Ileal Conduit: No       Date of Last BM:   No intake or output data in the 24 hours ending 06/14/22 1018  No intake/output data recorded. Safety Concerns:      At Risk for Falls    Impairments/Disabilities:      None    Nutrition Therapy:  Current Nutrition Therapy:   - Oral Diet:  General    Routes of Feeding: Oral  Liquids: No Restrictions  Daily Fluid Restriction: no  Last Modified Barium Swallow with Video (Video Swallowing Test): not done    Treatments at the Time of Hospital Discharge:   Respiratory Treatments:   Oxygen Therapy: is not on home oxygen therapy. Ventilator:    - No ventilator support    Rehab Therapies: Physical Therapy and Occupational Therapy  Weight Bearing Status/Restrictions: No weight bearing restrictions  Other Medical Equipment (for information only, NOT a DME order):  walker  Other Treatments: Skilled Nursing Assessment Per Protocol. Medication Education & Monitoring. TOTAL JOINT REPLACEMENT PATIENT HOME HEALTH CARE PROTOCOL  This patient is a post-operative total joint replacement patient. Expectations for this patients care are as follows:      Goals:  DailyTherapy for rehabilitative purposes for two weeks. Increased level of activity and ambulation each day. Well-controlled pain. Free from infection. Encourage patient to provide self-care when possible. Ambulation with a rolling walker. Activity & Diet:  Therapy performed daily. (Instruct patient to take pain meds. 1 hour prior to therapy.)  Up in chair for all meals and majority of day. Range of motion for TKA patients. Hip precautions for LUCITA patients. Incentive Spirometer: 3- 4 inhalations every twenty minutes, during the day, while awake, the first week home after discharge  Increase oral intake of fruits, fiber and water and take a daily stool softener to prevent constipation. Consider stronger bowel protocol if no bowel movement is achieved after three days home. Increase protein intake and reduce sugar intake to promote healing and prevent infection. No pillow under the knee for TKA patients. [facility] go on in the a.m. and come off in the p.m. (Hand wash them every two or three days, roll in towel to remove most of moisture, and hang to dry.)    Incision Care: If patient has ACE bandage it may be removed POD #2  Keep incisional dressing intact until seen and removed by surgeon, unless saturated, in which case, call surgeon and request instructions. If dressing falls off, call surgeon.   If using the Prevena dressing, with battery pack, place battery pack in waterproof bag during shower. If using Aquacel dressing then dressing is waterproof and patient may shower. If patient has a Prevena remove on POD #5 and replace with Aquacel dressing (patient was provided with dressing in hospital)  Elevated the leg and ice the affected area four times a day, for twenty minutes each time and after every physical therapy session. Normal Conditions - Will improve with provided comfort measures and time:  Some swelling in the operative leg is normal - this should reduce over time. Some post-operative pain is normal.  This will improve with prescribed medication, provided comfort measures and time. Constipation related to pain medications & decreased mobility is a common occurrence. (Increase your fiber & water intake and take a stool softener.)   Slight warmth of operative site is normal and will diminish with time. Fatigue and moderate pain after therapy is normal and will improve with time. Numbness near the incision site is normal and will improve with time. NOTE: Ensure/Remind patient to go to their follow-up appointment with their orthopedic surgeon, which is scheduled: Dr. Inez Mcqueen, 6/29/22 at 9:15 AM    Abnormal Conditions - When to call the Surgeon:  Increasing/excessive redness, warmth or swelling at the incisional site not relieved with ice and elevation. Increasing/excessive pain not well-controlled by prescribed medications. Drainage or odor from or around the incision site.  (A little blood showing through the bandage is ok, but active leaking, of any color, coming out of the bandage is NOT normal.)  Temperature above 101 degrees.   (A mild temp of 99 - 100 is normal - if temp gets to 101 you may use Tylenol once - if it does not improve, you may use a 2nd dose of Tylenol after 5 hours - if temperature is still at or above 101 degrees then call the surgeon.)  Calf tenderness, swelling, or redness or numbness of the foot/lower leg.  Shortness of breath or chest pain. Any other incision or surgical-related concerns. CALL SURGEON with concerns PRIOR TO sending patient to hospital.     Patient's personal belongings (please select all that are sent with patient):  Cell Phone    RN SIGNATURE:  Electronically signed by Shu Olivas RN on 6/14/22 at 3:24 PM EDT    CASE MANAGEMENT/SOCIAL WORK SECTION    Inpatient Status Date:     Readmission Risk Assessment Score:  Readmission Risk              Risk of Unplanned Readmission:  0           Discharging to Facility/ [address]  [address] [address]  Phone [phone]  Fax  [phone]     Dialysis Facility (if applicable)   Name:  Address:  Dialysis Schedule:  Phone:  Fax:    / signature: Electronically signed by Shu Olivas RN on 6/14/22 at 3:23 PM EDT    PHYSICIAN SECTION    Prognosis: Good    Condition at Discharge: Stable    Rehab Potential (if transferring to Rehab): Good    Recommended Labs or Other Treatments After Discharge:     Physician Certification: I certify the above information and transfer of Alaina Velez  is necessary for the continuing treatment of the diagnosis listed and that she requires Home Care for less 30 days.      Update Admission H&P: No change in H&P    PHYSICIAN SIGNATURE:  Electronically signed by Kristian Tucker MD on 6/14/22 at 10:18 AM EDT

## 2022-06-15 ENCOUNTER — TELEPHONE (OUTPATIENT)
Dept: ORTHOPEDIC SURGERY | Age: 57
End: 2022-06-15

## 2022-06-15 NOTE — TELEPHONE ENCOUNTER
Pt called in and stated that she is having a burning/stinging pain in her knee following TKA yesterday.  She was advised that is normal.

## 2022-06-21 DIAGNOSIS — M17.11 PRIMARY OSTEOARTHRITIS OF RIGHT KNEE: ICD-10-CM

## 2022-06-21 RX ORDER — OXYCODONE HYDROCHLORIDE AND ACETAMINOPHEN 5; 325 MG/1; MG/1
1-2 TABLET ORAL EVERY 4 HOURS PRN
Qty: 42 TABLET | Refills: 0 | Status: SHIPPED | OUTPATIENT
Start: 2022-06-21 | End: 2022-06-28

## 2022-06-21 NOTE — TELEPHONE ENCOUNTER
06/14 - Right KNEE TOTAL ARTHROPLASTY    Patient is asking for a refill on pain medication to [facility] in [place], [place]    Next appt  06/29

## 2022-06-27 ENCOUNTER — TELEPHONE (OUTPATIENT)
Dept: ORTHOPEDIC SURGERY | Age: 57
End: 2022-06-27

## 2022-06-27 NOTE — TELEPHONE ENCOUNTER
Patient is currently on percocet for pain  She has an appointment on Wednesday 6/29/22 to see you.   Please advise if anything else

## 2022-06-28 NOTE — TELEPHONE ENCOUNTER
I spoke with patient stating Dr Gray Schwartz will see her tomorrow at her appointment to discuss options

## 2022-06-29 ENCOUNTER — OFFICE VISIT (OUTPATIENT)
Dept: ORTHOPEDIC SURGERY | Age: 57
End: 2022-06-29

## 2022-06-29 DIAGNOSIS — Z96.651 STATUS POST TOTAL RIGHT KNEE REPLACEMENT: Primary | ICD-10-CM

## 2022-06-29 PROCEDURE — 99024 POSTOP FOLLOW-UP VISIT: CPT | Performed by: ORTHOPAEDIC SURGERY

## 2022-06-29 RX ORDER — OXYCODONE HYDROCHLORIDE AND ACETAMINOPHEN 5; 325 MG/1; MG/1
1 TABLET ORAL EVERY 6 HOURS PRN
Qty: 28 TABLET | Refills: 0 | Status: SHIPPED | OUTPATIENT
Start: 2022-06-29 | End: 2022-07-06

## 2022-06-29 NOTE — PROGRESS NOTES
Mirian Macias M.D.            [address] [place] [address], [address] [facility],[address], [address] [facility]           Dept Phone: [phone]           Dept Fax:  [phone], [facility]          Dept Phone: [phone]           Dept Fax:  [phone]      Chief Compliant:  Chief Complaint   Patient presents with    Post-Op Check     Rt TKA 6/14/22        History of Present Illness:  Patient returns today status post right TKA times 2 weeks. Patient states that she has a difficulty controlling her pain. Review of Systems   Constitutional: Negative for fever, chills, sweats, recent injury, recent illness  Neurological: Negative for Headaches, numbness, or weakness. Integumentary: Negative for rash, itching, ecchymosis, or wounds. Musculoskeletal: Positive for Post-Op Check (Rt TKA 6/14/22)       Physical Exam:  Constitutional: Patient is oriented to person, place, and time. Patient appears well-developed and well nourished. Musculoskeletal: Normal gait. Motion 0-100 degrees with expected pain with ROM. No Calf tenderness, Negative Rosalia's sign. Neurovascular intact. Neurological: Patient is alert and oriented to person, place, and time. Normal strenght. No sensory deficit. Skin: Skin is warm and dry. Incision is healing well without signs of redness or drainage  Nursing note and vitals reviewed. Labs and Imaging:     XR taken today:  XR KNEE RIGHT (1-2 VIEWS)    Result Date: 6/29/2022  X-rays taken today reviewed by me show standing AP both knees and lateral the right knee. Patient had recent right total knee arthroplasty and components appear to be in good alignment on AP lateral views with appropriate patellar height. Patient's had evidence for previous left total knee arthroplasty.          Orders Placed This Encounter   Procedures    XR KNEE RIGHT (1-2 VIEWS)     Standing Status:   Future     Number of Occurrences:   1     Standing Expiration Date:   6/28/2023    External Referral To Physical Therapy     Referral Priority:   Routine     Referral Type:   Eval and Treat     Referral Reason:   Specialty Services Required     Requested Specialty:   Physical Therapist     Number of Visits Requested:   1       Assessment and Plan:  1. Status post total right knee replacement        2 weeks status post right TKA        This is a 65 y.o. female who presents to the clinic today status post right TKA. Continue anticoagulation. Transition to outpatient Pt. Restrictions given. RTO 5-6 weeks. I encouraged patient to do compression hoses because of the amount of swelling she has      Provider Attestation:  Michelle Bush, personally performed the services described in this documentation. All medical record entries made by the scribe were at my direction and in my presence. I have reviewed the chart and discharge instructions and agree that the records reflect my personal performance and is accurate and complete. Olga Vo MD. 06/29/22        Please note that this chart was generated using voice recognition Dragon dictation software. Although every effort was made to ensure the accuracy of this automated transcription, some errors in transcription may have occurred.

## 2022-07-03 PROBLEM — Z01.818 PREOP EXAMINATION: Status: RESOLVED | Noted: 2022-06-03 | Resolved: 2022-07-03

## 2022-07-13 ENCOUNTER — TELEPHONE (OUTPATIENT)
Dept: ORTHOPEDIC SURGERY | Age: 57
End: 2022-07-13

## 2022-07-13 NOTE — TELEPHONE ENCOUNTER
PTA that has been working with the patient with home therapy called with concerns. Patient is sp TKA and recently re-injured her self. Twisting PER after getting out of a car that resulted in a loud pop. Pain and significant loss of ROM has been observed since incident. PTA is concerned because of how far the patient regressed. Patient is not scheduled for next post op visit until 8/10. Appt with Dr. Ely Avendaño was scheduled for 7/15 in [facility] to reassess knee.

## 2022-07-15 ENCOUNTER — OFFICE VISIT (OUTPATIENT)
Dept: ORTHOPEDIC SURGERY | Age: 57
End: 2022-07-15

## 2022-07-15 DIAGNOSIS — Z96.651 STATUS POST TOTAL RIGHT KNEE REPLACEMENT: Primary | ICD-10-CM

## 2022-07-15 PROCEDURE — 99024 POSTOP FOLLOW-UP VISIT: CPT | Performed by: ORTHOPAEDIC SURGERY

## 2022-07-15 RX ORDER — METHYLPREDNISOLONE 4 MG/1
TABLET ORAL
Qty: 1 KIT | Refills: 0 | Status: SHIPPED | OUTPATIENT
Start: 2022-07-15 | End: 2022-07-21

## 2022-07-15 NOTE — PROGRESS NOTES
Jake Grace M.D.            [address] [facility] [address] [facility], [address] [facility]           Dept Phone: [phone]           Dept Fax:  [address]           [place]          Dept Phone: [phone]           Dept Fax:  [phone]      Chief Compliant:  Chief Complaint   Patient presents with    Post-Op Check     Rt TKA 6/14/22        History of Present Illness: This is a 65 y.o. female who presents to the clinic today for evaluation / follow up of status post right total knee on 6/14/2022. Patient states that 3 days ago she was gone on a car and she kind of tripped and fell and hit her knee. She has been having pain ever since. She is able to walk on it but she is using a wheeled walker to help her out. .       Review of Systems   Constitutional: Negative for fever, chills, sweats. Eyes: Negative for changes in vision, or pain. HENT: Negative for ear ache, epistaxis, or sore throat. Respiratory/Cardio: Negative for Chest pain, palpitations, SOB, or cough. Gastrointestinal: Negative for abdominal pain, N/V/D. Genitourinary: Negative for dysuria, frequency, urgency, or hematuria. Neurological: Negative for headache, numbness, or weakness. Integumentary: Negative for rash, itching, laceration, or abrasion. Musculoskeletal: Positive for Post-Op Check (Rt TKA 6/14/22)       Physical Exam:  Constitutional: Patient is oriented to person, place, and time. Patient appears well-developed and well nourished. HENT: Negative otherwise noted  Head: Normocephalic and Atraumatic  Nose: Normal  Eyes: Conjunctivae and EOM are normal  Neck: Normal range of motion Neck supple. Respiratory/Cardio: Effort normal. No respiratory distress. Musculoskeletal: Examination of patient's right knee notes incisions intact. Steri-Strips are still present. She there is no obvious defect within the quadriceps or patellar tendon. I am able to get her to hold her leg out against resistance as well as to elevate her knee from 90 degrees to full extension albeit with some pain. There is no obvious medial or lateral collateral laxity. Patella tracks appropriately. She does have a little swelling her leg but her calves are nontender with a negative Homans  Neurological: Patient is alert and oriented to person, place, and time. Normal strenght. No sensory deficit. Skin: Skin is warm and dry  Psychiatric: Behavior is normal. Thought content normal.  Nursing note and vitals reviewed. Labs and Imaging:     XR taken today:  XR KNEE RIGHT (1-2 VIEWS)    Result Date: 7/15/2022  X-rays taken today reviewed by me show AP the view of both knees and a lateral the right knee. Patient is status post bilateral total knees most recently of the right knee. The components appear to be in good position good alignment there is no evidence for periarticular injury or fracture. Patellar height is appropriate indicative of no damage to the extensor mechanism. Patient has a previous left total knee arthroplasty as well. Orders Placed This Encounter   Procedures    XR KNEE RIGHT (1-2 VIEWS)     Standing Status:   Future     Number of Occurrences:   1     Standing Expiration Date:   7/15/2023       Assessment and Plan:  1. Status post total right knee replacement            This is a 65 y.o. female who presents to the clinic today for evaluation / follow up of status post right total knee 6/14/2022 with subsequent fall but without evidence for damage to her collaterals or her extensor mechanism based on examination as well as x-ray findings.      Past History:    Current Outpatient Medications:     methylPREDNISolone (MEDROL DOSEPACK) 4 MG tablet, Take by mouth., Disp: 1 kit, Rfl: 0    TRULICITY 1.5 BC/1.9OB SOPN, INJECT 1.5MG SUBCUTANEOUSLY ONCE WEEKLY DX: E11.65, Disp: , Rfl: NIFEdipine (PROCARDIA XL) 60 MG extended release tablet, Take 60 mg by mouth daily, Disp: , Rfl:     LORazepam (ATIVAN) 1 MG tablet, TAKE ONE (1) TABLET BY MOUTH TWICE DAILY AS NEEDED FOR ANXIETY, Disp: , Rfl:     nitroGLYCERIN (NITRODUR) 0.4 MG/HR, as directed, Disp: , Rfl:     omeprazole (PRILOSEC) 20 MG delayed release capsule, TAKE 1 CAPSULE BY MOUTH 2 TIMES DAILY (BEFORE MEALS), Disp: 180 capsule, Rfl: 1    metoclopramide (REGLAN) 10 MG tablet, TAKE 1 TABLET BY MOUTH 3 TIMES DAILY WITH MEALS, Disp: 120 tablet, Rfl: 2    enoxaparin (LOVENOX) 100 MG/ML injection, Inject 100 mg into the skin every 12 hours , Disp: , Rfl:     FLOVENT DISKUS 50 MCG/BLIST AEPB inhaler, TAKE 1 PUFF BY MOUTH TWICE A DAY, Disp: , Rfl:     glipiZIDE (GLUCOTROL) 5 MG tablet, Take 5 mg by mouth 2 times daily (before meals), Disp: , Rfl:     magnesium citrate solution, Take 296 mLs by mouth once for 1 dose, Disp: 296 mL, Rfl: 0    furosemide (LASIX) 20 MG tablet, TAKE 1 TABLET EVERY DAY, Disp: 90 tablet, Rfl: 1    Warfarin Sodium (COUMADIN PO), Take 6 mg by mouth daily, Disp: , Rfl:     Cholecalciferol (VITAMIN D PO), Take by mouth once a week Pt. Takes on Sundays, Disp: , Rfl:     Cholecalciferol (VITAMIN D3) 71503 units CAPS, Take by mouth once a week, Disp: , Rfl:     montelukast (SINGULAIR) 10 MG tablet, TAKE 1 TABLET BY MOUTH EVERY DAY AT NIGHT, Disp: 30 tablet, Rfl: 5    ferrous sulfate 27 MG TABS, Take by mouth daily , Disp: , Rfl:     cyclobenzaprine (FLEXERIL) 10 MG tablet, Take 1 tablet by mouth 2 times daily as needed for Muscle spasms, Disp: 60 tablet, Rfl: 1    triamcinolone (KENALOG) 0.025 % cream, Apply topically 2 times daily. , Disp: 80 g, Rfl: 1    fluticasone (FLONASE) 50 MCG/ACT nasal spray, INSTILL 1 SPRAY INTO EACH NOSTRIL DAILY AT BED TIME, Disp: 1 Bottle, Rfl: 3    losartan (COZAAR) 25 MG tablet, TAKE ONE-HALF TABLET BY MOUTH DAILY, Disp: 15 tablet, Rfl: 5    levETIRAcetam (KEPPRA) 250 MG tablet, Take 1 tablet by mouth 2 times daily, Disp: 180 tablet, Rfl: 1    venlafaxine (EFFEXOR XR) 150 MG extended release capsule, TAKE 1 CAPSULE (150 MG TOTAL) BY MOUTH DAILY. , Disp: , Rfl: 1    atorvastatin (LIPITOR) 80 MG tablet, TAKE 1 TABLET BY MOUTH NIGHTLY AT BEDTIME (Patient taking differently: Take 80 mg by mouth daily ), Disp: 30 tablet, Rfl: 5    temazepam (RESTORIL) 15 MG capsule, Take 15 mg by mouth nightly. ., Disp: , Rfl:     ONE TOUCH ULTRA TEST strip, TEST BLOOD SUGAR 3 TIMES A DAY, Disp: 100 strip, Rfl: 5    colestipol (COLESTID) 1 g tablet, Take 1 g by mouth daily , Disp: , Rfl:     clotrimazole (LOTRIMIN) 1 % cream, Apply topically 2 times daily. , Disp: 15 g, Rfl: 0    topiramate (TOPAMAX) 25 MG tablet, Take 25 mg by mouth , Disp: , Rfl:     PROAIR  (90 Base) MCG/ACT inhaler, Inhale 2 puffs into the lungs every 6 hours as needed , Disp: , Rfl:     potassium chloride (MICRO-K) 10 MEQ extended release capsule, , Disp: , Rfl:     EXACTECH TEST strip, , Disp: , Rfl:     Misc.  Devices MISC, True Plus     Testing 3 times a day, Disp: 100 each, Rfl: 5    busPIRone (BUSPAR) 10 MG tablet, Take 10 mg by mouth daily , Disp: , Rfl:     TRUEPLUS LANCETS 30G MISC, 1 each by Does not apply route daily, Disp: 100 each, Rfl: 3    ARIPiprazole (ABILIFY) 10 MG tablet, TAKE 1 TABLET BY MOUTH NIGHTLY, Disp: 30 tablet, Rfl: 2  Allergies   Allergen Reactions    Latex Other (See Comments)     Red marks on body    Adhesive Tape      Also plastic tape    Morphine And Related Nausea Only    Nickel Other (See Comments)     Pt stated \"caused infection\"    Other Other (See Comments)     Surgical staples cause infection    Aspirin Nausea And Vomiting     Social History     Socioeconomic History    Marital status:      Spouse name: Not on file    Number of children: Not on file    Years of education: Not on file    Highest education level: Not on file   Occupational History    Occupation: disability   Tobacco Use    Smoking status: Never    Smokeless tobacco: Never   Vaping Use    Vaping Use: Never used   Substance and Sexual Activity    Alcohol use: Yes     Alcohol/week: 0.0 standard drinks     Comment: OCCASSIONAL    Drug use: No    Sexual activity: Yes     Partners: Male   Other Topics Concern    Not on file   Social History Narrative    Not on file     Social Determinants of Health     Financial Resource Strain: Not on file   Food Insecurity: Not on file   Transportation Needs: Not on file   Physical Activity: Not on file   Stress: Not on file   Social Connections: Not on file   Intimate Partner Violence: Not on file   Housing Stability: Not on file     Past Medical History:   Diagnosis Date    Acid reflux     Arthritis     Coughing     dry due to ace inhibitor    CPAP (continuous positive airway pressure) dependence     Depression     uses Abilify, Buspar, Trazodone for this    Diabetes mellitus ([facility] [address]     Difficult intubation     Diverticulitis     Environmental allergies     Epilepsy ([facility] [address]     MAY 2018,. ... 4/12/22: States last seizure was 6 months ago. Factor V Leiden ([facility] [address]     Gastroparesis     Generalized abdominal pain     Hemorrhage of anus and rectum     Hiatal hernia     History of stroke associated with blood clotting tendency 2003    had clot in brain and left neck, left side weakness residual    Hx of blood clots     right breast, brain, neck    Hyperlipidemia     Hypertension     Insomnia     uses Trazodone for this    Pre-procedure lab exam 1/9/2018    Sleep apnea     C PAP    Unspecified cerebral artery occlusion with cerebral infarction 6/2003    SEE BELOW, [address] Mini stroke    Wears glasses      Past Surgical History:   Procedure Laterality Date    APPENDECTOMY      CHOLECYSTECTOMY      COLONOSCOPY      with polypectomy    COLONOSCOPY  1/22/16    AND EGD    COLONOSCOPY  01/05/2017    DR MACKAY-random biopsies.     COLONOSCOPY N/A 10/20/2020    COLONOSCOPY POLYPECTOMY HOT BIOPSY performed by Ashli Nickerson MD at [facility] ENDO    ENDOMETRIAL ABLATION      2 times    HERNIA REPAIR      HIATAL HERNIA REPAIR  2/4/2016    laparoscopic robotic    HYSTERECTOMY (CERVIX STATUS UNKNOWN)      with BSO    KNEE ARTHROSCOPY Left 4/7/15, 2015    x 2    LAPAROSCOPY      \"springs inserted in to fallopian tubes\" to close tubes    NECK SURGERY  July 2012    Anterior, C5,6,7 bulging disk repair    MA COLONOSCOPY FLX DX W/COLLJ SPEC WHEN PFRMD N/A 7/17/2018    COLONOSCOPY performed by Cecil eLos MD at [address] [facility] N/A 12/18/2018    SIGMOIDOSCOPY DIAGNOSTIC FLEXIBLE performed by Cecil Leos MD at [address] [facility] 6/14/2022    KNEE TOTAL ARTHROPLASTY performed by Ana M Hopper MD at [address] [place] [address]      Jan 2016 DR MACKAY    UPPER GASTROINTESTINAL ENDOSCOPY N/A 7/17/2018    EGD BIOPSY performed by Cecil Leos MD at [address] [place] [address] N/A 10/20/2020    EGD BIOPSY performed by Cecil Leos MD at [address] [facility]     Family History   Problem Relation Age of Onset    Heart Disease Mother         dies age 32    Cancer Father         lymph nodes    Pacemaker Sister    Plan  Patient and her  are reassured that nothing occurred to the prosthesis or any of the surrounding structures. I am getting have her get a Medrol Dosepak to help calm things down. She is presently getting evaluation at home therapy and then eventually outpatient therapy I would do this after the completion of the [address]. Ice and elevate. We will see her back here per previous appointment      Provider Attestation:  Dinah Grewal, personally performed the services described in this documentation. All medical record entries made by the scribe were at my direction and in my presence. I have reviewed the chart and discharge instructions and agree that the records reflect my personal performance and is accurate and complete.  Ana M Hopper MD. 07/15/22      Please note that this chart was generated using voice recognition Dragon dictation software. Although every effort was made to ensure the accuracy of this automated transcription, some errors in transcription may have occurred.

## 2022-07-20 DIAGNOSIS — M25.569 ACUTE KNEE PAIN, UNSPECIFIED LATERALITY: Primary | ICD-10-CM

## 2022-07-20 DIAGNOSIS — Z96.659 HISTORY OF KNEE REPLACEMENT, UNSPECIFIED LATERALITY: ICD-10-CM

## 2022-07-20 NOTE — TELEPHONE ENCOUNTER
LVM to return call for verification which medication she is talking about. Patient was prescribed MDP in office 7/15 and has a past script for percocet s/p Rt TKA 6/14/22

## 2022-07-20 NOTE — TELEPHONE ENCOUNTER
Patient returned call and is requesting a refill on the percocet pain medication    Please call her with any further questions   Thank you

## 2022-07-20 NOTE — TELEPHONE ENCOUNTER
Patient called in requesting something be sent in for pain as her current meds are not working. Please advise thank you!

## 2022-07-21 RX ORDER — OXYCODONE HYDROCHLORIDE AND ACETAMINOPHEN 5; 325 MG/1; MG/1
1 TABLET ORAL EVERY 6 HOURS PRN
Qty: 28 TABLET | Refills: 0 | Status: CANCELLED | OUTPATIENT
Start: 2022-07-21 | End: 2022-07-28

## 2022-07-21 RX ORDER — HYDROCODONE BITARTRATE AND ACETAMINOPHEN 5; 325 MG/1; MG/1
1 TABLET ORAL EVERY 6 HOURS PRN
Qty: 28 TABLET | Refills: 0 | Status: SHIPPED | OUTPATIENT
Start: 2022-07-21 | End: 2022-07-28

## 2022-08-04 DIAGNOSIS — Z96.659 HISTORY OF KNEE REPLACEMENT, UNSPECIFIED LATERALITY: Primary | ICD-10-CM

## 2022-08-04 RX ORDER — HYDROCODONE BITARTRATE AND ACETAMINOPHEN 5; 325 MG/1; MG/1
1 TABLET ORAL EVERY 4 HOURS PRN
Qty: 42 TABLET | Refills: 0 | Status: SHIPPED | OUTPATIENT
Start: 2022-08-04 | End: 2022-08-11

## 2022-08-04 RX ORDER — TIZANIDINE 4 MG/1
4 TABLET ORAL 3 TIMES DAILY PRN
Qty: 30 TABLET | Refills: 0 | Status: SHIPPED | OUTPATIENT
Start: 2022-08-04 | End: 2022-08-10

## 2022-08-04 NOTE — TELEPHONE ENCOUNTER
Recommend refill of Norco and addition of muscle relaxant in the form of tizanidine 4 mg 3 times daily and continued appointment with Dr. Lester Rawls on 8/10/2022.

## 2022-08-04 NOTE — TELEPHONE ENCOUNTER
Patient was seen at the ER in [place] 2-3 days ago because she couldn't get out of the car, they stated she sprained her leg  they gave her tramadol for her pain. She stated today it feels like electrical shock are going through her knee and muscle spasms    She had a TKA right  7/15/22 Her next appointment with Dr Ruddy Carlton is on 8/10/22  Please advise.

## 2022-08-10 ENCOUNTER — OFFICE VISIT (OUTPATIENT)
Dept: ORTHOPEDIC SURGERY | Age: 57
End: 2022-08-10

## 2022-08-10 ENCOUNTER — HOSPITAL ENCOUNTER (OUTPATIENT)
Dept: PREADMISSION TESTING | Age: 57
Discharge: HOME | End: 2022-08-14
Payer: MEDICARE

## 2022-08-10 VITALS
WEIGHT: 210 LBS | HEIGHT: 63 IN | SYSTOLIC BLOOD PRESSURE: 126 MMHG | BODY MASS INDEX: 37.21 KG/M2 | RESPIRATION RATE: 18 BRPM | OXYGEN SATURATION: 99 % | TEMPERATURE: 97.7 F | HEART RATE: 84 BPM | DIASTOLIC BLOOD PRESSURE: 77 MMHG

## 2022-08-10 DIAGNOSIS — Z96.651 STATUS POST TOTAL RIGHT KNEE REPLACEMENT: Primary | ICD-10-CM

## 2022-08-10 LAB
ABSOLUTE EOS #: 0.1 K/UL (ref 0–0.4)
ABSOLUTE LYMPH #: 2.9 K/UL (ref 1–4.8)
ABSOLUTE MONO #: 0.5 K/UL (ref 0.1–1.3)
ANION GAP SERPL CALCULATED.3IONS-SCNC: 12 MMOL/L (ref 9–17)
BASOPHILS # BLD: 1 % (ref 0–2)
BASOPHILS ABSOLUTE: 0.1 K/UL (ref 0–0.2)
BUN BLDV-MCNC: 11 MG/DL (ref 6–20)
CALCIUM SERPL-MCNC: 9.2 MG/DL (ref 8.6–10.4)
CHLORIDE BLD-SCNC: 105 MMOL/L (ref 98–107)
CO2: 26 MMOL/L (ref 20–31)
CREAT SERPL-MCNC: 0.74 MG/DL (ref 0.5–0.9)
EOSINOPHILS RELATIVE PERCENT: 2 % (ref 0–4)
GFR AFRICAN AMERICAN: >60 ML/MIN
GFR NON-AFRICAN AMERICAN: >60 ML/MIN
GFR SERPL CREATININE-BSD FRML MDRD: ABNORMAL ML/MIN/{1.73_M2}
GLUCOSE BLD-MCNC: 99 MG/DL (ref 70–99)
HCT VFR BLD CALC: 34.3 % (ref 36–46)
HEMOGLOBIN: 11.1 G/DL (ref 12–16)
LYMPHOCYTES # BLD: 41 % (ref 24–44)
MCH RBC QN AUTO: 27.9 PG (ref 26–34)
MCHC RBC AUTO-ENTMCNC: 32.3 G/DL (ref 31–37)
MCV RBC AUTO: 86.5 FL (ref 80–100)
MONOCYTES # BLD: 7 % (ref 1–7)
PDW BLD-RTO: 15.5 % (ref 11.5–14.9)
PLATELET # BLD: 332 K/UL (ref 150–450)
PMV BLD AUTO: 8 FL (ref 6–12)
POTASSIUM SERPL-SCNC: 3.4 MMOL/L (ref 3.7–5.3)
RBC # BLD: 3.96 M/UL (ref 4–5.2)
SEG NEUTROPHILS: 49 % (ref 36–66)
SEGMENTED NEUTROPHILS ABSOLUTE COUNT: 3.4 K/UL (ref 1.3–9.1)
SODIUM BLD-SCNC: 143 MMOL/L (ref 135–144)
WBC # BLD: 7 K/UL (ref 3.5–11)

## 2022-08-10 PROCEDURE — 99024 POSTOP FOLLOW-UP VISIT: CPT | Performed by: ORTHOPAEDIC SURGERY

## 2022-08-10 PROCEDURE — 80048 BASIC METABOLIC PNL TOTAL CA: CPT

## 2022-08-10 PROCEDURE — 36415 COLL VENOUS BLD VENIPUNCTURE: CPT

## 2022-08-10 PROCEDURE — 85025 COMPLETE CBC W/AUTO DIFF WBC: CPT

## 2022-08-10 PROCEDURE — APPSS45 APP SPLIT SHARED TIME 31-45 MINUTES: Performed by: NURSE PRACTITIONER

## 2022-08-10 RX ORDER — HYDROCODONE BITARTRATE AND ACETAMINOPHEN 5; 325 MG/1; MG/1
1 TABLET ORAL EVERY 4 HOURS PRN
Qty: 42 TABLET | Refills: 0 | Status: SHIPPED | OUTPATIENT
Start: 2022-08-10 | End: 2022-08-17 | Stop reason: SDUPTHER

## 2022-08-10 ASSESSMENT — PAIN DESCRIPTION - PAIN TYPE: TYPE: ACUTE PAIN

## 2022-08-10 ASSESSMENT — PAIN DESCRIPTION - LOCATION: LOCATION: KNEE

## 2022-08-10 ASSESSMENT — PAIN DESCRIPTION - ORIENTATION: ORIENTATION: RIGHT

## 2022-08-10 ASSESSMENT — PAIN DESCRIPTION - DESCRIPTORS: DESCRIPTORS: ACHING

## 2022-08-10 ASSESSMENT — PAIN SCALES - GENERAL: PAINLEVEL_OUTOF10: 8

## 2022-08-10 NOTE — H&P
HISTORY and [id number]       NAME:  Ofelia Jacques  MRN: [id number]   YOB: 1965   Date: 8/10/2022   Age: 65 y.o. Gender: female       COMPLAINT AND PRESENT HISTORY:   Ofelia Jacques is 65 y.o.,  female, undergoing preadmission testing for Adhesive capsulitis of right knee . S/P TKR (total knee replacement), right     Patient will be having:  KNEE MANIPULATION UNDER ANESTHESIA on  8/16/2022 by Dr. Margaret Schulte  Patient is post status Total Knee Arthroplasty on 6/14/2022. Per office notes of Dr. Margaret Schulte 8/10/22  Jez Norman returns today status post right total knee on 6/14/2022. She states that she is having a fair amount of pain and and really having a hard time with the progressing with therapy. She states that she has had a couple sprains to her knee and is and she which would prevent her from being as compliant with therapy as she did with her contralateral left total knee. Examination notes that her incision itself looks good. She has a no obvious redness or warmth to her knee. She has slight hypersensitivity but not true RSD she her motion however is pretty tough to get 20 to about 70degrees. There is no signs of the DVT she does have a little bit of swelling in the leg however. Impression  Arthrofibrosis status post right total knee  Previous left total knee doing well  Plan  Patient to be scheduled for manipulation under anesthesia right total knee. She was informed to be back in therapy immediately thereafter. We will see her back here postoperatively    She comes in to visit in a wheelchair with therapy dog. Patient c/o  pain , swelling, stiffness, popping and cracking in the right Knee. Pt describes the pain as 10/10 in intensity at times. \" I cant sleep in my bed and has to sleep in recliner chair\"     Patient has been using  ice compresses and Tylenol to help in pain relief, but has been ineffective. She states that the back of her leg hurts.   She is wearing compression hose on right leg. Patient has completed 2  in physical therapy. She states that she sprained her leg 2 times and fell at another time. Patient reports that  weight bearing aggravates sxs. Patient reports that she uses her rollated walker at the time \" if I dont I fall\"     Pt reports that the knee does not  experience a buckling or given away sensation  under the knee. But does lock-up \" feels like burning and electric shock going thru it. .  Last recent fall was a week ago. Patient denies any joint redness, but does experince joint  warmth,  she denies any fever or chills. Significant medical history:  Hypertension, hyperlipidemia, factor V Leiden, obstructive sleep apnea-uses CPAP, stroke-2003- left side residuals,  diabetes, epilepsy-last seizure 6 months ago, history of blood clots    Associated medications:   Procardia, losartan, Lipitor, Coumadin, Lasix, potassium, ferrous sulfate, Trulicity, glipizide, Keppera Topamax    Anticoagulants or blood thinners: Coumadin  Coumadin clinic at [facility].. Manages her anticoagulant    Pt denies any chest pain or SOB. No recent URI . Patient reports having difficult intubation and isolated surgery many years ago. Otherwise denies any personal or family problems with anesthesia. Patient had Labs done   Last EKG on 4/12/2022 was normal sinus rhythm    CLEARANCE     Medical clearance required for anticoagulation instructions. surgery scheduling will notify Dr. Noemi Morales office who will be responsible for making sure the clearance is obtained and is in the chart for surgery. Xrays or Imaging:     XR KNEE RIGHT (1-2 VIEWS) 7/15/22  Narrative    X-rays taken today reviewed by me show AP the view of both knees and a   lateral the right knee. Patient is status post bilateral total knees most   recently of the right knee.   The components appear to be in good position   good alignment there is no evidence for periarticular injury or fracture. Patellar height is appropriate indicative of no damage to the extensor   mechanism. Patient has a previous left total knee arthroplasty as well. Lab Results   Component Value Date     06/06/2022       [facility]  Outside Information  8/5/22   Contains abnormal data POCT Protime / INR   Ref Range & Units 5 d ago  INR 0.8 - 1.2 1.5 Abnormal      Lab Results   Component Value Date    WBC 7.0 08/10/2022    HGB 11.1 (L) 08/10/2022    HCT 34.3 (L) 08/10/2022    MCV 86.5 08/10/2022     08/10/2022     Lab Results   Component Value Date/Time     08/10/2022 04:25 PM    K 3.4 08/10/2022 04:25 PM     08/10/2022 04:25 PM    CO2 26 08/10/2022 04:25 PM    BUN 11 08/10/2022 04:25 PM    CREATININE 0.74 08/10/2022 04:25 PM    GLUCOSE 99 08/10/2022 04:25 PM    GLUCOSE 126 01/30/2012 03:46 PM    CALCIUM 9.2 08/10/2022 04:25 PM            PAST MEDICAL HISTORY     Past Medical History:   Diagnosis Date    Acid reflux     Arthritis     Asthma     Coughing     dry due to ace inhibitor    CPAP (continuous positive airway pressure) dependence     Depression     uses Abilify, Buspar, Trazodone for this    Diabetes mellitus ([facility]     Difficult intubation     Diverticulitis     Environmental allergies     Epilepsy ([facility] [facility]     MAY 2018,. ... 4/12/22: States last seizure was 6 months ago.      Factor V Leiden ([facility]     Gastroparesis     Generalized abdominal pain     Hemorrhage of anus and rectum     Hiatal hernia     History of stroke associated with blood clotting tendency 2003    had clot in brain and left neck, left side weakness residual    Hx of blood clots     right breast, brain, neck    Hyperlipidemia     Hypertension     Insomnia     uses Trazodone for this    Pre-procedure lab exam 01/09/2018    Sleep apnea     C PAP    Unspecified cerebral artery occlusion with cerebral infarction 06/2003    SEE BELOW, [address] Mini stroke    Wears glasses          SURGICAL HISTORY       Past Surgical History:   Procedure Laterality Date    APPENDECTOMY      CHOLECYSTECTOMY      COLONOSCOPY      with polypectomy    COLONOSCOPY  1/22/16    AND EGD    COLONOSCOPY  01/05/2017    DR MACKAY-random biopsies. COLONOSCOPY N/A 10/20/2020    COLONOSCOPY POLYPECTOMY HOT BIOPSY performed by Christ Vaughan MD at [address]      2 times    [facility]  2/4/2016    laparoscopic robotic    HYSTERECTOMY (CERVIX STATUS UNKNOWN)      with BSO    KNEE ARTHROSCOPY Left 4/7/15, 2015    x 2    LAPAROSCOPY      \"springs inserted in to fallopian tubes\" to close tubes    NECK SURGERY  July 2012    Anterior, C5,6,7 bulging disk repair    MA COLONOSCOPY FLX DX W/COLLJ SPEC WHEN PFRMD N/A 7/17/2018    COLONOSCOPY performed by Christ Vaughan MD at [address] N/A 12/18/2018    [address] performed by Christ Vaughan MD at [address] [facility] Right 6/14/2022    KNEE TOTAL ARTHROPLASTY performed by Valentine Chapa MD at [address]      Jan 2016 DR MACKAY    UPPER GASTROINTESTINAL ENDOSCOPY N/A 7/17/2018    EGD BIOPSY performed by Christ Vaughan MD at [address] N/A 10/20/2020    EGD BIOPSY performed by Christ Vaughan MD at [address] History   Problem Relation Age of Onset    Heart Disease Mother         dies age 32    Cancer Father         lymph nodes    Pacemaker Sister        SOCIAL HISTORY       Social History     Socioeconomic History    Marital status:      Spouse name: None    Number of children: None    Years of education: None    Highest education level: None   Occupational History    Occupation: disability   Tobacco Use    Smoking status: Never    Smokeless tobacco: Never   Vaping Use    Vaping Use: Never used   Substance and Sexual Activity    Alcohol use:  Yes     Alcohol/week: 0.0 standard drinks     Comment: OCCASSIONAL    Drug use: No    Sexual activity: Yes     Partners: Male           REVIEW OF SYSTEMS      Allergies   Allergen Reactions    Latex Other (See Comments)     Red marks on body    Adhesive Tape      Also plastic tape    Morphine And Related Nausea Only    Nickel Other (See Comments)     Pt stated \"caused infection\"    Other Other (See Comments)     Surgical staples cause infection    Aspirin Nausea And Vomiting       Current Outpatient Medications on File Prior to Encounter   Medication Sig Dispense Refill    HYDROcodone-acetaminophen (NORCO) 5-325 MG per tablet Take 1 tablet by mouth every 4 hours as needed for Pain for up to 7 days. Intended supply: 7 days. Take lowest dose possible to manage pain 42 tablet 0    HYDROcodone-acetaminophen (NORCO) 5-325 MG per tablet Take 1 tablet by mouth every 4 hours as needed for Pain for up to 7 days. Intended supply: 7 days. Take lowest dose possible to manage pain 42 tablet 0    TRULICITY 1.5 IV/9.6HW SOPN INJECT 1.5MG SUBCUTANEOUSLY ONCE WEEKLY DX: E11.65      NIFEdipine (PROCARDIA XL) 60 MG extended release tablet Take 60 mg by mouth daily      LORazepam (ATIVAN) 1 MG tablet TAKE ONE (1) TABLET BY MOUTH TWICE DAILY AS NEEDED FOR ANXIETY      nitroGLYCERIN (NITRODUR) 0.4 MG/HR as directed      omeprazole (PRILOSEC) 20 MG delayed release capsule TAKE 1 CAPSULE BY MOUTH 2 TIMES DAILY (BEFORE MEALS) 180 capsule 1    enoxaparin (LOVENOX) 100 MG/ML injection Inject 100 mg into the skin every 12 hours       FLOVENT DISKUS 50 MCG/BLIST AEPB inhaler TAKE 1 PUFF BY MOUTH TWICE A DAY      glipiZIDE (GLUCOTROL) 5 MG tablet Take 5 mg by mouth 2 times daily (before meals)      furosemide (LASIX) 20 MG tablet TAKE 1 TABLET EVERY DAY 90 tablet 1    Warfarin Sodium (COUMADIN PO) Take 6 mg by mouth daily 9 mg Tuesday and Thursdays  and 6 rest of days      Cholecalciferol (VITAMIN D PO) Take by mouth once a week Pt.  Takes on Sundays Cholecalciferol (VITAMIN D3) 53580 units CAPS Take by mouth once a week      montelukast (SINGULAIR) 10 MG tablet TAKE 1 TABLET BY MOUTH EVERY DAY AT NIGHT 30 tablet 5    ferrous sulfate 27 MG TABS Take by mouth daily       triamcinolone (KENALOG) 0.025 % cream Apply topically 2 times daily. 80 g 1    fluticasone (FLONASE) 50 MCG/ACT nasal spray INSTILL 1 SPRAY INTO EACH NOSTRIL DAILY AT BED TIME 1 Bottle 3    losartan (COZAAR) 25 MG tablet TAKE ONE-HALF TABLET BY MOUTH DAILY 15 tablet 5    levETIRAcetam (KEPPRA) 250 MG tablet Take 1 tablet by mouth 2 times daily 180 tablet 1    venlafaxine (EFFEXOR XR) 150 MG extended release capsule TAKE 1 CAPSULE (150 MG TOTAL) BY MOUTH DAILY. 1    atorvastatin (LIPITOR) 80 MG tablet TAKE 1 TABLET BY MOUTH NIGHTLY AT BEDTIME (Patient taking differently: Take 80 mg by mouth in the morning.) 30 tablet 5    temazepam (RESTORIL) 15 MG capsule Take 15 mg by mouth nightly. .      ONE TOUCH ULTRA TEST strip TEST BLOOD SUGAR 3 TIMES A  strip 5    clotrimazole (LOTRIMIN) 1 % cream Apply topically 2 times daily. 15 g 0    topiramate (TOPAMAX) 25 MG tablet Take 25 mg by mouth       PROAIR  (90 Base) MCG/ACT inhaler Inhale 2 puffs into the lungs every 6 hours as needed       potassium chloride (MICRO-K) 10 MEQ extended release capsule       EXACTECH TEST strip       Misc. Devices MISC True Plus     Testing 3 times a day 100 each 5    busPIRone (BUSPAR) 10 MG tablet Take 10 mg by mouth daily       TRUEPLUS LANCETS 30G MISC 1 each by Does not apply route daily 100 each 3    ARIPiprazole (ABILIFY) 10 MG tablet TAKE 1 TABLET BY MOUTH NIGHTLY 30 tablet 2     No current facility-administered medications on file prior to encounter. General health:  Fairly good. No fever or chills. Skin:  No itching, redness or rash. HEENT:  No headache, epistaxis or sore throat. Neck:  No pain, stiffness or masses. Cardiovascular/Respiratory system:  No chest pain, palpitation or shortness of breath. Gastrointestinal tract: No abdominal pain, Dysphagia, nausea, vomiting, diarrhea or constipation. Genitourinary:  No burning on micturition. No hesitancy, urgency, frequency or discoloration of urine. Locomotor:  See HPI               Neuropsychiatric:  No referable complaints. GENERAL PHYSICAL EXAM:     Vitals: /77   Pulse 84   Temp 97.7 °F (36.5 °C) (Infrared)   Resp 18   Ht 5' 3\" (1.6 m)   Wt 210 lb (95.3 kg)   SpO2 99%   BMI 37.20 kg/m²  Body mass index is 37.2 kg/m². GENERAL APPEARANCE:   Chantell Long is 65 y.o., female, moderately obese, nourished, conscious, alert. Does not appear to be distress or pain at this time. SKIN:  Warm, dry, no cyanosis or jaundice. Old incision to left knee. Toenails to bilateral feet appears to be fungus in nature. HEAD:  Normocephalic, atraumatic, no swelling or tenderness. EYES:  Pupils equal, reactive to light. EARS:  No discharge, no marked hearing loss. NOSE:  No rhinorrhea, epistaxis or septal deformity. THROAT:  Not congested. No ulceration bleeding or discharge. NECK:  No stiffness, trachea central.                  CHEST:  Symmetrical and equal on expansion. HEART:  RRR S1 > S2. No audible murmurs or gallops. LUNGS:  Equal on expansion, normal breath sounds. No adventitious sounds. ABDOMEN:  Obese. Soft on palpation. No localized tenderness. No guarding or rigidity. LYMPHATICS:  No palpable cervical lymphadenopathy. LOCOMOTOR, BACK AND SPINE:  No tenderness or deformities. Using antalgic gait and use of Rollator at walker to aid in ambulation. EXTREMITIES:  Symmetrical, no pretibial edema.  No calf tenderness. No discoloration or ulcerations. Tenderness on palpation of the right Knee joint space. Compression stocking on. Unable to extend leg out fully, expressing discomfort. NEUROLOGIC:  The patient is conscious, alert, oriented,Cranial nerve II-XII intact, taste and smell were not examined. No apparent focal sensory or motor deficits.                                                                                      PROVISIONAL DIAGNOSES / SURGERY:        KNEE MANIPULATION UNDER ANESTHESIA POST TOTAL KNEE ARTHROPLASTY DONE 6/14/22    Adhesive capsulitis of right knee     S/P TKR (total knee replacement), right       Patient Active Problem List    Diagnosis Date Noted    Primary osteoarthritis of right knee 06/14/2022    Hx of blood clots 04/11/2022    Other irritable bowel syndrome 09/11/2020    Morbid (severe) obesity due to excess calories ([address] 09/11/2020    Occult blood in stools 09/11/2020    Gastroparesis 09/11/2020    Rectal bleeding 09/11/2020    Hemorrhage of anus and rectum     Delayed gastric emptying 09/27/2018    Sacroiliitis ([address]     DDD (degenerative disc disease), lumbar     Encounter for medication monitoring     Lumbar facet arthropathy     Bloating 04/09/2018    Generalized abdominal pain     Lumbar spondylolysis     Chronic use of opiate for therapeutic purpose 12/08/2017    Left-sided weakness 10/27/2017    Worsening headaches 10/27/2017    TIA (transient ischemic attack) 10/10/2017    Facial numbness 10/10/2017    Lumbar radiculopathy     Type 2 diabetes mellitus with diabetic polyneuropathy, without long-term current use of insulin ([address] 08/18/2017    Lumbosacral spondylosis without myelopathy     Essential hypertension 01/20/2017    Abdominal pain 11/17/2016    Diarrhea 11/17/2016    Major depressive disorder, recurrent, severe without psychotic features ([address]     Chronic post-traumatic stress disorder (PTSD)     ERICK (generalized anxiety disorder)     Hiatal hernia 02/06/2016 Primary insomnia 02/03/2016    Acute medial meniscus tear of left knee 04/06/2015    Depression     Acid reflux     Hyperlipidemia     Epilepsy (HCC)     Pelvic pain in female     Colon polyp     History of stroke associated with blood clotting tendency     Cholelithiasis     Complex tear of medial meniscus of left knee as current injury 11/07/2014       ALLEY GONSALEZ, APRN - CNP on 8/10/2022 at 4:19 PM    Total time spent on encounter- PAT provider minutes: 31-40 minutes

## 2022-08-10 NOTE — H&P (VIEW-ONLY)
HISTORY and [address] [facility] [address]       NAME:  Megan Mascorro  MRN: [id number]   YOB: 1965   Date: 8/10/2022   Age: 65 y.o. Gender: female       COMPLAINT AND PRESENT HISTORY:   Megan Mascorro is 65 y.o.,  female, undergoing preadmission testing for Adhesive capsulitis of right knee . S/P TKR (total knee replacement), right     Patient will be having:  KNEE MANIPULATION UNDER ANESTHESIA on  8/16/2022 by Dr. Terrence Amor  Patient is post status Total Knee Arthroplasty on 6/14/2022. Per office notes of Dr. Terrence Amor 8/10/22  Shu Pathak returns today status post right total knee on 6/14/2022. She states that she is having a fair amount of pain and and really having a hard time with the progressing with therapy. She states that she has had a couple sprains to her knee and is and she which would prevent her from being as compliant with therapy as she did with her contralateral left total knee. Examination notes that her incision itself looks good. She has a no obvious redness or warmth to her knee. She has slight hypersensitivity but not true RSD she her motion however is pretty tough to get 20 to about 70degrees. There is no signs of the DVT she does have a little bit of swelling in the leg however. Impression  Arthrofibrosis status post right total knee  Previous left total knee doing well  Plan  Patient to be scheduled for manipulation under anesthesia right total knee. She was informed to be back in therapy immediately thereafter. We will see her back here postoperatively    She comes in to visit in a wheelchair with therapy dog. Patient c/o  pain , swelling, stiffness, popping and cracking in the right Knee. Pt describes the pain as 10/10 in intensity at times. \" I cant sleep in my bed and has to sleep in recliner chair\"     Patient has been using  ice compresses and Tylenol to help in pain relief, but has been ineffective. She states that the back of her leg hurts.   She is wearing compression hose on right leg. Patient has completed 2  in physical therapy. She states that she sprained her leg 2 times and fell at another time. Patient reports that  weight bearing aggravates sxs. Patient reports that she uses her rollated walker at the time \" if I dont I fall\"     Pt reports that the knee does not  experience a buckling or given away sensation  under the knee. But does lock-up \" feels like burning and electric shock going thru it. .  Last recent fall was a week ago. Patient denies any joint redness, but does experince joint  warmth,  she denies any fever or chills. Significant medical history:  Hypertension, hyperlipidemia, factor V Leiden, obstructive sleep apnea-uses CPAP, stroke-2003- left side residuals,  diabetes, epilepsy-last seizure 6 months ago, history of blood clots    Associated medications:   Procardia, losartan, Lipitor, Coumadin, Lasix, potassium, ferrous sulfate, Trulicity, glipizide, Keppera Topamax    Anticoagulants or blood thinners: Coumadin  C[facility] clinic at [facility].. Manages her anticoagulant    Pt denies any chest pain or SOB. No recent URI . Patient reports having difficult intubation and isolated surgery many years ago. Otherwise denies any personal or family problems with anesthesia. Patient had Labs done   Last EKG on 4/12/2022 was normal sinus rhythm    CLEARANCE     Medical clearance required for anticoagulation instructions. surgery scheduling will notify Dr. Jacobo Winslow office who will be responsible for making sure the clearance is obtained and is in the chart for surgery. Xrays or Imaging:     XR KNEE RIGHT (1-2 VIEWS) 7/15/22  Narrative    X-rays taken today reviewed by me show AP the view of both knees and a   lateral the right knee. Patient is status post bilateral total knees most   recently of the right knee.   The components appear to be in good position   good alignment there is no evidence for periarticular injury or fracture. Patellar height is appropriate indicative of no damage to the extensor   mechanism. Patient has a previous left total knee arthroplasty as well. Lab Results   Component Value Date     06/06/2022       [employer]  Outside Information  8/5/22   Contains abnormal data POCT Protime / INR   Ref Range & Units 5 d ago  INR 0.8 - 1.2 1.5 Abnormal      Lab Results   Component Value Date    WBC 7.0 08/10/2022    HGB 11.1 (L) 08/10/2022    HCT 34.3 (L) 08/10/2022    MCV 86.5 08/10/2022     08/10/2022     Lab Results   Component Value Date/Time     08/10/2022 04:25 PM    K 3.4 08/10/2022 04:25 PM     08/10/2022 04:25 PM    CO2 26 08/10/2022 04:25 PM    BUN 11 08/10/2022 04:25 PM    CREATININE 0.74 08/10/2022 04:25 PM    GLUCOSE 99 08/10/2022 04:25 PM    GLUCOSE 126 01/30/2012 03:46 PM    CALCIUM 9.2 08/10/2022 04:25 PM            PAST MEDICAL HISTORY     Past Medical History:   Diagnosis Date    Acid reflux     Arthritis     Asthma     Coughing     dry due to ace inhibitor    CPAP (continuous positive airway pressure) dependence     Depression     uses Abilify, Buspar, Trazodone for this    Diabetes mellitus ([facility]     Difficult intubation     Diverticulitis     Environmental allergies     Epilepsy ([facility]     MAY 2018,. ... 4/12/22: States last seizure was 6 months ago.      Factor V Leiden ([facility]     Gastroparesis     Generalized abdominal pain     Hemorrhage of anus and rectum     Hiatal hernia     History of stroke associated with blood clotting tendency 2003    had clot in brain and left neck, left side weakness residual    Hx of blood clots     right breast, brain, neck    Hyperlipidemia     Hypertension     Insomnia     uses Trazodone for this    Pre-procedure lab exam 01/09/2018    Sleep apnea     C PAP    Unspecified cerebral artery occlusion with cerebral infarction 06/2003    SEE BELOW, [address] Mini stroke    Wears glasses          SURGICAL HISTORY       Past Surgical History:   Procedure Laterality Date    APPENDECTOMY      CHOLECYSTECTOMY      COLONOSCOPY      with polypectomy    COLONOSCOPY  1/22/16    AND EGD    COLONOSCOPY  01/05/2017    DR MACKAY-random biopsies. COLONOSCOPY N/A 10/20/2020    COLONOSCOPY POLYPECTOMY HOT BIOPSY performed by Tawnya Powell MD at [address]      2 times    [address]  2/4/2016    laparoscopic robotic    HYSTERECTOMY (CERVIX STATUS UNKNOWN)      with BSO    KNEE ARTHROSCOPY Left 4/7/15, 2015    x 2    LAPAROSCOPY      \"springs inserted in to fallopian tubes\" to close tubes    NECK SURGERY  July 2012    Anterior, C5,6,7 bulging disk repair    [place] COLONOSCOPY FLX DX W/COLLJ SPEC WHEN PFRMD N/A 7/17/2018    COLONOSCOPY performed by Tawnya Powell MD at [address] N/A 12/18/2018    [address] performed by Tawnya Powell MD at [address] [facility] Right 6/14/2022    KNEE TOTAL ARTHROPLASTY performed by Antoinette Azul MD at [place] [address].      Jan 2016 DR MACKAY    UPPER GASTROINTESTINAL ENDOSCOPY N/A 7/17/2018    EGD BIOPSY performed by Tawnya Powell MD at [place] [address]. N/A 10/20/2020    EGD BIOPSY performed by Tawnya Powell MD at [address] History   Problem Relation Age of Onset    Heart Disease Mother         dies age 32    Cancer Father         lymph nodes    Pacemaker Sister        SOCIAL HISTORY       Social History     Socioeconomic History    Marital status:      Spouse name: None    Number of children: None    Years of education: None    Highest education level: None   Occupational History    Occupation: disability   Tobacco Use    Smoking status: Never    Smokeless tobacco: Never   Vaping Use    Vaping Use: Never used   Substance and Sexual Activity    Alcohol use:  Yes     Alcohol/week: 0.0 standard drinks     Comment: OCCASSIONAL    Drug use: No    Sexual activity: Yes     Partners: Male           REVIEW OF SYSTEMS      Allergies   Allergen Reactions    Latex Other (See Comments)     Red marks on body    Adhesive Tape      Also plastic tape    Morphine And Related Nausea Only    Nickel Other (See Comments)     Pt stated \"caused infection\"    Other Other (See Comments)     Surgical staples cause infection    Aspirin Nausea And Vomiting       Current Outpatient Medications on File Prior to Encounter   Medication Sig Dispense Refill    HYDROcodone-acetaminophen (NORCO) 5-325 MG per tablet Take 1 tablet by mouth every 4 hours as needed for Pain for up to 7 days. Intended supply: 7 days. Take lowest dose possible to manage pain 42 tablet 0    HYDROcodone-acetaminophen (NORCO) 5-325 MG per tablet Take 1 tablet by mouth every 4 hours as needed for Pain for up to 7 days. Intended supply: 7 days. Take lowest dose possible to manage pain 42 tablet 0    TRULICITY 1.5 LC/1.4WK SOPN INJECT 1.5MG SUBCUTANEOUSLY ONCE WEEKLY DX: E11.65      NIFEdipine (PROCARDIA XL) 60 MG extended release tablet Take 60 mg by mouth daily      LORazepam (ATIVAN) 1 MG tablet TAKE ONE (1) TABLET BY MOUTH TWICE DAILY AS NEEDED FOR ANXIETY      nitroGLYCERIN (NITRODUR) 0.4 MG/HR as directed      omeprazole (PRILOSEC) 20 MG delayed release capsule TAKE 1 CAPSULE BY MOUTH 2 TIMES DAILY (BEFORE MEALS) 180 capsule 1    enoxaparin (LOVENOX) 100 MG/ML injection Inject 100 mg into the skin every 12 hours       FLOVENT DISKUS 50 MCG/BLIST AEPB inhaler TAKE 1 PUFF BY MOUTH TWICE A DAY      glipiZIDE (GLUCOTROL) 5 MG tablet Take 5 mg by mouth 2 times daily (before meals)      furosemide (LASIX) 20 MG tablet TAKE 1 TABLET EVERY DAY 90 tablet 1    Warfarin Sodium (COUMADIN PO) Take 6 mg by mouth daily 9 mg Tuesday and Thursdays  and 6 rest of days      Cholecalciferol (VITAMIN D PO) Take by mouth once a week Pt.  Takes on Sundays Cholecalciferol (VITAMIN D3) 83657 units CAPS Take by mouth once a week      montelukast (SINGULAIR) 10 MG tablet TAKE 1 TABLET BY MOUTH EVERY DAY AT NIGHT 30 tablet 5    ferrous sulfate 27 MG TABS Take by mouth daily       triamcinolone (KENALOG) 0.025 % cream Apply topically 2 times daily. 80 g 1    fluticasone (FLONASE) 50 MCG/ACT nasal spray INSTILL 1 SPRAY INTO EACH NOSTRIL DAILY AT BED TIME 1 Bottle 3    losartan (COZAAR) 25 MG tablet TAKE ONE-HALF TABLET BY MOUTH DAILY 15 tablet 5    levETIRAcetam (KEPPRA) 250 MG tablet Take 1 tablet by mouth 2 times daily 180 tablet 1    venlafaxine (EFFEXOR XR) 150 MG extended release capsule TAKE 1 CAPSULE (150 MG TOTAL) BY MOUTH DAILY. 1    atorvastatin (LIPITOR) 80 MG tablet TAKE 1 TABLET BY MOUTH NIGHTLY AT BEDTIME (Patient taking differently: Take 80 mg by mouth in the morning.) 30 tablet 5    temazepam (RESTORIL) 15 MG capsule Take 15 mg by mouth nightly. .      ONE TOUCH ULTRA TEST strip TEST BLOOD SUGAR 3 TIMES A  strip 5    clotrimazole (LOTRIMIN) 1 % cream Apply topically 2 times daily. 15 g 0    topiramate (TOPAMAX) 25 MG tablet Take 25 mg by mouth       PROAIR  (90 Base) MCG/ACT inhaler Inhale 2 puffs into the lungs every 6 hours as needed       potassium chloride (MICRO-K) 10 MEQ extended release capsule       EXACTECH TEST strip       Misc. Devices MISC True Plus     Testing 3 times a day 100 each 5    busPIRone (BUSPAR) 10 MG tablet Take 10 mg by mouth daily       TRUEPLUS LANCETS 30G MISC 1 each by Does not apply route daily 100 each 3    ARIPiprazole (ABILIFY) 10 MG tablet TAKE 1 TABLET BY MOUTH NIGHTLY 30 tablet 2     No current facility-administered medications on file prior to encounter. General health:  Fairly good. No fever or chills. Skin:  No itching, redness or rash. HEENT:  No headache, epistaxis or sore throat. Neck:  No pain, stiffness or masses. Cardiovascular/Respiratory system:  No chest pain, palpitation or shortness of breath. Gastrointestinal tract: No abdominal pain, Dysphagia, nausea, vomiting, diarrhea or constipation. Genitourinary:  No burning on micturition. No hesitancy, urgency, frequency or discoloration of urine. Locomotor:  See HPI               Neuropsychiatric:  No referable complaints. GENERAL PHYSICAL EXAM:     Vitals: /77   Pulse 84   Temp 97.7 °F (36.5 °C) (Infrared)   Resp 18   Ht 5' 3\" (1.6 m)   Wt 210 lb (95.3 kg)   SpO2 99%   BMI 37.20 kg/m²  Body mass index is 37.2 kg/m². GENERAL APPEARANCE:   Christine Lesches is 65 y.o., female, moderately obese, nourished, conscious, alert. Does not appear to be distress or pain at this time. SKIN:  Warm, dry, no cyanosis or jaundice. Old incision to left knee. Toenails to bilateral feet appears to be fungus in nature. HEAD:  Normocephalic, atraumatic, no swelling or tenderness. EYES:  Pupils equal, reactive to light. EARS:  No discharge, no marked hearing loss. NOSE:  No rhinorrhea, epistaxis or septal deformity. THROAT:  Not congested. No ulceration bleeding or discharge. NECK:  No stiffness, trachea central.                  CHEST:  Symmetrical and equal on expansion. HEART:  RRR S1 > S2. No audible murmurs or gallops. LUNGS:  Equal on expansion, normal breath sounds. No adventitious sounds. ABDOMEN:  Obese. Soft on palpation. No localized tenderness. No guarding or rigidity. LYMPHATICS:  No palpable cervical lymphadenopathy. LOCOMOTOR, BACK AND SPINE:  No tenderness or deformities. Using antalgic gait and use of Rollator at walker to aid in ambulation. EXTREMITIES:  Symmetrical, no pretibial edema.  No calf Primary insomnia 02/03/2016    Acute medial meniscus tear of left knee 04/06/2015    Depression     Acid reflux     Hyperlipidemia     Epilepsy (HCC)     Pelvic pain in female     Colon polyp     History of stroke associated with blood clotting tendency     Cholelithiasis     Complex tear of medial meniscus of left knee as current injury 11/07/2014       ALLEY GONSALEZ, APRN - CNP on 8/10/2022 at 4:19 PM    Total time spent on encounter- PAT provider minutes: 31-40 minutes

## 2022-08-10 NOTE — DISCHARGE INSTRUCTIONS
Pre-op Instructions For Out-Patient Surgery    Medication Instructions:  Please stop herbs and any supplements now (includes vitamins and minerals). Please contact your surgeon and prescribing physician for pre-op instructions for any blood thinners. Coumadin as directed. If you have inhalers/aerosol treatments at home, please use them the morning of your surgery and bring the inhalers with you to the hospital.    Please take the following medications the morning of your surgery with a sip of water:    inhaler, keppra,topamax, losartan, nifedipine, omeprazole    Surgery Instructions:  After midnight before surgery:  Do not eat or drink anything, including water, mints, gum, and hard candy. You may brush your teeth without swallowing. No smoking, chewing tobacco, or street drugs. Please shower or bathe before surgery. If you were given Surgical Scrub Chlorhexidine Gluconate Liquid (CHG), please shower the night before and the morning of your surgery following the detailed instructions you received during your pre-admission visit. Please do not wear any cologne, lotion, powder, deodorant, jewelry, piercings, perfume, makeup, nail polish, hair accessories, or hair spray on the day of surgery. Wear loose comfortable clothing. Leave your valuables at home. Bring a storage case for any glasses/contacts. An adult who is responsible for you MUST drive you home and should be with you for the first 24 hours after surgery. If having out-patient knee and foot surgeries, please arrange for planned crutches, walker, or wheelchair before arriving to the hospital.    The Day of Surgery:  Arrive at [address] [place] [address] Surgery Entrance at the time directed by your surgeon and check in at the desk. If you have a living will or healthcare power of , please bring a copy. You will be taken to the pre-op holding area where you will be prepared for surgery.   A

## 2022-08-10 NOTE — PROGRESS NOTES
Mike Rodríguez returns today status post right total knee on 6/14/2022. She states that she is having a fair amount of pain and and really having a hard time with the progressing with therapy. She states that she has had a couple sprains to her knee and is and she which would prevent her from being as compliant with therapy as she did with her contralateral left total knee. Examination notes that her incision itself looks good. She has a no obvious redness or warmth to her knee. She has slight hypersensitivity but not true RSD she her motion however is pretty tough to get 20 to about 70degrees. There is no signs of the DVT she does have a little bit of swelling in the leg however. Impression  Arthrofibrosis status post right total knee  Previous left total knee doing well    Plan  Patient to be scheduled for manipulation under anesthesia right total knee. She was informed to be back in therapy immediately thereafter.   We will see her back here postoperatively

## 2022-08-11 ENCOUNTER — TELEPHONE (OUTPATIENT)
Dept: ORTHOPEDIC SURGERY | Age: 57
End: 2022-08-11

## 2022-08-11 NOTE — TELEPHONE ENCOUNTER
Patient called and would like someone to call her about stopping her blood thinner pre-op please call pt at [phone] thank you

## 2022-08-16 DIAGNOSIS — Z96.651 STATUS POST TOTAL RIGHT KNEE REPLACEMENT: ICD-10-CM

## 2022-08-17 RX ORDER — HYDROCODONE BITARTRATE AND ACETAMINOPHEN 5; 325 MG/1; MG/1
1 TABLET ORAL EVERY 4 HOURS PRN
Qty: 42 TABLET | Refills: 0 | Status: SHIPPED | OUTPATIENT
Start: 2022-08-17 | End: 2022-08-24

## 2022-08-26 ENCOUNTER — HOSPITAL ENCOUNTER (OUTPATIENT)
Dept: PREADMISSION TESTING | Age: 57
Discharge: HOME | End: 2022-08-30
Payer: MEDICARE

## 2022-08-26 ENCOUNTER — ANESTHESIA EVENT (OUTPATIENT)
Dept: OPERATING ROOM | Age: 57
End: 2022-08-26
Payer: MEDICARE

## 2022-08-26 VITALS — BODY MASS INDEX: 35.44 KG/M2 | WEIGHT: 200 LBS | HEIGHT: 63 IN

## 2022-08-26 NOTE — PROGRESS NOTES
nurse practitioner or house officer. Your IV will be started and you will meet your anesthesiologist.    When you go to surgery, your family will be directed to the surgical waiting room, where the doctor should speak with them after your surgery. After surgery, you will be taken to the recovery room then when you are awake and stable you will go to the short stay unit for preparation to be discharged. If you use a Bi-PAP or C-PAP machine, please bring it with you and leave it in the car in case it is needed in recovery room. Instructions read to Cassie Pisano and understanding verbalized.

## 2022-08-29 ENCOUNTER — TELEPHONE (OUTPATIENT)
Dept: ORTHOPEDIC SURGERY | Age: 57
End: 2022-08-29

## 2022-08-29 ENCOUNTER — HOSPITAL ENCOUNTER (OUTPATIENT)
Age: 57
Setting detail: OUTPATIENT SURGERY
Discharge: HOME | End: 2022-08-29
Attending: ORTHOPAEDIC SURGERY | Admitting: ORTHOPAEDIC SURGERY
Payer: MEDICARE

## 2022-08-29 ENCOUNTER — ANESTHESIA (OUTPATIENT)
Dept: OPERATING ROOM | Age: 57
End: 2022-08-29
Payer: MEDICARE

## 2022-08-29 VITALS
DIASTOLIC BLOOD PRESSURE: 66 MMHG | HEIGHT: 63 IN | WEIGHT: 200 LBS | BODY MASS INDEX: 35.44 KG/M2 | SYSTOLIC BLOOD PRESSURE: 112 MMHG | TEMPERATURE: 97.5 F | OXYGEN SATURATION: 93 % | HEART RATE: 83 BPM | RESPIRATION RATE: 19 BRPM

## 2022-08-29 DIAGNOSIS — Z96.651 STATUS POST TOTAL RIGHT KNEE REPLACEMENT: Primary | ICD-10-CM

## 2022-08-29 DIAGNOSIS — T84.82XA ARTHROFIBROSIS OF TOTAL KNEE REPLACEMENT, INITIAL ENCOUNTER (HCC): Primary | ICD-10-CM

## 2022-08-29 DIAGNOSIS — Z96.659 HISTORY OF KNEE REPLACEMENT, UNSPECIFIED LATERALITY: ICD-10-CM

## 2022-08-29 LAB
GLUCOSE BLD-MCNC: 99 MG/DL (ref 65–105)
INR BLD: 1.2
PROTHROMBIN TIME: 15 SEC (ref 11.8–14.6)

## 2022-08-29 PROCEDURE — 7100000001 HC PACU RECOVERY - ADDTL 15 MIN: Performed by: ORTHOPAEDIC SURGERY

## 2022-08-29 PROCEDURE — C9290 INJ, BUPIVACAINE LIPOSOME: HCPCS | Performed by: ANESTHESIOLOGY

## 2022-08-29 PROCEDURE — 3700000000 HC ANESTHESIA ATTENDED CARE: Performed by: ORTHOPAEDIC SURGERY

## 2022-08-29 PROCEDURE — 27570 FIXATION OF KNEE JOINT: CPT | Performed by: ORTHOPAEDIC SURGERY

## 2022-08-29 PROCEDURE — 2500000003 HC RX 250 WO HCPCS: Performed by: NURSE ANESTHETIST, CERTIFIED REGISTERED

## 2022-08-29 PROCEDURE — 82947 ASSAY GLUCOSE BLOOD QUANT: CPT

## 2022-08-29 PROCEDURE — 85610 PROTHROMBIN TIME: CPT

## 2022-08-29 PROCEDURE — 2580000003 HC RX 258: Performed by: ANESTHESIOLOGY

## 2022-08-29 PROCEDURE — 6360000002 HC RX W HCPCS: Performed by: ANESTHESIOLOGY

## 2022-08-29 PROCEDURE — 64447 NJX AA&/STRD FEMORAL NRV IMG: CPT | Performed by: ANESTHESIOLOGY

## 2022-08-29 PROCEDURE — 2500000003 HC RX 250 WO HCPCS: Performed by: ANESTHESIOLOGY

## 2022-08-29 PROCEDURE — 7100000000 HC PACU RECOVERY - FIRST 15 MIN: Performed by: ORTHOPAEDIC SURGERY

## 2022-08-29 PROCEDURE — 6360000002 HC RX W HCPCS: Performed by: NURSE ANESTHETIST, CERTIFIED REGISTERED

## 2022-08-29 PROCEDURE — 3600000001 HC SURGERY LEVEL 1  BASE: Performed by: ORTHOPAEDIC SURGERY

## 2022-08-29 RX ORDER — LABETALOL HYDROCHLORIDE 5 MG/ML
10 INJECTION, SOLUTION INTRAVENOUS
Status: DISCONTINUED | OUTPATIENT
Start: 2022-08-29 | End: 2022-08-29 | Stop reason: HOSPADM

## 2022-08-29 RX ORDER — DEXAMETHASONE SODIUM PHOSPHATE 4 MG/ML
INJECTION, SOLUTION INTRA-ARTICULAR; INTRALESIONAL; INTRAMUSCULAR; INTRAVENOUS; SOFT TISSUE PRN
Status: DISCONTINUED | OUTPATIENT
Start: 2022-08-29 | End: 2022-08-29 | Stop reason: SDUPTHER

## 2022-08-29 RX ORDER — SODIUM CHLORIDE 0.9 % (FLUSH) 0.9 %
5-40 SYRINGE (ML) INJECTION PRN
Status: DISCONTINUED | OUTPATIENT
Start: 2022-08-29 | End: 2022-08-29 | Stop reason: HOSPADM

## 2022-08-29 RX ORDER — METOCLOPRAMIDE HYDROCHLORIDE 5 MG/ML
10 INJECTION INTRAMUSCULAR; INTRAVENOUS
Status: DISCONTINUED | OUTPATIENT
Start: 2022-08-29 | End: 2022-08-29 | Stop reason: HOSPADM

## 2022-08-29 RX ORDER — ONDANSETRON 2 MG/ML
INJECTION INTRAMUSCULAR; INTRAVENOUS PRN
Status: DISCONTINUED | OUTPATIENT
Start: 2022-08-29 | End: 2022-08-29 | Stop reason: SDUPTHER

## 2022-08-29 RX ORDER — MIDAZOLAM HYDROCHLORIDE 1 MG/ML
INJECTION INTRAMUSCULAR; INTRAVENOUS PRN
Status: DISCONTINUED | OUTPATIENT
Start: 2022-08-29 | End: 2022-08-29 | Stop reason: SDUPTHER

## 2022-08-29 RX ORDER — HYDRALAZINE HYDROCHLORIDE 20 MG/ML
10 INJECTION INTRAMUSCULAR; INTRAVENOUS
Status: DISCONTINUED | OUTPATIENT
Start: 2022-08-29 | End: 2022-08-29 | Stop reason: HOSPADM

## 2022-08-29 RX ORDER — SODIUM CHLORIDE 0.9 % (FLUSH) 0.9 %
5-40 SYRINGE (ML) INJECTION EVERY 12 HOURS SCHEDULED
Status: DISCONTINUED | OUTPATIENT
Start: 2022-08-29 | End: 2022-08-29 | Stop reason: HOSPADM

## 2022-08-29 RX ORDER — BUPIVACAINE HYDROCHLORIDE 5 MG/ML
10 INJECTION, SOLUTION EPIDURAL; INTRACAUDAL ONCE
Status: COMPLETED | OUTPATIENT
Start: 2022-08-29 | End: 2022-08-29

## 2022-08-29 RX ORDER — SODIUM CHLORIDE 9 MG/ML
INJECTION, SOLUTION INTRAVENOUS CONTINUOUS
Status: DISCONTINUED | OUTPATIENT
Start: 2022-08-29 | End: 2022-08-29 | Stop reason: HOSPADM

## 2022-08-29 RX ORDER — FENTANYL CITRATE 50 UG/ML
INJECTION, SOLUTION INTRAMUSCULAR; INTRAVENOUS PRN
Status: DISCONTINUED | OUTPATIENT
Start: 2022-08-29 | End: 2022-08-29 | Stop reason: SDUPTHER

## 2022-08-29 RX ORDER — OXYCODONE HYDROCHLORIDE AND ACETAMINOPHEN 5; 325 MG/1; MG/1
1-2 TABLET ORAL EVERY 4 HOURS PRN
Qty: 42 TABLET | Refills: 0 | Status: SHIPPED | OUTPATIENT
Start: 2022-08-29 | End: 2022-09-05

## 2022-08-29 RX ORDER — FENTANYL CITRATE 50 UG/ML
25 INJECTION, SOLUTION INTRAMUSCULAR; INTRAVENOUS EVERY 5 MIN PRN
Status: DISCONTINUED | OUTPATIENT
Start: 2022-08-29 | End: 2022-08-29 | Stop reason: HOSPADM

## 2022-08-29 RX ORDER — ONDANSETRON 2 MG/ML
4 INJECTION INTRAMUSCULAR; INTRAVENOUS
Status: DISCONTINUED | OUTPATIENT
Start: 2022-08-29 | End: 2022-08-29 | Stop reason: HOSPADM

## 2022-08-29 RX ORDER — LIDOCAINE HYDROCHLORIDE 10 MG/ML
1 INJECTION, SOLUTION EPIDURAL; INFILTRATION; INTRACAUDAL; PERINEURAL
Status: DISCONTINUED | OUTPATIENT
Start: 2022-08-29 | End: 2022-08-29 | Stop reason: HOSPADM

## 2022-08-29 RX ORDER — PROPOFOL 10 MG/ML
INJECTION, EMULSION INTRAVENOUS PRN
Status: DISCONTINUED | OUTPATIENT
Start: 2022-08-29 | End: 2022-08-29 | Stop reason: SDUPTHER

## 2022-08-29 RX ORDER — SODIUM CHLORIDE 9 MG/ML
25 INJECTION, SOLUTION INTRAVENOUS PRN
Status: DISCONTINUED | OUTPATIENT
Start: 2022-08-29 | End: 2022-08-29 | Stop reason: HOSPADM

## 2022-08-29 RX ORDER — DIPHENHYDRAMINE HYDROCHLORIDE 50 MG/ML
12.5 INJECTION INTRAMUSCULAR; INTRAVENOUS
Status: DISCONTINUED | OUTPATIENT
Start: 2022-08-29 | End: 2022-08-29 | Stop reason: HOSPADM

## 2022-08-29 RX ORDER — HYDROCODONE BITARTRATE AND ACETAMINOPHEN 5; 325 MG/1; MG/1
1 TABLET ORAL EVERY 6 HOURS PRN
Status: DISCONTINUED | OUTPATIENT
Start: 2022-08-29 | End: 2022-08-29 | Stop reason: HOSPADM

## 2022-08-29 RX ORDER — SODIUM CHLORIDE 9 MG/ML
INJECTION, SOLUTION INTRAVENOUS PRN
Status: DISCONTINUED | OUTPATIENT
Start: 2022-08-29 | End: 2022-08-29 | Stop reason: HOSPADM

## 2022-08-29 RX ORDER — MIDAZOLAM HYDROCHLORIDE 1 MG/ML
2 INJECTION INTRAMUSCULAR; INTRAVENOUS ONCE
Status: COMPLETED | OUTPATIENT
Start: 2022-08-29 | End: 2022-08-29

## 2022-08-29 RX ORDER — LIDOCAINE HYDROCHLORIDE 20 MG/ML
INJECTION, SOLUTION EPIDURAL; INFILTRATION; INTRACAUDAL; PERINEURAL PRN
Status: DISCONTINUED | OUTPATIENT
Start: 2022-08-29 | End: 2022-08-29 | Stop reason: SDUPTHER

## 2022-08-29 RX ADMIN — DEXAMETHASONE SODIUM PHOSPHATE 8 MG: 4 INJECTION, SOLUTION INTRAMUSCULAR; INTRAVENOUS at 10:37

## 2022-08-29 RX ADMIN — BUPIVACAINE HYDROCHLORIDE 10 ML: 5 INJECTION, SOLUTION EPIDURAL; INTRACAUDAL; PERINEURAL at 09:30

## 2022-08-29 RX ADMIN — MIDAZOLAM 2 MG: 1 INJECTION INTRAMUSCULAR; INTRAVENOUS at 09:24

## 2022-08-29 RX ADMIN — ONDANSETRON 4 MG: 2 INJECTION INTRAMUSCULAR; INTRAVENOUS at 10:37

## 2022-08-29 RX ADMIN — FENTANYL CITRATE 50 MCG: 50 INJECTION, SOLUTION INTRAMUSCULAR; INTRAVENOUS at 10:33

## 2022-08-29 RX ADMIN — PROPOFOL 120 MG: 10 INJECTION, EMULSION INTRAVENOUS at 10:33

## 2022-08-29 RX ADMIN — BUPIVACAINE 10 ML: 13.3 INJECTION, SUSPENSION, LIPOSOMAL INFILTRATION at 09:30

## 2022-08-29 RX ADMIN — FENTANYL CITRATE 50 MCG: 50 INJECTION, SOLUTION INTRAMUSCULAR; INTRAVENOUS at 10:38

## 2022-08-29 RX ADMIN — MIDAZOLAM 2 MG: 1 INJECTION INTRAMUSCULAR; INTRAVENOUS at 10:31

## 2022-08-29 RX ADMIN — SODIUM CHLORIDE: 9 INJECTION, SOLUTION INTRAVENOUS at 09:07

## 2022-08-29 RX ADMIN — LIDOCAINE HYDROCHLORIDE 80 MG: 20 INJECTION, SOLUTION EPIDURAL; INFILTRATION; INTRACAUDAL; PERINEURAL at 10:33

## 2022-08-29 RX ADMIN — HYDROMORPHONE HYDROCHLORIDE 0.5 MG: 1 INJECTION, SOLUTION INTRAMUSCULAR; INTRAVENOUS; SUBCUTANEOUS at 11:07

## 2022-08-29 ASSESSMENT — PAIN DESCRIPTION - FREQUENCY
FREQUENCY: CONTINUOUS
FREQUENCY: CONTINUOUS

## 2022-08-29 ASSESSMENT — PAIN DESCRIPTION - LOCATION
LOCATION: KNEE
LOCATION: KNEE

## 2022-08-29 ASSESSMENT — PAIN DESCRIPTION - DESCRIPTORS
DESCRIPTORS: ACHING

## 2022-08-29 ASSESSMENT — PAIN DESCRIPTION - ORIENTATION
ORIENTATION: RIGHT
ORIENTATION: RIGHT

## 2022-08-29 ASSESSMENT — PAIN - FUNCTIONAL ASSESSMENT: PAIN_FUNCTIONAL_ASSESSMENT: 0-10

## 2022-08-29 ASSESSMENT — PAIN SCALES - GENERAL
PAINLEVEL_OUTOF10: 8
PAINLEVEL_OUTOF10: 6

## 2022-08-29 ASSESSMENT — PAIN DESCRIPTION - ONSET
ONSET: AWAKENED FROM SLEEP
ONSET: AWAKENED FROM SLEEP

## 2022-08-29 ASSESSMENT — PAIN DESCRIPTION - PAIN TYPE
TYPE: SURGICAL PAIN
TYPE: SURGICAL PAIN

## 2022-08-29 NOTE — INTERVAL H&P NOTE
Update History & Physical     The patient's History and Physical of 8/10/22 was reviewed with the patient. I personally examined the patient, I concur with above findings, there was no change. Physical exam was completed today and unchanged from source note. Heart rhythm and rate remains regular and normal, all lung fields CTA as noted per source H&P. Patient states they have been NPO since before midnight. Medications taken TODAY (w/sip of water): Sheron  Patient denies taking any anti-coagulants, including aspirin currently. No personal or family hx of complications w/anesthesia. Denies personal hx of MRSA. Positive personal hx of blood clots. Denies current CP, palpitations, dizziness, SOB, URI s/s, GI issues, fever/chills. Review current vital signs per RN flow sheet. (Notation: Medications listed are not currently reconciled at the signing of this H&P note, to be reconciled in pre-op per RN)    Most recent lab work reviewed:  Lab Results   Component Value Date     08/10/2022    K 3.4 (L) 08/10/2022     08/10/2022    CO2 26 08/10/2022    BUN 11 08/10/2022    CREATININE 0.74 08/10/2022    GLUCOSE 99 08/10/2022    CALCIUM 9.2 08/10/2022    PROT 7.0 11/15/2018    LABALBU 3.7 11/15/2018    BILITOT 0.18 (L) 11/15/2018    ALKPHOS 184 (H) 11/15/2018    AST 16 11/15/2018    ALT 13 11/15/2018    LABGLOM >60 08/10/2022    GFRAA >60 08/10/2022    GLOB NOT REPORTED 02/07/2017       Lab Results   Component Value Date    WBC 7.0 08/10/2022    HGB 11.1 (L) 08/10/2022    HCT 34.3 (L) 08/10/2022    MCV 86.5 08/10/2022     08/10/2022       Surgical site was confirmed per myself and the patient.   Electronically signed by KAMARI Styles CNP on 8/29/2022 at 8:32 AM

## 2022-08-29 NOTE — OP NOTE
Operative Note      Patient: Filemon Van  YOB: 1965  MRN: [id number]    Date of Procedure: 8/29/2022    Pre-Op Diagnosis: Adhesive capsulitis of right total knee [M76.891]    Post-Op Diagnosis: Same       Procedure(s):  TOTAL KNEE MANIPULATION    Surgeon(s):  Melinda Ureña MD    Assistant:   Resident: Thomas Xavier DO    Anesthesia: General plus preoperative adductor canal block    Estimated Blood Loss (mL): Minimal    Complications: None    Specimens:   * No specimens in log *    Implants:  * No implants in log *      Drains: * No LDAs found *    Findings: Adhesive capsulitis of the right total knee    Detailed Description of Procedure:   Patient has history of a total knee arthroplasty. That she is a last appointment was noted to have lack of motion motion was tender only about 85 degrees felt patient benefit from manipulation she consented with good comprehension of all risk benefits    Patient received a preoperative abductor canal block in the holding area per Dr. Rosalia Howard and then was taken operative suite where it general anesthesia was administered. Timeout was called to verify laterality the above motion parameters were identified adhesive capsulitis was then broken up with a combination of flexing as well as manipulated into flexion and extension.   This was done in several series obvious adhesion breakage can be appreciated by the end of the procedure patient had full extension and flexes easily to 135 degrees no injection was performed patient was awakened taken postanesthesia care unit in good condition    Electronically signed by Melinda Ureña MD on 8/29/2022 at 10:41 AM

## 2022-08-29 NOTE — DISCHARGE INSTRUCTIONS
General Discharge Instructions      Call your doctor (surgeon) if you have any of the followin. Pain is not helped by medication ordered. 2. Oral temperature is 101 degrees or higher. 3. Increased swelling, redness, warmth, or hardness, around surgery area. 4. Increasing and progressive bleeding or drainage from the surgery area. 5. Toe or finger nail beds are blue or cold to touch  6. Unable to urinate within 8 hours after surgery. 7. For any questions.

## 2022-08-29 NOTE — ANESTHESIA PRE PROCEDURE
Department of Anesthesiology  Preprocedure Note       Name:  Hilda Mehta   Age:  65 y.o.  :  1965                                          MRN:  [id number]         Date:  2022      Surgeon: Noel Alejandra):  Melvina Story MD    Procedure: Procedure(s):  TOTAL KNEE MANIPULATION    Medications prior to admission:   Prior to Admission medications    Medication Sig Start Date End Date Taking? Authorizing Provider   TRULICITY 1.5 UW/2.4QX SOPN INJECT 1.5MG SUBCUTANEOUSLY ONCE WEEKLY DX: E11.65 22   Historical Provider, MD   NIFEdipine (PROCARDIA XL) 60 MG extended release tablet Take 60 mg by mouth daily 22   Historical Provider, MD   LORazepam (ATIVAN) 1 MG tablet TAKE ONE (1) TABLET BY MOUTH TWICE DAILY AS NEEDED FOR ANXIETY 3/22/22   Historical Provider, MD   nitroGLYCERIN (NITRODUR) 0.4 MG/HR as directed    Historical Provider, MD   omeprazole (PRILOSEC) 20 MG delayed release capsule TAKE 1 CAPSULE BY MOUTH 2 TIMES DAILY (BEFORE MEALS) 22   Gentry Vickers MD   enoxaparin (LOVENOX) 100 MG/ML injection Inject 100 mg into the skin every 12 hours  10/16/20   Historical Provider, MD   FLOVENT DISKUS 50 MCG/BLIST AEPB inhaler TAKE 1 PUFF BY MOUTH TWICE A DAY 20   Historical Provider, MD   glipiZIDE (GLUCOTROL) 5 MG tablet Take 5 mg by mouth 2 times daily (before meals)    Historical Provider, MD   Warfarin Sodium (COUMADIN PO) Take 6 mg by mouth daily 9 mg Tuesday and   and 6 rest of days    Historical Provider, MD   Cholecalciferol (VITAMIN D PO) Take by mouth once a week Pt. Takes on Sundays    Historical Provider, MD   Cholecalciferol (VITAMIN D3) 63951 units CAPS Take by mouth once a week    Historical Provider, MD   montelukast (SINGULAIR) 10 MG tablet TAKE 1 TABLET BY MOUTH EVERY DAY AT NIGHT 11/15/18   [facility]   ferrous sulfate 27 MG TABS Take by mouth daily     Historical Provider, MD   triamcinolone (KENALOG) 0.025 % cream Apply topically 2 times daily.  10/5/18   Lendel Day [place]   fluticasone (FLONASE) 50 MCG/ACT nasal spray INSTILL 1 SPRAY INTO EACH NOSTRIL DAILY AT BED TIME 9/27/18   Rizwana Canales   losartan (COZAAR) 25 MG tablet TAKE ONE-HALF TABLET BY MOUTH DAILY 9/11/18   Rizwana Cohn [place]   levETIRAcetam (KEPPRA) 250 MG tablet Take 1 tablet by mouth 2 times daily 9/1/18   Nancy Deng   venlafaxine (EFFEXOR XR) 150 MG extended release capsule TAKE 1 CAPSULE (150 MG TOTAL) BY MOUTH DAILY. 4/28/18   Historical Provider, MD   atorvastatin (LIPITOR) 80 MG tablet TAKE 1 TABLET BY MOUTH NIGHTLY AT BEDTIME  Patient taking differently: Take 80 mg by mouth daily 6/4/18   Rizwana Cohn [place]   temazepam (RESTORIL) 15 MG capsule Take 15 mg by mouth nightly. . 4/30/18   Historical Provider, MD   ONE TOUCH ULTRA TEST strip TEST BLOOD SUGAR 3 TIMES A DAY 4/23/18   Andrei Wilson MD   clotrimazole (LOTRIMIN) 1 % cream Apply topically 2 times daily. 3/14/18   Nancy Deng   topiramate (TOPAMAX) 25 MG tablet Take 25 mg by mouth  2/10/18   Historical Provider, MD   PROAIR  (95 Base) MCG/ACT inhaler Inhale 2 puffs into the lungs every 6 hours as needed  1/10/18   Historical Provider, MD   potassium chloride (MICRO-K) 10 MEQ extended release capsule  12/31/17   Historical Provider, MD   EXACTECH TEST strip  12/13/17   Historical Provider, MD [employer] MISC True Plus     Testing 3 times a day 10/17/17   Rizwana Canales   busPIRone (BUSPAR) 10 MG tablet Take 10 mg by mouth daily     Historical Provider, MD   TRUEPLUS LANCETS 30G MISC 1 each by Does not apply route daily 4/11/17   Rizwana Canales   ARIPiprazole (ABILIFY) 10 MG tablet TAKE 1 TABLET BY MOUTH NIGHTLY 4/10/17   Nancy Deng       Current medications:    No current facility-administered medications for this visit. No current outpatient medications on file.      Facility-Administered Medications Ordered in Other Visits   Medication Dose Route Frequency Provider Last Rate Last Admin    lidocaine PF 1 % injection 1 mL  1 mL IntraDERmal Once PRN Cristina Tirado MD        0.9 % sodium chloride infusion   IntraVENous Continuous Cristina Tirado MD        sodium chloride flush 0.9 % injection 5-40 mL  5-40 mL IntraVENous 2 times per day Cristina Tirado MD        sodium chloride flush 0.9 % injection 5-40 mL  5-40 mL IntraVENous PRN Cristina Tirado MD        0.9 % sodium chloride infusion   IntraVENous PRN Cristina Tirado MD        bupivacaine liposome (EXPAREL) 1.3 % injection 133 mg  10 mL SubCUTAneous Once Bridget Banegas MD        bupivacaine (PF) (MARCAINE) 0.5 % injection 50 mg  10 mL Infiltration Once Bridget Baengas MD           Allergies: Allergies   Allergen Reactions    Latex Other (See Comments)     Red marks on body    Adhesive Tape      Also plastic tape    Morphine And Related Nausea Only    Nickel Other (See Comments)     Pt stated \"caused infection\"    Other Other (See Comments)     Surgical staples cause infection    Aspirin Nausea And Vomiting    Lisinopril Nausea And Vomiting       Problem List:    Patient Active Problem List   Diagnosis Code    Complex tear of medial meniscus of left knee as current injury O26.114M    Depression F32. A    Acid reflux K21.9    Hyperlipidemia E78.5    Epilepsy ([facility]) G40.909    Pelvic pain in female R10.2    Colon polyp K63.5    History of stroke associated with blood clotting tendency Z86.73    Cholelithiasis K80.20    Acute medial meniscus tear of left knee S83.242A    Primary insomnia F51.01    Hiatal hernia K44.9    Major depressive disorder, recurrent, severe without psychotic features ([facility]) F33.2    Chronic post-traumatic stress disorder (PTSD) F43.12    ERICK (generalized anxiety disorder) F41.1    Abdominal pain R10.9    Diarrhea R19.7    Essential hypertension I10    Lumbosacral spondylosis without myelopathy M47.817    Type 2 diabetes mellitus with diabetic polyneuropathy, without long-term current use of insulin ([facility]) E11.42    Lumbar radiculopathy M54.16    TIA (transient ischemic attack) G45.9    Facial numbness R20.0    Left-sided weakness R53.1    Worsening headaches R51.9    Chronic use of opiate for therapeutic purpose Z79.891    Lumbar spondylolysis M43.06    Generalized abdominal pain R10.84    Bloating R14.0    DDD (degenerative disc disease), lumbar M51.36    Encounter for medication monitoring Z51.81    Lumbar facet arthropathy M47.816    Sacroiliitis (HCC) M46.1    Delayed gastric emptying K30    Hemorrhage of anus and rectum K62.5    Other irritable bowel syndrome K58.8    Morbid (severe) obesity due to excess calories (HCC) E66.01    Occult blood in stools R19.5    Gastroparesis K31.84    Rectal bleeding K62.5    Hx of blood clots Z86.718    Primary osteoarthritis of right knee M17.11       Past Medical History:        Diagnosis Date    Acid reflux     Arthritis     Asthma     Coughing     dry due to ace inhibitor    CPAP (continuous positive airway pressure) dependence     Depression     uses Abilify, Buspar, Trazodone for this    Diabetes mellitus ([facility]     Difficult intubation     Diverticulitis     Environmental allergies     Epilepsy ([facility]     MAY 2018,. ... 4/12/22: States last seizure was 6 months ago.      Factor V Leiden ([facility]     Gastroparesis     Generalized abdominal pain     Hemorrhage of anus and rectum     Hiatal hernia     History of stroke associated with blood clotting tendency 2003    had clot in brain and left neck, left side weakness residual    Hx of blood clots     right breast, brain, neck    Hyperlipidemia     Hypertension     Insomnia     uses Trazodone for this    Pre-procedure lab exam 01/09/2018    Sleep apnea     C PAP    Unspecified cerebral artery occlusion with cerebral infarction 06/2003    SEE BELOW, [address] Mini stroke    Wears glasses        Past Surgical History:        Procedure Laterality Date    APPENDECTOMY      CHOLECYSTECTOMY      COLONOSCOPY      with polypectomy    COLONOSCOPY  1/22/16    AND EGD    COLONOSCOPY  01/05/2017    DR MACKAY-random biopsies.  COLONOSCOPY N/A 10/20/2020    COLONOSCOPY POLYPECTOMY HOT BIOPSY performed by Fazal Parsons MD at [place] [address]      2 times   [facility]  2/4/2016    laparoscopic robotic    HYSTERECTOMY (CERVIX STATUS UNKNOWN)      with BSO    KNEE ARTHROSCOPY Left 4/7/15, 2015    x 2    LAPAROSCOPY      \"springs inserted in to fallopian tubes\" to close tubes   [facility] NECK SURGERY  July 2012    Anterior, C5,6,7 bulging disk repair    AR COLONOSCOPY FLX DX W/COLLJ SPEC WHEN PFRMD N/A 7/17/2018    COLONOSCOPY performed by Fazal Parsons MD at [address] [place] [address] N/A 12/18/2018    SIGMOIDOSCOPY DIAGNOSTIC FLEXIBLE performed by Fazal Parsons MD at [address] [place] [address] Right 6/14/2022    KNEE TOTAL ARTHROPLASTY performed by Kenzie Jean MD at [address] [place] [address]      Jan 2016  [facility] GASTROINTESTINAL ENDOSCOPY N/A 7/17/2018    EGD BIOPSY performed by Fazal Parsons MD at [address] [place] [address] N/A 10/20/2020    EGD BIOPSY performed by Fazal Parsons MD at [address] [place] [address] History:    Social History     Tobacco Use    Smoking status: Never    Smokeless tobacco: Never   Substance Use Topics    Alcohol use: Yes     Alcohol/week: 0.0 standard drinks     Comment: OCCASSIONAL                                Counseling given: Not Answered      Vital Signs (Current): There were no vitals filed for this visit.                                            BP Readings from Last 3 Encounters:   08/29/22 (!) 158/99   08/10/22 126/77   06/14/22 107/68       NPO Status:                                                                                 BMI:   Wt Readings from Last 3 Encounters:   08/29/22 200 lb (90.7 kg)   08/26/22 200 lb (90.7 kg)   08/10/22 210 lb (95.3 kg)     There is no height or weight on file to calculate BMI.    CBC:   Lab Results   Component Value Date/Time    WBC 7.0 08/10/2022 04:25 PM    RBC 3.96 08/10/2022 04:25 PM    RBC 4.55 02/01/2012 05:48 AM    HGB 11.1 08/10/2022 04:25 PM    HCT 34.3 08/10/2022 04:25 PM    MCV 86.5 08/10/2022 04:25 PM    RDW 15.5 08/10/2022 04:25 PM     08/10/2022 04:25 PM     02/01/2012 05:48 AM       CMP:   Lab Results   Component Value Date/Time     08/10/2022 04:25 PM    K 3.4 08/10/2022 04:25 PM     08/10/2022 04:25 PM    CO2 26 08/10/2022 04:25 PM    BUN 11 08/10/2022 04:25 PM    CREATININE 0.74 08/10/2022 04:25 PM    GFRAA >60 08/10/2022 04:25 PM    LABGLOM >60 08/10/2022 04:25 PM    GLUCOSE 99 08/10/2022 04:25 PM    GLUCOSE 126 01/30/2012 03:46 PM    PROT 7.0 11/15/2018 10:25 PM    CALCIUM 9.2 08/10/2022 04:25 PM    BILITOT 0.18 11/15/2018 10:25 PM    ALKPHOS 184 11/15/2018 10:25 PM    AST 16 11/15/2018 10:25 PM    ALT 13 11/15/2018 10:25 PM       POC Tests: No results for input(s): POCGLU, POCNA, POCK, POCCL, POCBUN, POCHEMO, POCHCT in the last 72 hours. Coags:   Lab Results   Component Value Date/Time    PROTIME 18.1 01/16/2019 11:00 AM    PROTIME 25.9 10/05/2018 11:09 AM    INR 1.8 01/16/2019 11:00 AM    APTT 59.9 11/15/2018 10:25 PM       HCG (If Applicable): No results found for: PREGTESTUR, PREGSERUM, HCG, HCGQUANT     ABGs: No results found for: PHART, PO2ART, JID8ZJG, LZG4UUD, BEART, R8XAVCSV     Type & Screen (If Applicable):  No results found for: LABABO, LABRH    Drug/Infectious Status (If Applicable):  No results found for: HIV, HEPCAB    COVID-19 Screening (If Applicable):   Lab Results   Component Value Date/Time    COVID19 Not Detected 10/16/2020 02:18 AM           Anesthesia Evaluation  Patient summary reviewed and Nursing notes reviewed   history of anesthetic complications: history of difficult intubation.   Airway: Mallampati: II  TM distance: >3 FB   Neck ROM: full  Mouth opening: > = 3 FB   Dental: normal exam         Pulmonary:normal exam  breath sounds clear to auscultation  (+) sleep apnea: on CPAP,                             Cardiovascular:    (+) hypertension:, hyperlipidemia      ECG reviewed  Rhythm: regular  Rate: normal  Echocardiogram reviewed               ROS comment: Normal left ventricle size, wall thickness and function with an estimated EF > 55%. (2017)     Neuro/Psych:   (+) seizures:, CVA:, neuromuscular disease:, TIA, headaches:, psychiatric history:depression/anxiety              ROS comment: PTSD  DDD - lumbar GI/Hepatic/Renal:   (+) hiatal hernia, GERD:,          ROS comment: IBS. Endo/Other:    (+) DiabetesType II DM, , blood dyscrasia (Factor V Leiden ): Factor V and anticoagulation therapy, arthritis: OA., electrolyte abnormalities (hypokalemia), . Abdominal:             Vascular:   + DVT, . Other Findings:             Anesthesia Plan      general and regional     ASA 3     (Preop adductor canal block - r/b/a discussed including bleeding, infection, and nerve injury. Patient agrees to proceed with procedure.)  Induction: intravenous. MIPS: Postoperative opioids intended and Prophylactic antiemetics administered. Anesthetic plan and risks discussed with patient. Plan discussed with CRNA.                     Balbina Sigala MD   8/29/2022

## 2022-08-29 NOTE — ANESTHESIA POSTPROCEDURE EVALUATION
Department of Anesthesiology  Postprocedure Note    Patient: William Pittman  MRN: [id number]  YOB: 1965  Date of evaluation: 8/29/2022      Procedure Summary     Date: 08/29/22 Room / Location: [address] [place] [address] / [facility]: EDNNIS KEN    Anesthesia Start: 1031 Anesthesia Stop: 0599    Procedure: TOTAL KNEE MANIPULATION (Right: Knee) Diagnosis:       Adhesive capsulitis of right knee      (Adhesive capsulitis of right knee [E36.824])    Surgeons: Natalie Trivedi MD Responsible Provider:     Anesthesia Type: General ASA Status: 3          Anesthesia Type: General    Hayden Phase I: Hayden Score: 10    Hayden Phase II:        Anesthesia Post Evaluation    Comments: POST- ANESTHESIA EVALUATION       Pt Name: William Pittman  MRN: [id number]  YOB: 1965  Date of evaluation: 8/29/2022  Time:  12:55 PM      /66   Pulse 83   Temp 97.5 °F (36.4 °C) (Infrared)   Resp 19   Ht 5' 3\" (1.6 m)   Wt 200 lb (90.7 kg)   SpO2 93%   BMI 35.43 kg/m²      Consciousness Level  Awake  Cardiopulmonary Status  Stable  Pain Adequately Treated YES  Nausea / Vomiting  NO  Adequate Hydration  YES  Anesthesia Related Complications NONE      Electronically signed by Gisel Husain MD on 8/29/2022 at 12:55 PM

## 2022-08-29 NOTE — ANESTHESIA PROCEDURE NOTES
Peripheral Block    Patient location during procedure: pre-op  Reason for block: post-op pain management and at surgeon's request  Start time: 8/29/2022 9:30 AM  End time: 8/29/2022 9:36 AM  Staffing  Performed: anesthesiologist   Anesthesiologist: Joceline Kearney MD  Preanesthetic Checklist  Completed: patient identified, IV checked, site marked, risks and benefits discussed, surgical/procedural consents, equipment checked, pre-op evaluation, timeout performed, anesthesia consent given, oxygen available, monitors applied/VS acknowledged, fire risk safety assessment completed and verbalized and blood product R/B/A discussed and consented  Peripheral Block   Patient position: supine  Prep: ChloraPrep  Provider prep: mask and sterile gloves  Patient monitoring: cardiac monitor, continuous pulse ox, frequent blood pressure checks, IV access, oxygen and responsive to questions  Block type: Saphenous  Laterality: right  Injection technique: single-shot  Guidance: ultrasound guided    Needle   Needle type: short-bevel   Needle gauge: 20 G  Needle localization: anatomical landmarks and ultrasound guidance  Needle length: 8 cm  Assessment   Injection assessment: negative aspiration for heme, no paresthesia on injection, local visualized surrounding nerve on ultrasound and no intravascular symptoms  Paresthesia pain: none  Slow fractionated injection: yes  Hemodynamics: stable  Real-time US image taken/store: yes  Outcomes: patient tolerated procedure well and uncomplicated    Medications Administered  bupivacaine liposome (EXPAREL) 1.3 % injection 133 mg - SubCUTAneous, Thigh Right   10 mL - 8/29/2022 9:30:00 AM  bupivacaine (PF) (MARCAINE) 0.5 % injection 50 mg - Infiltration, Thigh Right   10 mL - 8/29/2022 9:30:00 AM

## 2022-09-06 ENCOUNTER — TELEPHONE (OUTPATIENT)
Dept: ORTHOPEDIC SURGERY | Age: 57
End: 2022-09-06

## 2022-09-06 NOTE — TELEPHONE ENCOUNTER
Patient called office stating that she is in tremendous pain since her total knee manipulation on 8/29/22. Patient states that she has been taking a pain pill every 4 hours and icing. She also mentioned that she hast started PT. She states that she currently has 17 pain pills left. I recommended that she try taking 2 percocet tablets every four hours and hopefully this will start to manage her pain. At that point I suggested that she return to only taking 1 tablet every 4 hours and supplement with ibu. Patient voiced understanding and is going to call the office if she continues to have issues even with taking 2 tablets at a time.

## 2022-09-12 ENCOUNTER — OFFICE VISIT (OUTPATIENT)
Dept: ORTHOPEDIC SURGERY | Age: 57
End: 2022-09-12

## 2022-09-12 DIAGNOSIS — Z96.651 STATUS POST TOTAL RIGHT KNEE REPLACEMENT: Primary | ICD-10-CM

## 2022-09-12 PROCEDURE — 99024 POSTOP FOLLOW-UP VISIT: CPT | Performed by: ORTHOPAEDIC SURGERY

## 2022-09-12 RX ORDER — OXYCODONE HYDROCHLORIDE AND ACETAMINOPHEN 5; 325 MG/1; MG/1
1 TABLET ORAL EVERY 6 HOURS PRN
Qty: 28 TABLET | Refills: 0 | Status: SHIPPED | OUTPATIENT
Start: 2022-09-12 | End: 2022-09-19

## 2022-09-12 NOTE — PROGRESS NOTES
Hayder Marie returns today status post manipulation under anesthesia right total knee on 8/29/2022. She says her motion feels little bit better but still feels tight    Examination notes that sure her incisions pristine her she is about 5 degrees full [place] being straight on her out I can get a really pusher but it is really painful for her she flexes to about 100 degrees which is another big improvement but still is tight after that. She has lots of major pain issues and there is no signs of RSD however. Impression  Status post manipulation under anesthesia right total knee    Plan  Patient was encouraged to get back into therapy as is much as possible I would like to see her back in 4 weeks for reevaluation.   She may require a dynamic splint in the future if not getting any improvement in her motion

## 2022-09-17 DIAGNOSIS — Z96.651 STATUS POST TOTAL RIGHT KNEE REPLACEMENT: Primary | ICD-10-CM

## 2022-09-19 RX ORDER — TIZANIDINE 4 MG/1
TABLET ORAL
Qty: 30 TABLET | OUTPATIENT
Start: 2022-09-19

## 2022-09-29 RX ORDER — OMEPRAZOLE 20 MG/1
20 CAPSULE, DELAYED RELEASE ORAL
Qty: 180 CAPSULE | Refills: 1 | OUTPATIENT
Start: 2022-09-29

## 2022-10-26 ENCOUNTER — OFFICE VISIT (OUTPATIENT)
Dept: ORTHOPEDIC SURGERY | Age: 57
End: 2022-10-26

## 2022-10-26 DIAGNOSIS — Z96.651 STATUS POST TOTAL RIGHT KNEE REPLACEMENT: Primary | ICD-10-CM

## 2022-10-26 PROCEDURE — 99024 POSTOP FOLLOW-UP VISIT: CPT | Performed by: ORTHOPAEDIC SURGERY

## 2022-10-26 NOTE — PROGRESS NOTES
Dylon Malik returns today status post right total knee subsequent manipulation on 8/29/2022. She still remains in physical therapy and she says overall feels much better if she still has a little tightness but she is getting better    Examination notes that she is about 3 or 4 degrees of full extension I can push her almost all the way straight but she goes back to the flexion she flexes to about 120 degrees good varus and valgus stability and good tracking. Impression  Status post right total knee subsequent manipulation    Plan  Patient was encouraged to keep working on her final a full degrees of extension. She continue her physical therapy.   She is to call if any problems otherwise back in June of next year for her follow-up first anniversary

## 2022-12-13 RX ORDER — OMEPRAZOLE 20 MG/1
20 CAPSULE, DELAYED RELEASE ORAL
Qty: 180 CAPSULE | Refills: 1 | OUTPATIENT
Start: 2022-12-13

## 2022-12-13 NOTE — TELEPHONE ENCOUNTER
Patient has not seen Dr Jesus Alberto Menchaca since 2020 and has recently seen a [facility] Gastroenterologist in October. This mediation is no longer Prescribed by Dr Spencer Gupta.

## 2023-01-12 ENCOUNTER — TELEPHONE (OUTPATIENT)
Dept: ORTHOPEDIC SURGERY | Age: 58
End: 2023-01-12

## 2023-01-12 NOTE — TELEPHONE ENCOUNTER
Pt called stating she is still having a lot of pain since her R knee surgery in June. She states she can't get in and out of bathtub, has a hard time sleeping, limps, has a hard time with activities of daily living. Her PCP suggested she call. Please call pt with recommendations.

## 2023-01-13 NOTE — TELEPHONE ENCOUNTER
Unable to leave  for patient  Mailbox is full.   Patient is to call and schedule with Jesse PRATT as Dr Dolphus Ormond have limited appointments do to his upcoming surgery

## 2023-01-18 ENCOUNTER — OFFICE VISIT (OUTPATIENT)
Dept: ORTHOPEDIC SURGERY | Age: 58
End: 2023-01-18
Payer: MEDICARE

## 2023-01-18 ENCOUNTER — HOSPITAL ENCOUNTER (OUTPATIENT)
Age: 58
Setting detail: SPECIMEN
Discharge: HOME | End: 2023-01-18
Payer: MEDICARE

## 2023-01-18 VITALS — WEIGHT: 200 LBS | RESPIRATION RATE: 14 BRPM | BODY MASS INDEX: 35.44 KG/M2 | HEIGHT: 63 IN

## 2023-01-18 DIAGNOSIS — Z96.651 S/P TKR (TOTAL KNEE REPLACEMENT), RIGHT: Primary | ICD-10-CM

## 2023-01-18 DIAGNOSIS — Z96.651 S/P TKR (TOTAL KNEE REPLACEMENT), RIGHT: ICD-10-CM

## 2023-01-18 DIAGNOSIS — M25.561 CHRONIC KNEE PAIN AFTER TOTAL REPLACEMENT OF RIGHT KNEE JOINT: ICD-10-CM

## 2023-01-18 DIAGNOSIS — Z96.651 CHRONIC KNEE PAIN AFTER TOTAL REPLACEMENT OF RIGHT KNEE JOINT: ICD-10-CM

## 2023-01-18 DIAGNOSIS — G89.29 CHRONIC KNEE PAIN AFTER TOTAL REPLACEMENT OF RIGHT KNEE JOINT: ICD-10-CM

## 2023-01-18 LAB
ABSOLUTE EOS #: 0.1 K/UL (ref 0–0.4)
ABSOLUTE LYMPH #: 1.7 K/UL (ref 1–4.8)
ABSOLUTE MONO #: 0.5 K/UL (ref 0.1–1.3)
BASOPHILS # BLD: 1 % (ref 0–2)
BASOPHILS ABSOLUTE: 0.1 K/UL (ref 0–0.2)
C-REACTIVE PROTEIN: 7.7 MG/L (ref 0–5)
EOSINOPHILS RELATIVE PERCENT: 1 % (ref 0–4)
HCT VFR BLD CALC: 33.8 % (ref 36–46)
HEMOGLOBIN: 10.8 G/DL (ref 12–16)
LYMPHOCYTES # BLD: 27 % (ref 24–44)
MCH RBC QN AUTO: 28 PG (ref 26–34)
MCHC RBC AUTO-ENTMCNC: 31.8 G/DL (ref 31–37)
MCV RBC AUTO: 87.8 FL (ref 80–100)
MONOCYTES # BLD: 8 % (ref 1–7)
PDW BLD-RTO: 15.5 % (ref 11.5–14.9)
PLATELET # BLD: 304 K/UL (ref 150–450)
PMV BLD AUTO: 8.4 FL (ref 6–12)
RBC # BLD: 3.84 M/UL (ref 4–5.2)
SEDIMENTATION RATE, ERYTHROCYTE: 10 MM/HR (ref 0–30)
SEG NEUTROPHILS: 63 % (ref 36–66)
SEGMENTED NEUTROPHILS ABSOLUTE COUNT: 4 K/UL (ref 1.3–9.1)
WBC # BLD: 6.3 K/UL (ref 3.5–11)

## 2023-01-18 PROCEDURE — 36415 COLL VENOUS BLD VENIPUNCTURE: CPT

## 2023-01-18 PROCEDURE — 3017F COLORECTAL CA SCREEN DOC REV: CPT | Performed by: PHYSICIAN ASSISTANT

## 2023-01-18 PROCEDURE — 99214 OFFICE O/P EST MOD 30 MIN: CPT | Performed by: PHYSICIAN ASSISTANT

## 2023-01-18 PROCEDURE — 85025 COMPLETE CBC W/AUTO DIFF WBC: CPT

## 2023-01-18 PROCEDURE — G8484 FLU IMMUNIZE NO ADMIN: HCPCS | Performed by: PHYSICIAN ASSISTANT

## 2023-01-18 PROCEDURE — G8428 CUR MEDS NOT DOCUMENT: HCPCS | Performed by: PHYSICIAN ASSISTANT

## 2023-01-18 PROCEDURE — 86140 C-REACTIVE PROTEIN: CPT

## 2023-01-18 PROCEDURE — 1036F TOBACCO NON-USER: CPT | Performed by: PHYSICIAN ASSISTANT

## 2023-01-18 PROCEDURE — 85652 RBC SED RATE AUTOMATED: CPT

## 2023-01-18 PROCEDURE — G8417 CALC BMI ABV UP PARAM F/U: HCPCS | Performed by: PHYSICIAN ASSISTANT

## 2023-01-18 RX ORDER — TIZANIDINE 4 MG/1
4 TABLET ORAL NIGHTLY PRN
Qty: 30 TABLET | Refills: 0 | Status: SHIPPED | OUTPATIENT
Start: 2023-01-18

## 2023-01-19 ENCOUNTER — TELEPHONE (OUTPATIENT)
Dept: ORTHOPEDIC SURGERY | Age: 58
End: 2023-01-19

## 2023-01-19 DIAGNOSIS — Z96.651 S/P TKR (TOTAL KNEE REPLACEMENT), RIGHT: ICD-10-CM

## 2023-01-19 DIAGNOSIS — M25.561 RIGHT KNEE PAIN, UNSPECIFIED CHRONICITY: Primary | ICD-10-CM

## 2023-01-19 NOTE — TELEPHONE ENCOUNTER
Patient called to request an order for a walk in shower; That she can provide to her landlord. Patient states she is having trouble getting into her shower.  Patient can be called back at [phone]

## 2023-01-19 NOTE — TELEPHONE ENCOUNTER
I contacted the patient for verification on the walk-in shower. I will have Jordan Scruggs write a paper script on Monday afternoon.

## 2023-02-09 DIAGNOSIS — Z96.651 S/P TKR (TOTAL KNEE REPLACEMENT), RIGHT: ICD-10-CM

## 2023-02-09 RX ORDER — TIZANIDINE 4 MG/1
4 TABLET ORAL NIGHTLY PRN
Qty: 30 TABLET | Refills: 0 | Status: SHIPPED | OUTPATIENT
Start: 2023-02-09

## 2023-02-15 ENCOUNTER — HOSPITAL ENCOUNTER (OUTPATIENT)
Dept: CT IMAGING | Age: 58
Discharge: HOME | End: 2023-02-17
Payer: MEDICARE

## 2023-02-15 ENCOUNTER — TELEPHONE (OUTPATIENT)
Dept: ORTHOPEDIC SURGERY | Age: 58
End: 2023-02-15

## 2023-02-15 DIAGNOSIS — M25.561 RIGHT KNEE PAIN, UNSPECIFIED CHRONICITY: ICD-10-CM

## 2023-02-15 PROCEDURE — 73700 CT LOWER EXTREMITY W/O DYE: CPT

## 2023-02-15 NOTE — TELEPHONE ENCOUNTER
----- Message from [place]o [place], [place] sent at 2/15/2023  3:46 PM EST -----  No evidence of loosening or periprosthetic fracture. Recommend follow up with Dr. Haylie Trevizo for further evaluation.

## 2023-02-15 NOTE — TELEPHONE ENCOUNTER
I called the patient today with the results of her right knee CT. An appointment was made with Dr. Clara Gregory.

## 2023-03-02 NOTE — TELEPHONE ENCOUNTER
Referral in the [address] [place] [address]. The patient has left knee pain. Referrad by Dale Perez. According to the patient she is in extreme pain and can't bear weight. The XR shows mild djd from the ER. She has [facility] and [facility] for insurance. You have seen her in the past for left knee and did a knee scope. Want to see her again?
Referral updated
Female

## 2023-03-08 DIAGNOSIS — Z96.651 S/P TKR (TOTAL KNEE REPLACEMENT), RIGHT: ICD-10-CM

## 2023-03-08 RX ORDER — TIZANIDINE 4 MG/1
4 TABLET ORAL NIGHTLY PRN
Qty: 30 TABLET | Refills: 0 | Status: SHIPPED | OUTPATIENT
Start: 2023-03-08

## 2023-03-15 ENCOUNTER — OFFICE VISIT (OUTPATIENT)
Dept: ORTHOPEDIC SURGERY | Age: 58
End: 2023-03-15

## 2023-03-15 DIAGNOSIS — M25.551 RIGHT HIP PAIN: ICD-10-CM

## 2023-03-15 DIAGNOSIS — Z96.651 HISTORY OF TOTAL RIGHT KNEE REPLACEMENT: Primary | ICD-10-CM

## 2023-03-15 RX ORDER — BETAMETHASONE SODIUM PHOSPHATE AND BETAMETHASONE ACETATE 3; 3 MG/ML; MG/ML
12 INJECTION, SUSPENSION INTRA-ARTICULAR; INTRALESIONAL; INTRAMUSCULAR; SOFT TISSUE ONCE
Status: COMPLETED | OUTPATIENT
Start: 2023-03-15 | End: 2023-03-15

## 2023-03-15 RX ORDER — BUPIVACAINE HYDROCHLORIDE 2.5 MG/ML
30 INJECTION, SOLUTION EPIDURAL; INFILTRATION; INTRACAUDAL ONCE
Status: SHIPPED | OUTPATIENT
Start: 2023-03-15

## 2023-03-15 RX ORDER — BUPIVACAINE HYDROCHLORIDE 2.5 MG/ML
2 INJECTION, SOLUTION EPIDURAL; INFILTRATION; INTRACAUDAL ONCE
Status: COMPLETED | OUTPATIENT
Start: 2023-03-15 | End: 2023-03-15

## 2023-03-15 RX ORDER — LIDOCAINE HYDROCHLORIDE 10 MG/ML
2 INJECTION, SOLUTION INFILTRATION; PERINEURAL ONCE
Status: COMPLETED | OUTPATIENT
Start: 2023-03-15 | End: 2023-03-15

## 2023-03-15 RX ORDER — BETAMETHASONE SODIUM PHOSPHATE AND BETAMETHASONE ACETATE 3; 3 MG/ML; MG/ML
12 INJECTION, SUSPENSION INTRA-ARTICULAR; INTRALESIONAL; INTRAMUSCULAR; SOFT TISSUE ONCE
Status: SHIPPED | OUTPATIENT
Start: 2023-03-15

## 2023-03-15 RX ADMIN — BETAMETHASONE SODIUM PHOSPHATE AND BETAMETHASONE ACETATE 12 MG: 3; 3 INJECTION, SUSPENSION INTRA-ARTICULAR; INTRALESIONAL; INTRAMUSCULAR; SOFT TISSUE at 09:33

## 2023-03-15 RX ADMIN — LIDOCAINE HYDROCHLORIDE 2 ML: 10 INJECTION, SOLUTION INFILTRATION; PERINEURAL at 09:32

## 2023-03-15 RX ADMIN — BUPIVACAINE HYDROCHLORIDE 5 MG: 2.5 INJECTION, SOLUTION EPIDURAL; INFILTRATION; INTRACAUDAL at 09:34

## 2023-03-15 NOTE — PROGRESS NOTES
Preethi Cates M.D.            [address] [facility] [address] [facility],[address], [address] [facility]           Dept Phone: [phone]           Dept Fax:  [phone]           [place]          Dept Phone: [phone]           Dept Fax:  [phone]      Chief Compliant:  Chief Complaint   Patient presents with    Pain     6/14/22/ Rt TKA        History of Present Illness: This is a 65 y.o. female who presents to the clinic today for evaluation / follow up of status post a right total knee subsequent manipulation. Patient was doing very well but in the last couple months had increasing tightness and pain. She saw Donna Garcia and did get his CT scan and lab work which was unremarkable for infectious process or any periprosthetic issues she has been losing motion since the last time we have seen her although she did have a manipulation in the initial postoperative phase she denies any fever or chills no redness or swelling or warmth to her knee. Review of Systems   Constitutional: Negative for fever, chills, sweats. Eyes: Negative for changes in vision, or pain. HENT: Negative for ear ache, epistaxis, or sore throat. Respiratory/Cardio: Negative for Chest pain, palpitations, SOB, or cough. Gastrointestinal: Negative for abdominal pain, N/V/D. Genitourinary: Negative for dysuria, frequency, urgency, or hematuria. Neurological: Negative for headache, numbness, or weakness. Integumentary: Negative for rash, itching, laceration, or abrasion. Musculoskeletal: Positive for Pain (6/14/22/ Rt TKA)       Physical Exam:  Constitutional: Patient is oriented to person, place, and time. Patient appears well-developed and well nourished.    HENT: Negative otherwise noted  Head: Normocephalic and Atraumatic  Nose: Normal  Eyes: Conjunctivae and EOM are normal  Neck: Normal range of motion Neck supple. Respiratory/Cardio: Effort normal. No respiratory distress. Musculoskeletal: Physical examination of the patient's right knee no redness or warmth. She can get always straight but she is now having problems can past 95 degrees of flexion. We she was at 120 before. She has good varus valgus stability no swelling or warmth no signs of RSD. Overall appears very benign    It is noted the patient is quite tender greater trochanter going down her IT band    Neurological: Patient is alert and oriented to person, place, and time. Normal strength. No sensory deficit. Skin: Skin is warm and dry  Psychiatric: Behavior is normal. Thought content normal.  Nursing note and vitals reviewed. Labs and Imaging:     XR taken today: No new x-rays taken today    Recent CT scan of the knee is unremarkable for any periprosthetic problems. She had a normal sed rate CRP       Orders Placed This Encounter   Procedures    Sedimentation Rate     Standing Status:   Future     Standing Expiration Date:   3/15/2024    C-Reactive Protein    22960 - DRAIN/INJECT LARGE JOINT BURSA       Assessment and Plan:  1. History of total right knee replacement    2. Right hip pain    3.       Continued right knee pain    Administrations This Visit       betamethasone acetate-betamethasone sodium phosphate (CELESTONE) injection 12 mg       Admin Date  03/15/2023  09:33 Action  Given Dose  12 mg Route  Intra-artICUlar Site  Hip Right Administered By  Rose Guzman LPN    Ordering Provider: Mihir Aleman MD    NDC: 3552-7927-86    Lot#: [id number]    : [employer]    Patient Supplied?: No              bupivacaine (PF) (MARCAINE) 0.25 % injection 5 mg       Admin Date  03/15/2023  09:34 Action  Given Dose  5 mg Route  Intra-artICUlar Site  Hip Right Administered By  Rose Guzman LPN    Ordering Provider: Mihir Aleman MD    [address]: 25448-914-16    Lot#: [id number]    : Donna Parikh [place]    Patient Supplied?: No              lidocaine 1 % injection 2 mL       Admin Date  03/15/2023  09:32 Action  Given Dose  2 mL Route  Intra-artICUlar Site  Hip Right Administered By  Vanesa Durham LPN    Ordering Provider: Judah Bermudez MD    NDC: 43351-280-76    Lot#: [id number]    : [facility] [facility]    Patient Supplied?: No                    This is a 65 y.o. female who presents to the clinic today for evaluation / follow up of status post right total knee  Previous left total knee doing superbly. Past History:    Current Outpatient Medications:     tiZANidine (ZANAFLEX) 4 MG tablet, TAKE 1 TABLET BY MOUTH NIGHTLY AS NEEDED (AS NEEDED FOR NIGHTTIME PAIN), Disp: 30 tablet, Rfl: 0    TRULICITY 1.5 AI/1.3UJ SOPN, INJECT 1.5MG SUBCUTANEOUSLY ONCE WEEKLY DX: E11.65, Disp: , Rfl:     NIFEdipine (PROCARDIA XL) 60 MG extended release tablet, Take 60 mg by mouth daily, Disp: , Rfl:     LORazepam (ATIVAN) 1 MG tablet, TAKE ONE (1) TABLET BY MOUTH TWICE DAILY AS NEEDED FOR ANXIETY, Disp: , Rfl:     nitroGLYCERIN (NITRODUR) 0.4 MG/HR, as directed, Disp: , Rfl:     omeprazole (PRILOSEC) 20 MG delayed release capsule, TAKE 1 CAPSULE BY MOUTH 2 TIMES DAILY (BEFORE MEALS), Disp: 180 capsule, Rfl: 1    enoxaparin (LOVENOX) 100 MG/ML injection, Inject 100 mg into the skin every 12 hours , Disp: , Rfl:     FLOVENT DISKUS 50 MCG/BLIST AEPB inhaler, TAKE 1 PUFF BY MOUTH TWICE A DAY, Disp: , Rfl:     glipiZIDE (GLUCOTROL) 5 MG tablet, Take 5 mg by mouth 2 times daily (before meals), Disp: , Rfl:     Warfarin Sodium (COUMADIN PO), Take 6 mg by mouth daily 9 mg Tuesday and Thursdays  and 6 rest of days, Disp: , Rfl:     Cholecalciferol (VITAMIN D PO), Take by mouth once a week Pt.  Takes on Sundays, Disp: , Rfl:     Cholecalciferol (VITAMIN D3) 23003 units CAPS, Take by mouth once a week, Disp: , Rfl:     montelukast (SINGULAIR) 10 MG tablet, TAKE 1 TABLET BY MOUTH EVERY DAY AT NIGHT, Disp: 30 tablet, Rfl: 5 ferrous sulfate 27 MG TABS, Take by mouth daily , Disp: , Rfl:     triamcinolone (KENALOG) 0.025 % cream, Apply topically 2 times daily. , Disp: 80 g, Rfl: 1    fluticasone (FLONASE) 50 MCG/ACT nasal spray, INSTILL 1 SPRAY INTO EACH NOSTRIL DAILY AT BED TIME, Disp: 1 Bottle, Rfl: 3    losartan (COZAAR) 25 MG tablet, TAKE ONE-HALF TABLET BY MOUTH DAILY, Disp: 15 tablet, Rfl: 5    levETIRAcetam (KEPPRA) 250 MG tablet, Take 1 tablet by mouth 2 times daily, Disp: 180 tablet, Rfl: 1    venlafaxine (EFFEXOR XR) 150 MG extended release capsule, TAKE 1 CAPSULE (150 MG TOTAL) BY MOUTH DAILY. , Disp: , Rfl: 1    atorvastatin (LIPITOR) 80 MG tablet, TAKE 1 TABLET BY MOUTH NIGHTLY AT BEDTIME, Disp: 30 tablet, Rfl: 5    temazepam (RESTORIL) 15 MG capsule, Take 15 mg by mouth nightly. ., Disp: , Rfl:     ONE TOUCH ULTRA TEST strip, TEST BLOOD SUGAR 3 TIMES A DAY, Disp: 100 strip, Rfl: 5    clotrimazole (LOTRIMIN) 1 % cream, Apply topically 2 times daily. , Disp: 15 g, Rfl: 0    topiramate (TOPAMAX) 25 MG tablet, Take 25 mg by mouth , Disp: , Rfl:     PROAIR  (90 Base) MCG/ACT inhaler, Inhale 2 puffs into the lungs every 6 hours as needed , Disp: , Rfl:     potassium chloride (MICRO-K) 10 MEQ extended release capsule, , Disp: , Rfl:     EXACTECH TEST strip, , Disp: , Rfl:     Misc.  Devices MISC, True Plus     Testing 3 times a day, Disp: 100 each, Rfl: 5    busPIRone (BUSPAR) 10 MG tablet, Take 10 mg by mouth daily , Disp: , Rfl:     TRUEPLUS LANCETS 30G MISC, 1 each by Does not apply route daily, Disp: 100 each, Rfl: 3    ARIPiprazole (ABILIFY) 10 MG tablet, TAKE 1 TABLET BY MOUTH NIGHTLY, Disp: 30 tablet, Rfl: 2  Allergies   Allergen Reactions    Latex Other (See Comments)     Red marks on body    Adhesive Tape      Also plastic tape    Morphine And Related Nausea Only    Nickel Other (See Comments)     Pt stated \"caused infection\"    Other Other (See Comments)     Surgical staples cause infection    Aspirin Nausea And Vomiting    Lisinopril Nausea And Vomiting     Social History     Socioeconomic History    Marital status:      Spouse name: Not on file    Number of children: Not on file    Years of education: Not on file    Highest education level: Not on file   Occupational History    Occupation: disability   Tobacco Use    Smoking status: Never    Smokeless tobacco: Never   Vaping Use    Vaping Use: Never used   Substance and Sexual Activity    Alcohol use: Yes     Alcohol/week: 0.0 standard drinks     Comment: OCCASSIONAL    Drug use: No    Sexual activity: Yes     Partners: Male   Other Topics Concern    Not on file   Social History Narrative    Not on file     Social Determinants of Health     Financial Resource Strain: Not on file   Food Insecurity: Not on file   Transportation Needs: Not on file   Physical Activity: Not on file   Stress: Not on file   Social Connections: Not on file   Intimate Partner Violence: Not on file   Housing Stability: Not on file     Past Medical History:   Diagnosis Date    Acid reflux     Arthritis     Asthma     Coughing     dry due to ace inhibitor    CPAP (continuous positive airway pressure) dependence     Depression     uses Abilify, Buspar, Trazodone for this    Diabetes mellitus ([facility]     Difficult intubation     Diverticulitis     Environmental allergies     Epilepsy ([facility]     MAY 2018,. ... 4/12/22: States last seizure was 6 months ago.      Factor V Leiden ([facility]     Gastroparesis     Generalized abdominal pain     Hemorrhage of anus and rectum     Hiatal hernia     History of stroke associated with blood clotting tendency 2003    had clot in brain and left neck, left side weakness residual    Hx of blood clots     right breast, brain, neck    Hyperlipidemia     Hypertension     Insomnia     uses Trazodone for this    Pre-procedure lab exam 01/09/2018    Sleep apnea     C PAP    Unspecified cerebral artery occlusion with cerebral infarction 06/2003    SEE BELOW, [address] Mini stroke Wears glasses      Past Surgical History:   Procedure Laterality Date    APPENDECTOMY      CHOLECYSTECTOMY      COLONOSCOPY      with polypectomy    COLONOSCOPY  1/22/16    AND EGD    COLONOSCOPY  01/05/2017    DR MACKAY-random biopsies. COLONOSCOPY N/A 10/20/2020    COLONOSCOPY POLYPECTOMY HOT BIOPSY performed by Rio Flowers MD at [facility]      2 times    [address]  2/4/2016    laparoscopic robotic    HYSTERECTOMY (CERVIX STATUS UNKNOWN)      with BSO    KNEE ARTHROSCOPY Left 4/7/15, 2015    x 2    KNEE SURGERY Right 8/29/2022    TOTAL KNEE MANIPULATION performed by Zahira Parr MD at [facility]      \"springs inserted in to fallopian tubes\" to close tubes    NECK SURGERY  July 2012    Anterior, C5,6,7 bulging disk repair    IA COLONOSCOPY FLX DX W/COLLJ SPEC WHEN PFRMD N/A 7/17/2018    COLONOSCOPY performed by Rio Flowers MD at [address] [facility] N/A 12/18/2018    SIGMOIDOSCOPY DIAGNOSTIC FLEXIBLE performed by Rio Flowers MD at [facility] Right 6/14/2022    KNEE TOTAL ARTHROPLASTY performed by Zahira Parr MD at [address] [facility]      Jan 2016 DR MACKAY    UPPER GASTROINTESTINAL ENDOSCOPY N/A 7/17/2018    EGD BIOPSY performed by Rio Flowers MD at [address] [facility] N/A 10/20/2020    EGD BIOPSY performed by Rio Flowers MD at [facility]     Family History   Problem Relation Age of Onset    Heart Disease Mother         dies age 32    Cancer Father         lymph nodes    Pacemaker Sister    Plan  Patient will be repeat laboratory studies is to make sure deal with infectious process which I doubt. I did take the liberty of injecting the patient's right greater trochanter at the point of maximal tenderness with lidocaine 2 cc bupivacaine 2 cc and Celestone 2 cc.   Patient was actually significantly improved with this in regards to her motion and pain to her knee and likely she has a significant IT band problem which is causing her discomfort and tightness. Patient will have the lab works but in the meantime continue working on a stretching program and strengthening program for IT band as well as her knee we will see her back here in next 3 months unless there is any laboratory abnormalities      Provider Attestation:  I, June Grigsby, personally performed the services described in this documentation. All medical record entries made by the scribe were at my direction and in my presence. I have reviewed the chart and discharge instructions and agree that the records reflect my personal performance and is accurate and complete. June Grigsby MD. 03/15/23      Please note that this chart was generated using voice recognition Dragon dictation software. Although every effort was made to ensure the accuracy of this automated transcription, some errors in transcription may have occurred.

## 2023-03-21 ENCOUNTER — TELEPHONE (OUTPATIENT)
Dept: ORTHOPEDIC SURGERY | Age: 58
End: 2023-03-21

## 2023-03-21 NOTE — TELEPHONE ENCOUNTER
Patient called to report that donna needs a RX for walk in shower ordered by [facility] [employer] as well as a Prior authorization, She states that it can be obtained by calling [phone].  Patient can be called back at [phone]

## 2023-03-21 NOTE — TELEPHONE ENCOUNTER
Attempted to call [facility]. Went through all the prompts and the music stopped playing like someone answered the phone but no one was on the other end and they did not respond when I said 'hello'. Was on hold for over 10 minutes before hanging up.

## 2023-03-23 NOTE — TELEPHONE ENCOUNTER
Called [facility] and spoke to [facility] JONAS CHAMBERLAIN was given Ref # [id number] as well as pending auth # [id number]. She requested that we fax clinical information to [phone]. Chart notes faxed to that number.

## 2023-03-30 NOTE — TELEPHONE ENCOUNTER
Delio from [address] called to state that the walk in shower is not an approved benefit for patient. She also stated that the sx code that [address] provided during a phone call has to be submitted through [employer]. I told rep that patient is not currently scheduled for any other sxs. Called and notified patient of the denial. She voiced understanding.

## 2023-04-20 ENCOUNTER — OFFICE VISIT (OUTPATIENT)
Dept: ORTHOPEDIC SURGERY | Age: 58
End: 2023-04-20

## 2023-04-20 VITALS — RESPIRATION RATE: 14 BRPM | BODY MASS INDEX: 35.44 KG/M2 | WEIGHT: 200 LBS | HEIGHT: 63 IN

## 2023-04-20 DIAGNOSIS — M25.561 RIGHT KNEE PAIN, UNSPECIFIED CHRONICITY: Primary | ICD-10-CM

## 2023-04-20 RX ORDER — BUPIVACAINE HYDROCHLORIDE 5 MG/ML
2 INJECTION, SOLUTION PERINEURAL ONCE
Status: COMPLETED | OUTPATIENT
Start: 2023-04-20 | End: 2023-04-20

## 2023-04-20 RX ORDER — LIDOCAINE HYDROCHLORIDE 10 MG/ML
2 INJECTION, SOLUTION INFILTRATION; PERINEURAL ONCE
Status: COMPLETED | OUTPATIENT
Start: 2023-04-20 | End: 2023-04-20

## 2023-04-20 RX ORDER — BETAMETHASONE SODIUM PHOSPHATE AND BETAMETHASONE ACETATE 3; 3 MG/ML; MG/ML
12 INJECTION, SUSPENSION INTRA-ARTICULAR; INTRALESIONAL; INTRAMUSCULAR; SOFT TISSUE ONCE
Status: COMPLETED | OUTPATIENT
Start: 2023-04-20 | End: 2023-04-20

## 2023-04-20 RX ADMIN — LIDOCAINE HYDROCHLORIDE 2 ML: 10 INJECTION, SOLUTION INFILTRATION; PERINEURAL at 14:37

## 2023-04-20 RX ADMIN — BETAMETHASONE SODIUM PHOSPHATE AND BETAMETHASONE ACETATE 12 MG: 3; 3 INJECTION, SUSPENSION INTRA-ARTICULAR; INTRALESIONAL; INTRAMUSCULAR; SOFT TISSUE at 14:34

## 2023-04-20 RX ADMIN — BUPIVACAINE HYDROCHLORIDE 10 MG: 5 INJECTION, SOLUTION PERINEURAL at 14:34

## 2023-04-20 NOTE — PROGRESS NOTES
and well nourished. HENT: Negative otherwise noted  Head: Normocephalic and Atraumatic  Nose: Normal  Eyes: Conjunctivae and EOM are normal  Neck: Normal range of motion Neck supple. Respiratory/Cardio: Effort normal. No respiratory distress. Musculoskeletal: Lamination of the patient's right knee notes no obvious effusion. Her knee motion is actually fairly pain-free 0 to about 115 degrees minimal of any warmth no redness per se she is still today very and exquisitely tender over her greater trochanter going down her IT band. Neurological: Patient is alert and oriented to person, place, and time. Normal strength. No sensory deficit. Skin: Skin is warm and dry  Psychiatric: Behavior is normal. Thought content normal.  Nursing note and vitals reviewed. Labs and Imaging:     XR taken today: No new x-rays taken today  No results found. No orders of the defined types were placed in this encounter. Assessment and Plan:  Status post left total knee  Status post right total knee  Trochanteric bursitis right hip        This is a 65 y.o. female who presents to the clinic today for evaluation / follow up of trochanteric bursitis right hip status post right total knee normal infectious work-up with normal sed rate CRP done approximately 3-1/2 weeks ago.      Past History:    Current Outpatient Medications:     tiZANidine (ZANAFLEX) 4 MG tablet, TAKE 1 TABLET BY MOUTH NIGHTLY AS NEEDED (AS NEEDED FOR NIGHTTIME PAIN), Disp: 30 tablet, Rfl: 0    TRULICITY 1.5 RJ/7.9GI SOPN, INJECT 1.5MG SUBCUTANEOUSLY ONCE WEEKLY DX: E11.65, Disp: , Rfl:     NIFEdipine (PROCARDIA XL) 60 MG extended release tablet, Take 60 mg by mouth daily, Disp: , Rfl:     LORazepam (ATIVAN) 1 MG tablet, TAKE ONE (1) TABLET BY MOUTH TWICE DAILY AS NEEDED FOR ANXIETY, Disp: , Rfl:     nitroGLYCERIN (NITRODUR) 0.4 MG/HR, as directed, Disp: , Rfl:     omeprazole (PRILOSEC) 20 MG delayed release capsule, TAKE 1 CAPSULE BY MOUTH 2 TIMES DAILY

## 2023-04-27 ENCOUNTER — OFFICE VISIT (OUTPATIENT)
Dept: GASTROENTEROLOGY | Age: 58
End: 2023-04-27
Payer: MEDICARE

## 2023-04-27 VITALS
DIASTOLIC BLOOD PRESSURE: 86 MMHG | SYSTOLIC BLOOD PRESSURE: 120 MMHG | WEIGHT: 207.2 LBS | OXYGEN SATURATION: 98 % | HEIGHT: 63 IN | HEART RATE: 86 BPM | BODY MASS INDEX: 36.71 KG/M2 | RESPIRATION RATE: 18 BRPM

## 2023-04-27 DIAGNOSIS — E66.01 SEVERE OBESITY (BMI 35.0-39.9) WITH COMORBIDITY (HCC): ICD-10-CM

## 2023-04-27 DIAGNOSIS — Z12.11 COLON CANCER SCREENING: Primary | ICD-10-CM

## 2023-04-27 DIAGNOSIS — K52.9 CHRONIC DIARRHEA: ICD-10-CM

## 2023-04-27 DIAGNOSIS — K30 DELAYED GASTRIC EMPTYING: ICD-10-CM

## 2023-04-27 PROBLEM — F32.3 CURRENT SEVERE EPISODE OF MAJOR DEPRESSIVE DISORDER WITH PSYCHOTIC FEATURES (HCC): Status: ACTIVE | Noted: 2020-09-01

## 2023-04-27 PROBLEM — G24.01 TARDIVE DYSKINESIA: Status: ACTIVE | Noted: 2023-04-14

## 2023-04-27 PROBLEM — M47.816 LUMBAR FACET ARTHROPATHY: Status: ACTIVE | Noted: 2018-05-04

## 2023-04-27 PROBLEM — R56.9 CONVULSIONS (HCC): Status: ACTIVE | Noted: 2021-04-27

## 2023-04-27 PROBLEM — G81.94 LEFT HEMIPARESIS (HCC): Status: ACTIVE | Noted: 2023-04-27

## 2023-04-27 PROBLEM — I82.629 DEEP VEIN THROMBOSIS (DVT) OF UPPER EXTREMITY (HCC): Status: ACTIVE | Noted: 2019-02-07

## 2023-04-27 PROBLEM — D64.9 CHRONIC ANEMIA: Status: ACTIVE | Noted: 2021-04-27

## 2023-04-27 PROBLEM — D68.51 FACTOR V LEIDEN (HCC): Status: ACTIVE | Noted: 2021-04-27

## 2023-04-27 PROBLEM — M54.41 CHRONIC BILATERAL LOW BACK PAIN WITH RIGHT-SIDED SCIATICA: Status: ACTIVE | Noted: 2023-01-06

## 2023-04-27 PROBLEM — M23.92 INTERNAL DERANGEMENT OF LEFT KNEE: Status: ACTIVE | Noted: 2022-02-10

## 2023-04-27 PROBLEM — R10.84 GENERALIZED ABDOMINAL PAIN: Status: ACTIVE | Noted: 2018-04-05

## 2023-04-27 PROBLEM — M54.16 LUMBAR RADICULOPATHY: Status: ACTIVE | Noted: 2017-08-30

## 2023-04-27 PROBLEM — R26.89 ANTALGIC GAIT: Status: ACTIVE | Noted: 2023-01-06

## 2023-04-27 PROBLEM — F33.1 MAJOR DEPRESSIVE DISORDER, RECURRENT EPISODE, MODERATE (HCC): Status: ACTIVE | Noted: 2017-08-04

## 2023-04-27 PROBLEM — I99.8 VASCULAR INSUFFICIENCY: Status: ACTIVE | Noted: 2023-04-27

## 2023-04-27 PROBLEM — K21.9 GASTROESOPHAGEAL REFLUX DISEASE: Status: ACTIVE | Noted: 2023-04-27

## 2023-04-27 PROBLEM — M51.369 DDD (DEGENERATIVE DISC DISEASE), LUMBAR: Status: ACTIVE | Noted: 2018-05-04

## 2023-04-27 PROBLEM — G62.9 NEUROPATHY: Status: ACTIVE | Noted: 2023-04-27

## 2023-04-27 PROBLEM — Z96.651 S/P TOTAL KNEE ARTHROPLASTY, RIGHT: Status: ACTIVE | Noted: 2022-09-22

## 2023-04-27 PROBLEM — M25.569 PAIN IN KNEE: Status: ACTIVE | Noted: 2022-02-10

## 2023-04-27 PROBLEM — R53.1 WEAKNESS OF LEFT SIDE OF BODY: Status: ACTIVE | Noted: 2019-02-07

## 2023-04-27 PROBLEM — I87.2 VENOUS INSUFFICIENCY OF BOTH LOWER EXTREMITIES: Status: ACTIVE | Noted: 2021-08-13

## 2023-04-27 PROBLEM — I77.71 CAROTID ARTERY DISSECTION (HCC): Status: ACTIVE | Noted: 2022-09-22

## 2023-04-27 PROBLEM — E78.00 PURE HYPERCHOLESTEROLEMIA: Status: ACTIVE | Noted: 2020-07-28

## 2023-04-27 PROBLEM — M51.36 DDD (DEGENERATIVE DISC DISEASE), LUMBAR: Status: ACTIVE | Noted: 2018-05-04

## 2023-04-27 PROBLEM — R56.9 SEIZURE (HCC): Status: ACTIVE | Noted: 2023-04-27

## 2023-04-27 PROBLEM — K62.5 HEMORRHAGE OF ANUS AND RECTUM: Status: ACTIVE | Noted: 2020-09-11

## 2023-04-27 PROBLEM — D68.59 HYPERCOAGULOPATHY (HCC): Status: ACTIVE | Noted: 2022-02-11

## 2023-04-27 PROBLEM — R22.30 LOCALIZED SWELLING, MASS AND LUMP, UPPER LIMB: Status: ACTIVE | Noted: 2022-08-26

## 2023-04-27 PROBLEM — M47.817 LUMBOSACRAL SPONDYLOSIS WITHOUT MYELOPATHY: Status: ACTIVE | Noted: 2017-07-31

## 2023-04-27 PROBLEM — G47.30 SLEEP APNEA: Status: ACTIVE | Noted: 2021-04-27

## 2023-04-27 PROBLEM — M43.06 LUMBAR SPONDYLOLYSIS: Status: ACTIVE | Noted: 2018-03-13

## 2023-04-27 PROBLEM — I89.0 LYMPHEDEMA: Status: ACTIVE | Noted: 2021-04-27

## 2023-04-27 PROBLEM — I51.89 DIASTOLIC DYSFUNCTION: Status: ACTIVE | Noted: 2021-04-27

## 2023-04-27 PROBLEM — M19.91 PRIMARY OSTEOARTHRITIS: Status: ACTIVE | Noted: 2022-02-10

## 2023-04-27 PROBLEM — M53.3 DISORDER OF SACRUM: Status: ACTIVE | Noted: 2021-05-25

## 2023-04-27 PROBLEM — E87.6 HYPOKALEMIA: Status: ACTIVE | Noted: 2022-09-22

## 2023-04-27 PROBLEM — G43.009 NONINTRACTABLE MIGRAINE, UNSPECIFIED MIGRAINE TYPE: Status: ACTIVE | Noted: 2022-02-11

## 2023-04-27 PROBLEM — R29.90 STROKE-LIKE SYMPTOM: Status: ACTIVE | Noted: 2017-12-08

## 2023-04-27 PROBLEM — G40.109: Status: ACTIVE | Noted: 2022-02-11

## 2023-04-27 PROBLEM — Z96.659 ARTIFICIAL KNEE JOINT PRESENT: Status: ACTIVE | Noted: 2022-02-10

## 2023-04-27 PROBLEM — R47.1 DYSARTHRIA: Status: ACTIVE | Noted: 2022-09-22

## 2023-04-27 PROBLEM — G89.29 CHRONIC BILATERAL LOW BACK PAIN WITH RIGHT-SIDED SCIATICA: Status: ACTIVE | Noted: 2023-01-06

## 2023-04-27 PROBLEM — I10 HYPERTENSION: Status: ACTIVE | Noted: 2023-04-27

## 2023-04-27 PROBLEM — R06.02 CHRONIC SHORTNESS OF BREATH: Status: ACTIVE | Noted: 2020-07-28

## 2023-04-27 PROBLEM — K57.90 DIVERTICULOSIS: Status: ACTIVE | Noted: 2020-09-09

## 2023-04-27 PROBLEM — R25.1 TREMOR: Status: ACTIVE | Noted: 2021-03-24

## 2023-04-27 PROBLEM — F41.9 ANXIETY: Status: ACTIVE | Noted: 2023-04-27

## 2023-04-27 PROCEDURE — 99202 OFFICE O/P NEW SF 15 MIN: CPT | Performed by: NURSE PRACTITIONER

## 2023-04-27 PROCEDURE — 3074F SYST BP LT 130 MM HG: CPT | Performed by: NURSE PRACTITIONER

## 2023-04-27 PROCEDURE — 3078F DIAST BP <80 MM HG: CPT | Performed by: NURSE PRACTITIONER

## 2023-04-27 RX ORDER — NITROGLYCERIN 0.4 MG/1
TABLET SUBLINGUAL
COMMUNITY
Start: 2021-12-21 | End: 2023-04-27

## 2023-04-27 RX ORDER — TRAMADOL HYDROCHLORIDE 50 MG/1
TABLET ORAL
COMMUNITY
Start: 2023-03-02

## 2023-04-27 RX ORDER — BREXPIPRAZOLE 0.5 MG/1
TABLET ORAL
COMMUNITY
Start: 2023-02-13

## 2023-04-27 RX ORDER — ALPRAZOLAM 0.5 MG/1
TABLET ORAL
COMMUNITY
Start: 2023-01-09 | End: 2023-04-27 | Stop reason: ALTCHOICE

## 2023-04-27 RX ORDER — CYCLOBENZAPRINE HCL 10 MG
TABLET ORAL
COMMUNITY
Start: 2023-03-02

## 2023-04-27 RX ORDER — CLOTRIMAZOLE AND BETAMETHASONE DIPROPIONATE 10; .64 MG/G; MG/G
CREAM TOPICAL
COMMUNITY
Start: 2023-03-21 | End: 2023-04-27

## 2023-04-27 RX ORDER — TERBINAFINE HYDROCHLORIDE 250 MG/1
TABLET ORAL
COMMUNITY
Start: 2023-04-24

## 2023-04-27 RX ORDER — POLYETHYLENE GLYCOL 3350, SODIUM CHLORIDE, SODIUM BICARBONATE, POTASSIUM CHLORIDE 420; 11.2; 5.72; 1.48 G/4L; G/4L; G/4L; G/4L
4000 POWDER, FOR SOLUTION ORAL ONCE
Qty: 4000 ML | Refills: 0 | Status: CANCELLED | OUTPATIENT
Start: 2023-04-27 | End: 2023-04-27

## 2023-04-27 RX ORDER — AMITRIPTYLINE HYDROCHLORIDE 50 MG/1
100 TABLET, FILM COATED ORAL NIGHTLY
COMMUNITY
Start: 2023-04-19 | End: 2023-04-27

## 2023-04-27 RX ORDER — LORATADINE 10 MG/1
10 TABLET ORAL EVERY MORNING
COMMUNITY
Start: 2023-03-29

## 2023-04-27 RX ORDER — ROSUVASTATIN CALCIUM 20 MG/1
20 TABLET, COATED ORAL NIGHTLY
COMMUNITY
Start: 2023-04-14 | End: 2023-04-27

## 2023-04-27 RX ORDER — GABAPENTIN 300 MG/1
300 CAPSULE ORAL 2 TIMES DAILY
COMMUNITY
Start: 2023-04-18

## 2023-04-27 RX ORDER — RIZATRIPTAN BENZOATE 5 MG/1
TABLET, ORALLY DISINTEGRATING ORAL
COMMUNITY
Start: 2023-01-26

## 2023-04-27 RX ORDER — CARVEDILOL 12.5 MG/1
12.5 TABLET ORAL 2 TIMES DAILY
COMMUNITY
Start: 2022-09-14

## 2023-04-27 RX ORDER — LORATADINE 10 MG/1
10 TABLET ORAL DAILY
COMMUNITY
Start: 2022-10-07 | End: 2023-04-27

## 2023-04-27 RX ORDER — WARFARIN SODIUM 3 MG/1
TABLET ORAL
COMMUNITY
Start: 2023-04-15

## 2023-04-27 RX ORDER — CARVEDILOL 12.5 MG/1
TABLET ORAL
COMMUNITY
Start: 2023-04-07

## 2023-04-27 RX ORDER — GLUCAGON 3 MG/1
POWDER NASAL
COMMUNITY

## 2023-04-27 RX ORDER — LEVETIRACETAM 750 MG/1
1500 TABLET ORAL 2 TIMES DAILY
COMMUNITY
Start: 2023-04-04

## 2023-04-27 RX ORDER — GLUCAGON 3 MG/1
POWDER NASAL
COMMUNITY
Start: 2023-04-07

## 2023-04-27 RX ORDER — BUMETANIDE 1 MG/1
1 TABLET ORAL DAILY
COMMUNITY
Start: 2023-02-09

## 2023-04-27 RX ORDER — POTASSIUM CHLORIDE 20 MEQ/1
20 TABLET, EXTENDED RELEASE ORAL 2 TIMES DAILY
COMMUNITY
Start: 2023-04-07

## 2023-04-27 RX ORDER — ROSUVASTATIN CALCIUM 20 MG/1
1 TABLET, COATED ORAL NIGHTLY
COMMUNITY
Start: 2023-03-27

## 2023-04-27 RX ORDER — ALPRAZOLAM 0.5 MG/1
TABLET ORAL
COMMUNITY
Start: 2023-02-02 | End: 2023-04-27 | Stop reason: ALTCHOICE

## 2023-04-27 RX ORDER — MOMETASONE FUROATE 50 UG/1
SPRAY, METERED NASAL
COMMUNITY
Start: 2022-12-16

## 2023-04-27 RX ORDER — BUMETANIDE 1 MG/1
TABLET ORAL
COMMUNITY
Start: 2023-04-07 | End: 2023-04-27

## 2023-04-27 RX ORDER — METHYLPREDNISOLONE 4 MG/1
4 TABLET ORAL DAILY
COMMUNITY
Start: 2022-12-07

## 2023-04-27 RX ORDER — DEUTETRABENAZINE 9 MG/1
TABLET, COATED ORAL
COMMUNITY
Start: 2023-04-11

## 2023-04-27 RX ORDER — PREDNISONE 20 MG/1
TABLET ORAL
COMMUNITY
Start: 2023-03-02 | End: 2023-04-27 | Stop reason: ALTCHOICE

## 2023-04-27 RX ORDER — FERROUS SULFATE 325(65) MG
1 TABLET ORAL
COMMUNITY
Start: 2023-04-04

## 2023-04-27 RX ORDER — BUTALBITAL, ACETAMINOPHEN AND CAFFEINE 50; 325; 40 MG/1; MG/1; MG/1
1 TABLET ORAL 2 TIMES DAILY PRN
COMMUNITY
Start: 2023-01-01

## 2023-04-27 RX ORDER — AMITRIPTYLINE HYDROCHLORIDE 50 MG/1
100 TABLET, FILM COATED ORAL NIGHTLY
COMMUNITY
Start: 2023-04-14

## 2023-04-27 RX ORDER — TOPIRAMATE 100 MG/1
TABLET, FILM COATED ORAL
COMMUNITY
Start: 2023-04-07

## 2023-04-27 RX ORDER — WARFARIN SODIUM 6 MG/1
TABLET ORAL
COMMUNITY
Start: 2023-04-07

## 2023-04-27 RX ORDER — ECHINACEA PURPUREA EXTRACT 125 MG
2 TABLET ORAL PRN
COMMUNITY
Start: 2022-09-19

## 2023-04-27 RX ORDER — MOMETASONE FUROATE 50 UG/1
SPRAY, METERED NASAL
COMMUNITY
Start: 2023-04-07

## 2023-04-27 RX ORDER — BUSPIRONE HYDROCHLORIDE 15 MG/1
TABLET ORAL
COMMUNITY
Start: 2023-04-07

## 2023-04-27 ASSESSMENT — ENCOUNTER SYMPTOMS
ALLERGIC/IMMUNOLOGIC NEGATIVE: 1
RESPIRATORY NEGATIVE: 1

## 2023-04-28 ASSESSMENT — ENCOUNTER SYMPTOMS
ANAL BLEEDING: 0
DIARRHEA: 1
BLOOD IN STOOL: 0
NAUSEA: 1

## 2023-05-04 ENCOUNTER — TELEPHONE (OUTPATIENT)
Dept: GASTROENTEROLOGY | Age: 58
End: 2023-05-04

## 2023-05-04 DIAGNOSIS — Z12.11 COLON CANCER SCREENING: Primary | ICD-10-CM

## 2023-05-04 NOTE — TELEPHONE ENCOUNTER
Attempted call to [facility] at [phone] to schedule her colonoscopy, [facility] requests NP returns call later. Attempted call to [facility] at [phone] to schedule her colonoscopy, call forward to voice male and unable to message.

## 2023-05-04 NOTE — TELEPHONE ENCOUNTER
Patient returned call to office. In agreement to sschedule colonoscopy for 11/29/2023. Prep instructions mailed to patient. Advised patient to call office with any further questions or concerns once she received prep instructions in mail. Patient voiced knowledge and understanding.

## 2023-05-09 ENCOUNTER — TELEPHONE (OUTPATIENT)
Dept: GASTROENTEROLOGY | Age: 58
End: 2023-05-09

## 2023-05-09 NOTE — TELEPHONE ENCOUNTER
Pt called asking to schedule colon with Madie aPdron, pt states she is established with Madie Padron, pt is due to colon recall, pt does not want to see anyone else but Madie Padron for colon, pt is on coumadin and OV had to be made first, OV was scheduled for [email or web address].

## 2023-05-22 ENCOUNTER — TELEPHONE (OUTPATIENT)
Dept: ORTHOPEDIC SURGERY | Age: 58
End: 2023-05-22

## 2023-05-22 NOTE — TELEPHONE ENCOUNTER
Patient called and wanted to be seen with Dr Lorene Loaiza for right leg pain- she stated that she can barley move and is in a lot of pain, please advise and call pt back [phone], thank you

## 2023-05-23 NOTE — TELEPHONE ENCOUNTER
May 22, 2023  Kalpana Doherty MD  to Me    BM     1:01 PM  Patient had a greater trochanter injection back in April which apparently helped her out. Is that this pain is back? I spoke with patient she states this is the pain she was having in April when she received the injection. Please advise.

## 2023-05-24 NOTE — TELEPHONE ENCOUNTER
May 23, 2023  Dilma Rasheed MD  to Me    BM     1:05 PM  Can have patient come in for repeat injection even though its only been a little over 6 weeks     Patient has been scheduled for 6/6/23 in [facility] efrem Spencer

## 2023-05-30 DIAGNOSIS — Z96.651 S/P TKR (TOTAL KNEE REPLACEMENT), RIGHT: ICD-10-CM

## 2023-05-30 RX ORDER — TIZANIDINE 4 MG/1
4 TABLET ORAL NIGHTLY PRN
Qty: 30 TABLET | Refills: 0 | Status: SHIPPED | OUTPATIENT
Start: 2023-05-30

## 2023-05-30 NOTE — TELEPHONE ENCOUNTER
Patient is requesting refill on Tizanidine 4 mg for S/P TKR (total knee replacement), right.     DEX 3/8/23    LOV with Dr. Leno Freedman 4/20/23

## 2023-06-16 ENCOUNTER — APPOINTMENT (OUTPATIENT)
Dept: GENERAL RADIOLOGY | Age: 58
End: 2023-06-16
Payer: COMMERCIAL

## 2023-06-16 ENCOUNTER — HOSPITAL ENCOUNTER (EMERGENCY)
Age: 58
Discharge: HOME | End: 2023-06-16
Attending: STUDENT IN AN ORGANIZED HEALTH CARE EDUCATION/TRAINING PROGRAM
Payer: COMMERCIAL

## 2023-06-16 VITALS
DIASTOLIC BLOOD PRESSURE: 80 MMHG | SYSTOLIC BLOOD PRESSURE: 133 MMHG | HEIGHT: 63 IN | WEIGHT: 200 LBS | TEMPERATURE: 98.9 F | RESPIRATION RATE: 18 BRPM | BODY MASS INDEX: 35.44 KG/M2 | OXYGEN SATURATION: 98 % | HEART RATE: 84 BPM

## 2023-06-16 DIAGNOSIS — M25.561 ACUTE PAIN OF RIGHT KNEE: Primary | ICD-10-CM

## 2023-06-16 PROCEDURE — 6370000000 HC RX 637 (ALT 250 FOR IP): Performed by: STUDENT IN AN ORGANIZED HEALTH CARE EDUCATION/TRAINING PROGRAM

## 2023-06-16 PROCEDURE — 6360000002 HC RX W HCPCS: Performed by: STUDENT IN AN ORGANIZED HEALTH CARE EDUCATION/TRAINING PROGRAM

## 2023-06-16 PROCEDURE — 73562 X-RAY EXAM OF KNEE 3: CPT

## 2023-06-16 PROCEDURE — 96372 THER/PROPH/DIAG INJ SC/IM: CPT

## 2023-06-16 PROCEDURE — 99284 EMERGENCY DEPT VISIT MOD MDM: CPT

## 2023-06-16 RX ORDER — ONDANSETRON 4 MG/1
4 TABLET, ORALLY DISINTEGRATING ORAL ONCE
Status: COMPLETED | OUTPATIENT
Start: 2023-06-16 | End: 2023-06-16

## 2023-06-16 RX ORDER — LIDOCAINE 4 G/G
1 PATCH TOPICAL DAILY
Qty: 10 EACH | Refills: 0 | Status: SHIPPED | OUTPATIENT
Start: 2023-06-16 | End: 2023-06-20

## 2023-06-16 RX ORDER — HYDROCODONE BITARTRATE AND ACETAMINOPHEN 5; 325 MG/1; MG/1
1 TABLET ORAL ONCE
Status: COMPLETED | OUTPATIENT
Start: 2023-06-16 | End: 2023-06-16

## 2023-06-16 RX ORDER — ORPHENADRINE CITRATE 30 MG/ML
60 INJECTION INTRAMUSCULAR; INTRAVENOUS ONCE
Status: COMPLETED | OUTPATIENT
Start: 2023-06-16 | End: 2023-06-16

## 2023-06-16 RX ORDER — LIDOCAINE 4 G/G
1 PATCH TOPICAL ONCE
Status: DISCONTINUED | OUTPATIENT
Start: 2023-06-16 | End: 2023-06-17 | Stop reason: HOSPADM

## 2023-06-16 RX ADMIN — ONDANSETRON 4 MG: 4 TABLET, ORALLY DISINTEGRATING ORAL at 20:42

## 2023-06-16 RX ADMIN — HYDROCODONE BITARTRATE AND ACETAMINOPHEN 1 TABLET: 5; 325 TABLET ORAL at 20:42

## 2023-06-16 RX ADMIN — ONDANSETRON 4 MG: 4 TABLET, ORALLY DISINTEGRATING ORAL at 21:58

## 2023-06-16 RX ADMIN — ORPHENADRINE CITRATE 60 MG: 60 INJECTION INTRAMUSCULAR; INTRAVENOUS at 21:55

## 2023-06-16 ASSESSMENT — PAIN SCALES - GENERAL
PAINLEVEL_OUTOF10: 9
PAINLEVEL_OUTOF10: 9
PAINLEVEL_OUTOF10: 10
PAINLEVEL_OUTOF10: 10

## 2023-06-16 ASSESSMENT — PAIN DESCRIPTION - PAIN TYPE: TYPE: ACUTE PAIN

## 2023-06-16 ASSESSMENT — PAIN DESCRIPTION - LOCATION
LOCATION: KNEE

## 2023-06-16 ASSESSMENT — PAIN - FUNCTIONAL ASSESSMENT: PAIN_FUNCTIONAL_ASSESSMENT: 0-10

## 2023-06-16 ASSESSMENT — PAIN DESCRIPTION - ORIENTATION
ORIENTATION: RIGHT

## 2023-06-16 ASSESSMENT — PAIN DESCRIPTION - DESCRIPTORS
DESCRIPTORS: SHARP
DESCRIPTORS: SHARP

## 2023-06-16 ASSESSMENT — LIFESTYLE VARIABLES: HOW OFTEN DO YOU HAVE A DRINK CONTAINING ALCOHOL: NEVER

## 2023-06-16 NOTE — ED NOTES
Mode of arrival (squad #, walk in, police, etc) : Walk-in        Chief complaint(s): Knee pain (right)         Arrival Note (brief scenario, treatment PTA, etc). : Pt states that she was walking down stairs yesterday when she felt her knee pop. Pt has had surgery on said knee in the past. Pt was unable to get in and see her orthopedic doctor. Pt was able to walk in on said knee. C= \"Have you ever felt that you should Cut down on your drinking? \"  No  A= \"Have people Annoyed you by criticizing your drinking? \"  No  G= \"Have you ever felt bad or Guilty about your drinking? \"  No  E= \"Have you ever had a drink as an Eye-opener first thing in the morning to steady your nerves or to help a hangover? \"  No      Deferred []      Reason for deferring: N/A    *If yes to two or more: probable alcohol abuse. Lisa Rubio RN  06/16/23 1954

## 2023-06-17 ASSESSMENT — ENCOUNTER SYMPTOMS
RHINORRHEA: 0
SHORTNESS OF BREATH: 0
NAUSEA: 0
VOMITING: 0
DIARRHEA: 0
ABDOMINAL PAIN: 0
EYE REDNESS: 0

## 2023-06-17 NOTE — ED PROVIDER NOTES
[address]  Emergency Department Encounter  Emergency Medicine Resident     Pt Kuldeep Morris  MRN: [id number]  [place] 1965  Date of evaluation: 6/16/23  PCP:  No primary care provider on file. Note Started: 8:15 PM EDT    CHIEF COMPLAINT       Chief Complaint   Patient presents with    Knee Injury     Right     HISTORY OF PRESENT ILLNESS  (Location/Symptom, Timing/Onset, Context/Setting, Quality, Duration, Modifying Factors, Severity.)      Quique Gracia is a 65 y.o. female who presents with acute on chronic right-sided knee pain. Patient had bilateral knee replacement 1 year ago with Dr. Krystal Palm at [address] [place] [address]. Patient was walking on the stairs yesterday when she felt a pop in the right knee. Patient states that since the replacement, she has been unable to perform range of motion with the right knee. She has been following with Dr. Krystal Palm for this and has received injections for her pain symptoms. She has a follow-up appointment established with him on June 28, 2023. She only stumbled during this occasion she did not fall nor strike her head. No LOC. She did not fall onto the knee. PAST MEDICAL / SURGICAL / SOCIAL / FAMILY HISTORY      has a past medical history of Acid reflux, Arthritis, Asthma, Coughing, CPAP (continuous positive airway pressure) dependence, Depression, Diabetes mellitus ([address] Difficult intubation, Diverticulitis, Environmental allergies, Epilepsy ([address] Factor V Leiden ([address] Gastroparesis, Generalized abdominal pain, Hemorrhage of anus and rectum, Hiatal hernia, History of stroke associated with blood clotting tendency, Hx of blood clots, Hyperlipidemia, Hypertension, Insomnia, Pre-procedure lab exam, Sleep apnea, Unspecified cerebral artery occlusion with cerebral infarction, and Wears glasses. has a past surgical history that includes Neck surgery (July 2012); Endometrial ablation; laparoscopy; Hysterectomy; Tonsillectomy; Cholecystectomy;  Appendectomy;

## 2023-06-17 NOTE — DISCHARGE INSTRUCTIONS
X-ray imaging showed no change in position of your joint hardware. For pain you can use the topical diclofenac gel to help ease your symptoms.   It is recommended that you call your orthopedic physician and arrange follow-up appointment for reevaluation if pain symptoms persist.

## 2023-06-17 NOTE — ED PROVIDER NOTES
EMERGENCY DEPARTMENT ENCOUNTER   ATTENDING ATTESTATION     Pt Name: Lauren Coker  MRN: [id number]  [place] 1965  Date of evaluation: 6/16/23       Lauren Coker is a 65 y.o. female who presents with Knee Injury (Right)    Pop in right knee. Has history of TKA. Tender on exam, plan is xray    MDM:     X-rays unremarkable    Patient will follow up with her surgeon. No erythema or warmth afebrile low suspicion for septic arthritis it was a traumatic injury    Vitals:   Vitals:    06/16/23 1948   BP: 133/80   Pulse: 84   Resp: 18   Temp: 98.9 °F (37.2 °C)   SpO2: 98%   Weight: 200 lb (90.7 kg)   Height: 5' 3\" (1.6 m)         I personally saw and examined the patient. I have reviewed and agree with the resident's findings, including all diagnostic interpretations and treatment plan as written. I was present for the key portions of any procedures performed and the inclusive time noted for any critical care statement.     Lauren Lazaro MD  Attending Emergency Physician           Lauren Lazaro MD  06/16/23 [id number]

## 2023-06-19 RX ORDER — CLOTRIMAZOLE AND BETAMETHASONE DIPROPIONATE 10; .64 MG/G; MG/G
CREAM TOPICAL
COMMUNITY
Start: 2023-06-02

## 2023-06-19 RX ORDER — BUDESONIDE AND FORMOTEROL FUMARATE DIHYDRATE 160; 4.5 UG/1; UG/1
AEROSOL RESPIRATORY (INHALATION)
COMMUNITY
Start: 2023-06-07 | End: 2023-06-20

## 2023-06-20 ENCOUNTER — OFFICE VISIT (OUTPATIENT)
Dept: INTERNAL MEDICINE CLINIC | Age: 58
End: 2023-06-20
Payer: COMMERCIAL

## 2023-06-20 VITALS
OXYGEN SATURATION: 96 % | SYSTOLIC BLOOD PRESSURE: 118 MMHG | HEIGHT: 63 IN | BODY MASS INDEX: 36.5 KG/M2 | WEIGHT: 206 LBS | HEART RATE: 73 BPM | DIASTOLIC BLOOD PRESSURE: 68 MMHG

## 2023-06-20 DIAGNOSIS — E78.5 HYPERLIPIDEMIA, UNSPECIFIED HYPERLIPIDEMIA TYPE: ICD-10-CM

## 2023-06-20 DIAGNOSIS — Z11.59 NEED FOR HEPATITIS C SCREENING TEST: ICD-10-CM

## 2023-06-20 DIAGNOSIS — G81.94 LEFT HEMIPARESIS (HCC): ICD-10-CM

## 2023-06-20 DIAGNOSIS — Z82.49 FAMILY HISTORY OF HEART DISEASE: ICD-10-CM

## 2023-06-20 DIAGNOSIS — G40.109: ICD-10-CM

## 2023-06-20 DIAGNOSIS — J45.50 SEVERE PERSISTENT ASTHMA WITHOUT COMPLICATION: ICD-10-CM

## 2023-06-20 DIAGNOSIS — F33.3 SEVERE EPISODE OF RECURRENT MAJOR DEPRESSIVE DISORDER, WITH PSYCHOTIC FEATURES (HCC): ICD-10-CM

## 2023-06-20 DIAGNOSIS — D68.59 HYPERCOAGULOPATHY (HCC): ICD-10-CM

## 2023-06-20 DIAGNOSIS — E11.42 TYPE 2 DIABETES MELLITUS WITH DIABETIC POLYNEUROPATHY, WITHOUT LONG-TERM CURRENT USE OF INSULIN (HCC): ICD-10-CM

## 2023-06-20 DIAGNOSIS — K51.40 PSEUDOPOLYPOSIS OF COLON WITHOUT COMPLICATION, UNSPECIFIED PART OF COLON (HCC): ICD-10-CM

## 2023-06-20 DIAGNOSIS — I10 HYPERTENSION, UNSPECIFIED TYPE: ICD-10-CM

## 2023-06-20 DIAGNOSIS — Z76.89 ENCOUNTER TO ESTABLISH CARE: Primary | ICD-10-CM

## 2023-06-20 DIAGNOSIS — K31.84 GASTROPARESIS: ICD-10-CM

## 2023-06-20 DIAGNOSIS — R13.10 DYSPHAGIA, UNSPECIFIED TYPE: ICD-10-CM

## 2023-06-20 DIAGNOSIS — Z11.4 ENCOUNTER FOR SCREENING FOR HIV: ICD-10-CM

## 2023-06-20 PROCEDURE — 99204 OFFICE O/P NEW MOD 45 MIN: CPT | Performed by: NURSE PRACTITIONER

## 2023-06-20 PROCEDURE — 3078F DIAST BP <80 MM HG: CPT | Performed by: NURSE PRACTITIONER

## 2023-06-20 PROCEDURE — 3074F SYST BP LT 130 MM HG: CPT | Performed by: NURSE PRACTITIONER

## 2023-06-20 RX ORDER — ALBUTEROL SULFATE 90 UG/1
2 AEROSOL, METERED RESPIRATORY (INHALATION) 4 TIMES DAILY PRN
Qty: 18 G | Refills: 1 | Status: SHIPPED | OUTPATIENT
Start: 2023-06-20

## 2023-06-20 SDOH — ECONOMIC STABILITY: FOOD INSECURITY: WITHIN THE PAST 12 MONTHS, THE FOOD YOU BOUGHT JUST DIDN'T LAST AND YOU DIDN'T HAVE MONEY TO GET MORE.: NEVER TRUE

## 2023-06-20 SDOH — ECONOMIC STABILITY: FOOD INSECURITY: WITHIN THE PAST 12 MONTHS, YOU WORRIED THAT YOUR FOOD WOULD RUN OUT BEFORE YOU GOT MONEY TO BUY MORE.: NEVER TRUE

## 2023-06-20 SDOH — ECONOMIC STABILITY: HOUSING INSECURITY
IN THE LAST 12 MONTHS, WAS THERE A TIME WHEN YOU DID NOT HAVE A STEADY PLACE TO SLEEP OR SLEPT IN A SHELTER (INCLUDING NOW)?: NO

## 2023-06-20 SDOH — ECONOMIC STABILITY: INCOME INSECURITY: HOW HARD IS IT FOR YOU TO PAY FOR THE VERY BASICS LIKE FOOD, HOUSING, MEDICAL CARE, AND HEATING?: NOT HARD AT ALL

## 2023-06-20 ASSESSMENT — PATIENT HEALTH QUESTIONNAIRE - PHQ9
SUM OF ALL RESPONSES TO PHQ QUESTIONS 1-9: 22
4. FEELING TIRED OR HAVING LITTLE ENERGY: 3
2. FEELING DOWN, DEPRESSED OR HOPELESS: 3
SUM OF ALL RESPONSES TO PHQ9 QUESTIONS 1 & 2: 6
6. FEELING BAD ABOUT YOURSELF - OR THAT YOU ARE A FAILURE OR HAVE LET YOURSELF OR YOUR FAMILY DOWN: 1
SUM OF ALL RESPONSES TO PHQ QUESTIONS 1-9: 22
5. POOR APPETITE OR OVEREATING: 3
10. IF YOU CHECKED OFF ANY PROBLEMS, HOW DIFFICULT HAVE THESE PROBLEMS MADE IT FOR YOU TO DO YOUR WORK, TAKE CARE OF THINGS AT HOME, OR GET ALONG WITH OTHER PEOPLE: 1
7. TROUBLE CONCENTRATING ON THINGS, SUCH AS READING THE NEWSPAPER OR WATCHING TELEVISION: 3
1. LITTLE INTEREST OR PLEASURE IN DOING THINGS: 3
3. TROUBLE FALLING OR STAYING ASLEEP: 3
8. MOVING OR SPEAKING SO SLOWLY THAT OTHER PEOPLE COULD HAVE NOTICED. OR THE OPPOSITE, BEING SO FIGETY OR RESTLESS THAT YOU HAVE BEEN MOVING AROUND A LOT MORE THAN USUAL: 3
SUM OF ALL RESPONSES TO PHQ QUESTIONS 1-9: 22
SUM OF ALL RESPONSES TO PHQ QUESTIONS 1-9: 22
9. THOUGHTS THAT YOU WOULD BE BETTER OFF DEAD, OR OF HURTING YOURSELF: 0

## 2023-06-20 ASSESSMENT — COLUMBIA-SUICIDE SEVERITY RATING SCALE - C-SSRS
6. HAVE YOU EVER DONE ANYTHING, STARTED TO DO ANYTHING, OR PREPARED TO DO ANYTHING TO END YOUR LIFE?: NO
1. WITHIN THE PAST MONTH, HAVE YOU WISHED YOU WERE DEAD OR WISHED YOU COULD GO TO SLEEP AND NOT WAKE UP?: NO
2. HAVE YOU ACTUALLY HAD ANY THOUGHTS OF KILLING YOURSELF?: NO

## 2023-06-20 ASSESSMENT — ENCOUNTER SYMPTOMS
SHORTNESS OF BREATH: 1
BACK PAIN: 1
ABDOMINAL PAIN: 0
TROUBLE SWALLOWING: 1
WHEEZING: 0
COUGH: 0
DIARRHEA: 1

## 2023-06-28 ENCOUNTER — OFFICE VISIT (OUTPATIENT)
Dept: ORTHOPEDIC SURGERY | Age: 58
End: 2023-06-28

## 2023-06-28 DIAGNOSIS — Z96.651 S/P TKR (TOTAL KNEE REPLACEMENT), RIGHT: ICD-10-CM

## 2023-06-28 DIAGNOSIS — Z96.651 HISTORY OF TOTAL RIGHT KNEE REPLACEMENT: ICD-10-CM

## 2023-06-28 DIAGNOSIS — M25.551 RIGHT HIP PAIN: Primary | ICD-10-CM

## 2023-06-28 RX ORDER — LIDOCAINE HYDROCHLORIDE 10 MG/ML
2 INJECTION, SOLUTION INFILTRATION; PERINEURAL ONCE
Status: COMPLETED | OUTPATIENT
Start: 2023-06-28 | End: 2023-06-28

## 2023-06-28 RX ORDER — BETAMETHASONE SODIUM PHOSPHATE AND BETAMETHASONE ACETATE 3; 3 MG/ML; MG/ML
12 INJECTION, SUSPENSION INTRA-ARTICULAR; INTRALESIONAL; INTRAMUSCULAR; SOFT TISSUE ONCE
Status: COMPLETED | OUTPATIENT
Start: 2023-06-28 | End: 2023-06-28

## 2023-06-28 RX ORDER — BUPIVACAINE HYDROCHLORIDE 2.5 MG/ML
2 INJECTION, SOLUTION INFILTRATION; PERINEURAL ONCE
Status: COMPLETED | OUTPATIENT
Start: 2023-06-28 | End: 2023-06-28

## 2023-06-28 RX ADMIN — LIDOCAINE HYDROCHLORIDE 2 ML: 10 INJECTION, SOLUTION INFILTRATION; PERINEURAL at 14:17

## 2023-06-28 RX ADMIN — BETAMETHASONE SODIUM PHOSPHATE AND BETAMETHASONE ACETATE 12 MG: 3; 3 INJECTION, SUSPENSION INTRA-ARTICULAR; INTRALESIONAL; INTRAMUSCULAR; SOFT TISSUE at 14:16

## 2023-06-28 RX ADMIN — BUPIVACAINE HYDROCHLORIDE 5 MG: 2.5 INJECTION, SOLUTION INFILTRATION; PERINEURAL at 14:17

## 2023-06-29 RX ORDER — TIZANIDINE 4 MG/1
4 TABLET ORAL NIGHTLY PRN
Qty: 30 TABLET | Refills: 0 | Status: SHIPPED | OUTPATIENT
Start: 2023-06-29

## 2023-07-05 PROBLEM — K62.5 HEMORRHAGE OF ANUS AND RECTUM: Status: RESOLVED | Noted: 2020-09-11 | Resolved: 2023-07-05

## 2023-07-05 PROBLEM — I77.71 CAROTID ARTERY DISSECTION (HCC): Status: RESOLVED | Noted: 2022-09-22 | Resolved: 2023-07-05

## 2023-07-05 PROBLEM — E78.00 PURE HYPERCHOLESTEROLEMIA: Status: RESOLVED | Noted: 2020-07-28 | Resolved: 2023-07-05

## 2023-07-05 PROBLEM — Z96.651 S/P TOTAL KNEE ARTHROPLASTY, RIGHT: Status: RESOLVED | Noted: 2022-09-22 | Resolved: 2023-07-05

## 2023-07-05 PROBLEM — M25.569 PAIN IN KNEE: Status: RESOLVED | Noted: 2022-02-10 | Resolved: 2023-07-05

## 2023-07-07 RX ORDER — CLOTRIMAZOLE AND BETAMETHASONE DIPROPIONATE 10; .64 MG/G; MG/G
CREAM TOPICAL
OUTPATIENT
Start: 2023-07-07

## 2023-07-24 ENCOUNTER — HOSPITAL ENCOUNTER (EMERGENCY)
Age: 58
Discharge: HOME | End: 2023-07-24
Attending: EMERGENCY MEDICINE
Payer: COMMERCIAL

## 2023-07-24 ENCOUNTER — OFFICE VISIT (OUTPATIENT)
Dept: INTERNAL MEDICINE CLINIC | Age: 58
End: 2023-07-24
Payer: COMMERCIAL

## 2023-07-24 VITALS
SYSTOLIC BLOOD PRESSURE: 120 MMHG | TEMPERATURE: 98.1 F | HEART RATE: 72 BPM | RESPIRATION RATE: 15 BRPM | OXYGEN SATURATION: 95 % | DIASTOLIC BLOOD PRESSURE: 74 MMHG | HEIGHT: 63 IN | BODY MASS INDEX: 36.5 KG/M2 | WEIGHT: 206 LBS

## 2023-07-24 VITALS
HEART RATE: 82 BPM | OXYGEN SATURATION: 98 % | BODY MASS INDEX: 36.49 KG/M2 | DIASTOLIC BLOOD PRESSURE: 82 MMHG | WEIGHT: 206 LBS | SYSTOLIC BLOOD PRESSURE: 124 MMHG

## 2023-07-24 DIAGNOSIS — Z13.220 SCREENING FOR HYPERLIPIDEMIA: ICD-10-CM

## 2023-07-24 DIAGNOSIS — E86.0 DEHYDRATION: Primary | ICD-10-CM

## 2023-07-24 DIAGNOSIS — R53.83 OTHER FATIGUE: ICD-10-CM

## 2023-07-24 DIAGNOSIS — I82.629 ACUTE DEEP VEIN THROMBOSIS (DVT) OF UPPER EXTREMITY, UNSPECIFIED LATERALITY, UNSPECIFIED VEIN (HCC): ICD-10-CM

## 2023-07-24 DIAGNOSIS — G93.40 ENCEPHALOPATHY: ICD-10-CM

## 2023-07-24 DIAGNOSIS — G93.41 ACUTE METABOLIC ENCEPHALOPATHY: ICD-10-CM

## 2023-07-24 DIAGNOSIS — Z12.31 ENCOUNTER FOR SCREENING MAMMOGRAM FOR BREAST CANCER: ICD-10-CM

## 2023-07-24 DIAGNOSIS — E11.42 TYPE 2 DIABETES MELLITUS WITH DIABETIC POLYNEUROPATHY, WITHOUT LONG-TERM CURRENT USE OF INSULIN (HCC): Primary | ICD-10-CM

## 2023-07-24 LAB
ALBUMIN SERPL-MCNC: 3.7 G/DL (ref 3.5–5.2)
ALP SERPL-CCNC: 141 U/L (ref 35–104)
ALT SERPL-CCNC: 30 U/L (ref 5–33)
ANION GAP SERPL CALCULATED.3IONS-SCNC: 8 MMOL/L (ref 9–17)
AST SERPL-CCNC: 23 U/L
BACTERIA URNS QL MICRO: NORMAL
BASOPHILS # BLD: 0.1 K/UL (ref 0–0.2)
BASOPHILS NFR BLD: 1 % (ref 0–2)
BILIRUB SERPL-MCNC: <0.2 MG/DL (ref 0.3–1.2)
BILIRUB UR QL STRIP: NEGATIVE
BUN SERPL-MCNC: 20 MG/DL (ref 6–20)
CALCIUM SERPL-MCNC: 8.4 MG/DL (ref 8.6–10.4)
CASTS #/AREA URNS LPF: NORMAL /LPF
CHLORIDE SERPL-SCNC: 107 MMOL/L (ref 98–107)
CLARITY UR: CLEAR
CO2 SERPL-SCNC: 25 MMOL/L (ref 20–31)
COLOR UR: YELLOW
CREAT SERPL-MCNC: 0.9 MG/DL (ref 0.5–0.9)
EOSINOPHIL # BLD: 0.1 K/UL (ref 0–0.4)
EOSINOPHILS RELATIVE PERCENT: 2 % (ref 0–4)
EPI CELLS #/AREA URNS HPF: NORMAL /HPF
ERYTHROCYTE [DISTWIDTH] IN BLOOD BY AUTOMATED COUNT: 14.5 % (ref 11.5–14.9)
GFR SERPL CREATININE-BSD FRML MDRD: >60 ML/MIN/1.73M2
GLUCOSE SERPL-MCNC: 130 MG/DL (ref 70–99)
GLUCOSE UR STRIP-MCNC: NEGATIVE MG/DL
HCT VFR BLD AUTO: 34.6 % (ref 36–46)
HGB BLD-MCNC: 11 G/DL (ref 12–16)
HGB UR QL STRIP.AUTO: NEGATIVE
INR PPP: 2.1
KETONES UR STRIP-MCNC: NEGATIVE MG/DL
LEUKOCYTE ESTERASE UR QL STRIP: ABNORMAL
LYMPHOCYTES NFR BLD: 2.2 K/UL (ref 1–4.8)
LYMPHOCYTES RELATIVE PERCENT: 35 % (ref 24–44)
MAGNESIUM SERPL-MCNC: 2.2 MG/DL (ref 1.6–2.6)
MCH RBC QN AUTO: 28 PG (ref 26–34)
MCHC RBC AUTO-ENTMCNC: 31.8 G/DL (ref 31–37)
MCV RBC AUTO: 87.9 FL (ref 80–100)
MONOCYTES NFR BLD: 0.4 K/UL (ref 0.1–1.3)
MONOCYTES NFR BLD: 6 % (ref 1–7)
NEUTROPHILS NFR BLD: 56 % (ref 36–66)
NEUTS SEG NFR BLD: 3.6 K/UL (ref 1.3–9.1)
NITRITE UR QL STRIP: NEGATIVE
PH UR STRIP: 6.5 [PH] (ref 5–8)
PLATELET # BLD AUTO: 239 K/UL (ref 150–450)
PMV BLD AUTO: 8.4 FL (ref 6–12)
POTASSIUM SERPL-SCNC: 4.1 MMOL/L (ref 3.7–5.3)
PROT SERPL-MCNC: 6.8 G/DL (ref 6.4–8.3)
PROT UR STRIP-MCNC: NEGATIVE MG/DL
PROTHROMBIN TIME: 24.3 SEC (ref 11.8–14.6)
RBC # BLD AUTO: 3.93 M/UL (ref 4–5.2)
RBC #/AREA URNS HPF: NORMAL /HPF
SODIUM SERPL-SCNC: 140 MMOL/L (ref 135–144)
SP GR UR STRIP: 1.01 (ref 1–1.03)
TROPONIN I SERPL HS-MCNC: <6 NG/L (ref 0–14)
TROPONIN I SERPL HS-MCNC: <6 NG/L (ref 0–14)
UROBILINOGEN UR STRIP-ACNC: NORMAL EU/DL (ref 0–1)
WBC #/AREA URNS HPF: NORMAL /HPF
WBC OTHER # BLD: 6.3 K/UL (ref 3.5–11)

## 2023-07-24 PROCEDURE — 80053 COMPREHEN METABOLIC PANEL: CPT

## 2023-07-24 PROCEDURE — 96360 HYDRATION IV INFUSION INIT: CPT

## 2023-07-24 PROCEDURE — 36415 COLL VENOUS BLD VENIPUNCTURE: CPT

## 2023-07-24 PROCEDURE — 93005 ELECTROCARDIOGRAM TRACING: CPT | Performed by: EMERGENCY MEDICINE

## 2023-07-24 PROCEDURE — 2580000003 HC RX 258: Performed by: EMERGENCY MEDICINE

## 2023-07-24 PROCEDURE — 81001 URINALYSIS AUTO W/SCOPE: CPT

## 2023-07-24 PROCEDURE — 84484 ASSAY OF TROPONIN QUANT: CPT

## 2023-07-24 PROCEDURE — 85027 COMPLETE CBC AUTOMATED: CPT

## 2023-07-24 PROCEDURE — 99284 EMERGENCY DEPT VISIT MOD MDM: CPT

## 2023-07-24 PROCEDURE — 96361 HYDRATE IV INFUSION ADD-ON: CPT

## 2023-07-24 PROCEDURE — 3074F SYST BP LT 130 MM HG: CPT | Performed by: INTERNAL MEDICINE

## 2023-07-24 PROCEDURE — 85610 PROTHROMBIN TIME: CPT

## 2023-07-24 PROCEDURE — 3078F DIAST BP <80 MM HG: CPT | Performed by: INTERNAL MEDICINE

## 2023-07-24 PROCEDURE — 83735 ASSAY OF MAGNESIUM: CPT

## 2023-07-24 PROCEDURE — 99213 OFFICE O/P EST LOW 20 MIN: CPT | Performed by: INTERNAL MEDICINE

## 2023-07-24 RX ORDER — 0.9 % SODIUM CHLORIDE 0.9 %
1000 INTRAVENOUS SOLUTION INTRAVENOUS ONCE
Status: COMPLETED | OUTPATIENT
Start: 2023-07-24 | End: 2023-07-24

## 2023-07-24 RX ADMIN — SODIUM CHLORIDE 1000 ML: 9 INJECTION, SOLUTION INTRAVENOUS at 14:06

## 2023-07-24 RX ADMIN — SODIUM CHLORIDE 1000 ML: 9 INJECTION, SOLUTION INTRAVENOUS at 12:13

## 2023-07-24 ASSESSMENT — ENCOUNTER SYMPTOMS
SHORTNESS OF BREATH: 0
NAUSEA: 0
VISUAL CHANGE: 0
VOMITING: 0
BLOOD IN STOOL: 0
DIARRHEA: 0
COUGH: 0

## 2023-07-24 ASSESSMENT — LIFESTYLE VARIABLES
HOW MANY STANDARD DRINKS CONTAINING ALCOHOL DO YOU HAVE ON A TYPICAL DAY: PATIENT DOES NOT DRINK
HOW OFTEN DO YOU HAVE A DRINK CONTAINING ALCOHOL: NEVER

## 2023-07-24 ASSESSMENT — PAIN - FUNCTIONAL ASSESSMENT: PAIN_FUNCTIONAL_ASSESSMENT: NONE - DENIES PAIN

## 2023-07-24 NOTE — PROGRESS NOTES
Visit Information    Have you changed or started any medications since your last visit including any over-the-counter medicines, vitamins, or herbal medicines? no   Are you having any side effects from any of your medications? -  no  Have you stopped taking any of your medications? Is so, why? -  no    Have you seen any other physician or provider since your last visit? No  Have you had any other diagnostic tests since your last visit? No  Have you been seen in the emergency room and/or had an admission to a hospital since we last saw you? No  Have you had your routine dental cleaning in the past 6 months? no    Have you activated your [employer] account? If not, what are your barriers?  Yes     Patient Care Team:  KAMARI Mesa CNP as PCP - General (Internal Medicine)  KAMARI Mesa CNP as PCP - Empaneled Provider    Medical History Review  Past Medical, Family, and Social History reviewed and does not contribute to the patient presenting condition    Health Maintenance   Topic Date Due    Diabetic foot exam  Never done    HIV screen  Never done    Diabetic retinal exam  Never done    Hepatitis C screen  Never done    Hepatitis B vaccine (1 of 3 - Risk 3-dose series) Never done    Pneumococcal 0-64 years Vaccine (2 - PCV) 09/08/2018    Diabetic Alb to Cr ratio (uACR) test  10/09/2018    Lipids  10/10/2018    A1C test (Diabetic or Prediabetic)  10/05/2019    Breast cancer screen  11/22/2019    Shingles vaccine (2 of 2) 10/15/2021    COVID-19 Vaccine (4 - Booster for Davon Grandchild series) 02/01/2022    Annual Wellness Visit (AWV)  Never done    GFR test (Diabetes, CKD 3-4, OR last GFR 15-59)  08/10/2023    Flu vaccine (1) 08/01/2023    Colorectal Cancer Screen  02/22/2024    Depression Monitoring  06/20/2024    DTaP/Tdap/Td vaccine (3 - Td or Tdap) 06/22/2031    Hepatitis A vaccine  Aged Out    Hib vaccine  Aged Out    Meningococcal (ACWY) vaccine  Aged Out    Cervical cancer screen  Discontinued

## 2023-07-24 NOTE — ED PROVIDER NOTES
Gastroparesis K31.84    Rectal bleeding K62.5    Hx of blood clots Z86.718    Primary osteoarthritis of right knee M17.11    Chronic anemia D64.9    Antalgic gait R26.89    Anxiety F41.9    Artificial knee joint present Z96.659    Chronic bilateral low back pain with right-sided sciatica M54.41, G89.29    Chronic shortness of breath R06.02    Current severe episode of major depressive disorder with psychotic features ([address]) F32.3    Deep vein thrombosis (DVT) of upper extremity ([facility]) L78.338    Diastolic dysfunction P72.75    Disorder of sacrum M53.3    Diverticulosis K57.90    Dysarthria R47.1    Focal epilepsy originating in parietal lobe ([facility]) G40.109    Hypercoagulopathy ([facility]) D68.59    Hypokalemia E87.6    Internal derangement of left knee M23.92    Lymphedema I89.0    Major depressive disorder, recurrent episode, moderate ([facility]) F33.1    Neuropathy G62.9    Primary osteoarthritis M19.91    Nonintractable migraine, unspecified migraine type G43.009    Convulsions ([facility]) R56.9    Seizure ([facility]) R56.9    Sleep apnea G47.30    Tardive dyskinesia G24.01    Tremor R25.1    Vascular insufficiency I99.8    Gastroesophageal reflux disease K21.9    Gastroesophageal reflux disease without esophagitis K21.9    Epileptic seizure ([address]) G40.909    Factor V Leiden ([address]) D68.51    Generalized anxiety disorder F41.1    Hypertension I10    Venous insufficiency of both lower extremities I87.2    Left hemiparesis ([facility]) G81.94    Weakness of left side of body R53.1    Localized swelling, mass and lump, upper limb R22.30    Severe obesity (BMI 35.0-39. 9) with comorbidity ([address]) E66.01     SURGICAL HISTORY       Past Surgical History:   Procedure Laterality Date    APPENDECTOMY      CHOLECYSTECTOMY      COLONOSCOPY      with polypectomy    COLONOSCOPY  01/22/2016    AND EGD    COLONOSCOPY  01/05/2017    DR MACKAY-random biopsies.     COLONOSCOPY N/A 10/20/2020    COLONOSCOPY POLYPECTOMY HOT BIOPSY performed by Ethel Wheeler MD at [facility]

## 2023-07-24 NOTE — PROGRESS NOTES
[address] [address]  Dept: [phone]  Dept Fax: [phone]    Office Progress/Follow Up Note  Date of patient's visit: 7/24/2023  Patient's Name:  Hang James YOB: 1965            Patient Care Team:  KAMARI Heck CNP as PCP - General (Internal Medicine)  KAMARI Heck CNP as PCP - Empaneled Provider    REASON FOR VISIT: Routine outpatient follow up/Same day visit/Post hospital/ED visit    HISTORY OF PRESENT ILLNESS:      Chief Complaint   Patient presents with    Diabetes     Follow up       Patient is a 56-year-old female with multiple comorbidities.   Came today for follow-up,  Has history of diabetes mellitus, DVT, stroke, depression and anxiety, hypertension,,.  Seizure disorder,  When I entered the room patient was very drowsy very hard to wake her up, patient would answer the question and then fall back asleep, patient told me that she feels that she is drunk, was very slow to respond, I called patient's boyfriend who was sitting outside in, he told me that patient had similar symptoms couple of years ago where they did an extensive work-up Does have history of sleep apnea, states that sometimes she does doze off but would wake up on command,  In the car patient was awake and alert, was reading newspaper, while in the clinic she could hardly keep her eyes open  Patient does have weakness of the left side which according to patient is not new  Patient was otherwise oriented      Time, patient would not stay awake for finger-to-nose test, also when she tried to walk she was pretty wobbly,    I advised patient's boyfriend to take her to ER for further evaluation, does have extensive neurological history including seizures severe migraine history of stroke and on Coumadin for coagulopathy,  Denies overdose on the medication intentional or accidental    Patient Active Problem List   Diagnosis    Complex tear of

## 2023-07-26 LAB
EKG ATRIAL RATE: 70 BPM
EKG P AXIS: 46 DEGREES
EKG P-R INTERVAL: 232 MS
EKG Q-T INTERVAL: 394 MS
EKG QRS DURATION: 102 MS
EKG QTC CALCULATION (BAZETT): 425 MS
EKG R AXIS: 12 DEGREES
EKG T AXIS: 15 DEGREES
EKG VENTRICULAR RATE: 70 BPM

## 2023-07-26 PROCEDURE — 93010 ELECTROCARDIOGRAM REPORT: CPT | Performed by: INTERNAL MEDICINE

## 2023-08-11 ENCOUNTER — TELEPHONE (OUTPATIENT)
Dept: INTERNAL MEDICINE CLINIC | Age: 58
End: 2023-08-11

## 2023-08-11 NOTE — TELEPHONE ENCOUNTER
Rodney leon from [employer] called and needed a verbal change in the diagnosis code. New code given of [address]. 11. Verbal given and Cologuard is being sent to pt.       Case code: [id number]

## 2023-08-15 ENCOUNTER — OFFICE VISIT (OUTPATIENT)
Dept: INTERNAL MEDICINE CLINIC | Age: 58
End: 2023-08-15
Payer: COMMERCIAL

## 2023-08-15 VITALS
DIASTOLIC BLOOD PRESSURE: 70 MMHG | OXYGEN SATURATION: 97 % | SYSTOLIC BLOOD PRESSURE: 126 MMHG | BODY MASS INDEX: 37.03 KG/M2 | WEIGHT: 209 LBS | HEART RATE: 72 BPM | HEIGHT: 63 IN

## 2023-08-15 DIAGNOSIS — L98.9 SKIN LESION OF FACE: Primary | ICD-10-CM

## 2023-08-15 DIAGNOSIS — K21.9 GASTROESOPHAGEAL REFLUX DISEASE, UNSPECIFIED WHETHER ESOPHAGITIS PRESENT: ICD-10-CM

## 2023-08-15 DIAGNOSIS — R60.0 BILATERAL LEG EDEMA: ICD-10-CM

## 2023-08-15 PROCEDURE — 3074F SYST BP LT 130 MM HG: CPT | Performed by: NURSE PRACTITIONER

## 2023-08-15 PROCEDURE — 3078F DIAST BP <80 MM HG: CPT | Performed by: NURSE PRACTITIONER

## 2023-08-15 PROCEDURE — 99213 OFFICE O/P EST LOW 20 MIN: CPT | Performed by: NURSE PRACTITIONER

## 2023-08-15 RX ORDER — AMITRIPTYLINE HYDROCHLORIDE 50 MG/1
100 TABLET, FILM COATED ORAL NIGHTLY
Qty: 30 TABLET | Status: CANCELLED | OUTPATIENT
Start: 2023-08-15

## 2023-08-15 RX ORDER — BUDESONIDE AND FORMOTEROL FUMARATE DIHYDRATE 160; 4.5 UG/1; UG/1
AEROSOL RESPIRATORY (INHALATION)
COMMUNITY
Start: 2023-07-16

## 2023-08-15 RX ORDER — LORAZEPAM 1 MG/1
1 TABLET ORAL 2 TIMES DAILY PRN
COMMUNITY
Start: 2022-08-08

## 2023-08-15 RX ORDER — CYANOCOBALAMIN (VITAMIN B-12) 500 MCG
TABLET ORAL DAILY
COMMUNITY

## 2023-08-15 NOTE — PROGRESS NOTES
trouble swallowing. Negative for ear pain and hearing loss. Eyes:  Negative for visual disturbance. Respiratory:  Positive for shortness of breath. Negative for cough and wheezing. Cardiovascular:  Positive for leg swelling. Negative for chest pain and palpitations. Gastrointestinal:  Positive for abdominal pain and diarrhea (chronic diarrhea for years). Negative for constipation and nausea. Endocrine: Negative for polydipsia, polyphagia and polyuria. Genitourinary:  Negative for difficulty urinating. Musculoskeletal:  Positive for arthralgias, back pain, gait problem and neck pain. Skin:         Skin lesion on cheek   Neurological:  Positive for seizures, weakness and headaches. On [place] for tardive dyskinesia   Psychiatric/Behavioral:  Positive for dysphoric mood and sleep disturbance (CAMILO on cpap). The patient is nervous/anxious. PHYSICAL EXAM:      Vitals:    08/15/23 1202   BP: 126/70   Site: Left Upper Arm   Pulse: 72   SpO2: 97%   Weight: 209 lb (94.8 kg)   Height: 5' 3\" (1.6 m)     BP Readings from Last 3 Encounters:   08/30/23 115/84   08/28/23 (!) 139/100   08/15/23 126/70        Physical Exam  Constitutional:       General: She is not in acute distress. Appearance: She is well-developed. She is obese. HENT:      Head: Normocephalic and atraumatic. Right Ear: Tympanic membrane and external ear normal.      Left Ear: Tympanic membrane and external ear normal.      Nose: Nose normal.      Mouth/Throat:      Mouth: Mucous membranes are moist.      Pharynx: Oropharynx is clear. Eyes:      General:         Right eye: No discharge. Left eye: No discharge. Conjunctiva/sclera: Conjunctivae normal.      Pupils: Pupils are equal, round, and reactive to light. Neck:      Thyroid: No thyromegaly. Cardiovascular:      Rate and Rhythm: Normal rate and regular rhythm. Pulses: Normal pulses. Heart sounds: Normal heart sounds. No murmur heard.

## 2023-08-28 ENCOUNTER — APPOINTMENT (OUTPATIENT)
Dept: GENERAL RADIOLOGY | Age: 58
End: 2023-08-28
Payer: COMMERCIAL

## 2023-08-28 ENCOUNTER — HOSPITAL ENCOUNTER (EMERGENCY)
Age: 58
Discharge: HOME | End: 2023-08-28
Attending: EMERGENCY MEDICINE
Payer: COMMERCIAL

## 2023-08-28 VITALS
HEIGHT: 63 IN | HEART RATE: 76 BPM | DIASTOLIC BLOOD PRESSURE: 100 MMHG | SYSTOLIC BLOOD PRESSURE: 139 MMHG | WEIGHT: 206 LBS | RESPIRATION RATE: 18 BRPM | BODY MASS INDEX: 36.5 KG/M2 | OXYGEN SATURATION: 98 % | TEMPERATURE: 98 F

## 2023-08-28 DIAGNOSIS — S16.1XXA STRAIN OF NECK MUSCLE, INITIAL ENCOUNTER: Primary | ICD-10-CM

## 2023-08-28 DIAGNOSIS — S46.812A STRAIN OF LEFT TRAPEZIUS MUSCLE, INITIAL ENCOUNTER: ICD-10-CM

## 2023-08-28 PROCEDURE — 96372 THER/PROPH/DIAG INJ SC/IM: CPT

## 2023-08-28 PROCEDURE — 72072 X-RAY EXAM THORAC SPINE 3VWS: CPT

## 2023-08-28 PROCEDURE — 72040 X-RAY EXAM NECK SPINE 2-3 VW: CPT

## 2023-08-28 PROCEDURE — 73030 X-RAY EXAM OF SHOULDER: CPT

## 2023-08-28 PROCEDURE — 99284 EMERGENCY DEPT VISIT MOD MDM: CPT

## 2023-08-28 PROCEDURE — 6370000000 HC RX 637 (ALT 250 FOR IP): Performed by: PHYSICIAN ASSISTANT

## 2023-08-28 PROCEDURE — 6360000002 HC RX W HCPCS: Performed by: PHYSICIAN ASSISTANT

## 2023-08-28 PROCEDURE — 71046 X-RAY EXAM CHEST 2 VIEWS: CPT

## 2023-08-28 RX ORDER — TRAMADOL HYDROCHLORIDE 50 MG/1
50 TABLET ORAL EVERY 6 HOURS PRN
Qty: 12 TABLET | Refills: 0 | Status: SHIPPED | OUTPATIENT
Start: 2023-08-28 | End: 2023-08-31

## 2023-08-28 RX ORDER — ACETAMINOPHEN 500 MG
500 TABLET ORAL ONCE
Status: COMPLETED | OUTPATIENT
Start: 2023-08-28 | End: 2023-08-28

## 2023-08-28 RX ORDER — HYDROCODONE BITARTRATE AND ACETAMINOPHEN 5; 325 MG/1; MG/1
1 TABLET ORAL ONCE
Status: COMPLETED | OUTPATIENT
Start: 2023-08-28 | End: 2023-08-28

## 2023-08-28 RX ORDER — ORPHENADRINE CITRATE 30 MG/ML
60 INJECTION INTRAMUSCULAR; INTRAVENOUS ONCE
Status: COMPLETED | OUTPATIENT
Start: 2023-08-28 | End: 2023-08-28

## 2023-08-28 RX ADMIN — ACETAMINOPHEN 500 MG: 500 TABLET ORAL at 14:59

## 2023-08-28 RX ADMIN — ORPHENADRINE CITRATE 60 MG: 60 INJECTION INTRAMUSCULAR; INTRAVENOUS at 12:27

## 2023-08-28 RX ADMIN — HYDROCODONE BITARTRATE AND ACETAMINOPHEN 1 TABLET: 5; 325 TABLET ORAL at 12:27

## 2023-08-28 ASSESSMENT — PAIN - FUNCTIONAL ASSESSMENT: PAIN_FUNCTIONAL_ASSESSMENT: 0-10

## 2023-08-28 ASSESSMENT — VISUAL ACUITY: OU: 1

## 2023-08-28 ASSESSMENT — ENCOUNTER SYMPTOMS
VOMITING: 0
BACK PAIN: 1
NAUSEA: 0
SHORTNESS OF BREATH: 0
ABDOMINAL PAIN: 0
CONSTIPATION: 0
COUGH: 0

## 2023-08-28 ASSESSMENT — PAIN DESCRIPTION - ORIENTATION: ORIENTATION: LEFT

## 2023-08-28 ASSESSMENT — PAIN DESCRIPTION - LOCATION: LOCATION: SHOULDER;BACK

## 2023-08-28 ASSESSMENT — PAIN SCALES - GENERAL: PAINLEVEL_OUTOF10: 8

## 2023-08-28 NOTE — DISCHARGE INSTRUCTIONS
Recommend heating pads. Please follow up with PCP or orthopedics. Return to the ED if you develop worsening pain, dizziness, vision changes, chest pain, shortness of breath, numbness or any other concerning symptoms.

## 2023-08-28 NOTE — ED PROVIDER NOTES
eMERGENCY dEPARTMENT eNCOUnter   [address] Name: Gaston Luo  MRN: [id number]  [address] [place] 1965  Date of evaluation: 8/28/23     Gaston Luo is a 63 y.o. female with CC: Back Pain and Shoulder Pain      Based on the medical record the care appears appropriate. I was personally available for consultation in the Emergency Department.     Nadia Soliz MD  Attending Emergency Physician                  aNdia Soliz MD  08/28/23 [id number]
Visit:  back, neck, shoulder pain     Differential Diagnosis: strain, arthritis. Diagnoses Considered but Do Not Suspect:  MI, PE, pneumonia, VAD, meningitis, epidural abscess, hematoma, cholecystitis, pancreatitis, cauda equina, AAA, UTI, pyelo, ureter stone. Pertinent Comorbid Conditions:  anticoagulated, diabetic, reports prior neck surgery with hardware    2)  Data Reviewed and Analyzed  (Lab and radiology tests/orders below in next section)    External Documents Reviewed: My EKG interpretation:       Tests considered but not ordered and why:      Decision Rules/Scores utilized:      Imaging that is independently reviewed and interpreted by me as:      3)  Treatment and Disposition           Patient repeat assessment:  stable, pain improved. She is now laying on left side. Able to push herself up off of bed with left arm. Disposition discussion with patient/family, Shared Decision Making:  Discussed results and plan with the pt. They expressed appropriate understanding. Pt given close follow up, supportive care instructions and strict return instructions at the bedside. Case discussed with consulting clinician:  dr Grijalva Records     MIPS:      Social determinants of health impacting treatment or disposition:      Code Status Discussion:      CRITICAL CARE:       PROCEDURES:    Procedures      DATA FOR LAB AND RADIOLOGY TESTS ORDERED BELOW ARE REVIEWED BY THE ED CLINICIAN:    RADIOLOGY: All x-rays, CT, MRI, and formal ultrasound images (except ED bedside ultrasound) are read by the radiologist, see reports below, unless otherwise noted in MDM or here. Reports below are reviewed by myself. XR CHEST (2 VW)   Final Result   No acute cardiopulmonary disease or radiographic CHF. XR SHOULDER LEFT (MIN 2 VIEWS)   Final Result   No acute abnormality detected within the shoulder. Evidence of pulmonary edema. Consider dedicated chest radiograph for better   evaluation.          XR CERVICAL

## 2023-08-28 NOTE — ED TRIAGE NOTES
Mode of arrival (squad #, walk in, police, etc) : walk in        Chief complaint(s): Shoulder pain, Back pain        Arrival Note (brief scenario, treatment PTA, etc). : Pt reports left shoulder pain unable to raise above head and middle back pain x 2 days. Pt denies known cause for injury. Pt denies difficulty urinating or having a bowel movement. Pt is A&Ox4.        C= \"Have you ever felt that you should Cut down on your drinking? \"  No  A= \"Have people Annoyed you by criticizing your drinking? \"  No  G= \"Have you ever felt bad or Guilty about your drinking? \"  No  E= \"Have you ever had a drink as an Eye-opener first thing in the morning to steady your nerves or to help a hangover? \"  No      Deferred []      Reason for deferring: N/A    *If yes to two or more: probable alcohol abuse. *

## 2023-08-29 ENCOUNTER — TELEPHONE (OUTPATIENT)
Dept: INTERNAL MEDICINE CLINIC | Age: 58
End: 2023-08-29

## 2023-08-29 ENCOUNTER — HOSPITAL ENCOUNTER (OUTPATIENT)
Age: 58
Setting detail: SPECIMEN
Discharge: HOME | End: 2023-08-29

## 2023-08-29 DIAGNOSIS — I10 HYPERTENSION, UNSPECIFIED TYPE: ICD-10-CM

## 2023-08-29 DIAGNOSIS — Z13.220 SCREENING FOR HYPERLIPIDEMIA: ICD-10-CM

## 2023-08-29 DIAGNOSIS — Z11.4 ENCOUNTER FOR SCREENING FOR HIV: ICD-10-CM

## 2023-08-29 DIAGNOSIS — Z11.59 NEED FOR HEPATITIS C SCREENING TEST: ICD-10-CM

## 2023-08-29 DIAGNOSIS — E11.42 TYPE 2 DIABETES MELLITUS WITH DIABETIC POLYNEUROPATHY, WITHOUT LONG-TERM CURRENT USE OF INSULIN (HCC): ICD-10-CM

## 2023-08-29 LAB
ALBUMIN SERPL-MCNC: 3.7 G/DL (ref 3.5–5.2)
ALBUMIN/GLOB SERPL: 1.2 {RATIO} (ref 1–2.5)
ALP SERPL-CCNC: 130 U/L (ref 35–104)
ALT SERPL-CCNC: 19 U/L (ref 5–33)
ANION GAP SERPL CALCULATED.3IONS-SCNC: 12 MMOL/L (ref 9–17)
AST SERPL-CCNC: 21 U/L
BASOPHILS # BLD: 0.07 K/UL (ref 0–0.2)
BASOPHILS NFR BLD: 1 % (ref 0–2)
BILIRUB SERPL-MCNC: <0.1 MG/DL (ref 0.3–1.2)
BUN SERPL-MCNC: 14 MG/DL (ref 6–20)
CALCIUM SERPL-MCNC: 8.4 MG/DL (ref 8.6–10.4)
CHLORIDE SERPL-SCNC: 110 MMOL/L (ref 98–107)
CHOLEST SERPL-MCNC: 165 MG/DL
CHOLESTEROL/HDL RATIO: 3.4
CO2 SERPL-SCNC: 22 MMOL/L (ref 20–31)
CREAT SERPL-MCNC: 0.9 MG/DL (ref 0.5–0.9)
EOSINOPHIL # BLD: 0.14 K/UL (ref 0–0.44)
EOSINOPHILS RELATIVE PERCENT: 2 % (ref 1–4)
ERYTHROCYTE [DISTWIDTH] IN BLOOD BY AUTOMATED COUNT: 14.1 % (ref 11.8–14.4)
GFR SERPL CREATININE-BSD FRML MDRD: >60 ML/MIN/1.73M2
GLUCOSE SERPL-MCNC: 165 MG/DL (ref 70–99)
HCT VFR BLD AUTO: 40 % (ref 36.3–47.1)
HDLC SERPL-MCNC: 48 MG/DL
HGB BLD-MCNC: 11.8 G/DL (ref 11.9–15.1)
HIV 1+2 AB+HIV1 P24 AG SERPL QL IA: NONREACTIVE
IMM GRANULOCYTES # BLD AUTO: <0.03 K/UL (ref 0–0.3)
IMM GRANULOCYTES NFR BLD: 0 %
LDLC SERPL CALC-MCNC: 68 MG/DL (ref 0–130)
LYMPHOCYTES NFR BLD: 2.47 K/UL (ref 1.1–3.7)
LYMPHOCYTES RELATIVE PERCENT: 37 % (ref 24–43)
MCH RBC QN AUTO: 28.6 PG (ref 25.2–33.5)
MCHC RBC AUTO-ENTMCNC: 29.5 G/DL (ref 28.4–34.8)
MCV RBC AUTO: 96.9 FL (ref 82.6–102.9)
MONOCYTES NFR BLD: 0.35 K/UL (ref 0.1–1.2)
MONOCYTES NFR BLD: 5 % (ref 3–12)
NEUTROPHILS NFR BLD: 55 % (ref 36–65)
NEUTS SEG NFR BLD: 3.62 K/UL (ref 1.5–8.1)
NRBC BLD-RTO: 0 PER 100 WBC
PLATELET # BLD AUTO: 250 K/UL (ref 138–453)
PMV BLD AUTO: 11 FL (ref 8.1–13.5)
POTASSIUM SERPL-SCNC: 4.4 MMOL/L (ref 3.7–5.3)
PROT SERPL-MCNC: 6.8 G/DL (ref 6.4–8.3)
RBC # BLD AUTO: 4.13 M/UL (ref 3.95–5.11)
SODIUM SERPL-SCNC: 144 MMOL/L (ref 135–144)
TRIGL SERPL-MCNC: 247 MG/DL
WBC OTHER # BLD: 6.7 K/UL (ref 3.5–11.3)

## 2023-08-30 ENCOUNTER — APPOINTMENT (OUTPATIENT)
Dept: CT IMAGING | Age: 58
End: 2023-08-30
Payer: COMMERCIAL

## 2023-08-30 ENCOUNTER — APPOINTMENT (OUTPATIENT)
Dept: GENERAL RADIOLOGY | Age: 58
End: 2023-08-30
Payer: COMMERCIAL

## 2023-08-30 ENCOUNTER — HOSPITAL ENCOUNTER (EMERGENCY)
Age: 58
Discharge: HOME | End: 2023-08-30
Attending: EMERGENCY MEDICINE
Payer: COMMERCIAL

## 2023-08-30 VITALS
DIASTOLIC BLOOD PRESSURE: 84 MMHG | HEART RATE: 73 BPM | TEMPERATURE: 98 F | RESPIRATION RATE: 15 BRPM | SYSTOLIC BLOOD PRESSURE: 115 MMHG | OXYGEN SATURATION: 96 %

## 2023-08-30 DIAGNOSIS — R07.9 CHEST PAIN, UNSPECIFIED TYPE: Primary | ICD-10-CM

## 2023-08-30 LAB
ALBUMIN SERPL-MCNC: 3.7 G/DL (ref 3.5–5.2)
ALP SERPL-CCNC: 135 U/L (ref 35–104)
ALT SERPL-CCNC: 23 U/L (ref 5–33)
ANION GAP SERPL CALCULATED.3IONS-SCNC: 11 MMOL/L (ref 9–17)
AST SERPL-CCNC: 26 U/L
BASOPHILS # BLD: 0.1 K/UL (ref 0–0.2)
BASOPHILS NFR BLD: 1 % (ref 0–2)
BILIRUB SERPL-MCNC: <0.2 MG/DL (ref 0.3–1.2)
BUN SERPL-MCNC: 14 MG/DL (ref 6–20)
CALCIUM SERPL-MCNC: 8.8 MG/DL (ref 8.6–10.4)
CHLORIDE SERPL-SCNC: 101 MMOL/L (ref 98–107)
CO2 SERPL-SCNC: 24 MMOL/L (ref 20–31)
CREAT SERPL-MCNC: 0.8 MG/DL (ref 0.5–0.9)
CREAT UR-MCNC: 61.9 MG/DL (ref 28–217)
EOSINOPHIL # BLD: 0.1 K/UL (ref 0–0.4)
EOSINOPHILS RELATIVE PERCENT: 2 % (ref 0–4)
ERYTHROCYTE [DISTWIDTH] IN BLOOD BY AUTOMATED COUNT: 14.3 % (ref 11.5–14.9)
EST. AVERAGE GLUCOSE BLD GHB EST-MCNC: 137 MG/DL
GFR SERPL CREATININE-BSD FRML MDRD: >60 ML/MIN/1.73M2
GLUCOSE SERPL-MCNC: 104 MG/DL (ref 70–99)
HBA1C MFR BLD: 6.4 % (ref 4–6)
HCT VFR BLD AUTO: 35.8 % (ref 36–46)
HCV AB SERPL QL IA: NONREACTIVE
HGB BLD-MCNC: 11.5 G/DL (ref 12–16)
INR PPP: 1.7
LYMPHOCYTES NFR BLD: 2.3 K/UL (ref 1–4.8)
LYMPHOCYTES RELATIVE PERCENT: 35 % (ref 24–44)
MAGNESIUM SERPL-MCNC: 2.2 MG/DL (ref 1.6–2.6)
MCH RBC QN AUTO: 28.6 PG (ref 26–34)
MCHC RBC AUTO-ENTMCNC: 32.1 G/DL (ref 31–37)
MCV RBC AUTO: 89.1 FL (ref 80–100)
MICROALBUMIN UR-MCNC: <12 MG/L
MICROALBUMIN/CREAT UR-RTO: NORMAL MCG/MG CREAT
MONOCYTES NFR BLD: 0.5 K/UL (ref 0.1–1.3)
MONOCYTES NFR BLD: 8 % (ref 1–7)
NEUTROPHILS NFR BLD: 54 % (ref 36–66)
NEUTS SEG NFR BLD: 3.7 K/UL (ref 1.3–9.1)
PLATELET # BLD AUTO: 247 K/UL (ref 150–450)
PMV BLD AUTO: 8.2 FL (ref 6–12)
POTASSIUM SERPL-SCNC: 3.3 MMOL/L (ref 3.7–5.3)
PROT SERPL-MCNC: 7 G/DL (ref 6.4–8.3)
PROTHROMBIN TIME: 20.6 SEC (ref 11.8–14.6)
RBC # BLD AUTO: 4.03 M/UL (ref 4–5.2)
SODIUM SERPL-SCNC: 136 MMOL/L (ref 135–144)
TROPONIN I SERPL HS-MCNC: <6 NG/L (ref 0–14)
TROPONIN I SERPL HS-MCNC: <6 NG/L (ref 0–14)
WBC OTHER # BLD: 6.7 K/UL (ref 3.5–11)

## 2023-08-30 PROCEDURE — 99285 EMERGENCY DEPT VISIT HI MDM: CPT

## 2023-08-30 PROCEDURE — 84484 ASSAY OF TROPONIN QUANT: CPT

## 2023-08-30 PROCEDURE — 6360000004 HC RX CONTRAST MEDICATION: Performed by: STUDENT IN AN ORGANIZED HEALTH CARE EDUCATION/TRAINING PROGRAM

## 2023-08-30 PROCEDURE — 85025 COMPLETE CBC W/AUTO DIFF WBC: CPT

## 2023-08-30 PROCEDURE — 6370000000 HC RX 637 (ALT 250 FOR IP): Performed by: STUDENT IN AN ORGANIZED HEALTH CARE EDUCATION/TRAINING PROGRAM

## 2023-08-30 PROCEDURE — 6360000002 HC RX W HCPCS: Performed by: STUDENT IN AN ORGANIZED HEALTH CARE EDUCATION/TRAINING PROGRAM

## 2023-08-30 PROCEDURE — 96374 THER/PROPH/DIAG INJ IV PUSH: CPT

## 2023-08-30 PROCEDURE — 85610 PROTHROMBIN TIME: CPT

## 2023-08-30 PROCEDURE — 36415 COLL VENOUS BLD VENIPUNCTURE: CPT

## 2023-08-30 PROCEDURE — 71260 CT THORAX DX C+: CPT

## 2023-08-30 PROCEDURE — 93005 ELECTROCARDIOGRAM TRACING: CPT | Performed by: STUDENT IN AN ORGANIZED HEALTH CARE EDUCATION/TRAINING PROGRAM

## 2023-08-30 PROCEDURE — 80053 COMPREHEN METABOLIC PANEL: CPT

## 2023-08-30 PROCEDURE — 2580000003 HC RX 258: Performed by: STUDENT IN AN ORGANIZED HEALTH CARE EDUCATION/TRAINING PROGRAM

## 2023-08-30 PROCEDURE — 71045 X-RAY EXAM CHEST 1 VIEW: CPT

## 2023-08-30 PROCEDURE — 83735 ASSAY OF MAGNESIUM: CPT

## 2023-08-30 RX ORDER — SODIUM CHLORIDE 0.9 % (FLUSH) 0.9 %
10 SYRINGE (ML) INJECTION AS NEEDED
Status: DISCONTINUED | OUTPATIENT
Start: 2023-08-30 | End: 2023-08-31 | Stop reason: HOSPADM

## 2023-08-30 RX ORDER — POTASSIUM CHLORIDE 20 MEQ/1
40 TABLET, EXTENDED RELEASE ORAL ONCE
Status: COMPLETED | OUTPATIENT
Start: 2023-08-30 | End: 2023-08-30

## 2023-08-30 RX ORDER — KETOROLAC TROMETHAMINE 30 MG/ML
15 INJECTION, SOLUTION INTRAMUSCULAR; INTRAVENOUS ONCE
Status: COMPLETED | OUTPATIENT
Start: 2023-08-30 | End: 2023-08-30

## 2023-08-30 RX ORDER — 0.9 % SODIUM CHLORIDE 0.9 %
100 INTRAVENOUS SOLUTION INTRAVENOUS ONCE
Status: COMPLETED | OUTPATIENT
Start: 2023-08-30 | End: 2023-08-30

## 2023-08-30 RX ADMIN — POTASSIUM CHLORIDE 40 MEQ: 1500 TABLET, EXTENDED RELEASE ORAL at 18:29

## 2023-08-30 RX ADMIN — SODIUM CHLORIDE, PRESERVATIVE FREE 10 ML: 5 INJECTION INTRAVENOUS at 17:59

## 2023-08-30 RX ADMIN — KETOROLAC TROMETHAMINE 15 MG: 30 INJECTION, SOLUTION INTRAMUSCULAR; INTRAVENOUS at 18:30

## 2023-08-30 RX ADMIN — SODIUM CHLORIDE 100 ML: 9 INJECTION, SOLUTION INTRAVENOUS at 17:59

## 2023-08-30 RX ADMIN — IOPAMIDOL 75 ML: 755 INJECTION, SOLUTION INTRAVENOUS at 17:54

## 2023-08-30 ASSESSMENT — ENCOUNTER SYMPTOMS
RHINORRHEA: 0
DIARRHEA: 0
BACK PAIN: 1
SORE THROAT: 0
WHEEZING: 0
SHORTNESS OF BREATH: 1
STRIDOR: 0
ABDOMINAL PAIN: 0
EYE REDNESS: 0
VOMITING: 0
SINUS PAIN: 0
NAUSEA: 0
PHOTOPHOBIA: 0
COUGH: 0
CHEST TIGHTNESS: 1

## 2023-08-30 ASSESSMENT — LIFESTYLE VARIABLES
HOW OFTEN DO YOU HAVE A DRINK CONTAINING ALCOHOL: NEVER
HOW MANY STANDARD DRINKS CONTAINING ALCOHOL DO YOU HAVE ON A TYPICAL DAY: PATIENT DOES NOT DRINK

## 2023-08-30 NOTE — DISCHARGE INSTRUCTIONS
Seen in the emergency department for chest pains. Lab and CT imaging unremarkable. You do not have a blood clot in your lung. No evidence of active cardiac ischemia on lab values or EKG. Please follow-up with your PCP within the next 1 day. Please return to the emergency department if you begin experiencing worsening chest pain, shortness of breath, nausea/vomiting, sweating, passing out, or any other concerning symptoms.

## 2023-08-30 NOTE — ED PROVIDER NOTES
[facility],[address]Th Floor ED  Emergency Department Encounter  Emergency Medicine Resident     Pt Wilkerson Dayron  MRN: [id number]  [address] 1965  Date of evaluation: 8/30/23  PCP:  KAMARI Levy - CNP  Note Started: 4:56 PM EDT      CHIEF COMPLAINT       Chief Complaint   Patient presents with    Chest Pain    Shortness of Breath       HISTORY OF PRESENT ILLNESS  (Location/Symptom, Timing/Onset, Context/Setting, Quality, Duration, Modifying Factors, Severity.)      Erma Osman is a 63 y.o. female with medical history including history of blood clots and CVA on warfarin (INR 2.2 at 14:30 today), history of carotid artery dissection, DM 2, HTN, HLD, and obesity who presents to the emergency department with worsening exertional chest pain/pressure which radiates through to her back and down her left arm associated with shortness of breath. She has a positive family history of MI. Denies nausea and diaphoresis. She tells me that she was recently seen earlier today at her warfarin clinic. The chest pains have been ongoing for the past 2 weeks however the pain/pressure today is different/worse than before. She states that she has left sided deficits from her previous stroke in 2003.     PAST MEDICAL / SURGICAL / SOCIAL / FAMILY HISTORY      has a past medical history of Acid reflux, Arthritis, Asthma, Carotid artery dissection (HCC), Coughing, CPAP (continuous positive airway pressure) dependence, Depression, Diabetes mellitus ([address]), Difficult intubation, Diverticulitis, Environmental allergies, Epilepsy ([address]), Factor V Leiden ([address]), Gastroparesis, Generalized abdominal pain, Hemorrhage of anus and rectum, Hiatal hernia, History of stroke associated with blood clotting tendency, Hx of blood clots, Hyperlipidemia, Hypertension, Hypokalemia, ZACH (iron deficiency anemia), Insomnia, Migraine, Pre-procedure lab exam, S/P total knee arthroplasty, right, Sacroiliitis ([address]), Sleep apnea, Unspecified cerebral

## 2023-08-31 LAB
EKG ATRIAL RATE: 74 BPM
EKG P AXIS: 47 DEGREES
EKG P-R INTERVAL: 234 MS
EKG Q-T INTERVAL: 402 MS
EKG QRS DURATION: 102 MS
EKG QTC CALCULATION (BAZETT): 446 MS
EKG R AXIS: 20 DEGREES
EKG T AXIS: 46 DEGREES
EKG VENTRICULAR RATE: 74 BPM

## 2023-08-31 PROCEDURE — 93010 ELECTROCARDIOGRAM REPORT: CPT | Performed by: INTERNAL MEDICINE

## 2023-09-11 ASSESSMENT — ENCOUNTER SYMPTOMS
SHORTNESS OF BREATH: 1
NAUSEA: 0
WHEEZING: 0
CONSTIPATION: 0
ABDOMINAL PAIN: 1
COUGH: 0
DIARRHEA: 1
TROUBLE SWALLOWING: 1
BACK PAIN: 1

## 2023-09-19 ENCOUNTER — OFFICE VISIT (OUTPATIENT)
Dept: INTERNAL MEDICINE CLINIC | Age: 58
End: 2023-09-19
Payer: COMMERCIAL

## 2023-09-19 VITALS
HEART RATE: 68 BPM | HEIGHT: 63 IN | DIASTOLIC BLOOD PRESSURE: 82 MMHG | WEIGHT: 211 LBS | BODY MASS INDEX: 37.39 KG/M2 | OXYGEN SATURATION: 97 % | SYSTOLIC BLOOD PRESSURE: 120 MMHG

## 2023-09-19 DIAGNOSIS — R10.9 RIGHT SIDED ABDOMINAL PAIN: ICD-10-CM

## 2023-09-19 DIAGNOSIS — Z98.890 HISTORY OF HERNIA REPAIR: ICD-10-CM

## 2023-09-19 DIAGNOSIS — Z87.19 HISTORY OF HERNIA REPAIR: ICD-10-CM

## 2023-09-19 DIAGNOSIS — M54.42 CHRONIC BILATERAL LOW BACK PAIN WITH LEFT-SIDED SCIATICA: ICD-10-CM

## 2023-09-19 DIAGNOSIS — M25.512 ACUTE PAIN OF LEFT SHOULDER: Primary | ICD-10-CM

## 2023-09-19 DIAGNOSIS — G89.29 CHRONIC BILATERAL LOW BACK PAIN WITH LEFT-SIDED SCIATICA: ICD-10-CM

## 2023-09-19 PROCEDURE — 3078F DIAST BP <80 MM HG: CPT | Performed by: NURSE PRACTITIONER

## 2023-09-19 PROCEDURE — 99213 OFFICE O/P EST LOW 20 MIN: CPT | Performed by: NURSE PRACTITIONER

## 2023-09-19 PROCEDURE — 3074F SYST BP LT 130 MM HG: CPT | Performed by: NURSE PRACTITIONER

## 2023-09-19 RX ORDER — MOMETASONE FUROATE 50 UG/1
SPRAY, METERED NASAL
COMMUNITY
Start: 2023-09-07

## 2023-09-19 RX ORDER — TOPIRAMATE 100 MG/1
TABLET, FILM COATED ORAL
Qty: 60 TABLET | Status: CANCELLED | OUTPATIENT
Start: 2023-09-19

## 2023-09-19 NOTE — PROGRESS NOTES
and Affect: Mood normal. Affect is flat. Behavior: Behavior is cooperative. LABORATORY FINDINGS:    CBC:   Lab Results   Component Value Date/Time    WBC 6.7 08/30/2023 05:04 PM    HGB 11.5 08/30/2023 05:04 PM     08/30/2023 05:04 PM     02/01/2012 05:48 AM     BMP:   Lab Results   Component Value Date/Time     08/30/2023 05:04 PM    K 3.3 08/30/2023 05:04 PM     08/30/2023 05:04 PM    CO2 24 08/30/2023 05:04 PM    BUN 14 08/30/2023 05:04 PM    CREATININE 0.8 08/30/2023 05:04 PM    GLUCOSE 104 08/30/2023 05:04 PM    GLUCOSE 126 01/30/2012 03:46 PM     HEMOGLOBIN A1C:   Lab Results   Component Value Date/Time    LABA1C 6.4 08/29/2023 01:20 PM     FASTING LIPID PANEL:   Lab Results   Component Value Date    CHOL 165 08/29/2023    HDL 48 08/29/2023    LDLCHOLESTEROL 68 08/29/2023    TRIG 247 (H) 08/29/2023       ASSESSMENT AND PLAN:      Visit Diagnoses and Associated Orders       Acute pain of left shoulder    -  Primary    New, advised on rest, ice/heat prn and will send for PT    External Referral To Physical Therapy [[id number] Custom]           Chronic bilateral low back pain with left-sided sciatica        Previous work up with MRI in 2018, injections, PT and pain management. Will refer to pain clinic of her choice    External Referral To Pain Clinic [[id number] [facility]]           History of hernia repair        US ABDOMEN LIMITED [23515 CPT(R)]   - Future Order         Right sided abdominal pain        US ABDOMEN LIMITED [74449 CPT(R)]   - Future Order         ORDERS WITHOUT AN ASSOCIATED DIAGNOSIS    mometasone (NASONEX) 50 MCG/ACT nasal spray [46264]               FOLLOW UP AND INSTRUCTIONS:     Return in about 1 month (around 10/19/2023) for chronic conditions. Patient to be scheduled with PCP due to medical complexity.     Ilsa Oates received counseling on the following healthy behaviors: nutrition, exercise, and medication adherence    Discussed use, benefit, and side effects of

## 2023-09-30 ENCOUNTER — HOSPITAL ENCOUNTER (EMERGENCY)
Age: 58
Discharge: HOME | End: 2023-09-30
Payer: COMMERCIAL

## 2023-09-30 VITALS
HEART RATE: 72 BPM | SYSTOLIC BLOOD PRESSURE: 133 MMHG | DIASTOLIC BLOOD PRESSURE: 80 MMHG | OXYGEN SATURATION: 97 % | RESPIRATION RATE: 18 BRPM

## 2023-09-30 VITALS
DIASTOLIC BLOOD PRESSURE: 80 MMHG | TEMPERATURE: 98.2 F | OXYGEN SATURATION: 98 % | RESPIRATION RATE: 22 BRPM | HEART RATE: 70 BPM | SYSTOLIC BLOOD PRESSURE: 112 MMHG

## 2023-09-30 VITALS — BODY MASS INDEX: 37.5 KG/M2

## 2023-09-30 DIAGNOSIS — Z79.899: ICD-10-CM

## 2023-09-30 DIAGNOSIS — M54.50: Primary | ICD-10-CM

## 2023-09-30 DIAGNOSIS — M54.6: ICD-10-CM

## 2023-09-30 PROCEDURE — 99284 EMERGENCY DEPT VISIT MOD MDM: CPT

## 2023-09-30 PROCEDURE — 96372 THER/PROPH/DIAG INJ SC/IM: CPT
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
## 2023-09-30 NOTE — ED.BACK1
HPI - Back Pain/Injury
General
Chief Complaint: Back Pain/Injury
Stated Complaint: BACK PAIN
Time Seen by Provider: 09/30/23 15:35
Source: patient
Mode of arrival: walk-in
History of Present Illness
HPI Narrative: 
Patient woke from sleep 2 days ago and had pain throughout her mid, upper and lower back. No recent injury or activity to account for the pain. She is not from the area. She said that she came to [place] to attend her grandchild's birthday party. 
Now she is supposed to go back home and her doctor's office is closed because it is Saturday.  No urinary symptoms or flank pain. No numbness, tingling or weakness.  Pain is worse with movement of the torso.  She did not take anything for pain - 
 tylenol and motrin don't work . She denied having chronic pain.
Related Data
Home Medications

 Medication  Instructions  Recorded  Confirmed
amitriptyline 50 mg tablet 100 mg PO QPM 09/30/23 09/30/23
bumetanide 1 mg tablet 1 mg PO DAILY 09/30/23 09/30/23
buspirone 15 mg tablet 15 mg PO DAILY 09/30/23 09/30/23
carvedilol 12.5 mg tablet 12.5 mg PO BID 09/30/23 09/30/23
deutetrabenazine 9 mg tablet 9 mg PO DAILY 09/30/23 09/30/23
(Austedo)   
dulaglutide 1.5 mg/0.5 mL 1.5 mg subcut QWEEK 09/30/23 09/30/23
subcutaneous pen injector   
(Trulicity)   
ferrous sulfate 325 mg (65 mg 325 mg PO DAILY 09/30/23 09/30/23
iron) tablet   
gabapentin 300 mg capsule 300 mg PO BID 09/30/23 09/30/23
levetiracetam 750 mg tablet 750 mg PO DAILY 09/30/23 09/30/23
lorazepam 1 mg tablet 1 mg PO QPM 09/30/23 09/30/23
nifedipine 60 mg tablet,extended 60 mg PO DAILY 09/30/23 09/30/23
release 24 hr   
potassium chloride 20 mEq 20 meq PO BID 09/30/23 09/30/23
tablet,extended release(part/cryst)   
rosuvastatin 20 mg tablet 20 mg PO QPM 09/30/23 09/30/23
topiramate 100 mg tablet 150 mg PO BID 09/30/23 09/30/23
venlafaxine 150 mg 150 mg PO DAILY 09/30/23 09/30/23
capsule,extended release 24 hr   

Previous Rx's

 Medication  Instructions  Recorded
methocarbamol 750 mg tablet 750 mg PO Q6H PRN pain #30 tabs 09/30/23
nabumetone 750 mg tablet 750 mg PO BID PRN pain #20 tabs 09/30/23


Allergies

Allergy/AdvReac Type Severity Reaction Status Date / Time
aspirin Allergy Severe  Verified 09/30/23 15:41
latex Allergy Severe  Verified 09/30/23 15:41
lisinopril Allergy Severe  Verified 09/30/23 15:41
morphine Allergy Severe  Verified 09/30/23 15:41
nickel Allergy Severe  Verified 09/30/23 15:41



Exam
Narrative
Exam Narrative: 
General:  Alert, no acute distress, patient resting comfortably
Skin:  warm, intact, no pallor noted
Head:  Normocephalic, atraumatic
Eye:  Normal conjunctiva
Respiratory:  No acute distress
Abdomen:  Normal bowel sounds, soft, nontender, no masses detected.  No rebound, guarding, or rigidity noted.
Back:  inspection of the back shows no obvious deformity, no swelling, no ecchymosis, contusion, abrasion, swelling, erythema, fluctuance or induration.  Tenderness noted to diffuse paraspinal soft tissue of thoracic and lumbar spine.  Straight leg 
raise on left is negative.  Straight leg raise on right is negative.  
No CVA tenderness noted bilaterally.
Musculoskeletal:  No deformity noted to bilateral lower extremities.  no cyanosis or mottling noted. normal pulses at DP and PT 2+ bilaterally and symmetrically.  Normal 5/5 strength at ankles with dorsiflexion and plantar flexion.  Patient is able 
to ambulate.  Normal sensation noted to both lower extremities.
Neurological:   AAOx4, normal sensory and motor observed.  L5-S1 reflexes intact symmetrically.  DTR 2+ at patellar bilaterally.
Psychiatric:  Cooperative and interactive.
Constitutional
Vital Signs, click to edit/add: 

Last Vital Signs

Temp  98.2 F   09/30/23 15:35
Pulse  72   09/30/23 16:07
Resp  18   09/30/23 16:07
BP  133/80   09/30/23 16:07
Pulse Ox  97   09/30/23 16:07
O2 Del Method  Room Air  09/30/23 16:07




Course
Vital Signs
Vital signs: 

Vital Signs

Temperature  98.2 F   09/30/23 15:35
Pulse Rate  70   09/30/23 15:35
Respiratory Rate  22   09/30/23 15:35
Blood Pressure  112/80   09/30/23 15:35
Pulse Oximetry  98   09/30/23 15:35
Oxygen Delivery Method  Room Air  09/30/23 15:35



Temperature  98.2 F   09/30/23 15:35
Pulse Rate  72   09/30/23 16:07
Respiratory Rate  18   09/30/23 16:07
Blood Pressure  133/80   09/30/23 16:07
Pulse Oximetry  97   09/30/23 16:07
Oxygen Delivery Method  Room Air  09/30/23 16:07




MDM - Back Pain/Injury
MDM Narrative
Medical decision making narrative: 
the patient does not have any neurological deficit or findings consistent with acute cauda equina syndrome, spinal abscess and she did not sustain blunt trauma and was not involved in MVC and did not fall.  Patient's exam is consistent with 
musculoskeletal pain and therefore she was given IM Solu-Medrol and IM Toradol before being discharged home with prescriptions for Relafen and Robaxin. She did see her primary care physician when she returns home for follow-up

Discharge Plan
Discharge
Chief Complaint: Back Pain/Injury

Clinical Impression:
 Lumbar back pain, Back pain, thoracic


Patient Disposition: Home, Self-Care

Time of Disposition Decision: 15:51

Prescriptions / Home Meds:
New
  nabumetone 750 mg tablet 
   750 mg PO BID PRN (Reason: pain) Qty: 20 0RF
  methocarbamol 750 mg tablet 
   750 mg PO Q6H PRN (Reason: pain) Qty: 30 0RF

No Action
  amitriptyline 50 mg tablet 
   100 mg PO QPM 
  bumetanide 1 mg tablet 
   1 mg PO DAILY 
  buspirone 15 mg tablet 
   15 mg PO DAILY 
  carvedilol 12.5 mg tablet 
   12.5 mg PO BID 
  Austedo 9 mg tablet 
   9 mg PO DAILY 
  Trulicity 1.5 mg/0.5 mL pen injector 
   1.5 mg SUBCUT QWEEK 
  ferrous sulfate 325 mg (65 mg iron) tablet 
   325 mg PO DAILY 
  gabapentin 300 mg capsule 
   300 mg PO BID 
  levetiracetam 750 mg tablet 
   750 mg PO DAILY 
  lorazepam 1 mg tablet 
   1 mg PO QPM 
  nifedipine 60 mg tablet extended release 24hr 
   60 mg PO DAILY 
  potassium chloride 20 mEq tablet,ER particles/crystals 
   20 meq PO BID 
  rosuvastatin 20 mg tablet 
   20 mg PO QPM 
  topiramate 100 mg tablet 
   150 mg PO BID 
  venlafaxine 150 mg capsule,extended release 24hr 
   150 mg PO DAILY 

Instructions:  Back Pain (ED)

Stand Alone Forms:  Portal Instructions

Referrals:
Physician,Non-Staff, MD [Primary Care Provider] - 1 week

Discharge Date/Time: 09/30/23 16:08

## 2023-10-01 ENCOUNTER — HOSPITAL ENCOUNTER (EMERGENCY)
Age: 58
Discharge: HOME | End: 2023-10-01
Attending: EMERGENCY MEDICINE
Payer: COMMERCIAL

## 2023-10-01 VITALS
HEIGHT: 63 IN | DIASTOLIC BLOOD PRESSURE: 99 MMHG | OXYGEN SATURATION: 96 % | TEMPERATURE: 98.2 F | HEART RATE: 91 BPM | BODY MASS INDEX: 37.56 KG/M2 | RESPIRATION RATE: 15 BRPM | SYSTOLIC BLOOD PRESSURE: 164 MMHG | WEIGHT: 212 LBS

## 2023-10-01 DIAGNOSIS — M54.9 ACUTE BILATERAL BACK PAIN, UNSPECIFIED BACK LOCATION: ICD-10-CM

## 2023-10-01 DIAGNOSIS — S46.819A STRAIN OF TRAPEZIUS MUSCLE, UNSPECIFIED LATERALITY, INITIAL ENCOUNTER: Primary | ICD-10-CM

## 2023-10-01 PROCEDURE — 99284 EMERGENCY DEPT VISIT MOD MDM: CPT

## 2023-10-01 PROCEDURE — 6360000002 HC RX W HCPCS: Performed by: EMERGENCY MEDICINE

## 2023-10-01 PROCEDURE — 6370000000 HC RX 637 (ALT 250 FOR IP): Performed by: EMERGENCY MEDICINE

## 2023-10-01 PROCEDURE — 96372 THER/PROPH/DIAG INJ SC/IM: CPT

## 2023-10-01 RX ORDER — ORPHENADRINE CITRATE 30 MG/ML
60 INJECTION INTRAMUSCULAR; INTRAVENOUS ONCE
Status: COMPLETED | OUTPATIENT
Start: 2023-10-01 | End: 2023-10-01

## 2023-10-01 RX ORDER — LIDOCAINE 50 MG/G
1 PATCH TOPICAL EVERY 24 HOURS
Qty: 30 PATCH | Refills: 0 | Status: SHIPPED | OUTPATIENT
Start: 2023-10-01 | End: 2023-10-31

## 2023-10-01 RX ORDER — KETOROLAC TROMETHAMINE 30 MG/ML
30 INJECTION, SOLUTION INTRAMUSCULAR; INTRAVENOUS ONCE
Status: COMPLETED | OUTPATIENT
Start: 2023-10-01 | End: 2023-10-01

## 2023-10-01 RX ORDER — LIDOCAINE 4 G/G
1 PATCH TOPICAL ONCE
Status: DISCONTINUED | OUTPATIENT
Start: 2023-10-01 | End: 2023-10-01 | Stop reason: HOSPADM

## 2023-10-01 RX ADMIN — KETOROLAC TROMETHAMINE 30 MG: 30 INJECTION, SOLUTION INTRAMUSCULAR; INTRAVENOUS at 13:58

## 2023-10-01 RX ADMIN — ORPHENADRINE CITRATE 60 MG: 60 INJECTION INTRAMUSCULAR; INTRAVENOUS at 13:59

## 2023-10-01 ASSESSMENT — PATIENT HEALTH QUESTIONNAIRE - PHQ9
SUM OF ALL RESPONSES TO PHQ QUESTIONS 1-9: 0
SUM OF ALL RESPONSES TO PHQ QUESTIONS 1-9: 0
2. FEELING DOWN, DEPRESSED OR HOPELESS: 0
1. LITTLE INTEREST OR PLEASURE IN DOING THINGS: 0
SUM OF ALL RESPONSES TO PHQ QUESTIONS 1-9: 0
SUM OF ALL RESPONSES TO PHQ QUESTIONS 1-9: 0
SUM OF ALL RESPONSES TO PHQ9 QUESTIONS 1 & 2: 0

## 2023-10-01 ASSESSMENT — PAIN DESCRIPTION - LOCATION
LOCATION: BACK;NECK;SHOULDER
LOCATION: BACK
LOCATION: BACK

## 2023-10-01 ASSESSMENT — ENCOUNTER SYMPTOMS
COUGH: 0
SINUS PRESSURE: 0
SHORTNESS OF BREATH: 0
WHEEZING: 0
EYE REDNESS: 0
CHEST TIGHTNESS: 0
COLOR CHANGE: 0
SORE THROAT: 0
VOMITING: 0
TROUBLE SWALLOWING: 0
EYE PAIN: 0
EYE DISCHARGE: 0
RHINORRHEA: 0
CONSTIPATION: 0
BLOOD IN STOOL: 0
ABDOMINAL PAIN: 0
FACIAL SWELLING: 0
BACK PAIN: 1
DIARRHEA: 0
NAUSEA: 0

## 2023-10-01 ASSESSMENT — PAIN DESCRIPTION - DESCRIPTORS
DESCRIPTORS: ACHING;SHOOTING
DESCRIPTORS: ACHING;SHOOTING

## 2023-10-01 ASSESSMENT — PAIN DESCRIPTION - ORIENTATION: ORIENTATION: LEFT

## 2023-10-01 ASSESSMENT — LIFESTYLE VARIABLES
HOW OFTEN DO YOU HAVE A DRINK CONTAINING ALCOHOL: MONTHLY OR LESS
HOW MANY STANDARD DRINKS CONTAINING ALCOHOL DO YOU HAVE ON A TYPICAL DAY: 1 OR 2

## 2023-10-01 ASSESSMENT — PAIN SCALES - GENERAL
PAINLEVEL_OUTOF10: 10

## 2023-10-01 ASSESSMENT — PAIN - FUNCTIONAL ASSESSMENT: PAIN_FUNCTIONAL_ASSESSMENT: 0-10

## 2023-10-01 NOTE — ED PROVIDER NOTES
Negative for color change, pallor, rash and wound. Neurological:  Negative for dizziness, tremors, seizures, syncope, speech difficulty, weakness, numbness and headaches. Psychiatric/Behavioral:  Negative for confusion, decreased concentration, hallucinations, self-injury, sleep disturbance and suicidal ideas. PAST MEDICAL HISTORY     Past Medical History:   Diagnosis Date    Acid reflux     Arthritis     Asthma     Carotid artery dissection ([address]) 9/22/2022    Coughing     dry due to ace inhibitor    CPAP (continuous positive airway pressure) dependence     Depression     uses Abilify, Buspar, Trazodone for this    Diabetes mellitus ([address])     Difficult intubation     Diverticulitis     Environmental allergies     Epilepsy ([address])     MAY 2018,. ... 4/12/22: States last seizure was 6 months ago. Factor V Leiden ([address])     Gastroparesis     Generalized abdominal pain     Hemorrhage of anus and rectum     Hiatal hernia     History of stroke associated with blood clotting tendency 2003    had clot in brain and left neck, left side weakness residual    Hx of blood clots     right breast, brain, neck    Hyperlipidemia     Hypertension     Hypokalemia     ZACH (iron deficiency anemia)     Insomnia     uses Trazodone for this    Migraine     Pre-procedure lab exam 01/09/2018    S/P total knee arthroplasty, right 9/22/2022    Sacroiliitis ([address])     Sleep apnea     C PAP    Unspecified cerebral artery occlusion with cerebral infarction 06/2003    SEE BELOW, [address] Mini stroke    Wears glasses        SURGICAL HISTORY       Past Surgical History:   Procedure Laterality Date    APPENDECTOMY      CHOLECYSTECTOMY      COLONOSCOPY      with polypectomy    COLONOSCOPY  01/22/2016    AND EGD    COLONOSCOPY  01/05/2017    DR MACKAY-random biopsies.     COLONOSCOPY N/A 10/20/2020    COLONOSCOPY POLYPECTOMY HOT BIOPSY performed by Jaki Helms MD at [address] [place] [address] Drive      2 times    [address]

## 2023-10-01 NOTE — ED TRIAGE NOTES
Mode of arrival (squad #, walk in, police, etc) : Radha Vangie darnell        Chief complaint(s): Back pain, shouder pain, neck pain        Arrival Note (brief scenario, treatment PTA, etc). : Pt reports back neck and shoulder pain x 3 days. Pt states seen at [address] [place] [address] yesterday and nothing was done besides meds given not working. Pt is A&Ox4.        C= \"Have you ever felt that you should Cut down on your drinking? \"  No  A= \"Have people Annoyed you by criticizing your drinking? \"  No  G= \"Have you ever felt bad or Guilty about your drinking? \"  No  E= \"Have you ever had a drink as an Eye-opener first thing in the morning to steady your nerves or to help a hangover? \"  No      Deferred []      Reason for deferring: N/A    *If yes to two or more: probable alcohol abuse. *

## 2023-10-02 ASSESSMENT — ENCOUNTER SYMPTOMS
SHORTNESS OF BREATH: 0
BACK PAIN: 1
CONSTIPATION: 0
COLOR CHANGE: 0
ABDOMINAL PAIN: 1
NAUSEA: 0
COUGH: 0
WHEEZING: 0

## 2023-10-05 ENCOUNTER — TELEPHONE (OUTPATIENT)
Dept: INTERNAL MEDICINE CLINIC | Age: 58
End: 2023-10-05

## 2023-10-05 NOTE — TELEPHONE ENCOUNTER
Podiatry Dr Liang Angeles office called to verify if office received their fax that had been sent yesterday?     Checked with [facility] and had been informed that no we did not receive fax, informed to refax please

## 2023-10-05 NOTE — TELEPHONE ENCOUNTER
Called and spoke to Christopher at the podiatrist office and she stated that they will need last office note that states the patient was here for her diabetes.  I informed her that Dr. Viry Crouch is not back to the office until 10/13/2023 to be able to sign the paperwork

## 2023-10-16 NOTE — TELEPHONE ENCOUNTER
----- Message from Kirstentomasz Calderón MA sent at 10/16/2023  4:01 PM EDT -----  Subject: Refill Request    QUESTIONS  Name of Medication? ONE TOUCH ULTRA TEST strip  Patient-reported dosage and instructions? 4 times daily  How many days do you have left? 0  Preferred Pharmacy? [id number]  Pharmacy phone number (if available)? [phone]  ---------------------------------------------------------------------------  --------------  CALL BACK INFO  What is the best way for the office to contact you? OK to leave message on   voicemail  Preferred Call Back Phone Number? [phone]  ---------------------------------------------------------------------------  --------------  SCRIPT ANSWERS  Relationship to Patient?  Self

## 2023-10-17 RX ORDER — GLUCOSAMINE HCL/CHONDROITIN SU 500-400 MG
CAPSULE ORAL
Qty: 100 STRIP | Refills: 1 | Status: SHIPPED | OUTPATIENT
Start: 2023-10-17

## 2023-10-18 ENCOUNTER — OFFICE VISIT (OUTPATIENT)
Dept: INTERNAL MEDICINE CLINIC | Age: 58
End: 2023-10-18
Payer: MEDICARE

## 2023-10-18 ENCOUNTER — TELEPHONE (OUTPATIENT)
Dept: INTERNAL MEDICINE CLINIC | Age: 58
End: 2023-10-18

## 2023-10-18 VITALS
HEART RATE: 82 BPM | SYSTOLIC BLOOD PRESSURE: 118 MMHG | BODY MASS INDEX: 37.02 KG/M2 | OXYGEN SATURATION: 94 % | WEIGHT: 209 LBS | DIASTOLIC BLOOD PRESSURE: 72 MMHG

## 2023-10-18 DIAGNOSIS — M25.512 ACUTE PAIN OF LEFT SHOULDER: Primary | ICD-10-CM

## 2023-10-18 PROCEDURE — 99214 OFFICE O/P EST MOD 30 MIN: CPT | Performed by: INTERNAL MEDICINE

## 2023-10-18 PROCEDURE — 3074F SYST BP LT 130 MM HG: CPT | Performed by: INTERNAL MEDICINE

## 2023-10-18 PROCEDURE — 3078F DIAST BP <80 MM HG: CPT | Performed by: INTERNAL MEDICINE

## 2023-10-18 RX ORDER — OXYCODONE AND ACETAMINOPHEN 7.5; 325 MG/1; MG/1
1 TABLET ORAL EVERY 8 HOURS PRN
Qty: 45 TABLET | Refills: 0 | Status: SHIPPED | OUTPATIENT
Start: 2023-10-18 | End: 2023-11-17

## 2023-10-18 RX ORDER — ALPRAZOLAM 1 MG/1
1 TABLET ORAL
Qty: 2 TABLET | Refills: 0 | Status: SHIPPED | OUTPATIENT
Start: 2023-10-18 | End: 2023-10-18

## 2023-10-18 RX ORDER — CYCLOBENZAPRINE HCL 10 MG
10 TABLET ORAL 3 TIMES DAILY PRN
Qty: 90 TABLET | Refills: 0 | Status: SHIPPED | OUTPATIENT
Start: 2023-10-18 | End: 2023-11-17

## 2023-10-18 NOTE — PROGRESS NOTES
Visit Information    Have you changed or started any medications since your last visit including any over-the-counter medicines, vitamins, or herbal medicines? no   Are you having any side effects from any of your medications? -  no  Have you stopped taking any of your medications? Is so, why? -  no    Have you seen any other physician or provider since your last visit? Yes - Records Obtained  Have you had any other diagnostic tests since your last visit? Yes - Records Obtained  Have you been seen in the emergency room and/or had an admission to a hospital since we last saw you? Yes - Records Obtained  Have you had your routine dental cleaning in the past 6 months? no    Have you activated your [employer] account? If not, what are your barriers?  Yes     Patient Care Team:  Rebecca Carr MD as PCP - General (Internal Medicine)  Rebecca Carr MD as PCP - Empaneled Provider    Medical History Review  Past Medical, Family, and Social History reviewed and does contribute to the patient presenting condition    Health Maintenance   Topic Date Due    Hepatitis B vaccine (1 of 3 - 3-dose series) Never done    Diabetic foot exam  Never done    Diabetic retinal exam  Never done    Pneumococcal 0-64 years Vaccine (2 - PCV) 09/08/2018    Shingles vaccine (2 of 2) 10/15/2021    Colorectal Cancer Screen  10/20/2021    COVID-19 Vaccine (4 - Raynette Wade series) 02/01/2022    Flu vaccine (1) 08/01/2023    Annual Wellness Visit (AWV)  10/17/2023    A1C test (Diabetic or Prediabetic)  08/29/2024    Diabetic Alb to Cr ratio (uACR) test  08/29/2024    Lipids  08/29/2024    GFR test (Diabetes, CKD 3-4, OR last GFR 15-59)  08/30/2024    Depression Monitoring  10/01/2024    Breast cancer screen  11/03/2024    DTaP/Tdap/Td vaccine (3 - Td or Tdap) 06/22/2031    Hepatitis C screen  Completed    HIV screen  Completed    Hepatitis A vaccine  Aged Out    Hib vaccine  Aged Out    Meningococcal (ACWY) vaccine  Aged Out    Cervical cancer screen
valid procedures specified. ASSESSMENT AND PLAN:      Diagnoses and all orders for this visit:  Acute pain of left shoulder  -     oxyCODONE-acetaminophen (PERCOCET) 7.5-325 MG per tablet; Take 1 tablet by mouth every 8 hours as needed for Pain for up to 30 days. Intended supply: 30 days Max Daily Amount: 3 tablets  -     [facility] [place] (Specify) as OP  -     ALPRAZolam (XANAX) 1 MG tablet; Take 1 tablet by mouth once as needed for Sleep (take one an hour before, take other just before mri) for up to 1 dose. Max Daily Amount: 1 mg  -     MRI SHOULDER LEFT W WO CONTRAST; Future  Other orders  -     cyclobenzaprine (FLEXERIL) 10 MG tablet; Take 1 tablet by mouth 3 times daily as needed for Muscle spasms  -     diclofenac sodium (VOLTAREN) 1 % GEL; Apply 2 g topically 4 times daily as needed for Pain (left shoulder)       Left shoulder pain, suspect rotator cuff pathology. Recent x-ray was negative for fracture/dislocation. Based on clinical exam we will pursue an MRI. Patient reports claustrophobia, Xanax prior to test.  Have asked the patient to continue warm and cold compresses, use a sling in the left upper extremity, has been prescribed Flexeril, Voltaren gel, Percocet as needed. May consider physical therapy once pain better controlled. Essential hypertension, currently controlled  Hyperlipidemia, on rosuvastatin. Discussed holding medication for the next 1 week. FOLLOW UP AND INSTRUCTIONS:   Return in about 4 weeks (around 11/15/2023), or if symptoms worsen or fail to improve. Jayna Hanson received counseling on the following healthy behaviors: nutrition    Discussed use, benefit, and side effects of prescribed medications. Barriers to medication compliance addressed. All patient questions answered. Pt voiced understanding.      Patient given educational materials - see patient instructions    MD TAMIKA Reyes [facility]  10/18/2023, 10:10 AM

## 2023-10-18 NOTE — TELEPHONE ENCOUNTER
----- Message from Jerrod Chavez sent at 10/18/2023  2:01 PM EDT -----  Subject: Message to Provider    QUESTIONS  Information for Provider?  states insurance wont pay for   oxyCODONE-acetaminophen (PERCOCET) 7.5-325 MG per   ---------------------------------------------------------------------------  --------------  Manuel Andrea INFO  [phone]; OK to leave message on voicemail  ---------------------------------------------------------------------------  --------------  SCRIPT ANSWERS  Relationship to Patient?  Self

## 2023-10-18 NOTE — PATIENT INSTRUCTIONS
Wear sling in left arm  Watch for worsening pain, swelling of joint, fevers, chills.  If you have any go to the ER  Continue warm and cold compresses

## 2023-10-19 ENCOUNTER — TELEPHONE (OUTPATIENT)
Dept: INTERNAL MEDICINE CLINIC | Age: 58
End: 2023-10-19

## 2023-10-19 NOTE — TELEPHONE ENCOUNTER
----- Message from Praveen Rhoda sent at 10/19/2023  9:59 AM EDT -----  Subject: Message to Provider    QUESTIONS  Information for Provider? Tash Hardin states that her insurance will not   cover her refill at pharmacy for the oxyCODONE-acetaminophen (PERCOCET)   7.5-325 MG per tablet and the cyclobenzaprine (FLEXERIL) 10 MG tablet. She   is calling to be advised. Please f/u with patient.   ---------------------------------------------------------------------------  --------------  Glendy Manriquez [employer]  [phone]; OK to leave message on voicemail  ---------------------------------------------------------------------------  --------------  SCRIPT ANSWERS  Relationship to Patient?  Self

## 2023-10-19 NOTE — TELEPHONE ENCOUNTER
Patient states that they wont pay for it because the dosage is to high, patient is going to call and see what their alternative are

## 2023-10-23 NOTE — TELEPHONE ENCOUNTER
[facility]  Subject: Medication Problem     Medication: cyclobenzaprine (FLEXERIL) 10 MG tablet   Dosage: Take 1 tablet by mouth 3 times daily as needed for Muscle spasms   Ordering Provider: undefined     Question/Problem: pt is in need of a Prior Authorization for this med.    please contact pharmacy for pt       Pharmacy: [facility] [id number] Zaynab Benoit, [address] [facility]   [phone] Lauren Schwartz [phone]     ---------------------------------------------------------------------------   --------------   Pati Wilson INFO   [phone]; OK to leave message on voicemail   ---------------------------------------------------------------------------   --------------     SCRIPT ANSWERS   Relationship to Patient: Self

## 2023-10-23 NOTE — TELEPHONE ENCOUNTER
Sakshi Sommers [facility] Clinical Support Staff  Subject: Medication Problem     Medication: oxyCODONE-acetaminophen (PERCOCET) 7.5-325 MG per tablet   Dosage: Take 1 tablet by mouth every 8 hours as needed for Pain for up to   30 days. Intended supply: 30 days Max Daily Amount: 3 tablets   Ordering Provider: undefined     Question/Problem: pt is in need of a Prior Authorization for this med.    please contact pharmacy for pt       Pharmacy: [facility] [id number]   [phone] [facility] [phone]     ---------------------------------------------------------------------------   --------------   Chelsie [facility] INFO   [phone]; OK to leave message on voicemail   ---------------------------------------------------------------------------   --------------     SCRIPT ANSWERS   Relationship to Patient: Self

## 2023-10-24 ENCOUNTER — TELEPHONE (OUTPATIENT)
Dept: INTERNAL MEDICINE CLINIC | Age: 58
End: 2023-10-24

## 2023-10-24 NOTE — TELEPHONE ENCOUNTER
Guanako Richardson from [facility] called and needing a physical signature on the office note from 07/24/2023 for the insurance to accept it, IO let them know that the provider is not in the office until 10/30/2023 to get a physical signature.

## 2023-10-25 ENCOUNTER — TELEPHONE (OUTPATIENT)
Dept: INTERNAL MEDICINE CLINIC | Age: 58
End: 2023-10-25

## 2023-10-25 DIAGNOSIS — M25.512 LEFT SHOULDER PAIN, UNSPECIFIED CHRONICITY: Primary | ICD-10-CM

## 2023-10-25 NOTE — TELEPHONE ENCOUNTER
Patient called to check on the status of Fax from [facility] / orders for diabetic shoes. Per [facility] received fax and will have Dr Kia Nuñez sign once back in the office next week.

## 2023-10-25 NOTE — TELEPHONE ENCOUNTER
Nguyen Ryan from [facility] scheduling called and stated that after asking the question for the MRI with and without contrast they stated that it will need to be a MRI without contrast and they will need a new order. Please advise.

## 2023-10-30 ENCOUNTER — TELEPHONE (OUTPATIENT)
Dept: INTERNAL MEDICINE CLINIC | Age: 58
End: 2023-10-30

## 2023-10-30 ENCOUNTER — OFFICE VISIT (OUTPATIENT)
Dept: INTERNAL MEDICINE CLINIC | Age: 58
End: 2023-10-30
Payer: MEDICARE

## 2023-10-30 VITALS
DIASTOLIC BLOOD PRESSURE: 88 MMHG | HEART RATE: 77 BPM | OXYGEN SATURATION: 97 % | WEIGHT: 211.4 LBS | BODY MASS INDEX: 37.45 KG/M2 | SYSTOLIC BLOOD PRESSURE: 140 MMHG

## 2023-10-30 DIAGNOSIS — G45.9 TIA (TRANSIENT ISCHEMIC ATTACK): ICD-10-CM

## 2023-10-30 DIAGNOSIS — M54.50 ACUTE RIGHT-SIDED LOW BACK PAIN WITHOUT SCIATICA: ICD-10-CM

## 2023-10-30 DIAGNOSIS — I10 ESSENTIAL HYPERTENSION: ICD-10-CM

## 2023-10-30 DIAGNOSIS — F33.2 MAJOR DEPRESSIVE DISORDER, RECURRENT, SEVERE WITHOUT PSYCHOTIC FEATURES (HCC): Primary | ICD-10-CM

## 2023-10-30 DIAGNOSIS — I99.8 VASCULAR INSUFFICIENCY: ICD-10-CM

## 2023-10-30 PROCEDURE — 3074F SYST BP LT 130 MM HG: CPT | Performed by: INTERNAL MEDICINE

## 2023-10-30 PROCEDURE — 3078F DIAST BP <80 MM HG: CPT | Performed by: INTERNAL MEDICINE

## 2023-10-30 PROCEDURE — G8484 FLU IMMUNIZE NO ADMIN: HCPCS | Performed by: INTERNAL MEDICINE

## 2023-10-30 PROCEDURE — 3017F COLORECTAL CA SCREEN DOC REV: CPT | Performed by: INTERNAL MEDICINE

## 2023-10-30 PROCEDURE — G8417 CALC BMI ABV UP PARAM F/U: HCPCS | Performed by: INTERNAL MEDICINE

## 2023-10-30 PROCEDURE — 99214 OFFICE O/P EST MOD 30 MIN: CPT | Performed by: INTERNAL MEDICINE

## 2023-10-30 PROCEDURE — 1036F TOBACCO NON-USER: CPT | Performed by: INTERNAL MEDICINE

## 2023-10-30 PROCEDURE — G8427 DOCREV CUR MEDS BY ELIG CLIN: HCPCS | Performed by: INTERNAL MEDICINE

## 2023-10-30 RX ORDER — PREDNISONE 20 MG/1
20 TABLET ORAL DAILY
Qty: 5 TABLET | Refills: 0 | Status: SHIPPED | OUTPATIENT
Start: 2023-10-30 | End: 2023-10-30

## 2023-10-30 RX ORDER — BLOOD PRESSURE TEST KIT
KIT MISCELLANEOUS
Qty: 1 KIT | Refills: 0 | Status: SHIPPED | OUTPATIENT
Start: 2023-10-30

## 2023-10-30 RX ORDER — PREDNISONE 20 MG/1
20 TABLET ORAL DAILY
Qty: 3 TABLET | Refills: 0 | Status: SHIPPED | OUTPATIENT
Start: 2023-10-30 | End: 2023-11-02

## 2023-10-30 NOTE — PROGRESS NOTES
[address] [address]  Dept: [phone]  Dept Fax: [phone]    Office Progress/Follow Up Note  Date of patient's visit: 10/30/2023  Patient's Name:  Anne Leon YOB: 1965            Patient Care Team:  Juli Patel MD as PCP - General (Internal Medicine)  Juli Patel MD as PCP - Empaneled Provider    REASON FOR VISIT: Routine outpatient follow up/Same day visit/Post hospital/ED visit    HISTORY OF PRESENT ILLNESS:      Chief Complaint   Patient presents with    Diabetes     Follow up   Last A1c 08/20/2023    Back Pain     Patient is having back and arm pain and it hurts to walk         History was obtained from the patient.  Anne Leon is a 63 y.o. is here for a   Patient came for follow-up  Has multiple comorbidities  Blood pressure at home, states that it generally runs low or normal, blood pressure borderline high today, patient in no Complaining of back pain , noted numbness, was Lyrica was given Percocet but not covered by insurance, does have appointment pain management in 3 days  Patient requested some pain medication  MRI ordered currently pending  Complaining of pain and restriction of movement of the shoulder, cannot abduct beyond 60 degrees,      Patient Active Problem List   Diagnosis    Complex tear of medial meniscus of left knee as current injury    Depression    Acid reflux    Hyperlipidemia    Epilepsy ([address])    Pelvic pain in female    Pseudopolyposis of colon without complication, unspecified part of colon ([address])    History of stroke associated with blood clotting tendency    Cholelithiasis    Acute medial meniscus tear of left knee    Insomnia    Hiatal hernia    Major depressive disorder, recurrent, severe without psychotic features ([address])    Chronic post-traumatic stress disorder (PTSD)    ERICK (generalized anxiety disorder)    Abdominal pain    Diarrhea    Essential hypertension    Lumbosacral

## 2023-10-30 NOTE — PROGRESS NOTES
Visit Information    Have you changed or started any medications since your last visit including any over-the-counter medicines, vitamins, or herbal medicines? no   Are you having any side effects from any of your medications? -  no  Have you stopped taking any of your medications? Is so, why? -  no    Have you seen any other physician or provider since your last visit? No  Have you had any other diagnostic tests since your last visit? No  Have you been seen in the emergency room and/or had an admission to a hospital since we last saw you? No  Have you had your routine dental cleaning in the past 6 months? no    Have you activated your [employer] account? If not, what are your barriers?  Yes     Patient Care Team:  Murali Mir MD as PCP - General (Internal Medicine)  Murlai Mir MD as PCP - Empaneled Provider    Medical History Review  Past Medical, Family, and Social History reviewed and does not contribute to the patient presenting condition    Health Maintenance   Topic Date Due    Hepatitis B vaccine (1 of 3 - 3-dose series) Never done    Diabetic retinal exam  Never done    Pneumococcal 0-64 years Vaccine (2 - PCV) 09/08/2018    Shingles vaccine (2 of 2) 10/15/2021    Colorectal Cancer Screen  10/20/2021    COVID-19 Vaccine (4 - Lashell Burden series) 02/01/2022    Flu vaccine (1) 08/01/2023    Annual Wellness Visit (AWV)  10/17/2023    A1C test (Diabetic or Prediabetic)  08/29/2024    Diabetic Alb to Cr ratio (uACR) test  08/29/2024    Lipids  08/29/2024    GFR test (Diabetes, CKD 3-4, OR last GFR 15-59)  08/30/2024    Depression Monitoring  10/01/2024    Diabetic foot exam  10/03/2024    Breast cancer screen  11/03/2024    DTaP/Tdap/Td vaccine (3 - Td or Tdap) 06/22/2031    Hepatitis C screen  Completed    HIV screen  Completed    Hepatitis A vaccine  Aged Out    Hib vaccine  Aged Out    Meningococcal (ACWY) vaccine  Aged Out    Cervical cancer screen  Discontinued

## 2023-11-01 ENCOUNTER — TELEPHONE (OUTPATIENT)
Dept: INTERNAL MEDICINE CLINIC | Age: 58
End: 2023-11-01

## 2023-11-01 NOTE — TELEPHONE ENCOUNTER
Patient calling in today and would like her Physical Therapy to be done at [facility] on [address] in [facility]. Please advise and route to clinical staff.

## 2023-11-02 ENCOUNTER — HOSPITAL ENCOUNTER (OUTPATIENT)
Dept: PAIN MANAGEMENT | Age: 58
Discharge: HOME | End: 2023-11-02
Payer: MEDICARE

## 2023-11-02 VITALS
OXYGEN SATURATION: 97 % | SYSTOLIC BLOOD PRESSURE: 113 MMHG | TEMPERATURE: 97.6 F | RESPIRATION RATE: 20 BRPM | DIASTOLIC BLOOD PRESSURE: 56 MMHG | HEART RATE: 68 BPM | HEIGHT: 63 IN | WEIGHT: 211 LBS | BODY MASS INDEX: 37.39 KG/M2

## 2023-11-02 DIAGNOSIS — M54.50 CHRONIC BILATERAL LOW BACK PAIN WITHOUT SCIATICA: Primary | ICD-10-CM

## 2023-11-02 DIAGNOSIS — Z79.01 CHRONIC ANTICOAGULATION: Chronic | ICD-10-CM

## 2023-11-02 DIAGNOSIS — G89.29 CHRONIC BILATERAL LOW BACK PAIN WITHOUT SCIATICA: Primary | ICD-10-CM

## 2023-11-02 DIAGNOSIS — Z79.899 CHRONICALLY ON BENZODIAZEPINE THERAPY: Chronic | ICD-10-CM

## 2023-11-02 PROCEDURE — 99203 OFFICE O/P NEW LOW 30 MIN: CPT

## 2023-11-02 PROCEDURE — 99203 OFFICE O/P NEW LOW 30 MIN: CPT | Performed by: ANESTHESIOLOGY

## 2023-11-02 ASSESSMENT — ENCOUNTER SYMPTOMS
CONSTIPATION: 0
BACK PAIN: 1
NAUSEA: 0
VOMITING: 0
DIARRHEA: 0
SHORTNESS OF BREATH: 1

## 2023-11-02 NOTE — PROGRESS NOTES
The patient is a 63 y. o. Non- / non  female. Chief Complaint   Patient presents with    New Patient     Back pain        HPI    Requesting physician for the evaluation of Lilly Fenton 1965:      Pain History    -61-year-old female with past medical history significant for obesity, BMI 40, history of DVT on chronic anticoagulation, reported history of stroke with left-sided weakness  Anxiety disorder for which on chronic benzodiazepine therapy    Referred for evaluation of back pain  Onset of symptoms 3 months back without any particular injury  Pain located in the lower lumbar area in midline and paramidline region  Report extension of pain down both legs over hip and gluteal region  No further radiation below the buttock level  No associated dermatomal numbness or paresthesia  No changes in bladder or bowel control  No previous lumbar spine injection or surgical history  No physical therapy  MRI have been ordered recently by PCP not scheduled yet    Pain is constant aggravates with home routine daily activities sitting standing walking    Pain score today  9  1. Location:Lower back  2. Radiation:to left hip  3. Character:burning  5. Duration:3months  6. Onset: months  7. Did an injury cause pain: no  8. Aggravating factors:walking, sitting household chores  9. Alleviating factors:nothing  10. Associated symptoms (numbness / tingling / weakness):  no  -Where at:no  -Down into finger tips or toes (specify which finger or toes):na  -constant or intermitting: constant  11. Red Flags: (weight loss / chills / loss of bladder or bowel control): lost of bladder    Previous management history  1. Previous diagnostic workup: (Imaging/EMG)   CT, MRI, or Xray: Xray  What part of the body: Thoracic  [facility]  What year or specific date: 08/28/2023  EMG:  no    2.  Previous non interventional treatments tried:  chiropractor or physical therapy: no  What part of the

## 2023-11-16 ENCOUNTER — TELEPHONE (OUTPATIENT)
Dept: INTERNAL MEDICINE CLINIC | Age: 58
End: 2023-11-16

## 2023-11-16 NOTE — TELEPHONE ENCOUNTER
Pt called in and stated that she has had the flu for 2 weeks. I looked for an opened apt with any provider and was unable to find an open appointment in the clinic. I suggested that pt go to Urgent care to be treated. Pt said she would go.

## 2023-12-04 ENCOUNTER — HOSPITAL ENCOUNTER (OUTPATIENT)
Dept: PAIN MANAGEMENT | Age: 58
Discharge: HOME | End: 2023-12-04
Payer: MEDICARE

## 2023-12-04 VITALS — BODY MASS INDEX: 36.5 KG/M2 | WEIGHT: 206 LBS | HEIGHT: 63 IN

## 2023-12-04 DIAGNOSIS — M47.817 LUMBOSACRAL SPONDYLOSIS WITHOUT MYELOPATHY: ICD-10-CM

## 2023-12-04 DIAGNOSIS — R29.90 STROKE-LIKE SYMPTOM: Primary | ICD-10-CM

## 2023-12-04 DIAGNOSIS — Z79.01 CHRONIC ANTICOAGULATION: Chronic | ICD-10-CM

## 2023-12-04 PROCEDURE — 99213 OFFICE O/P EST LOW 20 MIN: CPT

## 2023-12-04 PROCEDURE — 99213 OFFICE O/P EST LOW 20 MIN: CPT | Performed by: NURSE PRACTITIONER

## 2023-12-04 ASSESSMENT — ENCOUNTER SYMPTOMS
BOWEL INCONTINENCE: 0
CONSTIPATION: 0
SHORTNESS OF BREATH: 0
COUGH: 0
BACK PAIN: 1

## 2023-12-04 ASSESSMENT — PAIN SCALES - GENERAL: PAINLEVEL_OUTOF10: 9

## 2023-12-04 ASSESSMENT — PAIN DESCRIPTION - LOCATION: LOCATION: BACK

## 2023-12-04 NOTE — PROGRESS NOTES
Rfl: 3    Family History   Problem Relation Age of Onset    Heart Disease Mother         dies age 32    Cerebral Aneurysm Mother     Cancer Father         lymph nodes    Skin Cancer Father     Pacemaker Sister        Social History     Socioeconomic History    Marital status:      Spouse name: Not on file    Number of children: Not on file    Years of education: Not on file    Highest education level: Not on file   Occupational History    Occupation: disability   Tobacco Use    Smoking status: Never    Smokeless tobacco: Never   Vaping Use    Vaping Use: Never used   Substance and Sexual Activity    Alcohol use: Yes     Alcohol/week: 0.0 standard drinks of alcohol     Comment: OCCASSIONAL    Drug use: No    Sexual activity: Yes     Partners: Male   Other Topics Concern    Not on file   Social History Narrative    Not on file     Social Determinants of Health     Financial Resource Strain: Low Risk  (6/20/2023)    Overall Financial Resource Strain (CARDIA)     Difficulty of Paying Living Expenses: Not hard at all   Food Insecurity: Not on file (6/20/2023)   Transportation Needs: Unknown (6/20/2023)    PRAPARE - Transportation     Lack of Transportation (Medical): Not on file     Lack of Transportation (Non-Medical): No   Physical Activity: Not on file   Stress: Not on file   Social Connections: Not on file   Intimate Partner Violence: Not on file   Housing Stability: Unknown (6/20/2023)    Housing Stability Vital Sign     Unable to Pay for Housing in the Last Year: Not on file     Number of Places Lived in the Last Year: Not on file     Unstable Housing in the Last Year: No       Review of Systems:  Review of Systems   Musculoskeletal:  Positive for back pain. Gastrointestinal:  Negative for bowel incontinence. Genitourinary:  Negative for bladder incontinence. Neurological:  Positive for numbness. Negative for tingling and weakness.        Physical Exam:  There were no vitals taken for this

## 2023-12-04 NOTE — PROGRESS NOTES
Sodium (COUMADIN PO), Take 6 mg by mouth daily 9 mg Tuesday and Thursdays  and 6 rest of days, Disp: , Rfl:     montelukast (SINGULAIR) 10 MG tablet, TAKE 1 TABLET BY MOUTH EVERY DAY AT NIGHT, Disp: 30 tablet, Rfl: 5    fluticasone (FLONASE) 50 MCG/ACT nasal spray, INSTILL 1 SPRAY INTO EACH NOSTRIL DAILY AT BED TIME (Patient not taking: Reported on 10/30/2023), Disp: 1 Bottle, Rfl: 3    venlafaxine (EFFEXOR XR) 150 MG extended release capsule, TAKE 1 CAPSULE (150 MG TOTAL) BY MOUTH DAILY. , Disp: , Rfl: 1    ONE TOUCH ULTRA TEST strip, TEST BLOOD SUGAR 3 TIMES A DAY, Disp: 100 strip, Rfl: 5    PROAIR  (90 Base) MCG/ACT inhaler, Inhale 2 puffs into the lungs every 6 hours as needed, Disp: , Rfl:     EXACTECH TEST strip, , Disp: , Rfl:     TRUEPLUS LANCETS 30G MISC, 1 each by Does not apply route daily, Disp: 100 each, Rfl: 3    Family History   Problem Relation Age of Onset    Heart Disease Mother         dies age 32    Cerebral Aneurysm Mother     Cancer Father         lymph nodes    Skin Cancer Father     Pacemaker Sister        Social History     Socioeconomic History    Marital status:      Spouse name: Not on file    Number of children: Not on file    Years of education: Not on file    Highest education level: Not on file   Occupational History    Occupation: disability   Tobacco Use    Smoking status: Never    Smokeless tobacco: Never   Vaping Use    Vaping Use: Never used   Substance and Sexual Activity    Alcohol use:  Yes     Alcohol/week: 0.0 standard drinks of alcohol     Comment: OCCASSIONAL    Drug use: No    Sexual activity: Yes     Partners: Male   Other Topics Concern    Not on file   Social History Narrative    Not on file     Social Determinants of Health     Financial Resource Strain: Low Risk  (6/20/2023)    Overall Financial Resource Strain (CARDIA)     Difficulty of Paying Living Expenses: Not hard at all   Food Insecurity: Not on file (6/20/2023)   Transportation Needs: Unknown

## 2023-12-11 ENCOUNTER — TELEPHONE (OUTPATIENT)
Dept: INTERNAL MEDICINE CLINIC | Age: 58
End: 2023-12-11

## 2023-12-11 NOTE — TELEPHONE ENCOUNTER
Cut down to twice daily and see if it helps  If not ok to stop and make apt with me this week   Ok to overbook

## 2023-12-11 NOTE — TELEPHONE ENCOUNTER
Patient called the office stated that she was in the ER on 12/07/2023 and was given lactulose (CHRONULAC) 10 gram/15 mL solution  to take 3 times a day and it is making her have stomach pains that are making her cry. Patient would like to know what to do with the medication.

## 2023-12-21 LAB
BILIRUBIN URINE: ABNORMAL
BLOOD, URINE: NEGATIVE
CLARITY: ABNORMAL
COLOR: YELLOW
GLUCOSE URINE: NEGATIVE
KETONES, URINE: NEGATIVE
LEUKOCYTE ESTERASE, URINE: ABNORMAL
NITRITE, URINE: NEGATIVE
PH UA: 6 (ref 4.5–8)
PROTEIN UA: NEGATIVE
SPECIFIC GRAVITY UA: 1.01 (ref 1–1.03)
UROBILINOGEN, URINE: NORMAL

## 2023-12-23 ENCOUNTER — HOSPITAL ENCOUNTER (OUTPATIENT)
Dept: MRI IMAGING | Age: 58
Discharge: HOME | End: 2023-12-25
Attending: INTERNAL MEDICINE
Payer: MEDICARE

## 2023-12-23 DIAGNOSIS — M54.50 ACUTE RIGHT-SIDED LOW BACK PAIN WITHOUT SCIATICA: ICD-10-CM

## 2023-12-23 DIAGNOSIS — M25.512 LEFT SHOULDER PAIN, UNSPECIFIED CHRONICITY: ICD-10-CM

## 2023-12-23 PROCEDURE — 73221 MRI JOINT UPR EXTREM W/O DYE: CPT

## 2023-12-23 PROCEDURE — 72148 MRI LUMBAR SPINE W/O DYE: CPT

## 2023-12-27 ENCOUNTER — TELEPHONE (OUTPATIENT)
Dept: INTERNAL MEDICINE CLINIC | Age: 58
End: 2023-12-27

## 2023-12-27 DIAGNOSIS — G89.29 CHRONIC LEFT SHOULDER PAIN: Primary | ICD-10-CM

## 2023-12-27 DIAGNOSIS — R82.90 CLOUDY URINE: ICD-10-CM

## 2023-12-27 DIAGNOSIS — M25.512 CHRONIC LEFT SHOULDER PAIN: Primary | ICD-10-CM

## 2024-01-05 ENCOUNTER — OFFICE VISIT (OUTPATIENT)
Dept: PULMONOLOGY | Age: 59
End: 2024-01-05

## 2024-01-05 VITALS
SYSTOLIC BLOOD PRESSURE: 114 MMHG | HEIGHT: 63 IN | RESPIRATION RATE: 16 BRPM | HEART RATE: 76 BPM | OXYGEN SATURATION: 96 % | DIASTOLIC BLOOD PRESSURE: 74 MMHG | BODY MASS INDEX: 35.79 KG/M2 | WEIGHT: 202 LBS

## 2024-01-05 DIAGNOSIS — G47.33 OSA (OBSTRUCTIVE SLEEP APNEA): ICD-10-CM

## 2024-01-05 DIAGNOSIS — I50.814 RIGHT-SIDED CONGESTIVE HEART FAILURE SECONDARY TO LEFT-SIDED CONGESTIVE HEART FAILURE (HCC): ICD-10-CM

## 2024-01-05 DIAGNOSIS — R06.00 DYSPNEA, UNSPECIFIED TYPE: Primary | ICD-10-CM

## 2024-01-05 DIAGNOSIS — E66.01 SEVERE OBESITY (BMI 35.0-39.9) WITH COMORBIDITY (HCC): ICD-10-CM

## 2024-01-05 DIAGNOSIS — J45.50 SEVERE PERSISTENT ASTHMA WITHOUT COMPLICATION: ICD-10-CM

## 2024-01-05 NOTE — PROGRESS NOTES
REASON FOR THE CONSULTATION:  Effort dyspnea  Sleep apnea syndrome  History of CVA with left hemiparesis  Systemic hypertension  Diabetes mellitus  Possible asthma  HISTORY OF PRESENT ILLNESS:    Vesna Valencia is a 58 y.o. year old female here for evaluation of effort dyspnea that she been experiencing for the last 3 years.  The dyspnea is about grade 2.  He has occasionally noted some wheezing.  This is more common when she has upper respiratory infection or involved in an effort.  She also has a cough which is mostly dry occasionally does bring a small amount of mucoid sputum numbers.  Any blood.  No frequent chest colds or pneumonias.  She has not required any hospitalization for the lung disease anytime.    She does complain of anterior chest pain on coughing.  The cough pain does not radiate to any site.  Pain is not felt at rest.  She is known to have systemic hypertension and diabetes which are under good control her last A1c was 5.9.  She is on Trelegy and also uses albuterol on as-needed basis and which is controlling his symptoms well.    She also have had a CVA in 2003 which produced left hemiparesis.  Her neurological deficit has improved but there is some amount of residual damage on the left side.  No history of coronary artery disease no thyroid dysfunction.  She has noted pedal edema off and on especially in the evenings.  At 1 time she was on diuretic but she is not on anymore.  She does have a history of thromboembolic process involving the brain and the neck.  There is a question of possible embolus in the lung as well.  She does get chest infection to 3 times a year by year.  No hospitalization however.    She is also known to have obstructive sleep apnea syndrome and is on CPAP but she is in spite of the use of CPAP for adequate time every night she continued to feel tired and sleepy during the daytime.  His diagnosis was made about 3 years back.  She denies any snoring anymore.  She does not

## 2024-01-05 NOTE — PROGRESS NOTES
Patient study dictated by Dr. Terry  Patient Vesna Valencia  MR number [id number]   diagnosis dyspnea  Date of study 1/5/2024    Ventilatory function revealed normal vital capacity and FEV1 FEV1 FVC ratio is normal mid flows are normal lung capacities are decreased diffusion capacity is normal flow volume loops are normal    Impression    Patient mild degree of restrictive ventilatory dysfunction which may be related to her being overweight.  There is no evidence of any obstructive lung disease    Dictated by Dr. Terry

## 2024-01-05 NOTE — PATIENT INSTRUCTIONS
Provided patient with referral to cardiology. Patient to call cardiology beginning of next week if she does not hear from that office. She also has lab order and will be getting labs done at [facility].       [email or web address]        YOU ARE BEING REFERRED TO:    [facility] (a department of [facility])    [address] [place] [address] [address]  [place] [address]    Main Phone Number: [phone]    Phone number for scheduling sleep study: [phone]      Please contact the above numbers to schedule your sleep study.     Once you have completed the FIRST NIGHT you may be asked to return for a SECOND NIGHT if necessary.   After the SECOND NIGHT the sleep center will order a CPAP/BiPAP machine for you.     An order for the machine will be sent to a durable medical equipment company ([employer]).   The sleep center will provide you with the [employer] companies information.     Once you have your machine please contact our office to schedule a follow up appointment.   Your follow up will be scheduled for a date 30-90 days after you have received your machine.   At that follow up visit we will need to have a compliance download from your [employer] company.   Please contact your [employer] company to have the compliance download faxed to our office at   [phone] for your follow up appointment.       IF YOU HAVE ANY QUESTIONS ABOUT YOUR SLEEP STUDY   PLEASE CONTACT THE SLEEP CENTER.

## 2024-01-15 ENCOUNTER — OFFICE VISIT (OUTPATIENT)
Dept: ORTHOPEDIC SURGERY | Age: 59
End: 2024-01-15
Payer: MEDICARE

## 2024-01-15 VITALS — WEIGHT: 202 LBS | RESPIRATION RATE: 14 BRPM | HEIGHT: 63 IN | BODY MASS INDEX: 35.79 KG/M2

## 2024-01-15 DIAGNOSIS — M25.512 LEFT SHOULDER PAIN, UNSPECIFIED CHRONICITY: ICD-10-CM

## 2024-01-15 DIAGNOSIS — M75.82 ROTATOR CUFF TENDINITIS, LEFT: Primary | ICD-10-CM

## 2024-01-15 PROCEDURE — 99203 OFFICE O/P NEW LOW 30 MIN: CPT | Performed by: ORTHOPAEDIC SURGERY

## 2024-01-15 PROCEDURE — 20610 DRAIN/INJ JOINT/BURSA W/O US: CPT | Performed by: ORTHOPAEDIC SURGERY

## 2024-01-15 RX ORDER — TRIAMCINOLONE ACETONIDE 40 MG/ML
40 INJECTION, SUSPENSION INTRA-ARTICULAR; INTRAMUSCULAR ONCE
Status: COMPLETED | OUTPATIENT
Start: 2024-01-15 | End: 2024-01-15

## 2024-01-15 RX ORDER — LIDOCAINE HYDROCHLORIDE 10 MG/ML
3 INJECTION, SOLUTION INFILTRATION; PERINEURAL ONCE
Status: COMPLETED | OUTPATIENT
Start: 2024-01-15 | End: 2024-01-15

## 2024-01-15 RX ADMIN — TRIAMCINOLONE ACETONIDE 40 MG: 40 INJECTION, SUSPENSION INTRA-ARTICULAR; INTRAMUSCULAR at 15:50

## 2024-01-15 RX ADMIN — LIDOCAINE HYDROCHLORIDE 3 ML: 10 INJECTION, SOLUTION INFILTRATION; PERINEURAL at 15:50

## 2024-01-15 NOTE — PROGRESS NOTES
m (5' 2.99\")   Wt 91.6 kg (202 lb)   BMI 35.79 kg/m²    Constitutional: Patient is oriented to person, place, and time. Patient appears well-developed and well nourished.   Mental Status/psychiatric: Behavior is normal. Thought content normal.  HENT: Negative otherwise noted  Head: Normocephalic and Atraumatic  Nose: Normal  Respiratory/Cardio: Effort normal. No respiratory distress.    Shoulder:    Skin: Skin is warm and dry; no swelling or obvious muscular atrophy.   Vasculature: 2+ radial pulses bilaterally  Neuro: Sensation grossly intact to light touch diffusely  Tenderness: Tender to palpation diffusely about the left shoulder    ROM: (Degrees)    Right   A P   Left   A P    Elevation  140    Elevation  90 110  Abduction  135    Abduction  85  110  ER   60    ER   60 60  IR   T12    IR   Waist   90 abd/ER      90 abd/ER     90 abd/IR      90 abd/IR     Crepitation  No    Crepitation No  Dyskenesia  No    Dyskenesia No      Muscle strength:    Right       Left    Deltoid   5    Deltoid   5  Supraspinatus  5    Supraspinatus  4  ER   5    ER   4  IR   5    IR   4    Special tests    Right   Rotator Cuff    Left    n   Painful arc    y   n   Pain with ER    n    n   Neer's     y    n   Hawkin's    y    n   Drop Arm    n  n   Lift off/Belly Press   n  n   ER Lag    n          AC Joint  n   AC tenderness   n  n   Cross-chest adduction  y       Labrum/biceps    n   Bell's    Y (equivocal)   n   Biceps sheer    n      n   Speed's/Yergason's   y    n   Tenderness Biceps Groove  y    n   Vince's    n         Instability  n   Ant Apprehension   n    n   Post Apprehension   n    n   Ant Load shift    n    n   Post Load shift   n   n   Sulcus     n  n   Generalized Laxity   n  n   Relocation test   n  n   Crank test     n  n   Fransico-superior escape  n       Imaging:  Xrays: 3 views of the left shoulder obtained on 1/15/2024 and additional views completed on 8/28/2023 were independently reviewed  Indications: Left

## 2024-01-19 ENCOUNTER — TELEPHONE (OUTPATIENT)
Dept: INTERNAL MEDICINE CLINIC | Age: 59
End: 2024-01-19

## 2024-01-22 DIAGNOSIS — M75.82 ROTATOR CUFF TENDINITIS, LEFT: Primary | ICD-10-CM

## 2024-01-24 RX ORDER — METHYLPREDNISOLONE 4 MG/1
TABLET ORAL
Qty: 1 KIT | Refills: 0 | Status: SHIPPED | OUTPATIENT
Start: 2024-01-24 | End: 2024-01-30

## 2024-01-24 NOTE — TELEPHONE ENCOUNTER
MDP pended for your approval.    Also are you willing to prescribe home health to assist the patient with showering?    Patient will need to be contacted once MDP is approved to update her on Dr. Jansen's SHUBHAM script and decision regarding home health.

## 2024-01-25 ENCOUNTER — HOSPITAL ENCOUNTER (OUTPATIENT)
Dept: PAIN MANAGEMENT | Age: 59
Discharge: HOME | End: 2024-01-25
Payer: MEDICARE

## 2024-01-25 VITALS — BODY MASS INDEX: 35.79 KG/M2 | HEIGHT: 63 IN | WEIGHT: 202 LBS

## 2024-01-25 DIAGNOSIS — Z79.899 CHRONICALLY ON BENZODIAZEPINE THERAPY: Chronic | ICD-10-CM

## 2024-01-25 DIAGNOSIS — M47.817 LUMBOSACRAL SPONDYLOSIS WITHOUT MYELOPATHY: Primary | ICD-10-CM

## 2024-01-25 DIAGNOSIS — Z79.01 CHRONIC ANTICOAGULATION: Chronic | ICD-10-CM

## 2024-01-25 PROCEDURE — 99214 OFFICE O/P EST MOD 30 MIN: CPT | Performed by: ANESTHESIOLOGY

## 2024-01-25 PROCEDURE — 99213 OFFICE O/P EST LOW 20 MIN: CPT

## 2024-01-25 ASSESSMENT — ENCOUNTER SYMPTOMS
COUGH: 1
EYES NEGATIVE: 1
BACK PAIN: 1

## 2024-01-25 ASSESSMENT — PAIN SCALES - GENERAL: PAINLEVEL_OUTOF10: 6

## 2024-01-25 NOTE — PROGRESS NOTES
The patient is a 58 y.o.Non- / non  female.    Chief Complaint   Patient presents with    Back Pain     MRI results        Back Pain  Pertinent negatives include no fever.   58-year-old female with multiple major comorbidities  History of DVT, on chronic anticoagulation with Coumadin  History of stroke with left-sided weakness  BMI 35  History of anxiety, on chronic benzodiazepine therapy    She was seen for initial evaluation in November  Index pain is back pain  Located in lower lumbar area across midline  Describes the pain as aching nagging stiffness  Extends over the hip and gluteal region  Describes progressive worsening of the pain over last 1 year  No previous lumbar spine injection or surgical history  Has tried physical therapy in past with limited benefit  Continuing to do home exercises  Has tried NSAIDs  Systemic NSAID contraindicated now because of Coumadin therapy  Pain is affecting quality of life and activity level  Oswestry disability index completed  Patient recently completed MRI lumbar spine  Report was reviewed  Images were reviewed independently  Finding discussed in detail with patient  Significant advanced degenerative disc changes at L4-5 and L5-S1 with bilateral facet arthropathy    Clinical presentation suggest axial back pain probably related to facet joint  Discussed diagnostic lumbar medial branch nerve block at bilateral L4-5 and L5-S1 facet  If this provide good short-term relief then consider for confirmatory block and radiofrequency ablation    Will also refer for a new course of physical therapy for core strengthening      Patient is here today for: MRI results  Pain level: 6   Character:  stabbing  Radiating:  Right leg  Weakness or numbness:  no  Aggravating Factors: walking  Alleviating Factors:  nothing  Constant or intermitting: constant  Bladder/bowel loss: sometimes      Past Medical History:   Diagnosis Date    Acid reflux     Arthritis     Asthma

## 2024-01-25 NOTE — H&P (VIEW-ONLY)
daily 50 g 0    amitriptyline (ELAVIL) 50 MG tablet Take 2 tablets by mouth nightly      busPIRone (BUSPAR) 15 MG tablet       gabapentin (NEURONTIN) 300 MG capsule Take 1 capsule by mouth 2 times daily.      levETIRAcetam (KEPPRA) 750 MG tablet Take 2 tablets by mouth 2 times daily      potassium chloride (KLOR-CON M) 20 MEQ extended release tablet Take 1 tablet by mouth 2 times daily      rosuvastatin (CRESTOR) 20 MG tablet Take 1 tablet by mouth nightly      terbinafine (LAMISIL) 250 MG tablet       topiramate (TOPAMAX) 100 MG tablet       ferrous sulfate (IRON 325) 325 (65 Fe) MG tablet Take 1 tablet by mouth daily (with breakfast)      NIFEdipine (PROCARDIA XL) 60 MG extended release tablet Take 1 tablet by mouth daily      nitroGLYCERIN (NITRODUR) 0.4 MG/HR       omeprazole (PRILOSEC) 20 MG delayed release capsule TAKE 1 CAPSULE BY MOUTH 2 TIMES DAILY (BEFORE MEALS) 180 capsule 1    venlafaxine (EFFEXOR XR) 150 MG extended release capsule TAKE 1 CAPSULE (150 MG TOTAL) BY MOUTH DAILY.  1    ONE TOUCH ULTRA TEST strip TEST BLOOD SUGAR 3 TIMES A  strip 5    PROAIR  (90 Base) MCG/ACT inhaler Inhale 2 puffs into the lungs every 6 hours as needed      TRUEPLUS LANCETS 30G MISC 1 each by Does not apply route daily 100 each 3     No current facility-administered medications for this encounter.       Review of Systems   Constitutional: Negative.  Negative for fever.   HENT: Negative.     Eyes: Negative.    Respiratory:  Positive for cough.    Cardiovascular: Negative.    Musculoskeletal:  Positive for back pain.         Objective:  General Appearance:  Uncomfortable and in pain.    Vital signs: (most recent): Height 1.6 m (5' 3\"), weight 91.6 kg (202 lb), not currently breastfeeding.  Vital signs are normal.  No fever.    Output: Producing urine and producing stool.    HEENT: Normal HEENT exam.    Lungs:  Normal effort and normal respiratory rate.  She is not in respiratory distress.    Heart: Normal

## 2024-01-27 DIAGNOSIS — J45.40 MODERATE PERSISTENT ASTHMA WITHOUT COMPLICATION: ICD-10-CM

## 2024-01-27 DIAGNOSIS — J45.50 SEVERE PERSISTENT ASTHMA WITHOUT COMPLICATION: ICD-10-CM

## 2024-01-29 ENCOUNTER — OFFICE VISIT (OUTPATIENT)
Dept: ORTHOPEDIC SURGERY | Age: 59
End: 2024-01-29
Payer: MEDICARE

## 2024-01-29 VITALS — BODY MASS INDEX: 35.79 KG/M2 | HEIGHT: 63 IN | WEIGHT: 202 LBS | RESPIRATION RATE: 14 BRPM

## 2024-01-29 DIAGNOSIS — Z96.651 HISTORY OF TOTAL RIGHT KNEE REPLACEMENT: ICD-10-CM

## 2024-01-29 DIAGNOSIS — M25.561 CHRONIC PAIN OF RIGHT KNEE: Primary | ICD-10-CM

## 2024-01-29 DIAGNOSIS — G89.29 CHRONIC PAIN OF RIGHT KNEE: Primary | ICD-10-CM

## 2024-01-29 PROCEDURE — 99213 OFFICE O/P EST LOW 20 MIN: CPT | Performed by: ORTHOPAEDIC SURGERY

## 2024-01-29 RX ORDER — FLUTICASONE FUROATE, UMECLIDINIUM BROMIDE AND VILANTEROL TRIFENATATE 100; 62.5; 25 UG/1; UG/1; UG/1
POWDER RESPIRATORY (INHALATION)
Qty: 60 EACH | Refills: 0 | Status: SHIPPED | OUTPATIENT
Start: 2024-01-29

## 2024-01-29 NOTE — PROGRESS NOTES
[facility]                   Kyle Ramirez M.D.            [address], [address]               [place], [place] [address]           Dept Phone: [phone]           Dept Fax:  [phone] [address] [facility]                       [address]           [place], [place] [address]          Dept Phone: [phone]           Dept Fax:  [phone]      Chief Compliant:  Chief Complaint   Patient presents with    Knee Pain     Rt knee        History of Present Illness:  This is a 58 y.o. female who presents to the clinic today for evaluation / follow up of right knee pain.  Patient is status post right total knee arthroplasty.  She did require a manipulation after her index procedure.  She states that she still is having great range of motion and she feels like she has pain mostly all the time.  Does not really describe any radicular pain does not really describe much in the way of any hip pain either..       Review of Systems   Constitutional: Negative for fever, chills, sweats.   Eyes: Negative for changes in vision, or pain.   HENT: Negative for ear ache, epistaxis, or sore throat.  Respiratory/Cardio: Negative for Chest pain, palpitations, SOB, or cough.  Gastrointestinal: Negative for abdominal pain, N/V/D.   Genitourinary: Negative for dysuria, frequency, urgency, or hematuria.   Neurological: Negative for headache, numbness, or weakness.   Integumentary: Negative for rash, itching, laceration, or abrasion.   Musculoskeletal: Positive for Knee Pain (Rt knee)       Physical Exam:  Constitutional: Patient is oriented to person, place, and time. Patient appears well-developed and well nourished.   HENT: Negative otherwise noted  Head: Normocephalic and Atraumatic  Nose: Normal  Eyes: Conjunctivae and EOM are normal  Neck: Normal range of motion Neck supple.    Respiratory/Cardio: Effort normal. No respiratory distress.  Musculoskeletal: Examination of the patient's

## 2024-02-05 ENCOUNTER — OFFICE VISIT (OUTPATIENT)
Dept: ORTHOPEDIC SURGERY | Age: 59
End: 2024-02-05
Payer: MEDICARE

## 2024-02-05 VITALS — RESPIRATION RATE: 14 BRPM | WEIGHT: 202 LBS | BODY MASS INDEX: 35.79 KG/M2 | HEIGHT: 63 IN

## 2024-02-05 DIAGNOSIS — M75.22 BICEPS TENDONITIS ON LEFT: Primary | ICD-10-CM

## 2024-02-05 PROCEDURE — 99212 OFFICE O/P EST SF 10 MIN: CPT | Performed by: ORTHOPAEDIC SURGERY

## 2024-02-05 NOTE — PROGRESS NOTES
HPI: Ms. Valencia is a 58-year-old lady here today for reevaluation of her left shoulder.  She was seen in my clinic a few weeks ago at which time treatment was initiated with a cortisone injection to the subacromial space as well as physical therapy.  She indicates that she only just got approved by her insurance for therapy and so is going to be starting on 2/8/2024.  The injection did not provide any pain relief and we also attempted a Medrol Dosepak without any improvement.   today she localizes her pain primarily to the anterior aspect of her shoulder.    On exam today she is tender to palpation along the biceps tendon and bicipital groove of the left shoulder.  She has a positive speeds test and biceps shear test.  She also has a positive Kenton's.    I had a discussion with the patient today about her pain.  It appears to be mostly bicipital in nature.  I did recommend an ultrasound-guided biceps tendon sheath cortisone injection which she was amenable to and so a referral was made.  I did encourage her to keep up with her appointment for physical therapy as well.  She was instructed to pay attention to how much pain relief she experiences with the injection regardless of how long the effects of the injection lasts.  I will see her back in my clinic as needed but she was certainly encouraged to return or call at anytime with persistent or worsening symptoms or with any questions or concerns.

## 2024-02-06 ENCOUNTER — TELEPHONE (OUTPATIENT)
Dept: INTERVENTIONAL RADIOLOGY/VASCULAR | Age: 59
End: 2024-02-06

## 2024-02-08 DIAGNOSIS — E11.42 TYPE 2 DIABETES MELLITUS WITH DIABETIC POLYNEUROPATHY, WITHOUT LONG-TERM CURRENT USE OF INSULIN (HCC): Chronic | ICD-10-CM

## 2024-02-08 RX ORDER — DULAGLUTIDE 0.75 MG/.5ML
INJECTION, SOLUTION SUBCUTANEOUS
Qty: 1 ADJUSTABLE DOSE PRE-FILLED PEN SYRINGE | Refills: 5 | Status: SHIPPED | OUTPATIENT
Start: 2024-02-08

## 2024-02-11 DIAGNOSIS — J45.50 SEVERE PERSISTENT ASTHMA WITHOUT COMPLICATION: ICD-10-CM

## 2024-02-11 DIAGNOSIS — J45.40 MODERATE PERSISTENT ASTHMA WITHOUT COMPLICATION: ICD-10-CM

## 2024-02-12 ENCOUNTER — HOSPITAL ENCOUNTER (OUTPATIENT)
Dept: PAIN MANAGEMENT | Facility: CLINIC | Age: 59
Discharge: HOME | End: 2024-02-12
Payer: MEDICARE

## 2024-02-12 VITALS
RESPIRATION RATE: 13 BRPM | HEART RATE: 80 BPM | OXYGEN SATURATION: 97 % | HEIGHT: 63 IN | SYSTOLIC BLOOD PRESSURE: 140 MMHG | BODY MASS INDEX: 35.44 KG/M2 | TEMPERATURE: 97 F | DIASTOLIC BLOOD PRESSURE: 87 MMHG | WEIGHT: 200 LBS

## 2024-02-12 DIAGNOSIS — R52 PAIN MANAGEMENT: ICD-10-CM

## 2024-02-12 DIAGNOSIS — M47.817 LUMBOSACRAL SPONDYLOSIS WITHOUT MYELOPATHY: Primary | ICD-10-CM

## 2024-02-12 LAB — GLUCOSE BLD-MCNC: 102 MG/DL (ref 65–105)

## 2024-02-12 PROCEDURE — 64493 INJ PARAVERT F JNT L/S 1 LEV: CPT | Performed by: ANESTHESIOLOGY

## 2024-02-12 PROCEDURE — 99152 MOD SED SAME PHYS/QHP 5/>YRS: CPT | Performed by: ANESTHESIOLOGY

## 2024-02-12 PROCEDURE — 64494 INJ PARAVERT F JNT L/S 2 LEV: CPT

## 2024-02-12 PROCEDURE — 64493 INJ PARAVERT F JNT L/S 1 LEV: CPT

## 2024-02-12 PROCEDURE — 82947 ASSAY GLUCOSE BLOOD QUANT: CPT

## 2024-02-12 PROCEDURE — 6360000004 HC RX CONTRAST MEDICATION: Performed by: ANESTHESIOLOGY

## 2024-02-12 PROCEDURE — 2500000003 HC RX 250 WO HCPCS: Performed by: ANESTHESIOLOGY

## 2024-02-12 PROCEDURE — 64494 INJ PARAVERT F JNT L/S 2 LEV: CPT | Performed by: ANESTHESIOLOGY

## 2024-02-12 PROCEDURE — 6360000002 HC RX W HCPCS: Performed by: ANESTHESIOLOGY

## 2024-02-12 RX ORDER — MIDAZOLAM HYDROCHLORIDE 2 MG/2ML
INJECTION, SOLUTION INTRAMUSCULAR; INTRAVENOUS
Status: COMPLETED | OUTPATIENT
Start: 2024-02-12 | End: 2024-02-12

## 2024-02-12 RX ORDER — ALBUTEROL SULFATE 90 UG/1
2 AEROSOL, METERED RESPIRATORY (INHALATION) 4 TIMES DAILY PRN
Qty: 18 EACH | Refills: 1 | Status: SHIPPED | OUTPATIENT
Start: 2024-02-12

## 2024-02-12 RX ORDER — BUPIVACAINE HYDROCHLORIDE 5 MG/ML
INJECTION, SOLUTION EPIDURAL; INTRACAUDAL
Status: COMPLETED | OUTPATIENT
Start: 2024-02-12 | End: 2024-02-12

## 2024-02-12 RX ORDER — LIDOCAINE HYDROCHLORIDE 10 MG/ML
INJECTION, SOLUTION EPIDURAL; INFILTRATION; INTRACAUDAL; PERINEURAL
Status: COMPLETED | OUTPATIENT
Start: 2024-02-12 | End: 2024-02-12

## 2024-02-12 RX ORDER — FENTANYL CITRATE 50 UG/ML
INJECTION, SOLUTION INTRAMUSCULAR; INTRAVENOUS
Status: COMPLETED | OUTPATIENT
Start: 2024-02-12 | End: 2024-02-12

## 2024-02-12 RX ADMIN — MIDAZOLAM HYDROCHLORIDE 1 MG: 1 INJECTION, SOLUTION INTRAMUSCULAR; INTRAVENOUS at 11:12

## 2024-02-12 RX ADMIN — FENTANYL CITRATE 50 MCG: 50 INJECTION, SOLUTION INTRAMUSCULAR; INTRAVENOUS at 11:13

## 2024-02-12 RX ADMIN — LIDOCAINE HYDROCHLORIDE 5 ML: 10 INJECTION, SOLUTION EPIDURAL; INFILTRATION; INTRACAUDAL at 11:14

## 2024-02-12 RX ADMIN — IOHEXOL 2 ML: 180 INJECTION INTRAVENOUS at 11:21

## 2024-02-12 RX ADMIN — MIDAZOLAM HYDROCHLORIDE 1 MG: 1 INJECTION, SOLUTION INTRAMUSCULAR; INTRAVENOUS at 11:16

## 2024-02-12 RX ADMIN — BUPIVACAINE HYDROCHLORIDE 6 ML: 5 INJECTION, SOLUTION EPIDURAL; INTRACAUDAL; PERINEURAL at 11:22

## 2024-02-12 NOTE — INTERVAL H&P NOTE
Update History & Physical    The patient's History and Physical of January 25, 2024 was reviewed with the patient and I examined the patient. There was no change. The surgical site was confirmed by the patient and me.     Plan: The risks, benefits, expected outcome, and alternative to the recommended procedure have been discussed with the patient. Patient understands and wants to proceed with the procedure.     ASA 3  MP 3    Electronically signed by Han Anders MD on 2/12/2024 at 11:06 AM

## 2024-02-12 NOTE — OP NOTE
Patient Name: Vesna Valencia   YOB: 1965  Room/Bed: Room/bed info not found  Medical Record Number: [id number]  Date: 2/12/2024       Sedation/ Anesthesia Plan:   intravenous sedation   as needed.    Medications Planned:   midazolam (Versed) / Fentanyl  Intravenously  as needed.    Preoperative Diagnosis: Lumbar spondylosis w/o myelopathy or radiculopathy  Postoperative Diagnosis: Lumbar spondylosis w/o myelopathy or radiculopathy  Blood Loss: none    Procedure Performed:  Bilateral Lumbar Medial Branch nerve Blocks at the transverse processes of L4, L5 and sacral  ala under fluoroscopy guidance    Procedure:      The Patient was seen in the preop area, chart was reviewed, informed consent was obtained. Patient was taken to procedure room and was placed in prone position. Vital signs were monitored through out the  Procedure. A time out was completed.  The skin over the back was prepped and draped in sterile manner.     The target point was marked at the junction of Transverse process and superior articular process at the target levels.  Skin and deep tissues were anesthetized with 1 % lidocaine. A 25-gauge needlele was advanced to the target spots under fluoroscopy guidance in AP / Lateral and Oblique views.     Then after negative aspiration contrast dye was injected with live fluoroscopy in AP views that showed  spread of the contrast with no epidural space and no vascular runoff or intrathecal spread.    Finally 0.5 ml of treatment solution 0.5% bupivacaine  was injected at each level.  The needle was removed and a Band-Aid was placed over the needle  insertion site.  The patient's vital signs remained stable and the patient tolerated the procedure well.      Electronically signed by Han Anders MD on 2/12/2024 at 11:26 AM    SEDATION NOTE:    ASA CLASSIFICATION  3  MP   CLASSIFICATION  3    Moderate intravenous conscious sedation was supervised by Dr. Anders  The patient was independently

## 2024-02-12 NOTE — DISCHARGE INSTRUCTIONS
Discharge Instructions following Sedation or Anesthesia:  You have  received  a sedative/anesthetic therefore, you should not consume any alcoholic beverages for minimum of 12 hours.  Do not drive or operate machinery for 24 hours.  Do not sign legal documents for 24 hours.  Dizziness, drowsiness, and unsteadiness may occur.  Rest when need to.  Increase diet as tolerated.  Keep up on fluids if diet allows.      General Instructions:  Do not take a tub bath for 72 hours after procedure (this includes hot tubs and swimming pools).  You may shower, but avoid hot water to injection site.   Avoid strenuous activity TODAY especially if you experience dizziness.   Remove band-aid the next day.  Wash off any residual iodine   Do not use heat, heating pad, or any other heating device over the injection site for 3 days after the procedure.  If you experience pain after your procedure, you may continue with your current pain medication as prescribed.  (DO NOT INCREASE YOUR PAIN MEDICATION WITHOUT TALKING TO DOCTOR)  Soreness and pain at injection site is common, may use ice to reduce soreness.    Please complete pain diary as instructed.     Call [facility] at [phone] if you experience:   Fever, chills or temperature over 100    Vomiting, Headache, persistent stiff neck, nausea, blurred vision   Difficulty in urinating or unable to urinate with 8 hours   Increase in weakness, numbness or loss of function   Increased redness, swelling or drainage at the injection site

## 2024-02-13 ENCOUNTER — TELEPHONE (OUTPATIENT)
Dept: PAIN MANAGEMENT | Age: 59
End: 2024-02-13

## 2024-02-13 NOTE — TELEPHONE ENCOUNTER
LMTC      S/P: Bilateral Lumbar Medial Branch nerve Blocks at the transverse processes of L4, L5 and sacral  ala under fluoroscopy guidance       DOS: 02/12/24     Pain  before procedure with activity :    Pain after procedure with activity:    Activities following procedure include:    % of pain relief:    Procedure successful:     OV or OR scheduled:

## 2024-02-26 ENCOUNTER — HOSPITAL ENCOUNTER (OUTPATIENT)
Dept: INTERVENTIONAL RADIOLOGY/VASCULAR | Age: 59
Discharge: HOME | End: 2024-02-28
Payer: MEDICARE

## 2024-02-26 DIAGNOSIS — M75.22 BICEPS TENDONITIS ON LEFT: ICD-10-CM

## 2024-02-26 PROCEDURE — 2709999900 IR ARTHR/ASP/INJ MAJOR JT/BURSA LEFT WO US

## 2024-02-26 PROCEDURE — 20611 DRAIN/INJ JOINT/BURSA W/US: CPT

## 2024-02-26 NOTE — OR NURSING
Patient brought to the IR suite per self, after consent obtained, patient placed on the procedure table.  Patient then positioned/prepped/draped.

## 2024-03-25 ENCOUNTER — APPOINTMENT (OUTPATIENT)
Dept: CT IMAGING | Age: 59
End: 2024-03-25
Payer: MEDICARE

## 2024-03-25 ENCOUNTER — HOSPITAL ENCOUNTER (OUTPATIENT)
Dept: PAIN MANAGEMENT | Facility: CLINIC | Age: 59
Discharge: HOME | End: 2024-03-25
Payer: MEDICARE

## 2024-03-25 ENCOUNTER — HOSPITAL ENCOUNTER (EMERGENCY)
Age: 59
Discharge: HOME | End: 2024-03-25
Attending: EMERGENCY MEDICINE
Payer: MEDICARE

## 2024-03-25 VITALS
HEIGHT: 63 IN | RESPIRATION RATE: 20 BRPM | HEART RATE: 67 BPM | WEIGHT: 200 LBS | SYSTOLIC BLOOD PRESSURE: 184 MMHG | TEMPERATURE: 98.4 F | DIASTOLIC BLOOD PRESSURE: 102 MMHG | OXYGEN SATURATION: 97 % | BODY MASS INDEX: 35.44 KG/M2

## 2024-03-25 VITALS
HEART RATE: 85 BPM | BODY MASS INDEX: 35.44 KG/M2 | OXYGEN SATURATION: 98 % | WEIGHT: 200 LBS | RESPIRATION RATE: 16 BRPM | DIASTOLIC BLOOD PRESSURE: 97 MMHG | TEMPERATURE: 98 F | SYSTOLIC BLOOD PRESSURE: 179 MMHG | HEIGHT: 63 IN

## 2024-03-25 DIAGNOSIS — R52 PAIN MANAGEMENT: ICD-10-CM

## 2024-03-25 DIAGNOSIS — M47.817 LUMBOSACRAL SPONDYLOSIS WITHOUT MYELOPATHY: Primary | ICD-10-CM

## 2024-03-25 DIAGNOSIS — G43.809 OTHER MIGRAINE WITHOUT STATUS MIGRAINOSUS, NOT INTRACTABLE: Primary | ICD-10-CM

## 2024-03-25 LAB
ALBUMIN SERPL-MCNC: 4.2 G/DL (ref 3.5–5.2)
ALP SERPL-CCNC: 135 U/L (ref 35–104)
ALT SERPL-CCNC: 40 U/L (ref 5–33)
ANION GAP SERPL CALCULATED.3IONS-SCNC: 13 MMOL/L (ref 9–17)
AST SERPL-CCNC: 29 U/L
BASOPHILS # BLD: 0.1 K/UL (ref 0–0.2)
BASOPHILS NFR BLD: 1 % (ref 0–2)
BILIRUB SERPL-MCNC: <0.2 MG/DL (ref 0.3–1.2)
BILIRUB UR QL STRIP: NEGATIVE
BUN SERPL-MCNC: 11 MG/DL (ref 6–20)
CALCIUM SERPL-MCNC: 9.4 MG/DL (ref 8.6–10.4)
CHLORIDE SERPL-SCNC: 102 MMOL/L (ref 98–107)
CLARITY UR: CLEAR
CO2 SERPL-SCNC: 25 MMOL/L (ref 20–31)
COLOR UR: YELLOW
COMMENT: NORMAL
CREAT SERPL-MCNC: 0.8 MG/DL (ref 0.5–0.9)
EOSINOPHIL # BLD: 0 K/UL (ref 0–0.4)
EOSINOPHILS RELATIVE PERCENT: 0 % (ref 0–4)
ERYTHROCYTE [DISTWIDTH] IN BLOOD BY AUTOMATED COUNT: 14.6 % (ref 11.5–14.9)
GFR SERPL CREATININE-BSD FRML MDRD: 86 ML/MIN/1.73M2
GLUCOSE BLD-MCNC: 112 MG/DL (ref 65–105)
GLUCOSE SERPL-MCNC: 123 MG/DL (ref 70–99)
GLUCOSE UR STRIP-MCNC: NEGATIVE MG/DL
HCT VFR BLD AUTO: 39.8 % (ref 36–46)
HGB BLD-MCNC: 13.1 G/DL (ref 12–16)
HGB UR QL STRIP.AUTO: NEGATIVE
KETONES UR STRIP-MCNC: NEGATIVE MG/DL
LEUKOCYTE ESTERASE UR QL STRIP: NEGATIVE
LIPASE SERPL-CCNC: 11 U/L (ref 13–60)
LYMPHOCYTES NFR BLD: 3.3 K/UL (ref 1–4.8)
LYMPHOCYTES RELATIVE PERCENT: 36 % (ref 24–44)
MAGNESIUM SERPL-MCNC: 2.1 MG/DL (ref 1.6–2.6)
MCH RBC QN AUTO: 29.3 PG (ref 26–34)
MCHC RBC AUTO-ENTMCNC: 32.9 G/DL (ref 31–37)
MCV RBC AUTO: 89.2 FL (ref 80–100)
MONOCYTES NFR BLD: 0.5 K/UL (ref 0.1–1.3)
MONOCYTES NFR BLD: 6 % (ref 1–7)
NEUTROPHILS NFR BLD: 57 % (ref 36–66)
NEUTS SEG NFR BLD: 5.3 K/UL (ref 1.3–9.1)
NITRITE UR QL STRIP: NEGATIVE
PH UR STRIP: 7 [PH] (ref 5–8)
PLATELET # BLD AUTO: 315 K/UL (ref 150–450)
PMV BLD AUTO: 8 FL (ref 6–12)
POC INR: 1.8
POTASSIUM SERPL-SCNC: 3.8 MMOL/L (ref 3.7–5.3)
PROT SERPL-MCNC: 7.1 G/DL (ref 6.4–8.3)
PROT UR STRIP-MCNC: NEGATIVE MG/DL
PROTHROMBIN TIME, POC: 21.3 SEC (ref 9.6–14.4)
RBC # BLD AUTO: 4.46 M/UL (ref 4–5.2)
SODIUM SERPL-SCNC: 140 MMOL/L (ref 135–144)
SP GR UR STRIP: 1.01 (ref 1–1.03)
TROPONIN I SERPL HS-MCNC: <6 NG/L (ref 0–14)
TROPONIN I SERPL HS-MCNC: <6 NG/L (ref 0–14)
UROBILINOGEN UR STRIP-ACNC: NORMAL EU/DL (ref 0–1)
WBC OTHER # BLD: 9.2 K/UL (ref 3.5–11)

## 2024-03-25 PROCEDURE — 82947 ASSAY GLUCOSE BLOOD QUANT: CPT

## 2024-03-25 PROCEDURE — 2580000003 HC RX 258: Performed by: EMERGENCY MEDICINE

## 2024-03-25 PROCEDURE — 80053 COMPREHEN METABOLIC PANEL: CPT

## 2024-03-25 PROCEDURE — 6360000004 HC RX CONTRAST MEDICATION: Performed by: ANESTHESIOLOGY

## 2024-03-25 PROCEDURE — 83735 ASSAY OF MAGNESIUM: CPT

## 2024-03-25 PROCEDURE — 96374 THER/PROPH/DIAG INJ IV PUSH: CPT

## 2024-03-25 PROCEDURE — 84484 ASSAY OF TROPONIN QUANT: CPT

## 2024-03-25 PROCEDURE — 93005 ELECTROCARDIOGRAM TRACING: CPT | Performed by: EMERGENCY MEDICINE

## 2024-03-25 PROCEDURE — 64493 INJ PARAVERT F JNT L/S 1 LEV: CPT | Performed by: ANESTHESIOLOGY

## 2024-03-25 PROCEDURE — 74177 CT ABD & PELVIS W/CONTRAST: CPT

## 2024-03-25 PROCEDURE — 36415 COLL VENOUS BLD VENIPUNCTURE: CPT

## 2024-03-25 PROCEDURE — 6360000002 HC RX W HCPCS: Performed by: EMERGENCY MEDICINE

## 2024-03-25 PROCEDURE — 70450 CT HEAD/BRAIN W/O DYE: CPT

## 2024-03-25 PROCEDURE — 99152 MOD SED SAME PHYS/QHP 5/>YRS: CPT | Performed by: ANESTHESIOLOGY

## 2024-03-25 PROCEDURE — 64494 INJ PARAVERT F JNT L/S 2 LEV: CPT

## 2024-03-25 PROCEDURE — 99285 EMERGENCY DEPT VISIT HI MDM: CPT

## 2024-03-25 PROCEDURE — 85610 PROTHROMBIN TIME: CPT

## 2024-03-25 PROCEDURE — 96376 TX/PRO/DX INJ SAME DRUG ADON: CPT

## 2024-03-25 PROCEDURE — 96375 TX/PRO/DX INJ NEW DRUG ADDON: CPT

## 2024-03-25 PROCEDURE — 6360000002 HC RX W HCPCS: Performed by: ANESTHESIOLOGY

## 2024-03-25 PROCEDURE — 83690 ASSAY OF LIPASE: CPT

## 2024-03-25 PROCEDURE — 6360000004 HC RX CONTRAST MEDICATION: Performed by: EMERGENCY MEDICINE

## 2024-03-25 PROCEDURE — 85025 COMPLETE CBC W/AUTO DIFF WBC: CPT

## 2024-03-25 PROCEDURE — 64493 INJ PARAVERT F JNT L/S 1 LEV: CPT

## 2024-03-25 PROCEDURE — 64494 INJ PARAVERT F JNT L/S 2 LEV: CPT | Performed by: ANESTHESIOLOGY

## 2024-03-25 PROCEDURE — 2500000003 HC RX 250 WO HCPCS: Performed by: ANESTHESIOLOGY

## 2024-03-25 PROCEDURE — 81003 URINALYSIS AUTO W/O SCOPE: CPT

## 2024-03-25 RX ORDER — LIDOCAINE HYDROCHLORIDE 10 MG/ML
INJECTION, SOLUTION EPIDURAL; INFILTRATION; INTRACAUDAL; PERINEURAL
Status: COMPLETED | OUTPATIENT
Start: 2024-03-25 | End: 2024-03-25

## 2024-03-25 RX ORDER — 0.9 % SODIUM CHLORIDE 0.9 %
100 INTRAVENOUS SOLUTION INTRAVENOUS ONCE
Status: COMPLETED | OUTPATIENT
Start: 2024-03-25 | End: 2024-03-25

## 2024-03-25 RX ORDER — BUTALBITAL, ACETAMINOPHEN AND CAFFEINE 300; 40; 50 MG/1; MG/1; MG/1
1 CAPSULE ORAL EVERY 6 HOURS PRN
Qty: 15 CAPSULE | Refills: 0 | Status: SHIPPED | OUTPATIENT
Start: 2024-03-25

## 2024-03-25 RX ORDER — SODIUM CHLORIDE 0.9 % (FLUSH) 0.9 %
10 SYRINGE (ML) INJECTION PRN
Status: DISCONTINUED | OUTPATIENT
Start: 2024-03-25 | End: 2024-03-26 | Stop reason: HOSPADM

## 2024-03-25 RX ORDER — MIDAZOLAM HYDROCHLORIDE 2 MG/2ML
INJECTION, SOLUTION INTRAMUSCULAR; INTRAVENOUS
Status: COMPLETED | OUTPATIENT
Start: 2024-03-25 | End: 2024-03-25

## 2024-03-25 RX ORDER — 0.9 % SODIUM CHLORIDE 0.9 %
1000 INTRAVENOUS SOLUTION INTRAVENOUS ONCE
Status: COMPLETED | OUTPATIENT
Start: 2024-03-25 | End: 2024-03-25

## 2024-03-25 RX ORDER — ONDANSETRON 2 MG/ML
4 INJECTION INTRAMUSCULAR; INTRAVENOUS ONCE
Status: COMPLETED | OUTPATIENT
Start: 2024-03-25 | End: 2024-03-25

## 2024-03-25 RX ORDER — BUPIVACAINE HYDROCHLORIDE 5 MG/ML
INJECTION, SOLUTION EPIDURAL; INTRACAUDAL
Status: COMPLETED | OUTPATIENT
Start: 2024-03-25 | End: 2024-03-25

## 2024-03-25 RX ORDER — DEXAMETHASONE SODIUM PHOSPHATE 10 MG/ML
10 INJECTION, SOLUTION INTRAMUSCULAR; INTRAVENOUS ONCE
Status: COMPLETED | OUTPATIENT
Start: 2024-03-25 | End: 2024-03-25

## 2024-03-25 RX ORDER — KETOROLAC TROMETHAMINE 30 MG/ML
15 INJECTION, SOLUTION INTRAMUSCULAR; INTRAVENOUS ONCE
Status: COMPLETED | OUTPATIENT
Start: 2024-03-25 | End: 2024-03-25

## 2024-03-25 RX ORDER — DIPHENHYDRAMINE HYDROCHLORIDE 50 MG/ML
50 INJECTION INTRAMUSCULAR; INTRAVENOUS ONCE
Status: COMPLETED | OUTPATIENT
Start: 2024-03-25 | End: 2024-03-25

## 2024-03-25 RX ORDER — FENTANYL CITRATE 50 UG/ML
INJECTION, SOLUTION INTRAMUSCULAR; INTRAVENOUS
Status: COMPLETED | OUTPATIENT
Start: 2024-03-25 | End: 2024-03-25

## 2024-03-25 RX ADMIN — KETOROLAC TROMETHAMINE 15 MG: 30 INJECTION, SOLUTION INTRAMUSCULAR; INTRAVENOUS at 20:46

## 2024-03-25 RX ADMIN — SODIUM CHLORIDE, PRESERVATIVE FREE 10 ML: 5 INJECTION INTRAVENOUS at 21:24

## 2024-03-25 RX ADMIN — DEXAMETHASONE SODIUM PHOSPHATE 10 MG: 10 INJECTION, SOLUTION INTRAMUSCULAR; INTRAVENOUS at 20:47

## 2024-03-25 RX ADMIN — IOPAMIDOL 75 ML: 755 INJECTION, SOLUTION INTRAVENOUS at 21:24

## 2024-03-25 RX ADMIN — SODIUM CHLORIDE 100 ML: 9 INJECTION, SOLUTION INTRAVENOUS at 21:25

## 2024-03-25 RX ADMIN — MIDAZOLAM HYDROCHLORIDE 1 MG: 1 INJECTION, SOLUTION INTRAMUSCULAR; INTRAVENOUS at 08:46

## 2024-03-25 RX ADMIN — BUPIVACAINE HYDROCHLORIDE 6 ML: 5 INJECTION, SOLUTION EPIDURAL; INTRACAUDAL; PERINEURAL at 08:50

## 2024-03-25 RX ADMIN — ONDANSETRON 4 MG: 2 INJECTION INTRAMUSCULAR; INTRAVENOUS at 20:47

## 2024-03-25 RX ADMIN — FENTANYL CITRATE 50 MCG: 50 INJECTION, SOLUTION INTRAMUSCULAR; INTRAVENOUS at 08:46

## 2024-03-25 RX ADMIN — LIDOCAINE HYDROCHLORIDE 5 ML: 10 INJECTION, SOLUTION EPIDURAL; INFILTRATION; INTRACAUDAL at 08:47

## 2024-03-25 RX ADMIN — IOHEXOL 3 ML: 180 INJECTION INTRAVENOUS at 08:49

## 2024-03-25 RX ADMIN — SODIUM CHLORIDE 1000 ML: 9 INJECTION, SOLUTION INTRAVENOUS at 20:46

## 2024-03-25 RX ADMIN — DIPHENHYDRAMINE HYDROCHLORIDE 50 MG: 50 INJECTION, SOLUTION INTRAMUSCULAR; INTRAVENOUS at 20:47

## 2024-03-25 ASSESSMENT — ENCOUNTER SYMPTOMS
PHOTOPHOBIA: 1
SHORTNESS OF BREATH: 0
BACK PAIN: 0
VOMITING: 1
COUGH: 0
NAUSEA: 1
ABDOMINAL PAIN: 1

## 2024-03-25 ASSESSMENT — PAIN - FUNCTIONAL ASSESSMENT
PAIN_FUNCTIONAL_ASSESSMENT: ACTIVITIES ARE NOT PREVENTED
PAIN_FUNCTIONAL_ASSESSMENT: NONE - DENIES PAIN
PAIN_FUNCTIONAL_ASSESSMENT: 0-10

## 2024-03-25 ASSESSMENT — PAIN DESCRIPTION - LOCATION
LOCATION: BACK
LOCATION: HEAD
LOCATION: ABDOMEN

## 2024-03-25 ASSESSMENT — PAIN SCALES - GENERAL
PAINLEVEL_OUTOF10: 3
PAINLEVEL_OUTOF10: 7
PAINLEVEL_OUTOF10: 4
PAINLEVEL_OUTOF10: 7

## 2024-03-25 ASSESSMENT — PAIN DESCRIPTION - PAIN TYPE: TYPE: ACUTE PAIN

## 2024-03-25 ASSESSMENT — PAIN DESCRIPTION - DESCRIPTORS
DESCRIPTORS: ACHING
DESCRIPTORS: STABBING

## 2024-03-25 ASSESSMENT — PAIN DESCRIPTION - ORIENTATION
ORIENTATION: LOWER
ORIENTATION: LEFT

## 2024-03-25 NOTE — DISCHARGE INSTRUCTIONS
Discharge Instructions following Sedation or Anesthesia:  You have  received  a sedative/anesthetic therefore, you should not consume any alcoholic beverages for minimum of 12 hours.  Do not drive or operate machinery for 24 hours.  Do not sign legal documents for 24 hours.  Dizziness, drowsiness, and unsteadiness may occur.  Rest when need to.  Increase diet as tolerated.  Keep up on fluids if diet allows.      General Instructions:  Do not take a tub bath for 72 hours after procedure (this includes hot tubs and swimming pools).  You may shower, but avoid hot water to injection site.   Avoid strenuous activity TODAY especially if you experience dizziness.   Remove band-aid the next day.  Wash off any residual iodine   Do not use heat, heating pad, or any other heating device over the injection site for 3 days after the procedure.  If you experience pain after your procedure, you may continue with your current pain medication as prescribed.  (DO NOT INCREASE YOUR PAIN MEDICATION WITHOUT TALKING TO DOCTOR)  Soreness and pain at injection site is common, may use ice to reduce soreness.    Please complete pain diary as instructed.     Call [facility] at [phone] if you experience:   Fever, chills or temperature over 100    Vomiting, Headache, persistent stiff neck, nausea, blurred vision   Difficulty in urinating or unable to urinate with 8 hours   Increase in weakness, numbness or loss of function   Increased redness, swelling or drainage at the injection site

## 2024-03-25 NOTE — H&P
Pain Pre-Op H&P Note    Han Anders MD    HPI: Vesna Valencia  presents with     Index pain is back pain  Located in lower lumbar area across midline  Describes the pain as aching nagging stiffness  Extends over the hip and gluteal region  Describes progressive worsening of the pain over last 1 year    Past Medical History:   Diagnosis Date    Acid reflux     Arthritis     Asthma     Carotid artery dissection (HCC) 9/22/2022    Coughing     dry due to ace inhibitor    CPAP (continuous positive airway pressure) dependence     Depression     uses Abilify, Buspar, Trazodone for this    Diabetes mellitus (HCC)     Difficult intubation     Diverticulitis     Environmental allergies     Epilepsy (HCC)     MAY 2018,.... 4/12/22: States last seizure was 6 months ago.     Factor V Leiden (HCC)     Gastroparesis     Generalized abdominal pain     Hemorrhage of anus and rectum     Hiatal hernia     History of stroke associated with blood clotting tendency 2003    had clot in brain and left neck, left side weakness residual    Hx of blood clots     right breast, brain, neck    Hyperlipidemia     Hypertension     Hypokalemia     ZACH (iron deficiency anemia)     Insomnia     uses Trazodone for this    Migraine     Pre-procedure lab exam 01/09/2018    S/P total knee arthroplasty, right 9/22/2022    Sacroiliitis (HCC)     Sleep apnea     C PAP    Unspecified cerebral artery occlusion with cerebral infarction 06/2003    SEE BELOW, 20017 Mini stroke    Wears glasses        Past Surgical History:   Procedure Laterality Date    APPENDECTOMY      CHOLECYSTECTOMY      COLONOSCOPY      with polypectomy    COLONOSCOPY  01/22/2016    AND EGD    COLONOSCOPY  01/05/2017    DR MACKAY-random biopsies.    COLONOSCOPY N/A 10/20/2020    COLONOSCOPY POLYPECTOMY HOT BIOPSY performed by Mauro Jeronimo MD at [facility]    ENDOMETRIAL ABLATION      2 times    HERNIA REPAIR      HIATAL HERNIA REPAIR  02/04/2016    laparoscopic robotic

## 2024-03-25 NOTE — ED TRIAGE NOTES
Mode of arrival (squad #, walk in, police, etc) : EMS        Chief complaint(s): emesis, urinary incontinence.        Arrival Note (brief scenario, treatment PTA, etc).: patient was seen at pain clinic today for lumbar injections for pain control. Patient states she had three episodes of urinary incontinence today and one episode of vomiting. Patient is still nauseous. Patient has a history of stroke with left sided weakness deficit from that. Squad gave patient 4mg of zofran in route.         C= \"Have you ever felt that you should Cut down on your drinking?\"  No  A= \"Have people Annoyed you by criticizing your drinking?\"  No  G= \"Have you ever felt bad or Guilty about your drinking?\"  No  E= \"Have you ever had a drink as an Eye-opener first thing in the morning to steady your nerves or to help a hangover?\"  No      Deferred []      Reason for deferring: N/A    *If yes to two or more: probable alcohol abuse.*

## 2024-03-25 NOTE — OP NOTE
Patient Name: Vesna Valencia   YOB: 1965  Room/Bed: Room/bed info not found  Medical Record Number: [id number]  Date: 3/25/2024       Sedation/ Anesthesia Plan:   intravenous sedation   as needed.    Medications Planned:   midazolam (Versed) / Fentanyl  Intravenously  as needed.    Preoperative Diagnosis: Lumbar spondylosis w/o myelopathy or radiculopathy  Postoperative Diagnosis: Lumbar spondylosis w/o myelopathy or radiculopathy  Blood Loss: none    Procedure Performed:  Bilateral Lumbar Medial Branch nerve Blocks at the transverse processes of L4, L5 and sacral  ala under fluoroscopy guidance    Procedure:      The Patient was seen in the preop area, chart was reviewed, informed consent was obtained. Patient was taken to procedure room and was placed in prone position. Vital signs were monitored through out the  Procedure. A time out was completed.  The skin over the back was prepped and draped in sterile manner.     The target point was marked at the junction of Transverse process and superior articular process at the target levels.  Skin and deep tissues were anesthetized with 1 % lidocaine. A 25-gauge needlele was advanced to the target spots under fluoroscopy guidance in AP / Lateral and Oblique views.     Then after negative aspiration contrast dye was injected with live fluoroscopy in AP views that showed  spread of the contrast with no epidural space and no vascular runoff or intrathecal spread.    Finally 0.5 ml of treatment solution 0.5 % bupivacaine  was injected at each level.  The needle was removed and a Band-Aid was placed over the needle  insertion site.  The patient's vital signs remained stable and the patient tolerated the procedure well.      Electronically signed by Han Anders MD on 3/25/2024 at 3:55 PM    SEDATION NOTE:    ASA CLASSIFICATION  3  MP   CLASSIFICATION  3    Moderate intravenous conscious sedation was supervised by Dr. Anders  The patient was independently

## 2024-03-26 ENCOUNTER — TELEPHONE (OUTPATIENT)
Dept: PAIN MANAGEMENT | Age: 59
End: 2024-03-26

## 2024-03-26 DIAGNOSIS — M47.817 LUMBOSACRAL SPONDYLOSIS WITHOUT MYELOPATHY: Primary | ICD-10-CM

## 2024-03-26 DIAGNOSIS — M48.062 LUMBAR STENOSIS WITH NEUROGENIC CLAUDICATION: ICD-10-CM

## 2024-03-26 LAB
EKG ATRIAL RATE: 69 BPM
EKG P AXIS: 55 DEGREES
EKG P-R INTERVAL: 196 MS
EKG Q-T INTERVAL: 432 MS
EKG QRS DURATION: 100 MS
EKG QTC CALCULATION (BAZETT): 462 MS
EKG R AXIS: 25 DEGREES
EKG T AXIS: 32 DEGREES
EKG VENTRICULAR RATE: 69 BPM

## 2024-03-26 PROCEDURE — 93010 ELECTROCARDIOGRAM REPORT: CPT | Performed by: INTERNAL MEDICINE

## 2024-03-26 NOTE — ED PROVIDER NOTES
shown below, the results are reviewed by myself, and all abnormals are listed below.  Labs Reviewed   COMPREHENSIVE METABOLIC PANEL - Abnormal; Notable for the following components:       Result Value    Glucose 123 (*)     Total Bilirubin <0.2 (*)     Alkaline Phosphatase 135 (*)     ALT 40 (*)     All other components within normal limits   LIPASE - Abnormal; Notable for the following components:    Lipase 11 (*)     All other components within normal limits   CBC WITH AUTO DIFFERENTIAL   URINALYSIS WITH REFLEX TO CULTURE   TROPONIN   TROPONIN   MAGNESIUM       Vitals Reviewed:    Vitals:    03/25/24 1953 03/25/24 2049 03/25/24 2213   BP: (!) 177/90 (!) 170/107 (!) 184/102   Pulse: 69 67    Resp: 20     Temp: 98.4 °F (36.9 °C)     TempSrc: Oral     SpO2: 97% 97% 97%   Weight: 90.7 kg (200 lb)     Height: 1.6 m (5' 3\")       MEDICATIONS GIVEN TO PATIENT THIS ENCOUNTER:  Orders Placed This Encounter   Medications    sodium chloride 0.9 % bolus 1,000 mL    diphenhydrAMINE (BENADRYL) injection 50 mg    ondansetron (ZOFRAN) injection 4 mg    dexAMETHasone (PF) (DECADRON) injection 10 mg    ketorolac (TORADOL) injection 15 mg    sodium chloride 0.9 % bolus 100 mL    sodium chloride flush 0.9 % injection 10 mL    iopamidol (ISOVUE-370) 76 % injection 75 mL    butalbital-APAP-caffeine (FIORICET) -40 MG CAPS per capsule     Sig: Take 1 capsule by mouth every 6 hours as needed for Headaches     Dispense:  15 capsule     Refill:  0     DISCHARGE PRESCRIPTIONS:  New Prescriptions    BUTALBITAL-APAP-CAFFEINE (FIORICET) -40 MG CAPS PER CAPSULE    Take 1 capsule by mouth every 6 hours as needed for Headaches     PHYSICIAN CONSULTS ORDERED THIS ENCOUNTER:  None  FINAL IMPRESSION      1. Other migraine without status migrainosus, not intractable          DISPOSITION/PLAN   DISPOSITION Decision To Discharge 03/25/2024 11:05:43 PM      OUTPATIENT FOLLOW UP THE PATIENT:  Олег Goldsmith MD  [address] [place] [address]

## 2024-03-26 NOTE — TELEPHONE ENCOUNTER
Procedure: Bilateral Lumbar Medial Branch nerve Blocks at the transverse processes of L4, L5 and sacral  ala under fluoroscopy guidance   DOS: 010303  Pain level before procedure with activity 9.  Pain with activity after procedure 0.  What activities done the day of procedure out to lunch and grocery shopping, walking dog  What percentage of  pain relief from procedure did you receive 100  Success Y  OR Scheduled  4/17

## 2024-03-26 NOTE — TELEPHONE ENCOUNTER
Reminder:  Lumbar MBB lumbar RFA will only cope with diagnosis lumbar spondylosis without myelopathy

## 2024-04-01 ENCOUNTER — TELEPHONE (OUTPATIENT)
Dept: PHARMACY | Facility: CLINIC | Age: 59
End: 2024-04-01

## 2024-04-01 NOTE — TELEPHONE ENCOUNTER
[facility] CLINICAL PHARMACY: ADHERENCE REVIEW  Identified care gap per [employer]: fills at [facility] : Diabetes and Statin adherence      ASSESSMENT  DIABETES ADHERENCE    Insurance Records claims through 3/23/24 (Prior Year PDC = not reported; YTD PDC = 81%; Potential Fail Date: 24):   Trulicity 0.75mg last filled on 24 for 30 day supply. Next refill due: 3/11/24    Prescribed sig:  weekly    Per Reconcile Dispense History: same       Per [facility]:2/15/24 last sold then transferred to [facility]    Lab Results   Component Value Date    LABA1C 5.9 2023    LABA1C 6.4 (H) 2023    LABA1C 6.3 10/05/2018     NOTE: A1c not yet completed this calendar year    STATIN ADHERENCE    Insurance Records claims through 3/23/24 (Prior Year PDC = not reported; YTD PDC = 78%; Potential Fail Date: 24):   Rosuvastatin 20mg last filled on 24 for 30 day supply. Next refill due: 3/8/24    Prescribed si tablet/capsule daily    Per Reconcile Dispense History:same.    [facility]  not contacted    Lab Results   Component Value Date    CHOL 165 2023    TRIG 247 (H) 2023    HDL 48 2023    LDLCHOLESTEROL 68 2023     ALT   Date Value Ref Range Status   2024 40 (H) 5 - 33 U/L Final     AST   Date Value Ref Range Status   2024 29 <32 U/L Final     The ASCVD Risk score (Emden DK, et al., 2019) failed to calculate for the following reasons:    The patient has a prior MI or stroke diagnosis     PLAN  Per insurer report, LIS-1 - may be able to receive up to a 100-day supply for the same cost as a 30-day supply      The following are interventions that have been identified:   Patient OVERDUE refilling both and active on home medication list.     Letter sent to patient.    Last Visit: 10/30/23  Next Visit: none      Becca Payne CPhT.   [facility] Clinical   [facility]  Toll free: [phone] Option 2

## 2024-04-26 ENCOUNTER — TELEPHONE (OUTPATIENT)
Dept: PAIN MANAGEMENT | Age: 59
End: 2024-04-26

## 2024-04-26 NOTE — TELEPHONE ENCOUNTER
I attempted to return patient's call; VM full.  RE patient wants to confirm procedure date and time.

## 2024-05-03 LAB
ESTIMATED AVERAGE GLUCOSE: NORMAL
HBA1C MFR BLD: 5.7 %

## 2024-05-08 ENCOUNTER — HOSPITAL ENCOUNTER (OUTPATIENT)
Dept: PAIN MANAGEMENT | Facility: CLINIC | Age: 59
Discharge: HOME | End: 2024-05-08
Payer: MEDICARE

## 2024-05-08 VITALS
SYSTOLIC BLOOD PRESSURE: 146 MMHG | HEART RATE: 75 BPM | OXYGEN SATURATION: 96 % | DIASTOLIC BLOOD PRESSURE: 84 MMHG | RESPIRATION RATE: 12 BRPM | BODY MASS INDEX: 35.79 KG/M2 | TEMPERATURE: 98.6 F | WEIGHT: 202 LBS | HEIGHT: 63 IN

## 2024-05-08 DIAGNOSIS — R52 PAIN MANAGEMENT: ICD-10-CM

## 2024-05-08 DIAGNOSIS — M47.817 LUMBOSACRAL SPONDYLOSIS WITHOUT MYELOPATHY: Primary | ICD-10-CM

## 2024-05-08 LAB — GLUCOSE BLD-MCNC: 92 MG/DL (ref 65–105)

## 2024-05-08 PROCEDURE — 82947 ASSAY GLUCOSE BLOOD QUANT: CPT

## 2024-05-08 PROCEDURE — 2500000003 HC RX 250 WO HCPCS: Performed by: ANESTHESIOLOGY

## 2024-05-08 PROCEDURE — 64636 DESTROY L/S FACET JNT ADDL: CPT

## 2024-05-08 PROCEDURE — 64636 DESTROY L/S FACET JNT ADDL: CPT | Performed by: ANESTHESIOLOGY

## 2024-05-08 PROCEDURE — 6360000002 HC RX W HCPCS: Performed by: ANESTHESIOLOGY

## 2024-05-08 PROCEDURE — 99152 MOD SED SAME PHYS/QHP 5/>YRS: CPT | Performed by: ANESTHESIOLOGY

## 2024-05-08 PROCEDURE — 64635 DESTROY LUMB/SAC FACET JNT: CPT

## 2024-05-08 PROCEDURE — 64635 DESTROY LUMB/SAC FACET JNT: CPT | Performed by: ANESTHESIOLOGY

## 2024-05-08 RX ORDER — LIDOCAINE HYDROCHLORIDE 10 MG/ML
INJECTION, SOLUTION EPIDURAL; INFILTRATION; INTRACAUDAL; PERINEURAL
Status: COMPLETED | OUTPATIENT
Start: 2024-05-08 | End: 2024-05-08

## 2024-05-08 RX ORDER — MIDAZOLAM HYDROCHLORIDE 2 MG/2ML
INJECTION, SOLUTION INTRAMUSCULAR; INTRAVENOUS
Status: COMPLETED | OUTPATIENT
Start: 2024-05-08 | End: 2024-05-08

## 2024-05-08 RX ORDER — FENTANYL CITRATE 50 UG/ML
INJECTION, SOLUTION INTRAMUSCULAR; INTRAVENOUS
Status: COMPLETED | OUTPATIENT
Start: 2024-05-08 | End: 2024-05-08

## 2024-05-08 RX ORDER — LIDOCAINE HYDROCHLORIDE 40 MG/ML
INJECTION, SOLUTION RETROBULBAR
Status: COMPLETED | OUTPATIENT
Start: 2024-05-08 | End: 2024-05-08

## 2024-05-08 RX ADMIN — FENTANYL CITRATE 50 MCG: 50 INJECTION, SOLUTION INTRAMUSCULAR; INTRAVENOUS at 12:40

## 2024-05-08 RX ADMIN — LIDOCAINE HYDROCHLORIDE 10 ML: 10 INJECTION, SOLUTION EPIDURAL; INFILTRATION; INTRACAUDAL at 12:44

## 2024-05-08 RX ADMIN — LIDOCAINE HYDROCHLORIDE 5 ML: 40 SOLUTION RETROBULBAR; TOPICAL at 12:47

## 2024-05-08 RX ADMIN — MIDAZOLAM HYDROCHLORIDE 2 MG: 1 INJECTION, SOLUTION INTRAMUSCULAR; INTRAVENOUS at 12:40

## 2024-05-08 ASSESSMENT — PAIN DESCRIPTION - FREQUENCY: FREQUENCY: CONTINUOUS

## 2024-05-08 ASSESSMENT — PAIN DESCRIPTION - DESCRIPTORS: DESCRIPTORS: SHARP

## 2024-05-08 ASSESSMENT — PAIN DESCRIPTION - PAIN TYPE: TYPE: CHRONIC PAIN

## 2024-05-08 ASSESSMENT — PAIN SCALES - GENERAL: PAINLEVEL_OUTOF10: 9

## 2024-05-08 ASSESSMENT — PAIN - FUNCTIONAL ASSESSMENT
PAIN_FUNCTIONAL_ASSESSMENT: PREVENTS OR INTERFERES SOME ACTIVE ACTIVITIES AND ADLS
PAIN_FUNCTIONAL_ASSESSMENT: NONE - DENIES PAIN

## 2024-05-08 ASSESSMENT — PAIN DESCRIPTION - LOCATION: LOCATION: BACK

## 2024-05-08 ASSESSMENT — PAIN DESCRIPTION - ORIENTATION: ORIENTATION: MID

## 2024-05-08 NOTE — OP NOTE
Preoperative Diagnosis: Lumbar spondylosis w/o myelopathy, chronic low back pain  Postoperative Diagnosis: Lumbar spondylosis w/o myelopathy, chronic low back pain  SEDATION: SEE SEDATION NOTE  BLOOD LOSS: NONE    Procedure Performed:  :Radiofrequency ablation of median branches at the Transverse processes of L4, L5 and Sacral ala  for L4/5 and L5/S1 facet joints on Bilateral under fluoroscopic guidance with IV sedation    Procedure:    After starting an IV, the patient was taken to procedure room.  The patient was placed in prone position and skin over the back was prepped and draped in sterile manner.    Standard monitors were connected and vitals were monitored during the case and they remained stable during the procedure.    A meaningful communication was kept up with the patient throughout the procedure.    Then using fluoroscopy the junction of the transverse process of the target vertebra with the superior process of the facet joint was observed and the view was optimized.  The skin and deep tissues were infiltrated with 2 ml of  1 % lidocaine. The RF canula with the 10 mm active tip was introduced through the skin wheal under fluoroscopy guidance such that the tip of the needle lies in the groove of the transverse process with the superior processes of the facet joint.  Then a lateral and AP view of the lumbar spine was obtained to make sure the tip of needle is not in the neural foramen.  Then electric impedence was checked to make sure it is acceptable. Then a sensory stimulus was applied at 50 Hz up to 0.5 volt and concordant pain symptoms were reproduced. Then a motor stimulus was applied at 2 Hz up to 2 volts or 3 x times the sensory stimulus and no motor stimulation was seen in lower extremities.  Some multifidus stimulus was seen.  Then after negative aspiration 1 ml of 4% lidocaine was injected through the needle at each level.The radiofrequency lesion was done at 85 degrees centigrade for 110

## 2024-05-08 NOTE — DISCHARGE INSTRUCTIONS
Discharge Instructions following Sedation or Anesthesia:  You have  received  a sedative/anesthetic therefore, you should not consume any alcoholic beverages for minimum of 12 hours.  Do not drive or operate machinery for 24 hours.  Do not sign legal documents for 24 hours.  Dizziness, drowsiness, and unsteadiness may occur.  Rest when need to.  Increase diet as tolerated.  Keep up on fluids if diet allows.      General Instructions:  Do not take a tub bath for 72 hours after procedure (this includes hot tubs and swimming pools).  You may shower, but avoid hot water to injection site.   Avoid strenuous activity TODAY especially if you experience dizziness.   Remove band-aid the next day.  Wash off any residual iodine   Do not use heat, heating pad, or any other heating device over the injection site for 3 days after the procedure.  If you experience pain after your procedure, you may continue with your current pain medication as prescribed.  (DO NOT INCREASE YOUR PAIN MEDICATION WITHOUT TALKING TO DOCTOR)  Soreness and pain at injection site is common, may use ice to reduce soreness.    Call [facility] at [phone] if you experience:   Fever, chills or temperature over 100    Vomiting, Headache, persistent stiff neck, nausea, blurred vision   Difficulty in urinating or unable to urinate with 8 hours   Increase in weakness, numbness or loss of function   Increased redness, swelling or drainage at the injection site

## 2024-05-08 NOTE — H&P
Alcohol/week: 0.0 standard drinks of alcohol     Comment: OCCASSIONAL       Review of Systems:   Focused review of systems was performed, and negative as pertinent to diagnosis, except as stated in HPI.      Physical Exam  Constitutional:       Appearance: Normal appearance.   Pulmonary:      Effort: Pulmonary effort is normal.   Neurological:      Mental Status: alert.   Psychiatric:         Attention and Perception: Attention and perception normal.         Mood and Affect: Mood and affect normal.   Cardiovascular:      Rate: Normal rate.         ASA: 3          Mallampati: 3       Patient's current physical status, medications, medical history, and HPI have been reviewed and updated as appropriate on this date: 05/08/24    Risk/Benefit(s): The risks, benefits, alternatives, and potential complications have been discussed with the patient/family and informed consent has been obtained for the procedure/sedation.    Diagnosis:   1. Lumbosacral spondylosis without myelopathy              Plan:   Bilateral lumbar RFA at L4/5 and L5/S1      Han Anders MD

## 2024-05-10 ENCOUNTER — TELEPHONE (OUTPATIENT)
Dept: PHARMACY | Facility: CLINIC | Age: 59
End: 2024-05-10

## 2024-05-10 NOTE — TELEPHONE ENCOUNTER
since then.Non- [facility] PCP listed.    Last Visit: none  Next Visit: none        Betty Bishop CPhT  Population Health Clinical   [facility]  Toll Free: [phone] Option 1    For Pharmacy Admin Tracking Only    Program: Ready  CPA in place:  No  Recommendation Provided To: Patient/Caregiver: 1 via [employer] Message  Gap Closed?: No   Time Spent (min): 15

## 2024-06-20 NOTE — TELEPHONE ENCOUNTER
A (GUIDEWIRE GLDWR 3CM 260CM .035IN VASC NTNL TUNG PU) guidewire was introduced. Patient called to set up appt due to injection given on 01/15 with Dr Jansen did not work for her left shoulder.  Appt is set up to see him again 02/05, pt is requesting [place] to come in to help her shower if all possible, please advise and call pt at [phone], thank you    Added:  pt would like something for her pain as the tylenol is not helping- thank you

## 2024-07-11 ENCOUNTER — TELEPHONE (OUTPATIENT)
Dept: PHARMACY | Facility: CLINIC | Age: 59
End: 2024-07-11

## 2024-07-11 NOTE — TELEPHONE ENCOUNTER
[facility] CLINICAL PHARMACY: ADHERENCE REVIEW  Identified care gap per [employer]: fills at Non-preferred pharmacy: simple meds: Diabetes and Statin adherence    ASSESSMENT  DIABETES ADHERENCE    Insurance Records claims through 6/21/24 (Prior Year PDC = 100% - PASSED ; YTD PDC = 67%; Potential Fail Date: 07.18.24):   TRULICITY INJ 0.75/0.5  last filled on 05.20.24 for 28 day supply. Next refill due: 06.17.24    Prescribed sig:  inject weekly    Per Insurer Portal: last filled on 05.20.24 for 28 day supply.         Lab Results   Component Value Date    LABA1C 5.9 12/19/2023    LABA1C 6.4 (H) 08/29/2023    LABA1C 6.3 10/05/2018     NOTE: A1c not yet completed this calendar year    PLAN  Per insurer report, LIS-1 - may be able to receive up to a 100-day supply for the same cost as a 30-day supply.        The following are interventions that have been identified:   Patient OVERDUE refilling Trulicity and active on home medication list.     Attempting to reach patient to review - unable to leave message. Microtask message sent to patient.    NON-[facility] PCP    Last Visit: 10.30.23  Next Visit: none        Betty Bishop CPhT  [facility] Clinical   [facility]  Toll Free: [phone] Option 1    For Pharmacy Admin Tracking Only    Program: [employer]  CPA in place:  No  Gap Closed?: No   Time Spent (min): 15

## 2024-07-24 ENCOUNTER — OFFICE VISIT (OUTPATIENT)
Dept: ORTHOPEDIC SURGERY | Age: 59
End: 2024-07-24
Payer: MEDICARE

## 2024-07-24 DIAGNOSIS — Z96.651 S/P TKR (TOTAL KNEE REPLACEMENT), RIGHT: Primary | ICD-10-CM

## 2024-07-24 DIAGNOSIS — M25.561 CHRONIC KNEE PAIN AFTER TOTAL REPLACEMENT OF RIGHT KNEE JOINT: ICD-10-CM

## 2024-07-24 DIAGNOSIS — Z96.651 CHRONIC KNEE PAIN AFTER TOTAL REPLACEMENT OF RIGHT KNEE JOINT: ICD-10-CM

## 2024-07-24 DIAGNOSIS — G89.29 CHRONIC KNEE PAIN AFTER TOTAL REPLACEMENT OF RIGHT KNEE JOINT: ICD-10-CM

## 2024-07-24 PROCEDURE — 99213 OFFICE O/P EST LOW 20 MIN: CPT | Performed by: PHYSICIAN ASSISTANT

## 2024-07-24 RX ORDER — TIZANIDINE 4 MG/1
4 TABLET ORAL EVERY 8 HOURS PRN
Qty: 30 TABLET | Refills: 0 | Status: SHIPPED | OUTPATIENT
Start: 2024-07-24

## 2024-07-24 NOTE — PROGRESS NOTES
[facility]                   Ezekiel Chin PA-C            [address], [address]               [place], [place] [address]           Dept Phone: [phone]           Dept Fax:  [phone] [address]                       [address]           [place], [place] [address]          Dept Phone: [phone]           Dept Fax:  [phone]      Chief Compliant:  Chief Complaint   Patient presents with    Knee Pain     F/U: Rt knee        History of Present Illness:  Vesna returns today.  This is a 59 y.o. female who presents to the clinic today for follow up of right knee pain following right total knee arthroplasty.  Right TKA was done on 6/14/2022 with subsequent manipulation under anesthesia on 8/29/2022.  Unfortunately patient has continued to have significantly limited range of motion and chronic pain.  Infection was seen by Dr. Ramirez in January 2024  a CT of the right knee to rule out periprosthetic loosening was ordered but never followed through with due to other illness.    Patient reports last week pain Was so severe she was unable to tolerate weightbearing so was admitted to the [facility] secondary to the pain.  During this she had x-rays that are negative for any acute fracture or obvious loosening she also had infection workup that was overall negative at that time    Review of Systems   Constitutional: Negative for fever, chills, sweats, recent illness, or recent injury.   Neurological: Negative for headaches, numbness, or weakness.   Integumentary: Negative for rash, itching, ecchymosis, abrasions, or laceration.   Musculoskeletal: Positive for Knee Pain (F/U: Rt knee)       Physical Exam:  Constitutional: Patient is oriented to person, place, and time. Patient appears well-developed and well nourished.   Musculoskeletal:    Right Knee    Gait:  Antalgic with walker.   Incision:  {Well healed without any incisional erythema.

## 2024-08-04 ENCOUNTER — APPOINTMENT (OUTPATIENT)
Age: 59
DRG: 956 | End: 2024-08-04
Payer: MEDICARE

## 2024-08-04 ENCOUNTER — APPOINTMENT (OUTPATIENT)
Dept: GENERAL RADIOLOGY | Age: 59
DRG: 956 | End: 2024-08-04
Payer: MEDICARE

## 2024-08-04 ENCOUNTER — APPOINTMENT (OUTPATIENT)
Dept: CT IMAGING | Age: 59
DRG: 956 | End: 2024-08-04
Payer: MEDICARE

## 2024-08-04 ENCOUNTER — HOSPITAL ENCOUNTER (INPATIENT)
Age: 59
LOS: 16 days | Discharge: NURSING, REHAB OR LONG-TERM CARE | DRG: 956 | End: 2024-08-20
Attending: EMERGENCY MEDICINE | Admitting: SURGERY
Payer: MEDICARE
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
## 2024-08-04 DIAGNOSIS — S01.81XA FACIAL LACERATION, INITIAL ENCOUNTER: ICD-10-CM

## 2024-08-04 DIAGNOSIS — S29.8XXA BLUNT TRAUMA TO CHEST, INITIAL ENCOUNTER: Primary | ICD-10-CM

## 2024-08-04 DIAGNOSIS — S22.43XA CLOSED FRACTURE OF MULTIPLE RIBS OF BOTH SIDES, INITIAL ENCOUNTER: ICD-10-CM

## 2024-08-04 DIAGNOSIS — S32.401A CLOSED NONDISPLACED FRACTURE OF RIGHT ACETABULUM, UNSPECIFIED PORTION OF ACETABULUM, INITIAL ENCOUNTER (HCC): ICD-10-CM

## 2024-08-04 DIAGNOSIS — S26.91XA CONTUSION OF HEART, INITIAL ENCOUNTER: ICD-10-CM

## 2024-08-04 DIAGNOSIS — S42.001A CLOSED NONDISPLACED FRACTURE OF RIGHT CLAVICLE, UNSPECIFIED PART OF CLAVICLE, INITIAL ENCOUNTER: ICD-10-CM

## 2024-08-04 DIAGNOSIS — S43.151A: ICD-10-CM

## 2024-08-04 DIAGNOSIS — H05.231 PERIORBITAL HEMATOMA OF RIGHT EYE: ICD-10-CM

## 2024-08-04 DIAGNOSIS — S22.20XA CLOSED FRACTURE OF STERNUM, UNSPECIFIED PORTION OF STERNUM, INITIAL ENCOUNTER: ICD-10-CM
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
## 2024-08-04 LAB
25(OH)D3 SERPL-MCNC: 17.6 NG/ML (ref 30–100)
ANION GAP SERPL CALCULATED.3IONS-SCNC: 11 MMOL/L (ref 9–16)
ANION GAP SERPL CALCULATED.3IONS-SCNC: 12 MMOL/L (ref 9–16)
BASOPHILS # BLD: 0.05 K/UL (ref 0–0.2)
BASOPHILS NFR BLD: 0 % (ref 0–2)
BODY TEMPERATURE: 37
BUN SERPL-MCNC: 10 MG/DL (ref 6–20)
BUN SERPL-MCNC: 12 MG/DL (ref 6–20)
CALCIUM SERPL-MCNC: 8.2 MG/DL (ref 8.6–10.4)
CHLORIDE SERPL-SCNC: 103 MMOL/L (ref 98–107)
CHLORIDE SERPL-SCNC: 104 MMOL/L (ref 98–107)
CO2 SERPL-SCNC: 24 MMOL/L (ref 20–31)
CO2 SERPL-SCNC: 25 MMOL/L (ref 20–31)
COHGB MFR BLD: 1.6 % (ref 0–5)
CREAT SERPL-MCNC: 0.9 MG/DL (ref 0.5–0.9)
CREAT SERPL-MCNC: 1 MG/DL (ref 0.5–0.9)
ECHO AO ROOT DIAM: 2.7 CM
ECHO AO ROOT INDEX: 1.39 CM/M2
ECHO AV AREA PEAK VELOCITY: 2.4 CM2
ECHO AV AREA VTI: 2.6 CM2
ECHO AV AREA/BSA PEAK VELOCITY: 1.2 CM2/M2
ECHO AV AREA/BSA VTI: 1.3 CM2/M2
ECHO AV MEAN GRADIENT: 2 MMHG
ECHO AV MEAN VELOCITY: 0.6 M/S
ECHO AV PEAK GRADIENT: 4 MMHG
ECHO AV PEAK VELOCITY: 1 M/S
ECHO AV VELOCITY RATIO: 0.8
ECHO AV VTI: 16.9 CM
ECHO BSA: 2.02 M2
ECHO EST RA PRESSURE: 3 MMHG
ECHO LA AREA 2C: 6.1 CM2
ECHO LA AREA 4C: 7.9 CM2
ECHO LA DIAMETER INDEX: 1.34 CM/M2
ECHO LA DIAMETER: 2.6 CM
ECHO LA MAJOR AXIS: 3.5 CM
ECHO LA MINOR AXIS: 3 CM
ECHO LA TO AORTIC ROOT RATIO: 0.96
ECHO LA VOL BP: 12 ML (ref 22–52)
ECHO LA VOL MOD A2C: 11 ML (ref 22–52)
ECHO LA VOL MOD A4C: 12 ML (ref 22–52)
ECHO LA VOL/BSA BIPLANE: 6 ML/M2 (ref 16–34)
ECHO LA VOLUME INDEX MOD A2C: 6 ML/M2 (ref 16–34)
ECHO LA VOLUME INDEX MOD A4C: 6 ML/M2 (ref 16–34)
ECHO LV E' LATERAL VELOCITY: 5 CM/S
ECHO LV E' SEPTAL VELOCITY: 5 CM/S
ECHO LV FRACTIONAL SHORTENING: 34 % (ref 28–44)
ECHO LV INTERNAL DIMENSION DIASTOLE INDEX: 1.65 CM/M2
ECHO LV INTERNAL DIMENSION DIASTOLIC: 3.2 CM (ref 3.9–5.3)
ECHO LV INTERNAL DIMENSION SYSTOLIC INDEX: 1.08 CM/M2
ECHO LV INTERNAL DIMENSION SYSTOLIC: 2.1 CM
ECHO LV IVSD: 0.8 CM (ref 0.6–0.9)
ECHO LV MASS 2D: 83.7 G (ref 67–162)
ECHO LV MASS INDEX 2D: 43.1 G/M2 (ref 43–95)
ECHO LV POSTERIOR WALL DIASTOLIC: 1.1 CM (ref 0.6–0.9)
ECHO LV RELATIVE WALL THICKNESS RATIO: 0.69
ECHO LVOT AREA: 3.1 CM2
ECHO LVOT AV VTI INDEX: 0.83
ECHO LVOT DIAM: 2 CM
ECHO LVOT MEAN GRADIENT: 1 MMHG
ECHO LVOT PEAK GRADIENT: 2 MMHG
ECHO LVOT PEAK VELOCITY: 0.8 M/S
ECHO LVOT STROKE VOLUME INDEX: 22.7 ML/M2
ECHO LVOT SV: 44 ML
ECHO LVOT VTI: 14 CM
ECHO MV A VELOCITY: 0.46 M/S
ECHO MV AREA VTI: 2.4 CM2
ECHO MV E DECELERATION TIME (DT): 203 MS
ECHO MV E VELOCITY: 0.6 M/S
ECHO MV E/A RATIO: 1.3
ECHO MV E/E' LATERAL: 12
ECHO MV E/E' RATIO (AVERAGED): 12
ECHO MV E/E' SEPTAL: 12
ECHO MV LVOT VTI INDEX: 1.33
ECHO MV MAX VELOCITY: 0.7 M/S
ECHO MV MEAN GRADIENT: 1 MMHG
ECHO MV MEAN VELOCITY: 0.5 M/S
ECHO MV PEAK GRADIENT: 2 MMHG
ECHO MV VTI: 18.6 CM
ECHO PV MAX VELOCITY: 0.9 M/S
ECHO PV PEAK GRADIENT: 3 MMHG
ECHO RIGHT VENTRICULAR SYSTOLIC PRESSURE (RVSP): 16 MMHG
ECHO RV BASAL DIMENSION: 3.4 CM
ECHO RV FREE WALL PEAK S': 10 CM/S
ECHO TV REGURGITANT MAX VELOCITY: 1.81 M/S
ECHO TV REGURGITANT PEAK GRADIENT: 13 MMHG
EOSINOPHIL # BLD: <0.03 K/UL (ref 0–0.44)
EOSINOPHILS RELATIVE PERCENT: 0 % (ref 1–4)
ERYTHROCYTE [DISTWIDTH] IN BLOOD BY AUTOMATED COUNT: 13 % (ref 11.8–14.4)
ERYTHROCYTE [DISTWIDTH] IN BLOOD BY AUTOMATED COUNT: 13.2 % (ref 11.8–14.4)
ETHANOL PERCENT: <0.01 %
ETHANOLAMINE SERPL-MCNC: <10 MG/DL (ref 0–0.08)
FIBRINOGEN, FUNCTIONAL TEG: 31.9 MM (ref 15–32)
FIBRINOGEN, FUNCTIONAL TEG: 35 MM (ref 15–32)
FIO2 ON VENT: ABNORMAL %
GFR, ESTIMATED: 41 ML/MIN/1.73M2
GFR, ESTIMATED: 72 ML/MIN/1.73M2
GLUCOSE SERPL-MCNC: 150 MG/DL (ref 74–99)
GLUCOSE SERPL-MCNC: 155 MG/DL (ref 74–99)
HCG SERPL QL: NEGATIVE
HCO3 VENOUS: 27 MMOL/L (ref 24–30)
HCT VFR BLD AUTO: 38.1 % (ref 36.3–47.1)
HCT VFR BLD AUTO: 40.9 % (ref 36.3–47.1)
HGB BLD-MCNC: 12.5 G/DL (ref 11.9–15.1)
HGB BLD-MCNC: 12.9 G/DL (ref 11.9–15.1)
IMM GRANULOCYTES # BLD AUTO: 0.1 K/UL (ref 0–0.3)
IMM GRANULOCYTES NFR BLD: 1 %
INR PPP: 1.8
INR PPP: 2
LY30 (LYSIS) TEG: 0 % (ref 0–2.6)
LY30 (LYSIS) TEG: 0 % (ref 0–2.6)
LYMPHOCYTES NFR BLD: 1.76 K/UL (ref 1.1–3.7)
LYMPHOCYTES RELATIVE PERCENT: 11 % (ref 24–43)
MA(MAX CLOT) RAPID TEG: 69.6 MM (ref 52–70)
MA(MAX CLOT) RAPID TEG: 70.1 MM (ref 52–70)
MCH RBC QN AUTO: 28.9 PG (ref 25.2–33.5)
MCH RBC QN AUTO: 29.3 PG (ref 25.2–33.5)
MCHC RBC AUTO-ENTMCNC: 31.5 G/DL (ref 28.4–34.8)
MCHC RBC AUTO-ENTMCNC: 32.8 G/DL (ref 28.4–34.8)
MCV RBC AUTO: 89.2 FL (ref 82.6–102.9)
MCV RBC AUTO: 91.7 FL (ref 82.6–102.9)
MONOCYTES NFR BLD: 1.26 K/UL (ref 0.1–1.2)
MONOCYTES NFR BLD: 8 % (ref 3–12)
MYOGLOBIN SERPL-MCNC: 1028 NG/ML (ref 25–58)
NEUTROPHILS NFR BLD: 81 % (ref 36–65)
NEUTS SEG NFR BLD: 13.4 K/UL (ref 1.5–8.1)
NRBC BLD-RTO: 0 PER 100 WBC
NRBC BLD-RTO: 0 PER 100 WBC
O2 SAT, VEN: 50.1 % (ref 60–85)
PARTIAL THROMBOPLASTIN TIME: 27.4 SEC (ref 23–36.5)
PCO2 VENOUS: 54.3 MM HG (ref 39–55)
PERFORMING LOCATION: ABNORMAL
PH VENOUS: 7.32 (ref 7.32–7.42)
PLATELET # BLD AUTO: 254 K/UL (ref 138–453)
PLATELET # BLD AUTO: 311 K/UL (ref 138–453)
PMV BLD AUTO: 10 FL (ref 8.1–13.5)
PMV BLD AUTO: 9.9 FL (ref 8.1–13.5)
PO2 VENOUS: 30.2 MM HG (ref 30–50)
POSITIVE BASE EXCESS, VEN: 0.4 MMOL/L (ref 0–2)
POTASSIUM SERPL-SCNC: 3.7 MMOL/L (ref 3.7–5.3)
POTASSIUM SERPL-SCNC: 4.5 MMOL/L (ref 3.7–5.3)
PROTHROMBIN TIME: 20.2 SEC (ref 11.7–14.9)
PROTHROMBIN TIME: 21.9 SEC (ref 11.7–14.9)
RBC # BLD AUTO: 4.27 M/UL (ref 3.95–5.11)
RBC # BLD AUTO: 4.46 M/UL (ref 3.95–5.11)
REACTION TIME TEG: 4.5 MIN (ref 4.6–9.1)
REACTION TIME TEG: 5 MIN (ref 4.6–9.1)
SODIUM SERPL-SCNC: 139 MMOL/L (ref 136–145)
SODIUM SERPL-SCNC: 140 MMOL/L (ref 136–145)
TROPONIN I SERPL HS-MCNC: 142 NG/L (ref 0–14)
TROPONIN I SERPL HS-MCNC: 143 NG/L (ref 0–14)
TROPONIN I SERPL HS-MCNC: 156 NG/L (ref 0–14)
WBC OTHER # BLD: 10.7 K/UL (ref 3.5–11.3)
WBC OTHER # BLD: 16.6 K/UL (ref 3.5–11.3)

## 2024-08-04 PROCEDURE — 80051 ELECTROLYTE PANEL: CPT

## 2024-08-04 PROCEDURE — G0480 DRUG TEST DEF 1-7 CLASSES: HCPCS

## 2024-08-04 PROCEDURE — 86920 COMPATIBILITY TEST SPIN: CPT

## 2024-08-04 PROCEDURE — 73502 X-RAY EXAM HIP UNI 2-3 VIEWS: CPT

## 2024-08-04 PROCEDURE — 6360000004 HC RX CONTRAST MEDICATION

## 2024-08-04 PROCEDURE — 76376 3D RENDER W/INTRP POSTPROCES: CPT

## 2024-08-04 PROCEDURE — 6370000000 HC RX 637 (ALT 250 FOR IP)

## 2024-08-04 PROCEDURE — 85027 COMPLETE CBC AUTOMATED: CPT

## 2024-08-04 PROCEDURE — 86901 BLOOD TYPING SEROLOGIC RH(D): CPT

## 2024-08-04 PROCEDURE — 85610 PROTHROMBIN TIME: CPT

## 2024-08-04 PROCEDURE — 93306 TTE W/DOPPLER COMPLETE: CPT

## 2024-08-04 PROCEDURE — 2700000000 HC OXYGEN THERAPY PER DAY

## 2024-08-04 PROCEDURE — 85347 COAGULATION TIME ACTIVATED: CPT

## 2024-08-04 PROCEDURE — 2580000003 HC RX 258

## 2024-08-04 PROCEDURE — 2000000000 HC ICU R&B

## 2024-08-04 PROCEDURE — 85576 BLOOD PLATELET AGGREGATION: CPT

## 2024-08-04 PROCEDURE — 73562 X-RAY EXAM OF KNEE 3: CPT

## 2024-08-04 PROCEDURE — 0HQ0XZZ REPAIR SCALP SKIN, EXTERNAL APPROACH: ICD-10-PCS | Performed by: EMERGENCY MEDICINE

## 2024-08-04 PROCEDURE — 6810039001 HC L1 TRAUMA PRIORITY

## 2024-08-04 PROCEDURE — 83874 ASSAY OF MYOGLOBIN: CPT

## 2024-08-04 PROCEDURE — 72190 X-RAY EXAM OF PELVIS: CPT

## 2024-08-04 PROCEDURE — 6360000002 HC RX W HCPCS: Performed by: EMERGENCY MEDICINE

## 2024-08-04 PROCEDURE — 94640 AIRWAY INHALATION TREATMENT: CPT

## 2024-08-04 PROCEDURE — 85384 FIBRINOGEN ACTIVITY: CPT

## 2024-08-04 PROCEDURE — 74177 CT ABD & PELVIS W/CONTRAST: CPT

## 2024-08-04 PROCEDURE — 85390 FIBRINOLYSINS SCREEN I&R: CPT

## 2024-08-04 PROCEDURE — 99285 EMERGENCY DEPT VISIT HI MDM: CPT

## 2024-08-04 PROCEDURE — 73000 X-RAY EXAM OF COLLAR BONE: CPT

## 2024-08-04 PROCEDURE — 85730 THROMBOPLASTIN TIME PARTIAL: CPT

## 2024-08-04 PROCEDURE — 99223 1ST HOSP IP/OBS HIGH 75: CPT | Performed by: STUDENT IN AN ORGANIZED HEALTH CARE EDUCATION/TRAINING PROGRAM

## 2024-08-04 PROCEDURE — 82306 VITAMIN D 25 HYDROXY: CPT

## 2024-08-04 PROCEDURE — 96374 THER/PROPH/DIAG INJ IV PUSH: CPT

## 2024-08-04 PROCEDURE — 6360000002 HC RX W HCPCS: Performed by: SURGERY

## 2024-08-04 PROCEDURE — 82947 ASSAY GLUCOSE BLOOD QUANT: CPT

## 2024-08-04 PROCEDURE — 86900 BLOOD TYPING SEROLOGIC ABO: CPT

## 2024-08-04 PROCEDURE — 82565 ASSAY OF CREATININE: CPT

## 2024-08-04 PROCEDURE — 73552 X-RAY EXAM OF FEMUR 2/>: CPT

## 2024-08-04 PROCEDURE — 0SS9XZZ REPOSITION RIGHT HIP JOINT, EXTERNAL APPROACH: ICD-10-PCS | Performed by: STUDENT IN AN ORGANIZED HEALTH CARE EDUCATION/TRAINING PROGRAM

## 2024-08-04 PROCEDURE — 84484 ASSAY OF TROPONIN QUANT: CPT

## 2024-08-04 PROCEDURE — 72125 CT NECK SPINE W/O DYE: CPT

## 2024-08-04 PROCEDURE — 94761 N-INVAS EAR/PLS OXIMETRY MLT: CPT

## 2024-08-04 PROCEDURE — 93005 ELECTROCARDIOGRAM TRACING: CPT | Performed by: EMERGENCY MEDICINE

## 2024-08-04 PROCEDURE — 84520 ASSAY OF UREA NITROGEN: CPT

## 2024-08-04 PROCEDURE — 6360000002 HC RX W HCPCS

## 2024-08-04 PROCEDURE — 93306 TTE W/DOPPLER COMPLETE: CPT | Performed by: INTERNAL MEDICINE

## 2024-08-04 PROCEDURE — 99222 1ST HOSP IP/OBS MODERATE 55: CPT | Performed by: SURGERY

## 2024-08-04 PROCEDURE — 86850 RBC ANTIBODY SCREEN: CPT

## 2024-08-04 PROCEDURE — 85025 COMPLETE CBC W/AUTO DIFF WBC: CPT

## 2024-08-04 PROCEDURE — 2W6LX0Z TRACTION OF RIGHT LOWER EXTREMITY USING TRACTION APPARATUS: ICD-10-PCS | Performed by: STUDENT IN AN ORGANIZED HEALTH CARE EDUCATION/TRAINING PROGRAM

## 2024-08-04 PROCEDURE — 70450 CT HEAD/BRAIN W/O DYE: CPT

## 2024-08-04 PROCEDURE — 36415 COLL VENOUS BLD VENIPUNCTURE: CPT

## 2024-08-04 PROCEDURE — 70486 CT MAXILLOFACIAL W/O DYE: CPT

## 2024-08-04 PROCEDURE — 80048 BASIC METABOLIC PNL TOTAL CA: CPT

## 2024-08-04 PROCEDURE — 73560 X-RAY EXAM OF KNEE 1 OR 2: CPT

## 2024-08-04 PROCEDURE — 72192 CT PELVIS W/O DYE: CPT

## 2024-08-04 PROCEDURE — 27266 TREAT HIP DISLOCATION: CPT

## 2024-08-04 PROCEDURE — 82805 BLOOD GASES W/O2 SATURATION: CPT

## 2024-08-04 PROCEDURE — 84703 CHORIONIC GONADOTROPIN ASSAY: CPT

## 2024-08-04 RX ORDER — FENTANYL CITRATE 50 UG/ML
100 INJECTION, SOLUTION INTRAMUSCULAR; INTRAVENOUS ONCE
Status: COMPLETED | OUTPATIENT
Start: 2024-08-04 | End: 2024-08-04

## 2024-08-04 RX ORDER — ACETAMINOPHEN 500 MG
1000 TABLET ORAL EVERY 8 HOURS SCHEDULED
Status: DISCONTINUED | OUTPATIENT
Start: 2024-08-04 | End: 2024-08-20 | Stop reason: HOSPADM

## 2024-08-04 RX ORDER — SODIUM CHLORIDE 0.9 % (FLUSH) 0.9 %
5-40 SYRINGE (ML) INJECTION EVERY 12 HOURS SCHEDULED
Status: DISCONTINUED | OUTPATIENT
Start: 2024-08-04 | End: 2024-08-16

## 2024-08-04 RX ORDER — LEVETIRACETAM 100 MG/ML
300 SOLUTION ORAL 2 TIMES DAILY
Status: DISCONTINUED | OUTPATIENT
Start: 2024-08-04 | End: 2024-08-05

## 2024-08-04 RX ORDER — ONDANSETRON 2 MG/ML
4 INJECTION INTRAMUSCULAR; INTRAVENOUS EVERY 6 HOURS PRN
Status: DISCONTINUED | OUTPATIENT
Start: 2024-08-04 | End: 2024-08-16

## 2024-08-04 RX ORDER — IPRATROPIUM BROMIDE AND ALBUTEROL SULFATE 2.5; .5 MG/3ML; MG/3ML
1 SOLUTION RESPIRATORY (INHALATION)
Status: DISCONTINUED | OUTPATIENT
Start: 2024-08-04 | End: 2024-08-06

## 2024-08-04 RX ORDER — SENNA AND DOCUSATE SODIUM 50; 8.6 MG/1; MG/1
1 TABLET, FILM COATED ORAL 2 TIMES DAILY
Status: DISCONTINUED | OUTPATIENT
Start: 2024-08-04 | End: 2024-08-20 | Stop reason: HOSPADM

## 2024-08-04 RX ORDER — OXYCODONE HYDROCHLORIDE 5 MG/1
5 TABLET ORAL EVERY 4 HOURS PRN
Status: DISCONTINUED | OUTPATIENT
Start: 2024-08-04 | End: 2024-08-09

## 2024-08-04 RX ORDER — SODIUM CHLORIDE 9 MG/ML
INJECTION, SOLUTION INTRAVENOUS PRN
Status: DISCONTINUED | OUTPATIENT
Start: 2024-08-04 | End: 2024-08-11

## 2024-08-04 RX ORDER — ONDANSETRON 2 MG/ML
4 INJECTION INTRAMUSCULAR; INTRAVENOUS EVERY 6 HOURS PRN
Status: DISCONTINUED | OUTPATIENT
Start: 2024-08-04 | End: 2024-08-04 | Stop reason: SDUPTHER

## 2024-08-04 RX ORDER — LIDOCAINE HYDROCHLORIDE 10 MG/ML
20 INJECTION, SOLUTION INFILTRATION; PERINEURAL ONCE
Status: DISCONTINUED | OUTPATIENT
Start: 2024-08-04 | End: 2024-08-06

## 2024-08-04 RX ORDER — POLYETHYLENE GLYCOL 3350 17 G/17G
17 POWDER, FOR SOLUTION ORAL DAILY
Status: DISCONTINUED | OUTPATIENT
Start: 2024-08-04 | End: 2024-08-16

## 2024-08-04 RX ORDER — FENTANYL CITRATE 50 UG/ML
25 INJECTION, SOLUTION INTRAMUSCULAR; INTRAVENOUS
Status: DISCONTINUED | OUTPATIENT
Start: 2024-08-04 | End: 2024-08-05

## 2024-08-04 RX ORDER — FENTANYL CITRATE 50 UG/ML
50 INJECTION, SOLUTION INTRAMUSCULAR; INTRAVENOUS ONCE
Status: DISCONTINUED | OUTPATIENT
Start: 2024-08-04 | End: 2024-08-05

## 2024-08-04 RX ORDER — METHOCARBAMOL 500 MG/1
500 TABLET, FILM COATED ORAL EVERY 6 HOURS
Status: DISCONTINUED | OUTPATIENT
Start: 2024-08-04 | End: 2024-08-04

## 2024-08-04 RX ORDER — ENOXAPARIN SODIUM 100 MG/ML
30 INJECTION SUBCUTANEOUS 2 TIMES DAILY
Status: DISCONTINUED | OUTPATIENT
Start: 2024-08-04 | End: 2024-08-05

## 2024-08-04 RX ORDER — FENTANYL CITRATE 50 UG/ML
100 INJECTION, SOLUTION INTRAMUSCULAR; INTRAVENOUS ONCE
Status: DISCONTINUED | OUTPATIENT
Start: 2024-08-04 | End: 2024-08-05

## 2024-08-04 RX ORDER — METHOCARBAMOL 750 MG/1
750 TABLET, FILM COATED ORAL EVERY 6 HOURS
Status: DISCONTINUED | OUTPATIENT
Start: 2024-08-04 | End: 2024-08-13

## 2024-08-04 RX ORDER — GABAPENTIN 300 MG/1
300 CAPSULE ORAL EVERY 8 HOURS
Status: DISCONTINUED | OUTPATIENT
Start: 2024-08-04 | End: 2024-08-11

## 2024-08-04 RX ORDER — FENTANYL CITRATE 50 UG/ML
INJECTION, SOLUTION INTRAMUSCULAR; INTRAVENOUS
Status: DISPENSED
Start: 2024-08-04 | End: 2024-08-05

## 2024-08-04 RX ORDER — SODIUM CHLORIDE, SODIUM LACTATE, POTASSIUM CHLORIDE, CALCIUM CHLORIDE 600; 310; 30; 20 MG/100ML; MG/100ML; MG/100ML; MG/100ML
INJECTION, SOLUTION INTRAVENOUS CONTINUOUS
Status: DISCONTINUED | OUTPATIENT
Start: 2024-08-04 | End: 2024-08-05

## 2024-08-04 RX ORDER — PROPOFOL 10 MG/ML
200 INJECTION, EMULSION INTRAVENOUS CONTINUOUS
Status: DISCONTINUED | OUTPATIENT
Start: 2024-08-04 | End: 2024-08-04

## 2024-08-04 RX ORDER — FENTANYL CITRATE 50 UG/ML
50 INJECTION, SOLUTION INTRAMUSCULAR; INTRAVENOUS ONCE
Status: COMPLETED | OUTPATIENT
Start: 2024-08-04 | End: 2024-08-04

## 2024-08-04 RX ORDER — SODIUM CHLORIDE 0.9 % (FLUSH) 0.9 %
5-40 SYRINGE (ML) INJECTION PRN
Status: DISCONTINUED | OUTPATIENT
Start: 2024-08-04 | End: 2024-08-20 | Stop reason: HOSPADM

## 2024-08-04 RX ORDER — ONDANSETRON 4 MG/1
4 TABLET, ORALLY DISINTEGRATING ORAL EVERY 8 HOURS PRN
Status: DISCONTINUED | OUTPATIENT
Start: 2024-08-04 | End: 2024-08-20 | Stop reason: HOSPADM

## 2024-08-04 RX ADMIN — IOPAMIDOL 130 ML: 755 INJECTION, SOLUTION INTRAVENOUS at 12:15

## 2024-08-04 RX ADMIN — SODIUM CHLORIDE, POTASSIUM CHLORIDE, SODIUM LACTATE AND CALCIUM CHLORIDE: 600; 310; 30; 20 INJECTION, SOLUTION INTRAVENOUS at 15:08

## 2024-08-04 RX ADMIN — SODIUM CHLORIDE, POTASSIUM CHLORIDE, SODIUM LACTATE AND CALCIUM CHLORIDE: 600; 310; 30; 20 INJECTION, SOLUTION INTRAVENOUS at 23:50

## 2024-08-04 RX ADMIN — METHOCARBAMOL 500 MG: 500 TABLET ORAL at 17:44

## 2024-08-04 RX ADMIN — ENOXAPARIN SODIUM 30 MG: 100 INJECTION SUBCUTANEOUS at 21:36

## 2024-08-04 RX ADMIN — IPRATROPIUM BROMIDE AND ALBUTEROL SULFATE 1 DOSE: .5; 2.5 SOLUTION RESPIRATORY (INHALATION) at 15:23

## 2024-08-04 RX ADMIN — SENNOSIDES AND DOCUSATE SODIUM 1 TABLET: 50; 8.6 TABLET ORAL at 21:36

## 2024-08-04 RX ADMIN — ONDANSETRON 4 MG: 2 INJECTION INTRAMUSCULAR; INTRAVENOUS at 19:54

## 2024-08-04 RX ADMIN — IPRATROPIUM BROMIDE AND ALBUTEROL SULFATE 1 DOSE: .5; 2.5 SOLUTION RESPIRATORY (INHALATION) at 20:06

## 2024-08-04 RX ADMIN — METHOCARBAMOL 750 MG: 750 TABLET ORAL at 21:36

## 2024-08-04 RX ADMIN — SODIUM CHLORIDE, PRESERVATIVE FREE 10 ML: 5 INJECTION INTRAVENOUS at 21:36

## 2024-08-04 RX ADMIN — GABAPENTIN 300 MG: 300 CAPSULE ORAL at 17:44

## 2024-08-04 RX ADMIN — ACETAMINOPHEN 1000 MG: 500 TABLET ORAL at 21:36

## 2024-08-04 RX ADMIN — FENTANYL CITRATE 100 MCG: 50 INJECTION, SOLUTION INTRAMUSCULAR; INTRAVENOUS at 15:18

## 2024-08-04 RX ADMIN — LEVETIRACETAM 300 MG: 500 SOLUTION ORAL at 21:35

## 2024-08-04 RX ADMIN — FENTANYL CITRATE 25 MCG: 50 INJECTION, SOLUTION INTRAMUSCULAR; INTRAVENOUS at 23:46

## 2024-08-04 RX ADMIN — ONDANSETRON 4 MG: 2 INJECTION INTRAMUSCULAR; INTRAVENOUS at 15:09

## 2024-08-04 RX ADMIN — OXYCODONE HYDROCHLORIDE 5 MG: 5 TABLET ORAL at 21:36

## 2024-08-04 RX ADMIN — FENTANYL CITRATE 50 MCG: 50 INJECTION, SOLUTION INTRAMUSCULAR; INTRAVENOUS at 12:55

## 2024-08-04 ASSESSMENT — PAIN SCALES - GENERAL
PAINLEVEL_OUTOF10: 10

## 2024-08-04 ASSESSMENT — PAIN DESCRIPTION - LOCATION: LOCATION: LEG

## 2024-08-04 ASSESSMENT — PAIN DESCRIPTION - DESCRIPTORS: DESCRIPTORS: DISCOMFORT

## 2024-08-04 NOTE — PROCEDURES
PROCEDURE NOTE  Date: 8/4/2024   Name: Dasia Farrell  YOB: 1965    Procedures        PROCEDURE NOTE - LACERATION CLOSURE    PATIENT NAME: Dn Trauma Xxdanbury  MEDICAL RECORD NO. [id number]  DATE: 8/4/2024  TIME OF CLOSURE: 0230  Laceration Repair Performed by: Rubén Lund MD    PREOPERATIVE DIAGNOSIS: Laceration(s) as follows:   LOCATION: Anterior scalp   LENGTH: 4cm   LAYERED CLOSURE: No    POSTOPERATIVE DIAGNOSIS:  Same  PROCEDURE PERFORMED:  Suture closure of laceration  ESTIMATED BLOOD LOSS:  Less than 25 ml.  COMPLICATIONS:  None immediately appreciated.     DISCUSSION:  Dasia Farrell is a 59 y.o.-year-old female. The history and physical examination were reviewed and confirmed.  The diagnoses, proposed procedure, risks, possible complications, benefits and alternatives were discussed with the patient or family. She was given the opportunity to ask questions, and once answered, informed consent was obtained.  The patient was then prepared for the procedure.    Consent: The patient provided verbal consent for this procedure.    Time out performed: Immediately prior to the procedure a \"time out\" was called to verify the correct patient, the correct procedure, equipment, support staff and site/side marked as required.      Procedure: The patient was placed in the appropriate position and anesthesia around the laceration was obtained by infiltration using 1% Lidocaine with epinephrine. The area was then irrigated with normal saline. The laceration was closed with staples. There were no additional lacerations requiring repair. The wound area was then dressed with bacitracin.      Total repaired wound length: 4 cm.     Other Items: Staple count: 8    The patient tolerated the procedure well.    Complications: None    Rubén Lund MD  8/4/24, 2:46 PM
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
## 2024-08-05 ENCOUNTER — APPOINTMENT (OUTPATIENT)
Dept: GENERAL RADIOLOGY | Age: 59
DRG: 956 | End: 2024-08-05
Payer: MEDICARE

## 2024-08-05 PROBLEM — Z01.818 PRE-OP EVALUATION: Status: ACTIVE | Noted: 2024-08-05

## 2024-08-05 PROBLEM — R79.89 ELEVATED TROPONIN: Status: ACTIVE | Noted: 2024-08-05

## 2024-08-05 PROBLEM — Z86.73 HISTORY OF STROKE: Status: ACTIVE | Noted: 2024-08-05

## 2024-08-05 PROBLEM — Z87.898 HISTORY OF SEIZURE: Status: ACTIVE | Noted: 2024-08-05

## 2024-08-05 LAB
ANION GAP SERPL CALCULATED.3IONS-SCNC: 10 MMOL/L (ref 9–16)
BASOPHILS # BLD: 0.06 K/UL (ref 0–0.2)
BASOPHILS NFR BLD: 1 % (ref 0–2)
BILIRUB UR QL STRIP: NEGATIVE
BUN SERPL-MCNC: 14 MG/DL (ref 6–20)
CA-I BLD-SCNC: 1.17 MMOL/L (ref 1.13–1.33)
CALCIUM SERPL-MCNC: 8.3 MG/DL (ref 8.6–10.4)
CHLORIDE SERPL-SCNC: 103 MMOL/L (ref 98–107)
CLARITY UR: CLEAR
CO2 SERPL-SCNC: 23 MMOL/L (ref 20–31)
COLOR UR: YELLOW
CREAT SERPL-MCNC: 0.9 MG/DL (ref 0.5–0.9)
EKG ATRIAL RATE: 67 BPM
EKG P AXIS: 43 DEGREES
EKG P-R INTERVAL: 182 MS
EKG Q-T INTERVAL: 416 MS
EKG QRS DURATION: 88 MS
EKG QTC CALCULATION (BAZETT): 439 MS
EKG R AXIS: 31 DEGREES
EKG T AXIS: 46 DEGREES
EKG VENTRICULAR RATE: 67 BPM
EOSINOPHIL # BLD: <0.03 K/UL (ref 0–0.44)
EOSINOPHILS RELATIVE PERCENT: 0 % (ref 1–4)
EPI CELLS #/AREA URNS HPF: NORMAL /HPF (ref 0–5)
ERYTHROCYTE [DISTWIDTH] IN BLOOD BY AUTOMATED COUNT: 13.5 % (ref 11.8–14.4)
GFR, ESTIMATED: 78 ML/MIN/1.73M2
GLUCOSE BLD-MCNC: 118 MG/DL (ref 65–105)
GLUCOSE BLD-MCNC: 123 MG/DL (ref 65–105)
GLUCOSE BLD-MCNC: 129 MG/DL (ref 65–105)
GLUCOSE BLD-MCNC: 190 MG/DL (ref 65–105)
GLUCOSE SERPL-MCNC: 145 MG/DL (ref 74–99)
GLUCOSE UR STRIP-MCNC: NEGATIVE MG/DL
HCT VFR BLD AUTO: 32.7 % (ref 36.3–47.1)
HCT VFR BLD AUTO: 32.8 % (ref 36.3–47.1)
HGB BLD-MCNC: 10 G/DL (ref 11.9–15.1)
HGB BLD-MCNC: 9.4 G/DL (ref 11.9–15.1)
HGB UR QL STRIP.AUTO: NEGATIVE
IMM GRANULOCYTES # BLD AUTO: 0.03 K/UL (ref 0–0.3)
IMM GRANULOCYTES NFR BLD: 0 %
KETONES UR STRIP-MCNC: NEGATIVE MG/DL
LEUKOCYTE ESTERASE UR QL STRIP: NEGATIVE
LEVETIRACETAM SERPL-MCNC: 22 UG/ML
LYMPHOCYTES NFR BLD: 2.14 K/UL (ref 1.1–3.7)
LYMPHOCYTES RELATIVE PERCENT: 19 % (ref 24–43)
MAGNESIUM SERPL-MCNC: 2 MG/DL (ref 1.6–2.6)
MCH RBC QN AUTO: 29.2 PG (ref 25.2–33.5)
MCHC RBC AUTO-ENTMCNC: 30.6 G/DL (ref 28.4–34.8)
MCV RBC AUTO: 95.6 FL (ref 82.6–102.9)
MONOCYTES NFR BLD: 0.9 K/UL (ref 0.1–1.2)
MONOCYTES NFR BLD: 8 % (ref 3–12)
MUCOUS THREADS URNS QL MICRO: NORMAL
NEUTROPHILS NFR BLD: 72 % (ref 36–65)
NEUTS SEG NFR BLD: 8.23 K/UL (ref 1.5–8.1)
NITRITE UR QL STRIP: NEGATIVE
NRBC BLD-RTO: 0 PER 100 WBC
PH UR STRIP: 5.5 [PH] (ref 5–8)
PHOSPHATE SERPL-MCNC: 3.2 MG/DL (ref 2.5–4.5)
PLATELET # BLD AUTO: 226 K/UL (ref 138–453)
PMV BLD AUTO: 9.9 FL (ref 8.1–13.5)
POTASSIUM SERPL-SCNC: 4 MMOL/L (ref 3.7–5.3)
PROT UR STRIP-MCNC: ABNORMAL MG/DL
RBC # BLD AUTO: 3.42 M/UL (ref 3.95–5.11)
RBC #/AREA URNS HPF: NORMAL /HPF (ref 0–2)
SODIUM SERPL-SCNC: 136 MMOL/L (ref 136–145)
SP GR UR STRIP: 1.06 (ref 1–1.03)
TROPONIN I SERPL HS-MCNC: 89 NG/L (ref 0–14)
UROBILINOGEN UR STRIP-ACNC: 0.2 EU/DL (ref 0–1)
WBC #/AREA URNS HPF: NORMAL /HPF (ref 0–5)
WBC OTHER # BLD: 11.4 K/UL (ref 3.5–11.3)

## 2024-08-05 PROCEDURE — 6370000000 HC RX 637 (ALT 250 FOR IP)

## 2024-08-05 PROCEDURE — APPSS30 APP SPLIT SHARED TIME 16-30 MINUTES: Performed by: NURSE PRACTITIONER

## 2024-08-05 PROCEDURE — 84484 ASSAY OF TROPONIN QUANT: CPT

## 2024-08-05 PROCEDURE — 80177 DRUG SCRN QUAN LEVETIRACETAM: CPT

## 2024-08-05 PROCEDURE — 99222 1ST HOSP IP/OBS MODERATE 55: CPT | Performed by: PSYCHIATRY & NEUROLOGY

## 2024-08-05 PROCEDURE — 6360000002 HC RX W HCPCS

## 2024-08-05 PROCEDURE — 73630 X-RAY EXAM OF FOOT: CPT

## 2024-08-05 PROCEDURE — 51798 US URINE CAPACITY MEASURE: CPT

## 2024-08-05 PROCEDURE — 94640 AIRWAY INHALATION TREATMENT: CPT

## 2024-08-05 PROCEDURE — 2500000003 HC RX 250 WO HCPCS: Performed by: STUDENT IN AN ORGANIZED HEALTH CARE EDUCATION/TRAINING PROGRAM

## 2024-08-05 PROCEDURE — 94761 N-INVAS EAR/PLS OXIMETRY MLT: CPT

## 2024-08-05 PROCEDURE — 2700000000 HC OXYGEN THERAPY PER DAY

## 2024-08-05 PROCEDURE — 36415 COLL VENOUS BLD VENIPUNCTURE: CPT

## 2024-08-05 PROCEDURE — 2000000000 HC ICU R&B

## 2024-08-05 PROCEDURE — 85014 HEMATOCRIT: CPT

## 2024-08-05 PROCEDURE — 85025 COMPLETE CBC W/AUTO DIFF WBC: CPT

## 2024-08-05 PROCEDURE — 84100 ASSAY OF PHOSPHORUS: CPT

## 2024-08-05 PROCEDURE — 82947 ASSAY GLUCOSE BLOOD QUANT: CPT

## 2024-08-05 PROCEDURE — 99223 1ST HOSP IP/OBS HIGH 75: CPT | Performed by: INTERNAL MEDICINE

## 2024-08-05 PROCEDURE — 6370000000 HC RX 637 (ALT 250 FOR IP): Performed by: NURSE PRACTITIONER

## 2024-08-05 PROCEDURE — 85018 HEMOGLOBIN: CPT

## 2024-08-05 PROCEDURE — 83735 ASSAY OF MAGNESIUM: CPT

## 2024-08-05 PROCEDURE — 80048 BASIC METABOLIC PNL TOTAL CA: CPT

## 2024-08-05 PROCEDURE — 71045 X-RAY EXAM CHEST 1 VIEW: CPT

## 2024-08-05 PROCEDURE — 82330 ASSAY OF CALCIUM: CPT

## 2024-08-05 PROCEDURE — 99232 SBSQ HOSP IP/OBS MODERATE 35: CPT | Performed by: SURGERY

## 2024-08-05 PROCEDURE — 2580000003 HC RX 258

## 2024-08-05 PROCEDURE — 81001 URINALYSIS AUTO W/SCOPE: CPT

## 2024-08-05 RX ORDER — BUDESONIDE AND FORMOTEROL FUMARATE DIHYDRATE 160; 4.5 UG/1; UG/1
2 AEROSOL RESPIRATORY (INHALATION)
Status: DISCONTINUED | OUTPATIENT
Start: 2024-08-05 | End: 2024-08-20 | Stop reason: HOSPADM

## 2024-08-05 RX ORDER — VORTIOXETINE 10 MG/1
10 TABLET, FILM COATED ORAL DAILY
COMMUNITY
Start: 2024-07-23

## 2024-08-05 RX ORDER — METOPROLOL TARTRATE 1 MG/ML
2.5 INJECTION, SOLUTION INTRAVENOUS ONCE
Status: COMPLETED | OUTPATIENT
Start: 2024-08-05 | End: 2024-08-05

## 2024-08-05 RX ORDER — DEXTROSE MONOHYDRATE 100 MG/ML
INJECTION, SOLUTION INTRAVENOUS CONTINUOUS PRN
Status: DISCONTINUED | OUTPATIENT
Start: 2024-08-05 | End: 2024-08-16

## 2024-08-05 RX ORDER — ATOGEPANT 60 MG/1
1 TABLET ORAL DAILY
COMMUNITY
Start: 2024-07-12

## 2024-08-05 RX ORDER — VENLAFAXINE HYDROCHLORIDE 150 MG/1
300 CAPSULE, EXTENDED RELEASE ORAL DAILY
COMMUNITY
Start: 2024-07-27

## 2024-08-05 RX ORDER — ENOXAPARIN SODIUM 100 MG/ML
40 INJECTION SUBCUTANEOUS 2 TIMES DAILY
Status: DISCONTINUED | OUTPATIENT
Start: 2024-08-05 | End: 2024-08-11

## 2024-08-05 RX ORDER — DEUTETRABENAZINE 24 MG/1
1 TABLET, FILM COATED, EXTENDED RELEASE ORAL DAILY
COMMUNITY
Start: 2024-06-19

## 2024-08-05 RX ORDER — TOPIRAMATE 100 MG/1
100 TABLET, FILM COATED ORAL DAILY
Status: DISCONTINUED | OUTPATIENT
Start: 2024-08-05 | End: 2024-08-20 | Stop reason: HOSPADM

## 2024-08-05 RX ORDER — FLUTICASONE FUROATE, UMECLIDINIUM BROMIDE AND VILANTEROL TRIFENATATE 200; 62.5; 25 UG/1; UG/1; UG/1
1 POWDER RESPIRATORY (INHALATION) DAILY
COMMUNITY
Start: 2024-07-08

## 2024-08-05 RX ORDER — WARFARIN SODIUM 6 MG/1
6 TABLET ORAL DAILY
COMMUNITY
Start: 2024-06-05

## 2024-08-05 RX ORDER — TOPIRAMATE 100 MG/1
100 TABLET, FILM COATED ORAL DAILY
COMMUNITY
Start: 2024-06-27

## 2024-08-05 RX ORDER — RIMEGEPANT SULFATE 75 MG/75MG
1 TABLET, ORALLY DISINTEGRATING ORAL DAILY
COMMUNITY
Start: 2024-05-29

## 2024-08-05 RX ORDER — CARVEDILOL 3.12 MG/1
3.12 TABLET ORAL 2 TIMES DAILY WITH MEALS
Status: DISCONTINUED | OUTPATIENT
Start: 2024-08-05 | End: 2024-08-05

## 2024-08-05 RX ORDER — LEVETIRACETAM 750 MG/1
750 TABLET ORAL 2 TIMES DAILY
Status: ON HOLD | COMMUNITY
Start: 2024-06-27 | End: 2024-08-12

## 2024-08-05 RX ORDER — BUMETANIDE 1 MG/1
1 TABLET ORAL DAILY
Status: ON HOLD | COMMUNITY
Start: 2024-06-04 | End: 2024-08-14 | Stop reason: HOSPADM

## 2024-08-05 RX ORDER — INSULIN LISPRO 100 [IU]/ML
0-4 INJECTION, SOLUTION INTRAVENOUS; SUBCUTANEOUS NIGHTLY
Status: DISCONTINUED | OUTPATIENT
Start: 2024-08-05 | End: 2024-08-09

## 2024-08-05 RX ORDER — LAMOTRIGINE 100 MG/1
100 TABLET ORAL EVERY MORNING
Status: ON HOLD | COMMUNITY
Start: 2024-07-26 | End: 2024-08-12

## 2024-08-05 RX ORDER — SUMATRIPTAN 100 MG/1
100 TABLET, FILM COATED ORAL
COMMUNITY
Start: 2024-06-25

## 2024-08-05 RX ORDER — LAMOTRIGINE 25 MG/1
25 TABLET ORAL 2 TIMES DAILY
Status: DISCONTINUED | OUTPATIENT
Start: 2024-08-05 | End: 2024-08-20 | Stop reason: HOSPADM

## 2024-08-05 RX ORDER — ALBUTEROL SULFATE 90 UG/1
2 AEROSOL, METERED RESPIRATORY (INHALATION) EVERY 6 HOURS PRN
COMMUNITY
Start: 2024-07-14

## 2024-08-05 RX ORDER — ROSUVASTATIN CALCIUM 20 MG/1
20 TABLET, COATED ORAL
Status: DISCONTINUED | OUTPATIENT
Start: 2024-08-05 | End: 2024-08-20 | Stop reason: HOSPADM

## 2024-08-05 RX ORDER — CARVEDILOL 3.12 MG/1
3.12 TABLET ORAL 2 TIMES DAILY WITH MEALS
Status: ON HOLD | COMMUNITY
Start: 2024-06-25 | End: 2024-08-12 | Stop reason: HOSPADM

## 2024-08-05 RX ORDER — LEVETIRACETAM 500 MG/1
1500 TABLET ORAL 2 TIMES DAILY
Status: DISCONTINUED | OUTPATIENT
Start: 2024-08-05 | End: 2024-08-20 | Stop reason: HOSPADM

## 2024-08-05 RX ORDER — OMEPRAZOLE 20 MG/1
20 CAPSULE, DELAYED RELEASE ORAL DAILY
COMMUNITY
Start: 2024-06-11

## 2024-08-05 RX ORDER — GINSENG 100 MG
CAPSULE ORAL 3 TIMES DAILY
Status: DISCONTINUED | OUTPATIENT
Start: 2024-08-05 | End: 2024-08-20 | Stop reason: HOSPADM

## 2024-08-05 RX ORDER — BUSPIRONE HYDROCHLORIDE 30 MG/1
30 TABLET ORAL 2 TIMES DAILY
COMMUNITY
Start: 2024-07-04

## 2024-08-05 RX ORDER — ERGOCALCIFEROL 1.25 MG/1
50000 CAPSULE ORAL WEEKLY
Qty: 8 CAPSULE | Refills: 1 | Status: SHIPPED | OUTPATIENT
Start: 2024-08-05

## 2024-08-05 RX ORDER — BUSPIRONE HYDROCHLORIDE 10 MG/1
30 TABLET ORAL 2 TIMES DAILY
Status: DISCONTINUED | OUTPATIENT
Start: 2024-08-05 | End: 2024-08-20 | Stop reason: HOSPADM

## 2024-08-05 RX ORDER — ROSUVASTATIN CALCIUM 20 MG/1
20 TABLET, COATED ORAL
COMMUNITY
Start: 2024-07-18

## 2024-08-05 RX ORDER — ERENUMAB-AOOE 140 MG/ML
140 INJECTION, SOLUTION SUBCUTANEOUS
COMMUNITY
Start: 2024-07-02

## 2024-08-05 RX ORDER — ALBUTEROL SULFATE 90 UG/1
2 AEROSOL, METERED RESPIRATORY (INHALATION) EVERY 6 HOURS PRN
Status: DISCONTINUED | OUTPATIENT
Start: 2024-08-05 | End: 2024-08-20 | Stop reason: HOSPADM

## 2024-08-05 RX ORDER — GLUCAGON 1 MG/ML
1 KIT INJECTION PRN
Status: DISCONTINUED | OUTPATIENT
Start: 2024-08-05 | End: 2024-08-16

## 2024-08-05 RX ORDER — INSULIN LISPRO 100 [IU]/ML
0-16 INJECTION, SOLUTION INTRAVENOUS; SUBCUTANEOUS
Status: DISCONTINUED | OUTPATIENT
Start: 2024-08-05 | End: 2024-08-09

## 2024-08-05 RX ORDER — TIZANIDINE 4 MG/1
4 TABLET ORAL EVERY 8 HOURS PRN
Status: ON HOLD | COMMUNITY
Start: 2024-07-24 | End: 2024-08-12 | Stop reason: HOSPADM

## 2024-08-05 RX ADMIN — BUDESONIDE AND FORMOTEROL FUMARATE DIHYDRATE 2 PUFF: 160; 4.5 AEROSOL RESPIRATORY (INHALATION) at 12:00

## 2024-08-05 RX ADMIN — IPRATROPIUM BROMIDE AND ALBUTEROL SULFATE 1 DOSE: .5; 2.5 SOLUTION RESPIRATORY (INHALATION) at 16:40

## 2024-08-05 RX ADMIN — FENTANYL CITRATE 25 MCG: 50 INJECTION, SOLUTION INTRAMUSCULAR; INTRAVENOUS at 06:19

## 2024-08-05 RX ADMIN — SENNOSIDES AND DOCUSATE SODIUM 1 TABLET: 50; 8.6 TABLET ORAL at 20:27

## 2024-08-05 RX ADMIN — ACETAMINOPHEN 1000 MG: 500 TABLET ORAL at 06:23

## 2024-08-05 RX ADMIN — ACETAMINOPHEN 1000 MG: 500 TABLET ORAL at 13:27

## 2024-08-05 RX ADMIN — SODIUM CHLORIDE, PRESERVATIVE FREE 10 ML: 5 INJECTION INTRAVENOUS at 09:30

## 2024-08-05 RX ADMIN — IPRATROPIUM BROMIDE AND ALBUTEROL SULFATE 1 DOSE: .5; 2.5 SOLUTION RESPIRATORY (INHALATION) at 12:00

## 2024-08-05 RX ADMIN — TOPIRAMATE 100 MG: 100 TABLET, FILM COATED ORAL at 13:27

## 2024-08-05 RX ADMIN — METHOCARBAMOL 750 MG: 750 TABLET ORAL at 21:27

## 2024-08-05 RX ADMIN — OXYCODONE HYDROCHLORIDE 5 MG: 5 TABLET ORAL at 03:23

## 2024-08-05 RX ADMIN — SODIUM CHLORIDE, PRESERVATIVE FREE 10 ML: 5 INJECTION INTRAVENOUS at 20:27

## 2024-08-05 RX ADMIN — OXYCODONE HYDROCHLORIDE 5 MG: 5 TABLET ORAL at 21:27

## 2024-08-05 RX ADMIN — ACETAMINOPHEN 1000 MG: 500 TABLET ORAL at 21:27

## 2024-08-05 RX ADMIN — GABAPENTIN 300 MG: 300 CAPSULE ORAL at 09:28

## 2024-08-05 RX ADMIN — GABAPENTIN 300 MG: 300 CAPSULE ORAL at 18:08

## 2024-08-05 RX ADMIN — BUSPIRONE HYDROCHLORIDE 30 MG: 10 TABLET ORAL at 20:27

## 2024-08-05 RX ADMIN — SENNOSIDES AND DOCUSATE SODIUM 1 TABLET: 50; 8.6 TABLET ORAL at 09:28

## 2024-08-05 RX ADMIN — METOPROLOL TARTRATE 2.5 MG: 5 INJECTION INTRAVENOUS at 14:32

## 2024-08-05 RX ADMIN — METHOCARBAMOL 750 MG: 750 TABLET ORAL at 17:33

## 2024-08-05 RX ADMIN — IPRATROPIUM BROMIDE AND ALBUTEROL SULFATE 1 DOSE: .5; 2.5 SOLUTION RESPIRATORY (INHALATION) at 08:05

## 2024-08-05 RX ADMIN — HYDROMORPHONE HYDROCHLORIDE 0.25 MG: 1 INJECTION, SOLUTION INTRAMUSCULAR; INTRAVENOUS; SUBCUTANEOUS at 18:08

## 2024-08-05 RX ADMIN — GABAPENTIN 300 MG: 300 CAPSULE ORAL at 02:05

## 2024-08-05 RX ADMIN — HYDROMORPHONE HYDROCHLORIDE 0.25 MG: 1 INJECTION, SOLUTION INTRAMUSCULAR; INTRAVENOUS; SUBCUTANEOUS at 23:36

## 2024-08-05 RX ADMIN — METHOCARBAMOL 750 MG: 750 TABLET ORAL at 09:28

## 2024-08-05 RX ADMIN — VORTIOXETINE 10 MG: 10 TABLET, FILM COATED ORAL at 13:27

## 2024-08-05 RX ADMIN — OXYCODONE HYDROCHLORIDE 5 MG: 5 TABLET ORAL at 12:23

## 2024-08-05 RX ADMIN — HYDROMORPHONE HYDROCHLORIDE 0.25 MG: 1 INJECTION, SOLUTION INTRAMUSCULAR; INTRAVENOUS; SUBCUTANEOUS at 09:08

## 2024-08-05 RX ADMIN — IPRATROPIUM BROMIDE AND ALBUTEROL SULFATE 1 DOSE: .5; 2.5 SOLUTION RESPIRATORY (INHALATION) at 20:06

## 2024-08-05 RX ADMIN — POLYETHYLENE GLYCOL 3350 17 G: 17 POWDER, FOR SOLUTION ORAL at 09:28

## 2024-08-05 RX ADMIN — LEVETIRACETAM 1500 MG: 500 TABLET, FILM COATED ORAL at 20:27

## 2024-08-05 RX ADMIN — LEVETIRACETAM 300 MG: 500 SOLUTION ORAL at 09:27

## 2024-08-05 RX ADMIN — ROSUVASTATIN CALCIUM 20 MG: 20 TABLET, FILM COATED ORAL at 17:33

## 2024-08-05 RX ADMIN — BUSPIRONE HYDROCHLORIDE 30 MG: 10 TABLET ORAL at 10:37

## 2024-08-05 RX ADMIN — POTASSIUM BICARBONATE 20 MEQ: 782 TABLET, EFFERVESCENT ORAL at 03:23

## 2024-08-05 RX ADMIN — LEVETIRACETAM 1500 MG: 500 TABLET, FILM COATED ORAL at 12:09

## 2024-08-05 RX ADMIN — LAMOTRIGINE 25 MG: 25 TABLET ORAL at 20:27

## 2024-08-05 RX ADMIN — LAMOTRIGINE 25 MG: 25 TABLET ORAL at 12:08

## 2024-08-05 RX ADMIN — METHOCARBAMOL 750 MG: 750 TABLET ORAL at 02:05

## 2024-08-05 RX ADMIN — ENOXAPARIN SODIUM 40 MG: 100 INJECTION SUBCUTANEOUS at 20:27

## 2024-08-05 RX ADMIN — FENTANYL CITRATE 25 MCG: 50 INJECTION, SOLUTION INTRAMUSCULAR; INTRAVENOUS at 02:05

## 2024-08-05 RX ADMIN — ENOXAPARIN SODIUM 40 MG: 100 INJECTION SUBCUTANEOUS at 09:29

## 2024-08-05 RX ADMIN — BACITRACIN: 500 OINTMENT TOPICAL at 21:28

## 2024-08-05 ASSESSMENT — PAIN SCALES - GENERAL
PAINLEVEL_OUTOF10: 7
PAINLEVEL_OUTOF10: 10
PAINLEVEL_OUTOF10: 6
PAINLEVEL_OUTOF10: 10
PAINLEVEL_OUTOF10: 7
PAINLEVEL_OUTOF10: 10
PAINLEVEL_OUTOF10: 10
PAINLEVEL_OUTOF10: 8
PAINLEVEL_OUTOF10: 10
PAINLEVEL_OUTOF10: 5
PAINLEVEL_OUTOF10: 3
PAINLEVEL_OUTOF10: 10

## 2024-08-05 ASSESSMENT — PAIN DESCRIPTION - ORIENTATION
ORIENTATION: RIGHT
ORIENTATION: RIGHT

## 2024-08-05 ASSESSMENT — PAIN DESCRIPTION - DESCRIPTORS
DESCRIPTORS: SHARP
DESCRIPTORS: SHARP

## 2024-08-05 ASSESSMENT — PAIN DESCRIPTION - LOCATION
LOCATION: HIP;LEG
LOCATION: LEG;OTHER (COMMENT)
LOCATION: HIP;LEG;OTHER (COMMENT)

## 2024-08-05 NOTE — CARE COORDINATION
SBIRT complete, screening was negative.  Pt denies alcohol/drug use. She does admit to hx of depression and is seen by psychiatrist Dr. Darrel Abraham in [place] once a month and is currently on medication.  She denies concerns with her depression at this time and denies SI.        Alcohol Screening and Brief Intervention        No results for input(s): \"ALC\" in the last 72 hours.    Alcohol Pre-screening     (WOMEN ONLY) How many times in the past year have you had 4 or more drinks in a day?: None       Drug Pre-Screening -none       Drug Screening DAST       Mood Pre-Screening (PHQ-2)  During the past 2 weeks, have you been bothered by, feeling down, depressed or hopeless?  No        I have interviewed Vesna Valencia, [id number] regarding  Her alcohol consumption/drug use and risk for excessive use. Screenings were negative.  Patient  N/A intervention at this time.     Deferred []    Completed on: 8/5/2024   LINUS Avery
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
## 2024-08-06 LAB
ANION GAP SERPL CALCULATED.3IONS-SCNC: 10 MMOL/L (ref 9–16)
BASOPHILS # BLD: 0.1 K/UL (ref 0–0.2)
BASOPHILS NFR BLD: 1 % (ref 0–2)
BUN SERPL-MCNC: 13 MG/DL (ref 6–20)
CA-I BLD-SCNC: 1.18 MMOL/L (ref 1.13–1.33)
CALCIUM SERPL-MCNC: 8 MG/DL (ref 8.6–10.4)
CHLORIDE SERPL-SCNC: 100 MMOL/L (ref 98–107)
CO2 SERPL-SCNC: 25 MMOL/L (ref 20–31)
CREAT SERPL-MCNC: 0.8 MG/DL (ref 0.5–0.9)
EOSINOPHIL # BLD: 0.06 K/UL (ref 0–0.44)
EOSINOPHILS RELATIVE PERCENT: 1 % (ref 1–4)
ERYTHROCYTE [DISTWIDTH] IN BLOOD BY AUTOMATED COUNT: 13.4 % (ref 11.8–14.4)
FIBRINOGEN, FUNCTIONAL TEG: 36.1 MM (ref 15–32)
GFR, ESTIMATED: 88 ML/MIN/1.73M2
GLUCOSE BLD-MCNC: 129 MG/DL (ref 65–105)
GLUCOSE BLD-MCNC: 134 MG/DL (ref 65–105)
GLUCOSE BLD-MCNC: 154 MG/DL (ref 65–105)
GLUCOSE SERPL-MCNC: 141 MG/DL (ref 74–99)
HCT VFR BLD AUTO: 28.3 % (ref 36.3–47.1)
HGB BLD-MCNC: 9.4 G/DL (ref 11.9–15.1)
IMM GRANULOCYTES # BLD AUTO: 0.09 K/UL (ref 0–0.3)
IMM GRANULOCYTES NFR BLD: 1 %
INR PPP: 1.7
INR PPP: 2.4
LY30 (LYSIS) TEG: 0.2 % (ref 0–2.6)
LYMPHOCYTES NFR BLD: 2.57 K/UL (ref 1.1–3.7)
LYMPHOCYTES RELATIVE PERCENT: 23 % (ref 24–43)
MA(MAX CLOT) RAPID TEG: 68.9 MM (ref 52–70)
MAGNESIUM SERPL-MCNC: 1.9 MG/DL (ref 1.6–2.6)
MCH RBC QN AUTO: 29.4 PG (ref 25.2–33.5)
MCHC RBC AUTO-ENTMCNC: 33.2 G/DL (ref 28.4–34.8)
MCV RBC AUTO: 88.4 FL (ref 82.6–102.9)
MONOCYTES NFR BLD: 0.88 K/UL (ref 0.1–1.2)
MONOCYTES NFR BLD: 8 % (ref 3–12)
NEUTROPHILS NFR BLD: 68 % (ref 36–65)
NEUTS SEG NFR BLD: 7.68 K/UL (ref 1.5–8.1)
NRBC BLD-RTO: 0 PER 100 WBC
PARTIAL THROMBOPLASTIN TIME: 36.8 SEC (ref 23–36.5)
PHOSPHATE SERPL-MCNC: 3 MG/DL (ref 2.5–4.5)
PLATELET # BLD AUTO: 212 K/UL (ref 138–453)
PMV BLD AUTO: 10.6 FL (ref 8.1–13.5)
POC INR: 3.1
POTASSIUM SERPL-SCNC: 4 MMOL/L (ref 3.7–5.3)
PROTHROMBIN TIME, POC: 35.4 SEC (ref 10.4–14.2)
PROTHROMBIN TIME: 19.5 SEC (ref 11.7–14.9)
PROTHROMBIN TIME: 25.1 SEC (ref 11.7–14.9)
RBC # BLD AUTO: 3.2 M/UL (ref 3.95–5.11)
REACTION TIME TEG: 5.7 MIN (ref 4.6–9.1)
SODIUM SERPL-SCNC: 135 MMOL/L (ref 136–145)
WBC OTHER # BLD: 11.4 K/UL (ref 3.5–11.3)

## 2024-08-06 PROCEDURE — 6370000000 HC RX 637 (ALT 250 FOR IP)

## 2024-08-06 PROCEDURE — 83735 ASSAY OF MAGNESIUM: CPT

## 2024-08-06 PROCEDURE — 2580000003 HC RX 258

## 2024-08-06 PROCEDURE — 36430 TRANSFUSION BLD/BLD COMPNT: CPT

## 2024-08-06 PROCEDURE — 2000000000 HC ICU R&B

## 2024-08-06 PROCEDURE — 6370000000 HC RX 637 (ALT 250 FOR IP): Performed by: NURSE PRACTITIONER

## 2024-08-06 PROCEDURE — 6360000002 HC RX W HCPCS

## 2024-08-06 PROCEDURE — 94761 N-INVAS EAR/PLS OXIMETRY MLT: CPT

## 2024-08-06 PROCEDURE — 85384 FIBRINOGEN ACTIVITY: CPT

## 2024-08-06 PROCEDURE — 82947 ASSAY GLUCOSE BLOOD QUANT: CPT

## 2024-08-06 PROCEDURE — 85347 COAGULATION TIME ACTIVATED: CPT

## 2024-08-06 PROCEDURE — 2700000000 HC OXYGEN THERAPY PER DAY

## 2024-08-06 PROCEDURE — 85576 BLOOD PLATELET AGGREGATION: CPT

## 2024-08-06 PROCEDURE — 82330 ASSAY OF CALCIUM: CPT

## 2024-08-06 PROCEDURE — 84100 ASSAY OF PHOSPHORUS: CPT

## 2024-08-06 PROCEDURE — 30233K1 TRANSFUSION OF NONAUTOLOGOUS FROZEN PLASMA INTO PERIPHERAL VEIN, PERCUTANEOUS APPROACH: ICD-10-PCS | Performed by: SURGERY

## 2024-08-06 PROCEDURE — 94640 AIRWAY INHALATION TREATMENT: CPT

## 2024-08-06 PROCEDURE — 99232 SBSQ HOSP IP/OBS MODERATE 35: CPT | Performed by: SURGERY

## 2024-08-06 PROCEDURE — 86927 PLASMA FRESH FROZEN: CPT

## 2024-08-06 PROCEDURE — 36415 COLL VENOUS BLD VENIPUNCTURE: CPT

## 2024-08-06 PROCEDURE — 85730 THROMBOPLASTIN TIME PARTIAL: CPT

## 2024-08-06 PROCEDURE — 80048 BASIC METABOLIC PNL TOTAL CA: CPT

## 2024-08-06 PROCEDURE — 85610 PROTHROMBIN TIME: CPT

## 2024-08-06 PROCEDURE — 85025 COMPLETE CBC W/AUTO DIFF WBC: CPT

## 2024-08-06 PROCEDURE — P9017 PLASMA 1 DONOR FRZ W/IN 8 HR: HCPCS

## 2024-08-06 PROCEDURE — 85390 FIBRINOLYSINS SCREEN I&R: CPT

## 2024-08-06 RX ORDER — PANTOPRAZOLE SODIUM 40 MG/1
40 TABLET, DELAYED RELEASE ORAL
Status: DISCONTINUED | OUTPATIENT
Start: 2024-08-07 | End: 2024-08-20 | Stop reason: HOSPADM

## 2024-08-06 RX ORDER — SODIUM CHLORIDE 9 MG/ML
INJECTION, SOLUTION INTRAVENOUS PRN
Status: DISCONTINUED | OUTPATIENT
Start: 2024-08-06 | End: 2024-08-11

## 2024-08-06 RX ORDER — FENTANYL CITRATE 50 UG/ML
100 INJECTION, SOLUTION INTRAMUSCULAR; INTRAVENOUS ONCE
Status: DISCONTINUED | OUTPATIENT
Start: 2024-08-06 | End: 2024-08-08

## 2024-08-06 RX ORDER — PHYTONADIONE 10 MG/ML
5 INJECTION, EMULSION INTRAMUSCULAR; INTRAVENOUS; SUBCUTANEOUS ONCE
Status: DISCONTINUED | OUTPATIENT
Start: 2024-08-06 | End: 2024-08-06

## 2024-08-06 RX ORDER — SODIUM CHLORIDE, SODIUM LACTATE, POTASSIUM CHLORIDE, CALCIUM CHLORIDE 600; 310; 30; 20 MG/100ML; MG/100ML; MG/100ML; MG/100ML
INJECTION, SOLUTION INTRAVENOUS CONTINUOUS
Status: DISCONTINUED | OUTPATIENT
Start: 2024-08-07 | End: 2024-08-06

## 2024-08-06 RX ORDER — MIDAZOLAM HYDROCHLORIDE 2 MG/2ML
2 INJECTION, SOLUTION INTRAMUSCULAR; INTRAVENOUS ONCE
Status: DISCONTINUED | OUTPATIENT
Start: 2024-08-06 | End: 2024-08-08

## 2024-08-06 RX ORDER — IPRATROPIUM BROMIDE AND ALBUTEROL SULFATE 2.5; .5 MG/3ML; MG/3ML
1 SOLUTION RESPIRATORY (INHALATION) 2 TIMES DAILY
Status: DISCONTINUED | OUTPATIENT
Start: 2024-08-06 | End: 2024-08-20 | Stop reason: HOSPADM

## 2024-08-06 RX ORDER — SODIUM CHLORIDE, SODIUM LACTATE, POTASSIUM CHLORIDE, CALCIUM CHLORIDE 600; 310; 30; 20 MG/100ML; MG/100ML; MG/100ML; MG/100ML
INJECTION, SOLUTION INTRAVENOUS CONTINUOUS
Status: DISCONTINUED | OUTPATIENT
Start: 2024-08-07 | End: 2024-08-07

## 2024-08-06 RX ORDER — SODIUM CHLORIDE, SODIUM LACTATE, POTASSIUM CHLORIDE, CALCIUM CHLORIDE 600; 310; 30; 20 MG/100ML; MG/100ML; MG/100ML; MG/100ML
INJECTION, SOLUTION INTRAVENOUS CONTINUOUS
Status: DISCONTINUED | OUTPATIENT
Start: 2024-08-06 | End: 2024-08-06

## 2024-08-06 RX ADMIN — ROSUVASTATIN CALCIUM 20 MG: 20 TABLET, FILM COATED ORAL at 19:43

## 2024-08-06 RX ADMIN — SENNOSIDES AND DOCUSATE SODIUM 1 TABLET: 50; 8.6 TABLET ORAL at 19:42

## 2024-08-06 RX ADMIN — METHOCARBAMOL 750 MG: 750 TABLET ORAL at 21:00

## 2024-08-06 RX ADMIN — LAMOTRIGINE 25 MG: 25 TABLET ORAL at 21:00

## 2024-08-06 RX ADMIN — ACETAMINOPHEN 1000 MG: 500 TABLET ORAL at 21:00

## 2024-08-06 RX ADMIN — GABAPENTIN 300 MG: 300 CAPSULE ORAL at 02:27

## 2024-08-06 RX ADMIN — ENOXAPARIN SODIUM 40 MG: 100 INJECTION SUBCUTANEOUS at 21:01

## 2024-08-06 RX ADMIN — METHOCARBAMOL 750 MG: 750 TABLET ORAL at 03:43

## 2024-08-06 RX ADMIN — ENOXAPARIN SODIUM 40 MG: 100 INJECTION SUBCUTANEOUS at 08:05

## 2024-08-06 RX ADMIN — HYDROMORPHONE HYDROCHLORIDE 0.25 MG: 1 INJECTION, SOLUTION INTRAMUSCULAR; INTRAVENOUS; SUBCUTANEOUS at 13:00

## 2024-08-06 RX ADMIN — OXYCODONE HYDROCHLORIDE 5 MG: 5 TABLET ORAL at 07:17

## 2024-08-06 RX ADMIN — BUSPIRONE HYDROCHLORIDE 30 MG: 10 TABLET ORAL at 19:42

## 2024-08-06 RX ADMIN — OXYCODONE HYDROCHLORIDE 5 MG: 5 TABLET ORAL at 02:25

## 2024-08-06 RX ADMIN — ACETAMINOPHEN 1000 MG: 500 TABLET ORAL at 04:54

## 2024-08-06 RX ADMIN — IPRATROPIUM BROMIDE AND ALBUTEROL SULFATE 1 DOSE: .5; 2.5 SOLUTION RESPIRATORY (INHALATION) at 08:01

## 2024-08-06 RX ADMIN — VORTIOXETINE 10 MG: 10 TABLET, FILM COATED ORAL at 08:10

## 2024-08-06 RX ADMIN — SODIUM CHLORIDE, PRESERVATIVE FREE 10 ML: 5 INJECTION INTRAVENOUS at 19:42

## 2024-08-06 RX ADMIN — IPRATROPIUM BROMIDE AND ALBUTEROL SULFATE 1 DOSE: 2.5; .5 SOLUTION RESPIRATORY (INHALATION) at 21:06

## 2024-08-06 RX ADMIN — TOPIRAMATE 100 MG: 100 TABLET, FILM COATED ORAL at 08:10

## 2024-08-06 RX ADMIN — LAMOTRIGINE 25 MG: 25 TABLET ORAL at 08:11

## 2024-08-06 RX ADMIN — HYDROMORPHONE HYDROCHLORIDE 0.25 MG: 1 INJECTION, SOLUTION INTRAMUSCULAR; INTRAVENOUS; SUBCUTANEOUS at 09:19

## 2024-08-06 RX ADMIN — LEVETIRACETAM 1500 MG: 500 TABLET, FILM COATED ORAL at 21:00

## 2024-08-06 RX ADMIN — BACITRACIN: 500 OINTMENT TOPICAL at 19:43

## 2024-08-06 RX ADMIN — HYDROMORPHONE HYDROCHLORIDE 0.25 MG: 1 INJECTION, SOLUTION INTRAMUSCULAR; INTRAVENOUS; SUBCUTANEOUS at 04:54

## 2024-08-06 RX ADMIN — BACITRACIN: 500 OINTMENT TOPICAL at 14:09

## 2024-08-06 RX ADMIN — GABAPENTIN 300 MG: 300 CAPSULE ORAL at 17:26

## 2024-08-06 RX ADMIN — BACITRACIN: 500 OINTMENT TOPICAL at 08:04

## 2024-08-06 RX ADMIN — LEVETIRACETAM 1500 MG: 500 TABLET, FILM COATED ORAL at 08:17

## 2024-08-06 RX ADMIN — PHYTONADIONE 5 MG: 10 INJECTION, EMULSION INTRAMUSCULAR; INTRAVENOUS; SUBCUTANEOUS at 18:24

## 2024-08-06 RX ADMIN — BUSPIRONE HYDROCHLORIDE 30 MG: 10 TABLET ORAL at 08:05

## 2024-08-06 RX ADMIN — HYDROMORPHONE HYDROCHLORIDE 0.25 MG: 1 INJECTION, SOLUTION INTRAMUSCULAR; INTRAVENOUS; SUBCUTANEOUS at 16:58

## 2024-08-06 RX ADMIN — OXYCODONE HYDROCHLORIDE 5 MG: 5 TABLET ORAL at 19:42

## 2024-08-06 RX ADMIN — SODIUM CHLORIDE, POTASSIUM CHLORIDE, SODIUM LACTATE AND CALCIUM CHLORIDE: 600; 310; 30; 20 INJECTION, SOLUTION INTRAVENOUS at 08:09

## 2024-08-06 RX ADMIN — BUDESONIDE AND FORMOTEROL FUMARATE DIHYDRATE 2 PUFF: 160; 4.5 AEROSOL RESPIRATORY (INHALATION) at 08:01

## 2024-08-06 ASSESSMENT — PAIN SCALES - GENERAL
PAINLEVEL_OUTOF10: 10
PAINLEVEL_OUTOF10: 0
PAINLEVEL_OUTOF10: 5
PAINLEVEL_OUTOF10: 10
PAINLEVEL_OUTOF10: 0
PAINLEVEL_OUTOF10: 5
PAINLEVEL_OUTOF10: 8
PAINLEVEL_OUTOF10: 10
PAINLEVEL_OUTOF10: 0
PAINLEVEL_OUTOF10: 10
PAINLEVEL_OUTOF10: 0
PAINLEVEL_OUTOF10: 5
PAINLEVEL_OUTOF10: 9
PAINLEVEL_OUTOF10: 4
PAINLEVEL_OUTOF10: 5
PAINLEVEL_OUTOF10: 0
PAINLEVEL_OUTOF10: 9
PAINLEVEL_OUTOF10: 10

## 2024-08-06 ASSESSMENT — PAIN SCALES - WONG BAKER
WONGBAKER_NUMERICALRESPONSE: NO HURT
WONGBAKER_NUMERICALRESPONSE: NO HURT

## 2024-08-06 ASSESSMENT — PAIN DESCRIPTION - ORIENTATION
ORIENTATION: RIGHT

## 2024-08-06 ASSESSMENT — PAIN DESCRIPTION - DESCRIPTORS
DESCRIPTORS: ACHING;DISCOMFORT;SORE
DESCRIPTORS: ACHING;SORE;DISCOMFORT
DESCRIPTORS: ACHING
DESCRIPTORS: ACHING;DISCOMFORT
DESCRIPTORS: ACHING

## 2024-08-06 ASSESSMENT — PAIN - FUNCTIONAL ASSESSMENT
PAIN_FUNCTIONAL_ASSESSMENT: PREVENTS OR INTERFERES WITH MANY ACTIVE NOT PASSIVE ACTIVITIES
PAIN_FUNCTIONAL_ASSESSMENT: PREVENTS OR INTERFERES WITH ALL ACTIVE AND SOME PASSIVE ACTIVITIES

## 2024-08-06 ASSESSMENT — PAIN DESCRIPTION - LOCATION
LOCATION: LEG
LOCATION: LEG;HIP;RIB CAGE
LOCATION: LEG
LOCATION: LEG
LOCATION: HIP;RIB CAGE
LOCATION: LEG;HIP
LOCATION: HIP
LOCATION: HIP;LEG

## 2024-08-06 NOTE — FLOWSHEET NOTE
Surgery cancelled today d/t INR 2.4 and high TEG. Plan to re-evaluate tomorrow. Pt updated on plan and sent back to room

## 2024-08-07 ENCOUNTER — ANESTHESIA EVENT (OUTPATIENT)
Dept: SURGICAL ICU | Age: 59
End: 2024-08-07
Payer: MEDICARE

## 2024-08-07 ENCOUNTER — ANESTHESIA (OUTPATIENT)
Dept: SURGICAL ICU | Age: 59
End: 2024-08-07
Payer: MEDICARE

## 2024-08-07 ENCOUNTER — ANESTHESIA EVENT (OUTPATIENT)
Dept: OPERATING ROOM | Age: 59
End: 2024-08-07
Payer: MEDICARE

## 2024-08-07 LAB
ABO/RH: NORMAL
ANION GAP SERPL CALCULATED.3IONS-SCNC: 9 MMOL/L (ref 9–16)
ANTIBODY SCREEN: NEGATIVE
ARM BAND NUMBER: NORMAL
BASOPHILS # BLD: 0.05 K/UL (ref 0–0.2)
BASOPHILS NFR BLD: 0 % (ref 0–2)
BLOOD BANK BLOOD PRODUCT EXPIRATION DATE: NORMAL
BLOOD BANK BLOOD PRODUCT EXPIRATION DATE: NORMAL
BLOOD BANK DISPENSE STATUS: NORMAL
BLOOD BANK ISBT PRODUCT BLOOD TYPE: 5100
BLOOD BANK ISBT PRODUCT BLOOD TYPE: 5100
BLOOD BANK PRODUCT CODE: NORMAL
BLOOD BANK PRODUCT CODE: NORMAL
BLOOD BANK SAMPLE EXPIRATION: NORMAL
BLOOD BANK UNIT TYPE AND RH: NORMAL
BLOOD BANK UNIT TYPE AND RH: NORMAL
BPU ID: NORMAL
BUN SERPL-MCNC: 14 MG/DL (ref 6–20)
CA-I BLD-SCNC: 1.1 MMOL/L (ref 1.13–1.33)
CALCIUM SERPL-MCNC: 8.2 MG/DL (ref 8.6–10.4)
CHLORIDE SERPL-SCNC: 101 MMOL/L (ref 98–107)
CO2 SERPL-SCNC: 26 MMOL/L (ref 20–31)
COMPONENT: NORMAL
CREAT SERPL-MCNC: 0.7 MG/DL (ref 0.5–0.9)
CROSSMATCH RESULT: NORMAL
CROSSMATCH RESULT: NORMAL
EOSINOPHIL # BLD: 0.19 K/UL (ref 0–0.44)
EOSINOPHILS RELATIVE PERCENT: 2 % (ref 1–4)
ERYTHROCYTE [DISTWIDTH] IN BLOOD BY AUTOMATED COUNT: 13.4 % (ref 11.8–14.4)
FIBRINOGEN, FUNCTIONAL TEG: 40.1 MM (ref 15–32)
GFR, ESTIMATED: >90 ML/MIN/1.73M2
GLUCOSE BLD-MCNC: 125 MG/DL (ref 65–105)
GLUCOSE BLD-MCNC: 135 MG/DL (ref 65–105)
GLUCOSE BLD-MCNC: 136 MG/DL (ref 65–105)
GLUCOSE BLD-MCNC: 141 MG/DL (ref 65–105)
GLUCOSE BLD-MCNC: 239 MG/DL (ref 65–105)
GLUCOSE SERPL-MCNC: 135 MG/DL (ref 74–99)
HCT VFR BLD AUTO: 24.1 % (ref 36.3–47.1)
HGB BLD-MCNC: 8 G/DL (ref 11.9–15.1)
IMM GRANULOCYTES # BLD AUTO: 0.06 K/UL (ref 0–0.3)
IMM GRANULOCYTES NFR BLD: 1 %
INR PPP: 1.3
LY30 (LYSIS) TEG: ABNORMAL % (ref 0–2.6)
LYMPHOCYTES NFR BLD: 1.73 K/UL (ref 1.1–3.7)
LYMPHOCYTES RELATIVE PERCENT: 14 % (ref 24–43)
MA(MAX CLOT) RAPID TEG: 71.2 MM (ref 52–70)
MAGNESIUM SERPL-MCNC: 1.8 MG/DL (ref 1.6–2.6)
MCH RBC QN AUTO: 29.2 PG (ref 25.2–33.5)
MCHC RBC AUTO-ENTMCNC: 33.2 G/DL (ref 28.4–34.8)
MCV RBC AUTO: 88 FL (ref 82.6–102.9)
MONOCYTES NFR BLD: 0.8 K/UL (ref 0.1–1.2)
MONOCYTES NFR BLD: 7 % (ref 3–12)
NEUTROPHILS NFR BLD: 77 % (ref 36–65)
NEUTS SEG NFR BLD: 9.44 K/UL (ref 1.5–8.1)
NRBC BLD-RTO: 0 PER 100 WBC
PARTIAL THROMBOPLASTIN TIME: 40 SEC (ref 23–36.5)
PERFORMING LOCATION: ABNORMAL
PHOSPHATE SERPL-MCNC: 2 MG/DL (ref 2.5–4.5)
PLATELET # BLD AUTO: ABNORMAL K/UL (ref 138–453)
PLATELET, FLUORESCENCE: 171 K/UL (ref 138–453)
PLATELETS.RETICULATED NFR BLD AUTO: 2.7 % (ref 1.1–10.3)
POTASSIUM SERPL-SCNC: 4.1 MMOL/L (ref 3.7–5.3)
PROTHROMBIN TIME: 15.8 SEC (ref 11.7–14.9)
RBC # BLD AUTO: 2.74 M/UL (ref 3.95–5.11)
REACTION TIME TEG: >17 MIN (ref 4.6–9.1)
SODIUM SERPL-SCNC: 136 MMOL/L (ref 136–145)
TRANSFUSION STATUS: NORMAL
UNIT DIVISION: 0
UNIT ISSUE DATE/TIME: NORMAL
UNIT ISSUE DATE/TIME: NORMAL
WBC OTHER # BLD: 12.3 K/UL (ref 3.5–11.3)

## 2024-08-07 PROCEDURE — 85610 PROTHROMBIN TIME: CPT

## 2024-08-07 PROCEDURE — 80048 BASIC METABOLIC PNL TOTAL CA: CPT

## 2024-08-07 PROCEDURE — 82330 ASSAY OF CALCIUM: CPT

## 2024-08-07 PROCEDURE — 6370000000 HC RX 637 (ALT 250 FOR IP)

## 2024-08-07 PROCEDURE — 6360000002 HC RX W HCPCS: Performed by: ANESTHESIOLOGY

## 2024-08-07 PROCEDURE — 36415 COLL VENOUS BLD VENIPUNCTURE: CPT

## 2024-08-07 PROCEDURE — 85055 RETICULATED PLATELET ASSAY: CPT

## 2024-08-07 PROCEDURE — 94640 AIRWAY INHALATION TREATMENT: CPT

## 2024-08-07 PROCEDURE — 3E0R3BZ INTRODUCTION OF ANESTHETIC AGENT INTO SPINAL CANAL, PERCUTANEOUS APPROACH: ICD-10-PCS | Performed by: ANESTHESIOLOGY

## 2024-08-07 PROCEDURE — 85730 THROMBOPLASTIN TIME PARTIAL: CPT

## 2024-08-07 PROCEDURE — 2000000000 HC ICU R&B

## 2024-08-07 PROCEDURE — 2580000003 HC RX 258

## 2024-08-07 PROCEDURE — 83735 ASSAY OF MAGNESIUM: CPT

## 2024-08-07 PROCEDURE — 2580000003 HC RX 258: Performed by: NURSE PRACTITIONER

## 2024-08-07 PROCEDURE — C9290 INJ, BUPIVACAINE LIPOSOME: HCPCS | Performed by: ANESTHESIOLOGY

## 2024-08-07 PROCEDURE — 76942 ECHO GUIDE FOR BIOPSY: CPT | Performed by: ANESTHESIOLOGY

## 2024-08-07 PROCEDURE — 85025 COMPLETE CBC W/AUTO DIFF WBC: CPT

## 2024-08-07 PROCEDURE — 6360000002 HC RX W HCPCS

## 2024-08-07 PROCEDURE — 84100 ASSAY OF PHOSPHORUS: CPT

## 2024-08-07 PROCEDURE — 82947 ASSAY GLUCOSE BLOOD QUANT: CPT

## 2024-08-07 PROCEDURE — 2500000003 HC RX 250 WO HCPCS

## 2024-08-07 PROCEDURE — 99232 SBSQ HOSP IP/OBS MODERATE 35: CPT | Performed by: SURGERY

## 2024-08-07 RX ORDER — DEXTROSE MONOHYDRATE AND SODIUM CHLORIDE 5; .45 G/100ML; G/100ML
INJECTION, SOLUTION INTRAVENOUS CONTINUOUS
Status: DISCONTINUED | OUTPATIENT
Start: 2024-08-07 | End: 2024-08-07

## 2024-08-07 RX ORDER — BUPIVACAINE HYDROCHLORIDE 5 MG/ML
INJECTION, SOLUTION EPIDURAL; INTRACAUDAL
Status: COMPLETED | OUTPATIENT
Start: 2024-08-07 | End: 2024-08-07

## 2024-08-07 RX ORDER — DEXTROSE MONOHYDRATE AND SODIUM CHLORIDE 5; .45 G/100ML; G/100ML
INJECTION, SOLUTION INTRAVENOUS CONTINUOUS
Status: DISCONTINUED | OUTPATIENT
Start: 2024-08-08 | End: 2024-08-09

## 2024-08-07 RX ORDER — VENLAFAXINE HYDROCHLORIDE 150 MG/1
300 CAPSULE, EXTENDED RELEASE ORAL DAILY
Status: DISCONTINUED | OUTPATIENT
Start: 2024-08-07 | End: 2024-08-12 | Stop reason: CLARIF

## 2024-08-07 RX ADMIN — BUSPIRONE HYDROCHLORIDE 30 MG: 10 TABLET ORAL at 09:01

## 2024-08-07 RX ADMIN — SODIUM CHLORIDE, PRESERVATIVE FREE 10 ML: 5 INJECTION INTRAVENOUS at 09:02

## 2024-08-07 RX ADMIN — PANTOPRAZOLE SODIUM 40 MG: 40 TABLET, DELAYED RELEASE ORAL at 07:41

## 2024-08-07 RX ADMIN — VENLAFAXINE HYDROCHLORIDE 300 MG: 150 CAPSULE, EXTENDED RELEASE ORAL at 13:28

## 2024-08-07 RX ADMIN — ACETAMINOPHEN 1000 MG: 500 TABLET ORAL at 20:08

## 2024-08-07 RX ADMIN — ENOXAPARIN SODIUM 40 MG: 100 INJECTION SUBCUTANEOUS at 09:00

## 2024-08-07 RX ADMIN — GABAPENTIN 300 MG: 300 CAPSULE ORAL at 02:53

## 2024-08-07 RX ADMIN — BUPIVACAINE 20 ML: 13.3 INJECTION, SUSPENSION, LIPOSOMAL INFILTRATION at 14:25

## 2024-08-07 RX ADMIN — SENNOSIDES AND DOCUSATE SODIUM 1 TABLET: 50; 8.6 TABLET ORAL at 09:01

## 2024-08-07 RX ADMIN — SODIUM CHLORIDE, PRESERVATIVE FREE 10 ML: 5 INJECTION INTRAVENOUS at 20:08

## 2024-08-07 RX ADMIN — BACITRACIN: 500 OINTMENT TOPICAL at 20:08

## 2024-08-07 RX ADMIN — SODIUM CHLORIDE, POTASSIUM CHLORIDE, SODIUM LACTATE AND CALCIUM CHLORIDE: 600; 310; 30; 20 INJECTION, SOLUTION INTRAVENOUS at 00:03

## 2024-08-07 RX ADMIN — ACETAMINOPHEN 1000 MG: 500 TABLET ORAL at 13:27

## 2024-08-07 RX ADMIN — GABAPENTIN 300 MG: 300 CAPSULE ORAL at 18:36

## 2024-08-07 RX ADMIN — LAMOTRIGINE 25 MG: 25 TABLET ORAL at 11:34

## 2024-08-07 RX ADMIN — METHOCARBAMOL 750 MG: 750 TABLET ORAL at 20:08

## 2024-08-07 RX ADMIN — LAMOTRIGINE 25 MG: 25 TABLET ORAL at 20:35

## 2024-08-07 RX ADMIN — ENOXAPARIN SODIUM 40 MG: 100 INJECTION SUBCUTANEOUS at 20:35

## 2024-08-07 RX ADMIN — METHOCARBAMOL 750 MG: 750 TABLET ORAL at 17:17

## 2024-08-07 RX ADMIN — METHOCARBAMOL 750 MG: 750 TABLET ORAL at 02:54

## 2024-08-07 RX ADMIN — POLYETHYLENE GLYCOL 3350 17 G: 17 POWDER, FOR SOLUTION ORAL at 09:00

## 2024-08-07 RX ADMIN — ROSUVASTATIN CALCIUM 20 MG: 20 TABLET, FILM COATED ORAL at 17:17

## 2024-08-07 RX ADMIN — IPRATROPIUM BROMIDE AND ALBUTEROL SULFATE 1 DOSE: 2.5; .5 SOLUTION RESPIRATORY (INHALATION) at 08:08

## 2024-08-07 RX ADMIN — BUPIVACAINE 266 MG: 13.3 INJECTION, SUSPENSION, LIPOSOMAL INFILTRATION at 14:19

## 2024-08-07 RX ADMIN — DEXTROSE AND SODIUM CHLORIDE: 5; 450 INJECTION, SOLUTION INTRAVENOUS at 11:15

## 2024-08-07 RX ADMIN — BUSPIRONE HYDROCHLORIDE 30 MG: 10 TABLET ORAL at 20:08

## 2024-08-07 RX ADMIN — POTASSIUM PHOSPHATE, MONOBASIC AND POTASSIUM PHOSPHATE, DIBASIC 15 MMOL: 224; 236 INJECTION, SOLUTION, CONCENTRATE INTRAVENOUS at 07:59

## 2024-08-07 RX ADMIN — SENNOSIDES AND DOCUSATE SODIUM 1 TABLET: 50; 8.6 TABLET ORAL at 20:08

## 2024-08-07 RX ADMIN — HYDROMORPHONE HYDROCHLORIDE 0.25 MG: 1 INJECTION, SOLUTION INTRAMUSCULAR; INTRAVENOUS; SUBCUTANEOUS at 04:11

## 2024-08-07 RX ADMIN — BUDESONIDE AND FORMOTEROL FUMARATE DIHYDRATE 2 PUFF: 160; 4.5 AEROSOL RESPIRATORY (INHALATION) at 08:08

## 2024-08-07 RX ADMIN — DEXTROSE AND SODIUM CHLORIDE: 5; 450 INJECTION, SOLUTION INTRAVENOUS at 23:50

## 2024-08-07 RX ADMIN — LEVETIRACETAM 1500 MG: 500 TABLET, FILM COATED ORAL at 20:35

## 2024-08-07 RX ADMIN — OXYCODONE HYDROCHLORIDE 5 MG: 5 TABLET ORAL at 07:40

## 2024-08-07 RX ADMIN — HYDROMORPHONE HYDROCHLORIDE 0.25 MG: 1 INJECTION, SOLUTION INTRAMUSCULAR; INTRAVENOUS; SUBCUTANEOUS at 13:27

## 2024-08-07 RX ADMIN — BUPIVACAINE HYDROCHLORIDE 30 ML: 5 INJECTION, SOLUTION EPIDURAL; INTRACAUDAL; PERINEURAL at 14:25

## 2024-08-07 RX ADMIN — BACITRACIN: 500 OINTMENT TOPICAL at 13:28

## 2024-08-07 RX ADMIN — TOPIRAMATE 100 MG: 100 TABLET, FILM COATED ORAL at 11:35

## 2024-08-07 RX ADMIN — OXYCODONE HYDROCHLORIDE 5 MG: 5 TABLET ORAL at 20:08

## 2024-08-07 RX ADMIN — HYDROMORPHONE HYDROCHLORIDE 0.25 MG: 1 INJECTION, SOLUTION INTRAMUSCULAR; INTRAVENOUS; SUBCUTANEOUS at 09:00

## 2024-08-07 RX ADMIN — BACITRACIN: 500 OINTMENT TOPICAL at 09:02

## 2024-08-07 RX ADMIN — LEVETIRACETAM 1500 MG: 500 TABLET, FILM COATED ORAL at 09:08

## 2024-08-07 RX ADMIN — METHOCARBAMOL 750 MG: 750 TABLET ORAL at 09:01

## 2024-08-07 RX ADMIN — ACETAMINOPHEN 1000 MG: 500 TABLET ORAL at 04:11

## 2024-08-07 RX ADMIN — HYDROMORPHONE HYDROCHLORIDE 0.25 MG: 1 INJECTION, SOLUTION INTRAMUSCULAR; INTRAVENOUS; SUBCUTANEOUS at 23:40

## 2024-08-07 RX ADMIN — OXYCODONE HYDROCHLORIDE 5 MG: 5 TABLET ORAL at 11:54

## 2024-08-07 RX ADMIN — HYDROMORPHONE HYDROCHLORIDE 0.25 MG: 1 INJECTION, SOLUTION INTRAMUSCULAR; INTRAVENOUS; SUBCUTANEOUS at 00:08

## 2024-08-07 RX ADMIN — OXYCODONE HYDROCHLORIDE 5 MG: 5 TABLET ORAL at 03:36

## 2024-08-07 RX ADMIN — HYDROMORPHONE HYDROCHLORIDE 0.25 MG: 1 INJECTION, SOLUTION INTRAMUSCULAR; INTRAVENOUS; SUBCUTANEOUS at 17:24

## 2024-08-07 ASSESSMENT — PAIN SCALES - GENERAL
PAINLEVEL_OUTOF10: 4
PAINLEVEL_OUTOF10: 6
PAINLEVEL_OUTOF10: 10
PAINLEVEL_OUTOF10: 2
PAINLEVEL_OUTOF10: 8
PAINLEVEL_OUTOF10: 7
PAINLEVEL_OUTOF10: 10
PAINLEVEL_OUTOF10: 10
PAINLEVEL_OUTOF10: 6
PAINLEVEL_OUTOF10: 10
PAINLEVEL_OUTOF10: 10
PAINLEVEL_OUTOF10: 6
PAINLEVEL_OUTOF10: 6
PAINLEVEL_OUTOF10: 10
PAINLEVEL_OUTOF10: 10
PAINLEVEL_OUTOF10: 8
PAINLEVEL_OUTOF10: 10
PAINLEVEL_OUTOF10: 10

## 2024-08-07 ASSESSMENT — PAIN DESCRIPTION - ORIENTATION
ORIENTATION: RIGHT

## 2024-08-07 ASSESSMENT — PAIN DESCRIPTION - LOCATION
LOCATION: HIP;RIB CAGE
LOCATION: HIP;RIB CAGE
LOCATION: HEAD;HIP;LEG
LOCATION: HIP;RIB CAGE
LOCATION: OTHER (COMMENT)
LOCATION: HIP;RIB CAGE

## 2024-08-07 ASSESSMENT — PAIN DESCRIPTION - DESCRIPTORS
DESCRIPTORS: SHARP
DESCRIPTORS: ACHING
DESCRIPTORS: ACHING
DESCRIPTORS: SHARP
DESCRIPTORS: SHARP

## 2024-08-07 NOTE — CARE COORDINATION
Transitional Planning  Plan for ortho surgery today plan for definitive fixation RLE  Await PT OT evals postop   PT is open to ARU and has made two SNF choices referrals were made. Re-eval post op
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
## 2024-08-07 NOTE — ANESTHESIA PROCEDURE NOTES
Peripheral Block    Patient location during procedure: ICU  Reason for block: procedure for pain and at surgeon's request  Start time: 8/7/2024 2:25 PM  End time: 8/7/2024 2:35 PM  Staffing  Performed: anesthesiologist   Anesthesiologist: Arturo Holly MD  Performed by: Arturo Holly MD  Authorized by: Arturo Holly MD    Preanesthetic Checklist  Completed: patient identified, IV checked, site marked, risks and benefits discussed, surgical/procedural consents, equipment checked, pre-op evaluation, timeout performed, anesthesia consent given, oxygen available and monitors applied/VS acknowledged  Peripheral Block   Patient position: sitting  Prep: ChloraPrep  Provider prep: mask and sterile gloves  Patient monitoring: cardiac monitor, continuous pulse ox, continuous capnometry, frequent blood pressure checks, IV access and oxygen  Block type: Erector spinae  Laterality: bilateral  Injection technique: single-shot  Guidance: ultrasound guided    Needle   Needle type: insulated echogenic nerve stimulator needle   Needle gauge: 20 G  Needle localization: ultrasound guidance  Needle length: 10 cm  Assessment   Injection assessment: negative aspiration for heme and local visualized surrounding nerve on ultrasound  Paresthesia pain: none  Slow fractionated injection: yes  Hemodynamics: stable  Outcomes: uncomplicated    Additional Notes  10 mls Exparel +  15 mls 0.5 % Bupivacaine  on eachside -  2 inch from midline  ( approximately at T-5 levels)  Medications Administered  BUPivacaine liposome (EXPAREL) injection 1.3% - Perineural   20 mL - 8/7/2024 2:25:00 PM  BUPivacaine (MARCAINE) PF injection 0.5% - Perineural   30 mL - 8/7/2024 2:25:00 PM

## 2024-08-08 ENCOUNTER — APPOINTMENT (OUTPATIENT)
Dept: GENERAL RADIOLOGY | Age: 59
DRG: 956 | End: 2024-08-08
Payer: MEDICARE

## 2024-08-08 ENCOUNTER — ANESTHESIA (OUTPATIENT)
Dept: OPERATING ROOM | Age: 59
End: 2024-08-08
Payer: MEDICARE

## 2024-08-08 PROBLEM — S32.421A: Status: ACTIVE | Noted: 2024-08-08

## 2024-08-08 PROBLEM — S92.901A CLOSED FRACTURE OF BONE OF RIGHT FOOT: Status: ACTIVE | Noted: 2024-08-08
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
## 2024-08-08 LAB
ALLEN TEST: ABNORMAL
ANION GAP SERPL CALCULATED.3IONS-SCNC: 10 MMOL/L (ref 9–16)
ANION GAP SERPL CALCULATED.3IONS-SCNC: 9 MMOL/L (ref 9–16)
ARTERIAL PATENCY WRIST A: ABNORMAL
BASOPHILS # BLD: 0.04 K/UL (ref 0–0.2)
BASOPHILS NFR BLD: 1 % (ref 0–2)
BODY TEMPERATURE: 37
BUN BLD-MCNC: 8 MG/DL (ref 8–26)
BUN SERPL-MCNC: 12 MG/DL (ref 6–20)
BUN SERPL-MCNC: 13 MG/DL (ref 6–20)
CA-I BLD-SCNC: 1.09 MMOL/L (ref 1.13–1.33)
CA-I BLD-SCNC: 1.13 MMOL/L (ref 1.15–1.33)
CA-I BLD-SCNC: 1.15 MMOL/L (ref 1.13–1.33)
CALCIUM SERPL-MCNC: 7.5 MG/DL (ref 8.6–10.4)
CALCIUM SERPL-MCNC: 7.9 MG/DL (ref 8.6–10.4)
CHLORIDE BLD-SCNC: 102 MMOL/L (ref 98–107)
CHLORIDE SERPL-SCNC: 102 MMOL/L (ref 98–107)
CHLORIDE SERPL-SCNC: 107 MMOL/L (ref 98–107)
CHLORIDE, WHOLE BLOOD: 107 MMOL/L (ref 98–110)
CO2 BLD CALC-SCNC: 21 MMOL/L (ref 22–30)
CO2 SERPL-SCNC: 23 MMOL/L (ref 20–31)
CO2 SERPL-SCNC: 27 MMOL/L (ref 20–31)
COHGB MFR BLD: 3 % (ref 0–5)
CREAT SERPL-MCNC: 0.6 MG/DL (ref 0.5–0.9)
CREAT SERPL-MCNC: 0.7 MG/DL (ref 0.5–0.9)
CRITICAL ACTION: NORMAL
CRITICAL NOTIFICATION DATE/TIME: NORMAL
CRITICAL VALUE READ BACK: YES
EGFR, POC: >90 ML/MIN/1.73M2
EOSINOPHIL # BLD: 0.25 K/UL (ref 0–0.44)
EOSINOPHILS RELATIVE PERCENT: 4 % (ref 1–4)
ERYTHROCYTE [DISTWIDTH] IN BLOOD BY AUTOMATED COUNT: 13.3 % (ref 11.8–14.4)
ERYTHROCYTE [DISTWIDTH] IN BLOOD BY AUTOMATED COUNT: 13.5 % (ref 11.8–14.4)
ERYTHROCYTE [DISTWIDTH] IN BLOOD BY AUTOMATED COUNT: 13.9 % (ref 11.8–14.4)
FIO2 ON VENT: ABNORMAL %
FIO2: 4
GFR, ESTIMATED: >90 ML/MIN/1.73M2
GFR, ESTIMATED: >90 ML/MIN/1.73M2
GLUCOSE BLD-MCNC: 112 MG/DL (ref 65–105)
GLUCOSE BLD-MCNC: 136 MG/DL (ref 65–105)
GLUCOSE BLD-MCNC: 139 MG/DL (ref 65–105)
GLUCOSE BLD-MCNC: 152 MG/DL (ref 65–105)
GLUCOSE BLD-MCNC: 182 MG/DL (ref 74–100)
GLUCOSE BLD-MCNC: 85 MG/DL (ref 74–100)
GLUCOSE SERPL-MCNC: 162 MG/DL (ref 74–99)
GLUCOSE SERPL-MCNC: 220 MG/DL (ref 74–99)
HCO3 ARTERIAL: 27.6 MMOL/L (ref 22–27)
HCT VFR BLD AUTO: 15 % (ref 36–46)
HCT VFR BLD AUTO: 20.9 % (ref 36.3–47.1)
HCT VFR BLD AUTO: 22.9 % (ref 36.3–47.1)
HCT VFR BLD AUTO: 26.6 % (ref 36.3–47.1)
HCT VFR BLD CALC: 25.8 % (ref 36.3–47.1)
HEMOGLOBIN: 8.3 GM/DL (ref 11.9–15.1)
HGB BLD-MCNC: 6.5 G/DL (ref 11.9–15.1)
HGB BLD-MCNC: 7.2 G/DL (ref 11.9–15.1)
HGB BLD-MCNC: 8.2 G/DL (ref 11.9–15.1)
IMM GRANULOCYTES # BLD AUTO: <0.03 K/UL (ref 0–0.3)
IMM GRANULOCYTES NFR BLD: 0 %
LACTIC ACID, WHOLE BLOOD: 0.9 MMOL/L (ref 0.7–2.1)
LYMPHOCYTES NFR BLD: 1.68 K/UL (ref 1.1–3.7)
LYMPHOCYTES RELATIVE PERCENT: 24 % (ref 24–43)
MAGNESIUM SERPL-MCNC: 1.9 MG/DL (ref 1.6–2.6)
MAGNESIUM SERPL-MCNC: 2 MG/DL (ref 1.6–2.6)
MCH RBC QN AUTO: 28.5 PG (ref 25.2–33.5)
MCH RBC QN AUTO: 29.1 PG (ref 25.2–33.5)
MCH RBC QN AUTO: 29.8 PG (ref 25.2–33.5)
MCHC RBC AUTO-ENTMCNC: 30.8 G/DL (ref 28.4–34.8)
MCHC RBC AUTO-ENTMCNC: 31.1 G/DL (ref 28.4–34.8)
MCHC RBC AUTO-ENTMCNC: 31.4 G/DL (ref 28.4–34.8)
MCV RBC AUTO: 90.5 FL (ref 82.6–102.9)
MCV RBC AUTO: 93.7 FL (ref 82.6–102.9)
MCV RBC AUTO: 96.7 FL (ref 82.6–102.9)
MONOCYTES NFR BLD: 0.49 K/UL (ref 0.1–1.2)
MONOCYTES NFR BLD: 7 % (ref 3–12)
NEGATIVE BASE EXCESS, ART: 2.7 MMOL/L (ref 0–2)
NEUTROPHILS NFR BLD: 65 % (ref 36–65)
NEUTS SEG NFR BLD: 4.65 K/UL (ref 1.5–8.1)
NRBC BLD-RTO: 0 PER 100 WBC
O2 DELIVERY DEVICE: ABNORMAL
O2 SAT, ARTERIAL: 93.2 % (ref 94–100)
PATIENT TEMP: 36.7
PCO2 ARTERIAL: 46.7 MMHG (ref 32–45)
PH ARTERIAL: 7.39 (ref 7.35–7.45)
PHOSPHATE SERPL-MCNC: 2.3 MG/DL (ref 2.5–4.5)
PLATELET # BLD AUTO: 182 K/UL (ref 138–453)
PLATELET # BLD AUTO: 193 K/UL (ref 138–453)
PLATELET # BLD AUTO: 80 K/UL (ref 138–453)
PMV BLD AUTO: 10.1 FL (ref 8.1–13.5)
PMV BLD AUTO: 10.4 FL (ref 8.1–13.5)
PMV BLD AUTO: 9.6 FL (ref 8.1–13.5)
PO2 ARTERIAL: 65.5 MMHG (ref 75–95)
POC ANION GAP: 18 MMOL/L (ref 7–16)
POC CREATININE: 0.5 MG/DL (ref 0.51–1.19)
POC HCO3: 22.8 MMOL/L (ref 21–28)
POC HEMOGLOBIN (CALC): 5 G/DL (ref 12–16)
POC LACTIC ACID: 1.4 MMOL/L (ref 0.56–1.39)
POC LACTIC ACID: 9 MMOL/L (ref 0.56–1.39)
POC O2 SATURATION: 97.4 % (ref 94–98)
POC PCO2: 42.3 MM HG (ref 35–48)
POC PH: 7.34 (ref 7.35–7.45)
POC PO2: 101.5 MM HG (ref 83–108)
POSITIVE BASE EXCESS, ART: 2.8 MMOL/L (ref 0–2)
POTASSIUM BLD-SCNC: 3.7 MMOL/L (ref 3.5–4.5)
POTASSIUM SERPL-SCNC: 3.5 MMOL/L (ref 3.7–5.3)
POTASSIUM SERPL-SCNC: 3.9 MMOL/L (ref 3.7–5.3)
POTASSIUM, WHOLE BLOOD: 3.5 MMOL/L (ref 3.6–5)
RBC # BLD AUTO: 2.23 M/UL (ref 3.95–5.11)
RBC # BLD AUTO: 2.53 M/UL (ref 3.95–5.11)
RBC # BLD AUTO: 2.75 M/UL (ref 3.95–5.11)
SAMPLE SITE: ABNORMAL
SODIUM BLD-SCNC: 140 MMOL/L (ref 138–146)
SODIUM SERPL-SCNC: 138 MMOL/L (ref 136–145)
SODIUM SERPL-SCNC: 140 MMOL/L (ref 136–145)
SODIUM, WHOLE BLOOD: 140 MMOL/L (ref 136–145)
WBC OTHER # BLD: 6.6 K/UL (ref 3.5–11.3)
WBC OTHER # BLD: 7.1 K/UL (ref 3.5–11.3)
WBC OTHER # BLD: 9.2 K/UL (ref 3.5–11.3)

## 2024-08-08 PROCEDURE — 0YP9XYZ REMOVAL OF OTHER DEVICE FROM RIGHT LOWER EXTREMITY, EXTERNAL APPROACH: ICD-10-PCS | Performed by: STUDENT IN AN ORGANIZED HEALTH CARE EDUCATION/TRAINING PROGRAM

## 2024-08-08 PROCEDURE — 82947 ASSAY GLUCOSE BLOOD QUANT: CPT

## 2024-08-08 PROCEDURE — 2580000003 HC RX 258: Performed by: STUDENT IN AN ORGANIZED HEALTH CARE EDUCATION/TRAINING PROGRAM

## 2024-08-08 PROCEDURE — 84100 ASSAY OF PHOSPHORUS: CPT

## 2024-08-08 PROCEDURE — 6360000002 HC RX W HCPCS

## 2024-08-08 PROCEDURE — 82330 ASSAY OF CALCIUM: CPT

## 2024-08-08 PROCEDURE — 2580000003 HC RX 258

## 2024-08-08 PROCEDURE — 6370000000 HC RX 637 (ALT 250 FOR IP): Performed by: STUDENT IN AN ORGANIZED HEALTH CARE EDUCATION/TRAINING PROGRAM

## 2024-08-08 PROCEDURE — 6370000000 HC RX 637 (ALT 250 FOR IP)

## 2024-08-08 PROCEDURE — 86901 BLOOD TYPING SEROLOGIC RH(D): CPT

## 2024-08-08 PROCEDURE — P9016 RBC LEUKOCYTES REDUCED: HCPCS

## 2024-08-08 PROCEDURE — 2000000000 HC ICU R&B

## 2024-08-08 PROCEDURE — C1776 JOINT DEVICE (IMPLANTABLE): HCPCS | Performed by: STUDENT IN AN ORGANIZED HEALTH CARE EDUCATION/TRAINING PROGRAM

## 2024-08-08 PROCEDURE — 6360000002 HC RX W HCPCS: Performed by: STUDENT IN AN ORGANIZED HEALTH CARE EDUCATION/TRAINING PROGRAM

## 2024-08-08 PROCEDURE — 3700000000 HC ANESTHESIA ATTENDED CARE: Performed by: STUDENT IN AN ORGANIZED HEALTH CARE EDUCATION/TRAINING PROGRAM

## 2024-08-08 PROCEDURE — 85027 COMPLETE CBC AUTOMATED: CPT

## 2024-08-08 PROCEDURE — 86920 COMPATIBILITY TEST SPIN: CPT

## 2024-08-08 PROCEDURE — 83735 ASSAY OF MAGNESIUM: CPT

## 2024-08-08 PROCEDURE — 85347 COAGULATION TIME ACTIVATED: CPT

## 2024-08-08 PROCEDURE — 84132 ASSAY OF SERUM POTASSIUM: CPT

## 2024-08-08 PROCEDURE — 2720000010 HC SURG SUPPLY STERILE: Performed by: STUDENT IN AN ORGANIZED HEALTH CARE EDUCATION/TRAINING PROGRAM

## 2024-08-08 PROCEDURE — 30233N1 TRANSFUSION OF NONAUTOLOGOUS RED BLOOD CELLS INTO PERIPHERAL VEIN, PERCUTANEOUS APPROACH: ICD-10-PCS

## 2024-08-08 PROCEDURE — 85576 BLOOD PLATELET AGGREGATION: CPT

## 2024-08-08 PROCEDURE — 82565 ASSAY OF CREATININE: CPT

## 2024-08-08 PROCEDURE — 86850 RBC ANTIBODY SCREEN: CPT

## 2024-08-08 PROCEDURE — 3700000001 HC ADD 15 MINUTES (ANESTHESIA): Performed by: STUDENT IN AN ORGANIZED HEALTH CARE EDUCATION/TRAINING PROGRAM

## 2024-08-08 PROCEDURE — 94761 N-INVAS EAR/PLS OXIMETRY MLT: CPT

## 2024-08-08 PROCEDURE — 83605 ASSAY OF LACTIC ACID: CPT

## 2024-08-08 PROCEDURE — 20670 REMOVAL IMPLANT SUPERFICIAL: CPT | Performed by: STUDENT IN AN ORGANIZED HEALTH CARE EDUCATION/TRAINING PROGRAM

## 2024-08-08 PROCEDURE — 99232 SBSQ HOSP IP/OBS MODERATE 35: CPT | Performed by: SURGERY

## 2024-08-08 PROCEDURE — 72190 X-RAY EXAM OF PELVIS: CPT

## 2024-08-08 PROCEDURE — 2500000003 HC RX 250 WO HCPCS

## 2024-08-08 PROCEDURE — 7100000001 HC PACU RECOVERY - ADDTL 15 MIN: Performed by: STUDENT IN AN ORGANIZED HEALTH CARE EDUCATION/TRAINING PROGRAM

## 2024-08-08 PROCEDURE — 3600000014 HC SURGERY LEVEL 4 ADDTL 15MIN: Performed by: STUDENT IN AN ORGANIZED HEALTH CARE EDUCATION/TRAINING PROGRAM

## 2024-08-08 PROCEDURE — 7100000000 HC PACU RECOVERY - FIRST 15 MIN: Performed by: STUDENT IN AN ORGANIZED HEALTH CARE EDUCATION/TRAINING PROGRAM

## 2024-08-08 PROCEDURE — 28470 CLTX METATARSAL FX WO MNP EA: CPT | Performed by: STUDENT IN AN ORGANIZED HEALTH CARE EDUCATION/TRAINING PROGRAM

## 2024-08-08 PROCEDURE — 86900 BLOOD TYPING SEROLOGIC ABO: CPT

## 2024-08-08 PROCEDURE — 3E0T3BZ INTRODUCTION OF ANESTHETIC AGENT INTO PERIPHERAL NERVES AND PLEXI, PERCUTANEOUS APPROACH: ICD-10-PCS | Performed by: STUDENT IN AN ORGANIZED HEALTH CARE EDUCATION/TRAINING PROGRAM

## 2024-08-08 PROCEDURE — 82803 BLOOD GASES ANY COMBINATION: CPT

## 2024-08-08 PROCEDURE — 0SR904Z REPLACEMENT OF RIGHT HIP JOINT WITH CERAMIC ON POLYETHYLENE SYNTHETIC SUBSTITUTE, OPEN APPROACH: ICD-10-PCS | Performed by: STUDENT IN AN ORGANIZED HEALTH CARE EDUCATION/TRAINING PROGRAM

## 2024-08-08 PROCEDURE — 36415 COLL VENOUS BLD VENIPUNCTURE: CPT

## 2024-08-08 PROCEDURE — 84520 ASSAY OF UREA NITROGEN: CPT

## 2024-08-08 PROCEDURE — 80048 BASIC METABOLIC PNL TOTAL CA: CPT

## 2024-08-08 PROCEDURE — 94640 AIRWAY INHALATION TREATMENT: CPT

## 2024-08-08 PROCEDURE — 85014 HEMATOCRIT: CPT

## 2024-08-08 PROCEDURE — 73502 X-RAY EXAM HIP UNI 2-3 VIEWS: CPT

## 2024-08-08 PROCEDURE — 85390 FIBRINOLYSINS SCREEN I&R: CPT

## 2024-08-08 PROCEDURE — 76942 ECHO GUIDE FOR BIOPSY: CPT | Performed by: STUDENT IN AN ORGANIZED HEALTH CARE EDUCATION/TRAINING PROGRAM

## 2024-08-08 PROCEDURE — 85384 FIBRINOGEN ACTIVITY: CPT

## 2024-08-08 PROCEDURE — 27228 TREAT HIP FRACTURE(S): CPT | Performed by: STUDENT IN AN ORGANIZED HEALTH CARE EDUCATION/TRAINING PROGRAM

## 2024-08-08 PROCEDURE — 27130 TOTAL HIP ARTHROPLASTY: CPT | Performed by: STUDENT IN AN ORGANIZED HEALTH CARE EDUCATION/TRAINING PROGRAM

## 2024-08-08 PROCEDURE — C1713 ANCHOR/SCREW BN/BN,TIS/BN: HCPCS | Performed by: STUDENT IN AN ORGANIZED HEALTH CARE EDUCATION/TRAINING PROGRAM

## 2024-08-08 PROCEDURE — 80051 ELECTROLYTE PANEL: CPT

## 2024-08-08 PROCEDURE — 2709999900 HC NON-CHARGEABLE SUPPLY: Performed by: STUDENT IN AN ORGANIZED HEALTH CARE EDUCATION/TRAINING PROGRAM

## 2024-08-08 PROCEDURE — 0QS404Z REPOSITION RIGHT ACETABULUM WITH INTERNAL FIXATION DEVICE, OPEN APPROACH: ICD-10-PCS | Performed by: STUDENT IN AN ORGANIZED HEALTH CARE EDUCATION/TRAINING PROGRAM

## 2024-08-08 PROCEDURE — 85025 COMPLETE CBC W/AUTO DIFF WBC: CPT

## 2024-08-08 PROCEDURE — 3600000004 HC SURGERY LEVEL 4 BASE: Performed by: STUDENT IN AN ORGANIZED HEALTH CARE EDUCATION/TRAINING PROGRAM

## 2024-08-08 PROCEDURE — 85018 HEMOGLOBIN: CPT

## 2024-08-08 PROCEDURE — 82805 BLOOD GASES W/O2 SATURATION: CPT

## 2024-08-08 PROCEDURE — 2700000000 HC OXYGEN THERAPY PER DAY

## 2024-08-08 PROCEDURE — P9041 ALBUMIN (HUMAN),5%, 50ML: HCPCS

## 2024-08-08 PROCEDURE — 37799 UNLISTED PX VASCULAR SURGERY: CPT

## 2024-08-08 DEVICE — SCREW BNE L26MM DIA3.5MM CORT S STL ST NONCANNULATED LOK: Type: IMPLANTABLE DEVICE | Site: HIP | Status: FUNCTIONAL

## 2024-08-08 DEVICE — REDAPT LOCKING SCREW 15MM
Type: IMPLANTABLE DEVICE | Status: FUNCTIONAL
Brand: REDAPT

## 2024-08-08 DEVICE — BIOLOX DELTA CERAMIC FEMORAL HEAD 28MM DIA +5.0 12/14 TAPER
Type: IMPLANTABLE DEVICE | Site: HIP | Status: FUNCTIONAL
Brand: BIOLOX DELTA

## 2024-08-08 DEVICE — REDAPT MODULAR SHELL 52MM
Type: IMPLANTABLE DEVICE | Site: HIP | Status: FUNCTIONAL
Brand: REDAPT

## 2024-08-08 DEVICE — STEM FEM SZ 2 HI OFFSET CLLRD 12/14 TAPR ACTIS ARTC EZ: Type: IMPLANTABLE DEVICE | Site: HIP | Status: FUNCTIONAL

## 2024-08-08 DEVICE — REDAPT LOCKING SCREW 20MM
Type: IMPLANTABLE DEVICE | Status: FUNCTIONAL
Brand: REDAPT

## 2024-08-08 DEVICE — OR3O DUAL MOBILITY LINER 40/52
Type: IMPLANTABLE DEVICE | Site: HIP | Status: FUNCTIONAL
Brand: OR3O DUAL MOBILITY

## 2024-08-08 DEVICE — SCREW BNE L36MM DIA3.5MM CORT S STL ST NONCANNULATED LOK: Type: IMPLANTABLE DEVICE | Site: HIP | Status: FUNCTIONAL

## 2024-08-08 DEVICE — PLATE BNE W10.2XL104MM THK2.7MM 8 H BILAT S STL STR LO PROF: Type: IMPLANTABLE DEVICE | Site: HIP | Status: FUNCTIONAL

## 2024-08-08 DEVICE — SCREW BNE L46MM DIA3.5MM CORT S STL ST NONCANNULATED LOK: Type: IMPLANTABLE DEVICE | Site: HIP | Status: FUNCTIONAL

## 2024-08-08 DEVICE — SCREW BNE L50MM DIA3.5MM STD CORT S STL ST NONCANNULATED: Type: IMPLANTABLE DEVICE | Site: HIP | Status: FUNCTIONAL

## 2024-08-08 RX ORDER — MAGNESIUM HYDROXIDE 1200 MG/15ML
LIQUID ORAL CONTINUOUS PRN
Status: COMPLETED | OUTPATIENT
Start: 2024-08-08 | End: 2024-08-08

## 2024-08-08 RX ORDER — DEXAMETHASONE SODIUM PHOSPHATE 10 MG/ML
INJECTION, SOLUTION INTRAMUSCULAR; INTRAVENOUS PRN
Status: DISCONTINUED | OUTPATIENT
Start: 2024-08-08 | End: 2024-08-08 | Stop reason: SDUPTHER

## 2024-08-08 RX ORDER — NALOXONE HYDROCHLORIDE 0.4 MG/ML
INJECTION, SOLUTION INTRAMUSCULAR; INTRAVENOUS; SUBCUTANEOUS PRN
Status: DISCONTINUED | OUTPATIENT
Start: 2024-08-08 | End: 2024-08-08 | Stop reason: HOSPADM

## 2024-08-08 RX ORDER — SODIUM CHLORIDE 9 MG/ML
INJECTION, SOLUTION INTRAVENOUS PRN
Status: DISCONTINUED | OUTPATIENT
Start: 2024-08-08 | End: 2024-08-08 | Stop reason: HOSPADM

## 2024-08-08 RX ORDER — SODIUM CHLORIDE 0.9 % (FLUSH) 0.9 %
5-40 SYRINGE (ML) INJECTION EVERY 12 HOURS SCHEDULED
Status: DISCONTINUED | OUTPATIENT
Start: 2024-08-08 | End: 2024-08-08 | Stop reason: HOSPADM

## 2024-08-08 RX ORDER — MIDAZOLAM HYDROCHLORIDE 1 MG/ML
INJECTION INTRAMUSCULAR; INTRAVENOUS PRN
Status: DISCONTINUED | OUTPATIENT
Start: 2024-08-08 | End: 2024-08-08 | Stop reason: SDUPTHER

## 2024-08-08 RX ORDER — ROCURONIUM BROMIDE 10 MG/ML
INJECTION, SOLUTION INTRAVENOUS PRN
Status: DISCONTINUED | OUTPATIENT
Start: 2024-08-08 | End: 2024-08-08 | Stop reason: SDUPTHER

## 2024-08-08 RX ORDER — FENTANYL CITRATE 50 UG/ML
25 INJECTION, SOLUTION INTRAMUSCULAR; INTRAVENOUS
Status: COMPLETED | OUTPATIENT
Start: 2024-08-08 | End: 2024-08-08

## 2024-08-08 RX ORDER — SODIUM CHLORIDE 9 MG/ML
INJECTION, SOLUTION INTRAVENOUS PRN
Status: DISCONTINUED | OUTPATIENT
Start: 2024-08-08 | End: 2024-08-11

## 2024-08-08 RX ORDER — SODIUM CHLORIDE 0.9 % (FLUSH) 0.9 %
5-40 SYRINGE (ML) INJECTION PRN
Status: DISCONTINUED | OUTPATIENT
Start: 2024-08-08 | End: 2024-08-08 | Stop reason: HOSPADM

## 2024-08-08 RX ORDER — SODIUM CHLORIDE, SODIUM LACTATE, POTASSIUM CHLORIDE, CALCIUM CHLORIDE 600; 310; 30; 20 MG/100ML; MG/100ML; MG/100ML; MG/100ML
INJECTION, SOLUTION INTRAVENOUS CONTINUOUS PRN
Status: DISCONTINUED | OUTPATIENT
Start: 2024-08-08 | End: 2024-08-08 | Stop reason: SDUPTHER

## 2024-08-08 RX ORDER — HEPARIN SODIUM 10000 [USP'U]/ML
INJECTION, SOLUTION INTRAVENOUS; SUBCUTANEOUS PRN
Status: DISCONTINUED | OUTPATIENT
Start: 2024-08-08 | End: 2024-08-08 | Stop reason: ALTCHOICE

## 2024-08-08 RX ORDER — TOBRAMYCIN 1.2 G/30ML
INJECTION, POWDER, LYOPHILIZED, FOR SOLUTION INTRAVENOUS PRN
Status: DISCONTINUED | OUTPATIENT
Start: 2024-08-08 | End: 2024-08-08 | Stop reason: ALTCHOICE

## 2024-08-08 RX ORDER — ALBUMIN, HUMAN INJ 5% 5 %
SOLUTION INTRAVENOUS PRN
Status: DISCONTINUED | OUTPATIENT
Start: 2024-08-08 | End: 2024-08-08 | Stop reason: SDUPTHER

## 2024-08-08 RX ORDER — ONDANSETRON 2 MG/ML
INJECTION INTRAMUSCULAR; INTRAVENOUS PRN
Status: DISCONTINUED | OUTPATIENT
Start: 2024-08-08 | End: 2024-08-08 | Stop reason: SDUPTHER

## 2024-08-08 RX ORDER — VANCOMYCIN HYDROCHLORIDE 1 G/20ML
INJECTION, POWDER, LYOPHILIZED, FOR SOLUTION INTRAVENOUS PRN
Status: DISCONTINUED | OUTPATIENT
Start: 2024-08-08 | End: 2024-08-08 | Stop reason: ALTCHOICE

## 2024-08-08 RX ORDER — TRANEXAMIC ACID 10 MG/ML
INJECTION, SOLUTION INTRAVENOUS PRN
Status: DISCONTINUED | OUTPATIENT
Start: 2024-08-08 | End: 2024-08-08 | Stop reason: SDUPTHER

## 2024-08-08 RX ORDER — FENTANYL CITRATE 50 UG/ML
INJECTION, SOLUTION INTRAMUSCULAR; INTRAVENOUS PRN
Status: DISCONTINUED | OUTPATIENT
Start: 2024-08-08 | End: 2024-08-08 | Stop reason: SDUPTHER

## 2024-08-08 RX ORDER — PROPOFOL 10 MG/ML
INJECTION, EMULSION INTRAVENOUS PRN
Status: DISCONTINUED | OUTPATIENT
Start: 2024-08-08 | End: 2024-08-08 | Stop reason: SDUPTHER

## 2024-08-08 RX ORDER — FENTANYL CITRATE 50 UG/ML
25 INJECTION, SOLUTION INTRAMUSCULAR; INTRAVENOUS ONCE
Status: COMPLETED | OUTPATIENT
Start: 2024-08-08 | End: 2024-08-08

## 2024-08-08 RX ORDER — LIDOCAINE HYDROCHLORIDE 10 MG/ML
INJECTION, SOLUTION EPIDURAL; INFILTRATION; INTRACAUDAL; PERINEURAL PRN
Status: DISCONTINUED | OUTPATIENT
Start: 2024-08-08 | End: 2024-08-08 | Stop reason: SDUPTHER

## 2024-08-08 RX ORDER — CALCIUM GLUCONATE 20 MG/ML
1000 INJECTION, SOLUTION INTRAVENOUS ONCE
Status: COMPLETED | OUTPATIENT
Start: 2024-08-08 | End: 2024-08-08

## 2024-08-08 RX ORDER — FENTANYL CITRATE 50 UG/ML
25 INJECTION, SOLUTION INTRAMUSCULAR; INTRAVENOUS EVERY 5 MIN PRN
Status: DISCONTINUED | OUTPATIENT
Start: 2024-08-08 | End: 2024-08-08 | Stop reason: HOSPADM

## 2024-08-08 RX ORDER — BUPIVACAINE HYDROCHLORIDE 5 MG/ML
INJECTION, SOLUTION EPIDURAL; INTRACAUDAL
Status: COMPLETED | OUTPATIENT
Start: 2024-08-08 | End: 2024-08-08

## 2024-08-08 RX ADMIN — SENNOSIDES AND DOCUSATE SODIUM 1 TABLET: 50; 8.6 TABLET ORAL at 20:33

## 2024-08-08 RX ADMIN — METHOCARBAMOL 750 MG: 750 TABLET ORAL at 20:33

## 2024-08-08 RX ADMIN — FENTANYL CITRATE 25 MCG: 50 INJECTION, SOLUTION INTRAMUSCULAR; INTRAVENOUS at 21:22

## 2024-08-08 RX ADMIN — METHOCARBAMOL 750 MG: 750 TABLET ORAL at 04:11

## 2024-08-08 RX ADMIN — SODIUM CHLORIDE, PRESERVATIVE FREE 10 ML: 5 INJECTION INTRAVENOUS at 20:33

## 2024-08-08 RX ADMIN — PROPOFOL 50 MG: 10 INJECTION, EMULSION INTRAVENOUS at 16:58

## 2024-08-08 RX ADMIN — FENTANYL CITRATE 25 MCG: 50 INJECTION, SOLUTION INTRAMUSCULAR; INTRAVENOUS at 11:42

## 2024-08-08 RX ADMIN — FENTANYL CITRATE 50 MCG: 50 INJECTION, SOLUTION INTRAMUSCULAR; INTRAVENOUS at 16:36

## 2024-08-08 RX ADMIN — OXYCODONE HYDROCHLORIDE 5 MG: 5 TABLET ORAL at 06:01

## 2024-08-08 RX ADMIN — BACITRACIN: 500 OINTMENT TOPICAL at 20:35

## 2024-08-08 RX ADMIN — POTASSIUM BICARBONATE 40 MEQ: 782 TABLET, EFFERVESCENT ORAL at 04:11

## 2024-08-08 RX ADMIN — GABAPENTIN 300 MG: 300 CAPSULE ORAL at 01:39

## 2024-08-08 RX ADMIN — TRANEXAMIC ACID 1 G: 10 INJECTION, SOLUTION INTRAVENOUS at 15:48

## 2024-08-08 RX ADMIN — ROCURONIUM BROMIDE 30 MG: 10 INJECTION, SOLUTION INTRAVENOUS at 15:49

## 2024-08-08 RX ADMIN — SODIUM CHLORIDE, SODIUM LACTATE, POTASSIUM CHLORIDE, CALCIUM CHLORIDE: 600; 310; 30; 20 INJECTION, SOLUTION INTRAVENOUS at 15:02

## 2024-08-08 RX ADMIN — LEVETIRACETAM 1500 MG: 500 TABLET, FILM COATED ORAL at 20:32

## 2024-08-08 RX ADMIN — FENTANYL CITRATE 50 MCG: 50 INJECTION, SOLUTION INTRAMUSCULAR; INTRAVENOUS at 16:58

## 2024-08-08 RX ADMIN — FENTANYL CITRATE 50 MCG: 50 INJECTION, SOLUTION INTRAMUSCULAR; INTRAVENOUS at 18:44

## 2024-08-08 RX ADMIN — IPRATROPIUM BROMIDE AND ALBUTEROL SULFATE 1 DOSE: 2.5; .5 SOLUTION RESPIRATORY (INHALATION) at 20:50

## 2024-08-08 RX ADMIN — TRANEXAMIC ACID 1 G: 10 INJECTION, SOLUTION INTRAVENOUS at 18:12

## 2024-08-08 RX ADMIN — DEXAMETHASONE SODIUM PHOSPHATE 10 MG: 10 INJECTION, SOLUTION INTRAMUSCULAR; INTRAVENOUS at 15:49

## 2024-08-08 RX ADMIN — FENTANYL CITRATE 100 MCG: 50 INJECTION, SOLUTION INTRAMUSCULAR; INTRAVENOUS at 15:10

## 2024-08-08 RX ADMIN — OXYCODONE HYDROCHLORIDE 5 MG: 5 TABLET ORAL at 20:33

## 2024-08-08 RX ADMIN — ROCURONIUM BROMIDE 50 MG: 10 INJECTION, SOLUTION INTRAVENOUS at 15:11

## 2024-08-08 RX ADMIN — SODIUM CHLORIDE, POTASSIUM CHLORIDE, SODIUM LACTATE AND CALCIUM CHLORIDE: 600; 310; 30; 20 INJECTION, SOLUTION INTRAVENOUS at 15:02

## 2024-08-08 RX ADMIN — FENTANYL CITRATE 50 MCG: 50 INJECTION, SOLUTION INTRAMUSCULAR; INTRAVENOUS at 18:15

## 2024-08-08 RX ADMIN — ONDANSETRON 4 MG: 2 INJECTION INTRAMUSCULAR; INTRAVENOUS at 18:04

## 2024-08-08 RX ADMIN — BUPIVACAINE HYDROCHLORIDE 30 ML: 5 INJECTION, SOLUTION EPIDURAL; INTRACAUDAL; PERINEURAL at 13:40

## 2024-08-08 RX ADMIN — PANTOPRAZOLE SODIUM 40 MG: 40 TABLET, DELAYED RELEASE ORAL at 06:01

## 2024-08-08 RX ADMIN — BUDESONIDE AND FORMOTEROL FUMARATE DIHYDRATE 2 PUFF: 160; 4.5 AEROSOL RESPIRATORY (INHALATION) at 09:21

## 2024-08-08 RX ADMIN — PROPOFOL 40 MG: 10 INJECTION, EMULSION INTRAVENOUS at 18:44

## 2024-08-08 RX ADMIN — MIDAZOLAM 2 MG: 1 INJECTION INTRAMUSCULAR; INTRAVENOUS at 15:07

## 2024-08-08 RX ADMIN — IPRATROPIUM BROMIDE AND ALBUTEROL SULFATE 1 DOSE: 2.5; .5 SOLUTION RESPIRATORY (INHALATION) at 09:21

## 2024-08-08 RX ADMIN — SUGAMMADEX 200 MG: 100 INJECTION, SOLUTION INTRAVENOUS at 18:52

## 2024-08-08 RX ADMIN — LIDOCAINE HYDROCHLORIDE 50 MG: 10 INJECTION, SOLUTION EPIDURAL; INFILTRATION; INTRACAUDAL; PERINEURAL at 15:10

## 2024-08-08 RX ADMIN — ACETAMINOPHEN 1000 MG: 500 TABLET ORAL at 04:11

## 2024-08-08 RX ADMIN — ACETAMINOPHEN 1000 MG: 500 TABLET ORAL at 20:32

## 2024-08-08 RX ADMIN — LAMOTRIGINE 25 MG: 25 TABLET ORAL at 20:35

## 2024-08-08 RX ADMIN — FENTANYL CITRATE 25 MCG: 50 INJECTION, SOLUTION INTRAMUSCULAR; INTRAVENOUS at 11:28

## 2024-08-08 RX ADMIN — DEXTROSE AND SODIUM CHLORIDE: 5; 450 INJECTION, SOLUTION INTRAVENOUS at 20:46

## 2024-08-08 RX ADMIN — FENTANYL CITRATE 25 MCG: 50 INJECTION, SOLUTION INTRAMUSCULAR; INTRAVENOUS at 11:16

## 2024-08-08 RX ADMIN — Medication 2000 MG: at 20:33

## 2024-08-08 RX ADMIN — Medication 2 G: at 15:38

## 2024-08-08 RX ADMIN — BUSPIRONE HYDROCHLORIDE 30 MG: 10 TABLET ORAL at 20:33

## 2024-08-08 RX ADMIN — PROPOFOL 150 MG: 10 INJECTION, EMULSION INTRAVENOUS at 15:10

## 2024-08-08 RX ADMIN — ALBUMIN (HUMAN) 25 G: 12.5 INJECTION, SOLUTION INTRAVENOUS at 16:04

## 2024-08-08 RX ADMIN — CALCIUM GLUCONATE 1000 MG: 20 INJECTION, SOLUTION INTRAVENOUS at 04:09

## 2024-08-08 ASSESSMENT — PAIN DESCRIPTION - DESCRIPTORS
DESCRIPTORS: ACHING;BURNING
DESCRIPTORS: ACHING
DESCRIPTORS: ACHING;BURNING
DESCRIPTORS: ACHING

## 2024-08-08 ASSESSMENT — PAIN SCALES - GENERAL
PAINLEVEL_OUTOF10: 7
PAINLEVEL_OUTOF10: 2
PAINLEVEL_OUTOF10: 10
PAINLEVEL_OUTOF10: 10
PAINLEVEL_OUTOF10: 4
PAINLEVEL_OUTOF10: 6
PAINLEVEL_OUTOF10: 8
PAINLEVEL_OUTOF10: 9
PAINLEVEL_OUTOF10: 6
PAINLEVEL_OUTOF10: 4
PAINLEVEL_OUTOF10: 10

## 2024-08-08 ASSESSMENT — PAIN - FUNCTIONAL ASSESSMENT
PAIN_FUNCTIONAL_ASSESSMENT: INTOLERABLE, UNABLE TO DO ANY ACTIVE OR PASSIVE ACTIVITIES
PAIN_FUNCTIONAL_ASSESSMENT: 0-10
PAIN_FUNCTIONAL_ASSESSMENT: INTOLERABLE, UNABLE TO DO ANY ACTIVE OR PASSIVE ACTIVITIES
PAIN_FUNCTIONAL_ASSESSMENT: FACE, LEGS, ACTIVITY, CRY, AND CONSOLABILITY (FLACC)
PAIN_FUNCTIONAL_ASSESSMENT: INTOLERABLE, UNABLE TO DO ANY ACTIVE OR PASSIVE ACTIVITIES

## 2024-08-08 ASSESSMENT — PAIN DESCRIPTION - ORIENTATION
ORIENTATION: RIGHT

## 2024-08-08 ASSESSMENT — PAIN DESCRIPTION - LOCATION
LOCATION: HIP
LOCATION: HIP;RIB CAGE
LOCATION: LEG;HIP
LOCATION: HIP

## 2024-08-08 NOTE — FLOWSHEET NOTE
Patient asking to speak to a nurse about an MRI of the shoulder order. Patient thought he had one in place but apparently does not.  Please advise at [phone].   SUTURES AND LIQUID BAND CLOSURE. WELL APPROXIMATED

## 2024-08-08 NOTE — DISCHARGE INSTRUCTIONS
Orthopaedic Instructions:  -Weight bearing status: partial weight bearing of 50% to the right leg  --Precautions to avoid hip dislocation:  -Keep your knees apart. Don't cross your legs.  -Sleep with a pillow between your legs for the first 3 days  -Step up first with your unaffected leg. Then bring the affected leg up to the same step. Bring your crutches or cane up.  -Don't lift your knee higher than your hip.  -Don't lean forward while you sit down or stand up, and don't bend past 90 degrees (like the angle in a letter \"L\"). This means you can't try to  something off the floor or bend down to tie your shoes.  -Don't sit on low chairs, beds, or toilets. You may want to use a raised toilet seat for a while. Sit in chairs with arms.  -Do not remove Optifoam bandage (the large sealed \"Band-aid\"-like dressing) until your follow-up date in clinic. It is okay to shower with the Optifoam bandage. Should it fall off, replace with band-aids or gauze & tape until dry. It is still okay to shower if it falls off, but avoid baths and underwater submersion.  -Always look for signs of compartment syndrome: pain out of proportion to the injury, pain not controlled with pain medication, numbness in digits, changing of color of digits (paleness). If these signs occur return to ED immediately for reassessment.  -Always work on toe motion (to non-injured toes) while in HSS to decrease swelling.  -Ice (20 minutes on and off 1 hour) and elevate above the level of the heart to reduce swelling and throbbing pain.  -Should urinate within 8 hours of surgery.  -Call the office or come to Emergency Room if signs of infection appear (hot, swollen, red, draining pus, fever).  -Take medications as prescribed.  -Wean off narcotics (percocet/norco) as soon as possible. Do not take tylenol if still taking narcotics.  -Follow up with Dr. Martell in his office on  8/23/24 at 8:15 AM . Call [phone] to schedule/confirm or with any

## 2024-08-08 NOTE — ANESTHESIA PROCEDURE NOTES
Peripheral Block    Patient location during procedure: pre-op  Reason for block: at surgeon's request  Start time: 8/8/2024 1:40 PM  End time: 8/8/2024 1:45 PM  Staffing  Performed: anesthesiologist   Anesthesiologist: Aneesh Merino MD  Performed by: Aneesh Merino MD  Authorized by: Aneesh Merino MD    Preanesthetic Checklist  Completed: patient identified, IV checked, site marked, risks and benefits discussed, surgical/procedural consents, equipment checked, pre-op evaluation, timeout performed, anesthesia consent given, oxygen available, monitors applied/VS acknowledged, fire risk safety assessment completed and verbalized and blood product R/B/A discussed and consented  Peripheral Block   Patient position: supine  Prep: ChloraPrep  Provider prep: mask and sterile gloves  Patient monitoring: cardiac monitor, continuous pulse ox, frequent blood pressure checks, IV access, oxygen and responsive to questions  Block type: Fascia iliaca  Laterality: right  Injection technique: single-shot  Guidance: ultrasound guided  Local infiltration: lidocaine  Local infiltration: lidocaine    Needle   Needle type: insulated echogenic nerve stimulator needle   Needle localization: ultrasound guidance  Assessment   Injection assessment: negative aspiration for heme, no paresthesia on injection, no intravascular symptoms and local visualized surrounding nerve on ultrasound  Paresthesia pain: none  Slow fractionated injection: yes  Hemodynamics: stable  Outcomes: uncomplicated and patient tolerated procedure well    Medications Administered  BUPivacaine (MARCAINE) PF injection 0.5% - Perineural   30 mL - 8/8/2024 1:40:00 PM

## 2024-08-08 NOTE — BRIEF OP NOTE
Brief Postoperative Note      Patient: Vesna Valencia  YOB: 1965  MRN: [id number]    Date of Procedure: 8/8/2024    Pre-Op Diagnosis Codes:  Right femoral head fracture  Right posterior column, posterior wall Acetabulum fracture  Right MT fractures of the 3/4th digit.     Post-Op Diagnosis:   Right femoral head fracture  Right posterior column, posterior wall Acetabulum fracture  Right MT fractures of the 3/4th digit.      Procedure(s):  Right LUCITA  ORIF R PC/PW acetabulum fx  Removal superficial implant right femur  Closed treatment right 3 and 4th MT fractures    Surgeon(s):  Nik Martell DO    Assistant:  Resident: Damaso Michaels MD; Padua, Fortunato G, MD    Anesthesia: General    Estimated Blood Loss (mL): 600 mL    Cell saver returned: 250 mL     Fluids: 500 albumin, 1L crystalloid    Urine: 370 cc    Complications: None    Specimens:   * No specimens in log *    Implants:  Implant Name Type Inv. Item Serial No.  Lot No. LRB No. Used Action   PLATE BNE [id number] THK2.7MM 8 H BILAT S STL STR LO PROF - [id number]  PLATE BNE [id number] THK2.7MM 8 H BILAT S STL STR LO PROF  DEPUY SYNTHES [place]  Right 1 Implanted   SCREW BNE L46MM DIA3.5MM GUDELIA S STL ST NONCANNULATED PROSPER - [id number]  SCREW BNE L46MM DIA3.5MM GUDELIA S STL ST NONCANNULATED PROSPER  DEPUY SYNTHES [employer]-WD  Right 1 Implanted   SCREW BNE L50MM DIA3.5MM STD GUDELIA S STL ST NONCANNULATED - [id number]  SCREW BNE L50MM DIA3.5MM STD GUDELIA S STL ST NONCANNULATED  DEPUY SYNTHES [employer]-WD  Right 1 Implanted   SCREW BNE L26MM DIA3.5MM GUDELIA S STL ST NONCANNULATED PROSPER - [id number]  SCREW BNE L26MM DIA3.5MM GUDELIA S STL ST NONCANNULATED PROSPER  DEPUY SYNTHES USA-WD  Right 1 Implanted   SCREW BNE L36MM DIA3.5MM GUDELIA S STL ST NONCANNULATED PROSPER - [id number]  SCREW BNE L36MM DIA3.5MM GUDELIA S STL ST NONCANNULATED PROSPER  DEPUY SYNTHES [employer]-WD  Right 1 Implanted   SHELL ACET MOD 52 MM HIP REDAPT - [id number]  SHELL ACET MOD 52 MM HIP REDAPT  ASHISH AND

## 2024-08-08 NOTE — ANESTHESIA PRE PROCEDURE
Department of Anesthesiology  Preprocedure Note       Name:  Vesna Valencia   Age:  59 y.o.  :  1965                                          MRN:  [id number]         Date:  2024      Surgeon: Surgeon(s):  Nik Martell DO    Procedure: Procedure(s):  TOTAL HIP ARTHROPLASTY, ACETABULUM OPEN REDUCTION INTERNAL FIXATION  (FLAT BETTYE TABLE, PEG BOARD, LATERAL DECUBITUS, C-ARM, DEPUY ACTIS STEM, HINOJOSA&NEPHEW REDAPT CUP, *CELL-SAVER*)    Medications prior to admission:   Prior to Admission medications    Medication Sig Start Date End Date Taking? Authorizing Provider   vitamin D (ERGOCALCIFEROL) 1.25 MG (11049 UT) CAPS capsule Take 1 capsule by mouth once a week 24  Yes Huy Tello DO   albuterol sulfate HFA (PROVENTIL;VENTOLIN;PROAIR) 108 (90 Base) MCG/ACT inhaler Inhale 2 puffs into the lungs every 6 hours as needed for Wheezing or Shortness of Breath 24  Yes Provider, Historical, MD   QULIPTA 60 MG TABS Take 1 tablet by mouth daily 24  Yes Provider, Historical, MD   bumetanide (BUMEX) 1 MG tablet Take 1 tablet by mouth daily 24  Yes Provider, Historical, MD   busPIRone (BUSPAR) 30 MG tablet Take 30 mg by mouth 2 times daily 24  Yes Provider, Historical, MD   carvedilol (COREG) 3.125 MG tablet Take 1 tablet by mouth 2 times daily (with meals) 24  Yes Provider, Historical, MD   AUSTEDO XR 24 MG TB24 Take 1 tablet by mouth daily 24  Yes Provider, Historical, MD   AIMOVIG 140 MG/ML SOAJ Inject 140 mg into the skin every 30 days 24  Yes Provider, Historical, MD   TRELETONA ELLIPTA 200-62.5-25 MCG/ACT AEPB inhaler Inhale 1 puff into the lungs daily 24  Yes Provider, Historical, MD   lamoTRIgine (LAMICTAL) 100 MG tablet Take 1 tablet by mouth every morning 24  Yes Provider, MD Jackelyn   levETIRAcetam (KEPPRA) 750 MG tablet Take 1 tablet by mouth 2 times daily 24  Yes Provider, Historical, MD   omeprazole (PRILOSEC) 20 MG delayed release capsule Take 1

## 2024-08-09 ENCOUNTER — APPOINTMENT (OUTPATIENT)
Dept: GENERAL RADIOLOGY | Age: 59
DRG: 956 | End: 2024-08-09
Payer: MEDICARE

## 2024-08-09 LAB
ANION GAP SERPL CALCULATED.3IONS-SCNC: 9 MMOL/L (ref 9–16)
BASOPHILS # BLD: <0.03 K/UL (ref 0–0.2)
BASOPHILS NFR BLD: 0 % (ref 0–2)
BUN SERPL-MCNC: 11 MG/DL (ref 6–20)
CA-I BLD-SCNC: 1.07 MMOL/L (ref 1.13–1.33)
CA-I BLD-SCNC: 1.08 MMOL/L (ref 1.13–1.33)
CALCIUM SERPL-MCNC: 7.8 MG/DL (ref 8.6–10.4)
CHLORIDE SERPL-SCNC: 104 MMOL/L (ref 98–107)
CO2 SERPL-SCNC: 25 MMOL/L (ref 20–31)
CREAT SERPL-MCNC: 0.6 MG/DL (ref 0.5–0.9)
EOSINOPHIL # BLD: <0.03 K/UL (ref 0–0.44)
EOSINOPHILS RELATIVE PERCENT: 0 % (ref 1–4)
ERYTHROCYTE [DISTWIDTH] IN BLOOD BY AUTOMATED COUNT: 13.9 % (ref 11.8–14.4)
FIBRINOGEN, FUNCTIONAL TEG: 28.6 MM (ref 15–32)
GFR, ESTIMATED: >90 ML/MIN/1.73M2
GLUCOSE BLD-MCNC: 142 MG/DL (ref 65–105)
GLUCOSE BLD-MCNC: 144 MG/DL (ref 65–105)
GLUCOSE BLD-MCNC: 152 MG/DL (ref 65–105)
GLUCOSE BLD-MCNC: 158 MG/DL (ref 65–105)
GLUCOSE SERPL-MCNC: 151 MG/DL (ref 74–99)
HCT VFR BLD AUTO: 22.8 % (ref 36.3–47.1)
HGB BLD-MCNC: 7.4 G/DL (ref 11.9–15.1)
IMM GRANULOCYTES # BLD AUTO: 0.07 K/UL (ref 0–0.3)
IMM GRANULOCYTES NFR BLD: 1 %
LY30 (LYSIS) TEG: 0.1 % (ref 0–2.6)
LYMPHOCYTES NFR BLD: 1.5 K/UL (ref 1.1–3.7)
LYMPHOCYTES RELATIVE PERCENT: 14 % (ref 24–43)
MA(MAX CLOT) RAPID TEG: 67.7 MM (ref 52–70)
MAGNESIUM SERPL-MCNC: 2 MG/DL (ref 1.6–2.6)
MCH RBC QN AUTO: 29.1 PG (ref 25.2–33.5)
MCHC RBC AUTO-ENTMCNC: 32.5 G/DL (ref 28.4–34.8)
MCV RBC AUTO: 89.8 FL (ref 82.6–102.9)
MONOCYTES NFR BLD: 1.07 K/UL (ref 0.1–1.2)
MONOCYTES NFR BLD: 10 % (ref 3–12)
NEUTROPHILS NFR BLD: 75 % (ref 36–65)
NEUTS SEG NFR BLD: 8.01 K/UL (ref 1.5–8.1)
NRBC BLD-RTO: 0 PER 100 WBC
PHOSPHATE SERPL-MCNC: 1.2 MG/DL (ref 2.5–4.5)
PHOSPHATE SERPL-MCNC: 3.4 MG/DL (ref 2.5–4.5)
PLATELET # BLD AUTO: 179 K/UL (ref 138–453)
PMV BLD AUTO: 10 FL (ref 8.1–13.5)
POTASSIUM SERPL-SCNC: 3.7 MMOL/L (ref 3.7–5.3)
RBC # BLD AUTO: 2.54 M/UL (ref 3.95–5.11)
REACTION TIME TEG: 3.7 MIN (ref 4.6–9.1)
SODIUM SERPL-SCNC: 138 MMOL/L (ref 136–145)
WBC OTHER # BLD: 10.7 K/UL (ref 3.5–11.3)

## 2024-08-09 PROCEDURE — 97163 PT EVAL HIGH COMPLEX 45 MIN: CPT

## 2024-08-09 PROCEDURE — 84100 ASSAY OF PHOSPHORUS: CPT

## 2024-08-09 PROCEDURE — 85025 COMPLETE CBC W/AUTO DIFF WBC: CPT

## 2024-08-09 PROCEDURE — 97166 OT EVAL MOD COMPLEX 45 MIN: CPT

## 2024-08-09 PROCEDURE — 2000000000 HC ICU R&B

## 2024-08-09 PROCEDURE — 2580000003 HC RX 258: Performed by: STUDENT IN AN ORGANIZED HEALTH CARE EDUCATION/TRAINING PROGRAM

## 2024-08-09 PROCEDURE — 2580000003 HC RX 258

## 2024-08-09 PROCEDURE — 82947 ASSAY GLUCOSE BLOOD QUANT: CPT

## 2024-08-09 PROCEDURE — 6360000002 HC RX W HCPCS: Performed by: STUDENT IN AN ORGANIZED HEALTH CARE EDUCATION/TRAINING PROGRAM

## 2024-08-09 PROCEDURE — 85576 BLOOD PLATELET AGGREGATION: CPT

## 2024-08-09 PROCEDURE — 97110 THERAPEUTIC EXERCISES: CPT

## 2024-08-09 PROCEDURE — 80048 BASIC METABOLIC PNL TOTAL CA: CPT

## 2024-08-09 PROCEDURE — 99232 SBSQ HOSP IP/OBS MODERATE 35: CPT | Performed by: SURGERY

## 2024-08-09 PROCEDURE — 71045 X-RAY EXAM CHEST 1 VIEW: CPT

## 2024-08-09 PROCEDURE — 51798 US URINE CAPACITY MEASURE: CPT

## 2024-08-09 PROCEDURE — 85390 FIBRINOLYSINS SCREEN I&R: CPT

## 2024-08-09 PROCEDURE — 85347 COAGULATION TIME ACTIVATED: CPT

## 2024-08-09 PROCEDURE — 97530 THERAPEUTIC ACTIVITIES: CPT

## 2024-08-09 PROCEDURE — 83735 ASSAY OF MAGNESIUM: CPT

## 2024-08-09 PROCEDURE — 6370000000 HC RX 637 (ALT 250 FOR IP)

## 2024-08-09 PROCEDURE — 6360000002 HC RX W HCPCS

## 2024-08-09 PROCEDURE — 94640 AIRWAY INHALATION TREATMENT: CPT

## 2024-08-09 PROCEDURE — 82330 ASSAY OF CALCIUM: CPT

## 2024-08-09 PROCEDURE — 6370000000 HC RX 637 (ALT 250 FOR IP): Performed by: STUDENT IN AN ORGANIZED HEALTH CARE EDUCATION/TRAINING PROGRAM

## 2024-08-09 PROCEDURE — 85384 FIBRINOGEN ACTIVITY: CPT

## 2024-08-09 PROCEDURE — 2700000000 HC OXYGEN THERAPY PER DAY

## 2024-08-09 PROCEDURE — 36415 COLL VENOUS BLD VENIPUNCTURE: CPT

## 2024-08-09 PROCEDURE — 51701 INSERT BLADDER CATHETER: CPT

## 2024-08-09 PROCEDURE — 2500000003 HC RX 250 WO HCPCS

## 2024-08-09 PROCEDURE — 94761 N-INVAS EAR/PLS OXIMETRY MLT: CPT

## 2024-08-09 PROCEDURE — 97535 SELF CARE MNGMENT TRAINING: CPT

## 2024-08-09 RX ORDER — OXYCODONE HYDROCHLORIDE 5 MG/1
10 TABLET ORAL EVERY 4 HOURS PRN
Status: DISCONTINUED | OUTPATIENT
Start: 2024-08-09 | End: 2024-08-11

## 2024-08-09 RX ORDER — ALBUTEROL SULFATE 2.5 MG/3ML
2.5 SOLUTION RESPIRATORY (INHALATION) EVERY 6 HOURS PRN
Status: DISCONTINUED | OUTPATIENT
Start: 2024-08-09 | End: 2024-08-20 | Stop reason: HOSPADM

## 2024-08-09 RX ORDER — CALCIUM GLUCONATE 20 MG/ML
1000 INJECTION, SOLUTION INTRAVENOUS ONCE
Status: COMPLETED | OUTPATIENT
Start: 2024-08-09 | End: 2024-08-09

## 2024-08-09 RX ORDER — OXYCODONE HYDROCHLORIDE 5 MG/1
5 TABLET ORAL ONCE
Status: COMPLETED | OUTPATIENT
Start: 2024-08-09 | End: 2024-08-09

## 2024-08-09 RX ORDER — MAGNESIUM SULFATE 1 G/100ML
1000 INJECTION INTRAVENOUS ONCE
Status: COMPLETED | OUTPATIENT
Start: 2024-08-09 | End: 2024-08-09

## 2024-08-09 RX ORDER — INSULIN LISPRO 100 [IU]/ML
0-16 INJECTION, SOLUTION INTRAVENOUS; SUBCUTANEOUS
Status: DISCONTINUED | OUTPATIENT
Start: 2024-08-09 | End: 2024-08-11

## 2024-08-09 RX ORDER — OXYCODONE HYDROCHLORIDE 5 MG/1
5 TABLET ORAL EVERY 4 HOURS PRN
Status: DISCONTINUED | OUTPATIENT
Start: 2024-08-09 | End: 2024-08-11

## 2024-08-09 RX ADMIN — OXYCODONE HYDROCHLORIDE 10 MG: 5 TABLET ORAL at 11:43

## 2024-08-09 RX ADMIN — Medication 2000 MG: at 05:36

## 2024-08-09 RX ADMIN — GABAPENTIN 300 MG: 300 CAPSULE ORAL at 17:17

## 2024-08-09 RX ADMIN — ACETAMINOPHEN 1000 MG: 500 TABLET ORAL at 04:33

## 2024-08-09 RX ADMIN — BUDESONIDE AND FORMOTEROL FUMARATE DIHYDRATE 2 PUFF: 160; 4.5 AEROSOL RESPIRATORY (INHALATION) at 08:30

## 2024-08-09 RX ADMIN — ENOXAPARIN SODIUM 40 MG: 100 INJECTION SUBCUTANEOUS at 20:23

## 2024-08-09 RX ADMIN — LAMOTRIGINE 25 MG: 25 TABLET ORAL at 08:08

## 2024-08-09 RX ADMIN — LEVETIRACETAM 1500 MG: 500 TABLET, FILM COATED ORAL at 09:36

## 2024-08-09 RX ADMIN — METHOCARBAMOL 750 MG: 750 TABLET ORAL at 16:07

## 2024-08-09 RX ADMIN — MAGNESIUM SULFATE HEPTAHYDRATE 1000 MG: 1 INJECTION, SOLUTION INTRAVENOUS at 00:35

## 2024-08-09 RX ADMIN — SENNOSIDES AND DOCUSATE SODIUM 1 TABLET: 50; 8.6 TABLET ORAL at 20:21

## 2024-08-09 RX ADMIN — CALCIUM GLUCONATE 1000 MG: 20 INJECTION, SOLUTION INTRAVENOUS at 01:30

## 2024-08-09 RX ADMIN — Medication 2000 MG: at 11:46

## 2024-08-09 RX ADMIN — ACETAMINOPHEN 1000 MG: 500 TABLET ORAL at 14:43

## 2024-08-09 RX ADMIN — METHOCARBAMOL 750 MG: 750 TABLET ORAL at 09:36

## 2024-08-09 RX ADMIN — OXYCODONE HYDROCHLORIDE 10 MG: 5 TABLET ORAL at 08:09

## 2024-08-09 RX ADMIN — ROSUVASTATIN CALCIUM 20 MG: 20 TABLET, FILM COATED ORAL at 17:17

## 2024-08-09 RX ADMIN — TOPIRAMATE 100 MG: 100 TABLET, FILM COATED ORAL at 08:08

## 2024-08-09 RX ADMIN — OXYCODONE HYDROCHLORIDE 5 MG: 5 TABLET ORAL at 00:29

## 2024-08-09 RX ADMIN — POTASSIUM PHOSPHATE, MONOBASIC AND POTASSIUM PHOSPHATE, DIBASIC 45 MMOL: 224; 236 INJECTION, SOLUTION, CONCENTRATE INTRAVENOUS at 10:02

## 2024-08-09 RX ADMIN — OXYCODONE HYDROCHLORIDE 5 MG: 5 TABLET ORAL at 00:28

## 2024-08-09 RX ADMIN — OXYCODONE HYDROCHLORIDE 10 MG: 5 TABLET ORAL at 04:33

## 2024-08-09 RX ADMIN — METHOCARBAMOL 750 MG: 750 TABLET ORAL at 20:20

## 2024-08-09 RX ADMIN — POLYETHYLENE GLYCOL 3350 17 G: 17 POWDER, FOR SOLUTION ORAL at 08:08

## 2024-08-09 RX ADMIN — LAMOTRIGINE 25 MG: 25 TABLET ORAL at 20:24

## 2024-08-09 RX ADMIN — OXYCODONE HYDROCHLORIDE 10 MG: 5 TABLET ORAL at 16:07

## 2024-08-09 RX ADMIN — BUSPIRONE HYDROCHLORIDE 30 MG: 10 TABLET ORAL at 08:08

## 2024-08-09 RX ADMIN — METHOCARBAMOL 750 MG: 750 TABLET ORAL at 04:34

## 2024-08-09 RX ADMIN — IPRATROPIUM BROMIDE AND ALBUTEROL SULFATE 1 DOSE: 2.5; .5 SOLUTION RESPIRATORY (INHALATION) at 08:29

## 2024-08-09 RX ADMIN — BACITRACIN: 500 OINTMENT TOPICAL at 14:43

## 2024-08-09 RX ADMIN — OXYCODONE HYDROCHLORIDE 10 MG: 5 TABLET ORAL at 20:20

## 2024-08-09 RX ADMIN — ACETAMINOPHEN 1000 MG: 500 TABLET ORAL at 20:20

## 2024-08-09 RX ADMIN — PANTOPRAZOLE SODIUM 40 MG: 40 TABLET, DELAYED RELEASE ORAL at 06:33

## 2024-08-09 RX ADMIN — SODIUM CHLORIDE, PRESERVATIVE FREE 10 ML: 5 INJECTION INTRAVENOUS at 20:23

## 2024-08-09 RX ADMIN — LEVETIRACETAM 1500 MG: 500 TABLET, FILM COATED ORAL at 20:21

## 2024-08-09 RX ADMIN — DIBASIC SODIUM PHOSPHATE, MONOBASIC POTASSIUM PHOSPHATE AND MONOBASIC SODIUM PHOSPHATE 2 TABLET: 852; 155; 130 TABLET ORAL at 00:29

## 2024-08-09 RX ADMIN — SENNOSIDES AND DOCUSATE SODIUM 1 TABLET: 50; 8.6 TABLET ORAL at 08:09

## 2024-08-09 RX ADMIN — BUSPIRONE HYDROCHLORIDE 30 MG: 10 TABLET ORAL at 20:20

## 2024-08-09 RX ADMIN — VENLAFAXINE HYDROCHLORIDE 300 MG: 150 CAPSULE, EXTENDED RELEASE ORAL at 08:07

## 2024-08-09 RX ADMIN — DEXTROSE AND SODIUM CHLORIDE: 5; 450 INJECTION, SOLUTION INTRAVENOUS at 04:40

## 2024-08-09 RX ADMIN — GABAPENTIN 300 MG: 300 CAPSULE ORAL at 09:36

## 2024-08-09 RX ADMIN — BACITRACIN: 500 OINTMENT TOPICAL at 08:23

## 2024-08-09 RX ADMIN — BACITRACIN: 500 OINTMENT TOPICAL at 20:24

## 2024-08-09 RX ADMIN — GABAPENTIN 300 MG: 300 CAPSULE ORAL at 00:28

## 2024-08-09 RX ADMIN — IPRATROPIUM BROMIDE AND ALBUTEROL SULFATE 1 DOSE: 2.5; .5 SOLUTION RESPIRATORY (INHALATION) at 19:54

## 2024-08-09 ASSESSMENT — PAIN SCALES - GENERAL
PAINLEVEL_OUTOF10: 8
PAINLEVEL_OUTOF10: 10
PAINLEVEL_OUTOF10: 4
PAINLEVEL_OUTOF10: 8
PAINLEVEL_OUTOF10: 4
PAINLEVEL_OUTOF10: 4
PAINLEVEL_OUTOF10: 6
PAINLEVEL_OUTOF10: 10
PAINLEVEL_OUTOF10: 6
PAINLEVEL_OUTOF10: 4
PAINLEVEL_OUTOF10: 8
PAINLEVEL_OUTOF10: 4
PAINLEVEL_OUTOF10: 10
PAINLEVEL_OUTOF10: 8
PAINLEVEL_OUTOF10: 6
PAINLEVEL_OUTOF10: 9
PAINLEVEL_OUTOF10: 6
PAINLEVEL_OUTOF10: 8

## 2024-08-09 ASSESSMENT — PAIN DESCRIPTION - ORIENTATION
ORIENTATION: RIGHT

## 2024-08-09 ASSESSMENT — PAIN DESCRIPTION - DESCRIPTORS
DESCRIPTORS: ACHING

## 2024-08-09 ASSESSMENT — PAIN DESCRIPTION - LOCATION
LOCATION: LEG
LOCATION: HIP
LOCATION: HIP
LOCATION: LEG
LOCATION: LEG
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
## 2024-08-09 NOTE — CARE COORDINATION
Transition Planning     Call to Vesna from [facility]- reviewing. Requested information re Auto insurance- none on chart found.     1113 Call to [facility] admissions. Left HIPAA compliant  requesting call back.     1120 Spoke with pt re need for Auto insurance information- claim number. She startes auto insurance is under her boyfriend's Michael Fernandez who is currently a pt here at [facility].     1135 Spoke with pt's boyfriend Michael Fernandez at his bedside.He attempted to call his insurance carrier- no answer. He states he will contact CM with the needed information. Nellie ROE CM 2C updated.     1210 Namrata from [facility] - no beds available.     1215 Call to Maninder from [facility] to update.     1230 Requested additional SNF choices from Pt. She requested referral to [facility]- sent. Pt first choice is [facility].     1249 Daniela from [facility] received referral.     1535 Daniela from [facility] - unable to accept.

## 2024-08-09 NOTE — TRANSITION OF CARE
Trauma/Surgical Critical Care Sign Out Note:       Code Status: Full Code    Mode of provider to provider communication:        [] Via telephone   [x] In person     Date and time of sign-out: 8/9/2024 4:40 PM     Criteria Met for Transfer:  [x]   Oxygen saturation is > 90% on FiO2< 50% (exceptions may be made for patient pathophysiology)    [x]   Vital signs remain at or near baseline without pharmaceutical adjuncts, blood products, or > 2L fluid bolus in the last 24 hours  [x]   No suspicion or evidence of a new untreated infection (confusion, cool or cyanotic extremities, poor capillary refill, metabolic acidosis, low urine output)  [x]   Stable GCS, seizures controlled, no invasive neurological monitoring   [x]   Altered mental status is stable and able to be safely managed outside the ICU  [x]   No deterioration in renal function in the last 24 hours (creatinine > 50% increase, new onset oliguria)  [x]   Patient care needs do not exceed the capabilities of the unit they are being transferred to (suctioning needs, glucose monitoring, neurological monitoring, neurovascular checks, vital sign monitoring, I&O monitoring, drain management  [x]   No longer requiring mechanical ventilation via endotracheal intubation and/or decreasing O2/CPAP requirements  [x]   No need for medications that cannot be administered outside the ICU      Reason for ICU admission:  Bilateral rib fractures, sternal fractures and right femoral head/acetabular fracture with RIB score of 10    Injuries:  bilateral rib fxs, sternal fracture and R femoral head/acetabular fx     ICU course summary:  8/4/2024 ticu admission, heated high flow, nausea- zofran, bladder scan >600 1 x straight cath, repeat trop downtrending, k replaced  8/5: restart general diet  8/6: NPO for OR. Case cancelled due to INR elevated.   8/7: anesthesia DENISE block today. NPO for possible OR today. Start IVF. No OR. Restarted diet.  8/8: Hgb 6.5 , 1 U PRBC, replaced

## 2024-08-09 NOTE — ANESTHESIA POSTPROCEDURE EVALUATION
Department of Anesthesiology  Postprocedure Note    Patient: Vesna Valencia  MRN: [id number]  YOB: 1965  Date of evaluation: 8/8/2024    Procedure Summary       Date: 08/08/24 Room / Location: [address]    Anesthesia Start: 1502 Anesthesia Stop: 1909    Procedure: TOTAL HIP ARTHROPLASTY, ACETABULUM OPEN REDUCTION INTERNAL FIXATION (Right: Hip) Diagnosis:       Closed fracture of head of right femur, initial encounter (HCC)      Closed displaced fracture of posterior wall of right acetabulum, initial encounter ([facility])      (Closed fracture of head of right femur, initial encounter ([facility]) [S72.051A])      (Closed displaced fracture of posterior wall of right acetabulum, initial encounter ([facility]) [S32.421A])    Surgeons: Nik Martell DO Responsible Provider: Jossue Willson MD    Anesthesia Type: general ASA Status: 3            Anesthesia Type: No value filed.    Hayden Phase I: Hayden Score: 8    Hayden Phase II:      Anesthesia Post Evaluation    Patient location during evaluation: bedside  Patient participation: complete - patient participated  Level of consciousness: awake  Airway patency: patent  Nausea & Vomiting: no nausea and no vomiting  Cardiovascular status: blood pressure returned to baseline  Respiratory status: acceptable  Hydration status: euvolemic  Comments: BP (!) 154/82   Pulse 96   Temp 96.8 °F (36 °C)   Resp 13   Ht 1.6 m (5' 2.99\")   Wt 91.6 kg (202 lb)   SpO2 100%   BMI 35.79 kg/m²     Pain management: adequate    No notable events documented.

## 2024-08-10 ENCOUNTER — APPOINTMENT (OUTPATIENT)
Dept: GENERAL RADIOLOGY | Age: 59
DRG: 956 | End: 2024-08-10
Payer: MEDICARE

## 2024-08-10 LAB
ANION GAP SERPL CALCULATED.3IONS-SCNC: 8 MMOL/L (ref 9–16)
BASOPHILS # BLD: 0.03 K/UL (ref 0–0.2)
BASOPHILS NFR BLD: 0 % (ref 0–2)
BUN SERPL-MCNC: 13 MG/DL (ref 6–20)
CALCIUM SERPL-MCNC: 7.7 MG/DL (ref 8.6–10.4)
CHLORIDE SERPL-SCNC: 104 MMOL/L (ref 98–107)
CO2 SERPL-SCNC: 25 MMOL/L (ref 20–31)
CREAT SERPL-MCNC: 0.5 MG/DL (ref 0.5–0.9)
EOSINOPHIL # BLD: 0.12 K/UL (ref 0–0.44)
EOSINOPHILS RELATIVE PERCENT: 1 % (ref 1–4)
ERYTHROCYTE [DISTWIDTH] IN BLOOD BY AUTOMATED COUNT: 14.5 % (ref 11.8–14.4)
GFR, ESTIMATED: >90 ML/MIN/1.73M2
GLUCOSE BLD-MCNC: 123 MG/DL (ref 65–105)
GLUCOSE BLD-MCNC: 141 MG/DL (ref 65–105)
GLUCOSE BLD-MCNC: 154 MG/DL (ref 65–105)
GLUCOSE SERPL-MCNC: 121 MG/DL (ref 74–99)
HCT VFR BLD AUTO: 20.9 % (ref 36.3–47.1)
HCT VFR BLD AUTO: 24.4 % (ref 36.3–47.1)
HGB BLD-MCNC: 6.6 G/DL (ref 11.9–15.1)
HGB BLD-MCNC: 7.8 G/DL (ref 11.9–15.1)
IMM GRANULOCYTES # BLD AUTO: 0.06 K/UL (ref 0–0.3)
IMM GRANULOCYTES NFR BLD: 1 %
LYMPHOCYTES NFR BLD: 1.94 K/UL (ref 1.1–3.7)
LYMPHOCYTES RELATIVE PERCENT: 20 % (ref 24–43)
MAGNESIUM SERPL-MCNC: 1.9 MG/DL (ref 1.6–2.6)
MCH RBC QN AUTO: 29.1 PG (ref 25.2–33.5)
MCHC RBC AUTO-ENTMCNC: 31.6 G/DL (ref 28.4–34.8)
MCV RBC AUTO: 92.1 FL (ref 82.6–102.9)
MONOCYTES NFR BLD: 1.08 K/UL (ref 0.1–1.2)
MONOCYTES NFR BLD: 11 % (ref 3–12)
NEUTROPHILS NFR BLD: 67 % (ref 36–65)
NEUTS SEG NFR BLD: 6.44 K/UL (ref 1.5–8.1)
NRBC BLD-RTO: 0 PER 100 WBC
PHOSPHATE SERPL-MCNC: 2.5 MG/DL (ref 2.5–4.5)
PLATELET # BLD AUTO: 188 K/UL (ref 138–453)
PMV BLD AUTO: 9.6 FL (ref 8.1–13.5)
POTASSIUM SERPL-SCNC: 3.6 MMOL/L (ref 3.7–5.3)
RBC # BLD AUTO: 2.27 M/UL (ref 3.95–5.11)
RBC # BLD: ABNORMAL 10*6/UL
SODIUM SERPL-SCNC: 137 MMOL/L (ref 136–145)
WBC OTHER # BLD: 9.7 K/UL (ref 3.5–11.3)

## 2024-08-10 PROCEDURE — 6370000000 HC RX 637 (ALT 250 FOR IP): Performed by: STUDENT IN AN ORGANIZED HEALTH CARE EDUCATION/TRAINING PROGRAM

## 2024-08-10 PROCEDURE — 51701 INSERT BLADDER CATHETER: CPT

## 2024-08-10 PROCEDURE — 6360000002 HC RX W HCPCS: Performed by: STUDENT IN AN ORGANIZED HEALTH CARE EDUCATION/TRAINING PROGRAM

## 2024-08-10 PROCEDURE — 2700000000 HC OXYGEN THERAPY PER DAY

## 2024-08-10 PROCEDURE — 2060000000 HC ICU INTERMEDIATE R&B

## 2024-08-10 PROCEDURE — 6370000000 HC RX 637 (ALT 250 FOR IP)

## 2024-08-10 PROCEDURE — 2580000003 HC RX 258: Performed by: STUDENT IN AN ORGANIZED HEALTH CARE EDUCATION/TRAINING PROGRAM

## 2024-08-10 PROCEDURE — 51798 US URINE CAPACITY MEASURE: CPT

## 2024-08-10 PROCEDURE — 85025 COMPLETE CBC W/AUTO DIFF WBC: CPT

## 2024-08-10 PROCEDURE — 85018 HEMOGLOBIN: CPT

## 2024-08-10 PROCEDURE — 71045 X-RAY EXAM CHEST 1 VIEW: CPT

## 2024-08-10 PROCEDURE — 36430 TRANSFUSION BLD/BLD COMPNT: CPT

## 2024-08-10 PROCEDURE — 6370000000 HC RX 637 (ALT 250 FOR IP): Performed by: NURSE PRACTITIONER

## 2024-08-10 PROCEDURE — 94761 N-INVAS EAR/PLS OXIMETRY MLT: CPT

## 2024-08-10 PROCEDURE — 83735 ASSAY OF MAGNESIUM: CPT

## 2024-08-10 PROCEDURE — P9016 RBC LEUKOCYTES REDUCED: HCPCS

## 2024-08-10 PROCEDURE — 36415 COLL VENOUS BLD VENIPUNCTURE: CPT

## 2024-08-10 PROCEDURE — 84100 ASSAY OF PHOSPHORUS: CPT

## 2024-08-10 PROCEDURE — 99232 SBSQ HOSP IP/OBS MODERATE 35: CPT | Performed by: SURGERY

## 2024-08-10 PROCEDURE — 80048 BASIC METABOLIC PNL TOTAL CA: CPT

## 2024-08-10 PROCEDURE — 82947 ASSAY GLUCOSE BLOOD QUANT: CPT

## 2024-08-10 PROCEDURE — 94640 AIRWAY INHALATION TREATMENT: CPT

## 2024-08-10 PROCEDURE — 85014 HEMATOCRIT: CPT

## 2024-08-10 PROCEDURE — 99233 SBSQ HOSP IP/OBS HIGH 50: CPT | Performed by: NURSE PRACTITIONER

## 2024-08-10 RX ORDER — SODIUM CHLORIDE 9 MG/ML
INJECTION, SOLUTION INTRAVENOUS PRN
Status: DISCONTINUED | OUTPATIENT
Start: 2024-08-10 | End: 2024-08-11

## 2024-08-10 RX ADMIN — BUSPIRONE HYDROCHLORIDE 30 MG: 10 TABLET ORAL at 08:42

## 2024-08-10 RX ADMIN — METHOCARBAMOL 750 MG: 750 TABLET ORAL at 20:55

## 2024-08-10 RX ADMIN — METOPROLOL TARTRATE 25 MG: 25 TABLET, FILM COATED ORAL at 20:54

## 2024-08-10 RX ADMIN — POLYETHYLENE GLYCOL 3350 17 G: 17 POWDER, FOR SOLUTION ORAL at 08:46

## 2024-08-10 RX ADMIN — PANTOPRAZOLE SODIUM 40 MG: 40 TABLET, DELAYED RELEASE ORAL at 04:20

## 2024-08-10 RX ADMIN — ACETAMINOPHEN 1000 MG: 500 TABLET ORAL at 04:19

## 2024-08-10 RX ADMIN — SODIUM CHLORIDE, PRESERVATIVE FREE 10 ML: 5 INJECTION INTRAVENOUS at 20:02

## 2024-08-10 RX ADMIN — ACETAMINOPHEN 1000 MG: 500 TABLET ORAL at 13:26

## 2024-08-10 RX ADMIN — METHOCARBAMOL 750 MG: 750 TABLET ORAL at 16:00

## 2024-08-10 RX ADMIN — ROSUVASTATIN CALCIUM 20 MG: 20 TABLET, FILM COATED ORAL at 18:12

## 2024-08-10 RX ADMIN — BACITRACIN: 500 OINTMENT TOPICAL at 08:44

## 2024-08-10 RX ADMIN — OXYCODONE HYDROCHLORIDE 10 MG: 5 TABLET ORAL at 00:14

## 2024-08-10 RX ADMIN — LAMOTRIGINE 25 MG: 25 TABLET ORAL at 20:57

## 2024-08-10 RX ADMIN — ENOXAPARIN SODIUM 40 MG: 100 INJECTION SUBCUTANEOUS at 20:54

## 2024-08-10 RX ADMIN — VENLAFAXINE HYDROCHLORIDE 300 MG: 150 CAPSULE, EXTENDED RELEASE ORAL at 09:46

## 2024-08-10 RX ADMIN — SENNOSIDES AND DOCUSATE SODIUM 1 TABLET: 50; 8.6 TABLET ORAL at 21:01

## 2024-08-10 RX ADMIN — METHOCARBAMOL 750 MG: 750 TABLET ORAL at 08:43

## 2024-08-10 RX ADMIN — LAMOTRIGINE 25 MG: 25 TABLET ORAL at 09:46

## 2024-08-10 RX ADMIN — BUSPIRONE HYDROCHLORIDE 30 MG: 10 TABLET ORAL at 20:54

## 2024-08-10 RX ADMIN — LEVETIRACETAM 1500 MG: 500 TABLET, FILM COATED ORAL at 20:58

## 2024-08-10 RX ADMIN — ACETAMINOPHEN 1000 MG: 500 TABLET ORAL at 20:56

## 2024-08-10 RX ADMIN — LEVETIRACETAM 1500 MG: 500 TABLET, FILM COATED ORAL at 08:43

## 2024-08-10 RX ADMIN — GABAPENTIN 300 MG: 300 CAPSULE ORAL at 02:34

## 2024-08-10 RX ADMIN — ENOXAPARIN SODIUM 40 MG: 100 INJECTION SUBCUTANEOUS at 08:44

## 2024-08-10 RX ADMIN — OXYCODONE HYDROCHLORIDE 10 MG: 5 TABLET ORAL at 04:19

## 2024-08-10 RX ADMIN — SENNOSIDES AND DOCUSATE SODIUM 1 TABLET: 50; 8.6 TABLET ORAL at 08:43

## 2024-08-10 RX ADMIN — GABAPENTIN 300 MG: 300 CAPSULE ORAL at 18:12

## 2024-08-10 RX ADMIN — TOPIRAMATE 100 MG: 100 TABLET, FILM COATED ORAL at 09:46

## 2024-08-10 RX ADMIN — METOPROLOL TARTRATE 25 MG: 25 TABLET, FILM COATED ORAL at 13:25

## 2024-08-10 RX ADMIN — IPRATROPIUM BROMIDE AND ALBUTEROL SULFATE 1 DOSE: 2.5; .5 SOLUTION RESPIRATORY (INHALATION) at 20:36

## 2024-08-10 RX ADMIN — SODIUM CHLORIDE, PRESERVATIVE FREE 10 ML: 5 INJECTION INTRAVENOUS at 08:44

## 2024-08-10 RX ADMIN — GABAPENTIN 300 MG: 300 CAPSULE ORAL at 09:46

## 2024-08-10 RX ADMIN — METHOCARBAMOL 750 MG: 750 TABLET ORAL at 02:34

## 2024-08-10 RX ADMIN — OXYCODONE HYDROCHLORIDE 10 MG: 5 TABLET ORAL at 08:46

## 2024-08-10 ASSESSMENT — PAIN DESCRIPTION - ORIENTATION
ORIENTATION: RIGHT

## 2024-08-10 ASSESSMENT — PAIN DESCRIPTION - ONSET: ONSET: ON-GOING

## 2024-08-10 ASSESSMENT — PAIN SCALES - GENERAL
PAINLEVEL_OUTOF10: 10
PAINLEVEL_OUTOF10: 6
PAINLEVEL_OUTOF10: 10
PAINLEVEL_OUTOF10: 10
PAINLEVEL_OUTOF10: 8
PAINLEVEL_OUTOF10: 10

## 2024-08-10 ASSESSMENT — PAIN DESCRIPTION - LOCATION
LOCATION: HIP

## 2024-08-10 ASSESSMENT — PAIN DESCRIPTION - DESCRIPTORS
DESCRIPTORS: SHARP
DESCRIPTORS: ACHING;SHARP;STABBING
DESCRIPTORS: ACHING;CRUSHING
DESCRIPTORS: SHARP
DESCRIPTORS: CRUSHING
DESCRIPTORS: ACHING;SHARP;STABBING;DISCOMFORT
DESCRIPTORS: ACHING

## 2024-08-10 ASSESSMENT — PAIN - FUNCTIONAL ASSESSMENT
PAIN_FUNCTIONAL_ASSESSMENT: PREVENTS OR INTERFERES SOME ACTIVE ACTIVITIES AND ADLS
PAIN_FUNCTIONAL_ASSESSMENT: PREVENTS OR INTERFERES SOME ACTIVE ACTIVITIES AND ADLS

## 2024-08-10 ASSESSMENT — PAIN DESCRIPTION - FREQUENCY: FREQUENCY: CONTINUOUS

## 2024-08-10 ASSESSMENT — PAIN DESCRIPTION - PAIN TYPE: TYPE: ACUTE PAIN

## 2024-08-10 NOTE — CONSENT
Informed Consent for Blood Component Transfusion Note    I have discussed with the patient the rationale for blood component transfusion; its benefits in treating or preventing fatigue, organ damage, or death; and its risk which includes mild transfusion reactions, rare risk of blood borne infection, or more serious but rare reactions. I have discussed the alternatives to transfusion, including the risk and consequences of not receiving transfusion. The patient had an opportunity to ask questions and had agreed to proceed with transfusion of blood components.    Electronically signed by Jose Luis Calvin MD on 8/10/24 at 7:18 AM EDT

## 2024-08-11 ENCOUNTER — APPOINTMENT (OUTPATIENT)
Dept: GENERAL RADIOLOGY | Age: 59
DRG: 956 | End: 2024-08-11
Payer: MEDICARE

## 2024-08-11 LAB
ANION GAP SERPL CALCULATED.3IONS-SCNC: 9 MMOL/L (ref 9–16)
ANTI-XA UNFRAC HEPARIN: 0.65 IU/L
ANTI-XA UNFRAC HEPARIN: 1.11 IU/L
BASOPHILS # BLD: 0.03 K/UL (ref 0–0.2)
BASOPHILS NFR BLD: 0 % (ref 0–2)
BUN SERPL-MCNC: 15 MG/DL (ref 6–20)
CALCIUM SERPL-MCNC: 7.9 MG/DL (ref 8.6–10.4)
CHLORIDE SERPL-SCNC: 105 MMOL/L (ref 98–107)
CO2 SERPL-SCNC: 24 MMOL/L (ref 20–31)
CREAT SERPL-MCNC: 0.6 MG/DL (ref 0.5–0.9)
EOSINOPHIL # BLD: 0.27 K/UL (ref 0–0.44)
EOSINOPHILS RELATIVE PERCENT: 3 % (ref 1–4)
ERYTHROCYTE [DISTWIDTH] IN BLOOD BY AUTOMATED COUNT: 15.4 % (ref 11.8–14.4)
GFR, ESTIMATED: >90 ML/MIN/1.73M2
GLUCOSE BLD-MCNC: 97 MG/DL (ref 65–105)
GLUCOSE SERPL-MCNC: 122 MG/DL (ref 74–99)
HCT VFR BLD AUTO: 23.8 % (ref 36.3–47.1)
HGB BLD-MCNC: 7.5 G/DL (ref 11.9–15.1)
IMM GRANULOCYTES # BLD AUTO: 0.07 K/UL (ref 0–0.3)
IMM GRANULOCYTES NFR BLD: 1 %
INR PPP: 1.1
LYMPHOCYTES NFR BLD: 1.35 K/UL (ref 1.1–3.7)
LYMPHOCYTES RELATIVE PERCENT: 16 % (ref 24–43)
MAGNESIUM SERPL-MCNC: 2.1 MG/DL (ref 1.6–2.6)
MCH RBC QN AUTO: 29.1 PG (ref 25.2–33.5)
MCHC RBC AUTO-ENTMCNC: 31.5 G/DL (ref 28.4–34.8)
MCV RBC AUTO: 92.2 FL (ref 82.6–102.9)
MONOCYTES NFR BLD: 0.7 K/UL (ref 0.1–1.2)
MONOCYTES NFR BLD: 9 % (ref 3–12)
NEUTROPHILS NFR BLD: 71 % (ref 36–65)
NEUTS SEG NFR BLD: 5.82 K/UL (ref 1.5–8.1)
NRBC BLD-RTO: 0 PER 100 WBC
PARTIAL THROMBOPLASTIN TIME: 53.6 SEC (ref 23–36.5)
PLATELET # BLD AUTO: 210 K/UL (ref 138–453)
PMV BLD AUTO: 9.9 FL (ref 8.1–13.5)
POTASSIUM SERPL-SCNC: 4 MMOL/L (ref 3.7–5.3)
PROTHROMBIN TIME: 14.4 SEC (ref 11.7–14.9)
RBC # BLD AUTO: 2.58 M/UL (ref 3.95–5.11)
RBC # BLD: ABNORMAL 10*6/UL
SODIUM SERPL-SCNC: 138 MMOL/L (ref 136–145)
WBC OTHER # BLD: 8.2 K/UL (ref 3.5–11.3)

## 2024-08-11 PROCEDURE — 6370000000 HC RX 637 (ALT 250 FOR IP): Performed by: STUDENT IN AN ORGANIZED HEALTH CARE EDUCATION/TRAINING PROGRAM

## 2024-08-11 PROCEDURE — 6360000002 HC RX W HCPCS: Performed by: STUDENT IN AN ORGANIZED HEALTH CARE EDUCATION/TRAINING PROGRAM

## 2024-08-11 PROCEDURE — 94640 AIRWAY INHALATION TREATMENT: CPT

## 2024-08-11 PROCEDURE — 2060000000 HC ICU INTERMEDIATE R&B

## 2024-08-11 PROCEDURE — 94761 N-INVAS EAR/PLS OXIMETRY MLT: CPT

## 2024-08-11 PROCEDURE — 99232 SBSQ HOSP IP/OBS MODERATE 35: CPT | Performed by: SURGERY

## 2024-08-11 PROCEDURE — 6370000000 HC RX 637 (ALT 250 FOR IP)

## 2024-08-11 PROCEDURE — 82947 ASSAY GLUCOSE BLOOD QUANT: CPT

## 2024-08-11 PROCEDURE — 80048 BASIC METABOLIC PNL TOTAL CA: CPT

## 2024-08-11 PROCEDURE — 6370000000 HC RX 637 (ALT 250 FOR IP): Performed by: NURSE PRACTITIONER

## 2024-08-11 PROCEDURE — 85610 PROTHROMBIN TIME: CPT

## 2024-08-11 PROCEDURE — 36415 COLL VENOUS BLD VENIPUNCTURE: CPT

## 2024-08-11 PROCEDURE — 85520 HEPARIN ASSAY: CPT

## 2024-08-11 PROCEDURE — 85025 COMPLETE CBC W/AUTO DIFF WBC: CPT

## 2024-08-11 PROCEDURE — 6360000002 HC RX W HCPCS

## 2024-08-11 PROCEDURE — 83735 ASSAY OF MAGNESIUM: CPT

## 2024-08-11 PROCEDURE — 85730 THROMBOPLASTIN TIME PARTIAL: CPT

## 2024-08-11 PROCEDURE — 71045 X-RAY EXAM CHEST 1 VIEW: CPT

## 2024-08-11 PROCEDURE — 2580000003 HC RX 258: Performed by: STUDENT IN AN ORGANIZED HEALTH CARE EDUCATION/TRAINING PROGRAM

## 2024-08-11 RX ORDER — HEPARIN SODIUM 1000 [USP'U]/ML
80 INJECTION, SOLUTION INTRAVENOUS; SUBCUTANEOUS PRN
Status: DISCONTINUED | OUTPATIENT
Start: 2024-08-11 | End: 2024-08-12

## 2024-08-11 RX ORDER — KETOROLAC TROMETHAMINE 15 MG/ML
15 INJECTION, SOLUTION INTRAMUSCULAR; INTRAVENOUS EVERY 6 HOURS
Status: COMPLETED | OUTPATIENT
Start: 2024-08-11 | End: 2024-08-16

## 2024-08-11 RX ORDER — HEPARIN SODIUM 1000 [USP'U]/ML
40 INJECTION, SOLUTION INTRAVENOUS; SUBCUTANEOUS PRN
Status: DISCONTINUED | OUTPATIENT
Start: 2024-08-11 | End: 2024-08-12

## 2024-08-11 RX ORDER — TAMSULOSIN HYDROCHLORIDE 0.4 MG/1
0.4 CAPSULE ORAL DAILY
Status: DISCONTINUED | OUTPATIENT
Start: 2024-08-11 | End: 2024-08-20 | Stop reason: HOSPADM

## 2024-08-11 RX ORDER — HEPARIN SODIUM 1000 [USP'U]/ML
80 INJECTION, SOLUTION INTRAVENOUS; SUBCUTANEOUS ONCE
Status: DISCONTINUED | OUTPATIENT
Start: 2024-08-11 | End: 2024-08-11

## 2024-08-11 RX ORDER — GABAPENTIN 400 MG/1
400 CAPSULE ORAL EVERY 8 HOURS
Status: DISCONTINUED | OUTPATIENT
Start: 2024-08-11 | End: 2024-08-13

## 2024-08-11 RX ORDER — IBUPROFEN 400 MG/1
400 TABLET ORAL
Status: DISCONTINUED | OUTPATIENT
Start: 2024-08-11 | End: 2024-08-11

## 2024-08-11 RX ORDER — OXYCODONE HYDROCHLORIDE 5 MG/1
5 TABLET ORAL EVERY 4 HOURS PRN
Status: DISCONTINUED | OUTPATIENT
Start: 2024-08-11 | End: 2024-08-12

## 2024-08-11 RX ORDER — WARFARIN SODIUM 3 MG/1
6 TABLET ORAL
Status: COMPLETED | OUTPATIENT
Start: 2024-08-11 | End: 2024-08-11

## 2024-08-11 RX ORDER — HEPARIN SODIUM 10000 [USP'U]/100ML
5-30 INJECTION, SOLUTION INTRAVENOUS CONTINUOUS
Status: DISCONTINUED | OUTPATIENT
Start: 2024-08-11 | End: 2024-08-12

## 2024-08-11 RX ADMIN — GABAPENTIN 400 MG: 400 CAPSULE ORAL at 16:44

## 2024-08-11 RX ADMIN — METHOCARBAMOL 750 MG: 750 TABLET ORAL at 17:44

## 2024-08-11 RX ADMIN — KETOROLAC TROMETHAMINE 15 MG: 15 INJECTION, SOLUTION INTRAMUSCULAR; INTRAVENOUS at 13:55

## 2024-08-11 RX ADMIN — BUDESONIDE AND FORMOTEROL FUMARATE DIHYDRATE 2 PUFF: 160; 4.5 AEROSOL RESPIRATORY (INHALATION) at 07:38

## 2024-08-11 RX ADMIN — SENNOSIDES AND DOCUSATE SODIUM 1 TABLET: 50; 8.6 TABLET ORAL at 20:40

## 2024-08-11 RX ADMIN — LAMOTRIGINE 25 MG: 25 TABLET ORAL at 20:40

## 2024-08-11 RX ADMIN — LAMOTRIGINE 25 MG: 25 TABLET ORAL at 08:17

## 2024-08-11 RX ADMIN — GABAPENTIN 300 MG: 300 CAPSULE ORAL at 08:22

## 2024-08-11 RX ADMIN — PANTOPRAZOLE SODIUM 40 MG: 40 TABLET, DELAYED RELEASE ORAL at 05:27

## 2024-08-11 RX ADMIN — METOPROLOL TARTRATE 25 MG: 25 TABLET, FILM COATED ORAL at 08:16

## 2024-08-11 RX ADMIN — LEVETIRACETAM 1500 MG: 500 TABLET, FILM COATED ORAL at 20:38

## 2024-08-11 RX ADMIN — LEVETIRACETAM 1500 MG: 500 TABLET, FILM COATED ORAL at 08:13

## 2024-08-11 RX ADMIN — IPRATROPIUM BROMIDE AND ALBUTEROL SULFATE 1 DOSE: 2.5; .5 SOLUTION RESPIRATORY (INHALATION) at 21:27

## 2024-08-11 RX ADMIN — METHOCARBAMOL 750 MG: 750 TABLET ORAL at 08:22

## 2024-08-11 RX ADMIN — OXYCODONE HYDROCHLORIDE 10 MG: 5 TABLET ORAL at 01:53

## 2024-08-11 RX ADMIN — ENOXAPARIN SODIUM 40 MG: 100 INJECTION SUBCUTANEOUS at 08:12

## 2024-08-11 RX ADMIN — SODIUM CHLORIDE, PRESERVATIVE FREE 10 ML: 5 INJECTION INTRAVENOUS at 08:29

## 2024-08-11 RX ADMIN — ACETAMINOPHEN 1000 MG: 500 TABLET ORAL at 05:26

## 2024-08-11 RX ADMIN — GABAPENTIN 300 MG: 300 CAPSULE ORAL at 01:49

## 2024-08-11 RX ADMIN — SODIUM CHLORIDE, PRESERVATIVE FREE 10 ML: 5 INJECTION INTRAVENOUS at 19:43

## 2024-08-11 RX ADMIN — ACETAMINOPHEN 1000 MG: 500 TABLET ORAL at 20:40

## 2024-08-11 RX ADMIN — METHOCARBAMOL 750 MG: 750 TABLET ORAL at 20:40

## 2024-08-11 RX ADMIN — OXYCODONE HYDROCHLORIDE 5 MG: 5 TABLET ORAL at 09:48

## 2024-08-11 RX ADMIN — ROSUVASTATIN CALCIUM 20 MG: 20 TABLET, FILM COATED ORAL at 18:44

## 2024-08-11 RX ADMIN — BUSPIRONE HYDROCHLORIDE 30 MG: 10 TABLET ORAL at 08:12

## 2024-08-11 RX ADMIN — BUSPIRONE HYDROCHLORIDE 30 MG: 10 TABLET ORAL at 20:38

## 2024-08-11 RX ADMIN — HYDROMORPHONE HYDROCHLORIDE 0.5 MG: 1 INJECTION, SOLUTION INTRAMUSCULAR; INTRAVENOUS; SUBCUTANEOUS at 11:36

## 2024-08-11 RX ADMIN — METHOCARBAMOL 750 MG: 750 TABLET ORAL at 05:25

## 2024-08-11 RX ADMIN — KETOROLAC TROMETHAMINE 15 MG: 15 INJECTION, SOLUTION INTRAMUSCULAR; INTRAVENOUS at 19:39

## 2024-08-11 RX ADMIN — WARFARIN SODIUM 6 MG: 3 TABLET ORAL at 18:44

## 2024-08-11 RX ADMIN — VENLAFAXINE HYDROCHLORIDE 300 MG: 150 CAPSULE, EXTENDED RELEASE ORAL at 08:12

## 2024-08-11 RX ADMIN — TAMSULOSIN HYDROCHLORIDE 0.4 MG: 0.4 CAPSULE ORAL at 08:15

## 2024-08-11 RX ADMIN — SENNOSIDES AND DOCUSATE SODIUM 1 TABLET: 50; 8.6 TABLET ORAL at 08:16

## 2024-08-11 RX ADMIN — METOPROLOL TARTRATE 25 MG: 25 TABLET, FILM COATED ORAL at 20:39

## 2024-08-11 RX ADMIN — IPRATROPIUM BROMIDE AND ALBUTEROL SULFATE 1 DOSE: 2.5; .5 SOLUTION RESPIRATORY (INHALATION) at 07:25

## 2024-08-11 RX ADMIN — TOPIRAMATE 100 MG: 100 TABLET, FILM COATED ORAL at 08:15

## 2024-08-11 RX ADMIN — ACETAMINOPHEN 1000 MG: 500 TABLET ORAL at 13:55

## 2024-08-11 RX ADMIN — OXYCODONE HYDROCHLORIDE 5 MG: 5 TABLET ORAL at 16:45

## 2024-08-11 RX ADMIN — HEPARIN SODIUM 18 UNITS/KG/HR: 10000 INJECTION, SOLUTION INTRAVENOUS at 11:14

## 2024-08-11 RX ADMIN — ACETAMINOPHEN 1000 MG: 500 TABLET ORAL at 14:31

## 2024-08-11 ASSESSMENT — PAIN DESCRIPTION - ORIENTATION
ORIENTATION: RIGHT

## 2024-08-11 ASSESSMENT — PAIN DESCRIPTION - LOCATION
LOCATION: HIP
LOCATION: HIP
LOCATION: HIP;FOOT
LOCATION: HIP

## 2024-08-11 ASSESSMENT — PAIN DESCRIPTION - DESCRIPTORS
DESCRIPTORS: SHARP
DESCRIPTORS: STABBING
DESCRIPTORS: ACHING
DESCRIPTORS: STABBING
DESCRIPTORS: SHARP
DESCRIPTORS: SHARP
DESCRIPTORS: ACHING;SHARP
DESCRIPTORS: STABBING

## 2024-08-11 ASSESSMENT — PAIN SCALES - GENERAL
PAINLEVEL_OUTOF10: 10
PAINLEVEL_OUTOF10: 10
PAINLEVEL_OUTOF10: 5
PAINLEVEL_OUTOF10: 10
PAINLEVEL_OUTOF10: 8
PAINLEVEL_OUTOF10: 3
PAINLEVEL_OUTOF10: 4
PAINLEVEL_OUTOF10: 8
PAINLEVEL_OUTOF10: 10
PAINLEVEL_OUTOF10: 10
PAINLEVEL_OUTOF10: 0
PAINLEVEL_OUTOF10: 10
PAINLEVEL_OUTOF10: 10

## 2024-08-11 ASSESSMENT — PAIN SCALES - WONG BAKER: WONGBAKER_NUMERICALRESPONSE: NO HURT

## 2024-08-12 ENCOUNTER — APPOINTMENT (OUTPATIENT)
Dept: GENERAL RADIOLOGY | Age: 59
DRG: 956 | End: 2024-08-12
Payer: MEDICARE

## 2024-08-12 LAB
ANION GAP SERPL CALCULATED.3IONS-SCNC: 11 MMOL/L (ref 9–16)
BUN SERPL-MCNC: 17 MG/DL (ref 6–20)
CALCIUM SERPL-MCNC: 7.9 MG/DL (ref 8.6–10.4)
CHLORIDE SERPL-SCNC: 104 MMOL/L (ref 98–107)
CO2 SERPL-SCNC: 20 MMOL/L (ref 20–31)
CREAT SERPL-MCNC: 0.6 MG/DL (ref 0.5–0.9)
GFR, ESTIMATED: >90 ML/MIN/1.73M2
GLUCOSE BLD-MCNC: 121 MG/DL (ref 65–105)
GLUCOSE BLD-MCNC: 133 MG/DL (ref 65–105)
GLUCOSE BLD-MCNC: 138 MG/DL (ref 65–105)
GLUCOSE BLD-MCNC: 98 MG/DL (ref 65–105)
GLUCOSE SERPL-MCNC: 103 MG/DL (ref 74–99)
INR PPP: 1.1
MAGNESIUM SERPL-MCNC: 2.3 MG/DL (ref 1.6–2.6)
POTASSIUM SERPL-SCNC: 4.2 MMOL/L (ref 3.7–5.3)
PROTHROMBIN TIME: 13.9 SEC (ref 11.7–14.9)
SODIUM SERPL-SCNC: 135 MMOL/L (ref 136–145)

## 2024-08-12 PROCEDURE — 6370000000 HC RX 637 (ALT 250 FOR IP): Performed by: STUDENT IN AN ORGANIZED HEALTH CARE EDUCATION/TRAINING PROGRAM

## 2024-08-12 PROCEDURE — 6370000000 HC RX 637 (ALT 250 FOR IP)

## 2024-08-12 PROCEDURE — 83735 ASSAY OF MAGNESIUM: CPT

## 2024-08-12 PROCEDURE — 6360000002 HC RX W HCPCS: Performed by: STUDENT IN AN ORGANIZED HEALTH CARE EDUCATION/TRAINING PROGRAM

## 2024-08-12 PROCEDURE — 71045 X-RAY EXAM CHEST 1 VIEW: CPT

## 2024-08-12 PROCEDURE — 2060000000 HC ICU INTERMEDIATE R&B

## 2024-08-12 PROCEDURE — 2580000003 HC RX 258: Performed by: STUDENT IN AN ORGANIZED HEALTH CARE EDUCATION/TRAINING PROGRAM

## 2024-08-12 PROCEDURE — 94640 AIRWAY INHALATION TREATMENT: CPT

## 2024-08-12 PROCEDURE — 6370000000 HC RX 637 (ALT 250 FOR IP): Performed by: NURSE PRACTITIONER

## 2024-08-12 PROCEDURE — 85610 PROTHROMBIN TIME: CPT

## 2024-08-12 PROCEDURE — 6360000002 HC RX W HCPCS

## 2024-08-12 PROCEDURE — 94761 N-INVAS EAR/PLS OXIMETRY MLT: CPT

## 2024-08-12 PROCEDURE — 80048 BASIC METABOLIC PNL TOTAL CA: CPT

## 2024-08-12 PROCEDURE — 36415 COLL VENOUS BLD VENIPUNCTURE: CPT

## 2024-08-12 PROCEDURE — 82947 ASSAY GLUCOSE BLOOD QUANT: CPT

## 2024-08-12 RX ORDER — TAMSULOSIN HYDROCHLORIDE 0.4 MG/1
0.4 CAPSULE ORAL DAILY
Qty: 30 CAPSULE | Refills: 0
Start: 2024-08-13

## 2024-08-12 RX ORDER — WARFARIN SODIUM 7.5 MG/1
7.5 TABLET ORAL
Status: COMPLETED | OUTPATIENT
Start: 2024-08-12 | End: 2024-08-12

## 2024-08-12 RX ORDER — OXYCODONE HYDROCHLORIDE 5 MG/1
5 TABLET ORAL EVERY 6 HOURS PRN
Status: DISCONTINUED | OUTPATIENT
Start: 2024-08-12 | End: 2024-08-13

## 2024-08-12 RX ORDER — LAMOTRIGINE 25 MG/1
25 TABLET ORAL 2 TIMES DAILY
Qty: 60 TABLET | Refills: 0
Start: 2024-08-12

## 2024-08-12 RX ORDER — POLYETHYLENE GLYCOL 3350 17 G/17G
17 POWDER, FOR SOLUTION ORAL DAILY
Qty: 30 PACKET | Refills: 0
Start: 2024-08-13 | End: 2024-08-20 | Stop reason: HOSPADM

## 2024-08-12 RX ORDER — METHOCARBAMOL 750 MG/1
750 TABLET, FILM COATED ORAL EVERY 6 HOURS
Qty: 40 TABLET | Refills: 0
Start: 2024-08-12 | End: 2024-08-14 | Stop reason: HOSPADM

## 2024-08-12 RX ORDER — LEVETIRACETAM 750 MG/1
1500 TABLET ORAL 2 TIMES DAILY
Qty: 60 TABLET | Refills: 3
Start: 2024-08-12

## 2024-08-12 RX ORDER — LIDOCAINE 4 G/G
1 PATCH TOPICAL DAILY
Status: DISCONTINUED | OUTPATIENT
Start: 2024-08-13 | End: 2024-08-12

## 2024-08-12 RX ORDER — ENOXAPARIN SODIUM 150 MG/ML
1 INJECTION SUBCUTANEOUS 2 TIMES DAILY
Status: DISCONTINUED | OUTPATIENT
Start: 2024-08-12 | End: 2024-08-20 | Stop reason: HOSPADM

## 2024-08-12 RX ORDER — SENNA AND DOCUSATE SODIUM 50; 8.6 MG/1; MG/1
1 TABLET, FILM COATED ORAL 2 TIMES DAILY
Qty: 60 TABLET | Refills: 0
Start: 2024-08-12

## 2024-08-12 RX ORDER — LIDOCAINE 4 G/G
1 PATCH TOPICAL ONCE
Status: COMPLETED | OUTPATIENT
Start: 2024-08-13 | End: 2024-08-13

## 2024-08-12 RX ORDER — VENLAFAXINE HYDROCHLORIDE 75 MG/1
300 CAPSULE, EXTENDED RELEASE ORAL DAILY
Status: DISCONTINUED | OUTPATIENT
Start: 2024-08-12 | End: 2024-08-20 | Stop reason: HOSPADM

## 2024-08-12 RX ORDER — IPRATROPIUM BROMIDE AND ALBUTEROL SULFATE 2.5; .5 MG/3ML; MG/3ML
3 SOLUTION RESPIRATORY (INHALATION) 2 TIMES DAILY
Qty: 360 ML | Refills: 0
Start: 2024-08-12

## 2024-08-12 RX ORDER — GABAPENTIN 400 MG/1
400 CAPSULE ORAL EVERY 8 HOURS
Qty: 21 CAPSULE | Refills: 0
Start: 2024-08-12 | End: 2024-08-14 | Stop reason: HOSPADM

## 2024-08-12 RX ADMIN — PANTOPRAZOLE SODIUM 40 MG: 40 TABLET, DELAYED RELEASE ORAL at 06:07

## 2024-08-12 RX ADMIN — IPRATROPIUM BROMIDE AND ALBUTEROL SULFATE 1 DOSE: 2.5; .5 SOLUTION RESPIRATORY (INHALATION) at 20:45

## 2024-08-12 RX ADMIN — NAPHAZOLINE HYDROCHLORIDE AND PHENIRAMINE MALEATE 1 DROP: .25; 3 SOLUTION/ DROPS OPHTHALMIC at 20:37

## 2024-08-12 RX ADMIN — ACETAMINOPHEN 1000 MG: 500 TABLET ORAL at 13:26

## 2024-08-12 RX ADMIN — LAMOTRIGINE 25 MG: 25 TABLET ORAL at 10:44

## 2024-08-12 RX ADMIN — METOPROLOL TARTRATE 25 MG: 25 TABLET, FILM COATED ORAL at 20:33

## 2024-08-12 RX ADMIN — NAPHAZOLINE HYDROCHLORIDE AND PHENIRAMINE MALEATE 1 DROP: .25; 3 SOLUTION/ DROPS OPHTHALMIC at 18:34

## 2024-08-12 RX ADMIN — TOPIRAMATE 100 MG: 100 TABLET, FILM COATED ORAL at 09:22

## 2024-08-12 RX ADMIN — SODIUM CHLORIDE, PRESERVATIVE FREE 5 ML: 5 INJECTION INTRAVENOUS at 21:30

## 2024-08-12 RX ADMIN — ACETAMINOPHEN 1000 MG: 500 TABLET ORAL at 06:06

## 2024-08-12 RX ADMIN — KETOROLAC TROMETHAMINE 15 MG: 15 INJECTION, SOLUTION INTRAMUSCULAR; INTRAVENOUS at 01:34

## 2024-08-12 RX ADMIN — ROSUVASTATIN CALCIUM 20 MG: 20 TABLET, FILM COATED ORAL at 18:35

## 2024-08-12 RX ADMIN — METHOCARBAMOL 750 MG: 750 TABLET ORAL at 09:21

## 2024-08-12 RX ADMIN — LEVETIRACETAM 1500 MG: 500 TABLET, FILM COATED ORAL at 20:33

## 2024-08-12 RX ADMIN — GABAPENTIN 400 MG: 400 CAPSULE ORAL at 18:35

## 2024-08-12 RX ADMIN — KETOROLAC TROMETHAMINE 15 MG: 15 INJECTION, SOLUTION INTRAMUSCULAR; INTRAVENOUS at 13:14

## 2024-08-12 RX ADMIN — POLYETHYLENE GLYCOL 3350 17 G: 17 POWDER, FOR SOLUTION ORAL at 09:22

## 2024-08-12 RX ADMIN — OXYCODONE HYDROCHLORIDE 5 MG: 5 TABLET ORAL at 21:41

## 2024-08-12 RX ADMIN — WARFARIN SODIUM 7.5 MG: 7.5 TABLET ORAL at 18:39

## 2024-08-12 RX ADMIN — LEVETIRACETAM 1500 MG: 500 TABLET, FILM COATED ORAL at 09:21

## 2024-08-12 RX ADMIN — OXYCODONE HYDROCHLORIDE 5 MG: 5 TABLET ORAL at 08:45

## 2024-08-12 RX ADMIN — TAMSULOSIN HYDROCHLORIDE 0.4 MG: 0.4 CAPSULE ORAL at 09:21

## 2024-08-12 RX ADMIN — BUDESONIDE AND FORMOTEROL FUMARATE DIHYDRATE 2 PUFF: 160; 4.5 AEROSOL RESPIRATORY (INHALATION) at 20:47

## 2024-08-12 RX ADMIN — SENNOSIDES AND DOCUSATE SODIUM 1 TABLET: 50; 8.6 TABLET ORAL at 09:21

## 2024-08-12 RX ADMIN — KETOROLAC TROMETHAMINE 15 MG: 15 INJECTION, SOLUTION INTRAMUSCULAR; INTRAVENOUS at 06:06

## 2024-08-12 RX ADMIN — BUSPIRONE HYDROCHLORIDE 30 MG: 10 TABLET ORAL at 20:34

## 2024-08-12 RX ADMIN — GABAPENTIN 400 MG: 400 CAPSULE ORAL at 01:34

## 2024-08-12 RX ADMIN — NAPHAZOLINE HYDROCHLORIDE AND PHENIRAMINE MALEATE 1 DROP: .25; 3 SOLUTION/ DROPS OPHTHALMIC at 13:15

## 2024-08-12 RX ADMIN — METHOCARBAMOL 750 MG: 750 TABLET ORAL at 20:29

## 2024-08-12 RX ADMIN — OXYCODONE HYDROCHLORIDE 5 MG: 5 TABLET ORAL at 15:40

## 2024-08-12 RX ADMIN — IPRATROPIUM BROMIDE AND ALBUTEROL SULFATE 1 DOSE: 2.5; .5 SOLUTION RESPIRATORY (INHALATION) at 08:51

## 2024-08-12 RX ADMIN — NAPHAZOLINE HYDROCHLORIDE AND PHENIRAMINE MALEATE 1 DROP: .25; 3 SOLUTION/ DROPS OPHTHALMIC at 09:22

## 2024-08-12 RX ADMIN — ENOXAPARIN SODIUM 105 MG: 150 INJECTION SUBCUTANEOUS at 20:34

## 2024-08-12 RX ADMIN — BACITRACIN: 500 OINTMENT TOPICAL at 20:38

## 2024-08-12 RX ADMIN — GABAPENTIN 400 MG: 400 CAPSULE ORAL at 09:21

## 2024-08-12 RX ADMIN — METHOCARBAMOL 750 MG: 750 TABLET ORAL at 15:41

## 2024-08-12 RX ADMIN — LAMOTRIGINE 25 MG: 25 TABLET ORAL at 20:35

## 2024-08-12 RX ADMIN — BUSPIRONE HYDROCHLORIDE 30 MG: 10 TABLET ORAL at 09:21

## 2024-08-12 RX ADMIN — ACETAMINOPHEN 1000 MG: 500 TABLET ORAL at 21:41

## 2024-08-12 RX ADMIN — VENLAFAXINE HYDROCHLORIDE 300 MG: 75 CAPSULE, EXTENDED RELEASE ORAL at 13:14

## 2024-08-12 RX ADMIN — ENOXAPARIN SODIUM 105 MG: 150 INJECTION SUBCUTANEOUS at 09:39

## 2024-08-12 RX ADMIN — KETOROLAC TROMETHAMINE 15 MG: 15 INJECTION, SOLUTION INTRAMUSCULAR; INTRAVENOUS at 20:27

## 2024-08-12 RX ADMIN — SODIUM CHLORIDE, PRESERVATIVE FREE 10 ML: 5 INJECTION INTRAVENOUS at 09:22

## 2024-08-12 RX ADMIN — BUDESONIDE AND FORMOTEROL FUMARATE DIHYDRATE 2 PUFF: 160; 4.5 AEROSOL RESPIRATORY (INHALATION) at 08:51

## 2024-08-12 RX ADMIN — HEPARIN SODIUM 15 UNITS/KG/HR: 10000 INJECTION, SOLUTION INTRAVENOUS at 01:38

## 2024-08-12 RX ADMIN — METHOCARBAMOL 750 MG: 750 TABLET ORAL at 06:07

## 2024-08-12 ASSESSMENT — PAIN DESCRIPTION - LOCATION
LOCATION: HIP
LOCATION: HIP;RIB CAGE
LOCATION: HIP
LOCATION: HIP
LOCATION: HIP;FOOT
LOCATION: HIP
LOCATION: HIP;FOOT
LOCATION: HIP

## 2024-08-12 ASSESSMENT — PAIN SCALES - GENERAL
PAINLEVEL_OUTOF10: 10
PAINLEVEL_OUTOF10: 8
PAINLEVEL_OUTOF10: 10
PAINLEVEL_OUTOF10: 5
PAINLEVEL_OUTOF10: 10
PAINLEVEL_OUTOF10: 10
PAINLEVEL_OUTOF10: 8

## 2024-08-12 ASSESSMENT — PAIN DESCRIPTION - ORIENTATION
ORIENTATION: RIGHT
ORIENTATION: LEFT;RIGHT
ORIENTATION: RIGHT

## 2024-08-12 ASSESSMENT — PAIN DESCRIPTION - DESCRIPTORS
DESCRIPTORS: ACHING
DESCRIPTORS: SHARP
DESCRIPTORS: ACHING
DESCRIPTORS: SHARP
DESCRIPTORS: SHARP
DESCRIPTORS: ACHING

## 2024-08-12 ASSESSMENT — PAIN - FUNCTIONAL ASSESSMENT
PAIN_FUNCTIONAL_ASSESSMENT: ACTIVITIES ARE NOT PREVENTED
PAIN_FUNCTIONAL_ASSESSMENT: ACTIVITIES ARE NOT PREVENTED
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
## 2024-08-13 LAB
ANION GAP SERPL CALCULATED.3IONS-SCNC: 9 MMOL/L (ref 9–16)
BASOPHILS # BLD: 0.07 K/UL (ref 0–0.2)
BASOPHILS NFR BLD: 1 % (ref 0–2)
BUN SERPL-MCNC: 15 MG/DL (ref 6–20)
CALCIUM SERPL-MCNC: 7.7 MG/DL (ref 8.6–10.4)
CHLORIDE SERPL-SCNC: 107 MMOL/L (ref 98–107)
CO2 SERPL-SCNC: 21 MMOL/L (ref 20–31)
CREAT SERPL-MCNC: 0.6 MG/DL (ref 0.5–0.9)
EOSINOPHIL # BLD: 0.26 K/UL (ref 0–0.44)
EOSINOPHILS RELATIVE PERCENT: 4 % (ref 1–4)
ERYTHROCYTE [DISTWIDTH] IN BLOOD BY AUTOMATED COUNT: 15.1 % (ref 11.8–14.4)
GFR, ESTIMATED: >90 ML/MIN/1.73M2
GLUCOSE BLD-MCNC: 107 MG/DL (ref 65–105)
GLUCOSE BLD-MCNC: 107 MG/DL (ref 65–105)
GLUCOSE BLD-MCNC: 156 MG/DL (ref 65–105)
GLUCOSE BLD-MCNC: 157 MG/DL (ref 65–105)
GLUCOSE SERPL-MCNC: 114 MG/DL (ref 74–99)
HCT VFR BLD AUTO: 23.4 % (ref 36.3–47.1)
HCT VFR BLD AUTO: 23.9 % (ref 36.3–47.1)
HGB BLD-MCNC: 6.4 G/DL (ref 11.9–15.1)
HGB BLD-MCNC: 7.8 G/DL (ref 11.9–15.1)
IMM GRANULOCYTES # BLD AUTO: 0.13 K/UL (ref 0–0.3)
IMM GRANULOCYTES NFR BLD: 2 %
INR PPP: 1.1
LYMPHOCYTES NFR BLD: 1.72 K/UL (ref 1.1–3.7)
LYMPHOCYTES RELATIVE PERCENT: 26 % (ref 24–43)
MAGNESIUM SERPL-MCNC: 2.2 MG/DL (ref 1.6–2.6)
MCH RBC QN AUTO: 28.3 PG (ref 25.2–33.5)
MCHC RBC AUTO-ENTMCNC: 27.4 G/DL (ref 28.4–34.8)
MCV RBC AUTO: 103.5 FL (ref 82.6–102.9)
MONOCYTES NFR BLD: 0.66 K/UL (ref 0.1–1.2)
MONOCYTES NFR BLD: 10 % (ref 3–12)
MORPHOLOGY: ABNORMAL
MORPHOLOGY: ABNORMAL
NEUTROPHILS NFR BLD: 57 % (ref 36–65)
NEUTS SEG NFR BLD: 3.76 K/UL (ref 1.5–8.1)
NRBC BLD-RTO: 0.3 PER 100 WBC
PLATELET # BLD AUTO: 297 K/UL (ref 138–453)
PMV BLD AUTO: 9.2 FL (ref 8.1–13.5)
POTASSIUM SERPL-SCNC: 3.9 MMOL/L (ref 3.7–5.3)
PROTHROMBIN TIME: 14.5 SEC (ref 11.7–14.9)
RBC # BLD AUTO: 2.26 M/UL (ref 3.95–5.11)
SODIUM SERPL-SCNC: 137 MMOL/L (ref 136–145)
WBC OTHER # BLD: 6.6 K/UL (ref 3.5–11.3)

## 2024-08-13 PROCEDURE — 86900 BLOOD TYPING SEROLOGIC ABO: CPT

## 2024-08-13 PROCEDURE — 85018 HEMOGLOBIN: CPT

## 2024-08-13 PROCEDURE — 80048 BASIC METABOLIC PNL TOTAL CA: CPT

## 2024-08-13 PROCEDURE — 82947 ASSAY GLUCOSE BLOOD QUANT: CPT

## 2024-08-13 PROCEDURE — 85610 PROTHROMBIN TIME: CPT

## 2024-08-13 PROCEDURE — 6370000000 HC RX 637 (ALT 250 FOR IP): Performed by: STUDENT IN AN ORGANIZED HEALTH CARE EDUCATION/TRAINING PROGRAM

## 2024-08-13 PROCEDURE — 94640 AIRWAY INHALATION TREATMENT: CPT

## 2024-08-13 PROCEDURE — 2580000003 HC RX 258: Performed by: STUDENT IN AN ORGANIZED HEALTH CARE EDUCATION/TRAINING PROGRAM

## 2024-08-13 PROCEDURE — P9016 RBC LEUKOCYTES REDUCED: HCPCS

## 2024-08-13 PROCEDURE — 6370000000 HC RX 637 (ALT 250 FOR IP)

## 2024-08-13 PROCEDURE — 86923 COMPATIBILITY TEST ELECTRIC: CPT

## 2024-08-13 PROCEDURE — 36415 COLL VENOUS BLD VENIPUNCTURE: CPT

## 2024-08-13 PROCEDURE — 86901 BLOOD TYPING SEROLOGIC RH(D): CPT

## 2024-08-13 PROCEDURE — 86850 RBC ANTIBODY SCREEN: CPT

## 2024-08-13 PROCEDURE — 85025 COMPLETE CBC W/AUTO DIFF WBC: CPT

## 2024-08-13 PROCEDURE — 2060000000 HC ICU INTERMEDIATE R&B

## 2024-08-13 PROCEDURE — 83735 ASSAY OF MAGNESIUM: CPT

## 2024-08-13 PROCEDURE — 36430 TRANSFUSION BLD/BLD COMPNT: CPT

## 2024-08-13 PROCEDURE — 6360000002 HC RX W HCPCS: Performed by: STUDENT IN AN ORGANIZED HEALTH CARE EDUCATION/TRAINING PROGRAM

## 2024-08-13 PROCEDURE — 6360000002 HC RX W HCPCS

## 2024-08-13 PROCEDURE — 85014 HEMATOCRIT: CPT

## 2024-08-13 PROCEDURE — 6370000000 HC RX 637 (ALT 250 FOR IP): Performed by: NURSE PRACTITIONER

## 2024-08-13 RX ORDER — OXYCODONE HYDROCHLORIDE 5 MG/1
5 TABLET ORAL ONCE
Status: COMPLETED | OUTPATIENT
Start: 2024-08-13 | End: 2024-08-13

## 2024-08-13 RX ORDER — SODIUM CHLORIDE 9 MG/ML
INJECTION, SOLUTION INTRAVENOUS PRN
Status: DISCONTINUED | OUTPATIENT
Start: 2024-08-13 | End: 2024-08-16

## 2024-08-13 RX ORDER — OXYCODONE HYDROCHLORIDE 5 MG/1
10 TABLET ORAL EVERY 4 HOURS PRN
Status: DISCONTINUED | OUTPATIENT
Start: 2024-08-13 | End: 2024-08-16

## 2024-08-13 RX ORDER — CYCLOBENZAPRINE HCL 10 MG
10 TABLET ORAL EVERY 8 HOURS SCHEDULED
Status: DISCONTINUED | OUTPATIENT
Start: 2024-08-13 | End: 2024-08-16

## 2024-08-13 RX ORDER — LIDOCAINE 4 G/G
1 PATCH TOPICAL DAILY
Status: DISCONTINUED | OUTPATIENT
Start: 2024-08-13 | End: 2024-08-16

## 2024-08-13 RX ORDER — OXYCODONE HYDROCHLORIDE 5 MG/1
5 TABLET ORAL EVERY 4 HOURS PRN
Status: DISCONTINUED | OUTPATIENT
Start: 2024-08-13 | End: 2024-08-16

## 2024-08-13 RX ORDER — GABAPENTIN 300 MG/1
600 CAPSULE ORAL EVERY 8 HOURS
Status: DISCONTINUED | OUTPATIENT
Start: 2024-08-13 | End: 2024-08-20 | Stop reason: HOSPADM

## 2024-08-13 RX ORDER — LIDOCAINE 4 G/G
1 PATCH TOPICAL ONCE
Status: DISCONTINUED | OUTPATIENT
Start: 2024-08-13 | End: 2024-08-16

## 2024-08-13 RX ADMIN — METOPROLOL TARTRATE 25 MG: 25 TABLET, FILM COATED ORAL at 21:38

## 2024-08-13 RX ADMIN — ENOXAPARIN SODIUM 105 MG: 150 INJECTION SUBCUTANEOUS at 21:39

## 2024-08-13 RX ADMIN — KETOROLAC TROMETHAMINE 15 MG: 15 INJECTION, SOLUTION INTRAMUSCULAR; INTRAVENOUS at 13:50

## 2024-08-13 RX ADMIN — NAPHAZOLINE HYDROCHLORIDE AND PHENIRAMINE MALEATE 1 DROP: .25; 3 SOLUTION/ DROPS OPHTHALMIC at 21:44

## 2024-08-13 RX ADMIN — GABAPENTIN 600 MG: 300 CAPSULE ORAL at 18:50

## 2024-08-13 RX ADMIN — SENNOSIDES AND DOCUSATE SODIUM 1 TABLET: 50; 8.6 TABLET ORAL at 07:36

## 2024-08-13 RX ADMIN — TAMSULOSIN HYDROCHLORIDE 0.4 MG: 0.4 CAPSULE ORAL at 07:40

## 2024-08-13 RX ADMIN — KETOROLAC TROMETHAMINE 15 MG: 15 INJECTION, SOLUTION INTRAMUSCULAR; INTRAVENOUS at 06:34

## 2024-08-13 RX ADMIN — CYCLOBENZAPRINE 10 MG: 10 TABLET, FILM COATED ORAL at 13:50

## 2024-08-13 RX ADMIN — LAMOTRIGINE 25 MG: 25 TABLET ORAL at 21:38

## 2024-08-13 RX ADMIN — NAPHAZOLINE HYDROCHLORIDE AND PHENIRAMINE MALEATE 1 DROP: .25; 3 SOLUTION/ DROPS OPHTHALMIC at 18:51

## 2024-08-13 RX ADMIN — TOPIRAMATE 100 MG: 100 TABLET, FILM COATED ORAL at 07:45

## 2024-08-13 RX ADMIN — KETOROLAC TROMETHAMINE 15 MG: 15 INJECTION, SOLUTION INTRAMUSCULAR; INTRAVENOUS at 01:21

## 2024-08-13 RX ADMIN — KETOROLAC TROMETHAMINE 15 MG: 15 INJECTION, SOLUTION INTRAMUSCULAR; INTRAVENOUS at 18:51

## 2024-08-13 RX ADMIN — OXYCODONE HYDROCHLORIDE 5 MG: 5 TABLET ORAL at 08:47

## 2024-08-13 RX ADMIN — ROSUVASTATIN CALCIUM 20 MG: 20 TABLET, FILM COATED ORAL at 18:53

## 2024-08-13 RX ADMIN — IPRATROPIUM BROMIDE AND ALBUTEROL SULFATE 1 DOSE: 2.5; .5 SOLUTION RESPIRATORY (INHALATION) at 09:52

## 2024-08-13 RX ADMIN — METHOCARBAMOL 750 MG: 750 TABLET ORAL at 10:52

## 2024-08-13 RX ADMIN — IPRATROPIUM BROMIDE AND ALBUTEROL SULFATE 1 DOSE: 2.5; .5 SOLUTION RESPIRATORY (INHALATION) at 21:03

## 2024-08-13 RX ADMIN — CYCLOBENZAPRINE 10 MG: 10 TABLET, FILM COATED ORAL at 22:35

## 2024-08-13 RX ADMIN — ACETAMINOPHEN 1000 MG: 500 TABLET ORAL at 13:49

## 2024-08-13 RX ADMIN — ACETAMINOPHEN 1000 MG: 500 TABLET ORAL at 22:34

## 2024-08-13 RX ADMIN — LAMOTRIGINE 25 MG: 25 TABLET ORAL at 07:37

## 2024-08-13 RX ADMIN — OXYCODONE HYDROCHLORIDE 10 MG: 5 TABLET ORAL at 21:41

## 2024-08-13 RX ADMIN — SENNOSIDES AND DOCUSATE SODIUM 1 TABLET: 50; 8.6 TABLET ORAL at 07:39

## 2024-08-13 RX ADMIN — METHOCARBAMOL 750 MG: 750 TABLET ORAL at 05:28

## 2024-08-13 RX ADMIN — SODIUM CHLORIDE, PRESERVATIVE FREE 10 ML: 5 INJECTION INTRAVENOUS at 07:38

## 2024-08-13 RX ADMIN — SODIUM CHLORIDE, PRESERVATIVE FREE 5 ML: 5 INJECTION INTRAVENOUS at 22:47

## 2024-08-13 RX ADMIN — PANTOPRAZOLE SODIUM 40 MG: 40 TABLET, DELAYED RELEASE ORAL at 05:28

## 2024-08-13 RX ADMIN — BUSPIRONE HYDROCHLORIDE 30 MG: 10 TABLET ORAL at 21:38

## 2024-08-13 RX ADMIN — VENLAFAXINE HYDROCHLORIDE 300 MG: 75 CAPSULE, EXTENDED RELEASE ORAL at 07:45

## 2024-08-13 RX ADMIN — OXYCODONE HYDROCHLORIDE 10 MG: 5 TABLET ORAL at 16:14

## 2024-08-13 RX ADMIN — BACITRACIN: 500 OINTMENT TOPICAL at 07:35

## 2024-08-13 RX ADMIN — BUSPIRONE HYDROCHLORIDE 30 MG: 10 TABLET ORAL at 07:36

## 2024-08-13 RX ADMIN — HYDROMORPHONE HYDROCHLORIDE 0.5 MG: 1 INJECTION, SOLUTION INTRAMUSCULAR; INTRAVENOUS; SUBCUTANEOUS at 10:53

## 2024-08-13 RX ADMIN — GABAPENTIN 400 MG: 400 CAPSULE ORAL at 01:20

## 2024-08-13 RX ADMIN — ENOXAPARIN SODIUM 105 MG: 150 INJECTION SUBCUTANEOUS at 07:46

## 2024-08-13 RX ADMIN — BUDESONIDE AND FORMOTEROL FUMARATE DIHYDRATE 2 PUFF: 160; 4.5 AEROSOL RESPIRATORY (INHALATION) at 10:00

## 2024-08-13 RX ADMIN — ACETAMINOPHEN 1000 MG: 500 TABLET ORAL at 05:28

## 2024-08-13 RX ADMIN — LEVETIRACETAM 1500 MG: 500 TABLET, FILM COATED ORAL at 21:38

## 2024-08-13 RX ADMIN — OXYCODONE HYDROCHLORIDE 5 MG: 5 TABLET ORAL at 07:37

## 2024-08-13 RX ADMIN — NAPHAZOLINE HYDROCHLORIDE AND PHENIRAMINE MALEATE 1 DROP: .25; 3 SOLUTION/ DROPS OPHTHALMIC at 07:37

## 2024-08-13 RX ADMIN — GABAPENTIN 400 MG: 400 CAPSULE ORAL at 10:52

## 2024-08-13 RX ADMIN — LEVETIRACETAM 1500 MG: 500 TABLET, FILM COATED ORAL at 07:38

## 2024-08-13 ASSESSMENT — PAIN DESCRIPTION - DESCRIPTORS
DESCRIPTORS: ACHING;SHARP
DESCRIPTORS: ACHING
DESCRIPTORS: ACHING;SHARP

## 2024-08-13 ASSESSMENT — PAIN - FUNCTIONAL ASSESSMENT
PAIN_FUNCTIONAL_ASSESSMENT: ACTIVITIES ARE NOT PREVENTED

## 2024-08-13 ASSESSMENT — PAIN DESCRIPTION - ORIENTATION
ORIENTATION: RIGHT

## 2024-08-13 ASSESSMENT — PAIN DESCRIPTION - LOCATION
LOCATION: FOOT;LEG
LOCATION: HIP
LOCATION: HIP;RIB CAGE
LOCATION: HIP

## 2024-08-13 ASSESSMENT — PAIN SCALES - GENERAL
PAINLEVEL_OUTOF10: 8
PAINLEVEL_OUTOF10: 10
PAINLEVEL_OUTOF10: 8
PAINLEVEL_OUTOF10: 10
PAINLEVEL_OUTOF10: 8
PAINLEVEL_OUTOF10: 10
PAINLEVEL_OUTOF10: 8
PAINLEVEL_OUTOF10: 10
PAINLEVEL_OUTOF10: 7
PAINLEVEL_OUTOF10: 10
PAINLEVEL_OUTOF10: 10

## 2024-08-13 ASSESSMENT — PAIN SCALES - WONG BAKER: WONGBAKER_NUMERICALRESPONSE: NO HURT

## 2024-08-13 NOTE — CARE COORDINATION
Transitional planning-called [facility] and talked with Maninder. Received adjusters name for the car insurance- will call this morning to get all of the information they need. Will call back.

## 2024-08-14 LAB
ABO/RH: NORMAL
ANION GAP SERPL CALCULATED.3IONS-SCNC: 10 MMOL/L (ref 9–16)
ANTIBODY SCREEN: NEGATIVE
ARM BAND NUMBER: NORMAL
BASOPHILS # BLD: 0.05 K/UL (ref 0–0.2)
BASOPHILS NFR BLD: 1 % (ref 0–2)
BLOOD BANK BLOOD PRODUCT EXPIRATION DATE: NORMAL
BLOOD BANK DISPENSE STATUS: NORMAL
BLOOD BANK ISBT PRODUCT BLOOD TYPE: 5100
BLOOD BANK PRODUCT CODE: NORMAL
BLOOD BANK SAMPLE EXPIRATION: NORMAL
BLOOD BANK UNIT TYPE AND RH: NORMAL
BPU ID: NORMAL
BUN SERPL-MCNC: 15 MG/DL (ref 6–20)
CALCIUM SERPL-MCNC: 8.3 MG/DL (ref 8.6–10.4)
CHLORIDE SERPL-SCNC: 105 MMOL/L (ref 98–107)
CO2 SERPL-SCNC: 22 MMOL/L (ref 20–31)
COMPONENT: NORMAL
CREAT SERPL-MCNC: 0.6 MG/DL (ref 0.5–0.9)
CROSSMATCH RESULT: NORMAL
EOSINOPHIL # BLD: 0.3 K/UL (ref 0–0.44)
EOSINOPHILS RELATIVE PERCENT: 4 % (ref 1–4)
ERYTHROCYTE [DISTWIDTH] IN BLOOD BY AUTOMATED COUNT: 15.5 % (ref 11.8–14.4)
GFR, ESTIMATED: >90 ML/MIN/1.73M2
GLUCOSE BLD-MCNC: 110 MG/DL (ref 65–105)
GLUCOSE BLD-MCNC: 113 MG/DL (ref 65–105)
GLUCOSE BLD-MCNC: 120 MG/DL (ref 65–105)
GLUCOSE BLD-MCNC: 83 MG/DL (ref 65–105)
GLUCOSE SERPL-MCNC: 106 MG/DL (ref 74–99)
HCT VFR BLD AUTO: 27.8 % (ref 36.3–47.1)
HGB BLD-MCNC: 8.3 G/DL (ref 11.9–15.1)
IMM GRANULOCYTES # BLD AUTO: 0.15 K/UL (ref 0–0.3)
IMM GRANULOCYTES NFR BLD: 2 %
INR PPP: 1.2
LYMPHOCYTES NFR BLD: 2.09 K/UL (ref 1.1–3.7)
LYMPHOCYTES RELATIVE PERCENT: 27 % (ref 24–43)
MAGNESIUM SERPL-MCNC: 2.3 MG/DL (ref 1.6–2.6)
MCH RBC QN AUTO: 28.6 PG (ref 25.2–33.5)
MCHC RBC AUTO-ENTMCNC: 29.9 G/DL (ref 28.4–34.8)
MCV RBC AUTO: 95.9 FL (ref 82.6–102.9)
MONOCYTES NFR BLD: 0.67 K/UL (ref 0.1–1.2)
MONOCYTES NFR BLD: 9 % (ref 3–12)
NEUTROPHILS NFR BLD: 58 % (ref 36–65)
NEUTS SEG NFR BLD: 4.57 K/UL (ref 1.5–8.1)
NRBC BLD-RTO: 0.5 PER 100 WBC
PLATELET # BLD AUTO: 363 K/UL (ref 138–453)
PMV BLD AUTO: 9.5 FL (ref 8.1–13.5)
POTASSIUM SERPL-SCNC: 4.3 MMOL/L (ref 3.7–5.3)
PROTHROMBIN TIME: 15.2 SEC (ref 11.7–14.9)
RBC # BLD AUTO: 2.9 M/UL (ref 3.95–5.11)
RBC # BLD: ABNORMAL 10*6/UL
SODIUM SERPL-SCNC: 137 MMOL/L (ref 136–145)
TRANSFUSION STATUS: NORMAL
UNIT DIVISION: 0
UNIT ISSUE DATE/TIME: NORMAL
WBC OTHER # BLD: 7.8 K/UL (ref 3.5–11.3)

## 2024-08-14 PROCEDURE — 97535 SELF CARE MNGMENT TRAINING: CPT

## 2024-08-14 PROCEDURE — 97110 THERAPEUTIC EXERCISES: CPT

## 2024-08-14 PROCEDURE — 6370000000 HC RX 637 (ALT 250 FOR IP): Performed by: STUDENT IN AN ORGANIZED HEALTH CARE EDUCATION/TRAINING PROGRAM

## 2024-08-14 PROCEDURE — 82947 ASSAY GLUCOSE BLOOD QUANT: CPT

## 2024-08-14 PROCEDURE — 2580000003 HC RX 258: Performed by: STUDENT IN AN ORGANIZED HEALTH CARE EDUCATION/TRAINING PROGRAM

## 2024-08-14 PROCEDURE — 85610 PROTHROMBIN TIME: CPT

## 2024-08-14 PROCEDURE — 2060000000 HC ICU INTERMEDIATE R&B

## 2024-08-14 PROCEDURE — 6360000002 HC RX W HCPCS

## 2024-08-14 PROCEDURE — 94761 N-INVAS EAR/PLS OXIMETRY MLT: CPT

## 2024-08-14 PROCEDURE — 85025 COMPLETE CBC W/AUTO DIFF WBC: CPT

## 2024-08-14 PROCEDURE — 36415 COLL VENOUS BLD VENIPUNCTURE: CPT

## 2024-08-14 PROCEDURE — 94640 AIRWAY INHALATION TREATMENT: CPT

## 2024-08-14 PROCEDURE — 97530 THERAPEUTIC ACTIVITIES: CPT

## 2024-08-14 PROCEDURE — 99231 SBSQ HOSP IP/OBS SF/LOW 25: CPT | Performed by: SURGERY

## 2024-08-14 PROCEDURE — 6370000000 HC RX 637 (ALT 250 FOR IP)

## 2024-08-14 PROCEDURE — 6360000002 HC RX W HCPCS: Performed by: STUDENT IN AN ORGANIZED HEALTH CARE EDUCATION/TRAINING PROGRAM

## 2024-08-14 PROCEDURE — 80048 BASIC METABOLIC PNL TOTAL CA: CPT

## 2024-08-14 PROCEDURE — 83735 ASSAY OF MAGNESIUM: CPT

## 2024-08-14 RX ORDER — CYCLOBENZAPRINE HCL 10 MG
10 TABLET ORAL EVERY 8 HOURS SCHEDULED
Qty: 30 TABLET | Refills: 0
Start: 2024-08-14 | End: 2024-08-20 | Stop reason: HOSPADM

## 2024-08-14 RX ORDER — LIDOCAINE 4 G/G
1 PATCH TOPICAL ONCE
Qty: 1 EACH | Refills: 0
Start: 2024-08-14 | End: 2024-08-20 | Stop reason: HOSPADM

## 2024-08-14 RX ORDER — WARFARIN SODIUM 10 MG/1
10 TABLET ORAL
Status: COMPLETED | OUTPATIENT
Start: 2024-08-14 | End: 2024-08-14

## 2024-08-14 RX ORDER — GABAPENTIN 300 MG/1
600 CAPSULE ORAL EVERY 8 HOURS
Qty: 42 CAPSULE | Refills: 0
Start: 2024-08-14 | End: 2024-08-20

## 2024-08-14 RX ORDER — OXYCODONE HYDROCHLORIDE 5 MG/1
5 TABLET ORAL EVERY 6 HOURS PRN
Qty: 20 TABLET | Refills: 0 | Status: SHIPPED | OUTPATIENT
Start: 2024-08-14 | End: 2024-08-20 | Stop reason: HOSPADM

## 2024-08-14 RX ADMIN — IPRATROPIUM BROMIDE AND ALBUTEROL SULFATE 1 DOSE: 2.5; .5 SOLUTION RESPIRATORY (INHALATION) at 09:14

## 2024-08-14 RX ADMIN — KETOROLAC TROMETHAMINE 15 MG: 15 INJECTION, SOLUTION INTRAMUSCULAR; INTRAVENOUS at 13:22

## 2024-08-14 RX ADMIN — LAMOTRIGINE 25 MG: 25 TABLET ORAL at 09:14

## 2024-08-14 RX ADMIN — ACETAMINOPHEN 1000 MG: 500 TABLET ORAL at 06:29

## 2024-08-14 RX ADMIN — WARFARIN SODIUM 10 MG: 10 TABLET ORAL at 17:23

## 2024-08-14 RX ADMIN — LEVETIRACETAM 1500 MG: 500 TABLET, FILM COATED ORAL at 21:55

## 2024-08-14 RX ADMIN — LEVETIRACETAM 1500 MG: 500 TABLET, FILM COATED ORAL at 09:14

## 2024-08-14 RX ADMIN — ENOXAPARIN SODIUM 105 MG: 150 INJECTION SUBCUTANEOUS at 09:15

## 2024-08-14 RX ADMIN — KETOROLAC TROMETHAMINE 15 MG: 15 INJECTION, SOLUTION INTRAMUSCULAR; INTRAVENOUS at 09:16

## 2024-08-14 RX ADMIN — SODIUM CHLORIDE, PRESERVATIVE FREE 10 ML: 5 INJECTION INTRAVENOUS at 21:00

## 2024-08-14 RX ADMIN — IPRATROPIUM BROMIDE AND ALBUTEROL SULFATE 1 DOSE: 2.5; .5 SOLUTION RESPIRATORY (INHALATION) at 21:45

## 2024-08-14 RX ADMIN — OXYCODONE HYDROCHLORIDE 10 MG: 5 TABLET ORAL at 17:15

## 2024-08-14 RX ADMIN — CYCLOBENZAPRINE 10 MG: 10 TABLET, FILM COATED ORAL at 21:56

## 2024-08-14 RX ADMIN — SODIUM CHLORIDE, PRESERVATIVE FREE 10 ML: 5 INJECTION INTRAVENOUS at 09:17

## 2024-08-14 RX ADMIN — BUDESONIDE AND FORMOTEROL FUMARATE DIHYDRATE 2 PUFF: 160; 4.5 AEROSOL RESPIRATORY (INHALATION) at 09:14

## 2024-08-14 RX ADMIN — OXYCODONE HYDROCHLORIDE 10 MG: 5 TABLET ORAL at 04:53

## 2024-08-14 RX ADMIN — NAPHAZOLINE HYDROCHLORIDE AND PHENIRAMINE MALEATE 1 DROP: .25; 3 SOLUTION/ DROPS OPHTHALMIC at 09:51

## 2024-08-14 RX ADMIN — ROSUVASTATIN CALCIUM 20 MG: 20 TABLET, FILM COATED ORAL at 17:22

## 2024-08-14 RX ADMIN — METOPROLOL TARTRATE 12.5 MG: 25 TABLET, FILM COATED ORAL at 21:56

## 2024-08-14 RX ADMIN — GABAPENTIN 600 MG: 300 CAPSULE ORAL at 02:03

## 2024-08-14 RX ADMIN — BUSPIRONE HYDROCHLORIDE 30 MG: 10 TABLET ORAL at 09:15

## 2024-08-14 RX ADMIN — GABAPENTIN 600 MG: 300 CAPSULE ORAL at 09:14

## 2024-08-14 RX ADMIN — TAMSULOSIN HYDROCHLORIDE 0.4 MG: 0.4 CAPSULE ORAL at 09:17

## 2024-08-14 RX ADMIN — CYCLOBENZAPRINE 10 MG: 10 TABLET, FILM COATED ORAL at 14:28

## 2024-08-14 RX ADMIN — VENLAFAXINE HYDROCHLORIDE 300 MG: 75 CAPSULE, EXTENDED RELEASE ORAL at 09:14

## 2024-08-14 RX ADMIN — METOPROLOL TARTRATE 12.5 MG: 25 TABLET, FILM COATED ORAL at 09:16

## 2024-08-14 RX ADMIN — GABAPENTIN 600 MG: 300 CAPSULE ORAL at 17:15

## 2024-08-14 RX ADMIN — ACETAMINOPHEN 1000 MG: 500 TABLET ORAL at 21:55

## 2024-08-14 RX ADMIN — BACITRACIN: 500 OINTMENT TOPICAL at 22:02

## 2024-08-14 RX ADMIN — TOPIRAMATE 100 MG: 100 TABLET, FILM COATED ORAL at 09:16

## 2024-08-14 RX ADMIN — NAPHAZOLINE HYDROCHLORIDE AND PHENIRAMINE MALEATE 1 DROP: .25; 3 SOLUTION/ DROPS OPHTHALMIC at 17:18

## 2024-08-14 RX ADMIN — KETOROLAC TROMETHAMINE 15 MG: 15 INJECTION, SOLUTION INTRAMUSCULAR; INTRAVENOUS at 21:56

## 2024-08-14 RX ADMIN — NAPHAZOLINE HYDROCHLORIDE AND PHENIRAMINE MALEATE 1 DROP: .25; 3 SOLUTION/ DROPS OPHTHALMIC at 21:56

## 2024-08-14 RX ADMIN — BACITRACIN: 500 OINTMENT TOPICAL at 14:28

## 2024-08-14 RX ADMIN — BUSPIRONE HYDROCHLORIDE 30 MG: 10 TABLET ORAL at 21:56

## 2024-08-14 RX ADMIN — BACITRACIN: 500 OINTMENT TOPICAL at 09:18

## 2024-08-14 RX ADMIN — ACETAMINOPHEN 1000 MG: 500 TABLET ORAL at 14:28

## 2024-08-14 RX ADMIN — CYCLOBENZAPRINE 10 MG: 10 TABLET, FILM COATED ORAL at 06:28

## 2024-08-14 RX ADMIN — OXYCODONE HYDROCHLORIDE 10 MG: 5 TABLET ORAL at 11:35

## 2024-08-14 RX ADMIN — LAMOTRIGINE 25 MG: 25 TABLET ORAL at 21:55

## 2024-08-14 RX ADMIN — ENOXAPARIN SODIUM 105 MG: 150 INJECTION SUBCUTANEOUS at 21:56

## 2024-08-14 RX ADMIN — KETOROLAC TROMETHAMINE 15 MG: 15 INJECTION, SOLUTION INTRAMUSCULAR; INTRAVENOUS at 01:01

## 2024-08-14 RX ADMIN — PANTOPRAZOLE SODIUM 40 MG: 40 TABLET, DELAYED RELEASE ORAL at 06:29

## 2024-08-14 ASSESSMENT — PAIN SCALES - WONG BAKER

## 2024-08-14 ASSESSMENT — PAIN SCALES - GENERAL
PAINLEVEL_OUTOF10: 10
PAINLEVEL_OUTOF10: 8
PAINLEVEL_OUTOF10: 9
PAINLEVEL_OUTOF10: 5
PAINLEVEL_OUTOF10: 7
PAINLEVEL_OUTOF10: 8
PAINLEVEL_OUTOF10: 10
PAINLEVEL_OUTOF10: 7
PAINLEVEL_OUTOF10: 10
PAINLEVEL_OUTOF10: 8
PAINLEVEL_OUTOF10: 8

## 2024-08-14 ASSESSMENT — PAIN - FUNCTIONAL ASSESSMENT
PAIN_FUNCTIONAL_ASSESSMENT: ACTIVITIES ARE NOT PREVENTED

## 2024-08-14 ASSESSMENT — PAIN DESCRIPTION - ORIENTATION
ORIENTATION: RIGHT

## 2024-08-14 ASSESSMENT — PAIN DESCRIPTION - LOCATION
LOCATION: HIP
LOCATION: HIP
LOCATION: HIP;FOOT
LOCATION: GENERALIZED
LOCATION: GENERALIZED
LOCATION: HIP

## 2024-08-14 ASSESSMENT — PAIN DESCRIPTION - DESCRIPTORS
DESCRIPTORS: ACHING;PRESSURE
DESCRIPTORS: ACHING;SHARP
DESCRIPTORS: ACHING;CRUSHING;STABBING
DESCRIPTORS: ACHING;SHARP
DESCRIPTORS: SHARP;ACHING

## 2024-08-14 NOTE — CARE COORDINATION
Transitional Plan  Received a call from Maninder at [facility].  Maninder has the auto insurance information, received from patients boyfriend.  Maninder is submitting for pre-cert and will notify  once approved.

## 2024-08-15 LAB
ANION GAP SERPL CALCULATED.3IONS-SCNC: 7 MMOL/L (ref 9–16)
BASOPHILS # BLD: 0.06 K/UL (ref 0–0.2)
BASOPHILS NFR BLD: 1 % (ref 0–2)
BUN SERPL-MCNC: 19 MG/DL (ref 6–20)
CALCIUM SERPL-MCNC: 8.3 MG/DL (ref 8.6–10.4)
CHLORIDE SERPL-SCNC: 107 MMOL/L (ref 98–107)
CO2 SERPL-SCNC: 26 MMOL/L (ref 20–31)
CREAT SERPL-MCNC: 0.7 MG/DL (ref 0.5–0.9)
EOSINOPHIL # BLD: 0.21 K/UL (ref 0–0.44)
EOSINOPHILS RELATIVE PERCENT: 3 % (ref 1–4)
ERYTHROCYTE [DISTWIDTH] IN BLOOD BY AUTOMATED COUNT: 15.4 % (ref 11.8–14.4)
GFR, ESTIMATED: >90 ML/MIN/1.73M2
GLUCOSE BLD-MCNC: 108 MG/DL (ref 65–105)
GLUCOSE BLD-MCNC: 116 MG/DL (ref 65–105)
GLUCOSE BLD-MCNC: 125 MG/DL (ref 65–105)
GLUCOSE BLD-MCNC: 151 MG/DL (ref 65–105)
GLUCOSE SERPL-MCNC: 108 MG/DL (ref 74–99)
HCT VFR BLD AUTO: 27 % (ref 36.3–47.1)
HGB BLD-MCNC: 8.1 G/DL (ref 11.9–15.1)
IMM GRANULOCYTES # BLD AUTO: 0.14 K/UL (ref 0–0.3)
IMM GRANULOCYTES NFR BLD: 2 %
INR PPP: 1.3
LYMPHOCYTES NFR BLD: 1.69 K/UL (ref 1.1–3.7)
LYMPHOCYTES RELATIVE PERCENT: 24 % (ref 24–43)
MAGNESIUM SERPL-MCNC: 2.2 MG/DL (ref 1.6–2.6)
MCH RBC QN AUTO: 28.5 PG (ref 25.2–33.5)
MCHC RBC AUTO-ENTMCNC: 30 G/DL (ref 28.4–34.8)
MCV RBC AUTO: 95.1 FL (ref 82.6–102.9)
MONOCYTES NFR BLD: 0.45 K/UL (ref 0.1–1.2)
MONOCYTES NFR BLD: 6 % (ref 3–12)
NEUTROPHILS NFR BLD: 64 % (ref 36–65)
NEUTS SEG NFR BLD: 4.44 K/UL (ref 1.5–8.1)
NRBC BLD-RTO: 0 PER 100 WBC
PLATELET # BLD AUTO: 485 K/UL (ref 138–453)
PMV BLD AUTO: 9.4 FL (ref 8.1–13.5)
POTASSIUM SERPL-SCNC: 4.2 MMOL/L (ref 3.7–5.3)
PROTHROMBIN TIME: 15.9 SEC (ref 11.7–14.9)
RBC # BLD AUTO: 2.84 M/UL (ref 3.95–5.11)
RBC # BLD: ABNORMAL 10*6/UL
SODIUM SERPL-SCNC: 140 MMOL/L (ref 136–145)
WBC OTHER # BLD: 7 K/UL (ref 3.5–11.3)

## 2024-08-15 PROCEDURE — 85610 PROTHROMBIN TIME: CPT

## 2024-08-15 PROCEDURE — 94761 N-INVAS EAR/PLS OXIMETRY MLT: CPT

## 2024-08-15 PROCEDURE — 6370000000 HC RX 637 (ALT 250 FOR IP): Performed by: STUDENT IN AN ORGANIZED HEALTH CARE EDUCATION/TRAINING PROGRAM

## 2024-08-15 PROCEDURE — 99231 SBSQ HOSP IP/OBS SF/LOW 25: CPT | Performed by: SURGERY

## 2024-08-15 PROCEDURE — 94640 AIRWAY INHALATION TREATMENT: CPT

## 2024-08-15 PROCEDURE — 85025 COMPLETE CBC W/AUTO DIFF WBC: CPT

## 2024-08-15 PROCEDURE — 6360000002 HC RX W HCPCS

## 2024-08-15 PROCEDURE — 6370000000 HC RX 637 (ALT 250 FOR IP)

## 2024-08-15 PROCEDURE — 83735 ASSAY OF MAGNESIUM: CPT

## 2024-08-15 PROCEDURE — 2060000000 HC ICU INTERMEDIATE R&B

## 2024-08-15 PROCEDURE — 82947 ASSAY GLUCOSE BLOOD QUANT: CPT

## 2024-08-15 PROCEDURE — 2580000003 HC RX 258: Performed by: STUDENT IN AN ORGANIZED HEALTH CARE EDUCATION/TRAINING PROGRAM

## 2024-08-15 PROCEDURE — 6360000002 HC RX W HCPCS: Performed by: STUDENT IN AN ORGANIZED HEALTH CARE EDUCATION/TRAINING PROGRAM

## 2024-08-15 PROCEDURE — 80048 BASIC METABOLIC PNL TOTAL CA: CPT

## 2024-08-15 PROCEDURE — 36415 COLL VENOUS BLD VENIPUNCTURE: CPT

## 2024-08-15 RX ADMIN — LEVETIRACETAM 1500 MG: 500 TABLET, FILM COATED ORAL at 08:44

## 2024-08-15 RX ADMIN — SENNOSIDES AND DOCUSATE SODIUM 1 TABLET: 50; 8.6 TABLET ORAL at 08:39

## 2024-08-15 RX ADMIN — OXYCODONE HYDROCHLORIDE 10 MG: 5 TABLET ORAL at 10:57

## 2024-08-15 RX ADMIN — BUDESONIDE AND FORMOTEROL FUMARATE DIHYDRATE 2 PUFF: 160; 4.5 AEROSOL RESPIRATORY (INHALATION) at 09:31

## 2024-08-15 RX ADMIN — VENLAFAXINE HYDROCHLORIDE 300 MG: 75 CAPSULE, EXTENDED RELEASE ORAL at 08:45

## 2024-08-15 RX ADMIN — ENOXAPARIN SODIUM 105 MG: 150 INJECTION SUBCUTANEOUS at 21:28

## 2024-08-15 RX ADMIN — SODIUM CHLORIDE, PRESERVATIVE FREE 10 ML: 5 INJECTION INTRAVENOUS at 08:42

## 2024-08-15 RX ADMIN — BACITRACIN: 500 OINTMENT TOPICAL at 08:41

## 2024-08-15 RX ADMIN — LAMOTRIGINE 25 MG: 25 TABLET ORAL at 08:41

## 2024-08-15 RX ADMIN — BACITRACIN: 500 OINTMENT TOPICAL at 20:48

## 2024-08-15 RX ADMIN — PANTOPRAZOLE SODIUM 40 MG: 40 TABLET, DELAYED RELEASE ORAL at 08:39

## 2024-08-15 RX ADMIN — BUSPIRONE HYDROCHLORIDE 30 MG: 10 TABLET ORAL at 20:45

## 2024-08-15 RX ADMIN — CYCLOBENZAPRINE 10 MG: 10 TABLET, FILM COATED ORAL at 05:55

## 2024-08-15 RX ADMIN — ROSUVASTATIN CALCIUM 20 MG: 20 TABLET, FILM COATED ORAL at 18:15

## 2024-08-15 RX ADMIN — IPRATROPIUM BROMIDE AND ALBUTEROL SULFATE 1 DOSE: 2.5; .5 SOLUTION RESPIRATORY (INHALATION) at 20:24

## 2024-08-15 RX ADMIN — BACITRACIN: 500 OINTMENT TOPICAL at 15:00

## 2024-08-15 RX ADMIN — KETOROLAC TROMETHAMINE 15 MG: 15 INJECTION, SOLUTION INTRAMUSCULAR; INTRAVENOUS at 03:20

## 2024-08-15 RX ADMIN — KETOROLAC TROMETHAMINE 15 MG: 15 INJECTION, SOLUTION INTRAMUSCULAR; INTRAVENOUS at 13:53

## 2024-08-15 RX ADMIN — SENNOSIDES AND DOCUSATE SODIUM 1 TABLET: 50; 8.6 TABLET ORAL at 20:47

## 2024-08-15 RX ADMIN — NAPHAZOLINE HYDROCHLORIDE AND PHENIRAMINE MALEATE 1 DROP: .25; 3 SOLUTION/ DROPS OPHTHALMIC at 20:45

## 2024-08-15 RX ADMIN — CYCLOBENZAPRINE 10 MG: 10 TABLET, FILM COATED ORAL at 13:54

## 2024-08-15 RX ADMIN — GABAPENTIN 600 MG: 300 CAPSULE ORAL at 18:14

## 2024-08-15 RX ADMIN — TAMSULOSIN HYDROCHLORIDE 0.4 MG: 0.4 CAPSULE ORAL at 08:39

## 2024-08-15 RX ADMIN — CYCLOBENZAPRINE 10 MG: 10 TABLET, FILM COATED ORAL at 22:20

## 2024-08-15 RX ADMIN — METOPROLOL TARTRATE 12.5 MG: 25 TABLET, FILM COATED ORAL at 08:42

## 2024-08-15 RX ADMIN — GABAPENTIN 600 MG: 300 CAPSULE ORAL at 10:57

## 2024-08-15 RX ADMIN — NAPHAZOLINE HYDROCHLORIDE AND PHENIRAMINE MALEATE 1 DROP: .25; 3 SOLUTION/ DROPS OPHTHALMIC at 08:46

## 2024-08-15 RX ADMIN — OXYCODONE HYDROCHLORIDE 10 MG: 5 TABLET ORAL at 14:59

## 2024-08-15 RX ADMIN — OXYCODONE HYDROCHLORIDE 10 MG: 5 TABLET ORAL at 19:00

## 2024-08-15 RX ADMIN — IPRATROPIUM BROMIDE AND ALBUTEROL SULFATE 1 DOSE: 2.5; .5 SOLUTION RESPIRATORY (INHALATION) at 09:31

## 2024-08-15 RX ADMIN — ACETAMINOPHEN 1000 MG: 500 TABLET ORAL at 05:55

## 2024-08-15 RX ADMIN — KETOROLAC TROMETHAMINE 15 MG: 15 INJECTION, SOLUTION INTRAMUSCULAR; INTRAVENOUS at 20:45

## 2024-08-15 RX ADMIN — GABAPENTIN 600 MG: 300 CAPSULE ORAL at 03:20

## 2024-08-15 RX ADMIN — ENOXAPARIN SODIUM 105 MG: 150 INJECTION SUBCUTANEOUS at 08:45

## 2024-08-15 RX ADMIN — ACETAMINOPHEN 1000 MG: 500 TABLET ORAL at 22:20

## 2024-08-15 RX ADMIN — KETOROLAC TROMETHAMINE 15 MG: 15 INJECTION, SOLUTION INTRAMUSCULAR; INTRAVENOUS at 08:39

## 2024-08-15 RX ADMIN — TOPIRAMATE 100 MG: 100 TABLET, FILM COATED ORAL at 10:57

## 2024-08-15 RX ADMIN — LAMOTRIGINE 25 MG: 25 TABLET ORAL at 21:28

## 2024-08-15 RX ADMIN — METOPROLOL TARTRATE 12.5 MG: 25 TABLET, FILM COATED ORAL at 20:46

## 2024-08-15 RX ADMIN — ACETAMINOPHEN 1000 MG: 500 TABLET ORAL at 13:53

## 2024-08-15 RX ADMIN — NAPHAZOLINE HYDROCHLORIDE AND PHENIRAMINE MALEATE 1 DROP: .25; 3 SOLUTION/ DROPS OPHTHALMIC at 14:59

## 2024-08-15 RX ADMIN — SODIUM CHLORIDE, PRESERVATIVE FREE 5 ML: 5 INJECTION INTRAVENOUS at 22:00

## 2024-08-15 RX ADMIN — LEVETIRACETAM 1500 MG: 500 TABLET, FILM COATED ORAL at 21:28

## 2024-08-15 RX ADMIN — BUSPIRONE HYDROCHLORIDE 30 MG: 10 TABLET ORAL at 08:39

## 2024-08-15 ASSESSMENT — PAIN DESCRIPTION - LOCATION
LOCATION: HIP

## 2024-08-15 ASSESSMENT — PAIN SCALES - WONG BAKER

## 2024-08-15 ASSESSMENT — PAIN DESCRIPTION - ORIENTATION
ORIENTATION: RIGHT

## 2024-08-15 ASSESSMENT — PAIN SCALES - GENERAL
PAINLEVEL_OUTOF10: 0
PAINLEVEL_OUTOF10: 10
PAINLEVEL_OUTOF10: 8
PAINLEVEL_OUTOF10: 9
PAINLEVEL_OUTOF10: 8
PAINLEVEL_OUTOF10: 10
PAINLEVEL_OUTOF10: 10
PAINLEVEL_OUTOF10: 9
PAINLEVEL_OUTOF10: 0
PAINLEVEL_OUTOF10: 8
PAINLEVEL_OUTOF10: 10
PAINLEVEL_OUTOF10: 7
PAINLEVEL_OUTOF10: 10

## 2024-08-15 ASSESSMENT — PAIN DESCRIPTION - DESCRIPTORS
DESCRIPTORS: ACHING;SHARP
DESCRIPTORS: ACHING

## 2024-08-16 LAB
INR PPP: 1.3
PROTHROMBIN TIME: 16.2 SEC (ref 11.7–14.9)

## 2024-08-16 PROCEDURE — 2060000000 HC ICU INTERMEDIATE R&B

## 2024-08-16 PROCEDURE — 97530 THERAPEUTIC ACTIVITIES: CPT

## 2024-08-16 PROCEDURE — 6360000002 HC RX W HCPCS: Performed by: STUDENT IN AN ORGANIZED HEALTH CARE EDUCATION/TRAINING PROGRAM

## 2024-08-16 PROCEDURE — 6370000000 HC RX 637 (ALT 250 FOR IP): Performed by: STUDENT IN AN ORGANIZED HEALTH CARE EDUCATION/TRAINING PROGRAM

## 2024-08-16 PROCEDURE — 97110 THERAPEUTIC EXERCISES: CPT

## 2024-08-16 PROCEDURE — 36415 COLL VENOUS BLD VENIPUNCTURE: CPT

## 2024-08-16 PROCEDURE — 6360000002 HC RX W HCPCS

## 2024-08-16 PROCEDURE — 94761 N-INVAS EAR/PLS OXIMETRY MLT: CPT

## 2024-08-16 PROCEDURE — 97116 GAIT TRAINING THERAPY: CPT

## 2024-08-16 PROCEDURE — 6370000000 HC RX 637 (ALT 250 FOR IP)

## 2024-08-16 PROCEDURE — 94640 AIRWAY INHALATION TREATMENT: CPT

## 2024-08-16 PROCEDURE — 99232 SBSQ HOSP IP/OBS MODERATE 35: CPT | Performed by: NURSE PRACTITIONER

## 2024-08-16 PROCEDURE — 2580000003 HC RX 258: Performed by: STUDENT IN AN ORGANIZED HEALTH CARE EDUCATION/TRAINING PROGRAM

## 2024-08-16 PROCEDURE — 6370000000 HC RX 637 (ALT 250 FOR IP): Performed by: NURSE PRACTITIONER

## 2024-08-16 PROCEDURE — 85610 PROTHROMBIN TIME: CPT

## 2024-08-16 PROCEDURE — 97535 SELF CARE MNGMENT TRAINING: CPT

## 2024-08-16 RX ORDER — WARFARIN SODIUM 10 MG/1
10 TABLET ORAL
Status: COMPLETED | OUTPATIENT
Start: 2024-08-16 | End: 2024-08-16

## 2024-08-16 RX ORDER — AMITRIPTYLINE HYDROCHLORIDE 50 MG/1
50 TABLET, FILM COATED ORAL 2 TIMES DAILY
Status: DISCONTINUED | OUTPATIENT
Start: 2024-08-16 | End: 2024-08-20 | Stop reason: HOSPADM

## 2024-08-16 RX ORDER — LIDOCAINE 4 G/G
1 PATCH TOPICAL ONCE
Status: COMPLETED | OUTPATIENT
Start: 2024-08-16 | End: 2024-08-16

## 2024-08-16 RX ORDER — OXYCODONE HYDROCHLORIDE 5 MG/1
10 TABLET ORAL EVERY 4 HOURS PRN
Status: DISCONTINUED | OUTPATIENT
Start: 2024-08-16 | End: 2024-08-20 | Stop reason: HOSPADM

## 2024-08-16 RX ORDER — BUSPIRONE HYDROCHLORIDE 10 MG/1
30 TABLET ORAL 2 TIMES DAILY
Status: CANCELLED | OUTPATIENT
Start: 2024-08-16

## 2024-08-16 RX ORDER — CYCLOBENZAPRINE HCL 5 MG
5 TABLET ORAL EVERY 8 HOURS SCHEDULED
Status: DISCONTINUED | OUTPATIENT
Start: 2024-08-16 | End: 2024-08-20 | Stop reason: HOSPADM

## 2024-08-16 RX ADMIN — SENNOSIDES AND DOCUSATE SODIUM 1 TABLET: 50; 8.6 TABLET ORAL at 20:38

## 2024-08-16 RX ADMIN — NAPHAZOLINE HYDROCHLORIDE AND PHENIRAMINE MALEATE 1 DROP: .25; 3 SOLUTION/ DROPS OPHTHALMIC at 10:22

## 2024-08-16 RX ADMIN — NAPHAZOLINE HYDROCHLORIDE AND PHENIRAMINE MALEATE 1 DROP: .25; 3 SOLUTION/ DROPS OPHTHALMIC at 20:39

## 2024-08-16 RX ADMIN — WARFARIN SODIUM 10 MG: 10 TABLET ORAL at 17:30

## 2024-08-16 RX ADMIN — BUSPIRONE HYDROCHLORIDE 30 MG: 10 TABLET ORAL at 20:37

## 2024-08-16 RX ADMIN — NAPHAZOLINE HYDROCHLORIDE AND PHENIRAMINE MALEATE 1 DROP: .25; 3 SOLUTION/ DROPS OPHTHALMIC at 13:42

## 2024-08-16 RX ADMIN — OXYCODONE HYDROCHLORIDE 10 MG: 5 TABLET ORAL at 18:33

## 2024-08-16 RX ADMIN — IPRATROPIUM BROMIDE AND ALBUTEROL SULFATE 1 DOSE: 2.5; .5 SOLUTION RESPIRATORY (INHALATION) at 21:25

## 2024-08-16 RX ADMIN — KETOROLAC TROMETHAMINE 15 MG: 15 INJECTION, SOLUTION INTRAMUSCULAR; INTRAVENOUS at 02:08

## 2024-08-16 RX ADMIN — KETOROLAC TROMETHAMINE 15 MG: 15 INJECTION, SOLUTION INTRAMUSCULAR; INTRAVENOUS at 08:29

## 2024-08-16 RX ADMIN — PANTOPRAZOLE SODIUM 40 MG: 40 TABLET, DELAYED RELEASE ORAL at 05:45

## 2024-08-16 RX ADMIN — METOPROLOL TARTRATE 12.5 MG: 25 TABLET, FILM COATED ORAL at 10:23

## 2024-08-16 RX ADMIN — TOPIRAMATE 100 MG: 100 TABLET, FILM COATED ORAL at 10:22

## 2024-08-16 RX ADMIN — CYCLOBENZAPRINE HYDROCHLORIDE 5 MG: 5 TABLET, FILM COATED ORAL at 20:38

## 2024-08-16 RX ADMIN — CYCLOBENZAPRINE HYDROCHLORIDE 5 MG: 5 TABLET, FILM COATED ORAL at 13:42

## 2024-08-16 RX ADMIN — BUDESONIDE AND FORMOTEROL FUMARATE DIHYDRATE 2 PUFF: 160; 4.5 AEROSOL RESPIRATORY (INHALATION) at 09:20

## 2024-08-16 RX ADMIN — ENOXAPARIN SODIUM 105 MG: 150 INJECTION SUBCUTANEOUS at 10:20

## 2024-08-16 RX ADMIN — GABAPENTIN 600 MG: 300 CAPSULE ORAL at 10:20

## 2024-08-16 RX ADMIN — LEVETIRACETAM 1500 MG: 500 TABLET, FILM COATED ORAL at 10:21

## 2024-08-16 RX ADMIN — BACITRACIN: 500 OINTMENT TOPICAL at 13:42

## 2024-08-16 RX ADMIN — LAMOTRIGINE 25 MG: 25 TABLET ORAL at 10:20

## 2024-08-16 RX ADMIN — OXYCODONE HYDROCHLORIDE 10 MG: 5 TABLET ORAL at 14:37

## 2024-08-16 RX ADMIN — SENNOSIDES AND DOCUSATE SODIUM 1 TABLET: 50; 8.6 TABLET ORAL at 10:22

## 2024-08-16 RX ADMIN — GABAPENTIN 600 MG: 300 CAPSULE ORAL at 02:08

## 2024-08-16 RX ADMIN — ROSUVASTATIN CALCIUM 20 MG: 20 TABLET, FILM COATED ORAL at 17:29

## 2024-08-16 RX ADMIN — OXYCODONE HYDROCHLORIDE 5 MG: 5 TABLET ORAL at 10:21

## 2024-08-16 RX ADMIN — ACETAMINOPHEN 1000 MG: 500 TABLET ORAL at 13:41

## 2024-08-16 RX ADMIN — SODIUM CHLORIDE, PRESERVATIVE FREE 10 ML: 5 INJECTION INTRAVENOUS at 08:29

## 2024-08-16 RX ADMIN — BACITRACIN: 500 OINTMENT TOPICAL at 10:22

## 2024-08-16 RX ADMIN — NAPHAZOLINE HYDROCHLORIDE AND PHENIRAMINE MALEATE 1 DROP: .25; 3 SOLUTION/ DROPS OPHTHALMIC at 02:10

## 2024-08-16 RX ADMIN — TAMSULOSIN HYDROCHLORIDE 0.4 MG: 0.4 CAPSULE ORAL at 10:21

## 2024-08-16 RX ADMIN — ENOXAPARIN SODIUM 105 MG: 150 INJECTION SUBCUTANEOUS at 20:37

## 2024-08-16 RX ADMIN — BACITRACIN: 500 OINTMENT TOPICAL at 20:37

## 2024-08-16 RX ADMIN — METOPROLOL TARTRATE 12.5 MG: 25 TABLET, FILM COATED ORAL at 20:38

## 2024-08-16 RX ADMIN — VENLAFAXINE HYDROCHLORIDE 300 MG: 75 CAPSULE, EXTENDED RELEASE ORAL at 10:20

## 2024-08-16 RX ADMIN — IPRATROPIUM BROMIDE AND ALBUTEROL SULFATE 1 DOSE: 2.5; .5 SOLUTION RESPIRATORY (INHALATION) at 09:14

## 2024-08-16 RX ADMIN — AMITRIPTYLINE HYDROCHLORIDE 50 MG: 50 TABLET, FILM COATED ORAL at 13:41

## 2024-08-16 RX ADMIN — ACETAMINOPHEN 1000 MG: 500 TABLET ORAL at 05:45

## 2024-08-16 RX ADMIN — BUSPIRONE HYDROCHLORIDE 30 MG: 10 TABLET ORAL at 10:20

## 2024-08-16 RX ADMIN — CYCLOBENZAPRINE 10 MG: 10 TABLET, FILM COATED ORAL at 05:44

## 2024-08-16 RX ADMIN — LEVETIRACETAM 1500 MG: 500 TABLET, FILM COATED ORAL at 20:37

## 2024-08-16 RX ADMIN — NAPHAZOLINE HYDROCHLORIDE AND PHENIRAMINE MALEATE 1 DROP: .25; 3 SOLUTION/ DROPS OPHTHALMIC at 17:30

## 2024-08-16 RX ADMIN — ACETAMINOPHEN 1000 MG: 500 TABLET ORAL at 20:37

## 2024-08-16 RX ADMIN — GABAPENTIN 600 MG: 300 CAPSULE ORAL at 17:30

## 2024-08-16 ASSESSMENT — PAIN SCALES - GENERAL
PAINLEVEL_OUTOF10: 8
PAINLEVEL_OUTOF10: 7
PAINLEVEL_OUTOF10: 10
PAINLEVEL_OUTOF10: 8
PAINLEVEL_OUTOF10: 7
PAINLEVEL_OUTOF10: 7
PAINLEVEL_OUTOF10: 10
PAINLEVEL_OUTOF10: 7
PAINLEVEL_OUTOF10: 10

## 2024-08-16 ASSESSMENT — PAIN SCALES - WONG BAKER

## 2024-08-16 ASSESSMENT — PAIN DESCRIPTION - DESCRIPTORS
DESCRIPTORS: ACHING;THROBBING;STABBING
DESCRIPTORS: SHARP
DESCRIPTORS: ACHING;STABBING;THROBBING
DESCRIPTORS: ACHING;STABBING;THROBBING
DESCRIPTORS: ACHING;SHARP
DESCRIPTORS: ACHING;SHOOTING;STABBING
DESCRIPTORS: THROBBING;STABBING;ACHING

## 2024-08-16 ASSESSMENT — PAIN DESCRIPTION - ORIENTATION
ORIENTATION: RIGHT;LEFT
ORIENTATION: RIGHT
ORIENTATION: RIGHT;LEFT
ORIENTATION: RIGHT

## 2024-08-16 ASSESSMENT — PAIN DESCRIPTION - LOCATION
LOCATION: LEG
LOCATION: HIP;LEG
LOCATION: HIP;LEG
LOCATION: HIP
LOCATION: BACK;HIP
LOCATION: HIP;LEG

## 2024-08-16 NOTE — CARE COORDINATION
Transitional planning-called [facility] and talked with Maninder. Insurance is requesting updated PT/OT notes. Notified PT/OT

## 2024-08-17 LAB
INR PPP: 1.2
PROTHROMBIN TIME: 15 SEC (ref 11.7–14.9)

## 2024-08-17 PROCEDURE — 6370000000 HC RX 637 (ALT 250 FOR IP): Performed by: STUDENT IN AN ORGANIZED HEALTH CARE EDUCATION/TRAINING PROGRAM

## 2024-08-17 PROCEDURE — 2060000000 HC ICU INTERMEDIATE R&B

## 2024-08-17 PROCEDURE — 6360000002 HC RX W HCPCS: Performed by: STUDENT IN AN ORGANIZED HEALTH CARE EDUCATION/TRAINING PROGRAM

## 2024-08-17 PROCEDURE — 94640 AIRWAY INHALATION TREATMENT: CPT

## 2024-08-17 PROCEDURE — 85610 PROTHROMBIN TIME: CPT

## 2024-08-17 PROCEDURE — 36415 COLL VENOUS BLD VENIPUNCTURE: CPT

## 2024-08-17 PROCEDURE — 6370000000 HC RX 637 (ALT 250 FOR IP)

## 2024-08-17 PROCEDURE — 6370000000 HC RX 637 (ALT 250 FOR IP): Performed by: NURSE PRACTITIONER

## 2024-08-17 PROCEDURE — 94761 N-INVAS EAR/PLS OXIMETRY MLT: CPT

## 2024-08-17 RX ORDER — WARFARIN SODIUM 10 MG/1
10 TABLET ORAL
Status: COMPLETED | OUTPATIENT
Start: 2024-08-17 | End: 2024-08-17

## 2024-08-17 RX ADMIN — CYCLOBENZAPRINE HYDROCHLORIDE 5 MG: 5 TABLET, FILM COATED ORAL at 12:58

## 2024-08-17 RX ADMIN — WARFARIN SODIUM 10 MG: 10 TABLET ORAL at 17:11

## 2024-08-17 RX ADMIN — OXYCODONE HYDROCHLORIDE 10 MG: 5 TABLET ORAL at 19:41

## 2024-08-17 RX ADMIN — IPRATROPIUM BROMIDE AND ALBUTEROL SULFATE 1 DOSE: 2.5; .5 SOLUTION RESPIRATORY (INHALATION) at 09:26

## 2024-08-17 RX ADMIN — OXYCODONE HYDROCHLORIDE 10 MG: 5 TABLET ORAL at 23:04

## 2024-08-17 RX ADMIN — BUSPIRONE HYDROCHLORIDE 30 MG: 10 TABLET ORAL at 21:57

## 2024-08-17 RX ADMIN — ROSUVASTATIN CALCIUM 20 MG: 20 TABLET, FILM COATED ORAL at 17:11

## 2024-08-17 RX ADMIN — CYCLOBENZAPRINE HYDROCHLORIDE 5 MG: 5 TABLET, FILM COATED ORAL at 21:57

## 2024-08-17 RX ADMIN — ACETAMINOPHEN 1000 MG: 500 TABLET ORAL at 12:58

## 2024-08-17 RX ADMIN — SENNOSIDES AND DOCUSATE SODIUM 1 TABLET: 50; 8.6 TABLET ORAL at 09:12

## 2024-08-17 RX ADMIN — BUSPIRONE HYDROCHLORIDE 30 MG: 10 TABLET ORAL at 09:11

## 2024-08-17 RX ADMIN — NAPHAZOLINE HYDROCHLORIDE AND PHENIRAMINE MALEATE 1 DROP: .25; 3 SOLUTION/ DROPS OPHTHALMIC at 13:00

## 2024-08-17 RX ADMIN — NAPHAZOLINE HYDROCHLORIDE AND PHENIRAMINE MALEATE 1 DROP: .25; 3 SOLUTION/ DROPS OPHTHALMIC at 09:18

## 2024-08-17 RX ADMIN — CYCLOBENZAPRINE HYDROCHLORIDE 5 MG: 5 TABLET, FILM COATED ORAL at 06:03

## 2024-08-17 RX ADMIN — PANTOPRAZOLE SODIUM 40 MG: 40 TABLET, DELAYED RELEASE ORAL at 09:11

## 2024-08-17 RX ADMIN — METOPROLOL TARTRATE 12.5 MG: 25 TABLET, FILM COATED ORAL at 21:58

## 2024-08-17 RX ADMIN — AMITRIPTYLINE HYDROCHLORIDE 50 MG: 50 TABLET, FILM COATED ORAL at 21:57

## 2024-08-17 RX ADMIN — BUDESONIDE AND FORMOTEROL FUMARATE DIHYDRATE 2 PUFF: 160; 4.5 AEROSOL RESPIRATORY (INHALATION) at 09:34

## 2024-08-17 RX ADMIN — OXYCODONE HYDROCHLORIDE 10 MG: 5 TABLET ORAL at 02:40

## 2024-08-17 RX ADMIN — ACETAMINOPHEN 1000 MG: 500 TABLET ORAL at 21:56

## 2024-08-17 RX ADMIN — VENLAFAXINE HYDROCHLORIDE 300 MG: 75 CAPSULE, EXTENDED RELEASE ORAL at 09:09

## 2024-08-17 RX ADMIN — SENNOSIDES AND DOCUSATE SODIUM 1 TABLET: 50; 8.6 TABLET ORAL at 21:58

## 2024-08-17 RX ADMIN — BACITRACIN: 500 OINTMENT TOPICAL at 09:20

## 2024-08-17 RX ADMIN — OXYCODONE HYDROCHLORIDE 10 MG: 5 TABLET ORAL at 10:39

## 2024-08-17 RX ADMIN — TOPIRAMATE 100 MG: 100 TABLET, FILM COATED ORAL at 09:11

## 2024-08-17 RX ADMIN — NAPHAZOLINE HYDROCHLORIDE AND PHENIRAMINE MALEATE 1 DROP: .25; 3 SOLUTION/ DROPS OPHTHALMIC at 21:58

## 2024-08-17 RX ADMIN — NAPHAZOLINE HYDROCHLORIDE AND PHENIRAMINE MALEATE 1 DROP: .25; 3 SOLUTION/ DROPS OPHTHALMIC at 17:07

## 2024-08-17 RX ADMIN — ACETAMINOPHEN 1000 MG: 500 TABLET ORAL at 06:03

## 2024-08-17 RX ADMIN — METOPROLOL TARTRATE 12.5 MG: 25 TABLET, FILM COATED ORAL at 09:11

## 2024-08-17 RX ADMIN — TAMSULOSIN HYDROCHLORIDE 0.4 MG: 0.4 CAPSULE ORAL at 09:09

## 2024-08-17 RX ADMIN — GABAPENTIN 600 MG: 300 CAPSULE ORAL at 02:40

## 2024-08-17 RX ADMIN — GABAPENTIN 600 MG: 300 CAPSULE ORAL at 17:07

## 2024-08-17 RX ADMIN — ENOXAPARIN SODIUM 105 MG: 150 INJECTION SUBCUTANEOUS at 09:19

## 2024-08-17 RX ADMIN — LAMOTRIGINE 25 MG: 25 TABLET ORAL at 21:57

## 2024-08-17 RX ADMIN — BACITRACIN: 500 OINTMENT TOPICAL at 12:59

## 2024-08-17 RX ADMIN — GABAPENTIN 600 MG: 300 CAPSULE ORAL at 09:11

## 2024-08-17 RX ADMIN — AMITRIPTYLINE HYDROCHLORIDE 50 MG: 50 TABLET, FILM COATED ORAL at 09:12

## 2024-08-17 RX ADMIN — ENOXAPARIN SODIUM 105 MG: 150 INJECTION SUBCUTANEOUS at 21:58

## 2024-08-17 RX ADMIN — OXYCODONE HYDROCHLORIDE 10 MG: 5 TABLET ORAL at 15:42

## 2024-08-17 RX ADMIN — LAMOTRIGINE 25 MG: 25 TABLET ORAL at 09:12

## 2024-08-17 RX ADMIN — LEVETIRACETAM 1500 MG: 500 TABLET, FILM COATED ORAL at 21:57

## 2024-08-17 RX ADMIN — LEVETIRACETAM 1500 MG: 500 TABLET, FILM COATED ORAL at 09:10

## 2024-08-17 ASSESSMENT — PAIN SCALES - WONG BAKER
WONGBAKER_NUMERICALRESPONSE: NO HURT

## 2024-08-17 ASSESSMENT — PAIN SCALES - GENERAL
PAINLEVEL_OUTOF10: 9
PAINLEVEL_OUTOF10: 10
PAINLEVEL_OUTOF10: 3
PAINLEVEL_OUTOF10: 10
PAINLEVEL_OUTOF10: 8
PAINLEVEL_OUTOF10: 5
PAINLEVEL_OUTOF10: 4
PAINLEVEL_OUTOF10: 10
PAINLEVEL_OUTOF10: 10
PAINLEVEL_OUTOF10: 5
PAINLEVEL_OUTOF10: 10

## 2024-08-17 ASSESSMENT — PAIN DESCRIPTION - DESCRIPTORS
DESCRIPTORS: SHARP
DESCRIPTORS: SHARP
DESCRIPTORS: ACHING;DISCOMFORT;THROBBING
DESCRIPTORS: ACHING;DISCOMFORT;STABBING;THROBBING

## 2024-08-17 ASSESSMENT — PAIN DESCRIPTION - ORIENTATION
ORIENTATION: RIGHT
ORIENTATION: LEFT;LOWER;MID
ORIENTATION: RIGHT

## 2024-08-17 ASSESSMENT — PAIN DESCRIPTION - LOCATION
LOCATION: HIP
LOCATION: HIP;LEG;BACK
LOCATION: HIP
LOCATION: HIP
LOCATION: HIP;LEG;FOOT
LOCATION: HIP
LOCATION: HIP
LOCATION: HIP;LEG

## 2024-08-18 LAB
INR PPP: 1.3
PROTHROMBIN TIME: 15.9 SEC (ref 11.7–14.9)

## 2024-08-18 PROCEDURE — 6370000000 HC RX 637 (ALT 250 FOR IP): Performed by: STUDENT IN AN ORGANIZED HEALTH CARE EDUCATION/TRAINING PROGRAM

## 2024-08-18 PROCEDURE — 6370000000 HC RX 637 (ALT 250 FOR IP): Performed by: NURSE PRACTITIONER

## 2024-08-18 PROCEDURE — 94640 AIRWAY INHALATION TREATMENT: CPT

## 2024-08-18 PROCEDURE — 97530 THERAPEUTIC ACTIVITIES: CPT

## 2024-08-18 PROCEDURE — 97110 THERAPEUTIC EXERCISES: CPT

## 2024-08-18 PROCEDURE — 6360000002 HC RX W HCPCS: Performed by: STUDENT IN AN ORGANIZED HEALTH CARE EDUCATION/TRAINING PROGRAM

## 2024-08-18 PROCEDURE — 36415 COLL VENOUS BLD VENIPUNCTURE: CPT

## 2024-08-18 PROCEDURE — 85610 PROTHROMBIN TIME: CPT

## 2024-08-18 PROCEDURE — 2580000003 HC RX 258: Performed by: STUDENT IN AN ORGANIZED HEALTH CARE EDUCATION/TRAINING PROGRAM

## 2024-08-18 PROCEDURE — 94761 N-INVAS EAR/PLS OXIMETRY MLT: CPT

## 2024-08-18 PROCEDURE — 2060000000 HC ICU INTERMEDIATE R&B

## 2024-08-18 PROCEDURE — 6370000000 HC RX 637 (ALT 250 FOR IP)

## 2024-08-18 RX ADMIN — NAPHAZOLINE HYDROCHLORIDE AND PHENIRAMINE MALEATE 1 DROP: .25; 3 SOLUTION/ DROPS OPHTHALMIC at 14:00

## 2024-08-18 RX ADMIN — BUSPIRONE HYDROCHLORIDE 30 MG: 10 TABLET ORAL at 21:04

## 2024-08-18 RX ADMIN — ENOXAPARIN SODIUM 105 MG: 150 INJECTION SUBCUTANEOUS at 09:02

## 2024-08-18 RX ADMIN — NAPHAZOLINE HYDROCHLORIDE AND PHENIRAMINE MALEATE 1 DROP: .25; 3 SOLUTION/ DROPS OPHTHALMIC at 17:14

## 2024-08-18 RX ADMIN — GABAPENTIN 600 MG: 300 CAPSULE ORAL at 03:40

## 2024-08-18 RX ADMIN — OXYCODONE HYDROCHLORIDE 10 MG: 5 TABLET ORAL at 21:04

## 2024-08-18 RX ADMIN — GABAPENTIN 600 MG: 300 CAPSULE ORAL at 17:13

## 2024-08-18 RX ADMIN — AMITRIPTYLINE HYDROCHLORIDE 50 MG: 50 TABLET, FILM COATED ORAL at 21:04

## 2024-08-18 RX ADMIN — ACETAMINOPHEN 1000 MG: 500 TABLET ORAL at 06:30

## 2024-08-18 RX ADMIN — BACITRACIN: 500 OINTMENT TOPICAL at 09:01

## 2024-08-18 RX ADMIN — LAMOTRIGINE 25 MG: 25 TABLET ORAL at 09:02

## 2024-08-18 RX ADMIN — METOPROLOL TARTRATE 12.5 MG: 25 TABLET, FILM COATED ORAL at 21:04

## 2024-08-18 RX ADMIN — ACETAMINOPHEN 1000 MG: 500 TABLET ORAL at 13:56

## 2024-08-18 RX ADMIN — OXYCODONE HYDROCHLORIDE 10 MG: 5 TABLET ORAL at 09:03

## 2024-08-18 RX ADMIN — SODIUM CHLORIDE, PRESERVATIVE FREE 10 ML: 5 INJECTION INTRAVENOUS at 09:04

## 2024-08-18 RX ADMIN — SENNOSIDES AND DOCUSATE SODIUM 1 TABLET: 50; 8.6 TABLET ORAL at 09:04

## 2024-08-18 RX ADMIN — TAMSULOSIN HYDROCHLORIDE 0.4 MG: 0.4 CAPSULE ORAL at 09:00

## 2024-08-18 RX ADMIN — LAMOTRIGINE 25 MG: 25 TABLET ORAL at 21:04

## 2024-08-18 RX ADMIN — LEVETIRACETAM 1500 MG: 500 TABLET, FILM COATED ORAL at 09:03

## 2024-08-18 RX ADMIN — BUSPIRONE HYDROCHLORIDE 30 MG: 10 TABLET ORAL at 09:02

## 2024-08-18 RX ADMIN — WARFARIN SODIUM 12 MG: 2 TABLET ORAL at 17:14

## 2024-08-18 RX ADMIN — ENOXAPARIN SODIUM 105 MG: 150 INJECTION SUBCUTANEOUS at 21:05

## 2024-08-18 RX ADMIN — GABAPENTIN 600 MG: 300 CAPSULE ORAL at 10:22

## 2024-08-18 RX ADMIN — LEVETIRACETAM 1500 MG: 500 TABLET, FILM COATED ORAL at 21:05

## 2024-08-18 RX ADMIN — BACITRACIN: 500 OINTMENT TOPICAL at 21:00

## 2024-08-18 RX ADMIN — OXYCODONE HYDROCHLORIDE 10 MG: 5 TABLET ORAL at 13:56

## 2024-08-18 RX ADMIN — SENNOSIDES AND DOCUSATE SODIUM 1 TABLET: 50; 8.6 TABLET ORAL at 21:04

## 2024-08-18 RX ADMIN — ROSUVASTATIN CALCIUM 20 MG: 20 TABLET, FILM COATED ORAL at 17:13

## 2024-08-18 RX ADMIN — PANTOPRAZOLE SODIUM 40 MG: 40 TABLET, DELAYED RELEASE ORAL at 09:02

## 2024-08-18 RX ADMIN — AMITRIPTYLINE HYDROCHLORIDE 50 MG: 50 TABLET, FILM COATED ORAL at 09:02

## 2024-08-18 RX ADMIN — BUDESONIDE AND FORMOTEROL FUMARATE DIHYDRATE 2 PUFF: 160; 4.5 AEROSOL RESPIRATORY (INHALATION) at 08:26

## 2024-08-18 RX ADMIN — IPRATROPIUM BROMIDE AND ALBUTEROL SULFATE 1 DOSE: 2.5; .5 SOLUTION RESPIRATORY (INHALATION) at 08:25

## 2024-08-18 RX ADMIN — METOPROLOL TARTRATE 12.5 MG: 25 TABLET, FILM COATED ORAL at 09:04

## 2024-08-18 RX ADMIN — NAPHAZOLINE HYDROCHLORIDE AND PHENIRAMINE MALEATE 1 DROP: .25; 3 SOLUTION/ DROPS OPHTHALMIC at 21:05

## 2024-08-18 RX ADMIN — VENLAFAXINE HYDROCHLORIDE 300 MG: 75 CAPSULE, EXTENDED RELEASE ORAL at 09:03

## 2024-08-18 RX ADMIN — ACETAMINOPHEN 1000 MG: 500 TABLET ORAL at 21:03

## 2024-08-18 RX ADMIN — CYCLOBENZAPRINE HYDROCHLORIDE 5 MG: 5 TABLET, FILM COATED ORAL at 06:30

## 2024-08-18 RX ADMIN — NAPHAZOLINE HYDROCHLORIDE AND PHENIRAMINE MALEATE 1 DROP: .25; 3 SOLUTION/ DROPS OPHTHALMIC at 09:01

## 2024-08-18 RX ADMIN — TOPIRAMATE 100 MG: 100 TABLET, FILM COATED ORAL at 09:03

## 2024-08-18 RX ADMIN — CYCLOBENZAPRINE HYDROCHLORIDE 5 MG: 5 TABLET, FILM COATED ORAL at 21:04

## 2024-08-18 RX ADMIN — CYCLOBENZAPRINE HYDROCHLORIDE 5 MG: 5 TABLET, FILM COATED ORAL at 13:56

## 2024-08-18 ASSESSMENT — PAIN SCALES - GENERAL
PAINLEVEL_OUTOF10: 10
PAINLEVEL_OUTOF10: 7
PAINLEVEL_OUTOF10: 5
PAINLEVEL_OUTOF10: 10
PAINLEVEL_OUTOF10: 8
PAINLEVEL_OUTOF10: 5

## 2024-08-18 ASSESSMENT — PAIN DESCRIPTION - ORIENTATION
ORIENTATION: RIGHT
ORIENTATION: RIGHT;LOWER

## 2024-08-18 ASSESSMENT — PAIN DESCRIPTION - LOCATION
LOCATION: HIP
LOCATION: HIP
LOCATION: LEG;HIP

## 2024-08-18 ASSESSMENT — PAIN DESCRIPTION - DESCRIPTORS
DESCRIPTORS: SHARP;DISCOMFORT;THROBBING
DESCRIPTORS: ACHING;DISCOMFORT;THROBBING

## 2024-08-18 NOTE — CARE COORDINATION
Transitional planning: Left message for Vy with [facility] requesting return call regarding status of precert. Call back number provided.

## 2024-08-19 LAB
ANION GAP SERPL CALCULATED.3IONS-SCNC: 6 MMOL/L (ref 9–16)
BASOPHILS # BLD: 0.07 K/UL (ref 0–0.2)
BASOPHILS NFR BLD: 1 % (ref 0–2)
BUN SERPL-MCNC: 21 MG/DL (ref 6–20)
CALCIUM SERPL-MCNC: 8.7 MG/DL (ref 8.6–10.4)
CHLORIDE SERPL-SCNC: 107 MMOL/L (ref 98–107)
CO2 SERPL-SCNC: 26 MMOL/L (ref 20–31)
CREAT SERPL-MCNC: 0.6 MG/DL (ref 0.5–0.9)
EOSINOPHIL # BLD: 0.24 K/UL (ref 0–0.44)
EOSINOPHILS RELATIVE PERCENT: 3 % (ref 1–4)
ERYTHROCYTE [DISTWIDTH] IN BLOOD BY AUTOMATED COUNT: 15.8 % (ref 11.8–14.4)
GFR, ESTIMATED: >90 ML/MIN/1.73M2
GLUCOSE BLD-MCNC: 112 MG/DL (ref 65–105)
GLUCOSE SERPL-MCNC: 110 MG/DL (ref 74–99)
HCT VFR BLD AUTO: 28.1 % (ref 36.3–47.1)
HGB BLD-MCNC: 8.3 G/DL (ref 11.9–15.1)
IMM GRANULOCYTES # BLD AUTO: 0.09 K/UL (ref 0–0.3)
IMM GRANULOCYTES NFR BLD: 1 %
INR PPP: 1.4
LYMPHOCYTES NFR BLD: 1.97 K/UL (ref 1.1–3.7)
LYMPHOCYTES RELATIVE PERCENT: 22 % (ref 24–43)
MCH RBC QN AUTO: 28.7 PG (ref 25.2–33.5)
MCHC RBC AUTO-ENTMCNC: 29.5 G/DL (ref 28.4–34.8)
MCV RBC AUTO: 97.2 FL (ref 82.6–102.9)
MONOCYTES NFR BLD: 0.5 K/UL (ref 0.1–1.2)
MONOCYTES NFR BLD: 6 % (ref 3–12)
NEUTROPHILS NFR BLD: 67 % (ref 36–65)
NEUTS SEG NFR BLD: 5.94 K/UL (ref 1.5–8.1)
NRBC BLD-RTO: 0 PER 100 WBC
PLATELET # BLD AUTO: 594 K/UL (ref 138–453)
PMV BLD AUTO: 9.2 FL (ref 8.1–13.5)
POTASSIUM SERPL-SCNC: 4.1 MMOL/L (ref 3.7–5.3)
PROTHROMBIN TIME: 17.3 SEC (ref 11.7–14.9)
RBC # BLD AUTO: 2.89 M/UL (ref 3.95–5.11)
RBC # BLD: ABNORMAL 10*6/UL
SODIUM SERPL-SCNC: 139 MMOL/L (ref 136–145)
WBC OTHER # BLD: 8.8 K/UL (ref 3.5–11.3)

## 2024-08-19 PROCEDURE — 6370000000 HC RX 637 (ALT 250 FOR IP): Performed by: STUDENT IN AN ORGANIZED HEALTH CARE EDUCATION/TRAINING PROGRAM

## 2024-08-19 PROCEDURE — 2060000000 HC ICU INTERMEDIATE R&B

## 2024-08-19 PROCEDURE — 82947 ASSAY GLUCOSE BLOOD QUANT: CPT

## 2024-08-19 PROCEDURE — 97535 SELF CARE MNGMENT TRAINING: CPT

## 2024-08-19 PROCEDURE — 94761 N-INVAS EAR/PLS OXIMETRY MLT: CPT

## 2024-08-19 PROCEDURE — 6360000002 HC RX W HCPCS: Performed by: STUDENT IN AN ORGANIZED HEALTH CARE EDUCATION/TRAINING PROGRAM

## 2024-08-19 PROCEDURE — 36415 COLL VENOUS BLD VENIPUNCTURE: CPT

## 2024-08-19 PROCEDURE — 6370000000 HC RX 637 (ALT 250 FOR IP): Performed by: NURSE PRACTITIONER

## 2024-08-19 PROCEDURE — 80048 BASIC METABOLIC PNL TOTAL CA: CPT

## 2024-08-19 PROCEDURE — 85025 COMPLETE CBC W/AUTO DIFF WBC: CPT

## 2024-08-19 PROCEDURE — 94640 AIRWAY INHALATION TREATMENT: CPT

## 2024-08-19 PROCEDURE — 85610 PROTHROMBIN TIME: CPT

## 2024-08-19 PROCEDURE — 97110 THERAPEUTIC EXERCISES: CPT

## 2024-08-19 PROCEDURE — 6370000000 HC RX 637 (ALT 250 FOR IP)

## 2024-08-19 RX ORDER — WARFARIN SODIUM 10 MG/1
10 TABLET ORAL
Status: COMPLETED | OUTPATIENT
Start: 2024-08-19 | End: 2024-08-19

## 2024-08-19 RX ADMIN — WARFARIN SODIUM 10 MG: 10 TABLET ORAL at 21:05

## 2024-08-19 RX ADMIN — OXYCODONE HYDROCHLORIDE 10 MG: 5 TABLET ORAL at 20:19

## 2024-08-19 RX ADMIN — CYCLOBENZAPRINE HYDROCHLORIDE 5 MG: 5 TABLET, FILM COATED ORAL at 13:04

## 2024-08-19 RX ADMIN — METOPROLOL TARTRATE 12.5 MG: 25 TABLET, FILM COATED ORAL at 21:06

## 2024-08-19 RX ADMIN — LEVETIRACETAM 1500 MG: 500 TABLET, FILM COATED ORAL at 21:10

## 2024-08-19 RX ADMIN — BUSPIRONE HYDROCHLORIDE 30 MG: 10 TABLET ORAL at 08:03

## 2024-08-19 RX ADMIN — IPRATROPIUM BROMIDE AND ALBUTEROL SULFATE 1 DOSE: 2.5; .5 SOLUTION RESPIRATORY (INHALATION) at 19:47

## 2024-08-19 RX ADMIN — NAPHAZOLINE HYDROCHLORIDE AND PHENIRAMINE MALEATE 1 DROP: .25; 3 SOLUTION/ DROPS OPHTHALMIC at 08:08

## 2024-08-19 RX ADMIN — CYCLOBENZAPRINE HYDROCHLORIDE 5 MG: 5 TABLET, FILM COATED ORAL at 20:19

## 2024-08-19 RX ADMIN — VENLAFAXINE HYDROCHLORIDE 300 MG: 75 CAPSULE, EXTENDED RELEASE ORAL at 08:08

## 2024-08-19 RX ADMIN — GABAPENTIN 600 MG: 300 CAPSULE ORAL at 17:56

## 2024-08-19 RX ADMIN — OXYCODONE HYDROCHLORIDE 10 MG: 5 TABLET ORAL at 03:23

## 2024-08-19 RX ADMIN — GABAPENTIN 600 MG: 300 CAPSULE ORAL at 09:50

## 2024-08-19 RX ADMIN — ENOXAPARIN SODIUM 105 MG: 150 INJECTION SUBCUTANEOUS at 08:08

## 2024-08-19 RX ADMIN — AMITRIPTYLINE HYDROCHLORIDE 50 MG: 50 TABLET, FILM COATED ORAL at 08:10

## 2024-08-19 RX ADMIN — NAPHAZOLINE HYDROCHLORIDE AND PHENIRAMINE MALEATE 1 DROP: .25; 3 SOLUTION/ DROPS OPHTHALMIC at 21:12

## 2024-08-19 RX ADMIN — IPRATROPIUM BROMIDE AND ALBUTEROL SULFATE 1 DOSE: 2.5; .5 SOLUTION RESPIRATORY (INHALATION) at 08:55

## 2024-08-19 RX ADMIN — TOPIRAMATE 100 MG: 100 TABLET, FILM COATED ORAL at 08:10

## 2024-08-19 RX ADMIN — OXYCODONE HYDROCHLORIDE 10 MG: 5 TABLET ORAL at 09:50

## 2024-08-19 RX ADMIN — METOPROLOL TARTRATE 12.5 MG: 25 TABLET, FILM COATED ORAL at 08:02

## 2024-08-19 RX ADMIN — LAMOTRIGINE 25 MG: 25 TABLET ORAL at 21:10

## 2024-08-19 RX ADMIN — LEVETIRACETAM 1500 MG: 500 TABLET, FILM COATED ORAL at 08:10

## 2024-08-19 RX ADMIN — GABAPENTIN 600 MG: 300 CAPSULE ORAL at 03:23

## 2024-08-19 RX ADMIN — ACETAMINOPHEN 1000 MG: 500 TABLET ORAL at 20:19

## 2024-08-19 RX ADMIN — CYCLOBENZAPRINE HYDROCHLORIDE 5 MG: 5 TABLET, FILM COATED ORAL at 06:03

## 2024-08-19 RX ADMIN — TAMSULOSIN HYDROCHLORIDE 0.4 MG: 0.4 CAPSULE ORAL at 08:03

## 2024-08-19 RX ADMIN — BUSPIRONE HYDROCHLORIDE 30 MG: 10 TABLET ORAL at 21:05

## 2024-08-19 RX ADMIN — PANTOPRAZOLE SODIUM 40 MG: 40 TABLET, DELAYED RELEASE ORAL at 08:01

## 2024-08-19 RX ADMIN — ENOXAPARIN SODIUM 105 MG: 150 INJECTION SUBCUTANEOUS at 21:54

## 2024-08-19 RX ADMIN — SENNOSIDES AND DOCUSATE SODIUM 1 TABLET: 50; 8.6 TABLET ORAL at 21:06

## 2024-08-19 RX ADMIN — LAMOTRIGINE 25 MG: 25 TABLET ORAL at 08:10

## 2024-08-19 RX ADMIN — ACETAMINOPHEN 1000 MG: 500 TABLET ORAL at 13:04

## 2024-08-19 RX ADMIN — ROSUVASTATIN CALCIUM 20 MG: 20 TABLET, FILM COATED ORAL at 17:58

## 2024-08-19 RX ADMIN — NAPHAZOLINE HYDROCHLORIDE AND PHENIRAMINE MALEATE 1 DROP: .25; 3 SOLUTION/ DROPS OPHTHALMIC at 17:56

## 2024-08-19 RX ADMIN — BUDESONIDE AND FORMOTEROL FUMARATE DIHYDRATE 2 PUFF: 160; 4.5 AEROSOL RESPIRATORY (INHALATION) at 08:56

## 2024-08-19 RX ADMIN — SENNOSIDES AND DOCUSATE SODIUM 1 TABLET: 50; 8.6 TABLET ORAL at 08:03

## 2024-08-19 RX ADMIN — AMITRIPTYLINE HYDROCHLORIDE 50 MG: 50 TABLET, FILM COATED ORAL at 21:10

## 2024-08-19 RX ADMIN — ACETAMINOPHEN 1000 MG: 500 TABLET ORAL at 06:03

## 2024-08-19 ASSESSMENT — PAIN DESCRIPTION - LOCATION
LOCATION: HIP
LOCATION: GENERALIZED
LOCATION: HIP;LEG

## 2024-08-19 ASSESSMENT — PAIN DESCRIPTION - ORIENTATION
ORIENTATION: RIGHT
ORIENTATION: RIGHT

## 2024-08-19 ASSESSMENT — PAIN SCALES - GENERAL
PAINLEVEL_OUTOF10: 10
PAINLEVEL_OUTOF10: 10
PAINLEVEL_OUTOF10: 8
PAINLEVEL_OUTOF10: 7
PAINLEVEL_OUTOF10: 10
PAINLEVEL_OUTOF10: 10

## 2024-08-19 ASSESSMENT — PAIN DESCRIPTION - DESCRIPTORS: DESCRIPTORS: ACHING;DISCOMFORT

## 2024-08-19 ASSESSMENT — PAIN - FUNCTIONAL ASSESSMENT: PAIN_FUNCTIONAL_ASSESSMENT: ACTIVITIES ARE NOT PREVENTED

## 2024-08-19 NOTE — CARE COORDINATION
Transitional planning-called [facility] and talked with Yves is still pending.

## 2024-08-19 NOTE — RT PROTOCOL NOTE
RT Nebulizer Bronchodilator Protocol Note    There is a bronchodilator order in the chart from a provider indicating to follow the RT Bronchodilator Protocol and there is an “Initiate RT Bronchodilator Protocol” order as well (see protocol at bottom of note).    CXR Findings:  No results found.    The findings from the last RT Protocol Assessment were as follows:  Smoking: None or smoker <15 pack years  Respiratory Pattern: Regular pattern and RR 12-20 bpm  Breath Sounds: Clear breath sounds  Cough: Strong, spontaneous, non-productive  Indication for Bronchodilator Therapy: None  Bronchodilator Assessment Score: 0    Aerosolized bronchodilator medication orders have been revised according to the RT Nebulizer Bronchodilator Protocol below.    Respiratory Therapist to perform RT Therapy Protocol Assessment initially then follow the protocol.  Repeat RT Therapy Protocol Assessment PRN for score 0-3 or on second treatment, BID, and PRN for scores above 3.    No Indications - adjust the frequency to every 6 hours PRN wheezing or bronchospasm, if no treatments needed after 48 hours then discontinue using Per Protocol order mode.     If indication present, adjust the RT bronchodilator orders based on the Bronchodilator Assessment Score as indicated below.  If a patient is on this medication at home then do not decrease Frequency below that used at home.    0-3 - enter or revise RT bronchodilator order(s) to equivalent RT Bronchodilator order with Frequency of every 4 hours PRN for wheezing or increased work of breathing using Per Protocol order mode.       4-6 - enter or revise RT Bronchodilator order(s) to two equivalent RT bronchodilator orders with one order with BID Frequency and one order with Frequency of every 4 hours PRN wheezing or increased work of breathing using Per Protocol order mode.         7-10 - enter or revise RT Bronchodilator order(s) to two equivalent RT bronchodilator orders with one order with TID 
hours PRN for wheezing or increased work of breathing using Per Protocol order mode.        4-6 - enter or revise RT Bronchodilator order(s) to two equivalent RT bronchodilator orders with one order with BID Frequency and one order with Frequency of every 4 hours PRN wheezing or increased work of breathing using Per Protocol order mode.        7-10 - enter or revise RT Bronchodilator order(s) to two equivalent RT bronchodilator orders with one order with TID Frequency and one order with Frequency of every 4 hours PRN wheezing or increased work of breathing using Per Protocol order mode.       11-13 - enter or revise RT Bronchodilator order(s) to one equivalent RT bronchodilator order with QID Frequency and an Albuterol order with Frequency of every 4 hours PRN wheezing or increased work of breathing using Per Protocol order mode.      Greater than 13 - enter or revise RT Bronchodilator order(s) to one equivalent RT bronchodilator order with every 4 hours Frequency and an Albuterol order with Frequency of every 2 hours PRN wheezing or increased work of breathing using Per Protocol order mode.     RT to enter RT Home Evaluation for COPD & MDI Assessment order using Per Protocol order mode.    Electronically signed by CATY FOWLER RCP on 8/6/2024 at 10:50 AM

## 2024-08-20 VITALS
OXYGEN SATURATION: 98 % | WEIGHT: 240.3 LBS | TEMPERATURE: 97.5 F | HEIGHT: 63 IN | BODY MASS INDEX: 42.58 KG/M2 | RESPIRATION RATE: 14 BRPM | DIASTOLIC BLOOD PRESSURE: 65 MMHG | HEART RATE: 78 BPM | SYSTOLIC BLOOD PRESSURE: 115 MMHG

## 2024-08-20 DIAGNOSIS — S29.8XXA BLUNT TRAUMA TO CHEST, INITIAL ENCOUNTER: ICD-10-CM

## 2024-08-20 LAB
INR PPP: 1.7
PROTHROMBIN TIME: 19.7 SEC (ref 11.7–14.9)

## 2024-08-20 PROCEDURE — 6370000000 HC RX 637 (ALT 250 FOR IP): Performed by: STUDENT IN AN ORGANIZED HEALTH CARE EDUCATION/TRAINING PROGRAM

## 2024-08-20 PROCEDURE — 36415 COLL VENOUS BLD VENIPUNCTURE: CPT

## 2024-08-20 PROCEDURE — 6370000000 HC RX 637 (ALT 250 FOR IP): Performed by: NURSE PRACTITIONER

## 2024-08-20 PROCEDURE — 94640 AIRWAY INHALATION TREATMENT: CPT

## 2024-08-20 PROCEDURE — 6360000002 HC RX W HCPCS: Performed by: STUDENT IN AN ORGANIZED HEALTH CARE EDUCATION/TRAINING PROGRAM

## 2024-08-20 PROCEDURE — 6370000000 HC RX 637 (ALT 250 FOR IP)

## 2024-08-20 PROCEDURE — 85610 PROTHROMBIN TIME: CPT

## 2024-08-20 RX ORDER — OXYCODONE HYDROCHLORIDE 10 MG/1
10 TABLET ORAL EVERY 8 HOURS PRN
Qty: 3 TABLET | Refills: 0
Start: 2024-08-20 | End: 2024-08-20

## 2024-08-20 RX ORDER — OXYCODONE HYDROCHLORIDE 10 MG/1
10 TABLET ORAL EVERY 8 HOURS PRN
Qty: 3 TABLET | Refills: 0 | Status: SHIPPED | OUTPATIENT
Start: 2024-08-20 | End: 2024-08-20 | Stop reason: SDUPTHER

## 2024-08-20 RX ORDER — CYCLOBENZAPRINE HCL 5 MG
5 TABLET ORAL EVERY 8 HOURS SCHEDULED
Qty: 30 TABLET | Refills: 0 | Status: SHIPPED | OUTPATIENT
Start: 2024-08-20 | End: 2024-08-30

## 2024-08-20 RX ORDER — GABAPENTIN 300 MG/1
600 CAPSULE ORAL EVERY 8 HOURS
Qty: 42 CAPSULE | Refills: 0 | Status: SHIPPED | OUTPATIENT
Start: 2024-08-20 | End: 2024-08-27

## 2024-08-20 RX ORDER — OXYCODONE HYDROCHLORIDE 10 MG/1
10 TABLET ORAL EVERY 8 HOURS PRN
Qty: 30 TABLET | Refills: 0 | Status: SHIPPED | OUTPATIENT
Start: 2024-08-20 | End: 2024-08-30

## 2024-08-20 RX ORDER — AMITRIPTYLINE HYDROCHLORIDE 50 MG/1
50 TABLET, FILM COATED ORAL 2 TIMES DAILY
DISCHARGE
Start: 2024-08-20

## 2024-08-20 RX ORDER — CYCLOBENZAPRINE HCL 5 MG
5 TABLET ORAL EVERY 8 HOURS SCHEDULED
DISCHARGE
Start: 2024-08-20 | End: 2024-08-20

## 2024-08-20 RX ADMIN — CYCLOBENZAPRINE HYDROCHLORIDE 5 MG: 5 TABLET, FILM COATED ORAL at 05:51

## 2024-08-20 RX ADMIN — GABAPENTIN 600 MG: 300 CAPSULE ORAL at 01:38

## 2024-08-20 RX ADMIN — NAPHAZOLINE HYDROCHLORIDE AND PHENIRAMINE MALEATE 1 DROP: .25; 3 SOLUTION/ DROPS OPHTHALMIC at 07:42

## 2024-08-20 RX ADMIN — GABAPENTIN 600 MG: 300 CAPSULE ORAL at 10:34

## 2024-08-20 RX ADMIN — LAMOTRIGINE 25 MG: 25 TABLET ORAL at 07:43

## 2024-08-20 RX ADMIN — ACETAMINOPHEN 1000 MG: 500 TABLET ORAL at 05:51

## 2024-08-20 RX ADMIN — ENOXAPARIN SODIUM 105 MG: 150 INJECTION SUBCUTANEOUS at 07:42

## 2024-08-20 RX ADMIN — BUSPIRONE HYDROCHLORIDE 30 MG: 10 TABLET ORAL at 07:45

## 2024-08-20 RX ADMIN — OXYCODONE HYDROCHLORIDE 10 MG: 5 TABLET ORAL at 00:32

## 2024-08-20 RX ADMIN — SENNOSIDES AND DOCUSATE SODIUM 1 TABLET: 50; 8.6 TABLET ORAL at 07:43

## 2024-08-20 RX ADMIN — AMITRIPTYLINE HYDROCHLORIDE 50 MG: 50 TABLET, FILM COATED ORAL at 07:44

## 2024-08-20 RX ADMIN — TOPIRAMATE 100 MG: 100 TABLET, FILM COATED ORAL at 07:44

## 2024-08-20 RX ADMIN — PANTOPRAZOLE SODIUM 40 MG: 40 TABLET, DELAYED RELEASE ORAL at 07:47

## 2024-08-20 RX ADMIN — IPRATROPIUM BROMIDE AND ALBUTEROL SULFATE 1 DOSE: 2.5; .5 SOLUTION RESPIRATORY (INHALATION) at 08:07

## 2024-08-20 RX ADMIN — ACETAMINOPHEN 1000 MG: 500 TABLET ORAL at 13:22

## 2024-08-20 RX ADMIN — OXYCODONE HYDROCHLORIDE 10 MG: 5 TABLET ORAL at 05:51

## 2024-08-20 RX ADMIN — CYCLOBENZAPRINE HYDROCHLORIDE 5 MG: 5 TABLET, FILM COATED ORAL at 13:22

## 2024-08-20 RX ADMIN — TAMSULOSIN HYDROCHLORIDE 0.4 MG: 0.4 CAPSULE ORAL at 07:42

## 2024-08-20 RX ADMIN — PANTOPRAZOLE SODIUM 40 MG: 40 TABLET, DELAYED RELEASE ORAL at 05:51

## 2024-08-20 RX ADMIN — LEVETIRACETAM 1500 MG: 500 TABLET, FILM COATED ORAL at 07:41

## 2024-08-20 RX ADMIN — NAPHAZOLINE HYDROCHLORIDE AND PHENIRAMINE MALEATE 1 DROP: .25; 3 SOLUTION/ DROPS OPHTHALMIC at 13:24

## 2024-08-20 RX ADMIN — OXYCODONE HYDROCHLORIDE 10 MG: 5 TABLET ORAL at 10:34

## 2024-08-20 RX ADMIN — VENLAFAXINE HYDROCHLORIDE 300 MG: 75 CAPSULE, EXTENDED RELEASE ORAL at 07:40

## 2024-08-20 ASSESSMENT — PAIN SCALES - GENERAL
PAINLEVEL_OUTOF10: 8
PAINLEVEL_OUTOF10: 7
PAINLEVEL_OUTOF10: 8
PAINLEVEL_OUTOF10: 6
PAINLEVEL_OUTOF10: 6
PAINLEVEL_OUTOF10: 4
PAINLEVEL_OUTOF10: 8
PAINLEVEL_OUTOF10: 8

## 2024-08-20 ASSESSMENT — PAIN DESCRIPTION - DESCRIPTORS
DESCRIPTORS: ACHING;DISCOMFORT
DESCRIPTORS: ACHING
DESCRIPTORS: ACHING;DISCOMFORT

## 2024-08-20 ASSESSMENT — PAIN DESCRIPTION - ORIENTATION: ORIENTATION: RIGHT

## 2024-08-20 ASSESSMENT — PAIN DESCRIPTION - LOCATION
LOCATION: HIP;LEG
LOCATION: GENERALIZED

## 2024-08-20 NOTE — CARE COORDINATION
Transitional planning-call from Maninder at [facility]. Received auth. 0900 called [facility] and talked with Michelle-asked for request to be faxed. Faxed transport request to [facility]. 0922 faxed AVS and HENS to [facility]. 0930 called [facility] and talked with Matilde-P/U time 130PM, called [facility] and talked with Maninder-informed him of P/U time. Notified patient-she notified her family.
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
## 2024-08-20 NOTE — CARE COORDINATION
Discharge Report    [facility]  Clinical Case Management Department  Written by: Poly Herrera RN    Patient Name: Vesna Valencia  Attending Provider: No att. providers found  Admit Date: 2024 11:52 AM  MRN: [id number]  Account: [id number]                     : 1965  Discharge Date: 2024      Disposition: [facility]    Poly Herrera, RN

## 2024-08-20 NOTE — PLAN OF CARE
Problem: Discharge Planning  Goal: Discharge to home or other facility with appropriate resources  8/10/2024 1650 by Heidi Roche RN  Outcome: Progressing  8/10/2024 0446 by Bacilio Ramirez RN  Outcome: Progressing     Problem: Respiratory - Adult  Goal: Achieves optimal ventilation and oxygenation  8/10/2024 1650 by Heidi Roche RN  Outcome: Progressing  8/10/2024 0446 by Bacilio Ramirez RN  Outcome: Progressing     Problem: Pain  Goal: Verbalizes/displays adequate comfort level or baseline comfort level  8/10/2024 1650 by Heidi Roche RN  Outcome: Progressing  8/10/2024 0446 by Bacilio Ramirez RN  Outcome: Progressing     Problem: ABCDS Injury Assessment  Goal: Absence of physical injury  8/10/2024 0446 by Bacilio Ramirez RN  Outcome: Progressing     Problem: Skin/Tissue Integrity  Goal: Absence of new skin breakdown  8/10/2024 0446 by Bacilio Ramirez RN  Outcome: Progressing     Problem: Safety - Adult  Goal: Free from fall injury  8/10/2024 0446 by Bacilio Ramirez RN  Outcome: Progressing     Problem: Chronic Conditions and Co-morbidities  Goal: Patient's chronic conditions and co-morbidity symptoms are monitored and maintained or improved  8/10/2024 0446 by Bacilio Ramirez RN  Outcome: Progressing     
  Problem: Discharge Planning  Goal: Discharge to home or other facility with appropriate resources  8/10/2024 2134 by Haylie Erickson RN  Outcome: Progressing  8/10/2024 1650 by Heidi Roche RN  Outcome: Progressing     Problem: Respiratory - Adult  Goal: Achieves optimal ventilation and oxygenation  8/10/2024 2134 by Haylie Erickson RN  Outcome: Progressing  8/10/2024 1650 by Heidi Roche RN  Outcome: Progressing     Problem: Pain  Goal: Verbalizes/displays adequate comfort level or baseline comfort level  8/10/2024 2134 by Haylie Erickson RN  Outcome: Progressing  Flowsheets (Taken 8/10/2024 1950)  Verbalizes/displays adequate comfort level or baseline comfort level: Encourage patient to monitor pain and request assistance  8/10/2024 1650 by Heidi Roche RN  Outcome: Progressing     Problem: ABCDS Injury Assessment  Goal: Absence of physical injury  Outcome: Progressing  Flowsheets (Taken 8/10/2024 2000)  Absence of Physical Injury: Implement safety measures based on patient assessment     Problem: Skin/Tissue Integrity  Goal: Absence of new skin breakdown  Description: 1.  Monitor for areas of redness and/or skin breakdown  2.  Assess vascular access sites hourly  3.  Every 4-6 hours minimum:  Change oxygen saturation probe site  4.  Every 4-6 hours:  If on nasal continuous positive airway pressure, respiratory therapy assess nares and determine need for appliance change or resting period.  Outcome: Progressing     Problem: Safety - Adult  Goal: Free from fall injury  Outcome: Progressing  Flowsheets (Taken 8/10/2024 2000)  Free From Fall Injury: Instruct family/caregiver on patient safety     Problem: Chronic Conditions and Co-morbidities  Goal: Patient's chronic conditions and co-morbidity symptoms are monitored and maintained or improved  Outcome: Progressing     
  Problem: Discharge Planning  Goal: Discharge to home or other facility with appropriate resources  8/11/2024 1100 by Tracy Reddy RN  Outcome: Progressing  8/10/2024 2134 by Haylie Erickosn RN  Outcome: Progressing  Flowsheets (Taken 8/10/2024 2000)  Discharge to home or other facility with appropriate resources: Identify barriers to discharge with patient and caregiver     Problem: Respiratory - Adult  Goal: Achieves optimal ventilation and oxygenation  8/11/2024 1100 by Tracy Reddy RN  Outcome: Progressing  8/10/2024 2134 by Haylie Erickson RN  Outcome: Progressing     Problem: Pain  Goal: Verbalizes/displays adequate comfort level or baseline comfort level  8/11/2024 1100 by Tracy Reddy RN  Outcome: Progressing  8/10/2024 2134 by Haylie Erickson RN  Outcome: Progressing  Flowsheets (Taken 8/10/2024 1950)  Verbalizes/displays adequate comfort level or baseline comfort level: Encourage patient to monitor pain and request assistance     Problem: ABCDS Injury Assessment  Goal: Absence of physical injury  8/11/2024 1100 by Tracy Reddy RN  Outcome: Progressing  8/10/2024 2134 by Haylie Erickson RN  Outcome: Progressing  Flowsheets (Taken 8/10/2024 2000)  Absence of Physical Injury: Implement safety measures based on patient assessment     Problem: Skin/Tissue Integrity  Goal: Absence of new skin breakdown  Description: 1.  Monitor for areas of redness and/or skin breakdown  2.  Assess vascular access sites hourly  3.  Every 4-6 hours minimum:  Change oxygen saturation probe site  4.  Every 4-6 hours:  If on nasal continuous positive airway pressure, respiratory therapy assess nares and determine need for appliance change or resting period.  8/11/2024 1100 by Tracy Reddy RN  Outcome: Progressing  8/10/2024 2134 by Haylie Erickson RN  Outcome: Progressing     Problem: Safety - Adult  Goal: Free from fall injury  8/11/2024 1100 by Tracy Reddy RN  Outcome: 
  Problem: Discharge Planning  Goal: Discharge to home or other facility with appropriate resources  8/11/2024 2017 by Haylie Erickson RN  Outcome: Progressing  8/11/2024 1100 by Tracy Reddy RN  Outcome: Progressing     Problem: Respiratory - Adult  Goal: Achieves optimal ventilation and oxygenation  8/11/2024 2017 by Haylie Erickson RN  Outcome: Progressing  8/11/2024 1100 by Tracy Reddy RN  Outcome: Progressing     Problem: Pain  Goal: Verbalizes/displays adequate comfort level or baseline comfort level  8/11/2024 2017 by Haylie Erickson RN  Outcome: Progressing  8/11/2024 1100 by Tracy Reddy RN  Outcome: Progressing     Problem: ABCDS Injury Assessment  Goal: Absence of physical injury  8/11/2024 2017 by Haylie Erickson RN  Outcome: Progressing  Flowsheets (Taken 8/11/2024 2009)  Absence of Physical Injury: Implement safety measures based on patient assessment  8/11/2024 1100 by Tracy Reddy RN  Outcome: Progressing     Problem: Skin/Tissue Integrity  Goal: Absence of new skin breakdown  Description: 1.  Monitor for areas of redness and/or skin breakdown  2.  Assess vascular access sites hourly  3.  Every 4-6 hours minimum:  Change oxygen saturation probe site  4.  Every 4-6 hours:  If on nasal continuous positive airway pressure, respiratory therapy assess nares and determine need for appliance change or resting period.  8/11/2024 2017 by Haylie Erickson RN  Outcome: Progressing  8/11/2024 1100 by Tracy Reddy RN  Outcome: Progressing     Problem: Safety - Adult  Goal: Free from fall injury  8/11/2024 2017 by Haylie Erickson RN  Outcome: Progressing  Flowsheets (Taken 8/11/2024 2009)  Free From Fall Injury: Instruct family/caregiver on patient safety  8/11/2024 1100 by Tracy Reddy RN  Outcome: Progressing     Problem: Chronic Conditions and Co-morbidities  Goal: Patient's chronic conditions and co-morbidity symptoms are monitored and maintained or 
  Problem: Discharge Planning  Goal: Discharge to home or other facility with appropriate resources  8/13/2024 0715 by Heidi Roche RN  Outcome: Progressing  8/13/2024 0030 by Mayi Bonds RN  Outcome: Progressing     Problem: Respiratory - Adult  Goal: Achieves optimal ventilation and oxygenation  8/13/2024 0715 by Heidi Roche RN  Outcome: Progressing  8/13/2024 0030 by Mayi Bonds RN  Outcome: Progressing  8/12/2024 2329 by Rolly Vasquez RCP  Outcome: Progressing     Problem: Pain  Goal: Verbalizes/displays adequate comfort level or baseline comfort level  8/13/2024 0715 by Heidi Roche RN  Outcome: Progressing  8/13/2024 0030 by Mayi Bonds RN  Outcome: Progressing     Problem: ABCDS Injury Assessment  Goal: Absence of physical injury  8/13/2024 0715 by Heidi Roche RN  Outcome: Progressing  8/13/2024 0030 by Mayi Bonds RN  Outcome: Progressing     Problem: Skin/Tissue Integrity  Goal: Absence of new skin breakdown  8/13/2024 0715 by Heidi Roche RN  Outcome: Progressing  8/13/2024 0030 by Mayi Bonds RN  Outcome: Progressing     Problem: Safety - Adult  Goal: Free from fall injury  8/13/2024 0715 by Heidi Roche RN  Outcome: Progressing  8/13/2024 0030 by Mayi Bonds RN  Outcome: Progressing     Problem: Chronic Conditions and Co-morbidities  Goal: Patient's chronic conditions and co-morbidity symptoms are monitored and maintained or improved  8/13/2024 0715 by Heidi Roche RN  Outcome: Progressing  8/13/2024 0030 by Mayi Bonds RN  Outcome: Progressing     Problem: Nutrition Deficit:  Goal: Optimize nutritional status  8/13/2024 0715 by Heidi Roche RN  Outcome: Progressing  8/13/2024 0030 by Mayi Bonds RN  Outcome: Progressing     
  Problem: Discharge Planning  Goal: Discharge to home or other facility with appropriate resources  8/14/2024 1634 by Evonne Stroud RN  Outcome: Progressing  Flowsheets (Taken 8/14/2024 0800)  Discharge to home or other facility with appropriate resources: Identify barriers to discharge with patient and caregiver  8/14/2024 0654 by Mayi Bonds RN  Outcome: Progressing     Problem: Respiratory - Adult  Goal: Achieves optimal ventilation and oxygenation  8/14/2024 1634 by Evonne Stroud RN  Outcome: Progressing  8/14/2024 0654 by Mayi Bonds RN  Outcome: Progressing     Problem: Pain  Goal: Verbalizes/displays adequate comfort level or baseline comfort level  8/14/2024 1634 by Evonne Stroud RN  Outcome: Progressing  8/14/2024 0654 by Mayi Bonds RN  Outcome: Progressing     Problem: ABCDS Injury Assessment  Goal: Absence of physical injury  8/14/2024 1634 by Evonne Stroud RN  Outcome: Progressing  Flowsheets (Taken 8/14/2024 0800)  Absence of Physical Injury: Implement safety measures based on patient assessment  8/14/2024 0654 by Mayi Bonds RN  Outcome: Progressing     Problem: Skin/Tissue Integrity  Goal: Absence of new skin breakdown  Description: 1.  Monitor for areas of redness and/or skin breakdown  2.  Assess vascular access sites hourly  3.  Every 4-6 hours minimum:  Change oxygen saturation probe site  4.  Every 4-6 hours:  If on nasal continuous positive airway pressure, respiratory therapy assess nares and determine need for appliance change or resting period.  8/14/2024 1634 by Evonne Stroud RN  Outcome: Progressing  8/14/2024 0654 by Mayi Bonds RN  Outcome: Progressing     Problem: Chronic Conditions and Co-morbidities  Goal: Patient's chronic conditions and co-morbidity symptoms are monitored and maintained or improved  8/14/2024 1634 by Evonne Stroud RN  Outcome: Progressing  Flowsheets (Taken 8/14/2024 0800)  Care Plan - Patient's Chronic Conditions and 
  Problem: Discharge Planning  Goal: Discharge to home or other facility with appropriate resources  8/15/2024 0630 by Priscila Ferris RN  Outcome: Progressing  8/14/2024 1634 by Evonne Stroud RN  Outcome: Progressing  Flowsheets (Taken 8/14/2024 0800)  Discharge to home or other facility with appropriate resources: Identify barriers to discharge with patient and caregiver     Problem: Respiratory - Adult  Goal: Achieves optimal ventilation and oxygenation  8/15/2024 0630 by Priscila Ferris RN  Outcome: Progressing  8/14/2024 1634 by Evonne Stroud RN  Outcome: Progressing     Problem: Pain  Goal: Verbalizes/displays adequate comfort level or baseline comfort level  8/15/2024 0630 by Priscila Ferris RN  Outcome: Progressing  8/14/2024 1634 by Evonne Stroud RN  Outcome: Progressing     Problem: ABCDS Injury Assessment  Goal: Absence of physical injury  8/15/2024 0630 by Priscila Ferris RN  Outcome: Progressing  8/14/2024 1634 by Evonne Stroud RN  Outcome: Progressing  Flowsheets (Taken 8/14/2024 0800)  Absence of Physical Injury: Implement safety measures based on patient assessment     Problem: Skin/Tissue Integrity  Goal: Absence of new skin breakdown  Description: 1.  Monitor for areas of redness and/or skin breakdown  2.  Assess vascular access sites hourly  3.  Every 4-6 hours minimum:  Change oxygen saturation probe site  4.  Every 4-6 hours:  If on nasal continuous positive airway pressure, respiratory therapy assess nares and determine need for appliance change or resting period.  8/15/2024 0630 by Priscila Ferris RN  Outcome: Progressing  8/14/2024 1634 by Evonne Stroud RN  Outcome: Progressing     Problem: Safety - Adult  Goal: Free from fall injury  8/15/2024 0630 by Priscila Ferris RN  Outcome: Progressing  8/14/2024 1634 by Evonne Stroud RN  Outcome: Progressing  Flowsheets (Taken 8/14/2024 0800)  Free From Fall Injury: Instruct family/caregiver on patient safety     Problem: Chronic Conditions and 
  Problem: Discharge Planning  Goal: Discharge to home or other facility with appropriate resources  8/20/2024 0926 by Shae Rivera RN  Outcome: Completed  8/20/2024 0229 by Ami Barragan RN  Outcome: Progressing  Flowsheets (Taken 8/19/2024 2000)  Discharge to home or other facility with appropriate resources:   Identify barriers to discharge with patient and caregiver   Arrange for needed discharge resources and transportation as appropriate   Identify discharge learning needs (meds, wound care, etc)   Arrange for interpreters to assist at discharge as needed   Refer to discharge planning if patient needs post-hospital services based on physician order or complex needs related to functional status, cognitive ability or social support system     Problem: Respiratory - Adult  Goal: Achieves optimal ventilation and oxygenation  8/20/2024 0926 by Shae Rivera RN  Outcome: Completed  8/20/2024 0229 by Ami Barragan RN  Outcome: Progressing  Flowsheets (Taken 8/19/2024 1948 by Lorna Wayne [facility])  Achieves optimal ventilation and oxygenation:   Respiratory therapy support as indicated   Assess and instruct to report shortness of breath or any respiratory difficulty   Assess the need for suctioning and aspirate as needed   Encourage broncho-pulmonary hygiene including cough, deep breathe, incentive spirometry   Initiate smoking cessation protocol as indicated   Oxygen supplementation based on oxygen saturation or arterial blood gases   Position to facilitate oxygenation and minimize respiratory effort   Assess for changes in mentation and behavior   Assess for changes in respiratory status     Problem: Pain  Goal: Verbalizes/displays adequate comfort level or baseline comfort level  8/20/2024 0926 by Shae Rivera RN  Outcome: Completed  8/20/2024 0229 by Ami Barragan RN  Outcome: Progressing  Flowsheets (Taken 8/19/2024 1937)  Verbalizes/displays adequate comfort level or baseline comfort level:   
  Problem: Discharge Planning  Goal: Discharge to home or other facility with appropriate resources  8/4/2024 1721 by Dinah Sanders RN  Outcome: Progressing  Flowsheets (Taken 8/4/2024 1721)  Discharge to home or other facility with appropriate resources:   Identify barriers to discharge with patient and caregiver   Identify discharge learning needs (meds, wound care, etc)   Arrange for needed discharge resources and transportation as appropriate   Refer to discharge planning if patient needs post-hospital services based on physician order or complex needs related to functional status, cognitive ability or social support system  8/4/2024 1720 by Dinah Sanders RN  Outcome: Progressing     Problem: Respiratory - Adult  Goal: Achieves optimal ventilation and oxygenation  8/4/2024 1721 by Dinah Sanders RN  Outcome: Progressing  8/4/2024 1720 by Dinah Sanders RN  Outcome: Progressing  8/4/2024 1518 by Ami Pablo RCP  Outcome: Progressing     Problem: Pain  Goal: Verbalizes/displays adequate comfort level or baseline comfort level  8/4/2024 1721 by Dinah Sanders RN  Outcome: Progressing  Flowsheets (Taken 8/4/2024 1721)  Verbalizes/displays adequate comfort level or baseline comfort level:   Encourage patient to monitor pain and request assistance   Assess pain using appropriate pain scale   Administer analgesics based on type and severity of pain and evaluate response   Implement non-pharmacological measures as appropriate and evaluate response  8/4/2024 1720 by Dinah Sanders RN  Outcome: Progressing     Problem: ABCDS Injury Assessment  Goal: Absence of physical injury  Outcome: Progressing     Problem: Skin/Tissue Integrity  Goal: Absence of new skin breakdown  Description: 1.  Monitor for areas of redness and/or skin breakdown  2.  Assess vascular access sites hourly  3.  Every 4-6 hours minimum:  Change oxygen saturation probe site  4.  Every 4-6 hours:  If on nasal continuous positive airway pressure, respiratory 
  Problem: Discharge Planning  Goal: Discharge to home or other facility with appropriate resources  Outcome: Progressing     Problem: Respiratory - Adult  Goal: Achieves optimal ventilation and oxygenation  8/13/2024 0030 by Mayi Bonds RN  Outcome: Progressing  8/12/2024 2329 by Rolly Vasquez RCP  Outcome: Progressing     Problem: Pain  Goal: Verbalizes/displays adequate comfort level or baseline comfort level  Outcome: Progressing     Problem: ABCDS Injury Assessment  Goal: Absence of physical injury  Outcome: Progressing     Problem: Skin/Tissue Integrity  Goal: Absence of new skin breakdown  Description: 1.  Monitor for areas of redness and/or skin breakdown  2.  Assess vascular access sites hourly  3.  Every 4-6 hours minimum:  Change oxygen saturation probe site  4.  Every 4-6 hours:  If on nasal continuous positive airway pressure, respiratory therapy assess nares and determine need for appliance change or resting period.  Outcome: Progressing     Problem: Safety - Adult  Goal: Free from fall injury  Outcome: Progressing     Problem: Chronic Conditions and Co-morbidities  Goal: Patient's chronic conditions and co-morbidity symptoms are monitored and maintained or improved  Outcome: Progressing     Problem: Nutrition Deficit:  Goal: Optimize nutritional status  Outcome: Progressing     
  Problem: Discharge Planning  Goal: Discharge to home or other facility with appropriate resources  Outcome: Progressing     Problem: Respiratory - Adult  Goal: Achieves optimal ventilation and oxygenation  8/14/2024 0654 by aMyi Bonds RN  Outcome: Progressing  8/13/2024 2105 by Sally Corley RCP  Outcome: Progressing     Problem: Pain  Goal: Verbalizes/displays adequate comfort level or baseline comfort level  Outcome: Progressing     Problem: ABCDS Injury Assessment  Goal: Absence of physical injury  Outcome: Progressing     Problem: Skin/Tissue Integrity  Goal: Absence of new skin breakdown  Description: 1.  Monitor for areas of redness and/or skin breakdown  2.  Assess vascular access sites hourly  3.  Every 4-6 hours minimum:  Change oxygen saturation probe site  4.  Every 4-6 hours:  If on nasal continuous positive airway pressure, respiratory therapy assess nares and determine need for appliance change or resting period.  Outcome: Progressing     Problem: Safety - Adult  Goal: Free from fall injury  Outcome: Progressing     Problem: Chronic Conditions and Co-morbidities  Goal: Patient's chronic conditions and co-morbidity symptoms are monitored and maintained or improved  Outcome: Progressing     Problem: Nutrition Deficit:  Goal: Optimize nutritional status  Outcome: Progressing     
  Problem: Discharge Planning  Goal: Discharge to home or other facility with appropriate resources  Outcome: Progressing     Problem: Respiratory - Adult  Goal: Achieves optimal ventilation and oxygenation  8/6/2024 2331 by Maryellen Clements RN  Outcome: Progressing  8/6/2024 2111 by Opal Egan RCP  Outcome: Progressing     Problem: Pain  Goal: Verbalizes/displays adequate comfort level or baseline comfort level  Outcome: Progressing  Flowsheets  Taken 8/6/2024 1700 by Karime Zamarripa, RN  Verbalizes/displays adequate comfort level or baseline comfort level: Encourage patient to monitor pain and request assistance  Taken 8/6/2024 1640 by Karime Zamarripa, RN  Verbalizes/displays adequate comfort level or baseline comfort level: Encourage patient to monitor pain and request assistance  Taken 8/6/2024 1200 by Karime Zamarripa, RN  Verbalizes/displays adequate comfort level or baseline comfort level: Encourage patient to monitor pain and request assistance     Problem: ABCDS Injury Assessment  Goal: Absence of physical injury  Outcome: Progressing     Problem: Skin/Tissue Integrity  Goal: Absence of new skin breakdown  Description: 1.  Monitor for areas of redness and/or skin breakdown  2.  Assess vascular access sites hourly  3.  Every 4-6 hours minimum:  Change oxygen saturation probe site  4.  Every 4-6 hours:  If on nasal continuous positive airway pressure, respiratory therapy assess nares and determine need for appliance change or resting period.  Outcome: Progressing     Problem: Safety - Adult  Goal: Free from fall injury  Outcome: Progressing     Problem: Chronic Conditions and Co-morbidities  Goal: Patient's chronic conditions and co-morbidity symptoms are monitored and maintained or improved  Outcome: Progressing     
  Problem: Discharge Planning  Goal: Discharge to home or other facility with appropriate resources  Outcome: Progressing     Problem: Respiratory - Adult  Goal: Achieves optimal ventilation and oxygenation  Outcome: Progressing     Problem: Pain  Goal: Verbalizes/displays adequate comfort level or baseline comfort level  Outcome: Progressing     Problem: ABCDS Injury Assessment  Goal: Absence of physical injury  Outcome: Progressing     Problem: Skin/Tissue Integrity  Goal: Absence of new skin breakdown  Description: 1.  Monitor for areas of redness and/or skin breakdown  2.  Assess vascular access sites hourly  3.  Every 4-6 hours minimum:  Change oxygen saturation probe site  4.  Every 4-6 hours:  If on nasal continuous positive airway pressure, respiratory therapy assess nares and determine need for appliance change or resting period.  Outcome: Progressing     Problem: Safety - Adult  Goal: Free from fall injury  Outcome: Progressing     Problem: Chronic Conditions and Co-morbidities  Goal: Patient's chronic conditions and co-morbidity symptoms are monitored and maintained or improved  Outcome: Progressing     
  Problem: Discharge Planning  Goal: Discharge to home or other facility with appropriate resources  Outcome: Progressing     Problem: Respiratory - Adult  Goal: Achieves optimal ventilation and oxygenation  Outcome: Progressing     Problem: Pain  Goal: Verbalizes/displays adequate comfort level or baseline comfort level  Outcome: Progressing     Problem: ABCDS Injury Assessment  Goal: Absence of physical injury  Outcome: Progressing     Problem: Skin/Tissue Integrity  Goal: Absence of new skin breakdown  Description: 1.  Monitor for areas of redness and/or skin breakdown  2.  Assess vascular access sites hourly  3.  Every 4-6 hours minimum:  Change oxygen saturation probe site  4.  Every 4-6 hours:  If on nasal continuous positive airway pressure, respiratory therapy assess nares and determine need for appliance change or resting period.  Outcome: Progressing     Problem: Safety - Adult  Goal: Free from fall injury  Outcome: Progressing     Problem: Chronic Conditions and Co-morbidities  Goal: Patient's chronic conditions and co-morbidity symptoms are monitored and maintained or improved  Outcome: Progressing     Problem: Nutrition Deficit:  Goal: Optimize nutritional status  8/16/2024 1635 by Priscila Pool RN  Outcome: Progressing  8/16/2024 1322 by Lydia Montana, RD  Outcome: Progressing  Flowsheets (Taken 8/16/2024 1315)  Nutrient intake appropriate for improving, restoring, or maintaining nutritional needs:   Assess nutritional status and recommend course of action   Monitor oral intake, labs, and treatment plans   Recommend appropriate diets, oral nutritional supplements, and vitamin/mineral supplements     
  Problem: Discharge Planning  Goal: Discharge to home or other facility with appropriate resources  Outcome: Progressing     Problem: Respiratory - Adult  Goal: Achieves optimal ventilation and oxygenation  Outcome: Progressing     Problem: Pain  Goal: Verbalizes/displays adequate comfort level or baseline comfort level  Outcome: Progressing     Problem: ABCDS Injury Assessment  Goal: Absence of physical injury  Outcome: Progressing     Problem: Skin/Tissue Integrity  Goal: Absence of new skin breakdown  Description: 1.  Monitor for areas of redness and/or skin breakdown  2.  Assess vascular access sites hourly  3.  Every 4-6 hours minimum:  Change oxygen saturation probe site  4.  Every 4-6 hours:  If on nasal continuous positive airway pressure, respiratory therapy assess nares and determine need for appliance change or resting period.  Outcome: Progressing     Problem: Safety - Adult  Goal: Free from fall injury  Outcome: Progressing     Problem: Chronic Conditions and Co-morbidities  Goal: Patient's chronic conditions and co-morbidity symptoms are monitored and maintained or improved  Outcome: Progressing     Problem: Nutrition Deficit:  Goal: Optimize nutritional status  Outcome: Progressing     
  Problem: Discharge Planning  Goal: Discharge to home or other facility with appropriate resources  Outcome: Progressing  Flowsheets (Taken 8/19/2024 2000)  Discharge to home or other facility with appropriate resources:   Identify barriers to discharge with patient and caregiver   Arrange for needed discharge resources and transportation as appropriate   Identify discharge learning needs (meds, wound care, etc)   Arrange for interpreters to assist at discharge as needed   Refer to discharge planning if patient needs post-hospital services based on physician order or complex needs related to functional status, cognitive ability or social support system     Problem: Respiratory - Adult  Goal: Achieves optimal ventilation and oxygenation  8/20/2024 0229 by Ami Barragan, RN  Outcome: Progressing  Flowsheets (Taken 8/19/2024 1948 by Lorna Wayne RCP)  Achieves optimal ventilation and oxygenation:   Respiratory therapy support as indicated   Assess and instruct to report shortness of breath or any respiratory difficulty   Assess the need for suctioning and aspirate as needed   Encourage broncho-pulmonary hygiene including cough, deep breathe, incentive spirometry   Initiate smoking cessation protocol as indicated   Oxygen supplementation based on oxygen saturation or arterial blood gases   Position to facilitate oxygenation and minimize respiratory effort   Assess for changes in mentation and behavior   Assess for changes in respiratory status     Problem: Pain  Goal: Verbalizes/displays adequate comfort level or baseline comfort level  8/20/2024 0229 by Ami Barragan, RN  Outcome: Progressing  Flowsheets (Taken 8/19/2024 1937)  Verbalizes/displays adequate comfort level or baseline comfort level:   Encourage patient to monitor pain and request assistance   Assess pain using appropriate pain scale   Administer analgesics based on type and severity of pain and evaluate response   Implement non-pharmacological 
  Problem: Discharge Planning  Goal: Discharge to home or other facility with appropriate resources  Outcome: Progressing  Flowsheets (Taken 8/5/2024 0800)  Discharge to home or other facility with appropriate resources:   Identify barriers to discharge with patient and caregiver   Arrange for needed discharge resources and transportation as appropriate   Identify discharge learning needs (meds, wound care, etc)   Arrange for interpreters to assist at discharge as needed   Refer to discharge planning if patient needs post-hospital services based on physician order or complex needs related to functional status, cognitive ability or social support system     Problem: Respiratory - Adult  Goal: Achieves optimal ventilation and oxygenation  8/5/2024 1543 by Gardenia Mcclelland, RN  Outcome: Progressing  8/5/2024 0807 by Marisol Kim, RCP  Outcome: Progressing     Problem: Pain  Goal: Verbalizes/displays adequate comfort level or baseline comfort level  Outcome: Progressing     Problem: ABCDS Injury Assessment  Goal: Absence of physical injury  Outcome: Progressing     Problem: Skin/Tissue Integrity  Goal: Absence of new skin breakdown  Description: 1.  Monitor for areas of redness and/or skin breakdown  2.  Assess vascular access sites hourly  3.  Every 4-6 hours minimum:  Change oxygen saturation probe site  4.  Every 4-6 hours:  If on nasal continuous positive airway pressure, respiratory therapy assess nares and determine need for appliance change or resting period.  Outcome: Progressing     Problem: Safety - Adult  Goal: Free from fall injury  Outcome: Progressing     
  Problem: Discharge Planning  Goal: Discharge to home or other facility with appropriate resources  Outcome: Progressing  Flowsheets (Taken 8/5/2024 2000 by Kristie Bustamante, RN)  Discharge to home or other facility with appropriate resources: Identify barriers to discharge with patient and caregiver     Problem: Respiratory - Adult  Goal: Achieves optimal ventilation and oxygenation  8/6/2024 0720 by Karime Zamarripa RN  Outcome: Progressing  8/5/2024 2008 by Opal Egan RCP  Outcome: Progressing     Problem: Pain  Goal: Verbalizes/displays adequate comfort level or baseline comfort level  Outcome: Progressing     Problem: ABCDS Injury Assessment  Goal: Absence of physical injury  Outcome: Progressing     Problem: Skin/Tissue Integrity  Goal: Absence of new skin breakdown  Description: 1.  Monitor for areas of redness and/or skin breakdown  2.  Assess vascular access sites hourly  3.  Every 4-6 hours minimum:  Change oxygen saturation probe site  4.  Every 4-6 hours:  If on nasal continuous positive airway pressure, respiratory therapy assess nares and determine need for appliance change or resting period.  Outcome: Progressing     Problem: Safety - Adult  Goal: Free from fall injury  Outcome: Progressing     
  Problem: Respiratory - Adult  Goal: Achieves optimal ventilation and oxygenation  8/13/2024 0956 by Catrina Infante, RCP  Outcome: Progressing  Flowsheets (Taken 8/13/2024 0956)  Achieves optimal ventilation and oxygenation:   Assess for changes in respiratory status   Position to facilitate oxygenation and minimize respiratory effort   Assess and instruct to report shortness of breath or any respiratory difficulty   Assess for changes in mentation and behavior   Respiratory therapy support as indicated     
  Problem: Respiratory - Adult  Goal: Achieves optimal ventilation and oxygenation  8/13/2024 2105 by Sally Corley, LUIS  Outcome: Progressing   BRONCHOSPASM/BRONCHOCONSTRICTION     [x]         IMPROVE AERATION/BREATH SOUNDS  [x]   ADMINISTER BRONCHODILATOR THERAPY AS APPROPRIATE  [x]   ASSESS BREATH SOUNDS  []   IMPLEMENT AEROSOL/MDI PROTOCOL  [x]   PATIENT EDUCATION AS NEEDED    
  Problem: Respiratory - Adult  Goal: Achieves optimal ventilation and oxygenation  8/15/2024 0934 by Rita Crowe, LUIS  Outcome: Progressing  Flowsheets (Taken 8/15/2024 0931)  Achieves optimal ventilation and oxygenation:   Assess for changes in respiratory status   Assess for changes in mentation and behavior   Oxygen supplementation based on oxygen saturation or arterial blood gases   Position to facilitate oxygenation and minimize respiratory effort   Encourage broncho-pulmonary hygiene including cough, deep breathe, incentive spirometry   Assess and instruct to report shortness of breath or any respiratory difficulty   Respiratory therapy support as indicated     
  Problem: Respiratory - Adult  Goal: Achieves optimal ventilation and oxygenation  8/16/2024 0038 by Rosa Haley RCP  Outcome: Progressing     
  Problem: Respiratory - Adult  Goal: Achieves optimal ventilation and oxygenation  8/5/2024 2008 by Opal Egan RCP  Outcome: Progressing   BRONCHOSPASM/BRONCHOCONSTRICTION     [x]         IMPROVE AERATION/BREATH SOUNDS  [x]   ADMINISTER BRONCHODILATOR THERAPY AS APPROPRIATE  [x]   ASSESS BREATH SOUNDS  []   IMPLEMENT AEROSOL/MDI PROTOCOL  [x]   PATIENT EDUCATION AS NEEDED  PROVIDE ADEQUATE OXYGENATION WITH ACCEPTABLE SP02/ABG'S    [x]  IDENTIFY APPROPRIATE OXYGEN THERAPY  [x]   MONITOR SP02/ABG'S AS NEEDED   [x]   PATIENT EDUCATION AS NEEDED    
  Problem: Respiratory - Adult  Goal: Achieves optimal ventilation and oxygenation  8/6/2024 2111 by Opal Egan RCP  Outcome: Progressing   BRONCHOSPASM/BRONCHOCONSTRICTION     [x]         IMPROVE AERATION/BREATH SOUNDS  [x]   ADMINISTER BRONCHODILATOR THERAPY AS APPROPRIATE  [x]   ASSESS BREATH SOUNDS  []   IMPLEMENT AEROSOL/MDI PROTOCOL  [x]   PATIENT EDUCATION AS NEEDED  PROVIDE ADEQUATE OXYGENATION WITH ACCEPTABLE SP02/ABG'S    [x]  IDENTIFY APPROPRIATE OXYGEN THERAPY  [x]   MONITOR SP02/ABG'S AS NEEDED   [x]   PATIENT EDUCATION AS NEEDED    
  Problem: Respiratory - Adult  Goal: Achieves optimal ventilation and oxygenation  8/9/2024 0833 by Gilma Whitmore RCP  Outcome: Progressing  8/9/2024 0718 by Karime Zamarripa RN  Outcome: Progressing  8/8/2024 2145 by Stephani Gonzalez, RN  Outcome: Progressing   BRONCHOSPASM/BRONCHOCONSTRICTION     [x]         IMPROVE AERATION/BREATH SOUNDS  [x]   ADMINISTER BRONCHODILATOR THERAPY AS APPROPRIATE  [x]   ASSESS BREATH SOUNDS  []   IMPLEMENT AEROSOL/MDI PROTOCOL  [x]   PATIENT EDUCATION AS NEEDED    
  Problem: Respiratory - Adult  Goal: Achieves optimal ventilation and oxygenation  Outcome: Progressing     
  Problem: Respiratory - Adult  Goal: Achieves optimal ventilation and oxygenation  Outcome: Progressing     Problem: Pain  Goal: Verbalizes/displays adequate comfort level or baseline comfort level  Outcome: Progressing     Problem: ABCDS Injury Assessment  Goal: Absence of physical injury  Outcome: Progressing     Problem: Safety - Adult  Goal: Free from fall injury  Outcome: Progressing     
BRONCHOSPASM/BRONCHOCONSTRICTION     [x]         IMPROVE AERATION/BREATH SOUNDS  [x]   ADMINISTER BRONCHODILATOR THERAPY AS APPROPRIATE  [x]   ASSESS BREATH SOUNDS  []   IMPLEMENT AEROSOL/MDI PROTOCOL  [x]   PATIENT EDUCATION AS NEEDED      PROVIDE ADEQUATE OXYGENATION WITH ACCEPTABLE SP02/ABG'S    [x]  IDENTIFY APPROPRIATE OXYGEN THERAPY  [x]   MONITOR SP02/ABG'S AS NEEDED   [x]   PATIENT EDUCATION AS NEEDED    
BRONCHOSPASM/BRONCHOCONSTRICTION     [x]         IMPROVE AERATION/BREATH SOUNDS  [x]   ADMINISTER BRONCHODILATOR THERAPY AS APPROPRIATE  [x]   ASSESS BREATH SOUNDS  []   IMPLEMENT AEROSOL/MDI PROTOCOL  [x]   PATIENT EDUCATION AS NEEDED  PROVIDE ADEQUATE OXYGENATION WITH ACCEPTABLE SP02/ABG'S    [x]  IDENTIFY APPROPRIATE OXYGEN THERAPY  [x]   MONITOR SP02/ABG'S AS NEEDED   [x]   PATIENT EDUCATION AS NEEDED    
Orthopedic surgery plan of care note      Orthopedic surgery plan for definitive fixation on 8/6 with Dr. Martell for definitive fixation.  Patient will be n.p.o. on 8/6 at 0015.  Ancef on-call the OR.  Please page orthopedic surgery resident on-call with any questions or concerns.    Dameon Frye MD  Orthopedic Surgery Resident, PGY-3  [place], [place]    
Patient in no apparent distress at this time.  No falls or new injuries noted.  Complaints of pain addressed with ordered scheduled and prn meds.  Call light left within reach.    
RN  Outcome: Progressing  8/13/2024 0030 by Mayi Bonds RN  Outcome: Progressing     Problem: Chronic Conditions and Co-morbidities  Goal: Patient's chronic conditions and co-morbidity symptoms are monitored and maintained or improved  8/13/2024 1402 by Heidi Roche RN  Outcome: Progressing  8/13/2024 0715 by Heidi Roche RN  Outcome: Progressing  8/13/2024 0030 by Mayi Bonds RN  Outcome: Progressing     Problem: Nutrition Deficit:  Goal: Optimize nutritional status  8/13/2024 1402 by Heidi Roche RN  Outcome: Progressing  8/13/2024 0715 by Heidi Roche RN  Outcome: Progressing  8/13/2024 0030 by Mayi Bonds RN  Outcome: Progressing     
RN)  Absence of Physical Injury: Implement safety measures based on patient assessment  8/8/2024 2145 by Stephani Gonzalez RN  Outcome: Progressing     Problem: Skin/Tissue Integrity  Goal: Absence of new skin breakdown  Description: 1.  Monitor for areas of redness and/or skin breakdown  2.  Assess vascular access sites hourly  3.  Every 4-6 hours minimum:  Change oxygen saturation probe site  4.  Every 4-6 hours:  If on nasal continuous positive airway pressure, respiratory therapy assess nares and determine need for appliance change or resting period.  8/9/2024 0718 by Karime Zamarripa RN  Outcome: Progressing  8/8/2024 2145 by Stephani Gonzalez RN  Outcome: Progressing     Problem: Safety - Adult  Goal: Free from fall injury  8/9/2024 0718 by Karime Zamarripa RN  Outcome: Progressing  Flowsheets (Taken 8/8/2024 2146 by Stephani Gonzalez RN)  Free From Fall Injury: Instruct family/caregiver on patient safety  8/8/2024 2145 by Stephani Gonzalez RN  Outcome: Progressing     Problem: Chronic Conditions and Co-morbidities  Goal: Patient's chronic conditions and co-morbidity symptoms are monitored and maintained or improved  8/9/2024 0718 by Karime Zamarripa RN  Outcome: Progressing  8/8/2024 2145 by Stephani Gonzalez RN  Outcome: Progressing  Flowsheets  Taken 8/8/2024 2050 by Stephani Gonzalez RN  Care Plan - Patient's Chronic Conditions and Co-Morbidity Symptoms are Monitored and Maintained or Improved: Monitor and assess patient's chronic conditions and comorbid symptoms for stability, deterioration, or improvement  Taken 8/8/2024 0800 by Fritsch, Sarah, RN  Care Plan - Patient's Chronic Conditions and Co-Morbidity Symptoms are Monitored and Maintained or Improved: Monitor and assess patient's chronic conditions and comorbid symptoms for stability, deterioration, or improvement     
patient's chronic conditions and comorbid symptoms for stability, deterioration, or improvement  Taken 8/8/2024 0800 by Fritsch, Sarah, RN  Care Plan - Patient's Chronic Conditions and Co-Morbidity Symptoms are Monitored and Maintained or Improved: Monitor and assess patient's chronic conditions and comorbid symptoms for stability, deterioration, or improvement     
respiratory therapy assess nares and determine need for appliance change or resting period.  Outcome: Progressing     Problem: Safety - Adult  Goal: Free from fall injury  Outcome: Progressing     Problem: Chronic Conditions and Co-morbidities  Goal: Patient's chronic conditions and co-morbidity symptoms are monitored and maintained or improved  Outcome: Progressing  Flowsheets  Taken 8/9/2024 2000 by Stephani Gonzalez, RN  Care Plan - Patient's Chronic Conditions and Co-Morbidity Symptoms are Monitored and Maintained or Improved:   Monitor and assess patient's chronic conditions and comorbid symptoms for stability, deterioration, or improvement   Collaborate with multidisciplinary team to address chronic and comorbid conditions and prevent exacerbation or deterioration  Taken 8/9/2024 0800 by Karime Zamarripa, RN  Care Plan - Patient's Chronic Conditions and Co-Morbidity Symptoms are Monitored and Maintained or Improved: Monitor and assess patient's chronic conditions and comorbid symptoms for stability, deterioration, or improvement

## 2024-08-21 ENCOUNTER — TELEPHONE (OUTPATIENT)
Dept: ORTHOPEDIC SURGERY | Age: 59
End: 2024-08-21

## 2024-08-21 DIAGNOSIS — S22.20XA CLOSED FRACTURE OF STERNUM, UNSPECIFIED PORTION OF STERNUM, INITIAL ENCOUNTER: Primary | ICD-10-CM

## 2024-08-21 DIAGNOSIS — S22.20XD CLOSED FRACTURE OF STERNUM WITH ROUTINE HEALING, UNSPECIFIED PORTION OF STERNUM, SUBSEQUENT ENCOUNTER: Primary | ICD-10-CM

## 2024-08-21 RX ORDER — HYDROCODONE BITARTRATE AND ACETAMINOPHEN 5; 325 MG/1; MG/1
1 TABLET ORAL EVERY 6 HOURS PRN
Qty: 40 TABLET | Refills: 0 | Status: SHIPPED | OUTPATIENT
Start: 2024-08-21 | End: 2024-08-21

## 2024-08-21 RX ORDER — HYDROCODONE BITARTRATE AND ACETAMINOPHEN 10; 325 MG/1; MG/1
1 TABLET ORAL EVERY 4 HOURS PRN
Qty: 40 TABLET | Refills: 0 | Status: SHIPPED | OUTPATIENT
Start: 2024-08-21 | End: 2024-08-31

## 2024-08-21 NOTE — TELEPHONE ENCOUNTER
Anaid with [facility] called to reschedule appointment due to transportation. Patient can come early morning on Mondays or Tuesday. If not please contact Anaid back to work something in for pt [phone]

## 2024-08-22 ENCOUNTER — OUTSIDE SERVICES (OUTPATIENT)
Dept: PRIMARY CARE CLINIC | Age: 59
End: 2024-08-22

## 2024-08-22 DIAGNOSIS — I82.629 ACUTE DEEP VEIN THROMBOSIS (DVT) OF UPPER EXTREMITY, UNSPECIFIED LATERALITY, UNSPECIFIED VEIN (HCC): ICD-10-CM

## 2024-08-22 DIAGNOSIS — D68.51 FACTOR V LEIDEN (HCC): ICD-10-CM

## 2024-08-22 DIAGNOSIS — S32.421A: ICD-10-CM

## 2024-08-22 DIAGNOSIS — S29.8XXA BLUNT TRAUMA TO CHEST, INITIAL ENCOUNTER: Primary | ICD-10-CM

## 2024-08-22 ASSESSMENT — ENCOUNTER SYMPTOMS
SHORTNESS OF BREATH: 0
VOMITING: 0
DIARRHEA: 0
COUGH: 0
NAUSEA: 0

## 2024-08-22 NOTE — ASSESSMENT & PLAN NOTE
S/p surgery- pt c/o severe pain in hip, and leg and into foot.  Pain was 10/10 on admission and continues to be 10 in spite of max dose of oxycodone. Changed to norco with not much more improvement 8-10/10.  Will try some dilaudid for a couple of days until hip pain is improved.
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
## 2024-08-26 ENCOUNTER — TELEPHONE (OUTPATIENT)
Dept: ORTHOPEDIC SURGERY | Age: 59
End: 2024-08-26

## 2024-08-26 LAB
ABO/RH: NORMAL
ANTIBODY SCREEN: NEGATIVE
ARM BAND NUMBER: NORMAL
BLOOD BANK BLOOD PRODUCT EXPIRATION DATE: NORMAL
BLOOD BANK DISPENSE STATUS: NORMAL
BLOOD BANK ISBT PRODUCT BLOOD TYPE: 5100
BLOOD BANK PRODUCT CODE: NORMAL
BLOOD BANK SAMPLE EXPIRATION: NORMAL
BLOOD BANK UNIT TYPE AND RH: NORMAL
BPU ID: NORMAL
COMPONENT: NORMAL
CROSSMATCH RESULT: NORMAL
TRANSFUSION STATUS: NORMAL
UNIT DIVISION: 0
UNIT ISSUE DATE/TIME: NORMAL

## 2024-08-26 NOTE — TELEPHONE ENCOUNTER
Tasha with [facility] called to notify that they would be unable to take pt to her 9:00 AM appt for post-op with Dr. Martell today 8/26 due to pt needing to travel by stretcher.     They are open to rescheduling at either office location, but unable to schedule her before October. Please contact to schedule post-op. Phone: Call [phone] and \"ask for her nurse.\"

## 2024-08-27 ENCOUNTER — OUTSIDE SERVICES (OUTPATIENT)
Dept: PRIMARY CARE CLINIC | Age: 59
End: 2024-08-27

## 2024-08-27 DIAGNOSIS — D68.51 FACTOR V LEIDEN (HCC): ICD-10-CM

## 2024-08-27 DIAGNOSIS — S29.8XXD BLUNT TRAUMA TO CHEST, SUBSEQUENT ENCOUNTER: Primary | ICD-10-CM

## 2024-08-27 DIAGNOSIS — S32.421A: ICD-10-CM

## 2024-08-27 ASSESSMENT — ENCOUNTER SYMPTOMS
SHORTNESS OF BREATH: 0
VOMITING: 0
DIARRHEA: 0
COUGH: 0
NAUSEA: 0
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
## 2024-08-27 NOTE — ASSESSMENT & PLAN NOTE
Still with a lot of pain but is better controlled.  Continue same meds and decrease as tolerated.

## 2024-09-03 ENCOUNTER — OUTSIDE SERVICES (OUTPATIENT)
Dept: PRIMARY CARE CLINIC | Age: 59
End: 2024-09-03

## 2024-09-03 DIAGNOSIS — F41.1 GENERALIZED ANXIETY DISORDER: ICD-10-CM

## 2024-09-03 DIAGNOSIS — S32.421A: Primary | ICD-10-CM

## 2024-09-03 DIAGNOSIS — D68.51 FACTOR V LEIDEN (HCC): ICD-10-CM

## 2024-09-03 DIAGNOSIS — E11.42 TYPE 2 DIABETES MELLITUS WITH DIABETIC POLYNEUROPATHY, WITHOUT LONG-TERM CURRENT USE OF INSULIN (HCC): Chronic | ICD-10-CM

## 2024-09-03 ASSESSMENT — ENCOUNTER SYMPTOMS
NAUSEA: 0
VOMITING: 0
DIARRHEA: 0
SHORTNESS OF BREATH: 0
COUGH: 0

## 2024-09-03 NOTE — ASSESSMENT & PLAN NOTE
May want to consider cymbalta for her pain if the gabapentin does not help or she has side effects.  That may help her anxiety as well.

## 2024-09-03 NOTE — PROGRESS NOTES
Vesna Valencia is a 59 y.o. female being seen for her weekly follow up.  Location of visit: [facility]    HPI:  New today:  Pt still c/o pain.  Tried to go 8 hours with out taking the pain meds and states her pain got a lot worse.  No SOB or chest pain.  Pain is all in her leg        Review of Systems   Constitutional:  Negative for activity change, appetite change, chills, diaphoresis and fever.   HENT:  Negative for congestion.    Respiratory:  Negative for cough and shortness of breath.    Cardiovascular:  Negative for chest pain and palpitations.   Gastrointestinal:  Negative for diarrhea, nausea and vomiting.   Genitourinary:  Negative for hematuria.   Musculoskeletal:  Positive for arthralgias. Negative for myalgias.   Skin:  Negative for rash.   Neurological:  Negative for weakness and headaches.   Psychiatric/Behavioral:  Negative for agitation, dysphoric mood and sleep disturbance. The patient is not nervous/anxious.           Physical Exam  Vitals reviewed.   Constitutional:       General: She is not in acute distress.  HENT:      Head: Normocephalic and atraumatic.      Nose: No congestion or rhinorrhea.   Eyes:      General:         Right eye: No discharge.         Left eye: No discharge.   Cardiovascular:      Rate and Rhythm: Normal rate and regular rhythm.   Pulmonary:      Effort: Pulmonary effort is normal. No respiratory distress.      Breath sounds: Normal breath sounds.   Abdominal:      Palpations: Abdomen is soft.      Tenderness: There is no abdominal tenderness.   Musculoskeletal:      Right lower leg: No edema.      Left lower leg: No edema.   Skin:     General: Skin is warm and dry.   Neurological:      Mental Status: She is alert. Mental status is at baseline.          ASSESSMENT:   Diagnosis Orders   1. Displaced fracture of posterior wall of right acetabulum, initial encounter for closed fracture ([facility])        2. Factor V Leiden ([facility])        3. Generalized anxiety disorder

## 2024-09-03 NOTE — ASSESSMENT & PLAN NOTE
Continues to have pain even with dilaudid around the clock. Pt states she tried to go without it overnight, but pain got so much worse.  Will add gabapentin at night and see if that helps. Watch for side effects.  Increase as tolerated.

## 2024-09-04 PROBLEM — R79.89 ELEVATED TROPONIN: Status: RESOLVED | Noted: 2024-08-05 | Resolved: 2024-09-04

## 2024-09-04 PROBLEM — Z01.818 PRE-OP EVALUATION: Status: RESOLVED | Noted: 2024-08-05 | Resolved: 2024-09-04

## 2024-09-09 ENCOUNTER — OFFICE VISIT (OUTPATIENT)
Dept: ORTHOPEDIC SURGERY | Age: 59
End: 2024-09-09

## 2024-09-09 VITALS — BODY MASS INDEX: 42.52 KG/M2 | RESPIRATION RATE: 14 BRPM | HEIGHT: 63 IN | WEIGHT: 240 LBS

## 2024-09-09 DIAGNOSIS — H57.89 EYE LUMP: ICD-10-CM

## 2024-09-09 DIAGNOSIS — M79.671 RIGHT FOOT PAIN: ICD-10-CM

## 2024-09-09 DIAGNOSIS — M25.551 RIGHT HIP PAIN: Primary | ICD-10-CM

## 2024-09-09 PROCEDURE — 99024 POSTOP FOLLOW-UP VISIT: CPT | Performed by: STUDENT IN AN ORGANIZED HEALTH CARE EDUCATION/TRAINING PROGRAM

## 2024-09-10 ENCOUNTER — OUTSIDE SERVICES (OUTPATIENT)
Dept: PRIMARY CARE CLINIC | Age: 59
End: 2024-09-10

## 2024-09-10 DIAGNOSIS — S32.421A: Primary | ICD-10-CM

## 2024-09-10 DIAGNOSIS — I10 ESSENTIAL HYPERTENSION: ICD-10-CM

## 2024-09-10 ASSESSMENT — ENCOUNTER SYMPTOMS
DIARRHEA: 0
COUGH: 0
NAUSEA: 0
SHORTNESS OF BREATH: 0
VOMITING: 0

## 2024-09-11 DIAGNOSIS — S32.421A: Primary | ICD-10-CM

## 2024-09-11 RX ORDER — HYDROMORPHONE HYDROCHLORIDE 2 MG/1
1 TABLET ORAL EVERY 4 HOURS PRN
Qty: 40 TABLET | Refills: 0 | Status: SHIPPED | OUTPATIENT
Start: 2024-09-11 | End: 2024-10-11

## 2024-09-13 ENCOUNTER — OUTSIDE SERVICES (OUTPATIENT)
Dept: PRIMARY CARE CLINIC | Age: 59
End: 2024-09-13

## 2024-09-13 DIAGNOSIS — S32.421A: Primary | ICD-10-CM

## 2024-09-13 DIAGNOSIS — E11.42 TYPE 2 DIABETES MELLITUS WITH DIABETIC POLYNEUROPATHY, WITHOUT LONG-TERM CURRENT USE OF INSULIN (HCC): ICD-10-CM

## 2024-09-13 ASSESSMENT — ENCOUNTER SYMPTOMS
COUGH: 0
ABDOMINAL PAIN: 0
RHINORRHEA: 0
NAUSEA: 0
VOMITING: 0
WHEEZING: 0
EYE REDNESS: 0
SORE THROAT: 0
EYE DISCHARGE: 0
SHORTNESS OF BREATH: 0
DIARRHEA: 0
BACK PAIN: 1

## 2024-09-17 ENCOUNTER — OUTSIDE SERVICES (OUTPATIENT)
Dept: PRIMARY CARE CLINIC | Age: 59
End: 2024-09-17

## 2024-09-17 DIAGNOSIS — E11.42 TYPE 2 DIABETES MELLITUS WITH DIABETIC POLYNEUROPATHY, WITHOUT LONG-TERM CURRENT USE OF INSULIN (HCC): ICD-10-CM

## 2024-09-17 DIAGNOSIS — S32.421D DISPLACED FRACTURE OF POSTERIOR WALL OF RIGHT ACETABULUM, SUBSEQUENT ENCOUNTER FOR FRACTURE WITH ROUTINE HEALING: Primary | ICD-10-CM

## 2024-09-17 ASSESSMENT — ENCOUNTER SYMPTOMS
COUGH: 0
SORE THROAT: 0
VOMITING: 0
NAUSEA: 0
ABDOMINAL PAIN: 0
EYE DISCHARGE: 0
SHORTNESS OF BREATH: 0
EYE REDNESS: 0
RHINORRHEA: 0
DIARRHEA: 0
WHEEZING: 0

## 2024-09-19 ENCOUNTER — CARE COORDINATION (OUTPATIENT)
Dept: CASE MANAGEMENT | Age: 59
End: 2024-09-19

## 2024-09-20 ENCOUNTER — CARE COORDINATION (OUTPATIENT)
Dept: CASE MANAGEMENT | Age: 59
End: 2024-09-20

## 2024-11-11 ENCOUNTER — OFFICE VISIT (OUTPATIENT)
Dept: ORTHOPEDIC SURGERY | Age: 59
End: 2024-11-11
Payer: MEDICARE

## 2024-11-11 VITALS — WEIGHT: 192 LBS | RESPIRATION RATE: 14 BRPM | BODY MASS INDEX: 34.02 KG/M2 | HEIGHT: 63 IN

## 2024-11-11 DIAGNOSIS — M25.551 RIGHT HIP PAIN: Primary | ICD-10-CM

## 2024-11-11 PROCEDURE — 99214 OFFICE O/P EST MOD 30 MIN: CPT | Performed by: STUDENT IN AN ORGANIZED HEALTH CARE EDUCATION/TRAINING PROGRAM

## 2024-11-11 NOTE — PROGRESS NOTES
created with the assistance of a speech recognition program.  While intending to generate a document that actually reflects the content of the visit, the document can still have some errors including those of syntax and sound a like substitutions which may escape proof reading.  In such instances, actual meaning can be extrapolated by contextual diversion

## 2024-12-10 SDOH — HEALTH STABILITY: PHYSICAL HEALTH: ON AVERAGE, HOW MANY DAYS PER WEEK DO YOU ENGAGE IN MODERATE TO STRENUOUS EXERCISE (LIKE A BRISK WALK)?: 0 DAYS

## 2024-12-13 ENCOUNTER — OFFICE VISIT (OUTPATIENT)
Dept: ORTHOPEDIC SURGERY | Age: 59
End: 2024-12-13
Payer: MEDICARE

## 2024-12-13 VITALS — HEIGHT: 63 IN | WEIGHT: 192 LBS | RESPIRATION RATE: 14 BRPM | BODY MASS INDEX: 34.02 KG/M2

## 2024-12-13 DIAGNOSIS — R93.7 ABNORMAL FINDINGS ON DIAGNOSTIC IMAGING OF OTHER PARTS OF MUSCULOSKELETAL SYSTEM: ICD-10-CM

## 2024-12-13 DIAGNOSIS — M75.21 BICEPS TENDINITIS OF RIGHT SHOULDER: Primary | ICD-10-CM

## 2024-12-13 PROCEDURE — 99214 OFFICE O/P EST MOD 30 MIN: CPT | Performed by: ORTHOPAEDIC SURGERY

## 2024-12-13 NOTE — PROGRESS NOTES
HPI: Ms. Valencia is a 59-year-old lady here today for reevaluation of her left shoulder.  When she was last seen she was diagnosed with a biceps tendinitis and referred for an ultrasound-guided cortisone injection.  She states that she received the injection and had complete resolution of her pain up until about a month ago when the pain returned and has gradually gotten worse.  She is primarily painful along the anterior trochanter shoulder on exam is tender to palpation along the biceps tendon in the bicipital groove.  She has limited active range of motion due to pain but does have full passive range of motion.  We had a discussion about treatment options moving forward.  She is interested in surgery.  I believe she is a candidate for an arthroscopic possible open left shoulder biceps tenodesis.  We discussed the details of the procedure, risks and benefits of surgery, expected outcome and postoperative recovery course.  Risks as discussed included were not limited to risk of infection, wound healing problems, persistent pain, stiffness, neurovascular injury, failure of the biceps tendon that he will, suture and/or anchor related problems, and anesthesia.  She demonstrated good understanding of our discussion and would like to proceed with surgery.  All questions were answered.  We will schedule her for surgery at her convenience and facilitate her getting appropriate preoperative medical clearance.

## 2024-12-16 ENCOUNTER — PREP FOR PROCEDURE (OUTPATIENT)
Dept: ORTHOPEDIC SURGERY | Age: 59
End: 2024-12-16

## 2024-12-16 ENCOUNTER — TELEPHONE (OUTPATIENT)
Dept: ORTHOPEDIC SURGERY | Age: 59
End: 2024-12-16

## 2024-12-16 DIAGNOSIS — M75.22 BICEPS TENDONITIS ON LEFT: Primary | ICD-10-CM

## 2024-12-16 DIAGNOSIS — M75.22 BICEPS TENDONITIS, LEFT: ICD-10-CM

## 2024-12-16 NOTE — TELEPHONE ENCOUNTER
Patient called back and scheduled surgery date.  Surgery information including dates and times mailed to patient.

## 2024-12-16 NOTE — TELEPHONE ENCOUNTER
Attempted to call patient to discuss surgery scheduling.  Call went to voicemail and mailbox is full.

## 2025-01-22 ENCOUNTER — HOSPITAL ENCOUNTER (OUTPATIENT)
Age: 60
Discharge: HOME | End: 2025-01-26
Payer: MEDICARE

## 2025-01-22 VITALS
HEIGHT: 63 IN | BODY MASS INDEX: 36.32 KG/M2 | OXYGEN SATURATION: 97 % | WEIGHT: 205 LBS | RESPIRATION RATE: 18 BRPM | DIASTOLIC BLOOD PRESSURE: 83 MMHG | TEMPERATURE: 97.2 F | HEART RATE: 71 BPM | SYSTOLIC BLOOD PRESSURE: 131 MMHG

## 2025-01-22 DIAGNOSIS — R93.7 ABNORMAL FINDINGS ON DIAGNOSTIC IMAGING OF OTHER PARTS OF MUSCULOSKELETAL SYSTEM: ICD-10-CM

## 2025-01-22 DIAGNOSIS — M75.21 BICEPS TENDINITIS OF RIGHT SHOULDER: ICD-10-CM

## 2025-01-22 LAB
ANION GAP SERPL CALCULATED.3IONS-SCNC: 11 MMOL/L (ref 9–16)
BUN SERPL-MCNC: 15 MG/DL (ref 6–20)
CALCIUM SERPL-MCNC: 9.8 MG/DL (ref 8.6–10.4)
CHLORIDE SERPL-SCNC: 105 MMOL/L (ref 98–107)
CO2 SERPL-SCNC: 29 MMOL/L (ref 20–31)
CREAT SERPL-MCNC: 0.8 MG/DL (ref 0.6–0.9)
ERYTHROCYTE [DISTWIDTH] IN BLOOD BY AUTOMATED COUNT: 14.4 % (ref 11.8–14.4)
GFR, ESTIMATED: 85 ML/MIN/1.73M2
GLUCOSE SERPL-MCNC: 94 MG/DL (ref 74–99)
HCT VFR BLD AUTO: 41.1 % (ref 36.3–47.1)
HGB BLD-MCNC: 12.5 G/DL (ref 11.9–15.1)
MCH RBC QN AUTO: 28 PG (ref 25.2–33.5)
MCHC RBC AUTO-ENTMCNC: 30.4 G/DL (ref 28.4–34.8)
MCV RBC AUTO: 92.2 FL (ref 82.6–102.9)
NRBC BLD-RTO: 0 PER 100 WBC
PLATELET # BLD AUTO: 357 K/UL (ref 138–453)
PMV BLD AUTO: 10.4 FL (ref 8.1–13.5)
POTASSIUM SERPL-SCNC: 4 MMOL/L (ref 3.7–5.3)
RBC # BLD AUTO: 4.46 M/UL (ref 3.95–5.11)
SODIUM SERPL-SCNC: 145 MMOL/L (ref 136–145)
WBC OTHER # BLD: 8.3 K/UL (ref 3.5–11.3)

## 2025-01-22 PROCEDURE — 80048 BASIC METABOLIC PNL TOTAL CA: CPT

## 2025-01-22 PROCEDURE — 85027 COMPLETE CBC AUTOMATED: CPT

## 2025-01-22 PROCEDURE — 36415 COLL VENOUS BLD VENIPUNCTURE: CPT

## 2025-01-22 PROCEDURE — 93005 ELECTROCARDIOGRAM TRACING: CPT | Performed by: ANESTHESIOLOGY

## 2025-01-22 ASSESSMENT — PAIN DESCRIPTION - ORIENTATION: ORIENTATION: LEFT

## 2025-01-22 ASSESSMENT — PAIN DESCRIPTION - LOCATION: LOCATION: SHOULDER

## 2025-01-22 ASSESSMENT — PAIN SCALES - GENERAL: PAINLEVEL_OUTOF10: 9

## 2025-01-22 NOTE — DISCHARGE INSTRUCTIONS
DAY OF SURGERY/PROCEDURE  GUIDELINES    As a patient at the [facility], you can expect quality medical and nursing care that is centered on your individual needs. It is our goal to make your surgical experience as comfortable and excellent as possible.  ________________________________________________________________________    The following instructions are general guidelines, if any information on this sheet is different from what your doctor has instructed you to do, please follow your doctor's instructions.    Please arrive on 2/4 @ 1100 am      Enter through entrance C. Check in at registration     Upon arrival you will be taken to the pre-operative area to get ready for surgery, your family will stay in the waiting room and visit with you once you are ready for surgery. Due to special limitations please limit visitation to 1-2 members of your family at a time. When it is time for surgery your family will return to the waiting room.    Nothing to eat, drink, smoke, suck or chew after midnight (no water, gum, mints, cigarettes, cigars, pipes, snuff, chewing tobacco, etc.) or your surgery may be canceled.     Take a shower or bath on the morning of your surgery/procedure (Hibiclens if directed) Do not apply any lotions.    Brush your teeth, but do not swallow any water    IN CASE OF ILLNESS - If you have a cold or flu symptoms (high fever, runny nose, sore throat, cough, etc.) rash, nausea, vomiting, loose stools, and/or recent contact with someone who has a contagious disease (chick pox, measles, etc.) please call your doctor before coming to the surgery center    Take a small sip of water with Keppra, metoprolol,Trelegy inhaler    If applicable bring your:  Inhaler (s)  Hearing aid(s)  Eyeglasses and Case (If you wear contacts they have to be removed before surgery, bring case and solution)    DO NOT take anticoagulants (blood thinners, aspirin or aspirin-containing products) as instructed  by your physician.    Leave all jewelry at home and wear loose, comfortable clothing that is easy to put on and take off.     If you will be returning home the same day as your surgery, you will need to have a responsible adult (18 years of age or older) present to drive you home. You will need someone stay with you at home for the first 24 hours following your surgery. This is due to the anesthesia and the medication given to you during surgery and recovery.

## 2025-01-23 LAB
EKG ATRIAL RATE: 70 BPM
EKG P AXIS: 55 DEGREES
EKG P-R INTERVAL: 188 MS
EKG Q-T INTERVAL: 408 MS
EKG QRS DURATION: 90 MS
EKG QTC CALCULATION (BAZETT): 440 MS
EKG R AXIS: 15 DEGREES
EKG T AXIS: 34 DEGREES
EKG VENTRICULAR RATE: 70 BPM

## 2025-01-23 PROCEDURE — 93010 ELECTROCARDIOGRAM REPORT: CPT | Performed by: INTERNAL MEDICINE

## 2025-01-24 ENCOUNTER — TELEPHONE (OUTPATIENT)
Dept: ORTHOPEDIC SURGERY | Age: 60
End: 2025-01-24

## 2025-01-24 DIAGNOSIS — M75.22 BICEPS TENDONITIS ON LEFT: Primary | ICD-10-CM

## 2025-01-24 RX ORDER — DICLOFENAC SODIUM 75 MG/1
75 TABLET, DELAYED RELEASE ORAL 2 TIMES DAILY
Qty: 28 TABLET | Refills: 0 | Status: SHIPPED | OUTPATIENT
Start: 2025-01-24 | End: 2025-02-07

## 2025-01-24 NOTE — TELEPHONE ENCOUNTER
Pt has shoulder surgery scheduled for 2/4/25, she is calling today because her pain is getting worse. She would like medication called into [facility] in [place] if possible. Please advise

## 2025-01-24 NOTE — TELEPHONE ENCOUNTER
Attempted to contact patient to notify script was sent but was unable to leave a voicemail message due to mail box is full.

## 2025-01-31 ENCOUNTER — OFFICE VISIT (OUTPATIENT)
Dept: ORTHOPEDIC SURGERY | Age: 60
End: 2025-01-31

## 2025-01-31 VITALS — BODY MASS INDEX: 36.32 KG/M2 | WEIGHT: 205 LBS | HEIGHT: 63 IN | RESPIRATION RATE: 14 BRPM

## 2025-01-31 DIAGNOSIS — M75.22 BICEPS TENDONITIS ON LEFT: Primary | ICD-10-CM

## 2025-01-31 PROCEDURE — PREOPEXAM PRE-OP EXAM: Performed by: ORTHOPAEDIC SURGERY

## 2025-01-31 RX ORDER — SODIUM CHLORIDE 0.9 % (FLUSH) 0.9 %
5-40 SYRINGE (ML) INJECTION PRN
OUTPATIENT
Start: 2025-01-31

## 2025-01-31 RX ORDER — SODIUM CHLORIDE 0.9 % (FLUSH) 0.9 %
5-40 SYRINGE (ML) INJECTION EVERY 12 HOURS SCHEDULED
OUTPATIENT
Start: 2025-01-31

## 2025-01-31 RX ORDER — HYDROCODONE BITARTRATE AND ACETAMINOPHEN 5; 325 MG/1; MG/1
1 TABLET ORAL EVERY 4 HOURS PRN
Qty: 42 TABLET | Refills: 0 | Status: SHIPPED | OUTPATIENT
Start: 2025-01-31 | End: 2025-01-31 | Stop reason: CLARIF

## 2025-01-31 RX ORDER — ACETAMINOPHEN 325 MG/1
1000 TABLET ORAL ONCE
OUTPATIENT
Start: 2025-01-31 | End: 2025-01-31

## 2025-01-31 RX ORDER — OXYCODONE AND ACETAMINOPHEN 5; 325 MG/1; MG/1
1 TABLET ORAL EVERY 6 HOURS PRN
Qty: 28 TABLET | Refills: 0 | Status: SHIPPED | OUTPATIENT
Start: 2025-01-31 | End: 2025-02-07

## 2025-01-31 RX ORDER — SODIUM CHLORIDE 9 MG/ML
INJECTION, SOLUTION INTRAVENOUS PRN
OUTPATIENT
Start: 2025-01-31

## 2025-01-31 RX ORDER — ONDANSETRON 4 MG/1
4 TABLET, FILM COATED ORAL DAILY PRN
Qty: 20 TABLET | Refills: 0 | Status: SHIPPED | OUTPATIENT
Start: 2025-01-31

## 2025-01-31 NOTE — PROGRESS NOTES
ORTHOPEDIC PREOP H and P      HPI / Chief Complaint  Vesna Valencia is a 59 y.o. female who presents for her preop visit regarding her left shoulder. she has been dealing with pain for quite some time now due to biceps tendinitis and has failed attempts at conservative management with an ultrasound-guided biceps tendon sheath cortisone injection.    Past Medical History  Vesna  has a past medical history of Acid reflux, Arthritis, Asthma, Cancer (HCC), Carotid artery dissection (HCC), Cerebral artery occlusion with cerebral infarction (HCC), Coughing, CPAP (continuous positive airway pressure) dependence, Depression, Diabetes mellitus (HCC), Difficult intubation, Diverticulitis, Environmental allergies, Epilepsy (HCC), Factor 5 Leiden mutation, heterozygous (HCC), Factor V Leiden (HCC), Gastroparesis, Generalized abdominal pain, Hemorrhage of anus and rectum, Hiatal hernia, History of blood transfusion, History of stroke associated with blood clotting tendency, Hx of blood clots, Hx of blood clots, Hyperlipidemia, Hypertension, Hypokalemia, ZACH (iron deficiency anemia), Insomnia, Lung trouble, Migraine, Pre-procedure lab exam, S/P total knee arthroplasty, right, Sacroiliitis (HCC), Sleep apnea, Unspecified cerebral artery occlusion with cerebral infarction, and Wears glasses.    Past Surgical History  Vesna  has a past surgical history that includes Neck surgery (07/2012); Endometrial ablation; laparoscopy; Hysterectomy, total abdominal; Tonsillectomy; Upper gastrointestinal endoscopy; Knee arthroscopy (Left, 4/7/15, 2015); hiatal hernia repair (02/04/2016); Colonoscopy; Colonoscopy (01/22/2016); Colonoscopy (01/05/2017); hernia repair; pr colonoscopy flx dx w/collj spec when pfrmd (N/A, 07/17/2018); Upper gastrointestinal endoscopy (N/A, 07/17/2018); sigmoidoscopy (N/A, 12/18/2018); Upper gastrointestinal endoscopy (N/A, 10/20/2020); Colonoscopy (N/A, 10/20/2020); Total knee arthroplasty (Right, 06/14/2022);

## 2025-02-03 ENCOUNTER — ANESTHESIA EVENT (OUTPATIENT)
Dept: OPERATING ROOM | Age: 60
End: 2025-02-03
Payer: MEDICARE

## 2025-02-04 ENCOUNTER — ANESTHESIA (OUTPATIENT)
Dept: OPERATING ROOM | Age: 60
End: 2025-02-04
Payer: MEDICARE

## 2025-02-04 ENCOUNTER — HOSPITAL ENCOUNTER (OUTPATIENT)
Age: 60
Setting detail: OUTPATIENT SURGERY
Discharge: HOME | End: 2025-02-04
Attending: ORTHOPAEDIC SURGERY | Admitting: ORTHOPAEDIC SURGERY
Payer: MEDICARE

## 2025-02-04 VITALS
TEMPERATURE: 96.9 F | SYSTOLIC BLOOD PRESSURE: 124 MMHG | WEIGHT: 198 LBS | BODY MASS INDEX: 35.08 KG/M2 | OXYGEN SATURATION: 91 % | HEIGHT: 63 IN | DIASTOLIC BLOOD PRESSURE: 76 MMHG | HEART RATE: 67 BPM | RESPIRATION RATE: 15 BRPM

## 2025-02-04 LAB
GLUCOSE BLD-MCNC: 108 MG/DL (ref 65–105)
INR PPP: 1 (ref 0.9–1.2)
PROTHROMBIN TIME: 13.1 SEC (ref 11.8–14.6)

## 2025-02-04 PROCEDURE — 6360000002 HC RX W HCPCS: Performed by: NURSE ANESTHETIST, CERTIFIED REGISTERED

## 2025-02-04 PROCEDURE — 3600000014 HC SURGERY LEVEL 4 ADDTL 15MIN: Performed by: ORTHOPAEDIC SURGERY

## 2025-02-04 PROCEDURE — 6370000000 HC RX 637 (ALT 250 FOR IP): Performed by: STUDENT IN AN ORGANIZED HEALTH CARE EDUCATION/TRAINING PROGRAM

## 2025-02-04 PROCEDURE — 2500000003 HC RX 250 WO HCPCS: Performed by: NURSE ANESTHETIST, CERTIFIED REGISTERED

## 2025-02-04 PROCEDURE — 2500000003 HC RX 250 WO HCPCS: Performed by: ORTHOPAEDIC SURGERY

## 2025-02-04 PROCEDURE — 7100000011 HC PHASE II RECOVERY - ADDTL 15 MIN: Performed by: ORTHOPAEDIC SURGERY

## 2025-02-04 PROCEDURE — 64415 NJX AA&/STRD BRCH PLXS IMG: CPT | Performed by: ANESTHESIOLOGY

## 2025-02-04 PROCEDURE — C1776 JOINT DEVICE (IMPLANTABLE): HCPCS | Performed by: ORTHOPAEDIC SURGERY

## 2025-02-04 PROCEDURE — 85610 PROTHROMBIN TIME: CPT

## 2025-02-04 PROCEDURE — 7100000010 HC PHASE II RECOVERY - FIRST 15 MIN: Performed by: ORTHOPAEDIC SURGERY

## 2025-02-04 PROCEDURE — 3700000000 HC ANESTHESIA ATTENDED CARE: Performed by: ORTHOPAEDIC SURGERY

## 2025-02-04 PROCEDURE — 6360000002 HC RX W HCPCS: Performed by: ANESTHESIOLOGY

## 2025-02-04 PROCEDURE — 6360000002 HC RX W HCPCS: Performed by: ORTHOPAEDIC SURGERY

## 2025-02-04 PROCEDURE — 2709999900 HC NON-CHARGEABLE SUPPLY: Performed by: ORTHOPAEDIC SURGERY

## 2025-02-04 PROCEDURE — 3600000004 HC SURGERY LEVEL 4 BASE: Performed by: ORTHOPAEDIC SURGERY

## 2025-02-04 PROCEDURE — 82947 ASSAY GLUCOSE BLOOD QUANT: CPT

## 2025-02-04 PROCEDURE — 2580000003 HC RX 258: Performed by: STUDENT IN AN ORGANIZED HEALTH CARE EDUCATION/TRAINING PROGRAM

## 2025-02-04 PROCEDURE — 7100000000 HC PACU RECOVERY - FIRST 15 MIN: Performed by: ORTHOPAEDIC SURGERY

## 2025-02-04 PROCEDURE — 2720000010 HC SURG SUPPLY STERILE: Performed by: ORTHOPAEDIC SURGERY

## 2025-02-04 PROCEDURE — 3700000001 HC ADD 15 MINUTES (ANESTHESIA): Performed by: ORTHOPAEDIC SURGERY

## 2025-02-04 PROCEDURE — 2500000003 HC RX 250 WO HCPCS: Performed by: STUDENT IN AN ORGANIZED HEALTH CARE EDUCATION/TRAINING PROGRAM

## 2025-02-04 PROCEDURE — 6360000002 HC RX W HCPCS: Performed by: STUDENT IN AN ORGANIZED HEALTH CARE EDUCATION/TRAINING PROGRAM

## 2025-02-04 PROCEDURE — 6370000000 HC RX 637 (ALT 250 FOR IP): Performed by: ORTHOPAEDIC SURGERY

## 2025-02-04 PROCEDURE — 7100000001 HC PACU RECOVERY - ADDTL 15 MIN: Performed by: ORTHOPAEDIC SURGERY

## 2025-02-04 DEVICE — PROXIMAL TENODESIS IMPLANT SYSTEM REV: 0
Type: IMPLANTABLE DEVICE | Site: SHOULDER | Status: FUNCTIONAL
Brand: ARTHREX®

## 2025-02-04 RX ORDER — SODIUM CHLORIDE 0.9 % (FLUSH) 0.9 %
5-40 SYRINGE (ML) INJECTION EVERY 12 HOURS SCHEDULED
Status: DISCONTINUED | OUTPATIENT
Start: 2025-02-04 | End: 2025-02-04 | Stop reason: HOSPADM

## 2025-02-04 RX ORDER — DEXAMETHASONE SODIUM PHOSPHATE 4 MG/ML
INJECTION, SOLUTION INTRA-ARTICULAR; INTRALESIONAL; INTRAMUSCULAR; INTRAVENOUS; SOFT TISSUE
Status: DISCONTINUED | OUTPATIENT
Start: 2025-02-04 | End: 2025-02-04 | Stop reason: SDUPTHER

## 2025-02-04 RX ORDER — SODIUM CHLORIDE, SODIUM LACTATE, POTASSIUM CHLORIDE, CALCIUM CHLORIDE 600; 310; 30; 20 MG/100ML; MG/100ML; MG/100ML; MG/100ML
INJECTION, SOLUTION INTRAVENOUS CONTINUOUS
Status: DISCONTINUED | OUTPATIENT
Start: 2025-02-04 | End: 2025-02-04 | Stop reason: HOSPADM

## 2025-02-04 RX ORDER — SODIUM CHLORIDE 0.9 % (FLUSH) 0.9 %
5-40 SYRINGE (ML) INJECTION PRN
Status: DISCONTINUED | OUTPATIENT
Start: 2025-02-04 | End: 2025-02-04 | Stop reason: HOSPADM

## 2025-02-04 RX ORDER — DEXAMETHASONE SODIUM PHOSPHATE 10 MG/ML
10 INJECTION, SOLUTION INTRAMUSCULAR; INTRAVENOUS ONCE
Status: DISCONTINUED | OUTPATIENT
Start: 2025-02-04 | End: 2025-02-04 | Stop reason: HOSPADM

## 2025-02-04 RX ORDER — APREPITANT 40 MG/1
40 CAPSULE ORAL ONCE
Status: COMPLETED | OUTPATIENT
Start: 2025-02-04 | End: 2025-02-04

## 2025-02-04 RX ORDER — SODIUM CHLORIDE 9 MG/ML
INJECTION, SOLUTION INTRAVENOUS PRN
Status: DISCONTINUED | OUTPATIENT
Start: 2025-02-04 | End: 2025-02-04 | Stop reason: HOSPADM

## 2025-02-04 RX ORDER — SCOPOLAMINE 1 MG/3D
1 PATCH, EXTENDED RELEASE TRANSDERMAL
Status: DISCONTINUED | OUTPATIENT
Start: 2025-02-04 | End: 2025-02-04 | Stop reason: HOSPADM

## 2025-02-04 RX ORDER — LIDOCAINE HYDROCHLORIDE 10 MG/ML
1 INJECTION, SOLUTION EPIDURAL; INFILTRATION; INTRACAUDAL; PERINEURAL
Status: DISCONTINUED | OUTPATIENT
Start: 2025-02-04 | End: 2025-02-04 | Stop reason: HOSPADM

## 2025-02-04 RX ORDER — ONDANSETRON 2 MG/ML
INJECTION INTRAMUSCULAR; INTRAVENOUS
Status: DISCONTINUED | OUTPATIENT
Start: 2025-02-04 | End: 2025-02-04 | Stop reason: SDUPTHER

## 2025-02-04 RX ORDER — DEXAMETHASONE SODIUM PHOSPHATE 4 MG/ML
INJECTION, SOLUTION INTRA-ARTICULAR; INTRALESIONAL; INTRAMUSCULAR; INTRAVENOUS; SOFT TISSUE
Status: DISCONTINUED
Start: 2025-02-04 | End: 2025-02-04 | Stop reason: HOSPADM

## 2025-02-04 RX ORDER — ROCURONIUM BROMIDE 10 MG/ML
INJECTION, SOLUTION INTRAVENOUS
Status: DISCONTINUED | OUTPATIENT
Start: 2025-02-04 | End: 2025-02-04 | Stop reason: SDUPTHER

## 2025-02-04 RX ORDER — ONDANSETRON 2 MG/ML
4 INJECTION INTRAMUSCULAR; INTRAVENOUS
Status: DISCONTINUED | OUTPATIENT
Start: 2025-02-04 | End: 2025-02-04 | Stop reason: HOSPADM

## 2025-02-04 RX ORDER — HYDRALAZINE HYDROCHLORIDE 20 MG/ML
10 INJECTION INTRAMUSCULAR; INTRAVENOUS
Status: DISCONTINUED | OUTPATIENT
Start: 2025-02-04 | End: 2025-02-04 | Stop reason: HOSPADM

## 2025-02-04 RX ORDER — OXYCODONE HYDROCHLORIDE 5 MG/1
5 TABLET ORAL PRN
Status: DISCONTINUED | OUTPATIENT
Start: 2025-02-04 | End: 2025-02-04 | Stop reason: HOSPADM

## 2025-02-04 RX ORDER — PROPOFOL 10 MG/ML
INJECTION, EMULSION INTRAVENOUS
Status: DISCONTINUED | OUTPATIENT
Start: 2025-02-04 | End: 2025-02-04 | Stop reason: SDUPTHER

## 2025-02-04 RX ORDER — NALOXONE HYDROCHLORIDE 0.4 MG/ML
INJECTION, SOLUTION INTRAMUSCULAR; INTRAVENOUS; SUBCUTANEOUS PRN
Status: DISCONTINUED | OUTPATIENT
Start: 2025-02-04 | End: 2025-02-04 | Stop reason: HOSPADM

## 2025-02-04 RX ORDER — DEXAMETHASONE SODIUM PHOSPHATE 10 MG/ML
INJECTION, SOLUTION INTRAMUSCULAR; INTRAVENOUS
Status: COMPLETED | OUTPATIENT
Start: 2025-02-04 | End: 2025-02-04

## 2025-02-04 RX ORDER — LABETALOL HYDROCHLORIDE 5 MG/ML
10 INJECTION, SOLUTION INTRAVENOUS
Status: DISCONTINUED | OUTPATIENT
Start: 2025-02-04 | End: 2025-02-04 | Stop reason: HOSPADM

## 2025-02-04 RX ORDER — METOCLOPRAMIDE HYDROCHLORIDE 5 MG/ML
10 INJECTION INTRAMUSCULAR; INTRAVENOUS
Status: DISCONTINUED | OUTPATIENT
Start: 2025-02-04 | End: 2025-02-04 | Stop reason: HOSPADM

## 2025-02-04 RX ORDER — ROPIVACAINE HYDROCHLORIDE 5 MG/ML
INJECTION, SOLUTION EPIDURAL; INFILTRATION; PERINEURAL
Status: COMPLETED | OUTPATIENT
Start: 2025-02-04 | End: 2025-02-04

## 2025-02-04 RX ORDER — LIDOCAINE HYDROCHLORIDE 10 MG/ML
INJECTION, SOLUTION EPIDURAL; INFILTRATION; INTRACAUDAL; PERINEURAL
Status: DISCONTINUED | OUTPATIENT
Start: 2025-02-04 | End: 2025-02-04 | Stop reason: SDUPTHER

## 2025-02-04 RX ORDER — ACETAMINOPHEN 500 MG
1000 TABLET ORAL ONCE
Status: COMPLETED | OUTPATIENT
Start: 2025-02-04 | End: 2025-02-04

## 2025-02-04 RX ORDER — SEVOFLURANE 250 ML/250ML
LIQUID RESPIRATORY (INHALATION)
Status: DISCONTINUED
Start: 2025-02-04 | End: 2025-02-04 | Stop reason: HOSPADM

## 2025-02-04 RX ORDER — MIDAZOLAM HYDROCHLORIDE 2 MG/2ML
2 INJECTION, SOLUTION INTRAMUSCULAR; INTRAVENOUS
Status: DISCONTINUED | OUTPATIENT
Start: 2025-02-04 | End: 2025-02-04 | Stop reason: HOSPADM

## 2025-02-04 RX ORDER — MIDAZOLAM HYDROCHLORIDE 1 MG/ML
INJECTION, SOLUTION INTRAMUSCULAR; INTRAVENOUS
Status: COMPLETED | OUTPATIENT
Start: 2025-02-04 | End: 2025-02-04

## 2025-02-04 RX ORDER — DIPHENHYDRAMINE HYDROCHLORIDE 50 MG/ML
12.5 INJECTION INTRAMUSCULAR; INTRAVENOUS
Status: DISCONTINUED | OUTPATIENT
Start: 2025-02-04 | End: 2025-02-04 | Stop reason: HOSPADM

## 2025-02-04 RX ORDER — MEPERIDINE HYDROCHLORIDE 50 MG/ML
12.5 INJECTION INTRAMUSCULAR; INTRAVENOUS; SUBCUTANEOUS ONCE
Status: DISCONTINUED | OUTPATIENT
Start: 2025-02-04 | End: 2025-02-04 | Stop reason: HOSPADM

## 2025-02-04 RX ORDER — DEXAMETHASONE SODIUM PHOSPHATE 4 MG/ML
INJECTION, SOLUTION INTRA-ARTICULAR; INTRALESIONAL; INTRAMUSCULAR; INTRAVENOUS; SOFT TISSUE
Status: COMPLETED
Start: 2025-02-04 | End: 2025-02-04

## 2025-02-04 RX ORDER — DEXMEDETOMIDINE HYDROCHLORIDE 100 UG/ML
INJECTION, SOLUTION INTRAVENOUS
Status: DISCONTINUED | OUTPATIENT
Start: 2025-02-04 | End: 2025-02-04 | Stop reason: SDUPTHER

## 2025-02-04 RX ORDER — OXYCODONE HYDROCHLORIDE 5 MG/1
10 TABLET ORAL PRN
Status: DISCONTINUED | OUTPATIENT
Start: 2025-02-04 | End: 2025-02-04 | Stop reason: HOSPADM

## 2025-02-04 RX ORDER — ENOXAPARIN SODIUM 300 MG/3ML
INJECTION INTRAVENOUS; SUBCUTANEOUS 2 TIMES DAILY
COMMUNITY

## 2025-02-04 RX ORDER — MIDAZOLAM HYDROCHLORIDE 2 MG/2ML
2 INJECTION, SOLUTION INTRAMUSCULAR; INTRAVENOUS
Status: COMPLETED | OUTPATIENT
Start: 2025-02-04 | End: 2025-02-04

## 2025-02-04 RX ORDER — FENTANYL CITRATE 50 UG/ML
100 INJECTION, SOLUTION INTRAMUSCULAR; INTRAVENOUS
Status: COMPLETED | OUTPATIENT
Start: 2025-02-04 | End: 2025-02-04

## 2025-02-04 RX ORDER — EPINEPHRINE 1 MG/ML
INJECTION, SOLUTION INTRAMUSCULAR; SUBCUTANEOUS
Status: DISCONTINUED
Start: 2025-02-04 | End: 2025-02-04 | Stop reason: HOSPADM

## 2025-02-04 RX ORDER — FENTANYL CITRATE 50 UG/ML
INJECTION, SOLUTION INTRAMUSCULAR; INTRAVENOUS
Status: COMPLETED | OUTPATIENT
Start: 2025-02-04 | End: 2025-02-04

## 2025-02-04 RX ADMIN — PROPOFOL 150 MG: 10 INJECTION, EMULSION INTRAVENOUS at 12:25

## 2025-02-04 RX ADMIN — SODIUM CHLORIDE, POTASSIUM CHLORIDE, SODIUM LACTATE AND CALCIUM CHLORIDE: 600; 310; 30; 20 INJECTION, SOLUTION INTRAVENOUS at 10:37

## 2025-02-04 RX ADMIN — Medication 2000 MG: at 12:35

## 2025-02-04 RX ADMIN — ROPIVACAINE HYDROCHLORIDE 15 ML: 5 INJECTION, SOLUTION EPIDURAL; INFILTRATION; PERINEURAL at 11:21

## 2025-02-04 RX ADMIN — FENTANYL CITRATE 100 MCG: 50 INJECTION, SOLUTION INTRAMUSCULAR; INTRAVENOUS at 11:21

## 2025-02-04 RX ADMIN — ACETAMINOPHEN 1000 MG: 500 TABLET ORAL at 10:27

## 2025-02-04 RX ADMIN — FENTANYL CITRATE 25 MCG: 50 INJECTION, SOLUTION INTRAMUSCULAR; INTRAVENOUS at 12:38

## 2025-02-04 RX ADMIN — LIDOCAINE HYDROCHLORIDE 50 MG: 10 INJECTION, SOLUTION EPIDURAL; INFILTRATION; INTRACAUDAL; PERINEURAL at 12:25

## 2025-02-04 RX ADMIN — DEXMEDETOMIDINE HCL 12 MCG: 100 INJECTION INTRAVENOUS at 12:35

## 2025-02-04 RX ADMIN — ROCURONIUM BROMIDE 50 MG: 10 INJECTION, SOLUTION INTRAVENOUS at 12:26

## 2025-02-04 RX ADMIN — FENTANYL CITRATE 25 MCG: 50 INJECTION, SOLUTION INTRAMUSCULAR; INTRAVENOUS at 12:25

## 2025-02-04 RX ADMIN — ONDANSETRON 4 MG: 2 INJECTION INTRAMUSCULAR; INTRAVENOUS at 13:25

## 2025-02-04 RX ADMIN — DEXMEDETOMIDINE HCL 2 MCG: 100 INJECTION INTRAVENOUS at 12:58

## 2025-02-04 RX ADMIN — FAMOTIDINE 20 MG: 10 INJECTION, SOLUTION INTRAVENOUS at 10:30

## 2025-02-04 RX ADMIN — MIDAZOLAM 2 MG: 1 INJECTION INTRAMUSCULAR; INTRAVENOUS at 11:21

## 2025-02-04 RX ADMIN — APREPITANT 40 MG: 40 CAPSULE ORAL at 10:27

## 2025-02-04 RX ADMIN — DEXAMETHASONE SODIUM PHOSPHATE 4 MG: 4 INJECTION INTRA-ARTICULAR; INTRALESIONAL; INTRAMUSCULAR; INTRAVENOUS; SOFT TISSUE at 12:42

## 2025-02-04 RX ADMIN — DEXAMETHASONE SODIUM PHOSPHATE 8 MG: 10 INJECTION, SOLUTION INTRAMUSCULAR; INTRAVENOUS at 11:21

## 2025-02-04 RX ADMIN — ROCURONIUM BROMIDE 20 MG: 10 INJECTION, SOLUTION INTRAVENOUS at 12:53

## 2025-02-04 RX ADMIN — MIDAZOLAM HYDROCHLORIDE 2 MG: 1 INJECTION, SOLUTION INTRAMUSCULAR; INTRAVENOUS at 11:18

## 2025-02-04 RX ADMIN — FENTANYL CITRATE 100 MCG: 50 INJECTION, SOLUTION INTRAMUSCULAR; INTRAVENOUS at 11:18

## 2025-02-04 ASSESSMENT — PAIN DESCRIPTION - DESCRIPTORS: DESCRIPTORS: SHARP

## 2025-02-04 ASSESSMENT — ENCOUNTER SYMPTOMS: SHORTNESS OF BREATH: 1

## 2025-02-04 ASSESSMENT — PAIN - FUNCTIONAL ASSESSMENT
PAIN_FUNCTIONAL_ASSESSMENT: NONE - DENIES PAIN
PAIN_FUNCTIONAL_ASSESSMENT: 0-10

## 2025-02-04 NOTE — DISCHARGE INSTRUCTIONS
(i.e. Advil) may be taken in between the narcotic pain medication to help smooth out the post-operative ‘peaks and valleys’, reduce overall amount of pain medication required, and increase the time intervals between narcotic pain medication use. Do not take more than 2400mg in a day.   Please resume all home medications, unless instructed otherwise.    ACTIVITY  When sleeping or resting, inclined positions (i.e. reclining chair or stacked pillows behind you in bed) and a pillow under the forearm for support may provide better comfort.  Do not engage in activities which increase pain/swelling (lifting or any repetitive above shoulder-level activities) over the first 14 days following surgery.  Avoid long periods of sitting (without arm supported) or long distance travel.  NO driving until instructed otherwise by physician.  May return to work 5-7 days after surgery, if pain is tolerable and without use of your surgical arm.    IMMOBILIZER  Your immobilizer or sling should be worn at all times (except for hygiene and exercises).    ICE THERAPY  Begin immediately after surgery.  Do not place ice directly on the skin (place over an Ace wrap or thin clothing).  Use icing machine continuously or ice packs (if machine not prescribed) every 1-2 hours for 20 minutes each time daily for the first week and then as needed after that for pain and swelling. Remember to keep arm supported while icing.    EXERCISE  Perform pendulum exercises (leaning forward and letting the arm hang free and swing in slow circles) and full elbow/wrist/hand range of motion exercises 3 times per day.  When moving your elbow on the side your had surgery, support it with your non operative arm   Formal physical therapy (PT) will begin in 2 weeks as scheduled with a prescription that will be provided during your post-operative visit.    EMERGENCIES  Contact Dr. Jansen at [phone] if any of the following are present: Painful swelling or numbness,

## 2025-02-04 NOTE — ANESTHESIA POSTPROCEDURE EVALUATION
Department of Anesthesiology  Postprocedure Note    Patient: Vesna Valencia  MRN: [id number]  YOB: 1965  Date of evaluation: 2/4/2025    Procedure Summary       Date: 02/04/25 Room / Location: [address]    Anesthesia Start: 1220 Anesthesia Stop: 1410    Procedure: LEFT SHOULDER ARTHROSCOPY, OPEN BICEPS TENODESIS WITH ARTHREX AND PRE OP INTERSCALENE BLOCK (Left) Diagnosis:       Biceps tendonitis, left      (Biceps tendonitis, left [M75.22])    Surgeons: Addison Jansen MD Responsible Provider: Dahlia Hawkins MD    Anesthesia Type: general ASA Status: 3            Anesthesia Type: No value filed.    Hayden Phase I: Hayden Score: 9    Hayden Phase II: Hayden Score: 10    Anesthesia Post Evaluation    Patient location during evaluation: PACU  Patient participation: complete - patient participated  Level of consciousness: awake and alert  Pain score: 0  Airway patency: patent  Nausea & Vomiting: no nausea and no vomiting  Cardiovascular status: blood pressure returned to baseline  Respiratory status: acceptable and room air  Hydration status: euvolemic  Pain management: adequate and satisfactory to patient    No notable events documented.

## 2025-02-04 NOTE — ANESTHESIA PROCEDURE NOTES
Peripheral Block    Patient location during procedure: pre-op  Reason for block: post-op pain management and at surgeon's request  Start time: 2/4/2025 11:21 AM  End time: 2/4/2025 11:25 AM  Staffing  Performed: anesthesiologist   Anesthesiologist: Dahlia Hawkins MD  Performed by: Dahlia Hawkins MD  Authorized by: Dahlia Hawkins MD    Preanesthetic Checklist  Completed: patient identified, IV checked, site marked, risks and benefits discussed, surgical/procedural consents, equipment checked, pre-op evaluation, timeout performed, anesthesia consent given, oxygen available, monitors applied/VS acknowledged, fire risk safety assessment completed and verbalized and blood product R/B/A discussed and consented  Peripheral Block   Patient position: supine  Prep: ChloraPrep  Provider prep: mask and sterile gloves  Patient monitoring: cardiac monitor, continuous pulse ox, frequent blood pressure checks, IV access, oxygen and responsive to questions  Block type: Brachial plexus  Interscalene  Laterality: left  Injection technique: single-shot  Guidance: ultrasound guided  Local infiltration: bupivacaine  Infiltration strength: 0.25 %  Local infiltration: bupivacaine  Dose: 2 mL    Needle   Needle type: insulated echogenic nerve stimulator needle   Needle gauge: 22 G  Needle localization: anatomical landmarks and ultrasound guidance  Needle insertion depth: 2 cm  Test dose: negative  Needle length: 5 cm  Assessment   Injection assessment: negative aspiration for heme, no paresthesia on injection, local visualized surrounding nerve on ultrasound and no intravascular symptoms  Paresthesia pain: none  Slow fractionated injection: yes  Hemodynamics: stable  Outcomes: uncomplicated and patient tolerated procedure well    Medications Administered  fentaNYL (SUBLIMAZE) injection - IntraVENous   100 mcg - 2/4/2025 11:21:00 AM  midazolam (VERSED) injection 2 mg/2mL - IntraVENous   2 mg - 2/4/2025 11:21:00 AM  ropivacaine (NAROPIN) injection 0.5% -

## 2025-02-04 NOTE — ANESTHESIA PRE PROCEDURE
Department of Anesthesiology  Preprocedure Note       Name:  Vesna Valencia   Age:  59 y.o.  :  1965                                          MRN:  [id number]         Date:  2025      Surgeon: Surgeon(s):  Addison Jansen MD    Procedure: Procedure(s):  LEFT SHOULDER ARTHROSCOPIC POSSIBLE OPEN BICEPS TENODESIS WITH ARTHREX AND PRE OP INTERSCALENE BLOCK    Medications prior to admission:   Prior to Admission medications    Medication Sig Start Date End Date Taking? Authorizing Provider   ondansetron (ZOFRAN) 4 MG tablet Take 1 tablet by mouth daily as needed for Nausea or Vomiting 25   Addison Jansen MD   oxyCODONE-acetaminophen (PERCOCET) 5-325 MG per tablet Take 1 tablet by mouth every 6 hours as needed for Pain for up to 7 days. Intended supply: 7 days. Take lowest dose possible to manage pain Max Daily Amount: 4 tablets 25  Addison Jansen MD   diclofenac (VOLTAREN) 75 MG EC tablet Take 1 tablet by mouth 2 times daily for 14 days 25  Addison Jansen MD   amitriptyline (ELAVIL) 50 MG tablet Take 1 tablet by mouth in the morning and at bedtime 24   Sandy Ma APRN - CNP   metoprolol tartrate (LOPRESSOR) 25 MG tablet Take 0.5 tablets by mouth 2 times daily 24   Martina Cook APRN - CNP   ipratropium 0.5 mg-albuterol 2.5 mg (DUONEB) 0.5-2.5 (3) MG/3ML SOLN nebulizer solution Inhale 3 mLs into the lungs in the morning and at bedtime 24   Martina Cook APRN - CNP   lamoTRIgine (LAMICTAL) 25 MG tablet Take 1 tablet by mouth 2 times daily 24   Martina Cook APRN - CNP   levETIRAcetam (KEPPRA) 750 MG tablet Take 2 tablets by mouth 2 times daily 24   Martina Cook APRN - CNP   tamsulosin (FLOMAX) 0.4 MG capsule Take 1 capsule by mouth daily 24   Martina Cook, APRN - CNP   albuterol sulfate HFA (PROVENTIL;VENTOLIN;PROAIR) 108 (90 Base) MCG/ACT inhaler Inhale 2 puffs into the lungs every 6 hours as needed for

## 2025-02-04 NOTE — H&P
Update History & Physical    The patient's History and Physical of January 31, 2025 was reviewed with the patient and I examined the patient. There was no change. The surgical site was confirmed by the patient and me.       Plan: The risks, benefits, expected outcome, and alternative to the recommended procedure have been discussed with the patient. Patient understands and wants to proceed with the procedure.     Electronically signed by Addison Jansen MD on 2/4/2025 at 10:43 AM

## 2025-02-04 NOTE — BRIEF OP NOTE
Brief Postoperative Note      Patient: Vesna Valencia  YOB: 1965  MRN: [id number]    Date of Procedure: 2/4/2025    Pre-Op Diagnosis Codes:      * Biceps tendonitis, left [M75.22]    Post-Op Diagnosis: Same       Procedure(s):  LEFT SHOULDER ARTHROSCOPY, OPEN BICEPS TENODESIS WITH ARTHREX AND PRE OP INTERSCALENE BLOCK    Surgeon(s):  Addison Jansen MD    Assistant:  First Assistant: Abisai Cantu PA-C    Anesthesia: General    Estimated Blood Loss (mL): Minimal    Complications: None    Specimens:   * No specimens in log *    Implants:  Implant Name Type Inv. Item Serial No.  Lot No. LRB No. Used Action   KIT IMPL SYS PROX TENODESIS W/ BICEPSBUTTON INSRT FIBERLOOP - [id number]  KIT IMPL SYS PROX TENODESIS W/ BICEPSBUTTON INSRT FIBERLOOP  [employer] [id number] Left 1 Implanted         Drains: * No LDAs found *    Findings:  Infection Present At Time Of Surgery (PATOS) (choose all levels that have infection present):  No infection present  Other Findings: See operative report    Electronically signed by Addison Jansen MD on 2/4/2025 at 1:34 PM

## 2025-02-05 NOTE — OP NOTE
OPERATIVE REPORT    Date of Surgery: 2/4/25    Pre-operative Diagnosis: Left shoulder biceps tendinitis    Post-operative Diagnosis: Left shoulder biceps tendinitis    Procedure: Left shoulder arthroscopy with open subpectoral biceps tenodesis    Surgeon(s): Addison Jansen MD    Assistant(s): No resident present. Abisai Cantu PA-C, medically necessary for patient positioning, surgical site exposure, wound closure, and efficient completion of case.      Anesthesia: General Left interscalene block    Fluids: See anesthesia record    Urine output: See anesthesia record    Estimated blood loss: Minimal    Findings: Intact chondral surfaces, intact labrum, intact rotator cuff with some mild fraying of the posterior superior cuff, intact subscapularis    Specimen: None    Implants: Arthrex proximal biceps button    Surgical Indications:  Vesna Valencia is a 59 y.o. old female who presented with left shoulder pain consistent with a biceps tendinitis.  She responded well to an ultrasound-guided biceps tendon sheath cortisone injection however the effects of this was short-lived. Having failed attempts at conservative management and/or electing to forgo continued attempts at conservative management and following a discussion with the patient regarding both non-operative and operative treatment options, they consented to proceed with left shoulder arthroscopy with possible open subpectoral biceps tenodesis. she came to this decision after demonstrating an understanding of our discussion regarding details of the procedure, risks and benefits, expected outcome, and postoperative course.    Operative technique:     Following appropriate identification of the patient and her operative extremity, consent was reviewed with the patient and her operative extremity was signed. The anesthesia service administered a left interscalene nerve block and she was wheeled to the operating room where she finished a course of pre-operative

## 2025-02-10 ENCOUNTER — OFFICE VISIT (OUTPATIENT)
Dept: ORTHOPEDIC SURGERY | Age: 60
End: 2025-02-10

## 2025-02-10 VITALS — WEIGHT: 198 LBS | HEIGHT: 63 IN | BODY MASS INDEX: 35.08 KG/M2 | RESPIRATION RATE: 14 BRPM

## 2025-02-10 DIAGNOSIS — G89.29 CHRONIC PAIN OF RIGHT KNEE: ICD-10-CM

## 2025-02-10 DIAGNOSIS — M25.561 CHRONIC KNEE PAIN AFTER TOTAL REPLACEMENT OF RIGHT KNEE JOINT: ICD-10-CM

## 2025-02-10 DIAGNOSIS — Z96.651 S/P TKR (TOTAL KNEE REPLACEMENT), RIGHT: ICD-10-CM

## 2025-02-10 DIAGNOSIS — M25.551 RIGHT HIP PAIN: Primary | ICD-10-CM

## 2025-02-10 DIAGNOSIS — M25.561 CHRONIC PAIN OF RIGHT KNEE: ICD-10-CM

## 2025-02-10 DIAGNOSIS — Z96.651 CHRONIC KNEE PAIN AFTER TOTAL REPLACEMENT OF RIGHT KNEE JOINT: ICD-10-CM

## 2025-02-10 DIAGNOSIS — G89.29 CHRONIC KNEE PAIN AFTER TOTAL REPLACEMENT OF RIGHT KNEE JOINT: ICD-10-CM

## 2025-02-10 DIAGNOSIS — Z96.641 S/P TOTAL RIGHT HIP ARTHROPLASTY: ICD-10-CM

## 2025-02-10 RX ORDER — LIDOCAINE 50 MG/G
1 PATCH TOPICAL DAILY
Qty: 10 PATCH | Refills: 0 | Status: SHIPPED | OUTPATIENT
Start: 2025-02-10 | End: 2025-02-20

## 2025-02-10 NOTE — PROGRESS NOTES
I reviewed with the resident the medical history and the resident's findings on the physical examination.  I discussed with the resident the patient's diagnosis and concur with the plan. I independently performed a history and physical exam on the patient and agree with documentation as above.     Nik Martell DO           [facility]  [address]  [place] [address]  Dept Phone: [phone]  Dept Fax: [phone]      [facility] Follow Up      Subjective:   Date of Surgery: 8/8/2024    Vesna Valencia is a 59 y.o. year old female who presents to the clinic today for follow up status post right total hip arthroplasty with open reduction internal fixation right due to severe wall/posterior column acetabular fracture.  In regards to the patient's hip she has been doing very well.  She has had no difficulties or recent hip pain.  She is ambulating with a cane at this time.  Patient most recently had surgery with Dr. Rodarte with a left shoulder rotator cuff repair on 2/4 and she is doing well in her recovery from that.  Patient is complaining of right knee pain at this time which has been evident ever since her accident in August.  Patient had a right total knee done by Dr. Valencia on 6/14/2022.    Patient states that her knee pain has gotten worse since the accident and feels unstable as well as hurts at all times.  She is unable to do stairs because of how severe the pain is.  She does have a mature scar noted from previous surgical intervention as well as a scar on the medial aspect of the knee from laceration during her accident in August 2024.  She states that she has bulging on the medial aspect of her knee from swelling as well as numbness on the medial aspect of her knee.    Review of Systems  Gen: no fever, chills, malaise  CV: no chest pain or palpitations  Resp: no cough or shortness of breath  GI: no nausea, vomiting, diarrhea, or constipation  Neuro: no

## 2025-02-19 ENCOUNTER — OFFICE VISIT (OUTPATIENT)
Dept: ORTHOPEDIC SURGERY | Age: 60
End: 2025-02-19

## 2025-02-19 DIAGNOSIS — Z98.890 S/P ARTHROSCOPY OF LEFT SHOULDER: Primary | ICD-10-CM

## 2025-02-19 NOTE — PROGRESS NOTES
Procedure: Left shoulder arthroscopy with open subpectoral biceps tenodesis  Date of procedure: 2/4/2025    HPI:  Vesna Valencia is a 59 y.o. female who is approximately 2 weeks status post aforementioned procedure.  Indicates that she is doing relatively well.  Her pain is rated as a 9/10.  She denies having any fevers, chills, sweats or any constitutional symptoms.  She has been compliant with her pendulums and sling immobilization.    Physical examination:  Evaluation of patient's left shoulder and upper extremity demonstrates her incisions to be clean, dry, intact.  There is no warmth, erythema, wound dehiscence or drainage.  Sensation is grossly intact light touch in all dermatomes and she has a 2+ radial pulse with brisk capillary refill in her fingers.    Impression and plan:  Vesna Valencia is a 59 y.o. female who is 2 weeks status post a left shoulder arthroscopy with open subpectoral biceps tenodesis.  She is doing relatively well at this time.  We reviewed her arthroscopic images. Her sutures were taken out and Steri-Strips and clean dressings applied.  She may now get her incisions wet in the shower.  She is to continue on with her immobilization.  We'll get her started in formal physical therapy using rehab guidelines for a biceps tenodesis; a prescription was provided.  I'll see her back for reevaluation in 4 weeks but she was encouraged to return or call earlier with questions and/or concerns.

## 2025-03-24 ENCOUNTER — TELEPHONE (OUTPATIENT)
Dept: ORTHOPEDIC SURGERY | Age: 60
End: 2025-03-24

## 2025-03-24 NOTE — TELEPHONE ENCOUNTER
Patient apparently reported to her PT that she was lifting her dog onto her bed and heard a pop.  Apparently she is now exhibiting some added pain behaviors according to the PT.  She also is not tolerating therapy at all anymore from her appointment today.      Surgery was 2/4 and next post op is 4/30.    Do you want to bring her in sooner to check or have her see Abisai?

## 2025-04-23 PROBLEM — R47.1 DYSARTHRIA: Status: RESOLVED | Noted: 2022-09-22 | Resolved: 2025-04-23

## 2025-04-23 PROBLEM — R56.9 SEIZURE (HCC): Status: RESOLVED | Noted: 2023-04-27 | Resolved: 2025-04-23

## 2025-04-23 PROBLEM — M47.816 LUMBAR FACET ARTHROPATHY: Status: RESOLVED | Noted: 2018-05-04 | Resolved: 2025-04-23

## 2025-04-23 PROBLEM — G45.9 TIA (TRANSIENT ISCHEMIC ATTACK): Status: RESOLVED | Noted: 2017-10-10 | Resolved: 2025-04-23

## 2025-04-23 PROBLEM — K21.9 GASTROESOPHAGEAL REFLUX DISEASE: Status: RESOLVED | Noted: 2023-04-27 | Resolved: 2025-04-23

## 2025-04-23 PROBLEM — Z86.718 HX OF BLOOD CLOTS: Status: RESOLVED | Noted: 2022-04-11 | Resolved: 2025-04-23

## 2025-04-23 PROBLEM — R26.89 ANTALGIC GAIT: Status: RESOLVED | Noted: 2023-01-06 | Resolved: 2025-04-23

## 2025-04-23 PROBLEM — K30 DELAYED GASTRIC EMPTYING: Status: RESOLVED | Noted: 2018-09-27 | Resolved: 2025-04-23

## 2025-04-23 PROBLEM — M23.92 INTERNAL DERANGEMENT OF LEFT KNEE: Status: RESOLVED | Noted: 2022-02-10 | Resolved: 2025-04-23
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
## 2025-04-23 PROBLEM — E66.01 MORBID (SEVERE) OBESITY DUE TO EXCESS CALORIES (HCC): Status: RESOLVED | Noted: 2020-09-11 | Resolved: 2025-04-23

## 2025-04-23 PROBLEM — G43.009 NONINTRACTABLE MIGRAINE, UNSPECIFIED MIGRAINE TYPE: Status: RESOLVED | Noted: 2022-02-11 | Resolved: 2025-04-23

## 2025-04-23 PROBLEM — M43.06 LUMBAR SPONDYLOLYSIS: Status: RESOLVED | Noted: 2018-03-13 | Resolved: 2025-04-23

## 2025-04-23 PROBLEM — I82.629 DEEP VEIN THROMBOSIS (DVT) OF UPPER EXTREMITY (HCC): Status: RESOLVED | Noted: 2019-02-07 | Resolved: 2025-04-23

## 2025-04-23 PROBLEM — R22.30 LOCALIZED SWELLING, MASS AND LUMP, UPPER LIMB: Status: RESOLVED | Noted: 2022-08-26 | Resolved: 2025-04-23

## 2025-04-23 PROBLEM — R53.1 LEFT-SIDED WEAKNESS: Status: RESOLVED | Noted: 2017-10-27 | Resolved: 2025-04-23

## 2025-04-23 PROBLEM — M54.16 LUMBAR RADICULOPATHY: Status: RESOLVED | Noted: 2017-08-30 | Resolved: 2025-04-23

## 2025-04-23 PROBLEM — R14.0 BLOATING: Status: RESOLVED | Noted: 2018-04-09 | Resolved: 2025-04-23

## 2025-04-23 PROBLEM — Z87.898 HISTORY OF SEIZURE: Status: RESOLVED | Noted: 2024-08-05 | Resolved: 2025-04-23

## 2025-04-23 PROBLEM — I10 HYPERTENSION: Status: RESOLVED | Noted: 2023-04-27 | Resolved: 2025-04-23

## 2025-04-23 PROBLEM — F33.1 MAJOR DEPRESSIVE DISORDER, RECURRENT EPISODE, MODERATE (HCC): Status: RESOLVED | Noted: 2017-08-04 | Resolved: 2025-04-23

## 2025-04-23 PROBLEM — S29.8XXA BLUNT INJURY TO CHEST: Status: RESOLVED | Noted: 2024-08-04 | Resolved: 2025-04-23

## 2025-04-23 PROBLEM — F41.9 ANXIETY: Status: RESOLVED | Noted: 2023-04-27 | Resolved: 2025-04-23

## 2025-04-23 PROBLEM — R19.5 OCCULT BLOOD IN STOOLS: Status: RESOLVED | Noted: 2020-09-11 | Resolved: 2025-04-23

## 2025-04-23 PROBLEM — S32.421A: Status: RESOLVED | Noted: 2024-08-08 | Resolved: 2025-04-23

## 2025-04-23 PROBLEM — M17.11 PRIMARY OSTEOARTHRITIS OF RIGHT KNEE: Status: RESOLVED | Noted: 2022-06-14 | Resolved: 2025-04-23

## 2025-04-23 PROBLEM — R20.0 FACIAL NUMBNESS: Status: RESOLVED | Noted: 2017-10-10 | Resolved: 2025-04-23

## 2025-04-23 PROBLEM — R56.9 CONVULSIONS (HCC): Status: RESOLVED | Noted: 2021-04-27 | Resolved: 2025-04-23

## 2025-04-23 PROBLEM — I89.0 LYMPHEDEMA: Status: RESOLVED | Noted: 2021-04-27 | Resolved: 2025-04-23

## 2025-04-23 PROBLEM — S92.901A CLOSED FRACTURE OF BONE OF RIGHT FOOT: Status: RESOLVED | Noted: 2024-08-08 | Resolved: 2025-04-23

## 2025-04-23 PROBLEM — E87.6 HYPOKALEMIA: Status: RESOLVED | Noted: 2022-09-22 | Resolved: 2025-04-23

## 2025-04-23 PROBLEM — K62.5 RECTAL BLEEDING: Status: RESOLVED | Noted: 2020-09-11 | Resolved: 2025-04-23

## 2025-04-23 PROBLEM — R29.90 STROKE-LIKE SYMPTOM: Status: RESOLVED | Noted: 2017-12-08 | Resolved: 2025-04-23

## 2025-04-23 PROBLEM — R10.84 GENERALIZED ABDOMINAL PAIN: Status: RESOLVED | Noted: 2018-04-05 | Resolved: 2025-04-23

## 2025-04-23 PROBLEM — Z86.73 HISTORY OF STROKE: Status: RESOLVED | Noted: 2024-08-05 | Resolved: 2025-04-23

## 2025-04-23 PROBLEM — R51.9 WORSENING HEADACHES: Status: RESOLVED | Noted: 2017-10-27 | Resolved: 2025-04-23
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
## 2025-04-23 PROBLEM — D68.59 HYPERCOAGULOPATHY: Status: RESOLVED | Noted: 2022-02-11 | Resolved: 2025-04-23

## 2025-04-23 PROBLEM — I99.8 VASCULAR INSUFFICIENCY: Status: RESOLVED | Noted: 2023-04-27 | Resolved: 2025-04-23

## 2025-04-23 PROBLEM — M75.22 BICEPS TENDONITIS, LEFT: Status: RESOLVED | Noted: 2024-12-16 | Resolved: 2025-04-23

## 2025-04-23 PROBLEM — Z96.659 ARTIFICIAL KNEE JOINT PRESENT: Status: RESOLVED | Noted: 2022-02-10 | Resolved: 2025-04-23

## 2025-04-23 PROBLEM — M53.3 DISORDER OF SACRUM: Status: RESOLVED | Noted: 2021-05-25 | Resolved: 2025-04-23

## 2025-04-30 ENCOUNTER — OFFICE VISIT (OUTPATIENT)
Dept: ORTHOPEDIC SURGERY | Age: 60
End: 2025-04-30

## 2025-04-30 VITALS — RESPIRATION RATE: 14 BRPM | HEIGHT: 63 IN | WEIGHT: 198 LBS | BODY MASS INDEX: 35.08 KG/M2

## 2025-04-30 DIAGNOSIS — Z98.890 S/P ARTHROSCOPY OF LEFT SHOULDER: Primary | ICD-10-CM

## 2025-04-30 RX ORDER — LIDOCAINE HYDROCHLORIDE 10 MG/ML
5 INJECTION, SOLUTION INFILTRATION; PERINEURAL ONCE
Status: COMPLETED | OUTPATIENT
Start: 2025-04-30 | End: 2025-04-30

## 2025-04-30 RX ORDER — TRIAMCINOLONE ACETONIDE 40 MG/ML
40 INJECTION, SUSPENSION INTRA-ARTICULAR; INTRAMUSCULAR ONCE
Status: COMPLETED | OUTPATIENT
Start: 2025-04-30 | End: 2025-04-30

## 2025-04-30 RX ADMIN — LIDOCAINE HYDROCHLORIDE 5 ML: 10 INJECTION, SOLUTION INFILTRATION; PERINEURAL at 13:52

## 2025-04-30 RX ADMIN — TRIAMCINOLONE ACETONIDE 40 MG: 40 INJECTION, SUSPENSION INTRA-ARTICULAR; INTRAMUSCULAR at 13:53

## 2025-04-30 NOTE — PROGRESS NOTES
Procedure: Left shoulder arthroscopy with open subpectoral biceps tenodesis  Date of procedure: 2/4/2025    HPI:  Vesna Valencia is a 59 y.o. female who is approximately 3 months status post aforementioned procedure.  She indicates that she is still dealing with quite a bit of pain and limitation in her motion having a difficult time raising this arm above her head.  She continues to go to physical therapy    Physical examination:  Evaluation of patient's left shoulder and upper extremity demonstrates her incisions to be clean, dry, intact.  There is no warmth, erythema, wound dehiscence or drainage.  Sensation is grossly intact light touch in all dermatomes and she has a 2+ radial pulse with brisk capillary refill in her fingers.  She has approximately 70 degrees of active shoulder elevation 65 degrees of abduction and 45 degrees of external rotation.  Passively she has 90 degrees of shoulder elevation 75 degrees of abduction and 45 degrees of external rotation.  She has a painful arc of motion.    Impression and plan:  Vesna Valencia is a 59 y.o. female who is 3 months status post a left shoulder arthroscopy with open subpectoral biceps tenodesis.  She appears to be dealing with pain and stiffness due to an adhesive capsulitis.  I had a discussion with the patient educating her about this condition and we discussed treatment options available to her.  I recommended proceeding conservatively at this time with a cortisone injection to the glenohumeral joint as well as a stretching program.  She was amenable and had the injection administered as outlined below.  She was provided a home stretching program as well.  She may certainly continue with physical therapy.  I will see her back for reevaluation in 2 months but she was encouraged to return or call earlier with questions and/or concerns.    Procedure: left shoulder glenohumeral injection  Following an appropriate discussion with the patient regarding the risks

## 2025-05-28 ENCOUNTER — OFFICE VISIT (OUTPATIENT)
Dept: ORTHOPEDIC SURGERY | Age: 60
End: 2025-05-28
Payer: MEDICARE

## 2025-05-28 VITALS — WEIGHT: 205 LBS | RESPIRATION RATE: 14 BRPM | HEIGHT: 63 IN | BODY MASS INDEX: 36.32 KG/M2

## 2025-05-28 DIAGNOSIS — G89.29 CHRONIC PAIN OF RIGHT KNEE: Primary | ICD-10-CM

## 2025-05-28 DIAGNOSIS — M25.561 CHRONIC PAIN OF RIGHT KNEE: Primary | ICD-10-CM

## 2025-05-28 PROCEDURE — 20610 DRAIN/INJ JOINT/BURSA W/O US: CPT | Performed by: PHYSICIAN ASSISTANT

## 2025-05-28 PROCEDURE — 99214 OFFICE O/P EST MOD 30 MIN: CPT | Performed by: PHYSICIAN ASSISTANT

## 2025-05-28 RX ORDER — BUPIVACAINE HYDROCHLORIDE 2.5 MG/ML
2 INJECTION, SOLUTION INFILTRATION; PERINEURAL ONCE
Status: COMPLETED | OUTPATIENT
Start: 2025-05-28 | End: 2025-05-28

## 2025-05-28 RX ORDER — METHYLPREDNISOLONE ACETATE 80 MG/ML
80 INJECTION, SUSPENSION INTRA-ARTICULAR; INTRALESIONAL; INTRAMUSCULAR; SOFT TISSUE ONCE
Status: COMPLETED | OUTPATIENT
Start: 2025-05-28 | End: 2025-05-28

## 2025-05-28 RX ADMIN — METHYLPREDNISOLONE ACETATE 80 MG: 80 INJECTION, SUSPENSION INTRA-ARTICULAR; INTRALESIONAL; INTRAMUSCULAR; SOFT TISSUE at 14:03

## 2025-05-28 RX ADMIN — BUPIVACAINE HYDROCHLORIDE 5 MG: 2.5 INJECTION, SOLUTION INFILTRATION; PERINEURAL at 14:03

## 2025-06-19 ENCOUNTER — TELEPHONE (OUTPATIENT)
Dept: PHARMACY | Facility: CLINIC | Age: 60
End: 2025-06-19

## 2025-06-19 NOTE — TELEPHONE ENCOUNTER
[facility] CLINICAL PHARMACY: STATIN THERAPY REVIEW  Identified statin use in persons with diabetes care gap per [employer]. Records dated: 6/9/25 (DM 1st fill).    Patient also appears to be prescribed: Diabetes (Ozempic 56ds fill 5/22/25, new rx, prescriber: Олег Goldsmith MD, 2 refills remain)    ASSESSMENT  STATIN GAP IDENTIFIED    Per chart review: patient has been prescribed rosuvastatin 20mg daily  Per Reconcile Dispense History: last filled on 12/27/24 for 30 day supply. Prescriber: Олег Goldsmith MD, 0 refills remain.    Lab Results   Component Value Date    CHOL 165 08/29/2023    TRIG 247 (H) 08/29/2023    HDL 48 08/29/2023    LDL 68 08/29/2023    VLDL NOT REPORTED 10/10/2017    CHOLHDLRATIO 3.4 08/29/2023 6/7/25 ALT/AST 24/19 through [facility] labs    The ASCVD Risk score (Fausto DELGADO, et al., 2019) failed to calculate for the following reasons:    Risk score cannot be calculated because patient has a medical history suggesting prior/existing ASCVD     PLAN  Per insurer report, LIS-1 - may be able to receive up to a 100-day supply for the same cost as a 30-day supply.    The following are interventions that have been identified:   Statin Gap (Diabetes): confirm rosuvastatin 20mg daily? Still taking (no fills YTD)? If not taking, why stopped?  Update PCP info with [employer]  Per [employer], PCP selected: Neris Bernal  Appears PCP is: Олег Goldsmith MD (?possibly with [facility], not [facility])    Reached patient to review. States she is still taking rosuvastatin and denies any troubles with the medication or refilling it. Confirms Dr. Goldsmith is her PCP - encouraged her to update that info with [employer]. Patient denies needing any rosuvastatin refill or assistance at this time.    Last Visit: per [employer] portal, 3/12/25 with MD Quynh Silva, PharmD, BCACP  Population Health Pharmacist  [facility] Clinical Pharmacy  Department, toll free: [phone], option

## 2025-06-25 ENCOUNTER — OFFICE VISIT (OUTPATIENT)
Dept: ORTHOPEDIC SURGERY | Age: 60
End: 2025-06-25

## 2025-06-25 VITALS — HEIGHT: 63 IN | WEIGHT: 205 LBS | RESPIRATION RATE: 14 BRPM | BODY MASS INDEX: 36.32 KG/M2

## 2025-06-25 DIAGNOSIS — Z98.890 S/P ARTHROSCOPY OF LEFT SHOULDER: Primary | ICD-10-CM

## 2025-06-25 NOTE — PROGRESS NOTES
Procedure: Left shoulder arthroscopy with open subpectoral biceps tenodesis  Date of procedure: 2/4/2025    HPI:  Vesna Valencia is a 59 y.o. female who is approximately 5 months status post aforementioned procedure.  She indicates that her shoulder is feeling better.  She denies having any pain in the shoulder at this time.  She has completed physical therapy.    Physical examination:  Evaluation of patient's left shoulder and upper extremity demonstrates her incisions to be appropriately healed.  She has approximately 105 degrees of active shoulder elevation and abduction with 30 degrees of external rotation.  Passively she has 120 degrees of shoulder elevation and abduction with 45 degrees of external rotation.    Impression and plan:  Vesna Valencia is a 59 y.o. female who is 5 months status post a left shoulder arthroscopy with open subpectoral biceps tenodesis.  She is doing relatively well at this time.  She is happy with her shoulder and overall has no complaints.  I did encourage her to keep up with her home stretching and strengthening program.  She may continue to use this arm as she feels comfortable for daily activities.  I will see her back in my clinic as needed but she may return or call at anytime with questions and/or concerns.

## 2025-07-01 ENCOUNTER — TELEPHONE (OUTPATIENT)
Dept: ORTHOPEDIC SURGERY | Age: 60
End: 2025-07-01

## 2025-07-01 NOTE — TELEPHONE ENCOUNTER
Received a fax stating that Lidocaine 5% Patches require a prior authorization. Authorizing provider listed is Dr. Martell. Prior Auth Request form scanned into media on 7/1/25.    Key: [id number]

## 2025-07-02 ENCOUNTER — TELEPHONE (OUTPATIENT)
Dept: ORTHOPEDIC SURGERY | Age: 60
End: 2025-07-02

## 2025-07-16 ENCOUNTER — OFFICE VISIT (OUTPATIENT)
Dept: ORTHOPEDIC SURGERY | Age: 60
End: 2025-07-16

## 2025-07-16 VITALS — RESPIRATION RATE: 14 BRPM | WEIGHT: 198 LBS | BODY MASS INDEX: 35.08 KG/M2 | HEIGHT: 63 IN

## 2025-07-16 DIAGNOSIS — M25.561 CHRONIC KNEE PAIN AFTER TOTAL REPLACEMENT OF RIGHT KNEE JOINT: Primary | ICD-10-CM

## 2025-07-16 DIAGNOSIS — Z96.641 S/P TOTAL RIGHT HIP ARTHROPLASTY: ICD-10-CM

## 2025-07-16 DIAGNOSIS — G89.29 CHRONIC KNEE PAIN AFTER TOTAL REPLACEMENT OF RIGHT KNEE JOINT: Primary | ICD-10-CM

## 2025-07-16 DIAGNOSIS — Z96.651 CHRONIC KNEE PAIN AFTER TOTAL REPLACEMENT OF RIGHT KNEE JOINT: Primary | ICD-10-CM

## 2025-07-16 RX ORDER — TRAMADOL HYDROCHLORIDE 50 MG/1
50 TABLET ORAL EVERY 4 HOURS PRN
Qty: 18 TABLET | Refills: 0 | Status: SHIPPED | OUTPATIENT
Start: 2025-07-16 | End: 2025-07-19

## 2025-07-16 RX ORDER — METHYLPREDNISOLONE ACETATE 80 MG/ML
80 INJECTION, SUSPENSION INTRA-ARTICULAR; INTRALESIONAL; INTRAMUSCULAR; SOFT TISSUE ONCE
Status: COMPLETED | OUTPATIENT
Start: 2025-07-16 | End: 2025-07-16

## 2025-07-16 RX ORDER — BUPIVACAINE HYDROCHLORIDE 2.5 MG/ML
2 INJECTION, SOLUTION INFILTRATION; PERINEURAL ONCE
Status: COMPLETED | OUTPATIENT
Start: 2025-07-16 | End: 2025-07-16

## 2025-07-16 RX ADMIN — BUPIVACAINE HYDROCHLORIDE 5 MG: 2.5 INJECTION, SOLUTION INFILTRATION; PERINEURAL at 14:25

## 2025-07-16 RX ADMIN — METHYLPREDNISOLONE ACETATE 80 MG: 80 INJECTION, SUSPENSION INTRA-ARTICULAR; INTRALESIONAL; INTRAMUSCULAR; SOFT TISSUE at 14:25

## 2025-07-16 NOTE — PROGRESS NOTES
arthroplasty as well as acetabular fixation as well as stable right total knee arthroplasty hardware.  - Given that patient continues to be exquisitely painful despite extensive conservative measures would advise further diagnostic evaluation with CT of the right hip and knee which are provided at patient request.  - Patient to follow-up after completion of imaging    Procedure Note: Right trochanteric bursa Depo-Medrol Injection   An informed verbal consent for the procedure was obtained and risks including, but not limited to: allergy to medications, injection, bleeding, stiffness of joint, recurrence of symptoms, loss of function, swelling, drainage, irrigation, need for surgery and pseudo-septic inflammation, were explained to the patient. Also, discussed was the potential for further injections, irrigation and debridement and surgery. Alternate means of treatment have also been discussed with the patient.    Following an appropriate discussion with the patient regarding the risks and benefits of the procedure she consented to proceed. her right hip was prepped using betadine solution and alcohol swab. Using aseptic technique and through a lateral approach, her right trochanteric bursa was injected superficially with 3 cc mixture of 2 cc of Marcaine and 1 cc of Depo-Medrol 80mg/ML into the right trochanteric bursal space. A band aid was applied to the injection site. she tolerated the injection with no immediate adverse reactions.          Electronically signed by SANTA Ayers on 7/18/2025 at 7:47 AM    This note is created with the assistance of a speech recognition program.  While intending to generate a document that actually reflects the content of the visit, the document can still have some errors including those of syntax and sound a like substitutions which may escape proof reading.  In such instances, actual meaning can be extrapolated by contextual diversion

## 2025-07-18 ENCOUNTER — TELEPHONE (OUTPATIENT)
Dept: ORTHOPEDIC SURGERY | Age: 60
End: 2025-07-18

## 2025-07-18 NOTE — TELEPHONE ENCOUNTER
----- Message from Gurdeep CESPEDES sent at 7/17/2025  9:21 AM EDT -----  Regarding: Specialty Message to Provider  Specialty Message to Provider    Relationship to Patient: Covered Entity- [facility]     Patient Message: Pharmacy is contacting Tony Chin to verify/clarify medication refill, mention pt has been getting multiple narcotic scripts from different providers and want to make sure everything is correct and there are no discrepancies. Unable to fill tramadol until they discuss with provider, please call back and ask for a pharmacist.  --------------------------------------------------------------------------------------------------------------------------    Call Back Information: OK to leave message on voicemail  Preferred Call Back Number:  [id number] - [place], [place] - [address] - P [phone] - F [phone]  [address] [address]  Phone: [phone]  Fax: [phone]

## 2025-07-21 NOTE — TELEPHONE ENCOUNTER
Spoke with pharmacist Sandra and informed her that script could be cancelled. Tony reviewed ooars report.

## 2025-07-21 NOTE — TELEPHONE ENCOUNTER
Prescription monitoring report is reviewed. Looks like patient received short course of Oxycodone from recent ED visit. Would advise cancellation of Ultram

## 2025-08-13 ENCOUNTER — APPOINTMENT (OUTPATIENT)
Dept: GENERAL RADIOLOGY | Age: 60
End: 2025-08-13
Payer: MEDICARE

## 2025-08-13 ENCOUNTER — HOSPITAL ENCOUNTER (EMERGENCY)
Age: 60
Discharge: HOME | End: 2025-08-13
Attending: EMERGENCY MEDICINE
Payer: MEDICARE

## 2025-08-13 VITALS
SYSTOLIC BLOOD PRESSURE: 155 MMHG | WEIGHT: 194 LBS | DIASTOLIC BLOOD PRESSURE: 101 MMHG | HEART RATE: 80 BPM | OXYGEN SATURATION: 95 % | HEIGHT: 62 IN | TEMPERATURE: 98.1 F | BODY MASS INDEX: 35.7 KG/M2 | RESPIRATION RATE: 16 BRPM

## 2025-08-13 DIAGNOSIS — M25.551 RIGHT HIP PAIN: Primary | ICD-10-CM

## 2025-08-13 PROCEDURE — 6370000000 HC RX 637 (ALT 250 FOR IP): Performed by: NURSE PRACTITIONER

## 2025-08-13 PROCEDURE — 73502 X-RAY EXAM HIP UNI 2-3 VIEWS: CPT

## 2025-08-13 PROCEDURE — 99283 EMERGENCY DEPT VISIT LOW MDM: CPT

## 2025-08-13 RX ORDER — HYDROCODONE BITARTRATE AND ACETAMINOPHEN 5; 325 MG/1; MG/1
1 TABLET ORAL ONCE
Status: COMPLETED | OUTPATIENT
Start: 2025-08-13 | End: 2025-08-13

## 2025-08-13 RX ORDER — HYDROCODONE BITARTRATE AND ACETAMINOPHEN 5; 325 MG/1; MG/1
1 TABLET ORAL EVERY 6 HOURS PRN
Qty: 12 TABLET | Refills: 0 | Status: SHIPPED | OUTPATIENT
Start: 2025-08-13 | End: 2025-08-16

## 2025-08-13 RX ADMIN — HYDROCODONE BITARTRATE AND ACETAMINOPHEN 1 TABLET: 5; 325 TABLET ORAL at 21:59

## 2025-08-13 ASSESSMENT — ENCOUNTER SYMPTOMS
DIARRHEA: 0
VOMITING: 0
ABDOMINAL PAIN: 0
COUGH: 0
SORE THROAT: 0
SHORTNESS OF BREATH: 0
NAUSEA: 0
SINUS PAIN: 0

## 2025-08-13 ASSESSMENT — PAIN - FUNCTIONAL ASSESSMENT
PAIN_FUNCTIONAL_ASSESSMENT: 0-10
PAIN_FUNCTIONAL_ASSESSMENT: 0-10

## 2025-08-13 ASSESSMENT — PAIN DESCRIPTION - LOCATION: LOCATION: HIP

## 2025-08-13 ASSESSMENT — PAIN SCALES - GENERAL
PAINLEVEL_OUTOF10: 10
PAINLEVEL_OUTOF10: 10

## 2025-08-13 ASSESSMENT — PAIN DESCRIPTION - ORIENTATION: ORIENTATION: RIGHT

## 2025-08-19 ENCOUNTER — TELEPHONE (OUTPATIENT)
Dept: ORTHOPEDIC SURGERY | Age: 60
End: 2025-08-19

## 2025-08-27 ENCOUNTER — HOSPITAL ENCOUNTER (OUTPATIENT)
Dept: CT IMAGING | Age: 60
Discharge: HOME | End: 2025-08-29
Payer: MEDICARE

## 2025-08-27 DIAGNOSIS — M25.561 CHRONIC KNEE PAIN AFTER TOTAL REPLACEMENT OF RIGHT KNEE JOINT: ICD-10-CM

## 2025-08-27 DIAGNOSIS — Z96.651 CHRONIC KNEE PAIN AFTER TOTAL REPLACEMENT OF RIGHT KNEE JOINT: ICD-10-CM

## 2025-08-27 DIAGNOSIS — Z96.641 S/P TOTAL RIGHT HIP ARTHROPLASTY: ICD-10-CM

## 2025-08-27 DIAGNOSIS — G89.29 CHRONIC KNEE PAIN AFTER TOTAL REPLACEMENT OF RIGHT KNEE JOINT: ICD-10-CM

## 2025-08-27 PROCEDURE — 73700 CT LOWER EXTREMITY W/O DYE: CPT

## 2025-09-05 ENCOUNTER — RESULTS FOLLOW-UP (OUTPATIENT)
Dept: ORTHOPEDIC SURGERY | Age: 60
End: 2025-09-05

## 2025-09-05 DIAGNOSIS — M54.50 LOW BACK PAIN, UNSPECIFIED BACK PAIN LATERALITY, UNSPECIFIED CHRONICITY, UNSPECIFIED WHETHER SCIATICA PRESENT: Primary | ICD-10-CM

## (undated) DEVICE — FORCEPS BX L240CM JAW DIA2.4MM ORNG L CAP W/ NDL DISP RAD

## (undated) DEVICE — ASPIRATOR FLR L72IN SUCT TB W/ 1 DETACH FISH STK PUSH HNDL

## (undated) DEVICE — SUTURE VICRYL SZ 0 L36IN ABSRB UD CT-1 L36MM 1/2 CIR TAPR PNT VCP946H

## (undated) DEVICE — INTENDED TO SUPPORT AND MAINTAIN THE POSITION OF AN ANESTHETIZED PATIENT DURING SURGERY: Brand: HERMANTOR XL PINK KNEE POSITIONING PAD

## (undated) DEVICE — KIT SEP W/ BLD DRAW TB SYR NDL TRNQT PD

## (undated) DEVICE — SVMMC ORTH SPL DRP PK

## (undated) DEVICE — DRESSING,GAUZE,XEROFORM,CURAD,1"X8",ST: Brand: CURAD

## (undated) DEVICE — BLADE ES L6IN ELASTOMERIC COAT EXT DURABLE BEND UPTO 90DEG

## (undated) DEVICE — SUTURE FIBERWIRE SZ 2 W/ TAPERED NEEDLE BLUE L38IN NONABSORB BLU L26.5MM 1/2 CIRCLE AR7200

## (undated) DEVICE — PROTECTOR ULN NRV PUR FOAM HK LOOP STRP ANATOMICALLY

## (undated) DEVICE — Z DUPLICATE USE 2527422 TUBING O2 STD 7 FT EXTN NO CRUSH VISUAL CNTRST LF

## (undated) DEVICE — 3M™ IOBAN™ 2 ANTIMICROBIAL INCISE DRAPE 6650EZ: Brand: IOBAN™ 2

## (undated) DEVICE — 2108 SERIES SAGITTAL BLADE FLARED, GROUND  (29.0 X 1.32 X 84.0MM)

## (undated) DEVICE — GLOVE ORANGE PI 8   MSG9080

## (undated) DEVICE — BITEBLOCK 54FR W/ DENT RIM BLOX

## (undated) DEVICE — Device

## (undated) DEVICE — 1010 S-DRAPE TOWEL DRAPE 10/BX: Brand: STERI-DRAPE™

## (undated) DEVICE — CANNULA NSL AD TBNG L7FT PVC STR NONFLARED PRNG O2 DEL W STD

## (undated) DEVICE — ERBE NESSY® OMEGA PLATE USA (85+23)CM² , WITH CABLE 3 M: Brand: ERBE

## (undated) DEVICE — Z DISCONTINUED USE 2424143 ADAPTER O2 SWVL CHRISTMAS TREE GRN

## (undated) DEVICE — COVER,MAYO STAND,STERILE: Brand: MEDLINE

## (undated) DEVICE — DRAPE,REIN 53X77,STERILE: Brand: MEDLINE

## (undated) DEVICE — MHPB ARTHROSCOPY PACK: Brand: MEDLINE INDUSTRIES, INC.

## (undated) DEVICE — DRESSING TRNSPAR W5XL4.5IN FLM SHT SEMIPERMEABLE WIND

## (undated) DEVICE — SOLUTION IRRIG 3000ML LAC RINGERS ARTHROMTC PLAS CONT

## (undated) DEVICE — DRAIN SURG 15FR SIL SMOOTH RND 1/8IN END PERF W/ TRCR RADPQ

## (undated) DEVICE — SUTURE STRATAFIX SPRL MCRYL + SZ 2 0 L27IN ABSRB UD W NDL SXMP1B419

## (undated) DEVICE — DEFENDO AIR WATER SUCTION AND BIOPSY VALVE KIT FOR  OLYMPUS: Brand: DEFENDO AIR/WATER/SUCTION AND BIOPSY VALVE

## (undated) DEVICE — DRESSING ALGINATE POST OPERATIVE 12X3.5 IN RECT PRIMASEAL

## (undated) DEVICE — DRESSING PETRO W3XL8IN OIL EMUL N ADH GZ KNIT IMPREG CELOS

## (undated) DEVICE — BANDAGE COBAN 6 IN WND 6INX5YD FOAM

## (undated) DEVICE — STRAP ARMBRD W1.5XL32IN FOAM STR YET SFT W/ HK AND LOOP

## (undated) DEVICE — SUTURE VCRL SZ 0 L36IN ABSRB UD CT-1 L36MM 1/2 CIR TAPR PNT VCP946H

## (undated) DEVICE — PENCIL ES L3M BTTN SWCH HOLSTER W/ BLDE ELECTRD EDGE

## (undated) DEVICE — POUCH FLD 36X29IN SHLDR HVY LO GLARE MATTE FINISH FLM 2 SUCT

## (undated) DEVICE — TUBING PMP L16FT MAIN DISP FOR AR-6400 AR-6475 Â€“ ORDER MULTIPLES OF 10 EACH

## (undated) DEVICE — BLADE,CARBON-STEEL,10,STRL,DISPOSABLE: Brand: MEDLINE

## (undated) DEVICE — BLADE SHV L13CM DIA4MM BNE CUT AGG DEB COOLCUT

## (undated) DEVICE — BLANKET WRM W29.9XL79.1IN UP BODY FORC AIR MISTRAL-AIR

## (undated) DEVICE — DRAPE,HIP,W/POUCHES,STERILE: Brand: MEDLINE

## (undated) DEVICE — KIT EVAC 0.13IN RECT TB DIA10FR 400CC PVC 3 SPR Y CONN DRN

## (undated) DEVICE — NEPTUNE E-SEP 165MM SUCTION SLEEVE: Brand: NEPTUNE E-SEP

## (undated) DEVICE — 3M™ STERI-DRAPE™ INSTRUMENT POUCH 1018L: Brand: STERI-DRAPE™

## (undated) DEVICE — CEMENT MIXING SYSTEM WITH FEMORAL BREAKWAY NOZZLE: Brand: REVOLUTION

## (undated) DEVICE — APPLICATOR MEDICATED 10.5 CC SOLUTION HI LT ORNG CHLORAPREP

## (undated) DEVICE — MARKER SURG SKIN UTIL BLK REG TIP NONSMEARING W/ 6IN RUL

## (undated) DEVICE — KIT CHAIR TRIMANO FOAM W/ SUPP ARM DRP ERGONOMICALLY DESIGNED

## (undated) DEVICE — MASTISOL ADHESIVE LIQ 2/3ML

## (undated) DEVICE — STRAP,POSITIONING,KNEE/BODY,FOAM,4X60": Brand: MEDLINE

## (undated) DEVICE — SUTURE ETHIBOND EXCEL SZ 5 L30IN NONABSORBABLE GRN L48MM CCS MB47G

## (undated) DEVICE — GOWN,POLY REINFORCED,LG: Brand: MEDLINE

## (undated) DEVICE — ENDO KIT W/SYRINGE: Brand: MEDLINE INDUSTRIES, INC.

## (undated) DEVICE — CLOSURE SKIN FLX NONINVASIVE PRELOC TECHNOLOGY FOR 24IN

## (undated) DEVICE — APPLICATOR MEDICATED 26 CC SOLUTION HI LT ORNG CHLORAPREP

## (undated) DEVICE — Z INACTIVE USE 2525529 CONNECTOR TBNG FOR O2

## (undated) DEVICE — BANDAGE COBAN 4 IN COMPR W4INXL5YD FOAM COHESIVE QUIK STK SELF ADH SFT

## (undated) DEVICE — BLANKET WRM W40.2XL55.9IN IORT LO BODY + MISTRAL AIR

## (undated) DEVICE — KIT AUTOTRNS APPL AERO 2 SET SYR 2 TIP FOR PLT SEP SYS GPS

## (undated) DEVICE — SUTURE MONOCRYL SZ 2-0 L27IN ABSRB UD SH L26MM TAPERPOINT NDL Y417H

## (undated) DEVICE — ELECTRODE ES L3IN S STL BLDE INSUL DISP VALLEYLAB EDGE

## (undated) DEVICE — SUTURE PROL SZ 3-0 L18IN NONABSORBABLE BLU L24MM FS-1 3/8 8684G

## (undated) DEVICE — OPTIFOAM GENTLE AG,POST-OP STRIP,3.5X14: Brand: MEDLINE

## (undated) DEVICE — DRAPE,U/ SHT,SPLIT,PLAS,STERIL: Brand: MEDLINE

## (undated) DEVICE — ADHESIVE SKIN CLOSURE WND 8.661X1.5 IN 22 CM LIQUIBAND SECUR

## (undated) DEVICE — ZIPPERED TOGA, 2X LARGE: Brand: FLYTE

## (undated) DEVICE — JELLY,LUBE,STERILE,FLIP TOP,TUBE,2-OZ: Brand: MEDLINE

## (undated) DEVICE — SUTURE STRATAFIX SYMMETRIC PDS + SZ 1 L18IN ABSRB VLT L48MM SXPP1A400

## (undated) DEVICE — 450 ML BOTTLE OF 0.05% CHLORHEXIDINE GLUCONATE IN 99.95% STERILE WATER FOR IRRIGATION, USP AND APPLICATOR.: Brand: IRRISEPT ANTIMICROBIAL WOUND LAVAGE

## (undated) DEVICE — FORCEPS BX L240CM JAW DIA2.8MM L CAP W/ NDL MIC MESH TOOTH

## (undated) DEVICE — SOLUTION IRRIG 1000ML 0.9% SOD CHL USP POUR PLAS BTL

## (undated) DEVICE — PROBE ABLAT XL 90DEG ASPIR BPLR RF 1 PC ELECTRD ERGO HNDL

## (undated) DEVICE — C-ARMOR C-ARM EQUIPMENT COVERS CLEAR STERILE UNIVERSAL FIT 12 PER CASE: Brand: C-ARMOR

## (undated) DEVICE — POLYP TRAP: Brand: TRAPEASE®

## (undated) DEVICE — SUTURE MONOCRYL SZ 3-0 L27IN ABSRB UD L24MM PS-1 3/8 CIR PRIM Y936H

## (undated) DEVICE — BANDAGE,GAUZE,BULKEE II,4.5"X4.1YD,STRL: Brand: MEDLINE

## (undated) DEVICE — SHOULDER STABILIZATION KIT,                                    DISPOSABLE 12 PER BOX

## (undated) DEVICE — 1LYRTR 16FR10ML100%SIL UMS SNP: Brand: MEDLINE INDUSTRIES, INC.

## (undated) DEVICE — SUTURE PDS II SZ 0 L27IN ABSRB VLT L36MM CT-1 1/2 CIR Z340H

## (undated) DEVICE — ZIPPERED TOGA, X-LARGE: Brand: FLYTE

## (undated) DEVICE — SNARE ENDOSCP L240CM LOOP W13MM DIA2.4MM SHT THROW SM OVL

## (undated) DEVICE — COOLER THER 20-31IN L CRYO COMB W/ PD KNEE TB GRAV FLO

## (undated) DEVICE — SOLUTION IRRIG 3000ML 0.9% SOD CHL USP UROMATIC PLAS CONT

## (undated) DEVICE — GLOVE SURG SZ 75 L12IN THK75MIL DK GRN LTX FREE

## (undated) DEVICE — GLOVE ORTHO 8   MSG9480

## (undated) DEVICE — SPONGE LAP W18XL18IN WHT COT 4 PLY FLD STRUNG RADPQ DISP ST 2 PER PACK

## (undated) DEVICE — STRYKER PERFORMANCE SERIES SAGITTAL BLADE: Brand: STRYKER PERFORMANCE SERIES

## (undated) DEVICE — NEEDLE 1/2 CIR SZ 6 SURG MAYO CATGUT

## (undated) DEVICE — GOWN,AURORA,NONREINFORCED,LARGE: Brand: MEDLINE

## (undated) DEVICE — STOCKINETTE,IMPERVIOUS,12X48,STERILE: Brand: MEDLINE

## (undated) DEVICE — 1016 S-DRAPE IRRIG POUCH 10/BOX: Brand: STERI-DRAPE™

## (undated) DEVICE — DUAL CUT SAGITTAL BLADE

## (undated) DEVICE — MERCY HEALTH ST CHARLES: Brand: MEDLINE INDUSTRIES, INC.

## (undated) DEVICE — GLOVE ORANGE PI 8 1/2   MSG9085

## (undated) DEVICE — SHEET, ORTHO, SPLIT, STERILE: Brand: MEDLINE

## (undated) DEVICE — GLOVE ORANGE PI 7   MSG9070

## (undated) DEVICE — SUTURE VICRYL + SZ 3-0 L36IN ABSRB UD L36MM CT-1 1/2 CIR VCP944H

## (undated) DEVICE — GLOVE SURG SZ 8 L12IN THK75MIL DK GRN LTX FREE

## (undated) DEVICE — STRIP,CLOSURE,WOUND,MEDI-STRIP,1/2X4: Brand: MEDLINE

## (undated) DEVICE — CLEANER,CAUTERY TIP,2X2",STERILE: Brand: MEDLINE

## (undated) DEVICE — 3M™ IOBAN™ 2 ANTIMICROBIAL INCISE DRAPE 6651EZ: Brand: IOBAN™ 2

## (undated) DEVICE — YANKAUER,SMOOTH HANDLE,HIGH CAPACITY: Brand: MEDLINE INDUSTRIES, INC.

## (undated) DEVICE — 4-PORT MANIFOLD: Brand: NEPTUNE 2

## (undated) DEVICE — DRESSING FOAM W4XL4IN AG SIL FACE BORD IONIC ANTIMIC ADH

## (undated) DEVICE — 3M™ STERI-DRAPE™ U-DRAPE 1015: Brand: STERI-DRAPE™

## (undated) DEVICE — DRAPE,BAR,HEAD,STERILE: Brand: MEDLINE